# Patient Record
Sex: FEMALE | Race: WHITE | Employment: UNEMPLOYED | URBAN - METROPOLITAN AREA
[De-identification: names, ages, dates, MRNs, and addresses within clinical notes are randomized per-mention and may not be internally consistent; named-entity substitution may affect disease eponyms.]

---

## 2017-03-13 DIAGNOSIS — F42.4 SKIN PICKING HABIT: ICD-10-CM

## 2017-03-13 DIAGNOSIS — G20 DEMENTIA DUE TO PARKINSON'S DISEASE WITH BEHAVIORAL DISTURBANCE (HCC): ICD-10-CM

## 2017-03-13 DIAGNOSIS — Z86.61 HISTORY OF ENCEPHALITIS: ICD-10-CM

## 2017-03-13 DIAGNOSIS — G40.909 SEIZURE DISORDER (HCC): ICD-10-CM

## 2017-03-13 DIAGNOSIS — F02.818 DEMENTIA DUE TO PARKINSON'S DISEASE WITH BEHAVIORAL DISTURBANCE (HCC): ICD-10-CM

## 2017-03-14 RX ORDER — FLUOXETINE 20 MG/1
TABLET, FILM COATED ORAL
Qty: 180 TABLET | Refills: 2 | Status: SHIPPED | OUTPATIENT
Start: 2017-03-14 | End: 2018-09-11 | Stop reason: SDUPTHER

## 2017-07-24 ENCOUNTER — HOSPITAL ENCOUNTER (OUTPATIENT)
Age: 72
Setting detail: SPECIMEN
Discharge: HOME | End: 2017-07-24
Payer: COMMERCIAL

## 2017-07-24 PROCEDURE — 82306 VITAMIN D 25 HYDROXY: CPT

## 2017-07-24 PROCEDURE — 36415 COLL VENOUS BLD VENIPUNCTURE: CPT

## 2017-07-25 LAB — VITAMIN D 25-HYDROXY: 24 NG/ML (ref 30–100)

## 2017-08-04 DIAGNOSIS — G40.909 SEIZURE DISORDER (HCC): ICD-10-CM

## 2017-08-04 DIAGNOSIS — G20 DEMENTIA DUE TO PARKINSON'S DISEASE WITH BEHAVIORAL DISTURBANCE (HCC): ICD-10-CM

## 2017-08-04 DIAGNOSIS — F02.818 DEMENTIA DUE TO PARKINSON'S DISEASE WITH BEHAVIORAL DISTURBANCE (HCC): ICD-10-CM

## 2017-08-04 DIAGNOSIS — Z86.61 HISTORY OF ENCEPHALITIS: ICD-10-CM

## 2017-08-04 DIAGNOSIS — F42.4 SKIN PICKING HABIT: ICD-10-CM

## 2017-08-04 RX ORDER — CARBAMAZEPINE 200 MG/1
TABLET ORAL
Qty: 180 TABLET | Refills: 1 | Status: SHIPPED | OUTPATIENT
Start: 2017-08-04 | End: 2018-03-01 | Stop reason: SDUPTHER

## 2017-10-11 ENCOUNTER — OFFICE VISIT (OUTPATIENT)
Dept: FAMILY MEDICINE CLINIC | Age: 72
End: 2017-10-11
Payer: COMMERCIAL

## 2017-10-11 VITALS
WEIGHT: 140 LBS | HEART RATE: 94 BPM | BODY MASS INDEX: 23.3 KG/M2 | DIASTOLIC BLOOD PRESSURE: 57 MMHG | SYSTOLIC BLOOD PRESSURE: 106 MMHG | RESPIRATION RATE: 16 BRPM

## 2017-10-11 DIAGNOSIS — R32 ENURESIS: ICD-10-CM

## 2017-10-11 DIAGNOSIS — G20 DEMENTIA DUE TO PARKINSON'S DISEASE WITHOUT BEHAVIORAL DISTURBANCE (HCC): ICD-10-CM

## 2017-10-11 DIAGNOSIS — F02.818 DEMENTIA OF THE ALZHEIMER'S TYPE, WITH LATE ONSET, WITH DELUSIONS (HCC): ICD-10-CM

## 2017-10-11 DIAGNOSIS — G30.1 DEMENTIA OF THE ALZHEIMER'S TYPE, WITH LATE ONSET, WITH DELUSIONS (HCC): ICD-10-CM

## 2017-10-11 DIAGNOSIS — Z00.00 WELL ADULT EXAM: Primary | ICD-10-CM

## 2017-10-11 DIAGNOSIS — R56.9 PARTIAL SEIZURE (HCC): ICD-10-CM

## 2017-10-11 DIAGNOSIS — E55.9 VITAMIN D DEFICIENCY: ICD-10-CM

## 2017-10-11 DIAGNOSIS — F02.80 DEMENTIA DUE TO PARKINSON'S DISEASE WITHOUT BEHAVIORAL DISTURBANCE (HCC): ICD-10-CM

## 2017-10-11 PROCEDURE — 99397 PER PM REEVAL EST PAT 65+ YR: CPT | Performed by: FAMILY MEDICINE

## 2017-10-11 RX ORDER — DESMOPRESSIN ACETATE 0.2 MG/1
0.6 TABLET ORAL NIGHTLY
Qty: 90 TABLET | Refills: 11 | Status: SHIPPED | OUTPATIENT
Start: 2017-10-11 | End: 2018-09-11 | Stop reason: SDUPTHER

## 2017-10-11 ASSESSMENT — PATIENT HEALTH QUESTIONNAIRE - PHQ9
1. LITTLE INTEREST OR PLEASURE IN DOING THINGS: 0
2. FEELING DOWN, DEPRESSED OR HOPELESS: 0
SUM OF ALL RESPONSES TO PHQ QUESTIONS 1-9: 0
SUM OF ALL RESPONSES TO PHQ9 QUESTIONS 1 & 2: 0

## 2017-10-11 NOTE — PROGRESS NOTES
[facility] PHYSICIANS  COMPREHENSIVE CARE  [address]  [place] 1977 [address] [place] [id number]  Dept: [phone]      Ted Mahan is a 74 y.o. female who presents today for follow up on her  medical conditions as noted below. Chief Complaint   Patient presents with    Incontinence     peeing bed at night       Patient Active Problem List:     Seizure [facility])     Vitamin D deficiency     Anxiety     Hypercholesteremia     Cellulitis of right leg     MRSA (methicillin resistant staph aureus) culture positive     Breakthrough seizure ([address]     Memory loss     Difficulty speaking     Seizure disorder ([address]     Dementia with behavioral disturbance     History of encephalitis     Dementia due to Parkinson's disease with behavioral disturbance ([address]     Past Medical History:   Diagnosis Date    Anxiety     Convulsion ([address]     Encephalopathy     Herpes     Hyperlipidemia     Left bundle branch block     MRSA (methicillin resistant Staphylococcus aureus)     Parkinson's disease ([address]     Seizures ([address]     secondary to encephalitis    Vitamin D deficiency       Past Surgical History:   Procedure Laterality Date    ABSCESS DRAINAGE Right 12/14/2013    WOUND EXPLORATION AND I+D  RIGHT HIP     No family history on file. Current Outpatient Prescriptions   Medication Sig Dispense Refill    desmopressin (DDAVP) 0.2 MG tablet Take 3 tablets by mouth nightly 90 tablet 11    carBAMazepine (TEGRETOL) 200 MG tablet TAKE 1 AND 1/2 TABLETS BY MOUTH TWO TIMES A DAY  180 tablet 1    FLUoxetine (PROZAC) 20 MG tablet TAKE 2 TABLETS BY MOUTH ONE TIME A DAY  180 tablet 2     No current facility-administered medications for this visit.       ALLERGIES:    Allergies   Allergen Reactions    Latex     Haldol [Haloperidol Lactate]        Social History   Substance Use Topics    Smoking status: Former Smoker     Types: Cigarettes    Smokeless tobacco: Never Used      Comment: unknown how many packs a day, or how many years    Alcohol use No        LDL Calculated (mg/dL)   Date Value   01/05/2016 127     LDL Cholesterol (mg/dL)   Date Value   06/29/2015 115     HDL (mg/dL)   Date Value   01/05/2016 56     BUN (mg/dL)   Date Value   01/05/2016 20     CREATININE (no units)   Date Value   01/05/2016 0.98     Glucose (mg/dL)   Date Value   01/05/2016 101   03/12/2012 93              Subjective:      HPI  She is here today for a well visit  Her daughter states she's been having some problems with bedwetting lately a depends is not necessarily helping the situation. She is continent during the day and there have not been any other changes. Quality & Risk Score Accuracy - MEDICARE ADVANTAGE    Visit Dx:  Partial seizure ([address]  Stable based upon review of recent symptoms. Continue current treatment plan and follow up at least yearly. Visit Dx:  Dementia of the Alzheimer's type, with late onset, with delusions ([address]  Stable based upon review of recent symptoms and exam. Continue current treatment plan and follow-up at least yearly. Last edited 10/11/17 16:47 EDT by Juan Rudolph DO         iew of Systems:     Constitutional: Negative for fever, appetite change and fatigue. Family social and medical history reviewed and unchanged     HENT: Negative. Negative for nosebleeds, trouble swallowing and neck pain. Eyes: Negative for photophobia and visual disturbance. Respiratory: Negative. Negative for chest tightness and shortness of breath. Cardiovascular: Negative. Negative for chest pain and leg swelling. Gastrointestinal: Negative. Negative for abdominal pain and blood in stool. Endocrine: Negative for cold intolerance and polyuria. Genitourinary: Negative for dysuria and hematuria. Musculoskeletal: Negative. Skin: Negative for rash. Allergic/Immunologic: Negative. Neurological: Negative. Negative for dizziness, weakness and numbness. Hematological: Negative. Negative for adenopathy.  Does not bruise/bleed easily. Psychiatric/Behavioral: Negative for sleep disturbance, dysphoric mood and  decreased concentration. The patient is not nervous/anxious. Objective:     Physical Exam:     Nursing note and vitals reviewed. BP (!) 106/57   Pulse 94   Resp 16   Wt 140 lb (63.5 kg)   BMI 23.30 kg/m²   Constitutional: She is oriented to person, place, and time. She   appears well-developed and well-nourished. HENT:   Head: Normocephalic and atraumatic. Right Ear: External ear normal. Tympanic membrane is not erythematous. No middle ear effusion. Left Ear: External ear normal. Tympanic membrane is not erythematous. No middle ear effusion. Nose: No mucosal edema. Mouth/Throat: Oropharynx is clear and moist. No posterior oropharyngeal erythema. Eyes: Conjunctivae and EOM are normal. Pupils are equal, round, and reactive to light. Neck: Normal range of motion. Neck supple. No thyromegaly present. Cardiovascular: Normal rate, regular rhythm and normal heart sounds. No murmur heard. Pulmonary/Chest: Effort normal and breath sounds normal. She has no wheezes. Shehas no rales. Abdominal: Soft. Bowel sounds are normal. She exhibits no distension and no mass. There is no tenderness. There is no rebound and no guarding. Genitourinary/Anorectal:deferred  Musculoskeletal: Normal range of motion. She exhibits no edema or tenderness. Lymphadenopathy: She has no cervical adenopathy. Neurological: She is alert and oriented to person, place, and time. She has normal reflexes. Skin: Skin is warm and dry. No rash noted. Psychiatric: She has a normal mood and affect. Her   behavior is normal.       Assessment:      1. Well adult exam    2. Vitamin D deficiency    3. Enuresis    4. Partial seizure ([address]    5. Dementia of the Alzheimer's type, with late onset, with delusions ([address]    6.  Dementia due to Parkinson's disease without behavioral disturbance ([address]          Plan:      Call or return to clinic prn if these symptoms worsen or fail to improve as anticipated. I have reviewed the instructions with the patient, answering all questions to her satisfaction. No Follow-up on file.   Orders Placed This Encounter   Procedures    CBC Auto Differential     Standing Status:   Future     Standing Expiration Date:   10/11/2018    Comprehensive Metabolic Panel     Standing Status:   Future     Standing Expiration Date:   10/11/2018    Lipid Panel     Standing Status:   Future     Standing Expiration Date:   10/11/2018     Order Specific Question:   Is Patient Fasting?/# of Hours     Answer:   yes    T4, Free     Standing Status:   Future     Standing Expiration Date:   10/11/2018    TSH without Reflex     Standing Status:   Future     Standing Expiration Date:   10/11/2018    Vitamin D 25 Hydroxy     Standing Status:   Future     Standing Expiration Date:   10/11/2018    PTH, Intact     Standing Status:   Future     Standing Expiration Date:   10/11/2018     Orders Placed This Encounter   Medications    desmopressin (DDAVP) 0.2 MG tablet     Sig: Take 3 tablets by mouth nightly     Dispense:  90 tablet     Refill:  11     Try the above medication if this does not help possibly using a bladder control agent for OAB might help  Daughters notify me as to how things are going    Electronically signed by Ngoc So, DO on 10/11/2017 at 4:49 PM

## 2018-03-01 DIAGNOSIS — Z86.61 HISTORY OF ENCEPHALITIS: ICD-10-CM

## 2018-03-01 DIAGNOSIS — F02.818 DEMENTIA DUE TO PARKINSON'S DISEASE WITH BEHAVIORAL DISTURBANCE (HCC): ICD-10-CM

## 2018-03-01 DIAGNOSIS — G20 DEMENTIA DUE TO PARKINSON'S DISEASE WITH BEHAVIORAL DISTURBANCE (HCC): ICD-10-CM

## 2018-03-01 DIAGNOSIS — G40.909 SEIZURE DISORDER (HCC): ICD-10-CM

## 2018-03-01 DIAGNOSIS — F42.4 SKIN PICKING HABIT: ICD-10-CM

## 2018-03-01 RX ORDER — CARBAMAZEPINE 200 MG/1
TABLET ORAL
Qty: 180 TABLET | Refills: 0 | Status: SHIPPED | OUTPATIENT
Start: 2018-03-01 | End: 2018-05-24 | Stop reason: SDUPTHER

## 2018-05-24 DIAGNOSIS — G20 DEMENTIA DUE TO PARKINSON'S DISEASE WITH BEHAVIORAL DISTURBANCE (HCC): ICD-10-CM

## 2018-05-24 DIAGNOSIS — F42.4 SKIN PICKING HABIT: ICD-10-CM

## 2018-05-24 DIAGNOSIS — G40.909 SEIZURE DISORDER (HCC): ICD-10-CM

## 2018-05-24 DIAGNOSIS — F02.818 DEMENTIA DUE TO PARKINSON'S DISEASE WITH BEHAVIORAL DISTURBANCE (HCC): ICD-10-CM

## 2018-05-24 DIAGNOSIS — Z86.61 HISTORY OF ENCEPHALITIS: ICD-10-CM

## 2018-05-24 RX ORDER — CARBAMAZEPINE 200 MG/1
TABLET ORAL
Qty: 180 TABLET | Refills: 0 | Status: SHIPPED | OUTPATIENT
Start: 2018-05-24 | End: 2018-09-11 | Stop reason: SDUPTHER

## 2018-07-24 ENCOUNTER — TELEPHONE (OUTPATIENT)
Dept: FAMILY MEDICINE CLINIC | Age: 73
End: 2018-07-24

## 2018-07-24 NOTE — TELEPHONE ENCOUNTER
Asked daughter Stevie Nuno to schedule appointment. She is out of town until middle of August.  She will call back to schedule an appointment for [facility].

## 2018-08-21 ENCOUNTER — TELEPHONE (OUTPATIENT)
Dept: FAMILY MEDICINE CLINIC | Age: 73
End: 2018-08-21

## 2018-09-07 ENCOUNTER — HOSPITAL ENCOUNTER (EMERGENCY)
Age: 73
Discharge: HOME | End: 2018-09-07
Attending: EMERGENCY MEDICINE
Payer: COMMERCIAL

## 2018-09-07 VITALS
HEART RATE: 81 BPM | RESPIRATION RATE: 16 BRPM | BODY MASS INDEX: 22.29 KG/M2 | TEMPERATURE: 98 F | DIASTOLIC BLOOD PRESSURE: 70 MMHG | OXYGEN SATURATION: 97 % | SYSTOLIC BLOOD PRESSURE: 123 MMHG | HEIGHT: 67 IN | WEIGHT: 142 LBS

## 2018-09-07 DIAGNOSIS — N30.01 ACUTE CYSTITIS WITH HEMATURIA: Primary | ICD-10-CM

## 2018-09-07 DIAGNOSIS — N93.9 VAGINAL BLEEDING: ICD-10-CM

## 2018-09-07 LAB
-: ABNORMAL
ABSOLUTE EOS #: 0.8 K/UL (ref 0–0.4)
ABSOLUTE IMMATURE GRANULOCYTE: ABNORMAL K/UL (ref 0–0.3)
ABSOLUTE LYMPH #: 2.1 K/UL (ref 1–4.8)
ABSOLUTE MONO #: 1.1 K/UL (ref 0.1–1.3)
AMORPHOUS: ABNORMAL
ANION GAP SERPL CALCULATED.3IONS-SCNC: 11 MMOL/L (ref 9–17)
BACTERIA: ABNORMAL
BASOPHILS # BLD: 1 % (ref 0–2)
BASOPHILS ABSOLUTE: 0.1 K/UL (ref 0–0.2)
BILIRUBIN URINE: NEGATIVE
BUN BLDV-MCNC: 20 MG/DL (ref 8–23)
BUN/CREAT BLD: ABNORMAL (ref 9–20)
CALCIUM SERPL-MCNC: 9.2 MG/DL (ref 8.6–10.4)
CASTS UA: ABNORMAL /LPF
CHLORIDE BLD-SCNC: 101 MMOL/L (ref 98–107)
CO2: 26 MMOL/L (ref 20–31)
COLOR: YELLOW
COMMENT UA: ABNORMAL
CREAT SERPL-MCNC: 0.93 MG/DL (ref 0.5–0.9)
CRYSTALS, UA: ABNORMAL /HPF
DIFFERENTIAL TYPE: ABNORMAL
DIRECT EXAM: NORMAL
EOSINOPHILS RELATIVE PERCENT: 10 % (ref 0–4)
EPITHELIAL CELLS UA: ABNORMAL /HPF
GFR AFRICAN AMERICAN: >60 ML/MIN
GFR NON-AFRICAN AMERICAN: 59 ML/MIN
GFR SERPL CREATININE-BSD FRML MDRD: ABNORMAL ML/MIN/{1.73_M2}
GFR SERPL CREATININE-BSD FRML MDRD: ABNORMAL ML/MIN/{1.73_M2}
GLUCOSE BLD-MCNC: 115 MG/DL (ref 70–99)
GLUCOSE URINE: NEGATIVE
HCT VFR BLD CALC: 40.3 % (ref 36–46)
HEMOGLOBIN: 13.5 G/DL (ref 12–16)
IMMATURE GRANULOCYTES: ABNORMAL %
INR BLD: 1
KETONES, URINE: NEGATIVE
LEUKOCYTE ESTERASE, URINE: ABNORMAL
LYMPHOCYTES # BLD: 25 % (ref 24–44)
Lab: NORMAL
MCH RBC QN AUTO: 32 PG (ref 26–34)
MCHC RBC AUTO-ENTMCNC: 33.4 G/DL (ref 31–37)
MCV RBC AUTO: 95.7 FL (ref 80–100)
MONOCYTES # BLD: 13 % (ref 1–7)
MUCUS: ABNORMAL
NITRITE, URINE: NEGATIVE
NRBC AUTOMATED: ABNORMAL PER 100 WBC
OTHER OBSERVATIONS UA: ABNORMAL
PARTIAL THROMBOPLASTIN TIME: 26.1 SEC (ref 23–31)
PDW BLD-RTO: 13.4 % (ref 11.5–14.9)
PH UA: 6 (ref 5–8)
PLATELET # BLD: 331 K/UL (ref 150–450)
PLATELET ESTIMATE: ABNORMAL
PMV BLD AUTO: 7.5 FL (ref 6–12)
POTASSIUM SERPL-SCNC: 4.2 MMOL/L (ref 3.7–5.3)
PROTEIN UA: ABNORMAL
PROTHROMBIN TIME: 10 SEC (ref 9.7–12)
RBC # BLD: 4.21 M/UL (ref 4–5.2)
RBC # BLD: ABNORMAL 10*6/UL
RBC UA: ABNORMAL /HPF
RENAL EPITHELIAL, UA: ABNORMAL /HPF
SEG NEUTROPHILS: 51 % (ref 36–66)
SEGMENTED NEUTROPHILS ABSOLUTE COUNT: 4.3 K/UL (ref 1.3–9.1)
SODIUM BLD-SCNC: 138 MMOL/L (ref 135–144)
SPECIFIC GRAVITY UA: 1.02 (ref 1–1.03)
SPECIMEN DESCRIPTION: NORMAL
STATUS: NORMAL
TRICHOMONAS: ABNORMAL
TURBIDITY: ABNORMAL
URINE HGB: NEGATIVE
UROBILINOGEN, URINE: NORMAL
WBC # BLD: 8.5 K/UL (ref 3.5–11)
WBC # BLD: ABNORMAL 10*3/UL
WBC UA: ABNORMAL /HPF
YEAST: ABNORMAL

## 2018-09-07 PROCEDURE — 87480 CANDIDA DNA DIR PROBE: CPT

## 2018-09-07 PROCEDURE — 81001 URINALYSIS AUTO W/SCOPE: CPT

## 2018-09-07 PROCEDURE — 6370000000 HC RX 637 (ALT 250 FOR IP): Performed by: STUDENT IN AN ORGANIZED HEALTH CARE EDUCATION/TRAINING PROGRAM

## 2018-09-07 PROCEDURE — 99284 EMERGENCY DEPT VISIT MOD MDM: CPT

## 2018-09-07 PROCEDURE — 87591 N.GONORRHOEAE DNA AMP PROB: CPT

## 2018-09-07 PROCEDURE — 36415 COLL VENOUS BLD VENIPUNCTURE: CPT

## 2018-09-07 PROCEDURE — 87660 TRICHOMONAS VAGIN DIR PROBE: CPT

## 2018-09-07 PROCEDURE — 87086 URINE CULTURE/COLONY COUNT: CPT

## 2018-09-07 PROCEDURE — 87510 GARDNER VAG DNA DIR PROBE: CPT

## 2018-09-07 PROCEDURE — 85730 THROMBOPLASTIN TIME PARTIAL: CPT

## 2018-09-07 PROCEDURE — 85610 PROTHROMBIN TIME: CPT

## 2018-09-07 PROCEDURE — 80048 BASIC METABOLIC PNL TOTAL CA: CPT

## 2018-09-07 PROCEDURE — 85025 COMPLETE CBC W/AUTO DIFF WBC: CPT

## 2018-09-07 PROCEDURE — 87491 CHLMYD TRACH DNA AMP PROBE: CPT

## 2018-09-07 RX ORDER — CEPHALEXIN 500 MG/1
500 CAPSULE ORAL 2 TIMES DAILY
Qty: 14 CAPSULE | Refills: 0 | Status: SHIPPED | OUTPATIENT
Start: 2018-09-07 | End: 2018-09-14

## 2018-09-07 RX ORDER — CEPHALEXIN 250 MG/1
500 CAPSULE ORAL ONCE
Status: COMPLETED | OUTPATIENT
Start: 2018-09-07 | End: 2018-09-07

## 2018-09-07 RX ADMIN — CEPHALEXIN 500 MG: 250 CAPSULE ORAL at 21:12

## 2018-09-07 NOTE — ED NOTES
Pt ambulated to the restroom with daughter. Gait even and steady.       Mela Taveras, MITCH  09/07/18 1929

## 2018-09-07 NOTE — ED PROVIDER NOTES
[facility] [address] [facility] ED  Emergency Department Encounter  Emergency Medicine Resident     Pt Name: Kyleigh Nobles  MRN: [id number]  [place] 1945  Date of evaluation: 9/7/18  PCP:  Abdoulaye Braga, [address] [place] [address]       Chief Complaint   Patient presents with    Vaginal Bleeding     possible wound in perineal area       HISTORY OF PRESENT ILLNESS  (Location/Symptom, Timing/Onset, Context/Setting, Quality, Duration, Modifying Factors, Severity.)      Kyleigh Nobles is a 74 y.o. female who presents with concern for vaginal bleeding noted by daughter when changing the patient. Patient has a past history of hyperlipidemia, encephalopathy, and Parkinson's disease. She states that her only medication is Tegretol. Daughter states that she noted a small amount of blood when changing the patient yesterday. At this time the patient has no complaints, however the patient has a history of dementia. History is significantly limited by patient's history of dementia. PAST MEDICAL / SURGICAL / SOCIAL / FAMILY HISTORY      has a past medical history of Anxiety; Convulsion ([address] Encephalopathy; Herpes; Hyperlipidemia; Left bundle branch block; MRSA (methicillin resistant Staphylococcus aureus); Parkinson's disease ([address] Seizures ([address] and Vitamin D deficiency. has a past surgical history that includes Abscess Drainage (Right, 12/14/2013). Social History     Social History    Marital status: Single     Spouse name: N/A    Number of children: N/A    Years of education: N/A     Occupational History    Not on file. Social History Main Topics    Smoking status: Former Smoker     Types: Cigarettes    Smokeless tobacco: Never Used      Comment: unknown how many packs a day, or how many years    Alcohol use No    Drug use: No    Sexual activity: Not on file     Other Topics Concern    Not on file     Social History Narrative    No narrative on file       History reviewed.  No pertinent family history. Allergies:  Latex and Haldol [haloperidol lactate]    Home Medications:  Prior to Admission medications    Medication Sig Start Date End Date Taking? Authorizing Provider   cephALEXin (KEFLEX) 500 MG capsule Take 1 capsule by mouth 2 times daily for 7 days 9/7/18 9/14/18 Yes Consuelo Cunningham MD   carBAMazepine (TEGRETOL) 200 MG tablet take 1 and 1/2 tablets by mouth twice a day 5/24/18   Sravani Wu, DO   desmopressin (DDAVP) 0.2 MG tablet Take 3 tablets by mouth nightly 10/11/17   Sravani Wu DO   FLUoxetine (PROZAC) 20 MG tablet TAKE 2 TABLETS BY MOUTH ONE TIME A DAY  3/14/17   Sravani Wu, DO       REVIEW OF SYSTEMS    (2-9 systems for level 4, 10 or more for level 5)      Review of Systems   Unable to perform ROS: Dementia       PHYSICAL EXAM   (up to 7 for level 4, 8 or more for level 5)      INITIAL VITALS:   /70   Pulse 81   Temp 98 °F (36.7 °C) (Oral)   Resp 16   Ht 5' 7\" (1.702 m)   Wt 142 lb (64.4 kg)   SpO2 97%   BMI 22.24 kg/m²     Physical Exam   Constitutional: She appears well-developed and well-nourished. No distress. HENT:   Mouth/Throat: Oropharynx is clear and moist.   Cardiovascular: Normal rate, regular rhythm and normal heart sounds. No murmur heard. Pulmonary/Chest: Effort normal and breath sounds normal. No respiratory distress. She has no wheezes. She has no rales. Abdominal: Soft. Bowel sounds are normal. She exhibits no distension. There is no tenderness. There is no rebound and no guarding. Genitourinary: Rectal exam shows guaiac negative stool. No vaginal discharge found. Genitourinary Comments: Cervix could not be visualized, no vaginal lesions, no discharge. Guaiac negative on dark brown stool. No internal or external hemorrhoids. Skin: Skin is warm and dry.        DIFFERENTIAL  DIAGNOSIS     PLAN (LABS / IMAGING / EKG):  Orders Placed This Encounter   Procedures    Vaginitis DNA Probe    C.trachomatis N.gonorrhoeae DNA    Urine culture clean catch    Urinalysis Reflex to Culture    CBC Auto Differential    Basic Metabolic Panel    Protime-INR    APTT    Microscopic Urinalysis    Vaginal exam       MEDICATIONS ORDERED:  Orders Placed This Encounter   Medications    cephALEXin (KEFLEX) capsule 500 mg    cephALEXin (KEFLEX) 500 MG capsule     Sig: Take 1 capsule by mouth 2 times daily for 7 days     Dispense:  14 capsule     Refill:  0       DDX: Atrophic vaginitis, vaginal mass, urinary tract infection, STI, bacterial vaginosis, candidal vulvovaginitis, rectal bleeding    DIAGNOSTIC RESULTS / EMERGENCY DEPARTMENT COURSE / MDM     LABS:  Results for orders placed or performed during the hospital encounter of 09/07/18   Vaginitis DNA Probe   Result Value Ref Range    Specimen Description . VAGINA     Special Requests NOT REPORTED     Direct Exam NEGATIVE for Gardnerella vaginalis     Direct Exam NEGATIVE for Candida sp. Direct Exam NEGATIVE for Trichomonas vaginalis     Direct Exam       Method of testing is a DNA probe intended for detection and identification of    Direct Exam        Candida species, Gardnerella vaginalis, and Trichomonas vaginalis nucleic acid    Direct Exam        in vaginal fluid specimens from patients with symptoms of vaginitis/vaginosis.     Status FINAL 09/07/2018    Urinalysis Reflex to Culture   Result Value Ref Range    Color, UA YELLOW YEL    Turbidity UA CLOUDY (A) CLEAR    Glucose, Ur NEGATIVE NEG    Bilirubin Urine NEGATIVE NEG    Ketones, Urine NEGATIVE NEG    Specific Gravity, UA 1.025 1.000 - 1.030    Urine Hgb NEGATIVE NEG    pH, UA 6.0 5.0 - 8.0    Protein, UA TRACE (A) NEG    Urobilinogen, Urine Normal NORM    Nitrite, Urine NEGATIVE NEG    Leukocyte Esterase, Urine MOD (A) NEG    Urinalysis Comments NOT REPORTED    CBC Auto Differential   Result Value Ref Range    WBC 8.5 3.5 - 11.0 k/uL    RBC 4.21 4.0 - 5.2 m/uL    Hemoglobin 13.5 12.0 - 16.0 g/dL    Hematocrit 40.3 36 - 46 %    MCV 95.7 80 Resident    (Please note that portions of this note were completed with a voice recognition program.  Efforts were made to edit the dictations but occasionally words are mis-transcribed.)     Aracelis Galdamez MD  09/08/18 0816

## 2018-09-08 LAB
CULTURE: NORMAL
Lab: NORMAL
SPECIMEN DESCRIPTION: NORMAL
STATUS: NORMAL

## 2018-09-10 LAB
C TRACH DNA GENITAL QL NAA+PROBE: NEGATIVE
N. GONORRHOEAE DNA: NEGATIVE

## 2018-09-11 ENCOUNTER — OFFICE VISIT (OUTPATIENT)
Dept: FAMILY MEDICINE CLINIC | Age: 73
End: 2018-09-11
Payer: COMMERCIAL

## 2018-09-11 VITALS
HEIGHT: 67 IN | BODY MASS INDEX: 21.5 KG/M2 | WEIGHT: 137 LBS | HEART RATE: 77 BPM | DIASTOLIC BLOOD PRESSURE: 69 MMHG | SYSTOLIC BLOOD PRESSURE: 108 MMHG | RESPIRATION RATE: 16 BRPM

## 2018-09-11 DIAGNOSIS — R32 ENURESIS: ICD-10-CM

## 2018-09-11 DIAGNOSIS — G20 DEMENTIA DUE TO PARKINSON'S DISEASE WITH BEHAVIORAL DISTURBANCE (HCC): ICD-10-CM

## 2018-09-11 DIAGNOSIS — Z12.39 SCREENING FOR BREAST CANCER: ICD-10-CM

## 2018-09-11 DIAGNOSIS — F03.91 DEMENTIA WITH BEHAVIORAL DISTURBANCE, UNSPECIFIED DEMENTIA TYPE: ICD-10-CM

## 2018-09-11 DIAGNOSIS — G40.919 BREAKTHROUGH SEIZURE (HCC): ICD-10-CM

## 2018-09-11 DIAGNOSIS — Z00.00 WELL ADULT EXAM: Primary | ICD-10-CM

## 2018-09-11 DIAGNOSIS — F02.818 DEMENTIA DUE TO PARKINSON'S DISEASE WITH BEHAVIORAL DISTURBANCE (HCC): ICD-10-CM

## 2018-09-11 DIAGNOSIS — Z86.61 HISTORY OF ENCEPHALITIS: ICD-10-CM

## 2018-09-11 DIAGNOSIS — Z12.11 SCREENING FOR COLON CANCER: ICD-10-CM

## 2018-09-11 DIAGNOSIS — F42.4 SKIN PICKING HABIT: ICD-10-CM

## 2018-09-11 DIAGNOSIS — R56.9 SEIZURE (HCC): ICD-10-CM

## 2018-09-11 DIAGNOSIS — Z23 NEED FOR VACCINATION WITH 13-POLYVALENT PNEUMOCOCCAL CONJUGATE VACCINE: ICD-10-CM

## 2018-09-11 DIAGNOSIS — Z23 NEED FOR INFLUENZA VACCINATION: ICD-10-CM

## 2018-09-11 DIAGNOSIS — G40.909 SEIZURE DISORDER (HCC): ICD-10-CM

## 2018-09-11 PROCEDURE — 99397 PER PM REEVAL EST PAT 65+ YR: CPT | Performed by: FAMILY MEDICINE

## 2018-09-11 PROCEDURE — 90662 IIV NO PRSV INCREASED AG IM: CPT | Performed by: FAMILY MEDICINE

## 2018-09-11 PROCEDURE — 90670 PCV13 VACCINE IM: CPT | Performed by: FAMILY MEDICINE

## 2018-09-11 PROCEDURE — G0008 ADMIN INFLUENZA VIRUS VAC: HCPCS | Performed by: FAMILY MEDICINE

## 2018-09-11 PROCEDURE — G0009 ADMIN PNEUMOCOCCAL VACCINE: HCPCS | Performed by: FAMILY MEDICINE

## 2018-09-11 RX ORDER — DESMOPRESSIN ACETATE 0.2 MG/1
0.6 TABLET ORAL NIGHTLY
Qty: 90 TABLET | Refills: 11 | Status: SHIPPED | OUTPATIENT
Start: 2018-09-11 | End: 2019-10-24 | Stop reason: SDUPTHER

## 2018-09-11 RX ORDER — CARBAMAZEPINE 200 MG/1
TABLET ORAL
Qty: 180 TABLET | Refills: 3 | Status: SHIPPED | OUTPATIENT
Start: 2018-09-11 | End: 2019-05-28 | Stop reason: SDUPTHER

## 2018-09-11 RX ORDER — FLUOXETINE 20 MG/1
TABLET, FILM COATED ORAL
Qty: 180 TABLET | Refills: 3 | Status: SHIPPED | OUTPATIENT
Start: 2018-09-11 | End: 2019-09-19 | Stop reason: SDUPTHER

## 2018-09-11 ASSESSMENT — PATIENT HEALTH QUESTIONNAIRE - PHQ9
SUM OF ALL RESPONSES TO PHQ QUESTIONS 1-9: 0
SUM OF ALL RESPONSES TO PHQ QUESTIONS 1-9: 0
2. FEELING DOWN, DEPRESSED OR HOPELESS: 0
SUM OF ALL RESPONSES TO PHQ9 QUESTIONS 1 & 2: 0
1. LITTLE INTEREST OR PLEASURE IN DOING THINGS: 0

## 2018-09-11 NOTE — PROGRESS NOTES
Pulse 77   Resp 16   Ht 5' 7\" (1.702 m)   Wt 137 lb (62.1 kg)   Breastfeeding? No   BMI 21.46 kg/m²   Constitutional: She is oriented to person, place, and time. She   appears well-developed and well-nourished. HENT:   Head: Normocephalic and atraumatic. Right Ear: External ear normal. Tympanic membrane is not erythematous. No middle ear effusion. Left Ear: External ear normal. Tympanic membrane is not erythematous. No middle ear effusion. Nose: No mucosal edema. Mouth/Throat: Oropharynx is clear and moist. No posterior oropharyngeal erythema. Eyes: Conjunctivae and EOM are normal. Pupils are equal, round, and reactive to light. Neck: Normal range of motion. Neck supple. No thyromegaly present. Cardiovascular: Normal rate, regular rhythm and normal heart sounds. No murmur heard. Pulmonary/Chest: Effort normal and breath sounds normal. She has no wheezes. Shehas no rales. Abdominal: Soft. Bowel sounds are normal. She exhibits no distension and no mass. There is no tenderness. There is no rebound and no guarding. Genitourinary/Anorectal:deferred  Musculoskeletal: Normal range of motion. She exhibits no edema or tenderness. Lymphadenopathy: She has no cervical adenopathy. Neurological: She is alert and oriented to person. She has normal reflexes. Skin: Skin is warm and dry. No rash noted. Psychiatric: She has a normal mood and affect. Her   behavior is normal.       Assessment:      1. Well adult exam    2. Screening for breast cancer    3. Screening for colon cancer    4. History of encephalitis    5. Seizure disorder ([address]    6. Dementia due to Parkinson's disease with behavioral disturbance ([address]    7. Skin picking habit    8. Enuresis    9. Breakthrough seizure ([address]    10. Seizure ([address]    11. Dementia with behavioral disturbance, unspecified dementia type    12. Need for influenza vaccination    13.  Need for vaccination with 13-polyvalent pneumococcal conjugate vaccine Plan:      Call or return to clinic prn if these symptoms worsen or fail to improve as anticipated. I have reviewed the instructions with the patient, answering all questions to her satisfaction. No Follow-up on file.   Orders Placed This Encounter   Procedures    MC Screen Routine     Standing Status:   Future     Standing Expiration Date:   11/11/2019     Order Specific Question:   Reason for exam:     Answer:   screening    INFLUENZA, HIGH DOSE, 65 YRS +, IM, PF, PREFILL SYR, 0.5ML (FLUZONE HD)    PREVNAR 13 IM (Pneumococcal conjugate vaccine 13-valent)    CBC Auto Differential     Standing Status:   Future     Standing Expiration Date:   9/11/2019    Comprehensive Metabolic Panel     Standing Status:   Future     Standing Expiration Date:   9/11/2019    Lipid Panel     Standing Status:   Future     Standing Expiration Date:   9/11/2019     Order Specific Question:   Is Patient Fasting?/# of Hours     Answer:   yes    T4, Free     Standing Status:   Future     Standing Expiration Date:   9/11/2019    Thyroid Peroxidase Antibody     Standing Status:   Future     Standing Expiration Date:   9/11/2019    TSH without Reflex     Standing Status:   Future     Standing Expiration Date:   9/11/2019    Vitamin D 25 Hydroxy     Standing Status:   Future     Standing Expiration Date:   9/11/2019    PTH, Intact     Standing Status:   Future     Standing Expiration Date:   9/11/2019    [facility] [address], DO*     Referral Priority:   Routine     Referral Type:   Consult for Advice and Opinion     Referral Reason:   Specialty Services Required     Referred to Provider:   Deven Lemus MD     Requested Specialty:   Gastroenterology     Number of Visits Requested:   1     Orders Placed This Encounter   Medications    carBAMazepine (TEGRETOL) 200 MG tablet     Sig: take 1 and 1/2 tablets by mouth twice a day     Dispense:  180 tablet     Refill:  3    desmopressin (DDAVP) 0.2 MG tablet     Sig: Take 3 tablets by mouth nightly     Dispense:  90 tablet     Refill:  11    FLUoxetine (PROZAC) 20 MG tablet     Sig: TAKE 2 TABLETS BY MOUTH ONE TIME A DAY     Dispense:  180 tablet     Refill:  3   GIVEN A PREVNAR 13 AND A HIGH DOSE FLU  We'll order testing but daughter and I agree that she has not been any mental capacity to get any of the testing done   Electronically signed by Otto Foss DO on 9/11/2018 at 12:28 PM

## 2019-05-28 DIAGNOSIS — Z86.61 HISTORY OF ENCEPHALITIS: ICD-10-CM

## 2019-05-28 DIAGNOSIS — G40.909 SEIZURE DISORDER (HCC): ICD-10-CM

## 2019-05-28 DIAGNOSIS — G20 DEMENTIA DUE TO PARKINSON'S DISEASE WITH BEHAVIORAL DISTURBANCE (HCC): ICD-10-CM

## 2019-05-28 DIAGNOSIS — F02.818 DEMENTIA DUE TO PARKINSON'S DISEASE WITH BEHAVIORAL DISTURBANCE (HCC): ICD-10-CM

## 2019-05-28 DIAGNOSIS — F42.4 SKIN PICKING HABIT: ICD-10-CM

## 2019-05-29 RX ORDER — CARBAMAZEPINE 200 MG/1
TABLET ORAL
Qty: 180 TABLET | Refills: 2 | Status: SHIPPED | OUTPATIENT
Start: 2019-05-29 | End: 2020-06-11

## 2019-09-19 DIAGNOSIS — G40.909 SEIZURE DISORDER (HCC): ICD-10-CM

## 2019-09-19 DIAGNOSIS — F02.818 DEMENTIA DUE TO PARKINSON'S DISEASE WITH BEHAVIORAL DISTURBANCE (HCC): ICD-10-CM

## 2019-09-19 DIAGNOSIS — Z86.61 HISTORY OF ENCEPHALITIS: ICD-10-CM

## 2019-09-19 DIAGNOSIS — F42.4 SKIN PICKING HABIT: ICD-10-CM

## 2019-09-19 DIAGNOSIS — G20 DEMENTIA DUE TO PARKINSON'S DISEASE WITH BEHAVIORAL DISTURBANCE (HCC): ICD-10-CM

## 2019-09-19 RX ORDER — FLUOXETINE HYDROCHLORIDE 20 MG/1
CAPSULE ORAL
Qty: 180 CAPSULE | Refills: 2 | Status: SHIPPED | OUTPATIENT
Start: 2019-09-19 | End: 2020-07-14

## 2019-10-07 ENCOUNTER — OFFICE VISIT (OUTPATIENT)
Dept: ORTHOPEDIC SURGERY | Age: 74
End: 2019-10-07
Payer: COMMERCIAL

## 2019-10-07 VITALS
SYSTOLIC BLOOD PRESSURE: 123 MMHG | WEIGHT: 132.4 LBS | DIASTOLIC BLOOD PRESSURE: 76 MMHG | HEIGHT: 67 IN | BODY MASS INDEX: 20.78 KG/M2 | HEART RATE: 83 BPM

## 2019-10-07 DIAGNOSIS — S42.202A CLOSED FRACTURE OF PROXIMAL END OF LEFT HUMERUS, UNSPECIFIED FRACTURE MORPHOLOGY, INITIAL ENCOUNTER: Primary | ICD-10-CM

## 2019-10-07 PROCEDURE — 99203 OFFICE O/P NEW LOW 30 MIN: CPT | Performed by: ORTHOPAEDIC SURGERY

## 2019-10-07 RX ORDER — HYDROCODONE BITARTRATE AND ACETAMINOPHEN 5; 325 MG/1; MG/1
1 TABLET ORAL EVERY 4 HOURS PRN
Qty: 42 TABLET | Refills: 0 | Status: SHIPPED | OUTPATIENT
Start: 2019-10-07 | End: 2019-10-14

## 2019-10-07 RX ORDER — ACETAMINOPHEN 500 MG
500 TABLET ORAL EVERY 6 HOURS PRN
COMMUNITY
End: 2019-10-17

## 2019-10-07 ASSESSMENT — ENCOUNTER SYMPTOMS
ABDOMINAL PAIN: 0
CHEST TIGHTNESS: 0
VOMITING: 0
SHORTNESS OF BREATH: 0
COUGH: 0
COLOR CHANGE: 0
DIARRHEA: 0
NAUSEA: 0
CONSTIPATION: 0
ABDOMINAL DISTENTION: 0
APNEA: 0

## 2019-10-16 ENCOUNTER — CARE COORDINATION (OUTPATIENT)
Dept: CARE COORDINATION | Age: 74
End: 2019-10-16

## 2019-10-16 ENCOUNTER — OFFICE VISIT (OUTPATIENT)
Dept: FAMILY MEDICINE CLINIC | Age: 74
End: 2019-10-16
Payer: COMMERCIAL

## 2019-10-16 VITALS
WEIGHT: 139.4 LBS | HEART RATE: 90 BPM | OXYGEN SATURATION: 97 % | RESPIRATION RATE: 16 BRPM | SYSTOLIC BLOOD PRESSURE: 126 MMHG | DIASTOLIC BLOOD PRESSURE: 68 MMHG | HEIGHT: 68 IN | BODY MASS INDEX: 21.13 KG/M2

## 2019-10-16 DIAGNOSIS — G20 DEMENTIA DUE TO PARKINSON'S DISEASE WITH BEHAVIORAL DISTURBANCE (HCC): ICD-10-CM

## 2019-10-16 DIAGNOSIS — G40.909 SEIZURE DISORDER (HCC): ICD-10-CM

## 2019-10-16 DIAGNOSIS — F02.818 DEMENTIA ASSOCIATED WITH OTHER UNDERLYING DISEASE WITH BEHAVIORAL DISTURBANCE: ICD-10-CM

## 2019-10-16 DIAGNOSIS — R23.0 BLUE TOES: ICD-10-CM

## 2019-10-16 DIAGNOSIS — R60.0 LEG EDEMA, LEFT: Primary | ICD-10-CM

## 2019-10-16 DIAGNOSIS — G40.919 BREAKTHROUGH SEIZURE (HCC): ICD-10-CM

## 2019-10-16 DIAGNOSIS — N39.41 URGE INCONTINENCE OF URINE: ICD-10-CM

## 2019-10-16 DIAGNOSIS — F02.818 DEMENTIA DUE TO PARKINSON'S DISEASE WITH BEHAVIORAL DISTURBANCE (HCC): ICD-10-CM

## 2019-10-16 PROCEDURE — 99214 OFFICE O/P EST MOD 30 MIN: CPT | Performed by: FAMILY MEDICINE

## 2019-10-16 ASSESSMENT — PATIENT HEALTH QUESTIONNAIRE - PHQ9
1. LITTLE INTEREST OR PLEASURE IN DOING THINGS: 0
SUM OF ALL RESPONSES TO PHQ9 QUESTIONS 1 & 2: 0
SUM OF ALL RESPONSES TO PHQ QUESTIONS 1-9: 0
2. FEELING DOWN, DEPRESSED OR HOPELESS: 0
SUM OF ALL RESPONSES TO PHQ QUESTIONS 1-9: 0

## 2019-10-17 ENCOUNTER — HOSPITAL ENCOUNTER (OUTPATIENT)
Dept: VASCULAR LAB | Age: 74
Discharge: HOME | End: 2019-10-17
Payer: COMMERCIAL

## 2019-10-17 ENCOUNTER — HOSPITAL ENCOUNTER (EMERGENCY)
Age: 74
Discharge: HOME | End: 2019-10-17
Attending: EMERGENCY MEDICINE
Payer: COMMERCIAL

## 2019-10-17 ENCOUNTER — OFFICE VISIT (OUTPATIENT)
Dept: ORTHOPEDIC SURGERY | Age: 74
End: 2019-10-17
Payer: COMMERCIAL

## 2019-10-17 VITALS
BODY MASS INDEX: 20.73 KG/M2 | DIASTOLIC BLOOD PRESSURE: 58 MMHG | SYSTOLIC BLOOD PRESSURE: 133 MMHG | WEIGHT: 129 LBS | RESPIRATION RATE: 16 BRPM | HEART RATE: 93 BPM | HEIGHT: 66 IN | TEMPERATURE: 97.4 F | OXYGEN SATURATION: 98 %

## 2019-10-17 VITALS
BODY MASS INDEX: 20.73 KG/M2 | SYSTOLIC BLOOD PRESSURE: 130 MMHG | HEART RATE: 101 BPM | HEIGHT: 66 IN | DIASTOLIC BLOOD PRESSURE: 77 MMHG | WEIGHT: 129 LBS

## 2019-10-17 DIAGNOSIS — S42.202D CLOSED FRACTURE OF PROXIMAL END OF LEFT HUMERUS WITH ROUTINE HEALING, UNSPECIFIED FRACTURE MORPHOLOGY, SUBSEQUENT ENCOUNTER: Primary | ICD-10-CM

## 2019-10-17 DIAGNOSIS — R60.0 LEG EDEMA, LEFT: ICD-10-CM

## 2019-10-17 DIAGNOSIS — R23.0 BLUE TOES: ICD-10-CM

## 2019-10-17 DIAGNOSIS — I82.4Y2 ACUTE DEEP VEIN THROMBOSIS (DVT) OF PROXIMAL VEIN OF LEFT LOWER EXTREMITY (HCC): Primary | ICD-10-CM

## 2019-10-17 PROCEDURE — 99283 EMERGENCY DEPT VISIT LOW MDM: CPT

## 2019-10-17 PROCEDURE — 93926 LOWER EXTREMITY STUDY: CPT

## 2019-10-17 PROCEDURE — 99213 OFFICE O/P EST LOW 20 MIN: CPT | Performed by: ORTHOPAEDIC SURGERY

## 2019-10-17 PROCEDURE — 93971 EXTREMITY STUDY: CPT

## 2019-10-17 RX ORDER — HYDROCODONE BITARTRATE AND ACETAMINOPHEN 5; 325 MG/1; MG/1
1 TABLET ORAL EVERY 4 HOURS PRN
Qty: 42 TABLET | Refills: 0 | Status: SHIPPED | OUTPATIENT
Start: 2019-10-17 | End: 2019-10-24

## 2019-10-17 ASSESSMENT — ENCOUNTER SYMPTOMS
ABDOMINAL DISTENTION: 0
APNEA: 0
ABDOMINAL PAIN: 0
SHORTNESS OF BREATH: 0
DIARRHEA: 0
SINUS PRESSURE: 0
CONSTIPATION: 0
VOMITING: 0
CONSTIPATION: 0
RHINORRHEA: 0
SORE THROAT: 0
COLOR CHANGE: 0
BACK PAIN: 0
WHEEZING: 0
TROUBLE SWALLOWING: 0
COUGH: 0
FACIAL SWELLING: 0
CHEST TIGHTNESS: 0
EYE PAIN: 0
COUGH: 0
CHEST TIGHTNESS: 0
BLOOD IN STOOL: 0
ABDOMINAL PAIN: 0
DIARRHEA: 0
NAUSEA: 0
COLOR CHANGE: 0
NAUSEA: 0
SHORTNESS OF BREATH: 0
VOMITING: 0
EYE REDNESS: 0
EYE DISCHARGE: 0

## 2019-10-17 ASSESSMENT — PAIN SCALES - GENERAL: PAINLEVEL_OUTOF10: 10

## 2019-10-21 DIAGNOSIS — S42.202A CLOSED FRACTURE OF PROXIMAL END OF LEFT HUMERUS, UNSPECIFIED FRACTURE MORPHOLOGY, INITIAL ENCOUNTER: Primary | ICD-10-CM

## 2019-10-22 ENCOUNTER — CARE COORDINATION (OUTPATIENT)
Dept: CARE COORDINATION | Age: 74
End: 2019-10-22

## 2019-10-24 DIAGNOSIS — R32 ENURESIS: ICD-10-CM

## 2019-10-25 ENCOUNTER — OFFICE VISIT (OUTPATIENT)
Dept: ORTHOPEDIC SURGERY | Age: 74
End: 2019-10-25
Payer: COMMERCIAL

## 2019-10-25 VITALS
HEART RATE: 91 BPM | WEIGHT: 145 LBS | DIASTOLIC BLOOD PRESSURE: 66 MMHG | BODY MASS INDEX: 23.3 KG/M2 | SYSTOLIC BLOOD PRESSURE: 120 MMHG | HEIGHT: 66 IN

## 2019-10-25 DIAGNOSIS — S42.202D CLOSED FRACTURE OF PROXIMAL END OF LEFT HUMERUS WITH ROUTINE HEALING, UNSPECIFIED FRACTURE MORPHOLOGY, SUBSEQUENT ENCOUNTER: Primary | ICD-10-CM

## 2019-10-25 PROCEDURE — 99212 OFFICE O/P EST SF 10 MIN: CPT | Performed by: PHYSICIAN ASSISTANT

## 2019-10-25 RX ORDER — HYDROCODONE BITARTRATE AND ACETAMINOPHEN 5; 325 MG/1; MG/1
1 TABLET ORAL
Qty: 40 TABLET | Refills: 0 | Status: SHIPPED | OUTPATIENT
Start: 2019-10-25 | End: 2019-11-01

## 2019-10-25 RX ORDER — DESMOPRESSIN ACETATE 0.2 MG/1
0.6 TABLET ORAL NIGHTLY
Qty: 90 TABLET | Refills: 10 | Status: SHIPPED | OUTPATIENT
Start: 2019-10-25 | End: 2020-10-13

## 2019-10-25 ASSESSMENT — ENCOUNTER SYMPTOMS
APNEA: 0
SHORTNESS OF BREATH: 0
ABDOMINAL PAIN: 0
DIARRHEA: 0
NAUSEA: 0
CHEST TIGHTNESS: 0
ABDOMINAL DISTENTION: 0
VOMITING: 0
CONSTIPATION: 0
COUGH: 0
COLOR CHANGE: 0

## 2019-10-29 ENCOUNTER — CARE COORDINATION (OUTPATIENT)
Dept: CARE COORDINATION | Age: 74
End: 2019-10-29

## 2019-11-05 ENCOUNTER — CARE COORDINATION (OUTPATIENT)
Dept: CARE COORDINATION | Age: 74
End: 2019-11-05

## 2019-11-06 DIAGNOSIS — R30.0 DYSURIA: Primary | ICD-10-CM

## 2019-11-12 ENCOUNTER — CARE COORDINATION (OUTPATIENT)
Dept: CARE COORDINATION | Age: 74
End: 2019-11-12

## 2019-11-13 ENCOUNTER — HOSPITAL ENCOUNTER (OUTPATIENT)
Age: 74
Setting detail: SPECIMEN
Discharge: HOME | End: 2019-11-13
Payer: COMMERCIAL

## 2019-11-13 DIAGNOSIS — R30.0 DYSURIA: ICD-10-CM

## 2019-11-13 LAB
-: ABNORMAL
AMORPHOUS: ABNORMAL
BACTERIA: ABNORMAL
BILIRUBIN URINE: NEGATIVE
CASTS UA: ABNORMAL /LPF (ref 0–8)
COLOR: YELLOW
COMMENT UA: ABNORMAL
CRYSTALS, UA: ABNORMAL /HPF
EPITHELIAL CELLS UA: ABNORMAL /HPF (ref 0–5)
GLUCOSE URINE: NEGATIVE
KETONES, URINE: NEGATIVE
LEUKOCYTE ESTERASE, URINE: ABNORMAL
MUCUS: ABNORMAL
NITRITE, URINE: NEGATIVE
OTHER OBSERVATIONS UA: ABNORMAL
PH UA: 7 (ref 5–8)
PROTEIN UA: ABNORMAL
RBC UA: ABNORMAL /HPF (ref 0–4)
RENAL EPITHELIAL, UA: ABNORMAL /HPF
SPECIFIC GRAVITY UA: 1.02 (ref 1–1.03)
TRICHOMONAS: ABNORMAL
TURBIDITY: ABNORMAL
URINE HGB: NEGATIVE
UROBILINOGEN, URINE: NORMAL
WBC UA: ABNORMAL /HPF (ref 0–5)
YEAST: ABNORMAL

## 2019-11-14 ENCOUNTER — HOSPITAL ENCOUNTER (OUTPATIENT)
Dept: ULTRASOUND IMAGING | Facility: CLINIC | Age: 74
Discharge: HOME | End: 2019-11-16
Payer: COMMERCIAL

## 2019-11-14 ENCOUNTER — OFFICE VISIT (OUTPATIENT)
Dept: ORTHOPEDIC SURGERY | Age: 74
End: 2019-11-14
Payer: COMMERCIAL

## 2019-11-14 VITALS
DIASTOLIC BLOOD PRESSURE: 77 MMHG | HEIGHT: 66 IN | SYSTOLIC BLOOD PRESSURE: 127 MMHG | HEART RATE: 89 BPM | BODY MASS INDEX: 23.3 KG/M2 | WEIGHT: 145 LBS

## 2019-11-14 DIAGNOSIS — S42.202G CLOSED FRACTURE OF PROXIMAL END OF LEFT HUMERUS WITH DELAYED HEALING, UNSPECIFIED FRACTURE MORPHOLOGY, SUBSEQUENT ENCOUNTER: Primary | ICD-10-CM

## 2019-11-14 DIAGNOSIS — N39.42 URINARY INCONTINENCE WITHOUT SENSORY AWARENESS: ICD-10-CM

## 2019-11-14 DIAGNOSIS — R82.90 ABNORMAL FINDING ON URINALYSIS: ICD-10-CM

## 2019-11-14 DIAGNOSIS — R82.90 ABNORMAL FINDING ON URINALYSIS: Primary | ICD-10-CM

## 2019-11-14 LAB
CULTURE: NORMAL
Lab: NORMAL
SPECIMEN DESCRIPTION: NORMAL

## 2019-11-14 PROCEDURE — 99212 OFFICE O/P EST SF 10 MIN: CPT | Performed by: ORTHOPAEDIC SURGERY

## 2019-11-14 PROCEDURE — 76770 US EXAM ABDO BACK WALL COMP: CPT

## 2019-11-14 ASSESSMENT — ENCOUNTER SYMPTOMS
ABDOMINAL DISTENTION: 0
APNEA: 0
SHORTNESS OF BREATH: 0
VOMITING: 0
CHEST TIGHTNESS: 0
NAUSEA: 0
ABDOMINAL PAIN: 0
COLOR CHANGE: 0
DIARRHEA: 0
COUGH: 0
CONSTIPATION: 0

## 2019-11-19 ENCOUNTER — CARE COORDINATION (OUTPATIENT)
Dept: CARE COORDINATION | Age: 74
End: 2019-11-19

## 2019-11-20 DIAGNOSIS — N20.0 KIDNEY STONE: Primary | ICD-10-CM

## 2019-11-21 ENCOUNTER — TELEPHONE (OUTPATIENT)
Dept: FAMILY MEDICINE CLINIC | Age: 74
End: 2019-11-21

## 2019-11-21 DIAGNOSIS — N20.0 KIDNEY STONE: Primary | ICD-10-CM

## 2019-11-26 ENCOUNTER — CARE COORDINATION (OUTPATIENT)
Dept: CARE COORDINATION | Age: 74
End: 2019-11-26

## 2019-12-10 ENCOUNTER — CARE COORDINATION (OUTPATIENT)
Dept: CARE COORDINATION | Age: 74
End: 2019-12-10

## 2019-12-10 SDOH — HEALTH STABILITY: PHYSICAL HEALTH: ON AVERAGE, HOW MANY DAYS PER WEEK DO YOU ENGAGE IN MODERATE TO STRENUOUS EXERCISE (LIKE A BRISK WALK)?: 0 DAYS

## 2019-12-10 SDOH — ECONOMIC STABILITY: TRANSPORTATION INSECURITY
IN THE PAST 12 MONTHS, HAS LACK OF TRANSPORTATION KEPT YOU FROM MEETINGS, WORK, OR FROM GETTING THINGS NEEDED FOR DAILY LIVING?: NO

## 2019-12-10 SDOH — ECONOMIC STABILITY: INCOME INSECURITY: HOW HARD IS IT FOR YOU TO PAY FOR THE VERY BASICS LIKE FOOD, HOUSING, MEDICAL CARE, AND HEATING?: NOT HARD AT ALL

## 2019-12-10 SDOH — ECONOMIC STABILITY: TRANSPORTATION INSECURITY
IN THE PAST 12 MONTHS, HAS THE LACK OF TRANSPORTATION KEPT YOU FROM MEDICAL APPOINTMENTS OR FROM GETTING MEDICATIONS?: NO

## 2019-12-10 SDOH — ECONOMIC STABILITY: FOOD INSECURITY: WITHIN THE PAST 12 MONTHS, YOU WORRIED THAT YOUR FOOD WOULD RUN OUT BEFORE YOU GOT MONEY TO BUY MORE.: NEVER TRUE

## 2019-12-10 SDOH — SOCIAL STABILITY: SOCIAL NETWORK: IN A TYPICAL WEEK, HOW MANY TIMES DO YOU TALK ON THE PHONE WITH FAMILY, FRIENDS, OR NEIGHBORS?: THREE TIMES A WEEK

## 2019-12-10 SDOH — SOCIAL STABILITY: SOCIAL NETWORK: HOW OFTEN DO YOU ATTEND CHURCH OR RELIGIOUS SERVICES?: NEVER

## 2019-12-10 SDOH — SOCIAL STABILITY: SOCIAL NETWORK: HOW OFTEN DO YOU ATTENT MEETINGS OF THE CLUB OR ORGANIZATION YOU BELONG TO?: NEVER

## 2019-12-10 SDOH — ECONOMIC STABILITY: FOOD INSECURITY: WITHIN THE PAST 12 MONTHS, THE FOOD YOU BOUGHT JUST DIDN'T LAST AND YOU DIDN'T HAVE MONEY TO GET MORE.: NEVER TRUE

## 2019-12-10 SDOH — SOCIAL STABILITY: SOCIAL NETWORK
DO YOU BELONG TO ANY CLUBS OR ORGANIZATIONS SUCH AS CHURCH GROUPS UNIONS, FRATERNAL OR ATHLETIC GROUPS, OR SCHOOL GROUPS?: NO

## 2019-12-10 SDOH — SOCIAL STABILITY: SOCIAL NETWORK: HOW OFTEN DO YOU GET TOGETHER WITH FRIENDS OR RELATIVES?: TWICE A WEEK

## 2019-12-10 SDOH — HEALTH STABILITY: PHYSICAL HEALTH: ON AVERAGE, HOW MANY MINUTES DO YOU ENGAGE IN EXERCISE AT THIS LEVEL?: 0 MIN

## 2019-12-10 SDOH — HEALTH STABILITY: MENTAL HEALTH
STRESS IS WHEN SOMEONE FEELS TENSE, NERVOUS, ANXIOUS, OR CAN'T SLEEP AT NIGHT BECAUSE THEIR MIND IS TROUBLED. HOW STRESSED ARE YOU?: ONLY A LITTLE

## 2019-12-11 ENCOUNTER — OFFICE VISIT (OUTPATIENT)
Dept: ORTHOPEDIC SURGERY | Age: 74
End: 2019-12-11
Payer: COMMERCIAL

## 2019-12-11 VITALS
HEART RATE: 98 BPM | BODY MASS INDEX: 20.4 KG/M2 | WEIGHT: 130 LBS | DIASTOLIC BLOOD PRESSURE: 63 MMHG | SYSTOLIC BLOOD PRESSURE: 106 MMHG | HEIGHT: 67 IN

## 2019-12-11 DIAGNOSIS — S42.202G CLOSED FRACTURE OF PROXIMAL END OF LEFT HUMERUS WITH DELAYED HEALING, UNSPECIFIED FRACTURE MORPHOLOGY, SUBSEQUENT ENCOUNTER: Primary | ICD-10-CM

## 2019-12-11 PROCEDURE — 99212 OFFICE O/P EST SF 10 MIN: CPT | Performed by: PHYSICIAN ASSISTANT

## 2019-12-11 ASSESSMENT — ENCOUNTER SYMPTOMS
APNEA: 0
COUGH: 0
CONSTIPATION: 0
NAUSEA: 0
ABDOMINAL PAIN: 0
SHORTNESS OF BREATH: 0
DIARRHEA: 0
VOMITING: 0
COLOR CHANGE: 0
ABDOMINAL DISTENTION: 0
CHEST TIGHTNESS: 0

## 2019-12-17 ENCOUNTER — CARE COORDINATION (OUTPATIENT)
Dept: CARE COORDINATION | Age: 74
End: 2019-12-17

## 2019-12-24 ENCOUNTER — CARE COORDINATION (OUTPATIENT)
Dept: CARE COORDINATION | Age: 74
End: 2019-12-24

## 2020-01-07 ENCOUNTER — CARE COORDINATION (OUTPATIENT)
Dept: CARE COORDINATION | Age: 75
End: 2020-01-07

## 2020-01-07 NOTE — CARE COORDINATION
Ambulatory Care Coordination Note  1/7/2020  CM Risk Score: 1  Charlson 10 Year Mortality Risk Score: 47%     ACC: Michael Whitney, RN    Summary Note: spoke with pt daughter who said she did have kidney stones but they are not obstructing so they are not going to do anything at this time. Urology did give her meds to help with bladder control and she is to follow up with him early Feb. She said vascular does not need to see her anymore and discharged her. She will need to follow up with pcp in March   1) needs dexa  2) finish sdoh done   3) fu urology done   4) ct scan done   5) fu on vascular apt from November done         Care Coordination Interventions    Program Enrollment:  Rising Risk  Referral from Primary Care Provider:  Yes  Suggested Interventions and Community Resources         Goals Addressed                 This New Audra Goal   On track     I will complete referral to community agency rehab for assistance. Barriers: overwhelmed by complexity of regimen  Plan for overcoming my barriers: care coordination   Confidence: 8/10  Anticipated Goal Completion Date: 11/16/19              Prior to Admission medications    Medication Sig Start Date End Date Taking?  Authorizing Provider   Handicap Placard MISC by Does not apply route Permanent one year  Dx fractured humerus 11/14/19   Deepak Lee, DO   desmopressin (DDAVP) 0.2 MG tablet TAKE 3 TABLETS BY MOUTH NIGHTLY 10/25/19   Sravani Wu, DO   rivaroxaban (XARELTO STARTER PACK) 15 & 20 MG Starter Pack Follow package instructions 10/17/19   Katherine Culver MD   FLUoxetine (PROZAC) 20 MG capsule TAKE 2 CAPSULES BY MOUTH ONE TIME A DAY 9/19/19   Sravani Wu DO   carBAMazepine (TEGRETOL) 200 MG tablet TAKE 1 AND 1/2 TABLETS BY MOUTH TWO TIMES A DAY 5/29/19   Yaneth Pinzon DO       Future Appointments   Date Time Provider Ketan Arellano   1/23/2020  8:00 AM [facility]

## 2020-01-21 ENCOUNTER — CARE COORDINATION (OUTPATIENT)
Dept: CARE COORDINATION | Age: 75
End: 2020-01-21

## 2020-01-23 ENCOUNTER — OFFICE VISIT (OUTPATIENT)
Dept: ORTHOPEDIC SURGERY | Age: 75
End: 2020-01-23
Payer: COMMERCIAL

## 2020-01-23 VITALS
SYSTOLIC BLOOD PRESSURE: 106 MMHG | DIASTOLIC BLOOD PRESSURE: 68 MMHG | HEART RATE: 72 BPM | BODY MASS INDEX: 20.42 KG/M2 | WEIGHT: 130.07 LBS | HEIGHT: 67 IN

## 2020-01-23 PROCEDURE — 99212 OFFICE O/P EST SF 10 MIN: CPT | Performed by: ORTHOPAEDIC SURGERY

## 2020-01-23 ASSESSMENT — ENCOUNTER SYMPTOMS
COLOR CHANGE: 0
NAUSEA: 0
ABDOMINAL PAIN: 0
ABDOMINAL DISTENTION: 0
SHORTNESS OF BREATH: 0
CONSTIPATION: 0
COUGH: 0
VOMITING: 0
DIARRHEA: 0
APNEA: 0
CHEST TIGHTNESS: 0

## 2020-01-23 NOTE — PROGRESS NOTES
[facility]  [address] [place] [address]  Dept: [phone]  Dept Fax: [phone]        Fracture Follow Up      Subjective:     Chief Complaint   Patient presents with    Shoulder Pain     Left Proximal humerus 10/06/19 3 months post injury     HPI:     Follow up visit:     Lakshmi Tubbs is a 66y.o. year old female who presents to our office today for a followup regarding her closed fracture of proximal end of left humerus. The date of injury was on 10/06/2019. Therefore, we are 15 weeks and 4 days post injury. Patient has tried Tylenol and Ibuprofen for the pain. The opposite shoulder is okay. Patient presents today with no assisting devices. Patient states that her shoulder is in minimal to no pain. Review of Systems   Constitutional: Positive for activity change. Negative for appetite change, fatigue and fever. Respiratory: Negative for apnea, cough, chest tightness and shortness of breath. Cardiovascular: Negative for chest pain, palpitations and leg swelling. Gastrointestinal: Negative for abdominal distention, abdominal pain, constipation, diarrhea, nausea and vomiting. Genitourinary: Negative for difficulty urinating, dysuria and hematuria. Musculoskeletal: Positive for arthralgias. Negative for gait problem, joint swelling and myalgias. Skin: Negative for color change and rash. Neurological: Negative for dizziness, weakness, numbness and headaches. Psychiatric/Behavioral: Negative for sleep disturbance. I have reviewed the CC, HPI, ROS, PMH, FHX, Social History, and if not present in this note, I have reviewed in the patient's chart. I agree with the documentation provided by other staff and have reviewed their documentation prior to providing my signature indicating agreement.   Vitals:   /68   Pulse 72   Ht 5' 7\" (1.702 m)   Wt 130 lb 1.1 oz (59 kg)   BMI 20.37 kg/m²  Body mass index is 20.37 kg/m². Physical Examination:     Orthopedics:    GENERAL: Alert and oriented X3 in no acute distress. SKIN: Intact without lesions or ulcerations. NEURO: Musculoskeletal and axillary nerves intact to sensory and motor testing. VASC: Capillary refill is less than 3 seconds. Fracture:    LOCATION: Left Shoulder  SITE: Distal neurocirculatory status is intact. EXAM: Sensation is intact to light touch, there is full motor function of the extremity. PALP: fracture site is palpated with minimal pain. ROM: passively 120 degrees of forward elevation, 20 degrees of external rotation in neutral, 60 degrees of external rotation in abduction, internal rotation to L3. Minimally active shoulder ROM. STRENGTH: Supraspinatus 3/5, external rotators 3/5. MUSC: No atrophy, negative subscap lift off or belly press test.  IMP: no painful arc, no pain with cross body abduction. PALP: no pain over anterolateral acromion, no pain over AC joint, no pain over traps/rhomboids. no Bicipital groove pain. Assessment:     1. Closed fracture of proximal end of left humerus with delayed healing, unspecified fracture morphology, subsequent encounter      Procedures:    Procedure: no  Radiology:   SHOULDER X-RAY    Two views of the left shoulder and 2 views of the scapula, including AP, scapular Y, outlet and axillary views reveal:  Surgical neck proximal humerus fracture with no interval healing and resorption of the fracture site indicating an impending nonunion. the glenohumeral joint is well reduced without arthritic changes. Proximal migration of the humeral head has not occurred. Acromion is a type II. The acromioclavicular joint shows degenerative changes. Impression: Impending nonunion left proximal humerus fracture.   Plan:   Fracture Treatment : I reviewed the X-ray with the patient and her daughter and I informed them that the fracture seems to not be healing and it appears that the fracture has became an impending nonunion. We discussed the etiologies and natural histories of closed fracture of proximal end of left humerus. We discussed the various treatment alternatives including anti-inflammatory medications, physical therapy, injections, further imaging studies and as a last result surgery. During today's visit, I explained to the patient and her daughter that I feel the fracture may becoming an impending nonunion but I feel we should x-ray the fracture in three months to see how it is doing. I then informed them that I do not feel a surgery is needed unless she really wants to have it done to fix the fracture. I also explained to them that the surgery is not needed because the patient is not in much pain and she is still able to do the things she would like to do with her arm without experiencing much pain. I then asked the patient if she would like to have a surgery and she stated that she does not want to have surgery. From there, I instructed to the patient to continue using her arm in her ADL's so she may retain her ROM. The patient has opted to continue using her shoulder in her ADL's. Patient should return to the clinic in 3 months to follow up with Bere Barrera D. O. and at that visit we will get PC XR of the fracture site. The patient will call the office immediately with any problems. No orders of the defined types were placed in this encounter. Orders Placed This Encounter   Procedures    XR SHOULDER LEFT (MIN 2 VIEWS)     Standing Status:   Future     Standing Expiration Date:   1/23/2021     Tomasa ALVARADO V am scribing for and in the presence of Bere Liter D.O. 1/27/2020  12:37 PM        Bere ALVARADO DO, have personally seen this patient and I have reviewed the CC, PMH, FHX and Social History as provided by other clinical staff. I reassessed the HPI and ROS as scribed by Tonja Gagnon in my presence and it is both accurate and complete.  Thereafter, I personally performed the PE,

## 2020-01-28 ENCOUNTER — CARE COORDINATION (OUTPATIENT)
Dept: CARE COORDINATION | Age: 75
End: 2020-01-28

## 2020-01-31 ENCOUNTER — TELEPHONE (OUTPATIENT)
Dept: FAMILY MEDICINE CLINIC | Age: 75
End: 2020-01-31

## 2020-01-31 NOTE — TELEPHONE ENCOUNTER
Pt daughter states pt sees Dr. Essie Cruz for Urology and pt needs a refill of Mybetriq 50mg qd but they have not been able to reach Dr. Essie Cruz office for 2 weeks and request Dr. Darius Yoon send refill to [facility] until they can reach Dr. Essie Cruz.

## 2020-02-11 ENCOUNTER — CARE COORDINATION (OUTPATIENT)
Dept: CARE COORDINATION | Age: 75
End: 2020-02-11

## 2020-02-11 NOTE — CARE COORDINATION
Ambulatory Care Coordination Note  2/11/2020  CM Risk Score: 1  Charlson 10 Year Mortality Risk Score: 47%     ACC: Hima Pond, RN    Summary Note: spoke with pt daughter who said she will call and schedule an awv with her pcp. And will get a dexa scan ordered for her she denies any needs at this time will graduate from care coordination at this time as pt daughter states she does not have any needs at this time           Care Coordination Interventions    Program Enrollment:  Rising Risk  Referral from Primary Care Provider:  Yes  Suggested Interventions and Community Resources         Goals Addressed                 This Visit's Progress     Wellness Goal   On track     Patient Self-Management Goal for Health Maintenance  Goal: I will schedule a yearly preventative care visit. I will schedule bone density testing as directed by my provider. Barriers: overwhelmed by complexity of regimen  Plan for overcoming my barriers: care coordination   Confidence: 8/10  Anticipated Goal Completion Date: 3/28/20              Prior to Admission medications    Medication Sig Start Date End Date Taking?  Authorizing Provider   mirabegron (MYRBETRIQ) 50 MG TB24 Take 50 mg by mouth daily 2/3/20   Sravani Wu DO   Handicap Placard MISC by Does not apply route Permanent one year  Dx fractured humerus 11/14/19   Neal Bearden DO   desmopressin (DDAVP) 0.2 MG tablet TAKE 3 TABLETS BY MOUTH NIGHTLY 10/25/19   Sravani Wu DO   rivaroxaban (XARELTO STARTER PACK) 15 & 20 MG Starter Pack Follow package instructions 10/17/19   Jared Carvajal MD   FLUoxetine (PROZAC) 20 MG capsule TAKE 2 CAPSULES BY MOUTH ONE TIME A DAY 9/19/19   Sravani Wu DO   carBAMazepine (TEGRETOL) 200 MG tablet TAKE 1 AND 1/2 TABLETS BY MOUTH TWO TIMES A DAY 5/29/19   Vicente Akers DO       Future Appointments   Date Time Provider Ketan Arellano   4/27/2020  8:10 AM Chavo Vasquez

## 2020-03-30 ENCOUNTER — TELEPHONE (OUTPATIENT)
Dept: FAMILY MEDICINE CLINIC | Age: 75
End: 2020-03-30

## 2020-03-30 NOTE — TELEPHONE ENCOUNTER
States she fell yesterday in the bedroom doesn't think she hit anything when she fell. Daughter gave her tylenol and now when she asks pt if her stomach hurts she says no its her back. Pts daughter stated that pt does have dementia and changes what she says all of the time. Stated that her urine is normal, no vomiting.  Wondered if she just needed to keep an eye on her or what would be best

## 2020-03-30 NOTE — TELEPHONE ENCOUNTER
Pt has abdominal pain from a fall and daughter wants to know if she should be seen or what she can do from home.

## 2020-05-20 ENCOUNTER — OFFICE VISIT (OUTPATIENT)
Dept: FAMILY MEDICINE CLINIC | Age: 75
End: 2020-05-20
Payer: COMMERCIAL

## 2020-05-20 VITALS
OXYGEN SATURATION: 96 % | HEART RATE: 84 BPM | TEMPERATURE: 97.2 F | WEIGHT: 129.6 LBS | HEIGHT: 67 IN | SYSTOLIC BLOOD PRESSURE: 108 MMHG | DIASTOLIC BLOOD PRESSURE: 67 MMHG | BODY MASS INDEX: 20.34 KG/M2

## 2020-05-20 PROCEDURE — 99214 OFFICE O/P EST MOD 30 MIN: CPT | Performed by: FAMILY MEDICINE

## 2020-05-20 ASSESSMENT — PATIENT HEALTH QUESTIONNAIRE - PHQ9
SUM OF ALL RESPONSES TO PHQ QUESTIONS 1-9: 0
1. LITTLE INTEREST OR PLEASURE IN DOING THINGS: 0
2. FEELING DOWN, DEPRESSED OR HOPELESS: 0
SUM OF ALL RESPONSES TO PHQ QUESTIONS 1-9: 0
SUM OF ALL RESPONSES TO PHQ9 QUESTIONS 1 & 2: 0

## 2020-05-20 NOTE — PROGRESS NOTES
[facility]  [address] [place] [address] [address] [facility] [address]  Dept: [phone]      Samantha Hart is a 66 y.o. female who presents today for follow up on her  medical conditions as noted below. Chief Complaint   Patient presents with    Medication Check       Patient Active Problem List:     Seizure [facility])     Vitamin D deficiency     Anxiety     Hypercholesteremia     Cellulitis of right leg     MRSA (methicillin resistant staph aureus) culture positive     Breakthrough seizure ([address]     Memory loss     Difficulty speaking     Seizure disorder ([address]     Dementia with behavioral disturbance ([address]     History of encephalitis     Dementia due to Parkinson's disease with behavioral disturbance ([address]     Past Medical History:   Diagnosis Date    Anxiety     Convulsion ([address]     Encephalopathy     Herpes     Hyperlipidemia     Left bundle branch block     MRSA (methicillin resistant Staphylococcus aureus)     Parkinson's disease ([address]     Seizures ([address]     secondary to encephalitis    Vitamin D deficiency       Past Surgical History:   Procedure Laterality Date    ABSCESS DRAINAGE Right 12/14/2013    WOUND EXPLORATION AND I+D  RIGHT HIP     History reviewed. No pertinent family history.     Current Outpatient Medications   Medication Sig Dispense Refill    mirabegron (MYRBETRIQ) 50 MG TB24 Take 50 mg by mouth daily 30 tablet 11    desmopressin (DDAVP) 0.2 MG tablet TAKE 3 TABLETS BY MOUTH NIGHTLY 90 tablet 10    FLUoxetine (PROZAC) 20 MG capsule TAKE 2 CAPSULES BY MOUTH ONE TIME A  capsule 2    carBAMazepine (TEGRETOL) 200 MG tablet TAKE 1 AND 1/2 TABLETS BY MOUTH TWO TIMES A  tablet 2    Handicap Placard MISC by Does not apply route Permanent one year  Dx fractured humerus (Patient not taking: Reported on 5/20/2020) 1 each 0    rivaroxaban (XARELTO STARTER PACK) 15 & 20 MG Starter Pack Follow package instructions (Patient not Musculoskeletal: Negative. Skin: Negative for rash. Allergic/Immunologic: Negative. Neurological: Negative. Negative for dizziness, weakness and numbness. Hematological: Negative. Negative for adenopathy. Does not bruise/bleed easily. Psychiatric/Behavioral: Negative for sleep disturbance, dysphoric mood and  decreased concentration. The patient is not nervous/anxious. Objective:     Physical Exam:     Nursing note and vitals reviewed. /67   Pulse 84   Temp 97.2 °F (36.2 °C)   Ht 5' 7\" (1.702 m)   Wt 129 lb 9.6 oz (58.8 kg)   SpO2 96%   BMI 20.30 kg/m²   Constitutional: She is oriented to person, place, and time. She   appears well-developed and well-nourished. HENT:   Head: Normocephalic and atraumatic. Right Ear: External ear normal. Tympanic membrane is not erythematous. No middle ear effusion. Left Ear: External ear normal. Tympanic membrane is not erythematous. No middle ear effusion. Nose: No mucosal edema. Mouth/Throat: Oropharynx is clear and moist. No posterior oropharyngeal erythema. Eyes: Conjunctivae and EOM are normal. Pupils are equal, round, and reactive to light. Neck: Normal range of motion. Neck supple. No thyromegaly present. Cardiovascular: Normal rate, regular rhythm and normal heart sounds. No murmur heard. Pulmonary/Chest: Effort normal and breath sounds normal. She has no wheezes. Shehas no rales. Abdominal: Soft. Bowel sounds are normal. She exhibits no distension and no mass. There is no tenderness. There is no rebound and no guarding. Genitourinary/Anorectal:deferred  Musculoskeletal: Normal range of motion. She exhibits no edema or tenderness. Lymphadenopathy: She has no cervical adenopathy. Neurological: She is not alert and oriented to person, place, and time. She has normal reflexes. not verbal   Skin: Skin is warm and dry. No rash noted. Psychiatric: She has a normal mood and affect.  Her   behavior is normal. Assessment:      1. Dementia due to Parkinson's disease with behavioral disturbance ([facility]    2. Seizure disorder ([facility]    3. Dementia associated with other underlying disease with behavioral disturbance ([facility]    4. History of encephalitis    5. Breakthrough seizure ([facility]    6. Dysuria    7. Leg edema, left    8. Vitamin D deficiency    9. Hypercholesteremia    10. Post-menopausal          Plan:      Call or return to clinic prn if these symptoms worsen or fail to improve as anticipated. I have reviewed the instructions with the patient, answering all questions to her satisfaction. Return if symptoms worsen or fail to improve. Orders Placed This Encounter   Procedures    DEXA Bone Density Axial Skeleton     Standing Status:   Future     Standing Expiration Date:   5/20/2021     Order Specific Question:   Reason for exam:     Answer:   post jessy    CBC Auto Differential     Standing Status:   Future     Standing Expiration Date:   11/20/2020    Comprehensive Metabolic Panel     Standing Status:   Future     Standing Expiration Date:   11/20/2020    Lipid Panel     Standing Status:   Future     Standing Expiration Date:   11/20/2020     Order Specific Question:   Is Patient Fasting?/# of Hours     Answer:   yes    T4, Free     Standing Status:   Future     Standing Expiration Date:   11/20/2020    TSH without Reflex     Standing Status:   Future     Standing Expiration Date:   11/20/2020    Vitamin D 25 Hydroxy     Standing Status:   Future     Standing Expiration Date:   5/20/2021     No orders of the defined types were placed in this encounter.       Electronically signed by Moon Thacker DO on 5/20/2020 at 4:31 PM

## 2020-06-11 RX ORDER — CARBAMAZEPINE 200 MG/1
TABLET ORAL
Qty: 180 TABLET | Refills: 0 | Status: SHIPPED | OUTPATIENT
Start: 2020-06-11 | End: 2020-07-14

## 2020-07-14 ENCOUNTER — TELEPHONE (OUTPATIENT)
Dept: FAMILY MEDICINE CLINIC | Age: 75
End: 2020-07-14

## 2020-07-14 RX ORDER — CARBAMAZEPINE 200 MG/1
TABLET ORAL
Qty: 180 TABLET | Refills: 0 | Status: SHIPPED | OUTPATIENT
Start: 2020-07-14 | End: 2020-10-13

## 2020-07-14 RX ORDER — FLUOXETINE HYDROCHLORIDE 20 MG/1
CAPSULE ORAL
Qty: 180 CAPSULE | Refills: 0 | Status: SHIPPED | OUTPATIENT
Start: 2020-07-14 | End: 2020-10-14

## 2020-07-14 NOTE — TELEPHONE ENCOUNTER
Pt daughter requests an order for respite care at [facility] and a med list to be faxed to:    [phone] (w)

## 2020-10-13 ENCOUNTER — TELEPHONE (OUTPATIENT)
Dept: FAMILY MEDICINE CLINIC | Age: 75
End: 2020-10-13

## 2020-10-13 RX ORDER — CARBAMAZEPINE 200 MG/1
TABLET ORAL
Qty: 180 TABLET | Refills: 0 | Status: SHIPPED | OUTPATIENT
Start: 2020-10-13 | End: 2020-11-12

## 2020-10-13 RX ORDER — DESMOPRESSIN ACETATE 0.2 MG/1
TABLET ORAL
Qty: 90 TABLET | Refills: 0 | Status: SHIPPED | OUTPATIENT
Start: 2020-10-13 | End: 2021-01-04

## 2020-10-13 NOTE — TELEPHONE ENCOUNTER
Roseann Russ is calling to request a refill on the following medication(s):    Last Visit Date (If Applicable):  5/62/4386    Next Visit Date:    Visit date not found    Medication Request:  Requested Prescriptions     Pending Prescriptions Disp Refills    desmopressin (DDAVP) 0.2 MG tablet [Pharmacy Med Name: Desmopressin Acetate Oral Tablet 0.2 MG] 90 tablet 0     Sig: TAKE 3 TABLETS BY MOUTH IN THE EVENING    carBAMazepine (TEGRETOL) 200 MG tablet [Pharmacy Med Name: carBAMazepine Oral Tablet 200 MG] 180 tablet 0     Sig: TAKE 1 AND 1/2 TABLETS BY MOUTH TWO TIMES A DAY

## 2020-10-13 NOTE — TELEPHONE ENCOUNTER
Needs med list demographics, and h & P , faxed to Anju Martinez at [phone], Blu Cummins, needs today going to respite care this weekend  I faxed this myself

## 2020-12-26 DIAGNOSIS — R32 ENURESIS: ICD-10-CM

## 2020-12-28 NOTE — TELEPHONE ENCOUNTER
Gomez Mercado is calling to request a refill on the following medication(s):    Last Visit Date (If Applicable):  5/78/4456    Next Visit Date:    Visit date not found    Medication Request:  Requested Prescriptions     Pending Prescriptions Disp Refills    desmopressin (DDAVP) 0.2 MG tablet [Pharmacy Med Name: Desmopressin Acetate Oral Tablet 0.2 MG] 90 tablet 0     Sig: TAKE 3 TABLETS BY MOUTH IN THE EVENING

## 2021-01-04 RX ORDER — DESMOPRESSIN ACETATE 0.2 MG/1
TABLET ORAL
Qty: 90 TABLET | Refills: 0 | Status: SHIPPED | OUTPATIENT
Start: 2021-01-04 | End: 2021-01-07

## 2021-01-07 DIAGNOSIS — R32 ENURESIS: ICD-10-CM

## 2021-01-07 RX ORDER — DESMOPRESSIN ACETATE 0.2 MG/1
TABLET ORAL
Qty: 90 TABLET | Refills: 0 | Status: SHIPPED | OUTPATIENT
Start: 2021-01-07 | End: 2021-01-29

## 2021-01-07 NOTE — TELEPHONE ENCOUNTER
Herbie Cade is calling to request a refill on the following medication(s):    Last Visit Date (If Applicable):  0/24/4368    Next Visit Date:    Visit date not found    Medication Request:  Requested Prescriptions     Pending Prescriptions Disp Refills    desmopressin (DDAVP) 0.2 MG tablet [Pharmacy Med Name: Desmopressin Acetate Oral Tablet 0.2 MG] 90 tablet 0     Sig: TAKE 3 TABLETS BY MOUTH IN THE EVENING

## 2021-01-08 ENCOUNTER — TELEPHONE (OUTPATIENT)
Dept: FAMILY MEDICINE CLINIC | Age: 76
End: 2021-01-08

## 2021-02-22 ENCOUNTER — TELEPHONE (OUTPATIENT)
Dept: FAMILY MEDICINE CLINIC | Age: 76
End: 2021-02-22

## 2021-03-02 NOTE — TELEPHONE ENCOUNTER
[facility]    Last Visit Date (If Applicable):  2/51/3096    Next Visit Date:    3/5/2021    Medication Request:  Requested Prescriptions     Pending Prescriptions Disp Refills    mirabegron (MYRBETRIQ) 50 MG TB24 30 tablet 11     Sig: Take 50 mg by mouth daily

## 2021-03-05 ENCOUNTER — HOSPITAL ENCOUNTER (OUTPATIENT)
Age: 76
Setting detail: SPECIMEN
Discharge: HOME | End: 2021-03-05
Payer: COMMERCIAL

## 2021-03-05 ENCOUNTER — OFFICE VISIT (OUTPATIENT)
Dept: FAMILY MEDICINE CLINIC | Age: 76
End: 2021-03-05
Payer: COMMERCIAL

## 2021-03-05 VITALS
HEIGHT: 67 IN | DIASTOLIC BLOOD PRESSURE: 67 MMHG | OXYGEN SATURATION: 94 % | BODY MASS INDEX: 21.66 KG/M2 | WEIGHT: 138 LBS | SYSTOLIC BLOOD PRESSURE: 113 MMHG | HEART RATE: 76 BPM | TEMPERATURE: 96.7 F

## 2021-03-05 DIAGNOSIS — Z86.61 HISTORY OF ENCEPHALITIS: ICD-10-CM

## 2021-03-05 DIAGNOSIS — Z00.00 MEDICARE ANNUAL WELLNESS VISIT, SUBSEQUENT: ICD-10-CM

## 2021-03-05 DIAGNOSIS — E78.00 HYPERCHOLESTEREMIA: ICD-10-CM

## 2021-03-05 DIAGNOSIS — G20 DEMENTIA DUE TO PARKINSON'S DISEASE WITH BEHAVIORAL DISTURBANCE (HCC): ICD-10-CM

## 2021-03-05 DIAGNOSIS — G40.909 SEIZURE DISORDER (HCC): ICD-10-CM

## 2021-03-05 DIAGNOSIS — E55.9 VITAMIN D DEFICIENCY: ICD-10-CM

## 2021-03-05 DIAGNOSIS — Z78.0 POST-MENOPAUSAL: ICD-10-CM

## 2021-03-05 DIAGNOSIS — R32 ENURESIS: ICD-10-CM

## 2021-03-05 DIAGNOSIS — Z00.00 ROUTINE GENERAL MEDICAL EXAMINATION AT A HEALTH CARE FACILITY: ICD-10-CM

## 2021-03-05 DIAGNOSIS — F02.818 DEMENTIA DUE TO PARKINSON'S DISEASE WITH BEHAVIORAL DISTURBANCE (HCC): ICD-10-CM

## 2021-03-05 DIAGNOSIS — Z00.00 MEDICARE ANNUAL WELLNESS VISIT, SUBSEQUENT: Primary | ICD-10-CM

## 2021-03-05 LAB
ABSOLUTE EOS #: 0.45 K/UL (ref 0–0.44)
ABSOLUTE IMMATURE GRANULOCYTE: <0.03 K/UL (ref 0–0.3)
ABSOLUTE LYMPH #: 1.43 K/UL (ref 1.1–3.7)
ABSOLUTE MONO #: 0.85 K/UL (ref 0.1–1.2)
ALBUMIN SERPL-MCNC: 4 G/DL (ref 3.5–5.2)
ALBUMIN/GLOBULIN RATIO: 1 (ref 1–2.5)
ALP BLD-CCNC: 199 U/L (ref 35–104)
ALT SERPL-CCNC: 9 U/L (ref 5–33)
ANION GAP SERPL CALCULATED.3IONS-SCNC: 13 MMOL/L (ref 9–17)
AST SERPL-CCNC: 17 U/L
BASOPHILS # BLD: 1 % (ref 0–2)
BASOPHILS ABSOLUTE: 0.07 K/UL (ref 0–0.2)
BILIRUB SERPL-MCNC: 0.25 MG/DL (ref 0.3–1.2)
BUN BLDV-MCNC: 14 MG/DL (ref 8–23)
BUN/CREAT BLD: ABNORMAL (ref 9–20)
CALCIUM SERPL-MCNC: 9.3 MG/DL (ref 8.6–10.4)
CHLORIDE BLD-SCNC: 101 MMOL/L (ref 98–107)
CHOLESTEROL/HDL RATIO: 2.8
CHOLESTEROL: 197 MG/DL
CO2: 26 MMOL/L (ref 20–31)
CREAT SERPL-MCNC: 0.73 MG/DL (ref 0.5–0.9)
DIFFERENTIAL TYPE: ABNORMAL
EOSINOPHILS RELATIVE PERCENT: 6 % (ref 1–4)
GFR AFRICAN AMERICAN: >60 ML/MIN
GFR NON-AFRICAN AMERICAN: >60 ML/MIN
GFR SERPL CREATININE-BSD FRML MDRD: ABNORMAL ML/MIN/{1.73_M2}
GFR SERPL CREATININE-BSD FRML MDRD: ABNORMAL ML/MIN/{1.73_M2}
GLUCOSE BLD-MCNC: 95 MG/DL (ref 70–99)
HCT VFR BLD CALC: 42.7 % (ref 36.3–47.1)
HDLC SERPL-MCNC: 70 MG/DL
HEMOGLOBIN: 13.5 G/DL (ref 11.9–15.1)
IMMATURE GRANULOCYTES: 0 %
LDL CHOLESTEROL: 105 MG/DL (ref 0–130)
LYMPHOCYTES # BLD: 20 % (ref 24–43)
MCH RBC QN AUTO: 31.3 PG (ref 25.2–33.5)
MCHC RBC AUTO-ENTMCNC: 31.6 G/DL (ref 28.4–34.8)
MCV RBC AUTO: 99.1 FL (ref 82.6–102.9)
MONOCYTES # BLD: 12 % (ref 3–12)
NRBC AUTOMATED: 0 PER 100 WBC
PDW BLD-RTO: 13.2 % (ref 11.8–14.4)
PLATELET # BLD: 415 K/UL (ref 138–453)
PLATELET ESTIMATE: ABNORMAL
PMV BLD AUTO: 9.3 FL (ref 8.1–13.5)
POTASSIUM SERPL-SCNC: 4.2 MMOL/L (ref 3.7–5.3)
RBC # BLD: 4.31 M/UL (ref 3.95–5.11)
RBC # BLD: ABNORMAL 10*6/UL
SEG NEUTROPHILS: 61 % (ref 36–65)
SEGMENTED NEUTROPHILS ABSOLUTE COUNT: 4.48 K/UL (ref 1.5–8.1)
SODIUM BLD-SCNC: 140 MMOL/L (ref 135–144)
THYROXINE, FREE: 0.94 NG/DL (ref 0.93–1.7)
TOTAL PROTEIN: 8.1 G/DL (ref 6.4–8.3)
TRIGL SERPL-MCNC: 109 MG/DL
TSH SERPL DL<=0.05 MIU/L-ACNC: 2.31 MIU/L (ref 0.3–5)
VITAMIN D 25-HYDROXY: 24.9 NG/ML (ref 30–100)
VLDLC SERPL CALC-MCNC: NORMAL MG/DL (ref 1–30)
WBC # BLD: 7.3 K/UL (ref 3.5–11.3)
WBC # BLD: ABNORMAL 10*3/UL

## 2021-03-05 PROCEDURE — G0438 PPPS, INITIAL VISIT: HCPCS | Performed by: FAMILY MEDICINE

## 2021-03-05 ASSESSMENT — PATIENT HEALTH QUESTIONNAIRE - PHQ9
DEPRESSION UNABLE TO ASSESS: FUNCTIONAL CAPACITY MOTIVATION LIMITS ACCURACY
DEPRESSION UNABLE TO ASSESS: FUNCTIONAL CAPACITY MOTIVATION LIMITS ACCURACY

## 2021-03-08 NOTE — PROGRESS NOTES
Medicare Annual Wellness Visit  Name: Ayo Louise Date: 3/8/2021   MRN: [id number] Sex: Female   Age: 66 y.o. Ethnicity: Non-/Non    : 1945 Race: Kate Short is here for Medicare AWV    Screenings for behavioral, psychosocial and functional/safety risks, and cognitive dysfunction are all negative except as indicated below. These results, as well as other patient data from the [address] form, are documented in Flowsheets linked to this Encounter. Allergies   Allergen Reactions    Haldol [Haloperidol Lactate]        Prior to Visit Medications    Medication Sig Taking? Authorizing Provider   mirabegron (MYRBETRIQ) 50 MG TB24 Take 50 mg by mouth daily Yes Sravani Wu DO   desmopressin (DDAVP) 0.2 MG tablet TAKE 3 TABLETS BY MOUTH IN THE EVENING  Yes Sravani Wu DO   carBAMazepine (TEGRETOL) 200 MG tablet TAKE 1 AND 1/2 TABLETS BY MOUTH TWO TIMES A DAY  Yes Sravani Wu DO   FLUoxetine (PROZAC) 20 MG capsule TAKE 2 CAPSULES BY MOUTH ONE TIME A DAY  Yes Sravani Wu DO   Handicap Placard MISC by Does not apply route Permanent one year  Dx fractured humerus  Patient not taking: Reported on 2020  Everardo Carrasco DO   rivaroxaban (XARELTO STARTER PACK) 15 & 20 MG Starter Pack Follow package instructions  Patient not taking: Reported on 2020  Arnaud Hines MD       Past Medical History:   Diagnosis Date    Anxiety     [facility]     Encephalopathy     Herpes     Hyperlipidemia     Left bundle branch block     MRSA (methicillin resistant Staphylococcus aureus)     Parkinson's disease ([facility]     Seizures ([facility]     secondary to encephalitis    Vitamin D deficiency        Past Surgical History:   Procedure Laterality Date    ABSCESS DRAINAGE Right 2013    WOUND EXPLORATION AND I+D  RIGHT HIP       History reviewed. No pertinent family history.     CareTeam (Including outside providers/suppliers regularly involved in ordered. Positive Risk Factor Screenings with Interventions:     Fall Risk:  2 or more falls in past year?: (!) yes  Fall with injury in past year?: no  Fall Risk Interventions:    · Home safety tips provided     Depression:  Depression Unable to Assess: Functional capacity motivation limits accuracy    Severity:1-4 = minimal depression, 5-9 = mild depression, 10-14 = moderate depression, 15-19 = moderately severe depression, 20-27 = severe depression      General Health and ACP:  General  In general, how would you say your health is?: (unable to answer)  In the past 7 days, have you experienced any of the following?  New or Increased Pain, New or Increased Fatigue, Loneliness, Social Isolation, Stress or Anger?: (unable to answer)  Do you get the social and emotional support that you need?: (unable to answer)  Do you have a Living Will?: (unable to answer)  Advance Directives     Power of  Living Will ACP-Advance Directive ACP-Power of     Not on File Not on File Not on File Not on File      General Health Risk Interventions:  ·  no issues         Personalized Preventive Plan   Current Health Maintenance Status  Immunization History   Administered Date(s) Administered    Influenza 10/09/2013    Influenza Virus Vaccine 02/12/2015    Influenza Whole 10/13/2004    Influenza, High Dose (Fluzone 65 yrs and older) 10/04/2017, 09/11/2018    Pneumococcal Conjugate 13-valent ([id number]) 09/11/2018    Pneumococcal Polysaccharide ([id number]) 02/12/2015        Health Maintenance   Topic Date Due    Hepatitis C screen  Never done    DTaP/Tdap/Td vaccine (1 - Tdap) Never done    Shingles Vaccine (1 of 2) Never done    DEXA (modify frequency per FRAX score)  Never done   [employer] Visit (AWV)  Never done    Flu vaccine (1) 09/01/2020    Pneumococcal 65+ years Vaccine  Completed    COVID-19 Vaccine  Completed    Hepatitis A vaccine  Aged Out    Hepatitis B vaccine  Aged Out    Hib vaccine  Aged Out    Meningococcal (ACWY) vaccine  Aged Out     Recommendations for [employer] Due: see orders and patient instructions/AVS.  . Recommended screening schedule for the next 5-10 years is provided to the patient in written form: see Patient Paula Sanchez was seen today for medicare awv. Diagnoses and all orders for this visit:    Medicare annual wellness visit, subsequent  -     CBC Auto Differential; Future  -     Comprehensive Metabolic Panel; Future  -     Lipid Panel; Future  -     T4, Free; Future  -     TSH without Reflex; Future  -     Vitamin D 25 Hydroxy; Future    Seizure disorder (HCC)  -     CBC Auto Differential; Future  -     Comprehensive Metabolic Panel; Future  -     Lipid Panel; Future  -     T4, Free; Future  -     TSH without Reflex; Future  -     Vitamin D 25 Hydroxy; Future    Dementia due to Parkinson's disease with behavioral disturbance (HCC)  -     CBC Auto Differential; Future  -     Comprehensive Metabolic Panel; Future  -     Lipid Panel; Future  -     T4, Free; Future  -     TSH without Reflex; Future  -     Vitamin D 25 Hydroxy; Future    Vitamin D deficiency  -     CBC Auto Differential; Future  -     Comprehensive Metabolic Panel; Future  -     Lipid Panel; Future  -     T4, Free; Future  -     TSH without Reflex; Future  -     Vitamin D 25 Hydroxy; Future    Hypercholesteremia  -     CBC Auto Differential; Future  -     Comprehensive Metabolic Panel; Future  -     Lipid Panel; Future  -     T4, Free; Future  -     TSH without Reflex; Future  -     Vitamin D 25 Hydroxy; Future    Post-menopausal  -     CBC Auto Differential; Future  -     Comprehensive Metabolic Panel; Future  -     Lipid Panel; Future  -     T4, Free; Future  -     TSH without Reflex; Future  -     Vitamin D 25 Hydroxy; Future    History of encephalitis  -     CBC Auto Differential; Future  -     Comprehensive Metabolic Panel; Future  -     Lipid Panel;  Future  -     T4, Free; Future  -     TSH without Reflex; Future  -     Vitamin D 25 Hydroxy; Future    Enuresis  -     CBC Auto Differential; Future  -     Comprehensive Metabolic Panel; Future  -     Lipid Panel; Future  -     T4, Free; Future  -     TSH without Reflex; Future  -     Vitamin D 25 Hydroxy; Future           1. Medicare annual wellness visit, subsequent    2. Seizure disorder ([facility]    3. Dementia due to Parkinson's disease with behavioral disturbance ([facility]    4. Vitamin D deficiency    5. Hypercholesteremia    6. Post-menopausal    7. History of encephalitis    8.  Enuresis      Orders Placed This Encounter   Procedures    CBC Auto Differential    Comprehensive Metabolic Panel    Lipid Panel    T4, Free    TSH without Reflex    Vitamin D 25 Hydroxy     Requested Prescriptions      No prescriptions requested or ordered in this encounter

## 2021-09-08 ENCOUNTER — HOSPITAL ENCOUNTER (OUTPATIENT)
Age: 76
Setting detail: SPECIMEN
Discharge: HOME | End: 2021-09-08
Payer: COMMERCIAL

## 2021-09-08 ENCOUNTER — OFFICE VISIT (OUTPATIENT)
Dept: FAMILY MEDICINE CLINIC | Age: 76
End: 2021-09-08
Payer: COMMERCIAL

## 2021-09-08 VITALS
HEART RATE: 83 BPM | DIASTOLIC BLOOD PRESSURE: 80 MMHG | SYSTOLIC BLOOD PRESSURE: 139 MMHG | OXYGEN SATURATION: 98 % | HEIGHT: 67 IN | WEIGHT: 126.8 LBS | BODY MASS INDEX: 19.9 KG/M2

## 2021-09-08 DIAGNOSIS — Z09 HOSPITAL DISCHARGE FOLLOW-UP: Primary | ICD-10-CM

## 2021-09-08 DIAGNOSIS — I82.4Y2 ACUTE DEEP VEIN THROMBOSIS (DVT) OF PROXIMAL VEIN OF LEFT LOWER EXTREMITY (HCC): ICD-10-CM

## 2021-09-08 DIAGNOSIS — I95.0 IDIOPATHIC HYPOTENSION: ICD-10-CM

## 2021-09-08 LAB
ANION GAP SERPL CALCULATED.3IONS-SCNC: 21 MMOL/L (ref 9–17)
BUN BLDV-MCNC: 19 MG/DL (ref 8–23)
BUN/CREAT BLD: ABNORMAL (ref 9–20)
CALCIUM SERPL-MCNC: 9.5 MG/DL (ref 8.6–10.4)
CARBAMAZEPINE DATE LAST DOSE: ABNORMAL
CARBAMAZEPINE DOSE AMOUNT: ABNORMAL
CARBAMAZEPINE DOSE TIME: ABNORMAL
CARBAMAZEPINE LEVEL: 12.5 UG/ML (ref 4–12)
CHLORIDE BLD-SCNC: 102 MMOL/L (ref 98–107)
CO2: 17 MMOL/L (ref 20–31)
CREAT SERPL-MCNC: 0.98 MG/DL (ref 0.5–0.9)
GFR AFRICAN AMERICAN: >60 ML/MIN
GFR NON-AFRICAN AMERICAN: 55 ML/MIN
GFR SERPL CREATININE-BSD FRML MDRD: ABNORMAL ML/MIN/{1.73_M2}
GFR SERPL CREATININE-BSD FRML MDRD: ABNORMAL ML/MIN/{1.73_M2}
GLUCOSE BLD-MCNC: 92 MG/DL (ref 70–99)
HCT VFR BLD CALC: 41 % (ref 36.3–47.1)
HEMOGLOBIN: 13.1 G/DL (ref 11.9–15.1)
MCH RBC QN AUTO: 31.6 PG (ref 25.2–33.5)
MCHC RBC AUTO-ENTMCNC: 32 G/DL (ref 28.4–34.8)
MCV RBC AUTO: 99 FL (ref 82.6–102.9)
NRBC AUTOMATED: 0 PER 100 WBC
PDW BLD-RTO: 14.4 % (ref 11.8–14.4)
PLATELET # BLD: 372 K/UL (ref 138–453)
PMV BLD AUTO: 11 FL (ref 8.1–13.5)
POTASSIUM SERPL-SCNC: 4.7 MMOL/L (ref 3.7–5.3)
RBC # BLD: 4.14 M/UL (ref 3.95–5.11)
SODIUM BLD-SCNC: 140 MMOL/L (ref 135–144)
WBC # BLD: 8.8 K/UL (ref 3.5–11.3)

## 2021-09-08 PROCEDURE — 1111F DSCHRG MED/CURRENT MED MERGE: CPT | Performed by: FAMILY MEDICINE

## 2021-09-08 PROCEDURE — 99495 TRANSJ CARE MGMT MOD F2F 14D: CPT | Performed by: FAMILY MEDICINE

## 2021-09-08 RX ORDER — ACETAMINOPHEN 500 MG
500 TABLET ORAL EVERY 6 HOURS PRN
Status: ON HOLD | COMMUNITY
End: 2022-10-04

## 2021-09-08 RX ORDER — FLUDROCORTISONE ACETATE 0.1 MG/1
0.1 TABLET ORAL DAILY
Qty: 90 TABLET | Refills: 3 | Status: SHIPPED | OUTPATIENT
Start: 2021-09-08 | End: 2022-11-03

## 2021-09-08 SDOH — ECONOMIC STABILITY: FOOD INSECURITY: WITHIN THE PAST 12 MONTHS, YOU WORRIED THAT YOUR FOOD WOULD RUN OUT BEFORE YOU GOT MONEY TO BUY MORE.: NEVER TRUE

## 2021-09-08 SDOH — ECONOMIC STABILITY: FOOD INSECURITY: WITHIN THE PAST 12 MONTHS, THE FOOD YOU BOUGHT JUST DIDN'T LAST AND YOU DIDN'T HAVE MONEY TO GET MORE.: NEVER TRUE

## 2021-09-08 ASSESSMENT — PATIENT HEALTH QUESTIONNAIRE - PHQ9: DEPRESSION UNABLE TO ASSESS: FUNCTIONAL CAPACITY MOTIVATION LIMITS ACCURACY

## 2021-09-08 ASSESSMENT — SOCIAL DETERMINANTS OF HEALTH (SDOH): HOW HARD IS IT FOR YOU TO PAY FOR THE VERY BASICS LIKE FOOD, HOUSING, MEDICAL CARE, AND HEATING?: NOT HARD AT ALL

## 2021-09-08 NOTE — LETTER
Pr-14  4.2  [facility] [address] [place] [address]  Phone: [phone]  Fax: [phone]    Brigitte Evans DO         September 8, 2021     Patient: Rl Benites   YOB: 1945   Date of Visit: 9/8/2021       To Whom It May Concern: It is my medical opinion that Marifer Mendez requires a disability parking placard for the following reasons:  She has limited walking ability due to a neurologic condition. Duration of need: 5 years    If you have any questions or concerns, please don't hesitate to call.     Sincerely,        Sravani Wu, DO

## 2021-09-08 NOTE — PROGRESS NOTES
Post-Discharge Transitional Care Management Services or Hospital Follow Up      Dennis Reyes   YOB: 1945    Date of Office Visit:  9/8/2021  Date of Hospital Admission: 9/1/21  Date of Hospital Discharge: 9/7/21    Care management risk score Rising risk (score 2-5) and Complex Care (Scores >=6): 1     Non face to face  following discharge, date last encounter closed (first attempt may have been earlier): *No documented post hospital discharge outreach found in the last 14 days *No documented post hospital discharge outreach found in the last 14 days    Call initiated 2 business days of discharge: *No response recorded in the last 14 days    Patient Active Problem List   Diagnosis    Seizure [facility]    Vitamin D deficiency    Anxiety    Hypercholesteremia    Cellulitis of right leg    MRSA (methicillin resistant staph aureus) culture positive    Breakthrough seizure [facility]    Memory loss    Difficulty speaking    Seizure disorder [facility]    D[facility]    History of encephalitis    Dementia due to Parkinson's disease with behavioral disturbance ([facility]    Acute deep vein thrombosis (DVT) of proximal vein of left lower extremity (HCC)       Allergies   Allergen Reactions    Haldol [Haloperidol Lactate]        Medications listed as ordered at the time of discharge from hospital  List of medications patient is currently taking is complete. Source of medications in list are  Med list from [facility].           Medications marked \"taking\" at this time  Outpatient Medications Marked as Taking for the 9/8/21 encounter (Office Visit) with Ju Pill, DO   Medication Sig Dispense Refill    fludrocortisone (FLORINEF) 0.1 MG tablet Take 1 tablet by mouth daily 90 tablet 3    acetaminophen (TYLENOL) 500 MG tablet Take 500 mg by mouth every 6 hours as needed for Pain      mirabegron (MYRBETRIQ) 50 MG TB24 Take 50 mg by mouth daily 30 tablet 11    carBAMazepine (TEGRETOL) 200 MG tablet TAKE 1 AND 1/2 TABLETS BY MOUTH TWO TIMES A DAY  (Patient taking differently: TAKE 1 AND 1/2 TABLETS Am, 2 tabs bedtime) 180 tablet 3    FLUoxetine (PROZAC) 20 MG capsule TAKE 2 CAPSULES BY MOUTH ONE TIME A DAY  180 capsule 3        Medications patient taking as of now reconciled against medications ordered at time of hospital discharge: Yes    Chief Complaint   Patient presents with   [address] from Hospital       History of Present illness - Follow up of Hospital diagnosis(es):   1. Hospital discharge follow-up    2. Idiopathic hypotension    3. Acute deep vein thrombosis (DVT) of proximal vein of left lower extremity [facility])          Inpatient course: Discharge summary reviewed- see chart. Interval history/Current status: stable     Vitals:    09/08/21 1032   BP: 139/80   Pulse: 83   SpO2: 98%   Weight: 126 lb 12.8 oz (57.5 kg)   Height: 5' 7\" (1.702 m)     Body mass index is 19.86 kg/m². Wt Readings from Last 3 Encounters:   09/08/21 126 lb 12.8 oz (57.5 kg)   03/05/21 138 lb (62.6 kg)   05/20/20 129 lb 9.6 oz (58.8 kg)     BP Readings from Last 3 Encounters:   09/08/21 139/80   03/05/21 113/67   05/20/20 108/67       A comprehensive review of systems was negative except for what was noted in the HPI.     Physical Exam:  General Appearance: alert and oriented to person, place and time, well developed and well- nourished, in no acute distress  Skin: warm and dry, no rash or erythema  Head: normocephalic and atraumatic  Eyes: pupils equal, round, and reactive to light, extraocular eye movements intact, conjunctivae normal  ENT: tympanic membrane, external ear and ear canal normal bilaterally, nose without deformity, nasal mucosa and turbinates normal without polyps  Neck: supple and non-tender without mass, no thyromegaly or thyroid nodules, no cervical lymphadenopathy  Pulmonary/Chest: clear to auscultation bilaterally- no wheezes, rales or rhonchi, normal air movement, no respiratory distress  Cardiovascular: normal rate, regular rhythm, normal S1 and S2, no murmurs, rubs, clicks, or gallops, distal pulses intact, no carotid bruits  Abdomen: soft, non-tender, non-distended, normal bowel sounds, no masses or organomegaly  Extremities: no cyanosis, clubbing or edema  Musculoskeletal: normal range of motion, no joint swelling, deformity or tenderness  Neurologic: reflexes normal and symmetric, no cranial nerve deficit, gait, coordination and speech normal    Assessment/Plan:  1. Hospital discharge follow-up    2. Idiopathic hypotension    3. Acute deep vein thrombosis (DVT) of proximal vein of left lower extremity (HCC)      No orders of the defined types were placed in this encounter.     Requested Prescriptions     Signed Prescriptions Disp Refills    fludrocortisone (FLORINEF) 0.1 MG tablet 90 tablet 3     Sig: Take 1 tablet by mouth daily     Get labs       Medical Decision Making: moderate complexity

## 2021-09-13 ENCOUNTER — TELEPHONE (OUTPATIENT)
Dept: FAMILY MEDICINE CLINIC | Age: 76
End: 2021-09-13

## 2021-09-13 NOTE — TELEPHONE ENCOUNTER
----- Message from Sindy Medrano sent at 9/13/2021  4:04 PM EDT -----  Subject: Message to Provider    QUESTIONS  Information for Provider? [facility] physical therapist   called to inform on pts frequency of physical therapy, 2 times a week for   two weeks and 1 time a week for one week  ---------------------------------------------------------------------------  --------------  CALL BACK INFO  What is the best way for the office to contact you? OK to leave message on   voicemail  Preferred Call Back Phone Number? [phone]  ---------------------------------------------------------------------------  --------------  SCRIPT ANSWERS  Relationship to Patient? Third Party  Representative Name?  Eloise Salas

## 2021-10-28 ENCOUNTER — PATIENT MESSAGE (OUTPATIENT)
Dept: FAMILY MEDICINE CLINIC | Age: 76
End: 2021-10-28

## 2021-10-28 NOTE — PATIENT INSTRUCTIONS
SUBJECTIVE  Patient ID: Michell is a 38 year old female.    Chief Complaint   Patient presents with   • Cough   • PPD Placement   • PPD Placement     Cough  This is a recurrent problem. Episode onset: 2 months ago. The problem has been waxing and waning. The problem occurs every few minutes. The cough is productive of sputum. Associated symptoms include nasal congestion, postnasal drip and rhinorrhea. Pertinent negatives include no chills, ear congestion, ear pain, fever, myalgias, sore throat (resolving), shortness of breath or wheezing. Associated symptoms comments: Sinus pressure. The symptoms are aggravated by lying down (worse night and early morning). She has tried OTC cough suppressant (vicks, theraflu, nyquil) for the symptoms. The treatment provided mild relief. Her past medical history is significant for bronchitis and environmental allergies. There is no history of asthma or pneumonia.     Patient presents to clinic with complaints of cough, sinus congestion/pressure for over 2 months.     Past Medical History:   Diagnosis Date   • Abnormal fetal ultrasound    • AMA (advanced maternal age) multigravida 35+    • Anemia    • Diabetes mellitus (CMS/HCC)     H/O GDM   • Essential hypertension 2021   • Female infertility    • Fibroid    • Fibroids    • History of chicken pox    • History of diet controlled gestational diabetes mellitus (GDM)    • History of gestational diabetes mellitus (GDM)     placed on metformin   • History of  delivery    • PONV (postoperative nausea and vomiting)        Michell has a past medical history of Abnormal fetal ultrasound, AMA (advanced maternal age) multigravida 35+, Anemia, Diabetes mellitus (CMS/HCC), Essential hypertension (2021), Female infertility, Fibroid, Fibroids, History of chicken pox, History of diet controlled gestational diabetes mellitus (GDM), History of gestational diabetes mellitus (GDM) (), History of  delivery, and PONV  Personalized Preventive Plan for Shruti Zayas - 3/5/2021  Medicare offers a range of preventive health benefits. Some of the tests and screenings are paid in full while other may be subject to a deductible, co-insurance, and/or copay. Some of these benefits include a comprehensive review of your medical history including lifestyle, illnesses that may run in your family, and various assessments and screenings as appropriate. After reviewing your medical record and screening and assessments performed today your provider may have ordered immunizations, labs, imaging, and/or referrals for you. A list of these orders (if applicable) as well as your Preventive Care list are included within your After Visit Summary for your review. Other Preventive Recommendations:    · A preventive eye exam performed by an eye specialist is recommended every 1-2 years to screen for glaucoma; cataracts, macular degeneration, and other eye disorders. · A preventive dental visit is recommended every 6 months. · Try to get at least 150 minutes of exercise per week or 10,000 steps per day on a pedometer . · Order or download the FREE \"Exercise & Physical Activity: Your Everyday Guide\" from [employer]. Call 7-414.897.6177 or search [employer] online. · You need 3850-8868 mg of calcium and 5912-9730 IU of vitamin D per day. It is possible to meet your calcium requirement with diet alone, but a vitamin D supplement is usually necessary to meet this goal.  · When exposed to the sun, use a sunscreen that protects against both UVA and UVB radiation with an SPF of 30 or greater. Reapply every 2 to 3 hours or after sweating, drying off with a towel, or swimming. · Always wear a seat belt when traveling in a car. Always wear a helmet when riding a bicycle or motorcycle. (postoperative nausea and vomiting). She also has no past medical history of Adverse effect of anesthesia, Blood disorder, Breast disorder, Chronic kidney disease, Difficult intubation, Failed moderate sedation during procedure, Herpes simplex virus infection, HIV disease (CMS/HCC), Injury, Liver disease, Lupus (CMS/HCC), Malignant hyperthermia, Mental disorder, Motion sickness, No known problems, Postpartum depression, RAD (reactive airway disease), Rh incompatibility, Seizures (CMS/HCC), Sickle cell anemia (CMS/HCC), Spinal headache, or Thyroid disease.  Michell has a past surgical history that includes  section, low transverse; Myomectomy; in vitro fertilization report; and Hysterectomy.  Michell reports that she has never smoked. She has never used smokeless tobacco. She reports previous alcohol use. She reports that she does not use drugs.  Michell has a current medication list which includes the following prescription(s): gabapentin, ibuprofen, acetaminophen, diphenhydramine hcl, benzonatate, and amoxicillin-clavulanate.  Michell is allergic to seasonal.    Review of Systems   Constitutional: Negative for chills, diaphoresis, fatigue and fever.   HENT: Positive for congestion, postnasal drip, rhinorrhea, sinus pressure and sneezing. Negative for ear discharge, ear pain, hearing loss, sore throat (resolving) and trouble swallowing.    Eyes: Negative.    Respiratory: Positive for cough. Negative for chest tightness, shortness of breath and wheezing.    Cardiovascular: Negative.    Musculoskeletal: Negative for myalgias.   Allergic/Immunologic: Positive for environmental allergies. Negative for food allergies.   Neurological: Negative.    Hematological: Negative.    Psychiatric/Behavioral: Negative.        OBJECTIVE  Visit Vitals  /70 (BP Location: LUE - Left upper extremity, Patient Position: Sitting, Cuff Size: Large Adult)   Pulse 96   Temp 99 °F (37.2 °C) (Temporal)   Resp 18   Ht 5' 1\" (1.549 m)    Wt 95.3 kg (210 lb)   LMP 10/12/2020 (Exact Date)   SpO2 98%   BMI 39.68 kg/m²     Physical Exam  Constitutional:       General: She is awake. She is not in acute distress.     Appearance: Normal appearance. She is well-developed, well-groomed and normal weight. She is not ill-appearing or toxic-appearing.   HENT:      Head: Normocephalic.      Right Ear: Hearing, tympanic membrane, ear canal and external ear normal.      Left Ear: Hearing, tympanic membrane, ear canal and external ear normal.      Nose: Congestion present. No rhinorrhea.      Right Sinus: Maxillary sinus tenderness present. No frontal sinus tenderness.      Left Sinus: Maxillary sinus tenderness present. No frontal sinus tenderness.      Mouth/Throat:      Lips: Pink.      Mouth: Mucous membranes are moist.      Pharynx: Oropharynx is clear. Uvula midline. No posterior oropharyngeal erythema.      Tonsils: No tonsillar exudate. 1+ on the right. 1+ on the left.      Comments: +post nasal drip  Eyes:      General: Lids are normal. Vision grossly intact. Gaze aligned appropriately.      Extraocular Movements: Extraocular movements intact.      Conjunctiva/sclera: Conjunctivae normal.      Pupils: Pupils are equal, round, and reactive to light.   Cardiovascular:      Rate and Rhythm: Normal rate and regular rhythm.      Heart sounds: Normal heart sounds. No murmur heard.     Pulmonary:      Effort: Pulmonary effort is normal.      Breath sounds: Normal breath sounds and air entry. No decreased breath sounds, wheezing, rhonchi or rales.   Musculoskeletal:      Cervical back: Normal range of motion and neck supple.   Lymphadenopathy:      Head:      Right side of head: No submental, submandibular, tonsillar, preauricular, posterior auricular or occipital adenopathy.      Left side of head: No submental, submandibular, tonsillar, preauricular, posterior auricular or occipital adenopathy.      Cervical: No cervical adenopathy.      Upper Body:      Right  upper body: No supraclavicular adenopathy.      Left upper body: No supraclavicular adenopathy.   Neurological:      Mental Status: She is alert and oriented to person, place, and time.   Psychiatric:         Attention and Perception: Attention and perception normal.         Mood and Affect: Mood and affect normal.         Speech: Speech normal.         Behavior: Behavior normal. Behavior is cooperative.         Thought Content: Thought content normal.         Cognition and Memory: Cognition and memory normal.         Judgment: Judgment normal.             TB Screening Questionnaire  Any symptoms you are presenting with today: cough at night  Why do you need a TB test today? work  Have you ever had a positive TB skin test or TB blood test? No  Have you had a severe reaction to a TB skin Test? No  Have you ever taken medication for tuberculosis? No  What country were you born in [place]  If you were not born in the [place], what year did you come here[place]  Have you traveled or lived outside the [place] in the last 2 years? No  Have you been in contact with someone who has TB disease? No  Have you ever used injection drugs? No  Do you have HIV/AIDS? No  Do you have any diseases that could affect your immune system such as cancer, leukemia, or other? No  Do you have diabetes? No  Do you have severe kidney disease? No  Are you underweight or do you have a disease which affects how you absorb food and nutrients? No  Have you had the BCG vaccine? No  Do you take any prescription medications? No      Patient presents for a screening Mantoux Tuberculin Skin Test for employment.  Does not have a history of BCG Vaccine.  PPD 5TU 0.1ml placed on Left forearm, 10/28/2021 at 3:15 PM.  Patient given instructions to return in 48 to 72 hours to have Skin Test read.    Return for tb reading after 3:15pm on Saturday 10/30/21 or before 3:15pm on Chavo 10/31/21.          ASSESSMENT      Problem List Items Addressed This Visit     None      Visit  Diagnoses     Acute non-recurrent ethmoidal sinusitis    -  Primary    Relevant Medications    amoxicillin-clavulanate (AUGMENTIN) 875-125 MG per tablet    Screening-pulmonary TB        Relevant Orders    TB SKIN TEST (Completed)    Cough        Relevant Medications    benzonatate (TESSALON PERLES) 200 MG capsule                Current Outpatient Medications   Medication Sig Dispense Refill   • gabapentin (NEURONTIN) 300 MG capsule Take 1 capsule by mouth 3 times daily. 90 capsule 0   • ibuprofen (MOTRIN) 600 MG tablet Take 1 tablet by mouth every 6 hours as needed for Pain. 30 tablet 1   • acetaminophen (TYLENOL) 500 MG tablet Take 2 tablets by mouth every 8 hours as needed for Pain. 60 tablet 0   • diphenhydrAMINE HCl (BENADRYL ALLERGY PO) Take 25 mg by mouth daily as needed.      • benzonatate (TESSALON PERLES) 200 MG capsule Take 1 capsule by mouth 3 times daily as needed for Cough. 20 capsule 0   • amoxicillin-clavulanate (AUGMENTIN) 875-125 MG per tablet Take 1 tablet by mouth 2 times daily for 10 days. 20 tablet 0     No current facility-administered medications for this visit.             PLAN    Here with c/o sinus congestion and productive cough for 2 months. Went to urgent care when symptoms began, told it was URI. Symptoms not improving.   Denies wheezing, shortness of breath  Denies fevers, body aches  Denies sore throat, ear pain    PE reveals swollen nasal mucosa, post nasal drip, maxillary, ethmoid pain with palpation. Lungs clear    Treating for acute sinusitis  Augmentin BID for 10 days  Tessalon 3 times/day as needed for cough  Continue using Flonase nasal spray    Follow up with PCP or in clinic if symptoms don't improve in the next 7-10 days or sooner if they worsen. Patient verbalized understanding.     PPD placed to left forearm; return in 48-72 hours for results      Patient Instructions     MANTOUX TUBERCULIN (a.k.a. TB) SKIN TEST      What is Tuberculosis/TB?  Tuberculosis/TB is an  infectious disease, which is spread through the air by tiny germs when people cough, sneeze, speak or sing. TB germs are very small and can remain in the air for a long period of time. TB germs most oft  en settle in your lungs, but may also settle in other organs of your body. TB germs can be present in your body without making you ill. This is called TB INFECTION. TB infection cannot be spread to other people. When your  body’s defenses become weak and can no longer control the TB germs they multiply, this is called TB DISEASE. It can take month(s) to year(s) for TB infection to become TB disease. The TB SKIN TEST can show if a person has  been “infected” by TB germs.    How is the Mantoux Tuberculin/TB skin test given?  A very small amount of a product called Purified Protein Derivative (PPD) is injected just under the top layer of the skin on the forearm. Persons who have been infected by the TB germs usually react to the test by developing swelling at the injection site. The skin test results must be read 48 to 72 hours after given. Failure to return within this time frame means the test will need to be repeated.    Side effects…  Side effects from a skin test are rare and may include itching and discomfort at the injection site and very rarely the possibility of a blister, ulceration or necrosis (dead tissue) at the site if the injection.      Return for tb reading after 3:15pm on Saturday 10/30/21 or before 3:15pm on Chavo 10/31/21.   Patient Education     Sinusitis (Antibiotic Treatment)    The sinuses are air-filled spaces within the bones of the face. They connect to the inside of the nose. Sinusitis is an inflammation of the tissue that lines the sinuses. Sinusitis can occur during a cold. It can also happen due to allergies to pollens and other particles in the air. Sinusitis can cause symptoms of sinus congestion and a feeling of fullness. A sinus infection causes fever, headache, and facial pain.  There is often green or yellow fluid draining from the nose or into the back of the throat (post-nasal drip). You have been given antibiotics to treat this condition.   Home care  · Take the full course of antibiotics as instructed. Don't stop taking them, even when you feel better.  · Drink plenty of water, hot tea, and other liquids as directed by the healthcare provider. This may help thin nasal mucus. It also may help your sinuses drain fluids.  · Heat may help soothe painful areas of your face. Use a towel soaked in hot water. Or,  the shower and direct the warm spray onto your face. Using a vaporizer along with a menthol rub at night may also help soothe symptoms.   · An expectorant with guaifenesin may help thin nasal mucus and help your sinuses drain fluids. Talk with your provider or pharmacists before taking an over-the-counter (OTC) medicine if you have any questions about it or its side effects..  · You can use an OTC decongestant, unless a similar medicine was prescribed to you. Nasal sprays work the fastest. Use one that contains phenylephrine or oxymetazoline. First blow your nose gently. Then use the spray. Don't use these medicines more often than directed on the label. If you do, your symptoms may get worse. You may also take pills that contain pseudoephedrine. Don’t use products that combine multiple medicines. This is because side effects may be increased. Read labels. You can also ask the pharmacist for help. (People with high blood pressure should not use decongestants. They can raise blood pressure.) Talk with your provider or pharmacist if you have any questions about the medicine..  · OTC antihistamines may help if allergies contributed to your sinusitis. Talk with your provider or pharmacist if you have any questions about the medicine..  · Don't use nasal rinses or irrigation during an acute sinus infection, unless your healthcare provider tells you to. Rinsing may spread the  infection to other areas in your sinuses.  · Use acetaminophen or ibuprofen to control pain, unless another pain medicine was prescribed to you. If you have chronic liver or kidney disease or ever had a stomach ulcer, talk with your healthcare provider before using these medicines. Never give aspirin to anyone under age 18 who is ill with a fever. It may cause severe liver damage.  · Don't smoke. This can make symptoms worse.    Follow-up care  Follow up with your healthcare provider, or as advised.   When to seek medical advice  Call your healthcare provider if any of these occur:   · Facial pain or headache that gets worse  · Stiff neck  · Unusual drowsiness or confusion  · Swelling of your forehead or eyelids  · Symptoms don't go away in 10 days  · Vision problems, such as blurred or double vision  · Fever of 100.4ºF (38ºC) or higher, or as directed by your healthcare provider  Call 911  Call 911 if any of these occur:   · Seizure  · Trouble breathing  · Feeling dizzy or faint  · Fingernails, skin or lips look blue, purple , or gray  Prevention  Here are steps you can take to help prevent an infection:   · Keep good hand washing habits.  · Don’t have close contact with people who have sore throats, colds, or other upper respiratory infections.  · Don’t smoke, and stay away from secondhand smoke.  · Stay up to date with of your vaccines.  [employer] last reviewed this educational content on 12/1/2019  © 6420-1177 [employer]. All rights reserved. This information is not intended as a substitute for professional medical care. Always follow your healthcare professional's instructions.         Take antibiotics as prescribed with food; don't stop if feeling better. Need to complete medication  Tessalon 3 times/day as needed for cough  Continue using Flonase nasal spray    Follow up with PCP or in clinic if symptoms don't improve in the next 7-10 days or sooner if they worsen

## 2021-10-29 ENCOUNTER — HOSPITAL ENCOUNTER (OUTPATIENT)
Dept: VASCULAR LAB | Age: 76
Discharge: HOME | End: 2021-10-29
Payer: COMMERCIAL

## 2021-10-29 DIAGNOSIS — Z86.718 PERSONAL HISTORY OF VENOUS THROMBOSIS AND EMBOLUS: ICD-10-CM

## 2021-10-29 PROCEDURE — 93970 EXTREMITY STUDY: CPT

## 2021-10-29 NOTE — PROGRESS NOTES
10/29/2021. Called physicians office and had Physician on call paged. Micaela Mark returned phone call. Informed him of patients positive results. Past DVT was discussed. Was asked to inform patient to call office and get set up with the DVT clinic for same day visit.

## 2021-10-29 NOTE — TELEPHONE ENCOUNTER
From: Susana Orellana  To: Esther Thompson DO  Sent: 10/28/2021 6:09 PM EDT  Subject: Prescription Question    This message is being sent by Josefa Quan on behalf of Susana Orellana. Hi! I wanted to see if my mom can get her my myrbetriq refilled. I just called and Chaka Simmons said they faxed and called again yesterday.  Thanks

## 2021-11-17 ENCOUNTER — OFFICE VISIT (OUTPATIENT)
Dept: FAMILY MEDICINE CLINIC | Age: 76
End: 2021-11-17
Payer: COMMERCIAL

## 2021-11-17 VITALS
HEART RATE: 85 BPM | SYSTOLIC BLOOD PRESSURE: 105 MMHG | HEIGHT: 67 IN | BODY MASS INDEX: 20.88 KG/M2 | DIASTOLIC BLOOD PRESSURE: 58 MMHG | OXYGEN SATURATION: 94 % | WEIGHT: 133 LBS

## 2021-11-17 DIAGNOSIS — Z91.81 AT HIGH RISK FOR FALLS: ICD-10-CM

## 2021-11-17 DIAGNOSIS — G40.909 SEIZURE DISORDER (HCC): ICD-10-CM

## 2021-11-17 DIAGNOSIS — G20 DEMENTIA DUE TO PARKINSON'S DISEASE WITH BEHAVIORAL DISTURBANCE (HCC): ICD-10-CM

## 2021-11-17 DIAGNOSIS — F42.4 SKIN PICKING HABIT: ICD-10-CM

## 2021-11-17 DIAGNOSIS — F02.818 DEMENTIA DUE TO PARKINSON'S DISEASE WITH BEHAVIORAL DISTURBANCE (HCC): ICD-10-CM

## 2021-11-17 DIAGNOSIS — Z13.31 POSITIVE DEPRESSION SCREENING: ICD-10-CM

## 2021-11-17 DIAGNOSIS — Z86.61 HISTORY OF ENCEPHALITIS: ICD-10-CM

## 2021-11-17 PROCEDURE — 99213 OFFICE O/P EST LOW 20 MIN: CPT | Performed by: FAMILY MEDICINE

## 2021-11-17 PROCEDURE — G8431 POS CLIN DEPRES SCRN F/U DOC: HCPCS | Performed by: FAMILY MEDICINE

## 2021-11-17 RX ORDER — ASPIRIN 81 MG/1
81 TABLET ORAL DAILY
COMMUNITY

## 2021-11-17 RX ORDER — CARBAMAZEPINE 200 MG/1
TABLET ORAL
Qty: 180 TABLET | Refills: 3 | Status: ON HOLD | OUTPATIENT
Start: 2021-11-17 | End: 2022-05-19 | Stop reason: HOSPADM

## 2021-11-17 RX ORDER — FLUOXETINE HYDROCHLORIDE 20 MG/1
CAPSULE ORAL
Qty: 180 CAPSULE | Refills: 3 | Status: SHIPPED | OUTPATIENT
Start: 2021-11-17 | End: 2022-08-22 | Stop reason: SDUPTHER

## 2021-11-17 ASSESSMENT — PATIENT HEALTH QUESTIONNAIRE - PHQ9
5. POOR APPETITE OR OVEREATING: 2
SUM OF ALL RESPONSES TO PHQ9 QUESTIONS 1 & 2: 3
3. TROUBLE FALLING OR STAYING ASLEEP: 0
9. THOUGHTS THAT YOU WOULD BE BETTER OFF DEAD, OR OF HURTING YOURSELF: 0
1. LITTLE INTEREST OR PLEASURE IN DOING THINGS: 0
6. FEELING BAD ABOUT YOURSELF - OR THAT YOU ARE A FAILURE OR HAVE LET YOURSELF OR YOUR FAMILY DOWN: 3
2. FEELING DOWN, DEPRESSED OR HOPELESS: 3
8. MOVING OR SPEAKING SO SLOWLY THAT OTHER PEOPLE COULD HAVE NOTICED. OR THE OPPOSITE, BEING SO FIGETY OR RESTLESS THAT YOU HAVE BEEN MOVING AROUND A LOT MORE THAN USUAL: 0
4. FEELING TIRED OR HAVING LITTLE ENERGY: 0
7. TROUBLE CONCENTRATING ON THINGS, SUCH AS READING THE NEWSPAPER OR WATCHING TELEVISION: 3
SUM OF ALL RESPONSES TO PHQ QUESTIONS 1-9: 11
SUM OF ALL RESPONSES TO PHQ QUESTIONS 1-9: 11
10. IF YOU CHECKED OFF ANY PROBLEMS, HOW DIFFICULT HAVE THESE PROBLEMS MADE IT FOR YOU TO DO YOUR WORK, TAKE CARE OF THINGS AT HOME, OR GET ALONG WITH OTHER PEOPLE: 0
SUM OF ALL RESPONSES TO PHQ QUESTIONS 1-9: 11

## 2021-11-17 ASSESSMENT — COLUMBIA-SUICIDE SEVERITY RATING SCALE - C-SSRS
1. WITHIN THE PAST MONTH, HAVE YOU WISHED YOU WERE DEAD OR WISHED YOU COULD GO TO SLEEP AND NOT WAKE UP?: NO
6. HAVE YOU EVER DONE ANYTHING, STARTED TO DO ANYTHING, OR PREPARED TO DO ANYTHING TO END YOUR LIFE?: NO
2. HAVE YOU ACTUALLY HAD ANY THOUGHTS OF KILLING YOURSELF?: NO

## 2021-11-17 NOTE — PROGRESS NOTES
[facility]  [address] [place] [address] [address] \[facility]\"" [address]  Dept: [phone]      Robbie Blood is a 64 y.o. female who presents today for follow up on her  medical conditions as noted below. Chief Complaint   Patient presents with    Medication Check       Patient Active Problem List:     Seizure [facility])     Vitamin D deficiency     Anxiety     Hypercholesteremia     Cellulitis of right leg     MRSA (methicillin resistant staph aureus) culture positive     Breakthrough seizure ([address]     Memory loss     Difficulty speaking     Seizure disorder ([address]     Dementia with behavioral disturbance ([facility]     History of encephalitis     Dementia due to Parkinson's disease with behavioral disturbance ([facility]     Acute deep vein thrombosis (DVT) of proximal vein of left lower extremity ([facility]     Past Medical History:   Diagnosis Date    Anxiety     Convulsion ([facility]     Encephalopathy     Herpes     Hyperlipidemia     Left bundle branch block     MRSA (methicillin resistant Staphylococcus aureus)     Parkinson's disease ([facility]     Seizures ([facility]     secondary to encephalitis    Vitamin D deficiency       Past Surgical History:   Procedure Laterality Date    ABSCESS DRAINAGE Right 12/14/2013    WOUND EXPLORATION AND I+D  RIGHT HIP     No family history on file.     Current Outpatient Medications   Medication Sig Dispense Refill    aspirin 81 MG EC tablet Take 81 mg by mouth daily      carBAMazepine (TEGRETOL) 200 MG tablet TAKE 1 AND 1/2 TABLETS Am, 2 tabs bedtime 180 tablet 3    FLUoxetine (PROZAC) 20 MG capsule 2 po qd 180 capsule 3    mirabegron (MYRBETRIQ) 50 MG TB24 Take 50 mg by mouth daily 30 tablet 11    acetaminophen (TYLENOL) 500 MG tablet Take 500 mg by mouth every 6 hours as needed for Pain      desmopressin (DDAVP) 0.2 MG tablet TAKE 3 TABLETS BY MOUTH IN THE EVENING  (Patient not taking: Reported on 9/8/2021) 90 tablet 11    Handicap Placard MISC by Does not apply route Permanent one year  Dx fractured humerus (Patient not taking: Reported on 2020) 1 each 0    rivaroxaban (XARELTO STARTER PACK) 15 & 20 MG Starter Pack Follow package instructions (Patient not taking: Reported on 2020) 1 Package 0     No current facility-administered medications for this visit. ALLERGIES:    Allergies   Allergen Reactions    Haldol [Haloperidol Lactate]        Social History     Tobacco Use    Smoking status: Former Smoker     Packs/day: 1.00     Years: 30.00     Pack years: 30.00     Types: Cigarettes     Start date:      Quit date:      Years since quittin.8    Smokeless tobacco: Never Used    Tobacco comment: unknown how many packs a day, or how many years   Substance Use Topics    Alcohol use: No        LDL Calculated (mg/dL)   Date Value   2016 127     LDL Cholesterol (mg/dL)   Date Value   2021 105     HDL (mg/dL)   Date Value   2021 70     BUN (mg/dL)   Date Value   2021 19     CREATININE (mg/dL)   Date Value   2021 0.98 (H)     Glucose (mg/dL)   Date Value   2021 92   2012 93              Subjective:      HPI  She is being seen today for follow-up and med refills overall her daughter states she is doing fine with no new complaints or problems    Review of Systems:     Constitutional: Negative for fever, appetite change and fatigue. Family social and medical history reviewed and unchanged     HENT: Negative. Negative for nosebleeds, trouble swallowing and neck pain. Eyes: Negative for photophobia and visual disturbance. Respiratory: Negative. Negative for chest tightness and shortness of breath. Cardiovascular: Negative. Negative for chest pain and leg swelling. Gastrointestinal: Negative. Negative for abdominal pain and blood in stool. Endocrine: Negative for cold intolerance and polyuria. Genitourinary: Negative for dysuria and hematuria.    Musculoskeletal: Negative. Skin: Negative for rash. Allergic/Immunologic: Negative. Neurological: Negative. Negative for dizziness, weakness and numbness. Hematological: Negative. Negative for adenopathy. Does not bruise/bleed easily. Psychiatric/Behavioral: Negative for sleep disturbance, dysphoric mood and  decreased concentration. The patient is not nervous/anxious. Objective:     Physical Exam:     Nursing note and vitals reviewed. BP (!) 105/58   Pulse 85   Ht 5' 7\" (1.702 m)   Wt 133 lb (60.3 kg)   SpO2 94%   BMI 20.83 kg/m²   Constitutional: She is oriented to person, place, and time. She   appears well-developed and well-nourished. HENT:   Head: Normocephalic and atraumatic. Right Ear: External ear normal. Tympanic membrane is not erythematous. No middle ear effusion. Left Ear: External ear normal. Tympanic membrane is not erythematous. No middle ear effusion. Nose: No mucosal edema. Mouth/Throat: Oropharynx is clear and moist. No posterior oropharyngeal erythema. Eyes: Conjunctivae and EOM are normal. Pupils are equal, round, and reactive to light. Neck: Normal range of motion. Neck supple. No thyromegaly present. Cardiovascular: Normal rate, regular rhythm and normal heart sounds. No murmur heard. Pulmonary/Chest: Effort normal and breath sounds normal. She has no wheezes. Shehas no rales. Abdominal: Soft. Bowel sounds are normal. She exhibits no distension and no mass. There is no tenderness. There is no rebound and no guarding. Genitourinary/Anorectal:deferred  Musculoskeletal: Normal range of motion. She exhibits no edema or tenderness. Lymphadenopathy: She has no cervical adenopathy. Neurological: She is alert and oriented to person, place, and time. She has normal reflexes. Skin: Skin is warm and dry. No rash noted. Psychiatric: She has a normal mood and affect. Her   behavior is normal.       Assessment:      1. History of encephalitis    2.  Seizure disorder ([facility]    3. Dementia due to Parkinson's disease with behavioral disturbance ([facility]    4. Skin picking habit    5. At high risk for falls    6. Positive depression screening          Plan:      Call or return to clinic prn if these symptoms worsen or fail to improve as anticipated. I have reviewed the instructions with the patient, answering all questions to her satisfaction. No follow-ups on file.   Orders Placed This Encounter   Procedures    Positive Screen for Clinical Depression with a Documented Follow-up Plan      Orders Placed This Encounter   Medications    carBAMazepine (TEGRETOL) 200 MG tablet     Sig: TAKE 1 AND 1/2 TABLETS Am, 2 tabs bedtime     Dispense:  180 tablet     Refill:  3    FLUoxetine (PROZAC) 20 MG capsule     Si po qd     Dispense:  180 capsule     Refill:  3       Electronically signed by Lessie Phoenix, DO on 2021 at 9:15 AM

## 2022-01-12 ENCOUNTER — OFFICE VISIT (OUTPATIENT)
Dept: FAMILY MEDICINE CLINIC | Age: 77
End: 2022-01-12
Payer: COMMERCIAL

## 2022-01-12 VITALS
WEIGHT: 135.6 LBS | BODY MASS INDEX: 21.28 KG/M2 | DIASTOLIC BLOOD PRESSURE: 64 MMHG | HEIGHT: 67 IN | SYSTOLIC BLOOD PRESSURE: 122 MMHG | HEART RATE: 90 BPM | OXYGEN SATURATION: 98 %

## 2022-01-12 DIAGNOSIS — I82.4Y2 ACUTE DEEP VEIN THROMBOSIS (DVT) OF PROXIMAL VEIN OF LEFT LOWER EXTREMITY (HCC): ICD-10-CM

## 2022-01-12 DIAGNOSIS — Z86.61 HISTORY OF ENCEPHALITIS: Primary | ICD-10-CM

## 2022-01-12 DIAGNOSIS — G20 DEMENTIA DUE TO PARKINSON'S DISEASE WITH BEHAVIORAL DISTURBANCE (HCC): ICD-10-CM

## 2022-01-12 DIAGNOSIS — G40.909 SEIZURE DISORDER (HCC): ICD-10-CM

## 2022-01-12 DIAGNOSIS — F02.818 DEMENTIA DUE TO PARKINSON'S DISEASE WITH BEHAVIORAL DISTURBANCE (HCC): ICD-10-CM

## 2022-01-12 PROCEDURE — 99214 OFFICE O/P EST MOD 30 MIN: CPT | Performed by: FAMILY MEDICINE

## 2022-01-12 ASSESSMENT — PATIENT HEALTH QUESTIONNAIRE - PHQ9
SUM OF ALL RESPONSES TO PHQ QUESTIONS 1-9: 0
1. LITTLE INTEREST OR PLEASURE IN DOING THINGS: 0
2. FEELING DOWN, DEPRESSED OR HOPELESS: 0
SUM OF ALL RESPONSES TO PHQ9 QUESTIONS 1 & 2: 0

## 2022-01-12 NOTE — PROGRESS NOTES
[facility]  [address] [place] [address] [address] [facility] [address]  Dept: [phone]      Lorraine Yu is a 64 y.o. female who presents today for follow up on her  medical conditions as noted below. Chief Complaint   Patient presents with    Medication Check       Patient Active Problem List:     Seizure [facility])     Vitamin D deficiency     Anxiety     Hypercholesteremia     Cellulitis of right leg     MRSA (methicillin resistant staph aureus) culture positive     Breakthrough seizure ([facility]     Memory loss     Difficulty speaking     Seizure disorder ([address]     Dementia with behavioral disturbance ([facility]     History of encephalitis     Dementia due to Parkinson's disease with behavioral disturbance ([facility]     Acute deep vein thrombosis (DVT) of proximal vein of left lower extremity ([facility]     Past Medical History:   Diagnosis Date    Anxiety     Convulsion ([facility]     Encephalopathy     Herpes     Hyperlipidemia     Left bundle branch block     MRSA (methicillin resistant Staphylococcus aureus)     Parkinson's disease ([facility]     Seizures ([facility]     secondary to encephalitis    Vitamin D deficiency       Past Surgical History:   Procedure Laterality Date    ABSCESS DRAINAGE Right 12/14/2013    WOUND EXPLORATION AND I+D  RIGHT HIP     No family history on file.     Current Outpatient Medications   Medication Sig Dispense Refill    aspirin 81 MG EC tablet Take 81 mg by mouth daily      carBAMazepine (TEGRETOL) 200 MG tablet TAKE 1 AND 1/2 TABLETS Am, 2 tabs bedtime 180 tablet 3    FLUoxetine (PROZAC) 20 MG capsule 2 po qd 180 capsule 3    mirabegron (MYRBETRIQ) 50 MG TB24 Take 50 mg by mouth daily 30 tablet 11    acetaminophen (TYLENOL) 500 MG tablet Take 500 mg by mouth every 6 hours as needed for Pain      desmopressin (DDAVP) 0.2 MG tablet TAKE 3 TABLETS BY MOUTH IN THE EVENING  (Patient not taking: Reported on 9/8/2021) 90 tablet 11    Handicap Placard MISC by Does not apply route Permanent one year  Dx fractured humerus (Patient not taking: Reported on 2020) 1 each 0    rivaroxaban (XARELTO STARTER PACK) 15 & 20 MG Starter Pack Follow package instructions (Patient not taking: Reported on 2020) 1 Package 0     No current facility-administered medications for this visit. ALLERGIES:    Allergies   Allergen Reactions    Haldol [Haloperidol Lactate]        Social History     Tobacco Use    Smoking status: Former Smoker     Packs/day: 1.00     Years: 30.00     Pack years: 30.00     Types: Cigarettes     Start date:      Quit date:      Years since quittin.0    Smokeless tobacco: Never Used    Tobacco comment: unknown how many packs a day, or how many years   Substance Use Topics    Alcohol use: No        LDL Calculated (mg/dL)   Date Value   2016 127     LDL Cholesterol (mg/dL)   Date Value   2021 105     HDL (mg/dL)   Date Value   2021 70     BUN (mg/dL)   Date Value   2021 19     CREATININE (mg/dL)   Date Value   2021 0.98 (H)     Glucose (mg/dL)   Date Value   2021 92   2012 93              Subjective:      HPI  Is here today for follow-up on her ongoing medical issues of encephalopathy Parkinson's dementia seizure disorder. She is controlled currently  She continues to occasionally go to respite for care to give the daughter a respite break since she has been her caregiver since she was young girl  There are no new complaints or problems today    Review of Systems:     Constitutional: Negative for fever, appetite change and fatigue. Family social and medical history reviewed and unchanged     HENT: Negative. Negative for nosebleeds, trouble swallowing and neck pain. Eyes: Negative for photophobia and visual disturbance. Respiratory: Negative. Negative for chest tightness and shortness of breath. Cardiovascular: Negative. Negative for chest pain and leg swelling. Gastrointestinal: Negative. Negative for abdominal pain and blood in stool. Endocrine: Negative for cold intolerance and polyuria. Genitourinary: Negative for dysuria and hematuria. Musculoskeletal: Negative. Skin: Negative for rash. Allergic/Immunologic: Negative. Neurological: Negative. Negative for dizziness, weakness and numbness. Hematological: Negative. Negative for adenopathy. Does not bruise/bleed easily. Psychiatric/Behavioral: Negative for sleep disturbance, dysphoric mood and  decreased concentration. The patient is not nervous/anxious. Objective:     Physical Exam:     Nursing note and vitals reviewed. /64   Pulse 90   Ht 5' 7\" (1.702 m)   Wt 135 lb 9.6 oz (61.5 kg)   SpO2 98%   BMI 21.24 kg/m²   Constitutional: She is oriented to person, place, and time. She   appears well-developed and well-nourished. HENT:   Head: Normocephalic and atraumatic. Right Ear: External ear normal. Tympanic membrane is not erythematous. No middle ear effusion. Left Ear: External ear normal. Tympanic membrane is not erythematous. No middle ear effusion. Nose: No mucosal edema. Mouth/Throat: Oropharynx is clear and moist. No posterior oropharyngeal erythema. Eyes: Conjunctivae and EOM are normal. Pupils are equal, round, and reactive to light. Neck: Normal range of motion. Neck supple. No thyromegaly present. Cardiovascular: Normal rate, regular rhythm and normal heart sounds. No murmur heard. Pulmonary/Chest: Effort normal and breath sounds normal. She has no wheezes. Shehas no rales. Abdominal: Soft. Bowel sounds are normal. She exhibits no distension and no mass. There is no tenderness. There is no rebound and no guarding. Genitourinary/Anorectal:deferred  Musculoskeletal: Normal range of motion. She exhibits no edema or tenderness. Lymphadenopathy: She has no cervical adenopathy. Neurological: She is alert and oriented to person, place, and time. She has normal reflexes. Skin: Skin is warm and dry. No rash noted. Psychiatric: She has a normal mood and affect. Her   behavior is normal.       Assessment:      1. History of encephalitis    2. Acute deep vein thrombosis (DVT) of proximal vein of left lower extremity (HCC)    3. Seizure disorder ([facility]    4. Dementia due to Parkinson's disease with behavioral disturbance [facility])          Plan:      Call or return to clinic prn if these symptoms worsen or fail to improve as anticipated. I have reviewed the instructions with the patient, answering all questions to her satisfaction. Return if symptoms worsen or fail to improve. No orders of the defined types were placed in this encounter. No orders of the defined types were placed in this encounter.   Continue with all current regimen    Electronically signed by Teja Berman DO on 1/12/2022 at 4:14 PM No

## 2022-03-10 ENCOUNTER — TELEPHONE (OUTPATIENT)
Dept: FAMILY MEDICINE CLINIC | Age: 77
End: 2022-03-10

## 2022-03-10 NOTE — LETTER
[address]  [facility] [address] \[facility] [place] [facility]\"" [address]  Phone: [phone]  Fax: [phone]    Teja Berman, DO        March 10, 2022      To Whom It May Concern:     Nabeel Alva (1945) is cleared to go to respite care on weekends. If you have any questions or concerns please contact my office.        Sincerely,        Sravani Wu, DO

## 2022-03-10 NOTE — TELEPHONE ENCOUNTER
Patient's daughter [place] called stating [facility] needs order for patient to go into respite care on weekends. Fax order to [phone]. Patient was recently at [facility] every weekend but [address] is requesting new order since patient is transferring. Daughter is flying out of town and they need orders faxed today if possible.  Please advise

## 2022-05-15 ENCOUNTER — APPOINTMENT (OUTPATIENT)
Dept: CT IMAGING | Age: 77
DRG: 988 | End: 2022-05-15
Payer: COMMERCIAL

## 2022-05-15 ENCOUNTER — HOSPITAL ENCOUNTER (INPATIENT)
Age: 77
LOS: 4 days | Discharge: NURSING, REHAB OR LONG-TERM CARE | DRG: 988 | End: 2022-05-19
Attending: EMERGENCY MEDICINE | Admitting: STUDENT IN AN ORGANIZED HEALTH CARE EDUCATION/TRAINING PROGRAM
Payer: COMMERCIAL

## 2022-05-15 ENCOUNTER — APPOINTMENT (OUTPATIENT)
Dept: GENERAL RADIOLOGY | Age: 77
DRG: 988 | End: 2022-05-15
Payer: COMMERCIAL

## 2022-05-15 DIAGNOSIS — S01.81XA FACIAL LACERATION, INITIAL ENCOUNTER: ICD-10-CM

## 2022-05-15 DIAGNOSIS — I21.4 NSTEMI (NON-ST ELEVATED MYOCARDIAL INFARCTION) (HCC): ICD-10-CM

## 2022-05-15 DIAGNOSIS — S09.90XA CLOSED HEAD INJURY, INITIAL ENCOUNTER: Primary | ICD-10-CM

## 2022-05-15 PROBLEM — S32.010A CLOSED COMPRESSION FRACTURE OF L1 LUMBAR VERTEBRA, INITIAL ENCOUNTER (HCC): Status: ACTIVE | Noted: 2022-05-15

## 2022-05-15 LAB
ABSOLUTE EOS #: 0.69 K/UL (ref 0–0.44)
ABSOLUTE IMMATURE GRANULOCYTE: <0.03 K/UL (ref 0–0.3)
ABSOLUTE LYMPH #: 1.88 K/UL (ref 1.1–3.7)
ABSOLUTE MONO #: 1.01 K/UL (ref 0.1–1.2)
ALBUMIN SERPL-MCNC: 3.8 G/DL (ref 3.5–5.2)
ALBUMIN/GLOBULIN RATIO: 1.2 (ref 1–2.5)
ALP BLD-CCNC: 184 U/L (ref 35–104)
ALT SERPL-CCNC: 9 U/L (ref 5–33)
ANION GAP SERPL CALCULATED.3IONS-SCNC: 11 MMOL/L (ref 9–17)
AST SERPL-CCNC: 15 U/L
BASOPHILS # BLD: 1 % (ref 0–2)
BASOPHILS ABSOLUTE: 0.06 K/UL (ref 0–0.2)
BILIRUB SERPL-MCNC: 0.29 MG/DL (ref 0.3–1.2)
BUN BLDV-MCNC: 15 MG/DL (ref 8–23)
CALCIUM SERPL-MCNC: 8.9 MG/DL (ref 8.6–10.4)
CHLORIDE BLD-SCNC: 103 MMOL/L (ref 98–107)
CO2: 24 MMOL/L (ref 20–31)
CREAT SERPL-MCNC: 0.83 MG/DL (ref 0.5–0.9)
EOSINOPHILS RELATIVE PERCENT: 9 % (ref 1–4)
GFR AFRICAN AMERICAN: >60 ML/MIN
GFR NON-AFRICAN AMERICAN: >60 ML/MIN
GFR SERPL CREATININE-BSD FRML MDRD: ABNORMAL ML/MIN/{1.73_M2}
GLUCOSE BLD-MCNC: 114 MG/DL (ref 70–99)
HCT VFR BLD CALC: 40.5 % (ref 36.3–47.1)
HEMOGLOBIN: 13.6 G/DL (ref 11.9–15.1)
IMMATURE GRANULOCYTES: 0 %
LIPASE: 43 U/L (ref 13–60)
LYMPHOCYTES # BLD: 25 % (ref 24–43)
MAGNESIUM: 2.3 MG/DL (ref 1.6–2.6)
MCH RBC QN AUTO: 32.3 PG (ref 25.2–33.5)
MCHC RBC AUTO-ENTMCNC: 33.6 G/DL (ref 28.4–34.8)
MCV RBC AUTO: 96.2 FL (ref 82.6–102.9)
MONOCYTES # BLD: 14 % (ref 3–12)
NRBC AUTOMATED: 0 PER 100 WBC
PDW BLD-RTO: 13.2 % (ref 11.8–14.4)
PLATELET # BLD: 396 K/UL (ref 138–453)
PMV BLD AUTO: 8.4 FL (ref 8.1–13.5)
POTASSIUM SERPL-SCNC: 3.4 MMOL/L (ref 3.7–5.3)
PRO-BNP: 264 PG/ML
RBC # BLD: 4.21 M/UL (ref 3.95–5.11)
SEG NEUTROPHILS: 51 % (ref 36–65)
SEGMENTED NEUTROPHILS ABSOLUTE COUNT: 3.78 K/UL (ref 1.5–8.1)
SODIUM BLD-SCNC: 138 MMOL/L (ref 135–144)
TOTAL PROTEIN: 7.1 G/DL (ref 6.4–8.3)
TROPONIN, HIGH SENSITIVITY: 47 NG/L (ref 0–14)
TROPONIN, HIGH SENSITIVITY: 49 NG/L (ref 0–14)
WBC # BLD: 7.4 K/UL (ref 3.5–11.3)

## 2022-05-15 PROCEDURE — 6360000002 HC RX W HCPCS: Performed by: STUDENT IN AN ORGANIZED HEALTH CARE EDUCATION/TRAINING PROGRAM

## 2022-05-15 PROCEDURE — 99285 EMERGENCY DEPT VISIT HI MDM: CPT

## 2022-05-15 PROCEDURE — 72128 CT CHEST SPINE W/O DYE: CPT

## 2022-05-15 PROCEDURE — 72125 CT NECK SPINE W/O DYE: CPT

## 2022-05-15 PROCEDURE — 84484 ASSAY OF TROPONIN QUANT: CPT

## 2022-05-15 PROCEDURE — 83735 ASSAY OF MAGNESIUM: CPT

## 2022-05-15 PROCEDURE — 90715 TDAP VACCINE 7 YRS/> IM: CPT | Performed by: STUDENT IN AN ORGANIZED HEALTH CARE EDUCATION/TRAINING PROGRAM

## 2022-05-15 PROCEDURE — 93005 ELECTROCARDIOGRAM TRACING: CPT | Performed by: ANESTHESIOLOGY

## 2022-05-15 PROCEDURE — 80053 COMPREHEN METABOLIC PANEL: CPT

## 2022-05-15 PROCEDURE — 83880 ASSAY OF NATRIURETIC PEPTIDE: CPT

## 2022-05-15 PROCEDURE — 6370000000 HC RX 637 (ALT 250 FOR IP): Performed by: STUDENT IN AN ORGANIZED HEALTH CARE EDUCATION/TRAINING PROGRAM

## 2022-05-15 PROCEDURE — 70450 CT HEAD/BRAIN W/O DYE: CPT

## 2022-05-15 PROCEDURE — 83690 ASSAY OF LIPASE: CPT

## 2022-05-15 PROCEDURE — 0HQ1XZZ REPAIR FACE SKIN, EXTERNAL APPROACH: ICD-10-PCS | Performed by: SURGERY

## 2022-05-15 PROCEDURE — 72131 CT LUMBAR SPINE W/O DYE: CPT

## 2022-05-15 PROCEDURE — 2580000003 HC RX 258: Performed by: STUDENT IN AN ORGANIZED HEALTH CARE EDUCATION/TRAINING PROGRAM

## 2022-05-15 PROCEDURE — 85025 COMPLETE CBC W/AUTO DIFF WBC: CPT

## 2022-05-15 PROCEDURE — 74177 CT ABD & PELVIS W/CONTRAST: CPT

## 2022-05-15 PROCEDURE — 90471 IMMUNIZATION ADMIN: CPT | Performed by: STUDENT IN AN ORGANIZED HEALTH CARE EDUCATION/TRAINING PROGRAM

## 2022-05-15 PROCEDURE — 71045 X-RAY EXAM CHEST 1 VIEW: CPT

## 2022-05-15 PROCEDURE — 1200000000 HC SEMI PRIVATE

## 2022-05-15 PROCEDURE — 71260 CT THORAX DX C+: CPT

## 2022-05-15 PROCEDURE — 6360000004 HC RX CONTRAST MEDICATION: Performed by: STUDENT IN AN ORGANIZED HEALTH CARE EDUCATION/TRAINING PROGRAM

## 2022-05-15 RX ORDER — ACETAMINOPHEN 500 MG
1000 TABLET ORAL EVERY 8 HOURS SCHEDULED
Status: DISCONTINUED | OUTPATIENT
Start: 2022-05-15 | End: 2022-05-19 | Stop reason: HOSPADM

## 2022-05-15 RX ORDER — ONDANSETRON 4 MG/1
4 TABLET, ORALLY DISINTEGRATING ORAL EVERY 8 HOURS PRN
Status: DISCONTINUED | OUTPATIENT
Start: 2022-05-15 | End: 2022-05-16

## 2022-05-15 RX ORDER — TAMSULOSIN HYDROCHLORIDE 0.4 MG/1
0.4 CAPSULE ORAL DAILY
Status: DISCONTINUED | OUTPATIENT
Start: 2022-05-15 | End: 2022-05-19 | Stop reason: HOSPADM

## 2022-05-15 RX ORDER — SODIUM CHLORIDE 9 MG/ML
INJECTION, SOLUTION INTRAVENOUS PRN
Status: DISCONTINUED | OUTPATIENT
Start: 2022-05-15 | End: 2022-05-16

## 2022-05-15 RX ORDER — ONDANSETRON 2 MG/ML
4 INJECTION INTRAMUSCULAR; INTRAVENOUS EVERY 6 HOURS PRN
Status: DISCONTINUED | OUTPATIENT
Start: 2022-05-15 | End: 2022-05-16

## 2022-05-15 RX ORDER — SODIUM CHLORIDE 9 MG/ML
INJECTION, SOLUTION INTRAVENOUS CONTINUOUS
Status: DISCONTINUED | OUTPATIENT
Start: 2022-05-15 | End: 2022-05-16

## 2022-05-15 RX ORDER — SODIUM CHLORIDE 0.9 % (FLUSH) 0.9 %
5-40 SYRINGE (ML) INJECTION EVERY 12 HOURS SCHEDULED
Status: DISCONTINUED | OUTPATIENT
Start: 2022-05-15 | End: 2022-05-16

## 2022-05-15 RX ORDER — SODIUM CHLORIDE 0.9 % (FLUSH) 0.9 %
5-40 SYRINGE (ML) INJECTION PRN
Status: DISCONTINUED | OUTPATIENT
Start: 2022-05-15 | End: 2022-05-19 | Stop reason: HOSPADM

## 2022-05-15 RX ORDER — POLYETHYLENE GLYCOL 3350 17 G/17G
17 POWDER, FOR SOLUTION ORAL DAILY
Status: DISCONTINUED | OUTPATIENT
Start: 2022-05-16 | End: 2022-05-19 | Stop reason: HOSPADM

## 2022-05-15 RX ADMIN — TETANUS TOXOID, REDUCED DIPHTHERIA TOXOID AND ACELLULAR PERTUSSIS VACCINE, ADSORBED 0.5 ML: 5; 2.5; 8; 8; 2.5 SUSPENSION INTRAMUSCULAR at 16:55

## 2022-05-15 RX ADMIN — ACETAMINOPHEN 1000 MG: 500 TABLET ORAL at 22:52

## 2022-05-15 RX ADMIN — SODIUM CHLORIDE, PRESERVATIVE FREE 10 ML: 5 INJECTION INTRAVENOUS at 22:55

## 2022-05-15 RX ADMIN — SODIUM CHLORIDE: 9 INJECTION, SOLUTION INTRAVENOUS at 21:56

## 2022-05-15 RX ADMIN — TAMSULOSIN HYDROCHLORIDE 0.4 MG: 0.4 CAPSULE ORAL at 23:19

## 2022-05-15 RX ADMIN — IOPAMIDOL 75 ML: 755 INJECTION, SOLUTION INTRAVENOUS at 18:38

## 2022-05-15 ASSESSMENT — ENCOUNTER SYMPTOMS
ABDOMINAL PAIN: 0
BACK PAIN: 0

## 2022-05-15 ASSESSMENT — PAIN SCALES - GENERAL: PAINLEVEL_OUTOF10: 2

## 2022-05-15 NOTE — ED NOTES
Pt to ED via [facility] for a fall. Pt resides at a nursing facility, staff found pt on ground with blood around head, ems report approx \"1 cup\" of blood. Denies blood thinners. Laceration noted to left side of forehead. Pt presents in c-collar. Pt poor historian, per daughter, more quite than usual. Unknown how fell, unknown if LOC occurred. Cervical and lumbar tenderness noted. Pt is alert, answers questions, attached to monitor, RR even and non labored. Daughter at bedside, daughter historian.         José Davidson, MITCH  05/15/22 [id number]

## 2022-05-15 NOTE — ED NOTES
The following labs labeled with pt sticker and tubed to lab:     [x] Blue     [x] Lavender   [] on ice  [x] Green/yellow  [x] Green/black [] on ice  [] Yellow  [] Red  [] Pink      [] COVID-19 swab    [] Rapid  [] PCR  [] Flu swab  [] Peds Viral Panel     [] Urine Sample  [] Pelvic Cultures  [] Blood Cultures            Anisa Rogers RN  05/15/22 1205

## 2022-05-15 NOTE — CONSULTS
Dee Medina, Jennifer Aburto, Chandu Khan., [address], [address]   Urology Consultation      Patient:  Susan Anderson  MRN: [id number]  YOB: 1945    CHIEF COMPLAINT:  Obstructing left ureteral stone    HISTORY OF PRESENT ILLNESS:   The patient is a 64 y.o. female who presents after fall found to have age undetermined lumbar fracture and head laceration, urology consulted for incidentally found left obstructing 7 mm proximal ureteral stone on CT, also has nonobstructing bilateral renal stones. Patient does have a history of kidney stones and saw a urologist at [facility] before (no chart data found regarding urology on [facility]). Patient has history of dementia, most of history obtained per daughter. Patient has not had fevers, chills, nausea, or vomiting. No hematuria or dysuria. No abdominal pain or flank pain. No history of cystoscopic procedures. Patient is admitted to trauma for her head laceration, no plans for surgeries from their perspective. Cr 0.83, WBC 7.4, Hgb 13.6. Patient's old records, notes and chart reviewed and summarized above. 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     Current Outpatient Medications:     aspirin 81 MG EC tablet, Take 81 mg by mouth daily, Disp: , Rfl:     carBAMazepine (TEGRETOL) 200 MG tablet, TAKE 1 AND 1/2 TABLETS Am, 2 tabs bedtime, Disp: 180 tablet, Rfl: 3    FLUoxetine (PROZAC) 20 MG capsule, 2 po qd, Disp: 180 capsule, Rfl: 3    mirabegron (MYRBETRIQ) 50 MG TB24, Take 50 mg by mouth daily, Disp: 30 tablet, Rfl: 11    acetaminophen (TYLENOL) 500 MG tablet, Take 500 mg by mouth every 6 hours as needed for Pain, Disp: , Rfl:     desmopressin (DDAVP) 0.2 MG tablet, TAKE 3 TABLETS BY MOUTH IN THE EVENING  (Patient not taking: Reported on 2021), Disp: 90 tablet, Rfl: 11    Handicap Placard MISC, by Does not apply route Permanent one year Dx fractured humerus (Patient not taking: Reported on 2020), Disp: 1 each, Rfl: 0    rivaroxaban (XARELTO STARTER PACK) 15 & 20 MG Starter Pack, Follow package instructions (Patient not taking: Reported on 2020), Disp: 1 Package, Rfl: 0    Allergies: Allergies   Allergen Reactions    Haldol [Haloperidol Lactate]        Social History:   Social History     Socioeconomic History    Marital status: Single     Spouse name: Not on file    Number of children: Not on file    Years of education: Not on file    Highest education level: Not on file   Occupational History    Not on file   Tobacco Use    Smoking status: Former Smoker     Packs/day: 1.00     Years: 30.00     Pack years: 30.00     Types: Cigarettes     Start date:      Quit date:      Years since quittin.3    Smokeless tobacco: Never Used    Tobacco comment: unknown how many packs a day, or how many years   Vaping Use    Vaping Use: Never used   Substance and Sexual Activity    Alcohol use: No    Drug use: No    Sexual activity: Never   Other Topics Concern    Not on file   Social History Narrative    Not on file     Social Determinants of Health     Financial Resource Strain: Low Risk     Difficulty of Paying Living Expenses: Not hard at all   Food Insecurity: No Food Insecurity    Worried About [address] [employer] in the Last Year: Never true    [address] [employer] in the Last Year: Never true   Transportation Needs:     Lack of Transportation (Medical):  Not on file    Lack of Transportation (Non-Medical): Not on file   Physical Activity:     Days of Exercise per Week: Not on file    Minutes of Exercise per Session: Not on file   Stress:     Feeling of Stress : Not on file   Social Connections:     Frequency of Communication with Friends and Family: Not on file    Frequency of Social Gatherings with Friends and Family: Not on file    Attends [employer] Services: Not on file    Active Member of [address] [place] [address] or Organizations: Not on file    Attends Club or Organization Meetings: Not on file    Marital Status: Not on file   Intimate Partner Violence:     Fear of Current or Ex-Partner: Not on file    Emotionally Abused: Not on file    Physically Abused: Not on file    Sexually Abused: Not on file   Housing Stability:     Unable to Pay for Housing in the Last Year: Not on file    Number of [place] in the Last Year: Not on file    Unstable Housing in the Last Year: Not on file       Family History:  History reviewed. No pertinent family history. REVIEW OF SYSTEMS: Patient has dementia    Physical Exam:      This a 64 y.o. female   Vitals:    05/15/22 1718   BP:    Pulse: 78   Resp: 15   Temp:    SpO2: 94%     Constitutional: Patient in no acute distress. Neuro: alert and oriented to person place and time. Head: Atraumatic and normocephalic. Neck: Trachea midline. Ext: 2+ radial pulses bilaterally. Psych: Mood and affect normal.  Skin: No rashes or bruising present. Lungs: Respiratory effort normal.  Cardiovascular:  Regular rate  Abdomen: Soft, non-tender, non-distended. Neither side has CVA tenderness on exam.  Bladder non-tender and not distended. Lymphatics: no palpable lymphadenopathy  Pelvic exam: deferred. Rectal exam not indicated.     Labs:  Recent Labs     05/15/22  1625   WBC 7.4   HGB 13.6   HCT 40.5   MCV 96.2        Recent Labs     05/15/22  1625      K 3.4*      CO2 24   BUN 15   CREATININE 0.83       No results for input(s): COLORU, PHUR, LABCAST, WBCUA, RBCUA, MUCUS, TRICHOMONAS, YEAST, BACTERIA, CLARITYU, SPECGRAV, LEUKOCYTESUR, UROBILINOGEN,  in the last 72 hours. Invalid input(s): NITRATE, GLUCOSEUKETONESUAMORPHOUS        -----------------------------------------------------------------  Imaging Results:  CT HEAD WO CONTRAST    Result Date: 5/15/2022  EXAMINATION: CT OF THE HEAD WITHOUT CONTRAST  5/15/2022 4:38 pm TECHNIQUE: CT of the head was performed without the administration of intravenous contrast. Automated exposure control, iterative reconstruction, and/or weight based adjustment of the mA/kV was utilized to reduce the radiation dose to as low as reasonably achievable. COMPARISON: 10/12/2015 HISTORY: ORDERING SYSTEM PROVIDED HISTORY: Fall TECHNOLOGIST PROVIDED HISTORY: Fall Decision Support Exception - unselect if not a suspected or confirmed emergency medical condition->Emergency Medical Condition (MA) Reason for Exam: fall FINDINGS: BRAIN/VENTRICLES: There is no acute intracranial hemorrhage, mass effect or midline shift. No abnormal extra-axial fluid collection. The gray-white differentiation is maintained without evidence of an acute infarct. There is no evidence of hydrocephalus. Left-sided encephalomalacia likely related to remote MCA distribution stroke. Otherwise chronic microvascular disease and involutional change. Vascular calcifications. ORBITS: The visualized portion of the orbits demonstrate no acute abnormality. SINUSES: Mild paranasal sinus mucosal thickening. Mastoids are clear. SOFT TISSUES/SKULL:  No acute abnormality of the visualized skull or soft tissues. No acute intracranial abnormality. Encephalomalacia likely related to prior left MCA distribution stroke. Otherwise chronic microvascular disease.      CT CERVICAL SPINE WO CONTRAST    Result Date: 5/15/2022  EXAMINATION: CT OF THE LUMBAR SPINE WITHOUT CONTRAST; CT OF THE CERVICAL SPINE WITHOUT CONTRAST; CT OF THE THORACIC SPINE WITHOUT CONTRAST  5/15/2022 TECHNIQUE: CT of the lumbar spine was performed without the administration of intravenous contrast. Multiplanar reformatted images are provided for review. Adjustment of mA and/or kV according to patient size was utilized. Automated exposure control, iterative reconstruction, and/or weight based adjustment of the mA/kV was utilized to reduce the radiation dose to as low as reasonably achievable.; CT of the cervical spine was performed without the administration of intravenous contrast. Multiplanar reformatted images are provided for review. Automated exposure control, iterative reconstruction, and/or weight based adjustment of the mA/kV was utilized to reduce the radiation dose to as low as reasonably achievable.; CT of the thoracic spine was performed without the administration of intravenous contrast. Multiplanar reformatted images are provided for review. Automated exposure control, iterative reconstruction, and/or weight based adjustment of the mA/kV was utilized to reduce the radiation dose to as low as reasonably achievable. COMPARISON: None HISTORY: ORDERING SYSTEM PROVIDED HISTORY: fall, pain TECHNOLOGIST PROVIDED HISTORY: fall, pain Decision Support Exception - unselect if not a suspected or confirmed emergency medical condition->Emergency Medical Condition (MA) Reason for Exam: fall FINDINGS: Cervical: BONES/ALIGNMENT: There is no acute fracture or traumatic malalignment. DEGENERATIVE CHANGES: Multilevel disc and facet degenerative disease most pronounced at C4-C5 and C5-C6. SOFT TISSUES: There is no prevertebral soft tissue swelling. Thoracic and lumbar: BONES/ALIGNMENT: Moderate levoscoliosis of upper to midthoracic spine and mild dextroscoliosis of thoracolumbar junction. Multiple compression fractures throughout the thoracic and lumbar spine.  Some of these fractures have chronic appearance such as superior endplate compression fracture T12, T10 and T7, L2 and L3 with 20%, 20%, 30%, 30% and 50% height loss respectively. However superior endplate compression fracture of L1 with 5 mm retropulsion into the spinal canal is age indeterminate and may be acute in etiology. There is associated moderate canal stenosis at T12-L1. DEGENERATIVE CHANGES: Multilevel disc and facet degenerative disease most pronounced at L4-L5. SOFT TISSUES: Partial visualization of 7 mm obstructing stone of the left proximal ureter which is associated with mild left-sided hydroureteronephrosis. Multiple nonobstructing kidney stones are also noted bilaterally. Cervical CT: No acute osseous abnormality. No acute fracture. CT of thoracic and lumbar spine: Multiple compression fractures throughout the thoracic and lumbar spine. Some of these fractures have chronic appearance such as superior endplate compression fracture T12, T10 and T7, L2 and L3 with 20%, 20%, 30%, 30% and 50% height loss respectively. However superior endplate compression fracture of L1 with 5 mm retropulsion into the spinal canal is age indeterminate and may be acute in etiology. There is associated moderate canal stenosis at T12-L1. For further evaluation of this fracture either comparison with prior examination or lumbar spine MRI can be obtained. Moderate levoscoliosis of upper to midthoracic spine and mild dextroscoliosis of thoracolumbar junction Partial visualization of 7 mm obstructing stone of the left proximal ureter which is associated with mild left-sided hydroureteronephrosis. Multiple nonobstructing kidney stones are also noted bilaterally. RECOMMENDATIONS: Unavailable     CT THORACIC SPINE WO CONTRAST    Result Date: 5/15/2022  EXAMINATION: CT OF THE LUMBAR SPINE WITHOUT CONTRAST; CT OF THE CERVICAL SPINE WITHOUT CONTRAST; CT OF THE THORACIC SPINE WITHOUT CONTRAST  5/15/2022 TECHNIQUE: CT of the lumbar spine was performed without the administration of intravenous contrast. Multiplanar reformatted images are provided for review. Adjustment of mA and/or kV according to patient size was utilized. Automated exposure control, iterative reconstruction, and/or weight based adjustment of the mA/kV was utilized to reduce the radiation dose to as low as reasonably achievable.; CT of the cervical spine was performed without the administration of intravenous contrast. Multiplanar reformatted images are provided for review. Automated exposure control, iterative reconstruction, and/or weight based adjustment of the mA/kV was utilized to reduce the radiation dose to as low as reasonably achievable.; CT of the thoracic spine was performed without the administration of intravenous contrast. Multiplanar reformatted images are provided for review. Automated exposure control, iterative reconstruction, and/or weight based adjustment of the mA/kV was utilized to reduce the radiation dose to as low as reasonably achievable. COMPARISON: None HISTORY: ORDERING SYSTEM PROVIDED HISTORY: fall, pain TECHNOLOGIST PROVIDED HISTORY: fall, pain Decision Support Exception - unselect if not a suspected or confirmed emergency medical condition->Emergency Medical Condition (MA) Reason for Exam: fall FINDINGS: Cervical: BONES/ALIGNMENT: There is no acute fracture or traumatic malalignment. DEGENERATIVE CHANGES: Multilevel disc and facet degenerative disease most pronounced at C4-C5 and C5-C6. SOFT TISSUES: There is no prevertebral soft tissue swelling. Thoracic and lumbar: BONES/ALIGNMENT: Moderate levoscoliosis of upper to midthoracic spine and mild dextroscoliosis of thoracolumbar junction. Multiple compression fractures throughout the thoracic and lumbar spine. Some of these fractures have chronic appearance such as superior endplate compression fracture T12, T10 and T7, L2 and L3 with 20%, 20%, 30%, 30% and 50% height loss respectively.  However superior endplate compression fracture of L1 with 5 mm retropulsion into the spinal canal is age indeterminate and may be acute reduce the radiation dose to as low as reasonably achievable.; CT of the cervical spine was performed without the administration of intravenous contrast. Multiplanar reformatted images are provided for review. Automated exposure control, iterative reconstruction, and/or weight based adjustment of the mA/kV was utilized to reduce the radiation dose to as low as reasonably achievable.; CT of the thoracic spine was performed without the administration of intravenous contrast. Multiplanar reformatted images are provided for review. Automated exposure control, iterative reconstruction, and/or weight based adjustment of the mA/kV was utilized to reduce the radiation dose to as low as reasonably achievable. COMPARISON: None HISTORY: ORDERING SYSTEM PROVIDED HISTORY: fall, pain TECHNOLOGIST PROVIDED HISTORY: fall, pain Decision Support Exception - unselect if not a suspected or confirmed emergency medical condition->Emergency Medical Condition (MA) Reason for Exam: fall FINDINGS: Cervical: BONES/ALIGNMENT: There is no acute fracture or traumatic malalignment. DEGENERATIVE CHANGES: Multilevel disc and facet degenerative disease most pronounced at C4-C5 and C5-C6. SOFT TISSUES: There is no prevertebral soft tissue swelling. Thoracic and lumbar: BONES/ALIGNMENT: Moderate levoscoliosis of upper to midthoracic spine and mild dextroscoliosis of thoracolumbar junction. Multiple compression fractures throughout the thoracic and lumbar spine. Some of these fractures have chronic appearance such as superior endplate compression fracture T12, T10 and T7, L2 and L3 with 20%, 20%, 30%, 30% and 50% height loss respectively. However superior endplate compression fracture of L1 with 5 mm retropulsion into the spinal canal is age indeterminate and may be acute in etiology. There is associated moderate canal stenosis at T12-L1. DEGENERATIVE CHANGES: Multilevel disc and facet degenerative disease most pronounced at L4-L5.  SOFT TISSUES: Partial visualization of 7 mm obstructing stone of the left proximal ureter which is associated with mild left-sided hydroureteronephrosis. Multiple nonobstructing kidney stones are also noted bilaterally. Cervical CT: No acute osseous abnormality. No acute fracture. CT of thoracic and lumbar spine: Multiple compression fractures throughout the thoracic and lumbar spine. Some of these fractures have chronic appearance such as superior endplate compression fracture T12, T10 and T7, L2 and L3 with 20%, 20%, 30%, 30% and 50% height loss respectively. However superior endplate compression fracture of L1 with 5 mm retropulsion into the spinal canal is age indeterminate and may be acute in etiology. There is associated moderate canal stenosis at T12-L1. For further evaluation of this fracture either comparison with prior examination or lumbar spine MRI can be obtained. Moderate levoscoliosis of upper to midthoracic spine and mild dextroscoliosis of thoracolumbar junction Partial visualization of 7 mm obstructing stone of the left proximal ureter which is associated with mild left-sided hydroureteronephrosis. Multiple nonobstructing kidney stones are also noted bilaterally. RECOMMENDATIONS: Unavailable     CT ABDOMEN PELVIS W IV CONTRAST Additional Contrast? None    Result Date: 5/15/2022  EXAMINATION: CT OF THE ABDOMEN AND PELVIS WITH CONTRAST 5/15/2022 6:33 pm TECHNIQUE: CT of the abdomen and pelvis was performed with the administration of intravenous contrast. Multiplanar reformatted images are provided for review. Automated exposure control, iterative reconstruction, and/or weight based adjustment of the mA/kV was utilized to reduce the radiation dose to as low as reasonably achievable. COMPARISON: None.  HISTORY: ORDERING SYSTEM PROVIDED HISTORY: fall, r/o trauma TECHNOLOGIST PROVIDED HISTORY: fall, r/o trauma Decision Support Exception - unselect if not a suspected or confirmed emergency medical condition->Emergency Medical Condition (MA) Reason for Exam: fall FINDINGS: Lower Chest: Multifocal bibasilar ground-glass opacities. Cardiomegaly. Organs: The abdominal wall appears normal. The liver, spleen, pancreas, and adrenals appear normal.  Gallbladder normal. Multiple simple bilateral renal cysts. No further follow-up required as a appear benign/simple. Punctate nonobstructing nephroliths on the left. The bladder appears normal. GI/Bowel: The stomach,small bowel, and colon appear normal. Increased stool and colonic diverticulosis. Appendix is not identified. Pelvis: Normal Peritoneum/Retroperitoneum: The abdominal aorta and iliac arteries are normal in caliber. There is no pathologic adenopathy. Bones/Soft Tissues: Multiple compression fractures and Schmorl's node disease T10 through L3. Multiple compression fractures as above, age indeterminate. XR CHEST PORTABLE    Result Date: 5/15/2022  EXAMINATION: ONE XRAY VIEW OF THE CHEST 5/15/2022 4:18 pm COMPARISON: 10/12/2015. HISTORY: ORDERING SYSTEM PROVIDED HISTORY: fall. TECHNOLOGIST PROVIDED HISTORY: fall. FINDINGS: The cardiomediastinal silhouette is unremarkable. Aortic vascular calcification. Mild blunting of the right lateral costophrenic sulcus, possibly small effusion and or atelectasis. The lungs otherwise are clear. No acute infiltrate or evidence of overt failure. Mild dependent changes at the right lateral lung base, possibly small effusion and/or atelectasis. Otherwise unremarkable chest.     CT CHEST PULMONARY EMBOLISM W CONTRAST    Result Date: 5/15/2022  EXAMINATION: CTA OF THE CHEST 5/15/2022 6:33 pm TECHNIQUE: CTA of the chest was performed after the administration of intravenous contrast.  Multiplanar reformatted images are provided for review. MIP images are provided for review.  Automated exposure control, iterative reconstruction, and/or weight based adjustment of the mA/kV was utilized to reduce the radiation dose to as low as reasonably achievable. COMPARISON: None. HISTORY: ORDERING SYSTEM PROVIDED HISTORY: r/o PE.  fall TECHNOLOGIST PROVIDED HISTORY: r/o PE.  fall Decision Support Exception - unselect if not a suspected or confirmed emergency medical condition->Emergency Medical Condition (MA) Reason for Exam: r/o pe FINDINGS: Pulmonary Arteries: Pulmonary arteries are adequately opacified for evaluation. No evidence of intraluminal filling defect to suggest pulmonary embolism. Main pulmonary artery is normal in caliber. Mediastinum: No evidence of mediastinal lymphadenopathy. The heart and pericardium demonstrate no acute abnormality. There is no acute abnormality of the thoracic aorta. Coronary artery disease. Cardiomegaly. Lungs/pleura: 6 mm right middle lobe nodule adjacent to the pericardium on image number 71 right middle lobe consolidations. Subsegmental atelectasis bilaterally. Upper Abdomen: Limited images of the upper abdomen are unremarkable. Soft Tissues/Bones: Moderate dextroconvex scoliosis thoracolumbar spine. Multiple compression fractures T7 through L1. Right middle lobe airspace disease. 6 mm pleural based pulmonary nodule right middle lobe. Coronary artery disease. Multiple compression fractures age indeterminate. RECOMMENDATIONS: Fleischner Society guidelines for follow-up and management of incidentally detected pulmonary nodules: Single Solid Nodule: Nodule size equals 6-8 mm In a low-risk patient, CT at 6-12 months, then consider CT at 18-24 months. In a high-risk patient, CT at 6-12 months, then CT at 18-24 months. . - Low risk patients include individuals with minimal or absent history of smoking and other known risk factors. - High risk patients include individuals with a history or smoking or known risk factors. Radiology 2017 http://pubs. rsna.org/doi/full/10.1148/radiol. 5297401845       Assessment and Plan   Impression:    Obstructing 7 mm left proximal ureteral stone with mild hydronephrosis  Left nonobstructing nephroliths    Plan:   Follow up UA/UCx  IV fluids  Flomax  Strain all urine  NPO    Dada Cadet MD  Urology Resident, PGY-2  7:59 PM 5/15/2022

## 2022-05-15 NOTE — ED NOTES
Pt back to room from CT, attached to monitor, daughter at bedside, call light in reach      Soheila Cristobal RN  05/15/22 3027

## 2022-05-15 NOTE — H&P
TRAUMA HISTORY AND PHYSICAL EXAMINATION    PATIENT NAME: Roseann Russ  YOB: 1945  MEDICAL RECORD NO. [id number]   DATE: 5/15/2022  PRIMARY CARE PHYSICIAN: Sravani Wu DO  PATIENT EVALUATED AT THE REQUEST OF : Lee Ann    ACTIVATION   []Trauma Alert     [] Trauma Priority     [x]Trauma Consult.      IMPRESSION:     Patient Active Problem List   Diagnosis    Seizure ([address]    Vitamin D deficiency    Anxiety    Hypercholesteremia    Cellulitis of right leg    MRSA (methicillin resistant staph aureus) culture positive    Breakthrough seizure ([address]    Memory loss    Difficulty speaking    Seizure disorder ([address]    Dementia with behavioral disturbance ([address]    History of encephalitis    Dementia due to Parkinson's disease with behavioral disturbance ([address]    Acute deep vein thrombosis (DVT) of proximal vein of left lower extremity [facility])       MEDICAL DECISION MAKING AND PLAN:     63 yo F, FSH, on ASA  - Age indeterminate L1 superior endplate compression fracture with 5 mm retropulsion into spinal canal and associated T12-L1 canal stenosis  - Chronic superior endplate compression fractures T7, T10, T12, L2, L3  - 2 cm laceration to left temporal region, repaired at bedside  - 5 mm obstructing stone left ureteropelvic junction with parapelvic cysts and hydronephrosis  - Right middle lobe airspace disease  - 6 mm pleural based pulmonary nodule right middle lobe  - NSTEMI, Trop 49 - 47  - Hx of encephalomalacia, CAD  - Neurosurgery consult  - Urology consult  - Admit to trauma surgery service    CONSULT SERVICES    [x] Neurosurgery     [] Orthopedic Surgery    [] Cardiothoracic     [] Facial Trauma    [] Plastic Surgery (Burn)    [] Pediatric Surgery     [] Internal Medicine    [] Pulmonary Medicine    [x] Other:  Urology, cardiology      HISTORY:     Chief Complaint:  Fall, denying any pain    INJURY SUMMARY  - Age indeterminate L1 superior endplate compression fracture with 5 mm retropulsion into spinal canal and associated T12-L1 canal stenosis  - Chronic superior endplate compression fractures T7, T10, T12, L2, L3  - 2 cm laceration to left temporal region, repaired at bedside  - 5 mm obstructing stone left ureteropelvic junction with parapelvic cysts and hydronephrosis  - Right middle lobe airspace disease  - 6 mm pleural based pulmonary nodule right middle lobe    If intracranial hemorrhage is present, is it a:  [] BIG 1  [] BIG 2  [] BIG 3    GENERAL DATA  Age 64 y.o.  female   Patient information was obtained from patient and relative(s). History/Exam limitations: dementia. Patient presented to the Emergency Department by ambulance where the patient received see Ambulance Run Sheet prior to arrival.  Injury Date: 5/15/2022   Approximate Injury Time: 6:00 PM        Transport mode:   [x]Ambulance      [] Helicopter     []Car       [] Other    INJURY LOCATION, (e.g., home, farm, industry, street)  Specific Details of Location (e.g., bedroom, kitchen, garage): Respite care  Type of Residence (if occurred in home setting) (e.g., apartment, mobile home, single family home): MECHANISM OF INJURY    [x] Fall    [x]From Standing        HISTORY:     Michelle Simeon is a 64 y.o. female that presented to the Emergency Department following a fall from standing while a respite care. The patient has a history of encephalomalacia and dementia, and does not recall the fall. Daughter is at bedside and states she got a call from respite care that her mother had fallen and struck the left side of her face on the ground and sustained a laceration, unsure if LOC. She is on ASA but no other anticoagulation. Patient is oriented to self but does not have any memory of the fall or preceding events. She has a laceration to the left temporal region, and is denying any pain at the site. Denies CP, SOB, headache, changes in vision, abdominal pain, back pain, N/V, numbness, tingling, weakness.  Per daughter, she has been having new onset of urinary incontinence over the past few months and was started on myrbetriq and desmopressin. She has not had any falls recently and generally ambulates with assistance from her daughter. She does not have a walker or cane for ambulation at home. She lives with her daughter who provides 24/7 care, and goes to respite care occasionally. Loss of Consciousness []No   []Yes Duration(min)       [x] Unknown     Total Fluids Given Prior To Arrival  mL    MEDICATIONS:   []  None     []  Information not available due to exam limitations documented above    Prior to Admission medications    Medication Sig Start Date End Date Taking?  Authorizing Provider   aspirin 81 MG EC tablet Take 81 mg by mouth daily    Historical Provider, MD   carBAMazepine (TEGRETOL) 200 MG tablet TAKE 1 AND 1/2 TABLETS Am, 2 tabs bedtime 11/17/21   Sravani Wu, DO   FLUoxetine (PROZAC) 20 MG capsule 2 po qd 11/17/21   Sravani Wu, DO   mirabegron (MYRBETRIQ) 50 MG TB24 Take 50 mg by mouth daily 10/29/21   Sravani Wu,    acetaminophen (TYLENOL) 500 MG tablet Take 500 mg by mouth every 6 hours as needed for Pain    Historical Provider, MD   desmopressin (DDAVP) 0.2 MG tablet TAKE 3 TABLETS BY MOUTH IN THE EVENING   Patient not taking: Reported on 9/8/2021 6/1/21   Albin Wu,    Handicap Placard MISC by Does not apply route Permanent one year  Dx fractured humerus  Patient not taking: Reported on 5/20/2020 11/14/19   Jonatan Todd,    rivaroxaban (XARELTO STARTER PACK) 15 & 20 MG Starter Pack Follow package instructions  Patient not taking: Reported on 5/20/2020 10/17/19   Giselle Gaona MD       ALLERGIES:   []  None    []   Information not available due to exam limitations documented above     Haldol [haloperidol lactate]    PAST MEDICAL HISTORY: []  None   []   Information not available due to exam limitations documented above      has a past medical history of Anxiety, Convulsion ([facility] Encephalopathy, Herpes, Hyperlipidemia, Left bundle branch block, MRSA (methicillin resistant Staphylococcus aureus), Parkinson's disease ([facility] Seizures ([facility] and Vitamin D deficiency. has a past surgical history that includes Abscess Drainage (Right, 12/14/2013). FAMILY HISTORY   []   Information not available due to exam limitations documented above    family history is not on file. SOCIAL HISTORY  []   Information not available due to exam limitations documented above     reports that she quit smoking about 23 years ago. Her smoking use included cigarettes. She started smoking about 62 years ago. She has a 30.00 pack-year smoking history. She has never used smokeless tobacco.   reports no history of alcohol use. reports no history of drug use. Review of Systems:    []   Information not available due to exam limitations documented above    Review of Systems   Unable to perform ROS: Dementia   Cardiovascular: Negative for chest pain. Gastrointestinal: Negative for abdominal pain. Musculoskeletal: Negative for back pain and neck pain. Skin: Positive for wound (2 cm laceration to left temporal region). Neurological: Negative for dizziness, syncope, weakness, light-headedness and headaches. PHYSICAL EXAMINATION:     GLASCOW COMA SCALE  NEUROMUSCULAR BLOCKADE PRIOR TO ARRIVAL     [x]No        []Yes      Variable  Score   Variable  Score  Eye opening [x]Spontaneous 4 Verbal  [x]Oriented  5     []To voice  3   []Confused  4    []To pain  2   []Inapp words  3    []None  1   []Incomp words 2       []None  1   Motor   [x]Obeys  6    []Localizes pain 5    []Withdraws(pain) 4    []Flexion(pain) 3  []Extension(pain) 2    []None  1     GCS Total = 15    PHYSICAL EXAMINATION    VITAL SIGNS:   Vitals:    05/15/22 1718   BP:    Pulse: 78   Resp: 15   Temp:    SpO2: 94%       Physical Exam  HENT:      Head:      Comments: 2 cm laceration to left temporal region, mild venous oozing. Dried blood in hair.  Denying pain at the 6-12 months, then consider CT at 18-24 months. In a high-risk patient, CT at 6-12 months, then CT at 18-24 months. .      - Low risk patients include individuals with minimal or absent history of   smoking and other known risk factors. - High risk patients include individuals with a history or smoking or known   risk factors. Radiology 2017 http://pubs. rsna.org/doi/full/10.1148/radiol. 9814206725         CT THORACIC SPINE WO CONTRAST   Final Result   Cervical CT: No acute osseous abnormality. No acute fracture. CT of thoracic and lumbar spine:      Multiple compression fractures throughout the thoracic and lumbar spine. Some of these fractures have chronic appearance such as superior endplate   compression fracture T12, T10 and T7, L2 and L3 with 20%, 20%, 30%, 30% and   50% height loss respectively. However superior endplate compression fracture of L1 with 5 mm retropulsion   into the spinal canal is age indeterminate and may be acute in etiology. There is associated moderate canal stenosis at T12-L1. For further   evaluation of this fracture either comparison with prior examination or   lumbar spine MRI can be obtained. Moderate levoscoliosis of upper to midthoracic spine and mild dextroscoliosis   of thoracolumbar junction      Partial visualization of 7 mm obstructing stone of the left proximal ureter   which is associated with mild left-sided hydroureteronephrosis. Multiple   nonobstructing kidney stones are also noted bilaterally. RECOMMENDATIONS:   Unavailable         CT LUMBAR SPINE WO CONTRAST   Final Result   Cervical CT: No acute osseous abnormality. No acute fracture. CT of thoracic and lumbar spine:      Multiple compression fractures throughout the thoracic and lumbar spine.    Some of these fractures have chronic appearance such as superior endplate   compression fracture T12, T10 and T7, L2 and L3 with 20%, 20%, 30%, 30% and   50% height loss respectively. However superior endplate compression fracture of L1 with 5 mm retropulsion   into the spinal canal is age indeterminate and may be acute in etiology. There is associated moderate canal stenosis at T12-L1. For further   evaluation of this fracture either comparison with prior examination or   lumbar spine MRI can be obtained. Moderate levoscoliosis of upper to midthoracic spine and mild dextroscoliosis   of thoracolumbar junction      Partial visualization of 7 mm obstructing stone of the left proximal ureter   which is associated with mild left-sided hydroureteronephrosis. Multiple   nonobstructing kidney stones are also noted bilaterally. RECOMMENDATIONS:   Unavailable         CT HEAD WO CONTRAST   Final Result   No acute intracranial abnormality. Encephalomalacia likely related to prior left MCA distribution stroke. Otherwise chronic microvascular disease. CT CERVICAL SPINE WO CONTRAST   Final Result   Cervical CT: No acute osseous abnormality. No acute fracture. CT of thoracic and lumbar spine:      Multiple compression fractures throughout the thoracic and lumbar spine. Some of these fractures have chronic appearance such as superior endplate   compression fracture T12, T10 and T7, L2 and L3 with 20%, 20%, 30%, 30% and   50% height loss respectively. However superior endplate compression fracture of L1 with 5 mm retropulsion   into the spinal canal is age indeterminate and may be acute in etiology. There is associated moderate canal stenosis at T12-L1. For further   evaluation of this fracture either comparison with prior examination or   lumbar spine MRI can be obtained. Moderate levoscoliosis of upper to midthoracic spine and mild dextroscoliosis   of thoracolumbar junction      Partial visualization of 7 mm obstructing stone of the left proximal ureter   which is associated with mild left-sided hydroureteronephrosis.   Multiple nonobstructing kidney stones are also noted bilaterally. RECOMMENDATIONS:   Unavailable         XR CHEST PORTABLE   Final Result   Mild dependent changes at the right lateral lung base, possibly small   effusion and/or atelectasis. Otherwise unremarkable chest.         CT ABDOMEN PELVIS W IV CONTRAST Additional Contrast? None    (Results Pending)         LABS    Labs Reviewed   CBC WITH AUTO DIFFERENTIAL - Abnormal; Notable for the following components:       Result Value    Monocytes 14 (*)     Eosinophils % 9 (*)     Absolute Eos # 0.69 (*)     All other components within normal limits   COMPREHENSIVE METABOLIC PANEL W/ REFLEX TO MG FOR LOW K - Abnormal; Notable for the following components:    Glucose 114 (*)     Potassium 3.4 (*)     Alkaline Phosphatase 184 (*)     Total Bilirubin 0.29 (*)     All other components within normal limits   TROPONIN - Abnormal; Notable for the following components:    Troponin, High Sensitivity 49 (*)     All other components within normal limits   TROPONIN - Abnormal; Notable for the following components:    Troponin, High Sensitivity 47 (*)     All other components within normal limits   LIPASE   BRAIN NATRIURETIC PEPTIDE   MAGNESIUM   URINALYSIS WITH REFLEX TO CULTURE         Francis Noguera MD  5/15/22, 7:07 PM           Trauma Attending Attestation      I have reviewed the above GCS note(s) and confirmed the key elements of the medical history and physical exam. I have seen and examined the pt. I have discussed the findings, established the care plan and recommendations with Resident.         Gerhardt Dose, DO  5/16/2022  6:43 AM

## 2022-05-15 NOTE — PROGRESS NOTES
[facility] [address]     Emergency/Trauma Note    PATIENT NAME: Hema Sadler    Shift date: 05/15/22  Shift day: Sunday   Shift # 1    Room # 02/02   Name: Hema Sadler            Age: 64 y.o. Gender: female          Samaritan: [address] of Synagogue:     Trauma/Incident type: Adult Trauma Consult  Admit Date & Time: 5/15/2022  4:08 PM  TRAUMA NAME: N/A    ADVANCE DIRECTIVES IN CHART? No    NAME OF DECISION MAKER: N/A    RELATIONSHIP OF DECISION MAKER TO PATIENT: N/A    PATIENT/EVENT DESCRIPTION:  Hema Sadler is a 64 y.o. female who arrived via [place].  received perfect serve for Trauma Consult. Pt to be admitted to 02/02. SPIRITUAL ASSESSMENT/INTERVENTION:  Patient was not in room when  arrived. Patient's daughter Tea Calzada was present. Tea Calzada appeared to be receptive to  presence. She engaged in conversation about patients health and its impact.  gave space for Tea Calzada to openly discuss feelings/emotions which were validated by . Tea Calzada stated patient was Gnosticist and expressed gratitude for visit. PATIENT BELONGINGS:  With patient    ANY BELONGINGS OF SIGNIFICANT VALUE NOTED:  N/A    REGISTRATION STAFF NOTIFIED? Yes        SPIRITUAL CARE FOLLOW-UP PLAN:  Chaplains will remain available to offer spiritual and emotional support as needed.       Electronically signed by Ruby Nelson Resident, on 5/15/2022 at 6:51 PM.  [facility]  [phone]

## 2022-05-15 NOTE — CARE COORDINATION
Advance Care Planning     Advance Care Planning Clinical Specialist  Conversation Note      Date of ACP Conversation: 5/15/2022    Conversation Conducted with:  Healthcare Decision Maker: Next of Kin by law (only applies in absence of above) (name) daughter Helen Blackwell states POA, to provide documents    ACP Clinical Specialist: Charleen Ardon RN        Healthcare Decision Maker:     Current Designated Healthcare Decision Maker:     Primary Decision Maker: Ericka Mcgovern - [phone]  Click here to complete Healthcare Decision Makers including section of the Healthcare Decision Maker Relationship (ie \"Primary\")  Today we documented Decision Maker(s). The patient will provide ACP documents. Care Preferences  Daughter states patient is a DNR but does not have documentation    Hospitalization: \"If your health worsens and it becomes clear that your chance of recovery is unlikely, what would your preference be regarding hospitalization? \"    Choice:  [] The patient wants hospitalization. [] The patient prefers comfort-focused treatment without hospitalization. Ventilation: \"If you were in your present state of health and suddenly became very ill and were unable to breathe on your own, what would your preference be about the use of a ventilator (breathing machine) if it were available to you? \"      If the patient would desire the use of ventilator (breathing machine), answer \"yes\". If not, \"no\":     \"If your health worsens and it becomes clear that your chance of recovery is unlikely, what would your preference be about the use of a ventilator (breathing machine) if it were available to you? \"     Would the patient desire the use of ventilator (breathing machine)?:       Resuscitation  \"CPR works best to restart the heart when there is a sudden event, like a heart attack, in someone who is otherwise healthy.  Unfortunately, CPR does not typically restart the heart for people who have serious health conditions or who are very sick. \"    \"In the event your heart stopped as a result of an underlying serious health condition, would you want attempts to be made to restart your heart (answer \"yes\" for attempt to resuscitate) or would you prefer a natural death (answer \"no\" for do not attempt to resuscitate)? \"        [] Yes   [] No   Educated Patient / Reuel Ego regarding differences between Advance Directives and portable DNR orders.     Length of ACP Conversation in minutes:      Conversation Outcomes:  [] ACP discussion completed  [] Existing advance directive reviewed with patient; no changes to patient's previously recorded wishes  [] New Advance Directive completed  [] Portable Do Not Rescitate prepared for Provider review and signature  [] POLST/POST/MOLST/MOST prepared for Provider review and signature      Follow-up plan:    [] Schedule follow-up conversation to continue planning  [] Referred individual to Provider for additional questions/concerns   [] Advised patient/agent/surrogate to review completed ACP document and update if needed with changes in condition, patient preferences or care setting    [] This note routed to one or more involved healthcare providers

## 2022-05-15 NOTE — CARE COORDINATION
Case Management Initial Discharge Plan  Keyona Enrique,             Met with:family member daughter Pradip Garcia to discuss discharge plans. Information verified: address, contacts, phone number, , insurance Yes  Insurance Provider: [employer] [facility]    Emergency Contact/Next of Kin name & number: Roxana Holguin (daughter) [phone]  Who are involved in patient's support system? daughter    PCP: Meme Ziegler DO  Date of last visit: 2022      Discharge Planning    Living Arrangements:    with daughter    Home has 1 stories   2 stairs to climb to get into front door, Location of bedroom/bathroom in home main    Patient able to perform ADL's:Assisted    Current Services (outpatient & in home) respite care at [address]  DME equipment: shower chair  DME provider:     Is patient receiving oral anticoagulation therapy? No    If indicated:   Physician managing anticoagulation treatment:   Where does patient obtain lab work for ATC treatment? Does patient have any issues/concerns obtaining medications? No  If yes, what are patient's concerns? Is there a preferred Pharmacy after hours or on weekends? Yes    If yes, which pharmacy? [facility] on [address] Assistance Needed:       Patient agreeable to home care: No  Savage of choice provided:  no    Prior SNF/Rehab Placement and Facility: [facility]  Agreeable to SNF/Rehab: Yes  Savage of choice provided: yes     Evaluation: no    Expected Discharge date:       Patient expects to be discharged to:        If home: is the family and/or caregiver wiling & able to provide support at home? daughter  Who will be providing this support? daughter    Follow Up Appointment: Best Day/ Time:      Transportation provider: daughter  Transportation arrangements needed for discharge: Yes or No    Readmission Risk              Risk of Unplanned Readmission:  0             Does patient have a readmission risk score greater than 14?:   If yes, follow-up appointment must be made within 7 days of discharge.      Goals of Care:       Educated daughter on transitional options, provided freedom of choice and are agreeable with plan      Discharge Plan: from home with daughter, goes to [facility] for respite care [place], referral to [facility] if SNF needed          Electronically signed by Nancy Burt RN on 5/15/22 at 7:11 PM EDT

## 2022-05-15 NOTE — ED NOTES
Dr. Candis Rhodes at bedside updating pt and daughter on [id number] [facility]  05/15/22 [phone]

## 2022-05-15 NOTE — ED NOTES
The following labs labeled with pt sticker and tubed to lab:     [] Blue     [] Lavender   [] on ice  [x] Green/yellow  [] Green/black [] on ice  [] Yellow  [] Red  [] Pink      [] COVID-19 swab    [] Rapid  [] PCR  [] Flu swab  [] Peds Viral Panel     [] Urine Sample  [] Pelvic Cultures  [] Blood Cultures            Yara Prado RN  05/15/22 4940

## 2022-05-15 NOTE — ED PROVIDER NOTES
[facility] ED  eMERGENCY dEPARTMENT eNCOUnter   Attending Attestation     Pt Name: Michelle Simeon  MRN: [id number]  [place]bert 1945  Date of evaluation: 5/15/22       Michelle Siemon is a 64 y.o. female who presents with Fall      History: Pt presents after fall at respite care. Unwitnessed, uncertain LOC. Pt has a left for head laceration. Exam: HRRR, lungs CTABL, Abdomen soft and non tender. Pt well appearing. Pt has 3cm laceration over the left forehead which is mildly gaping. Plan for wound repair, CT head and neck. ACS screening. Consider discharge. If something abormal will contact Trauma    I performed a history and physical examination of the patient and discussed management with the resident. I reviewed the residents note and agree with the documented findings and plan of care. Any areas of disagreement are noted on the chart. I was personally present for the key portions of any procedures. I have documented in the chart those procedures where I was not present during the key portions. I have personally reviewed all images and agree with the resident's interpretation. I have reviewed the emergency nurses triage note. I agree with the chief complaint, past medical history, past surgical history, allergies, medications, social and family history as documented unless otherwise noted below. Documentation of the HPI, Physical Exam and Medical Decision Making performed by medical students or scribes is based on my personal performance of the HPI, PE and MDM. For Phys Assistant/ Nurse Practitioner cases/documentation I have had a face to face evaluation of this patient and have completed at least one if not all key elements of the E/M (history, physical exam, and MDM). Additional findings are as noted.     For APC cases I have personally evaluated and examined the patient in conjunction with the APC and agree with the treatment plan and disposition of the patient as recorded by the Franci Fry MD  Attending Emergency  Physician       Matteo Howell MD  05/15/22 1189

## 2022-05-15 NOTE — ED PROVIDER NOTES
[facility] ED  Emergency Department Encounter  EmergencyMedicine Resident     Pt Haily Houston  MRN: [id number]  [place] 1945  Date of evaluation: 5/15/22  PCP:  Fausto Koch, [facility]       Chief Complaint   Patient presents with    Fall       HISTORY OF PRESENT ILLNESS  (Location/Symptom, Timing/Onset, Context/Setting, Quality, Duration, Modifying Factors, Severity.)      Susan Anderson is a 64 y.o. female who presents with fall. Pt lives with daughter 5 days a week and then stays at [facility] for 2 days. Patient has a past medical history of dementia, seizure, DVT, Parkinson's disease. Currently on Tegretol, Prozac, mirabegron, aspirin. Patient is not on any anticoagulation currently, only taking aspirin. Was previously on Xarelto but PCP took her off of it. .  Patient presents with a fall and left-sided forehead laceration. The initial EMS report stated the fall was unwitnessed however the extended-care facility called and stated it was witnessed by the nursing supervisor. Nursing supervisor states that she was walking in the hallway and then fell forward hitting her head on the ground. Etiology of fall is unclear whether was mechanical versus syncopal. unclear loss of consciousness. Suspect patient fell from standing height but really not sure. Patient is otherwise at baseline health. PAST MEDICAL / SURGICAL / SOCIAL / FAMILY HISTORY      has a past medical history of Anxiety, Convulsion ([address] Encephalopathy, Herpes, Hyperlipidemia, Left bundle branch block, MRSA (methicillin resistant Staphylococcus aureus), Parkinson's disease ([address] Seizures ([address] and Vitamin D deficiency. has a past surgical history that includes Abscess Drainage (Right, 12/14/2013).     Social History     Socioeconomic History    Marital status: Single     Spouse name: Not on file    Number of children: Not on file    Years of education: Not on file    Highest education level: Not on file   Occupational History    Not on file   Tobacco Use    Smoking status: Former Smoker     Packs/day: 1.00     Years: 30.00     Pack years: 30.00     Types: Cigarettes     Start date: 54     Quit date:      Years since quittin.3    Smokeless tobacco: Never Used    Tobacco comment: unknown how many packs a day, or how many years   Vaping Use    Vaping Use: Never used   Substance and Sexual Activity    Alcohol use: No    Drug use: No    Sexual activity: Never   Other Topics Concern    Not on file   Social History Narrative    Not on file     Social Determinants of Health     Financial Resource Strain: Low Risk     Difficulty of Paying Living Expenses: Not hard at all   Food Insecurity: No Food Insecurity    Worried About [address] [employer] in the Last Year: Never true    [address] [employer] in the Last Year: Never true   Transportation Needs:     Lack of Transportation (Medical): Not on file    Lack of Transportation (Non-Medical): Not on file   Physical Activity:     Days of Exercise per Week: Not on file    Minutes of Exercise per Session: Not on file   Stress:     Feeling of Stress : Not on file   Social Connections:     Frequency of Communication with Friends and Family: Not on file    Frequency of Social Gatherings with Friends and Family: Not on file    Attends Gnosticist Services: Not on file    Active Member of [address] [place] [address] or Organizations: Not on file    Attends Club or Organization Meetings: Not on file    Marital Status: Not on file   Intimate Partner Violence:     Fear of Current or Ex-Partner: Not on file    Emotionally Abused: Not on file    Physically Abused: Not on file    Sexually Abused: Not on file   Housing Stability:     Unable to Pay for Housing in the Last Year: Not on file    Number of [place] in the Last Year: Not on file    Unstable Housing in the Last Year: Not on file       History reviewed. No pertinent family history.     Allergies:  Haldol [haloperidol lactate]    Home Medications:  Prior to Admission medications    Medication Sig Start Date End Date Taking? Authorizing Provider   aspirin 81 MG EC tablet Take 81 mg by mouth daily    Historical Provider, MD   carBAMazepine (TEGRETOL) 200 MG tablet TAKE 1 AND 1/2 TABLETS Am, 2 tabs bedtime 11/17/21   Sravani uW DO   FLUoxetine (PROZAC) 20 MG capsule 2 po qd 11/17/21   Sravani Wu DO   mirabegron (MYRBETRIQ) 50 MG TB24 Take 50 mg by mouth daily 10/29/21   Sravani Wu DO   acetaminophen (TYLENOL) 500 MG tablet Take 500 mg by mouth every 6 hours as needed for Pain    Historical Provider, MD   desmopressin (DDAVP) 0.2 MG tablet TAKE 3 TABLETS BY MOUTH IN THE EVENING   Patient not taking: Reported on 9/8/2021 6/1/21   Margy Wu DO   Handicap Placard MISC by Does not apply route Permanent one year  Dx fractured humerus  Patient not taking: Reported on 5/20/2020 11/14/19   Sachin Padilla DO   rivaroxaban (XARELTO STARTER PACK) 15 & 20 MG Starter Pack Follow package instructions  Patient not taking: Reported on 5/20/2020 10/17/19   Ember Bhandari MD       REVIEW OF SYSTEMS    (2-9 systems for level 4, 10 or more for level 5)      Review of Systems   Unable to perform ROS: Dementia        PHYSICAL EXAM   (up to 7 for level 4, 8 or more for level 5)      INITIAL VITALS:   /69   Pulse 70   Temp 97.7 °F (36.5 °C) (Axillary)   Resp 16   SpO2 94%      Vitals:    05/15/22 1711 05/15/22 1718 05/15/22 1957 05/15/22 2004   BP:       Pulse: 78 78 72 70   Resp: 12 15 17 16   Temp:       TempSrc:       SpO2: 93% 94% 92% 94%        Physical Exam  Vitals reviewed. Constitutional:       General: She is not in acute distress. Appearance: She is well-developed. She is not ill-appearing. HENT:      Head: Normocephalic. Comments: Approximately 3 cm gaping forehead laceration. Bleeding controlled. Right Ear: There is impacted cerumen. Left Ear: There is impacted cerumen.    Eyes: Extraocular Movements: Extraocular movements intact. Pupils: Pupils are equal, round, and reactive to light. Cardiovascular:      Rate and Rhythm: Normal rate and regular rhythm. Pulmonary:      Effort: Pulmonary effort is normal.      Breath sounds: Normal breath sounds. Abdominal:      General: There is no distension. Palpations: Abdomen is soft. Tenderness: There is no abdominal tenderness. There is no guarding or rebound. Musculoskeletal:         General: Normal range of motion. Cervical back: Normal range of motion and neck supple. Tenderness present. Right lower leg: No edema. Left lower leg: No edema. Comments: Patient has full range of motion of all 4 extremities. Tenderness to the cervical lower thoracic / lumbar spine   Skin:     General: Skin is warm and dry. Comments: No other bruising or wounds noted besides the forehead laceration   Neurological:      Mental Status: She is alert. Comments: Patient answers yes or no questions, pupils equal round reactive to light, extraocular movements are intact, moves all extremities.            DIFFERENTIAL  DIAGNOSIS     PLAN (LABS / IMAGING / EKG):  Orders Placed This Encounter   Procedures    Culture, Urine    CT HEAD WO CONTRAST    CT CERVICAL SPINE WO CONTRAST    CT THORACIC SPINE WO CONTRAST    XR CHEST PORTABLE    CT LUMBAR SPINE WO CONTRAST    CT CHEST PULMONARY EMBOLISM W CONTRAST    CT ABDOMEN PELVIS W IV CONTRAST Additional Contrast? None    CBC with Auto Differential    Comprehensive Metabolic Panel w/ Reflex to MG    Lipase    Troponin    Brain Natriuretic Peptide    Magnesium    Troponin    Urinalysis with Microscopic    Inpatient consult to Cardiology    Inpatient consult to Trauma Surgery    Inpatient consult to Neurosurgery    Inpatient consult to Urology    EKG 12 Lead       MEDICATIONS ORDERED:  Orders Placed This Encounter   Medications    Tetanus-Diphth-Acell Pertussis (BOOSTRIX) injection 0.5 mL    iopamidol (ISOVUE-370) 76 % injection 75 mL       DIAGNOSTIC RESULTS / EMERGENCY DEPARTMENT COURSE / MDM   LAB RESULTS:  Results for orders placed or performed during the hospital encounter of 05/15/22   CBC with Auto Differential   Result Value Ref Range    WBC 7.4 3.5 - 11.3 k/uL    RBC 4.21 3.95 - 5.11 m/uL    Hemoglobin 13.6 11.9 - 15.1 g/dL    Hematocrit 40.5 36.3 - 47.1 %    MCV 96.2 82.6 - 102.9 fL    MCH 32.3 25.2 - 33.5 pg    MCHC 33.6 28.4 - 34.8 g/dL    RDW 13.2 11.8 - 14.4 %    Platelets 701 681 - 846 k/uL    MPV 8.4 8.1 - 13.5 fL    NRBC Automated 0.0 0.0 per 100 WBC    Seg Neutrophils 51 36 - 65 %    Lymphocytes 25 24 - 43 %    Monocytes 14 (H) 3 - 12 %    Eosinophils % 9 (H) 1 - 4 %    Basophils 1 0 - 2 %    Immature Granulocytes 0 0 %    Segs Absolute 3.78 1.50 - 8.10 k/uL    Absolute Lymph # 1.88 1.10 - 3.70 k/uL    Absolute Mono # 1.01 0.10 - 1.20 k/uL    Absolute Eos # 0.69 (H) 0.00 - 0.44 k/uL    Basophils Absolute 0.06 0.00 - 0.20 k/uL    Absolute Immature Granulocyte <0.03 0.00 - 0.30 k/uL   Comprehensive Metabolic Panel w/ Reflex to MG   Result Value Ref Range    Glucose 114 (H) 70 - 99 mg/dL    BUN 15 8 - 23 mg/dL    CREATININE 0.83 0.50 - 0.90 mg/dL    Calcium 8.9 8.6 - 10.4 mg/dL    Sodium 138 135 - 144 mmol/L    Potassium 3.4 (L) 3.7 - 5.3 mmol/L    Chloride 103 98 - 107 mmol/L    CO2 24 20 - 31 mmol/L    Anion Gap 11 9 - 17 mmol/L    Alkaline Phosphatase 184 (H) 35 - 104 U/L    ALT 9 5 - 33 U/L    AST 15 <32 U/L    Total Bilirubin 0.29 (L) 0.3 - 1.2 mg/dL    Total Protein 7.1 6.4 - 8.3 g/dL    Albumin 3.8 3.5 - 5.2 g/dL    Albumin/Globulin Ratio 1.2 1.0 - 2.5    GFR Non-African American >60 >60 mL/min    GFR African American >60 >60 mL/min    GFR Comment         Lipase   Result Value Ref Range    Lipase 43 13 - 60 U/L   Troponin   Result Value Ref Range    Troponin, High Sensitivity 49 (H) 0 - 14 ng/L   Brain Natriuretic Peptide   Result Value Ref Range Pro- <300 pg/mL   Magnesium   Result Value Ref Range    Magnesium 2.3 1.6 - 2.6 mg/dL   Troponin   Result Value Ref Range    Troponin, High Sensitivity 47 (H) 0 - 14 ng/L         RADIOLOGY:  CT CHEST PULMONARY EMBOLISM W CONTRAST   Final Result   Right middle lobe airspace disease. 6 mm pleural based pulmonary nodule   right middle lobe. Coronary artery disease. Multiple compression fractures   age indeterminate. RECOMMENDATIONS:   Fleischner Society guidelines for follow-up and management of incidentally   detected pulmonary nodules:      Single Solid Nodule:      Nodule size equals 6-8 mm   In a low-risk patient, CT at 6-12 months, then consider CT at 18-24 months. In a high-risk patient, CT at 6-12 months, then CT at 18-24 months. .      - Low risk patients include individuals with minimal or absent history of   smoking and other known risk factors. - High risk patients include individuals with a history or smoking or known   risk factors. Radiology 2017 http://pubs. rsna.org/doi/full/10.1148/radiol. 6698092791         CT ABDOMEN PELVIS W IV CONTRAST Additional Contrast? None   Final Result   Addendum 1 of 1   ADDENDUM:   5 mm stone left ureteropelvic junction. Multiple parapelvic cysts and   possible hydronephrosis. .         Final   Multiple compression fractures as above, age indeterminate. CT THORACIC SPINE WO CONTRAST   Final Result   Cervical CT: No acute osseous abnormality. No acute fracture. CT of thoracic and lumbar spine:      Multiple compression fractures throughout the thoracic and lumbar spine. Some of these fractures have chronic appearance such as superior endplate   compression fracture T12, T10 and T7, L2 and L3 with 20%, 20%, 30%, 30% and   50% height loss respectively. However superior endplate compression fracture of L1 with 5 mm retropulsion   into the spinal canal is age indeterminate and may be acute in etiology.    There is associated moderate canal stenosis at T12-L1. For further   evaluation of this fracture either comparison with prior examination or   lumbar spine MRI can be obtained. Moderate levoscoliosis of upper to midthoracic spine and mild dextroscoliosis   of thoracolumbar junction      Partial visualization of 7 mm obstructing stone of the left proximal ureter   which is associated with mild left-sided hydroureteronephrosis. Multiple   nonobstructing kidney stones are also noted bilaterally. RECOMMENDATIONS:   Unavailable         CT LUMBAR SPINE WO CONTRAST   Final Result   Cervical CT: No acute osseous abnormality. No acute fracture. CT of thoracic and lumbar spine:      Multiple compression fractures throughout the thoracic and lumbar spine. Some of these fractures have chronic appearance such as superior endplate   compression fracture T12, T10 and T7, L2 and L3 with 20%, 20%, 30%, 30% and   50% height loss respectively. However superior endplate compression fracture of L1 with 5 mm retropulsion   into the spinal canal is age indeterminate and may be acute in etiology. There is associated moderate canal stenosis at T12-L1. For further   evaluation of this fracture either comparison with prior examination or   lumbar spine MRI can be obtained. Moderate levoscoliosis of upper to midthoracic spine and mild dextroscoliosis   of thoracolumbar junction      Partial visualization of 7 mm obstructing stone of the left proximal ureter   which is associated with mild left-sided hydroureteronephrosis. Multiple   nonobstructing kidney stones are also noted bilaterally. RECOMMENDATIONS:   Unavailable         CT HEAD WO CONTRAST   Final Result   No acute intracranial abnormality. Encephalomalacia likely related to prior left MCA distribution stroke. Otherwise chronic microvascular disease. CT CERVICAL SPINE WO CONTRAST   Final Result   Cervical CT: No acute osseous abnormality.   No acute fracture. CT of thoracic and lumbar spine:      Multiple compression fractures throughout the thoracic and lumbar spine. Some of these fractures have chronic appearance such as superior endplate   compression fracture T12, T10 and T7, L2 and L3 with 20%, 20%, 30%, 30% and   50% height loss respectively. However superior endplate compression fracture of L1 with 5 mm retropulsion   into the spinal canal is age indeterminate and may be acute in etiology. There is associated moderate canal stenosis at T12-L1. For further   evaluation of this fracture either comparison with prior examination or   lumbar spine MRI can be obtained. Moderate levoscoliosis of upper to midthoracic spine and mild dextroscoliosis   of thoracolumbar junction      Partial visualization of 7 mm obstructing stone of the left proximal ureter   which is associated with mild left-sided hydroureteronephrosis. Multiple   nonobstructing kidney stones are also noted bilaterally. RECOMMENDATIONS:   Unavailable         XR CHEST PORTABLE   Final Result   Mild dependent changes at the right lateral lung base, possibly small   effusion and/or atelectasis. Otherwise unremarkable chest.              EKG  EKG Interpretation    Interpreted by me    Rhythm: normal sinus   Rate: normal  Axis: normal  Ectopy: none  Conduction: Left bundle ranch block  ST Segments: no acute change  T Waves: no acute change  Q Waves: none    Clinical Impression: Old left bundle branch block with appropriate concordance and discordance. All EKG's are interpreted by the Emergency Department Physician who either signs or Co-signs this chart in the absence of a cardiologist.      [facility]:    Patient here after a fall. Has a left-sided forehead laceration needing repair. On examination she does have cervical thoracic and lumbar tenderness but no step-offs or deformity.   There is no chest wall tenderness full range of motion of all extremities, I do not see any other bruising or abrasions or lacerations. Unclear etiology of fall syncope versus mechanical fall. Patient walks with assist at baseline so likely mechanical in nature. However patient is unable to provide history secondary to dementia. Cardiac work-up showing old left bundle branch block with mildly elevated but stable troponins. Cardiology was consulted see note below. CT PE negative for PE, does show some atelectasis and a nodule. CT pan scan unremarkable for possible acute lumbar fractures as well as a obstructive uropathy. Neurosurgery and urology consulted. Awaiting urinalysis at this time. Patient is admitted to trauma for further work-up and evaluation. Trauma to repair laceration. ED Course as of 05/15/22 2017   Sun May 15, 2022   1636 FAST negative [CS]   2832 Discussed goals of care with daughter. Daughter states she has a signed DNR, not sure if it is a CC or CCA. At this time is agreeable to admission and further work-up. [CS]   5059 Discussed case with cardiology fellow including old a bundle branch block, troponins, chest x-ray. Do not recommend heparin at this time given flat troponin curve and patient is asymptomatic. Plan for trauma consult and admission [CS]   1934 Urology paged due to obstructive uropathy. Awaiting urine. Trauma paged for admission. Neurosurgery consulted for age-indeterminate back fractures. [CS]   2006 Signout taken from Dr. Jakub Medina. Pending admit orders and bed assignment. [LR]      ED Course User Index  [CS] Penelope Branch DO  [LR] Juliette Shaw DO         PROCEDURES:    CONSULTS:  IP CONSULT TO CARDIOLOGY  IP CONSULT TO TRAUMA SURGERY  IP CONSULT TO NEUROSURGERY  IP CONSULT TO UROLOGY    CRITICAL CARE:  Please see attending note    FINAL IMPRESSION      1. Closed head injury, initial encounter    2. Facial laceration, initial encounter    3.  NSTEMI (non-ST elevated myocardial infarction) ([address]          DISPOSITION / PLAN     DISPOSITION Decision To Admit 05/15/2022 05:57:14 PM      PATIENT REFERRED TO:  No follow-up provider specified.     DISCHARGE MEDICATIONS:  New Prescriptions    No medications on file       Fidencio Robbins DO  Emergency Medicine Resident    (Please note that portions of thisnote were completed with a voice recognition program.  Efforts were made to edit the dictations but occasionally words are mis-transcribed.)        Fidencio Robbins DO  Resident  05/15/22 2017

## 2022-05-15 NOTE — CONSULTS
Department of Neurosurgery                                       Resident Consult Note      Reason for Consult:  Lumbar spine compression fractures  Requesting Physician:  Dr. Dale Magdaleno:   [x]Dr. David Daniels  []Dr. David Cade  []Dr. Black Youssef     History Obtained From:  patient, family member - daughter, ED resident    CHIEF COMPLAINT:         Fall    HISTORY OF PRESENT ILLNESS:       The patient is a 64 y.o. female who presents after a fall. Patient does have a history of dementia and does history is limited. According to the report that was given, patient fell possibly from standing height, this was an unwitnessed fall. Sure there was a loss of consciousness. Of note patient does have a history of dementia, seizure, DVT and Parkinson disease. She is on Tegretol, aspirin, patient was on Xarelto however not on it anymore. CT head was done which showed encephalomalacia, CT thoracic and lumbar showed multiple compression fracture at T7, T10, T12, L2, L3. Patient also does have compression fracture at L1 with 5 mm retropulsion into the spinal canal which they are all age-indeterminate. On my assessment, patient with no focal neurodeficits, she does not have any cervical, thoracic or lumbar spinal tenderness on palpation.   Patient with GCS of 15    PAST MEDICAL HISTORY :       Past Medical History:        Diagnosis Date    Anxiety     Convulsion ([address]     Encephalopathy     Herpes     Hyperlipidemia     Left bundle branch block     MRSA (methicillin resistant Staphylococcus aureus)     Parkinson's disease ([address]     Seizures (HCC)     secondary to encephalitis    Vitamin D deficiency        Past Surgical History:        Procedure Laterality Date    ABSCESS DRAINAGE Right 12/14/2013    WOUND EXPLORATION AND I+D  RIGHT HIP       Social History:   Social History     Socioeconomic History    Marital status: Single     Spouse name: Not on file    Number of children: Not on file    Years of education: Not on file    Highest education level: Not on file   Occupational History    Not on file   Tobacco Use    Smoking status: Former Smoker     Packs/day: 1.00     Years: 30.00     Pack years: 30.00     Types: Cigarettes     Start date: 54     Quit date:      Years since quittin.3    Smokeless tobacco: Never Used    Tobacco comment: unknown how many packs a day, or how many years   Vaping Use    Vaping Use: Never used   Substance and Sexual Activity    Alcohol use: No    Drug use: No    Sexual activity: Never   Other Topics Concern    Not on file   Social History Narrative    Not on file     Social Determinants of Health     Financial Resource Strain: Low Risk     Difficulty of Paying Living Expenses: Not hard at all   Food Insecurity: No Food Insecurity    Worried About [address] in the Last Year: Never true    [address] [facility] in the Last Year: Never true   Transportation Needs:     Lack of Transportation (Medical): Not on file    Lack of Transportation (Non-Medical):  Not on file   Physical Activity:     Days of Exercise per Week: Not on file    Minutes of Exercise per Session: Not on file   Stress:     Feeling of Stress : Not on file   Social Connections:     Frequency of Communication with Friends and Family: Not on file    Frequency of Social Gatherings with Friends and Family: Not on file    Attends Anabaptism Services: Not on file    Active Member of [address] [place] [address] or Organizations: Not on file    Attends Club or Organization Meetings: Not on file    Marital Status: Not on file   Intimate Partner Violence:     Fear of Current or Ex-Partner: Not on file    Emotionally Abused: Not on file    Physically Abused: Not on file    Sexually Abused: Not on file   Housing Stability:     Unable to Pay for Housing in the Last Year: Not on file    Number of [place] in the Last Year: Not on file    Unstable Housing in the Last Year: Not on file       Family History:   History reviewed. No pertinent family history. Allergies:  Haldol [haloperidol lactate]    Home Medications:  Prior to Admission medications    Medication Sig Start Date End Date Taking? Authorizing Provider   aspirin 81 MG EC tablet Take 81 mg by mouth daily    Historical Provider, MD   carBAMazepine (TEGRETOL) 200 MG tablet TAKE 1 AND 1/2 TABLETS Am, 2 tabs bedtime 11/17/21   Sravani Wu DO   FLUoxetine (PROZAC) 20 MG capsule 2 po qd 11/17/21   Sravani Wu DO   mirabegron (MYRBETRIQ) 50 MG TB24 Take 50 mg by mouth daily 10/29/21   Sravani Wu DO   acetaminophen (TYLENOL) 500 MG tablet Take 500 mg by mouth every 6 hours as needed for Pain    Historical Provider, MD   desmopressin (DDAVP) 0.2 MG tablet TAKE 3 TABLETS BY MOUTH IN THE EVENING   Patient not taking: Reported on 9/8/2021 6/1/21   Clarence David Wu DO   Handicap Placard MISC by Does not apply route Permanent one year  Dx fractured humerus  Patient not taking: Reported on 5/20/2020 11/14/19   Slade Medina DO   rivaroxaban (XARELTO STARTER PACK) 15 & 20 MG Starter Pack Follow package instructions  Patient not taking: Reported on 5/20/2020 10/17/19   Meka Vega MD       Current Medications:   No current facility-administered medications for this encounter. REVIEW OF SYSTEMS:       CONSTITUTIONAL: negative for fatigue and malaise   EYES: negative for double vision and photophobia    HEENT: negative for tinnitus and sore throat   RESPIRATORY: negative for cough, shortness of breath   CARDIOVASCULAR: negative for chest pain, palpitations   GASTROINTESTINAL: negative for nausea, vomiting   GENITOURINARY: negative for incontinence   MUSCULOSKELETAL: negative for neck or back pain   NEUROLOGICAL: negative for seizures   PSYCHIATRIC: negative for agitated     Review of systems otherwise negative.     PHYSICAL EXAM:       /69   Pulse 78   Temp 97.7 °F (36.5 °C) (Axillary)   Resp 15   SpO2 94%       CONSTITUTIONAL:  Well developed, well nourished, alert and oriented x 3, in no acute distress. GCS 15, nontoxic. No dysarthria, no aphasia. EOMI.     HEAD:  normocephalic, atraumatic    EYES:  PERRLA, EOMI.   ENT:  moist mucous membranes   NECK:  supple, symmetric, no midline tenderness to palpation    BACK:  without midline tenderness, step-offs or deformities    LUNGS:  Equal air entry bilaterally   CARDIOVASCULAR:  normal s1 / s2   ABDOMEN:  Soft, no rigidity   NEUROLOGIC:  EYE OPENING     Spontaneous - 4 []       To voice - 3 []       To pain - 2 []       None - 1 []    VERBAL RESPONSE     Appropriate, oriented - 5 []       Dazed or confused - 4 []       Syllables, expletives - 3 []       Grunts - 2 []       None - 1 []    MOTOR RESPONSE     Spontaneous, command - 6 []       Localizes pain - 5 []       Withdraws pain - 4 []       Abnormal flexion - 3 []       Abnormal extension - 2 []       None - 1 []            Total GCS: 15    Mental Status:  A & O x3, awake             Cranial Nerves:    cranial nerves II-XII are grossly intact    Motor Exam:    Drift:  absent  Tone:  normal    Motor exam is symmetrical 5 out of 5 all extremities bilaterally    Sensory:    Touch:    Right Upper Extremity:  normal  Left Upper Extremity:  normal  Right Lower Extremity:  normal  Left Lower Extremity:  normal    Deep Tendon Reflexes:    Right Bicep:  1+  Left Bicep:  1+  Right Knee:  1+  Left Knee:  1+    Plantar Response:    Right:  downgoing  Left:  downgoing    Clonus:  absent  De La Paz's:  absent    Coordination/Dysmetria:  Heel to Shin:  Right:  normal  Finger to Nose:   Right:  normal   Dysdiadochokinesia:  N/A    Gait: Patient in spinal immobilization and thus unable to ambulate   SKIN:  no rash      LABS AND IMAGING:     CBC with Differential:    Lab Results   Component Value Date    WBC 7.4 05/15/2022    RBC 4.21 05/15/2022    RBC 4.07 03/12/2012    HGB 13.6 05/15/2022    HCT 40.5 05/15/2022     05/15/2022     03/12/2012    MCV 96.2 05/15/2022    MCH 32.3 05/15/2022    MCHC 33.6 05/15/2022    RDW 13.2 05/15/2022    LYMPHOPCT 25 05/15/2022    LYMPHOPCT 25 01/05/2016    MONOPCT 14 05/15/2022    MONOPCT 11 01/05/2016    EOSPCT 7 01/05/2016    BASOPCT 1 05/15/2022    BASOPCT 0 01/05/2016    MONOSABS 1.01 05/15/2022    MONOSABS 1.17 01/05/2016    LYMPHSABS 1.88 05/15/2022    LYMPHSABS 2.65 01/05/2016    EOSABS 0.69 05/15/2022    EOSABS 0.74 01/05/2016    BASOSABS 0.06 05/15/2022    DIFFTYPE NOT REPORTED 03/05/2021     BMP:    Lab Results   Component Value Date     05/15/2022    K 3.4 05/15/2022     05/15/2022    CO2 24 05/15/2022    BUN 15 05/15/2022    LABALBU 3.8 05/15/2022    CREATININE 0.83 05/15/2022    CALCIUM 8.9 05/15/2022    GFRAA >60 05/15/2022    LABGLOM >60 05/15/2022    GLUCOSE 114 05/15/2022    GLUCOSE 93 03/12/2012       Radiology Review:    CT HEAD WO    Impression   No acute intracranial abnormality.       Encephalomalacia likely related to prior left MCA distribution stroke. Otherwise chronic microvascular disease.           CT CERVICAL/THORACIC/LUMBAR    Impression   Cervical CT: No acute osseous abnormality.  No acute fracture.       CT of thoracic and lumbar spine:       Multiple compression fractures throughout the thoracic and lumbar spine. Some of these fractures have chronic appearance such as superior endplate   compression fracture T12, T10 and T7, L2 and L3 with 20%, 20%, 30%, 30% and   50% height loss respectively.       However superior endplate compression fracture of L1 with 5 mm retropulsion   into the spinal canal is age indeterminate and may be acute in etiology.    There is associated moderate canal stenosis at T12-L1.  For further   evaluation of this fracture either comparison with prior examination or   lumbar spine MRI can be obtained.       Moderate levoscoliosis of upper to midthoracic spine and mild dextroscoliosis   of thoracolumbar junction       Partial visualization of 7 mm obstructing stone of the left proximal ureter   which is associated with mild left-sided hydroureteronephrosis.  Multiple   nonobstructing kidney stones are also noted bilaterally.       RECOMMENDATIONS:   Unavailable           ASSESSMENT AND PLAN:       Patient Active Problem List   Diagnosis    Seizure ([address]    Vitamin D deficiency    Anxiety    Hypercholesteremia    Cellulitis of right leg    MRSA (methicillin resistant staph aureus) culture positive    Breakthrough seizure ([address]    Memory loss    Difficulty speaking    Seizure disorder ([address]    Dementia with behavioral disturbance ([address]    History of encephalitis    Dementia due to Parkinson's disease with behavioral disturbance ([address]    Acute deep vein thrombosis (DVT) of proximal vein of left lower extremity (HCC)         A/P:  This is a 64 y.o. female presents after a fall found to have multiple compression fracture in her thoracic and lumbar region that is age indeterminate. Her L1 compression fracture does show 5 mm retropulsion into the foraminal canal.  Patient has no focal neurodeficits, GCS of 15, she does not have any back pain on palpation. Patient care will be discussed with attending, will reevaluate patient along with attending     - No neurosurgical interventions planned for now  - CTLS recommendations: TLS  - HOB: 30 degrees   - Neuro checks per protocol  - Hold all antiplatelets and anticoagulants  - We recommend SBP < 140  - All further management per primary team     Additional recommendations may follow    Please contact neurosurgery with any changes in patients neurologic status. Thank you for your consult.        MD ROSALIO Kebede pager [phone]  5/15/2022  7:21 PM

## 2022-05-16 ENCOUNTER — ANESTHESIA (OUTPATIENT)
Dept: OPERATING ROOM | Age: 77
DRG: 988 | End: 2022-05-16
Payer: COMMERCIAL

## 2022-05-16 ENCOUNTER — APPOINTMENT (OUTPATIENT)
Dept: GENERAL RADIOLOGY | Age: 77
DRG: 988 | End: 2022-05-16
Payer: COMMERCIAL

## 2022-05-16 ENCOUNTER — ANESTHESIA EVENT (OUTPATIENT)
Dept: OPERATING ROOM | Age: 77
DRG: 988 | End: 2022-05-16
Payer: COMMERCIAL

## 2022-05-16 LAB
-: ABNORMAL
ANION GAP SERPL CALCULATED.3IONS-SCNC: 14 MMOL/L (ref 9–17)
BACTERIA: ABNORMAL
BILIRUBIN URINE: NEGATIVE
BUN BLDV-MCNC: 12 MG/DL (ref 8–23)
CALCIUM SERPL-MCNC: 8.3 MG/DL (ref 8.6–10.4)
CASTS UA: ABNORMAL /LPF (ref 0–8)
CHLORIDE BLD-SCNC: 103 MMOL/L (ref 98–107)
CO2: 18 MMOL/L (ref 20–31)
COLOR: YELLOW
CREAT SERPL-MCNC: 0.69 MG/DL (ref 0.5–0.9)
EKG ATRIAL RATE: 74 BPM
EKG P AXIS: 113 DEGREES
EKG P-R INTERVAL: 238 MS
EKG Q-T INTERVAL: 442 MS
EKG QRS DURATION: 120 MS
EKG QTC CALCULATION (BAZETT): 490 MS
EKG R AXIS: 67 DEGREES
EKG T AXIS: 69 DEGREES
EKG VENTRICULAR RATE: 74 BPM
EPITHELIAL CELLS UA: ABNORMAL /HPF (ref 0–5)
GFR AFRICAN AMERICAN: >60 ML/MIN
GFR NON-AFRICAN AMERICAN: >60 ML/MIN
GFR SERPL CREATININE-BSD FRML MDRD: ABNORMAL ML/MIN/{1.73_M2}
GLUCOSE BLD-MCNC: 108 MG/DL (ref 70–99)
GLUCOSE URINE: NEGATIVE
HCT VFR BLD CALC: 37.9 % (ref 36.3–47.1)
HEMOGLOBIN: 12.4 G/DL (ref 11.9–15.1)
KETONES, URINE: NEGATIVE
LEUKOCYTE ESTERASE, URINE: ABNORMAL
MCH RBC QN AUTO: 31.9 PG (ref 25.2–33.5)
MCHC RBC AUTO-ENTMCNC: 32.7 G/DL (ref 28.4–34.8)
MCV RBC AUTO: 97.4 FL (ref 82.6–102.9)
NITRITE, URINE: POSITIVE
NRBC AUTOMATED: 0 PER 100 WBC
PDW BLD-RTO: 13.3 % (ref 11.8–14.4)
PH UA: 7.5 (ref 5–8)
PLATELET # BLD: 363 K/UL (ref 138–453)
PMV BLD AUTO: 8.6 FL (ref 8.1–13.5)
POTASSIUM SERPL-SCNC: 3.4 MMOL/L (ref 3.7–5.3)
PROTEIN UA: ABNORMAL
RBC # BLD: 3.89 M/UL (ref 3.95–5.11)
RBC UA: ABNORMAL /HPF (ref 0–4)
SARS-COV-2, RAPID: NOT DETECTED
SODIUM BLD-SCNC: 135 MMOL/L (ref 135–144)
SPECIFIC GRAVITY UA: 1.05 (ref 1–1.03)
SPECIMEN DESCRIPTION: NORMAL
TROPONIN, HIGH SENSITIVITY: 40 NG/L (ref 0–14)
TROPONIN, HIGH SENSITIVITY: 43 NG/L (ref 0–14)
TROPONIN, HIGH SENSITIVITY: 44 NG/L (ref 0–14)
TURBIDITY: ABNORMAL
URINE HGB: ABNORMAL
UROBILINOGEN, URINE: NORMAL
WBC # BLD: 7.3 K/UL (ref 3.5–11.3)
WBC UA: ABNORMAL /HPF (ref 0–5)

## 2022-05-16 PROCEDURE — 84484 ASSAY OF TROPONIN QUANT: CPT

## 2022-05-16 PROCEDURE — 99222 1ST HOSP IP/OBS MODERATE 55: CPT | Performed by: NEUROLOGICAL SURGERY

## 2022-05-16 PROCEDURE — 3600000002 HC SURGERY LEVEL 2 BASE: Performed by: UROLOGY

## 2022-05-16 PROCEDURE — 3600000012 HC SURGERY LEVEL 2 ADDTL 15MIN: Performed by: UROLOGY

## 2022-05-16 PROCEDURE — 51798 US URINE CAPACITY MEASURE: CPT

## 2022-05-16 PROCEDURE — C1769 GUIDE WIRE: HCPCS | Performed by: UROLOGY

## 2022-05-16 PROCEDURE — 3209999900 FLUORO FOR SURGICAL PROCEDURES

## 2022-05-16 PROCEDURE — 81001 URINALYSIS AUTO W/SCOPE: CPT

## 2022-05-16 PROCEDURE — 87635 SARS-COV-2 COVID-19 AMP PRB: CPT

## 2022-05-16 PROCEDURE — 2500000003 HC RX 250 WO HCPCS: Performed by: STUDENT IN AN ORGANIZED HEALTH CARE EDUCATION/TRAINING PROGRAM

## 2022-05-16 PROCEDURE — 3700000001 HC ADD 15 MINUTES (ANESTHESIA): Performed by: UROLOGY

## 2022-05-16 PROCEDURE — 7100000000 HC PACU RECOVERY - FIRST 15 MIN: Performed by: UROLOGY

## 2022-05-16 PROCEDURE — 7100000001 HC PACU RECOVERY - ADDTL 15 MIN: Performed by: UROLOGY

## 2022-05-16 PROCEDURE — 2709999900 HC NON-CHARGEABLE SUPPLY: Performed by: UROLOGY

## 2022-05-16 PROCEDURE — 85027 COMPLETE CBC AUTOMATED: CPT

## 2022-05-16 PROCEDURE — 1200000000 HC SEMI PRIVATE

## 2022-05-16 PROCEDURE — 3700000000 HC ANESTHESIA ATTENDED CARE: Performed by: UROLOGY

## 2022-05-16 PROCEDURE — 36415 COLL VENOUS BLD VENIPUNCTURE: CPT

## 2022-05-16 PROCEDURE — 2580000003 HC RX 258: Performed by: UROLOGY

## 2022-05-16 PROCEDURE — 6360000002 HC RX W HCPCS: Performed by: NURSE ANESTHETIST, CERTIFIED REGISTERED

## 2022-05-16 PROCEDURE — 92523 SPEECH SOUND LANG COMPREHEN: CPT

## 2022-05-16 PROCEDURE — 87086 URINE CULTURE/COLONY COUNT: CPT

## 2022-05-16 PROCEDURE — 93005 ELECTROCARDIOGRAM TRACING: CPT | Performed by: STUDENT IN AN ORGANIZED HEALTH CARE EDUCATION/TRAINING PROGRAM

## 2022-05-16 PROCEDURE — 2500000003 HC RX 250 WO HCPCS: Performed by: NURSE ANESTHETIST, CERTIFIED REGISTERED

## 2022-05-16 PROCEDURE — 6370000000 HC RX 637 (ALT 250 FOR IP): Performed by: STUDENT IN AN ORGANIZED HEALTH CARE EDUCATION/TRAINING PROGRAM

## 2022-05-16 PROCEDURE — 87186 SC STD MICRODIL/AGAR DIL: CPT

## 2022-05-16 PROCEDURE — C1758 CATHETER, URETERAL: HCPCS | Performed by: UROLOGY

## 2022-05-16 PROCEDURE — 87077 CULTURE AEROBIC IDENTIFY: CPT

## 2022-05-16 PROCEDURE — 86403 PARTICLE AGGLUT ANTBDY SCRN: CPT

## 2022-05-16 PROCEDURE — 93010 ELECTROCARDIOGRAM REPORT: CPT | Performed by: INTERNAL MEDICINE

## 2022-05-16 PROCEDURE — C2617 STENT, NON-COR, TEM W/O DEL: HCPCS | Performed by: UROLOGY

## 2022-05-16 PROCEDURE — 2580000003 HC RX 258: Performed by: ANESTHESIOLOGY

## 2022-05-16 PROCEDURE — 80048 BASIC METABOLIC PNL TOTAL CA: CPT

## 2022-05-16 PROCEDURE — 6360000002 HC RX W HCPCS: Performed by: STUDENT IN AN ORGANIZED HEALTH CARE EDUCATION/TRAINING PROGRAM

## 2022-05-16 PROCEDURE — 0T778DZ DILATION OF LEFT URETER WITH INTRALUMINAL DEVICE, VIA NATURAL OR ARTIFICIAL OPENING ENDOSCOPIC: ICD-10-PCS | Performed by: UROLOGY

## 2022-05-16 DEVICE — URETERAL STENT
Type: IMPLANTABLE DEVICE | Status: FUNCTIONAL
Brand: POLARIS™ ULTRA

## 2022-05-16 RX ORDER — SODIUM CHLORIDE 0.9 % (FLUSH) 0.9 %
5-40 SYRINGE (ML) INJECTION PRN
Status: DISCONTINUED | OUTPATIENT
Start: 2022-05-16 | End: 2022-05-16 | Stop reason: HOSPADM

## 2022-05-16 RX ORDER — ONDANSETRON 2 MG/ML
4 INJECTION INTRAMUSCULAR; INTRAVENOUS
Status: DISCONTINUED | OUTPATIENT
Start: 2022-05-16 | End: 2022-05-16 | Stop reason: HOSPADM

## 2022-05-16 RX ORDER — SODIUM CHLORIDE, SODIUM LACTATE, POTASSIUM CHLORIDE, CALCIUM CHLORIDE 600; 310; 30; 20 MG/100ML; MG/100ML; MG/100ML; MG/100ML
INJECTION, SOLUTION INTRAVENOUS CONTINUOUS
Status: DISCONTINUED | OUTPATIENT
Start: 2022-05-16 | End: 2022-05-17

## 2022-05-16 RX ORDER — SODIUM CHLORIDE 0.9 % (FLUSH) 0.9 %
5-40 SYRINGE (ML) INJECTION EVERY 12 HOURS SCHEDULED
Status: DISCONTINUED | OUTPATIENT
Start: 2022-05-16 | End: 2022-05-16 | Stop reason: HOSPADM

## 2022-05-16 RX ORDER — FENTANYL CITRATE 50 UG/ML
INJECTION, SOLUTION INTRAMUSCULAR; INTRAVENOUS PRN
Status: DISCONTINUED | OUTPATIENT
Start: 2022-05-16 | End: 2022-05-16 | Stop reason: SDUPTHER

## 2022-05-16 RX ORDER — SODIUM CHLORIDE 9 MG/ML
INJECTION, SOLUTION INTRAVENOUS PRN
Status: DISCONTINUED | OUTPATIENT
Start: 2022-05-16 | End: 2022-05-16 | Stop reason: HOSPADM

## 2022-05-16 RX ORDER — MAGNESIUM HYDROXIDE 1200 MG/15ML
LIQUID ORAL CONTINUOUS PRN
Status: COMPLETED | OUTPATIENT
Start: 2022-05-16 | End: 2022-05-16

## 2022-05-16 RX ORDER — MIDAZOLAM HYDROCHLORIDE 2 MG/2ML
1 INJECTION, SOLUTION INTRAMUSCULAR; INTRAVENOUS EVERY 10 MIN PRN
Status: DISCONTINUED | OUTPATIENT
Start: 2022-05-16 | End: 2022-05-16 | Stop reason: HOSPADM

## 2022-05-16 RX ORDER — LIDOCAINE HYDROCHLORIDE 10 MG/ML
INJECTION, SOLUTION EPIDURAL; INFILTRATION; INTRACAUDAL; PERINEURAL PRN
Status: DISCONTINUED | OUTPATIENT
Start: 2022-05-16 | End: 2022-05-16 | Stop reason: SDUPTHER

## 2022-05-16 RX ORDER — FENTANYL CITRATE 50 UG/ML
25 INJECTION, SOLUTION INTRAMUSCULAR; INTRAVENOUS EVERY 5 MIN PRN
Status: DISCONTINUED | OUTPATIENT
Start: 2022-05-16 | End: 2022-05-16 | Stop reason: HOSPADM

## 2022-05-16 RX ORDER — PROPOFOL 10 MG/ML
INJECTION, EMULSION INTRAVENOUS PRN
Status: DISCONTINUED | OUTPATIENT
Start: 2022-05-16 | End: 2022-05-16 | Stop reason: SDUPTHER

## 2022-05-16 RX ORDER — MEPERIDINE HYDROCHLORIDE 50 MG/ML
12.5 INJECTION INTRAMUSCULAR; INTRAVENOUS; SUBCUTANEOUS EVERY 5 MIN PRN
Status: DISCONTINUED | OUTPATIENT
Start: 2022-05-16 | End: 2022-05-16 | Stop reason: HOSPADM

## 2022-05-16 RX ORDER — LIDOCAINE HYDROCHLORIDE 10 MG/ML
1 INJECTION, SOLUTION EPIDURAL; INFILTRATION; INTRACAUDAL; PERINEURAL
Status: DISCONTINUED | OUTPATIENT
Start: 2022-05-16 | End: 2022-05-16 | Stop reason: HOSPADM

## 2022-05-16 RX ORDER — MAGNESIUM HYDROXIDE 1200 MG/15ML
LIQUID ORAL PRN
Status: DISCONTINUED | OUTPATIENT
Start: 2022-05-16 | End: 2022-05-16 | Stop reason: ALTCHOICE

## 2022-05-16 RX ADMIN — SODIUM CHLORIDE, POTASSIUM CHLORIDE, SODIUM LACTATE AND CALCIUM CHLORIDE: 600; 310; 30; 20 INJECTION, SOLUTION INTRAVENOUS at 12:26

## 2022-05-16 RX ADMIN — PROPOFOL 25 MCG/KG/MIN: 10 INJECTION, EMULSION INTRAVENOUS at 09:16

## 2022-05-16 RX ADMIN — PROPOFOL 20 MG: 10 INJECTION, EMULSION INTRAVENOUS at 09:14

## 2022-05-16 RX ADMIN — FENTANYL CITRATE 25 MCG: 50 INJECTION, SOLUTION INTRAMUSCULAR; INTRAVENOUS at 09:14

## 2022-05-16 RX ADMIN — ACETAMINOPHEN 1000 MG: 500 TABLET ORAL at 05:02

## 2022-05-16 RX ADMIN — CEFTRIAXONE SODIUM 1000 MG: 1 INJECTION, POWDER, FOR SOLUTION INTRAMUSCULAR; INTRAVENOUS at 06:34

## 2022-05-16 RX ADMIN — LIDOCAINE HYDROCHLORIDE 50 MG: 10 INJECTION, SOLUTION EPIDURAL; INFILTRATION; INTRACAUDAL; PERINEURAL at 09:14

## 2022-05-16 RX ADMIN — ACETAMINOPHEN 1000 MG: 500 TABLET ORAL at 21:10

## 2022-05-16 RX ADMIN — SODIUM CHLORIDE: 9 INJECTION, SOLUTION INTRAVENOUS at 09:03

## 2022-05-16 RX ADMIN — SODIUM CHLORIDE, POTASSIUM CHLORIDE, SODIUM LACTATE AND CALCIUM CHLORIDE: 600; 310; 30; 20 INJECTION, SOLUTION INTRAVENOUS at 19:50

## 2022-05-16 ASSESSMENT — PAIN DESCRIPTION - ORIENTATION: ORIENTATION: ANTERIOR

## 2022-05-16 ASSESSMENT — PAIN DESCRIPTION - DESCRIPTORS: DESCRIPTORS: ACHING

## 2022-05-16 ASSESSMENT — PAIN - FUNCTIONAL ASSESSMENT
PAIN_FUNCTIONAL_ASSESSMENT: ACTIVITIES ARE NOT PREVENTED
PAIN_FUNCTIONAL_ASSESSMENT: 0-10

## 2022-05-16 ASSESSMENT — PAIN DESCRIPTION - LOCATION: LOCATION: HEAD

## 2022-05-16 ASSESSMENT — PAIN SCALES - GENERAL
PAINLEVEL_OUTOF10: 8
PAINLEVEL_OUTOF10: 2

## 2022-05-16 NOTE — PROGRESS NOTES
[facility] ([facility])  Occupational Therapy Not Seen Note    DATE: 2022    NAME: Wily Nicole  MRN: [id number]   : 1945      Patient not seen this date for Occupational Therapy due to:    Strict Bedrest:TLS precautions     Next Scheduled Treatment: check back 2022    Electronically signed by BOGDAN Moyer on 2022 at 7:27 AM

## 2022-05-16 NOTE — ED NOTES
Report to [facility] RN, Kristen Wilcox, all questions addressed      Soheila Cristobal RN  05/15/22 2122

## 2022-05-16 NOTE — ED PROVIDER NOTES
[facility]  Emergency Department  Emergency Medicine Resident Sign-out     Care of Gwen Waggoner was assumed from Dr. Anatoly Pennington and is being seen for Fall  . The patient's initial evaluation and plan have been discussed with the prior provider who initially evaluated the patient. EMERGENCY DEPARTMENT COURSE / MEDICAL DECISION MAKING:       MEDICATIONS GIVEN:  Orders Placed This Encounter   Medications    Tetanus-Diphth-Acell Pertussis (BOOSTRIX) injection 0.5 mL    iopamidol (ISOVUE-370) 76 % injection 75 mL       LABS / RADIOLOGY:     Labs Reviewed   CBC WITH AUTO DIFFERENTIAL - Abnormal; Notable for the following components:       Result Value    Monocytes 14 (*)     Eosinophils % 9 (*)     Absolute Eos # 0.69 (*)     All other components within normal limits   COMPREHENSIVE METABOLIC PANEL W/ REFLEX TO MG FOR LOW K - Abnormal; Notable for the following components:    Glucose 114 (*)     Potassium 3.4 (*)     Alkaline Phosphatase 184 (*)     Total Bilirubin 0.29 (*)     All other components within normal limits   TROPONIN - Abnormal; Notable for the following components:    Troponin, High Sensitivity 49 (*)     All other components within normal limits   TROPONIN - Abnormal; Notable for the following components:    Troponin, High Sensitivity 47 (*)     All other components within normal limits   CULTURE, URINE   LIPASE   BRAIN NATRIURETIC PEPTIDE   MAGNESIUM   URINALYSIS WITH REFLEX TO CULTURE       CT HEAD WO CONTRAST    Result Date: 5/15/2022  EXAMINATION: CT OF THE HEAD WITHOUT CONTRAST  5/15/2022 4:38 pm TECHNIQUE: CT of the head was performed without the administration of intravenous contrast. Automated exposure control, iterative reconstruction, and/or weight based adjustment of the mA/kV was utilized to reduce the radiation dose to as low as reasonably achievable.  COMPARISON: 10/12/2015 HISTORY: ORDERING SYSTEM PROVIDED HISTORY: Fall TECHNOLOGIST PROVIDED HISTORY: Fall Decision Support Exception - unselect if not a suspected or confirmed emergency medical condition->Emergency Medical Condition (MA) Reason for Exam: fall FINDINGS: BRAIN/VENTRICLES: There is no acute intracranial hemorrhage, mass effect or midline shift. No abnormal extra-axial fluid collection. The gray-white differentiation is maintained without evidence of an acute infarct. There is no evidence of hydrocephalus. Left-sided encephalomalacia likely related to remote MCA distribution stroke. Otherwise chronic microvascular disease and involutional change. Vascular calcifications. ORBITS: The visualized portion of the orbits demonstrate no acute abnormality. SINUSES: Mild paranasal sinus mucosal thickening. Mastoids are clear. SOFT TISSUES/SKULL:  No acute abnormality of the visualized skull or soft tissues. No acute intracranial abnormality. Encephalomalacia likely related to prior left MCA distribution stroke. Otherwise chronic microvascular disease. CT CERVICAL SPINE WO CONTRAST    Result Date: 5/15/2022  EXAMINATION: CT OF THE LUMBAR SPINE WITHOUT CONTRAST; CT OF THE CERVICAL SPINE WITHOUT CONTRAST; CT OF THE THORACIC SPINE WITHOUT CONTRAST  5/15/2022 TECHNIQUE: CT of the lumbar spine was performed without the administration of intravenous contrast. Multiplanar reformatted images are provided for review. Adjustment of mA and/or kV according to patient size was utilized. Automated exposure control, iterative reconstruction, and/or weight based adjustment of the mA/kV was utilized to reduce the radiation dose to as low as reasonably achievable.; CT of the cervical spine was performed without the administration of intravenous contrast. Multiplanar reformatted images are provided for review.  Automated exposure control, iterative reconstruction, and/or weight based adjustment of the mA/kV was utilized to reduce the radiation dose to as low as reasonably achievable.; CT of the thoracic spine was performed without the administration of intravenous contrast. Multiplanar reformatted images are provided for review. Automated exposure control, iterative reconstruction, and/or weight based adjustment of the mA/kV was utilized to reduce the radiation dose to as low as reasonably achievable. COMPARISON: None HISTORY: ORDERING SYSTEM PROVIDED HISTORY: fall, pain TECHNOLOGIST PROVIDED HISTORY: fall, pain Decision Support Exception - unselect if not a suspected or confirmed emergency medical condition->Emergency Medical Condition (MA) Reason for Exam: fall FINDINGS: Cervical: BONES/ALIGNMENT: There is no acute fracture or traumatic malalignment. DEGENERATIVE CHANGES: Multilevel disc and facet degenerative disease most pronounced at C4-C5 and C5-C6. SOFT TISSUES: There is no prevertebral soft tissue swelling. Thoracic and lumbar: BONES/ALIGNMENT: Moderate levoscoliosis of upper to midthoracic spine and mild dextroscoliosis of thoracolumbar junction. Multiple compression fractures throughout the thoracic and lumbar spine. Some of these fractures have chronic appearance such as superior endplate compression fracture T12, T10 and T7, L2 and L3 with 20%, 20%, 30%, 30% and 50% height loss respectively. However superior endplate compression fracture of L1 with 5 mm retropulsion into the spinal canal is age indeterminate and may be acute in etiology. There is associated moderate canal stenosis at T12-L1. DEGENERATIVE CHANGES: Multilevel disc and facet degenerative disease most pronounced at L4-L5. SOFT TISSUES: Partial visualization of 7 mm obstructing stone of the left proximal ureter which is associated with mild left-sided hydroureteronephrosis. Multiple nonobstructing kidney stones are also noted bilaterally. Cervical CT: No acute osseous abnormality. No acute fracture. CT of thoracic and lumbar spine: Multiple compression fractures throughout the thoracic and lumbar spine.  Some of these fractures have chronic appearance such as superior endplate compression fracture T12, T10 and T7, L2 and L3 with 20%, 20%, 30%, 30% and 50% height loss respectively. However superior endplate compression fracture of L1 with 5 mm retropulsion into the spinal canal is age indeterminate and may be acute in etiology. There is associated moderate canal stenosis at T12-L1. For further evaluation of this fracture either comparison with prior examination or lumbar spine MRI can be obtained. Moderate levoscoliosis of upper to midthoracic spine and mild dextroscoliosis of thoracolumbar junction Partial visualization of 7 mm obstructing stone of the left proximal ureter which is associated with mild left-sided hydroureteronephrosis. Multiple nonobstructing kidney stones are also noted bilaterally. RECOMMENDATIONS: Unavailable     CT THORACIC SPINE WO CONTRAST    Result Date: 5/15/2022  EXAMINATION: CT OF THE LUMBAR SPINE WITHOUT CONTRAST; CT OF THE CERVICAL SPINE WITHOUT CONTRAST; CT OF THE THORACIC SPINE WITHOUT CONTRAST  5/15/2022 TECHNIQUE: CT of the lumbar spine was performed without the administration of intravenous contrast. Multiplanar reformatted images are provided for review. Adjustment of mA and/or kV according to patient size was utilized. Automated exposure control, iterative reconstruction, and/or weight based adjustment of the mA/kV was utilized to reduce the radiation dose to as low as reasonably achievable.; CT of the cervical spine was performed without the administration of intravenous contrast. Multiplanar reformatted images are provided for review. Automated exposure control, iterative reconstruction, and/or weight based adjustment of the mA/kV was utilized to reduce the radiation dose to as low as reasonably achievable.; CT of the thoracic spine was performed without the administration of intravenous contrast. Multiplanar reformatted images are provided for review.  Automated exposure control, iterative reconstruction, and/or weight based adjustment of the mA/kV was utilized to reduce the radiation dose to as low as reasonably achievable. COMPARISON: None HISTORY: ORDERING SYSTEM PROVIDED HISTORY: fall, pain TECHNOLOGIST PROVIDED HISTORY: fall, pain Decision Support Exception - unselect if not a suspected or confirmed emergency medical condition->Emergency Medical Condition (MA) Reason for Exam: fall FINDINGS: Cervical: BONES/ALIGNMENT: There is no acute fracture or traumatic malalignment. DEGENERATIVE CHANGES: Multilevel disc and facet degenerative disease most pronounced at C4-C5 and C5-C6. SOFT TISSUES: There is no prevertebral soft tissue swelling. Thoracic and lumbar: BONES/ALIGNMENT: Moderate levoscoliosis of upper to midthoracic spine and mild dextroscoliosis of thoracolumbar junction. Multiple compression fractures throughout the thoracic and lumbar spine. Some of these fractures have chronic appearance such as superior endplate compression fracture T12, T10 and T7, L2 and L3 with 20%, 20%, 30%, 30% and 50% height loss respectively. However superior endplate compression fracture of L1 with 5 mm retropulsion into the spinal canal is age indeterminate and may be acute in etiology. There is associated moderate canal stenosis at T12-L1. DEGENERATIVE CHANGES: Multilevel disc and facet degenerative disease most pronounced at L4-L5. SOFT TISSUES: Partial visualization of 7 mm obstructing stone of the left proximal ureter which is associated with mild left-sided hydroureteronephrosis. Multiple nonobstructing kidney stones are also noted bilaterally. Cervical CT: No acute osseous abnormality. No acute fracture. CT of thoracic and lumbar spine: Multiple compression fractures throughout the thoracic and lumbar spine. Some of these fractures have chronic appearance such as superior endplate compression fracture T12, T10 and T7, L2 and L3 with 20%, 20%, 30%, 30% and 50% height loss respectively.  However superior endplate compression fracture of L1 with 5 mm retropulsion into the spinal canal is age indeterminate and may be acute in etiology. There is associated moderate canal stenosis at T12-L1. For further evaluation of this fracture either comparison with prior examination or lumbar spine MRI can be obtained. Moderate levoscoliosis of upper to midthoracic spine and mild dextroscoliosis of thoracolumbar junction Partial visualization of 7 mm obstructing stone of the left proximal ureter which is associated with mild left-sided hydroureteronephrosis. Multiple nonobstructing kidney stones are also noted bilaterally. RECOMMENDATIONS: Unavailable     CT LUMBAR SPINE WO CONTRAST    Result Date: 5/15/2022  EXAMINATION: CT OF THE LUMBAR SPINE WITHOUT CONTRAST; CT OF THE CERVICAL SPINE WITHOUT CONTRAST; CT OF THE THORACIC SPINE WITHOUT CONTRAST  5/15/2022 TECHNIQUE: CT of the lumbar spine was performed without the administration of intravenous contrast. Multiplanar reformatted images are provided for review. Adjustment of mA and/or kV according to patient size was utilized. Automated exposure control, iterative reconstruction, and/or weight based adjustment of the mA/kV was utilized to reduce the radiation dose to as low as reasonably achievable.; CT of the cervical spine was performed without the administration of intravenous contrast. Multiplanar reformatted images are provided for review. Automated exposure control, iterative reconstruction, and/or weight based adjustment of the mA/kV was utilized to reduce the radiation dose to as low as reasonably achievable.; CT of the thoracic spine was performed without the administration of intravenous contrast. Multiplanar reformatted images are provided for review. Automated exposure control, iterative reconstruction, and/or weight based adjustment of the mA/kV was utilized to reduce the radiation dose to as low as reasonably achievable.  COMPARISON: None HISTORY: ORDERING SYSTEM PROVIDED HISTORY: fall, pain TECHNOLOGIST PROVIDED HISTORY: fall, pain Decision Support Exception - unselect if not a suspected or confirmed emergency medical condition->Emergency Medical Condition (MA) Reason for Exam: fall FINDINGS: Cervical: BONES/ALIGNMENT: There is no acute fracture or traumatic malalignment. DEGENERATIVE CHANGES: Multilevel disc and facet degenerative disease most pronounced at C4-C5 and C5-C6. SOFT TISSUES: There is no prevertebral soft tissue swelling. Thoracic and lumbar: BONES/ALIGNMENT: Moderate levoscoliosis of upper to midthoracic spine and mild dextroscoliosis of thoracolumbar junction. Multiple compression fractures throughout the thoracic and lumbar spine. Some of these fractures have chronic appearance such as superior endplate compression fracture T12, T10 and T7, L2 and L3 with 20%, 20%, 30%, 30% and 50% height loss respectively. However superior endplate compression fracture of L1 with 5 mm retropulsion into the spinal canal is age indeterminate and may be acute in etiology. There is associated moderate canal stenosis at T12-L1. DEGENERATIVE CHANGES: Multilevel disc and facet degenerative disease most pronounced at L4-L5. SOFT TISSUES: Partial visualization of 7 mm obstructing stone of the left proximal ureter which is associated with mild left-sided hydroureteronephrosis. Multiple nonobstructing kidney stones are also noted bilaterally. Cervical CT: No acute osseous abnormality. No acute fracture. CT of thoracic and lumbar spine: Multiple compression fractures throughout the thoracic and lumbar spine. Some of these fractures have chronic appearance such as superior endplate compression fracture T12, T10 and T7, L2 and L3 with 20%, 20%, 30%, 30% and 50% height loss respectively. However superior endplate compression fracture of L1 with 5 mm retropulsion into the spinal canal is age indeterminate and may be acute in etiology. There is associated moderate canal stenosis at T12-L1.   For further evaluation of this fracture either comparison with prior examination or lumbar spine MRI can be obtained. Moderate levoscoliosis of upper to midthoracic spine and mild dextroscoliosis of thoracolumbar junction Partial visualization of 7 mm obstructing stone of the left proximal ureter which is associated with mild left-sided hydroureteronephrosis. Multiple nonobstructing kidney stones are also noted bilaterally. RECOMMENDATIONS: Unavailable     CT ABDOMEN PELVIS W IV CONTRAST Additional Contrast? None    Result Date: 5/15/2022  EXAMINATION: CT OF THE ABDOMEN AND PELVIS WITH CONTRAST 5/15/2022 6:33 pm TECHNIQUE: CT of the abdomen and pelvis was performed with the administration of intravenous contrast. Multiplanar reformatted images are provided for review. Automated exposure control, iterative reconstruction, and/or weight based adjustment of the mA/kV was utilized to reduce the radiation dose to as low as reasonably achievable. COMPARISON: None. HISTORY: ORDERING SYSTEM PROVIDED HISTORY: fall, r/o trauma TECHNOLOGIST PROVIDED HISTORY: fall, r/o trauma Decision Support Exception - unselect if not a suspected or confirmed emergency medical condition->Emergency Medical Condition (MA) Reason for Exam: fall FINDINGS: Lower Chest: Multifocal bibasilar ground-glass opacities. Cardiomegaly. Organs: The abdominal wall appears normal. The liver, spleen, pancreas, and adrenals appear normal.  Gallbladder normal. Multiple simple bilateral renal cysts. No further follow-up required as a appear benign/simple. Punctate nonobstructing nephroliths on the left. The bladder appears normal. GI/Bowel: The stomach,small bowel, and colon appear normal. Increased stool and colonic diverticulosis. Appendix is not identified. Pelvis: Normal Peritoneum/Retroperitoneum: The abdominal aorta and iliac arteries are normal in caliber. There is no pathologic adenopathy.  Bones/Soft Tissues: Multiple compression fractures and Schmorl's node disease T10 through L3. Multiple compression fractures as above, age indeterminate. XR CHEST PORTABLE    Result Date: 5/15/2022  EXAMINATION: ONE XRAY VIEW OF THE CHEST 5/15/2022 4:18 pm COMPARISON: 10/12/2015. HISTORY: ORDERING SYSTEM PROVIDED HISTORY: fall. TECHNOLOGIST PROVIDED HISTORY: fall. FINDINGS: The cardiomediastinal silhouette is unremarkable. Aortic vascular calcification. Mild blunting of the right lateral costophrenic sulcus, possibly small effusion and or atelectasis. The lungs otherwise are clear. No acute infiltrate or evidence of overt failure. Mild dependent changes at the right lateral lung base, possibly small effusion and/or atelectasis. Otherwise unremarkable chest.     CT CHEST PULMONARY EMBOLISM W CONTRAST    Result Date: 5/15/2022  EXAMINATION: CTA OF THE CHEST 5/15/2022 6:33 pm TECHNIQUE: CTA of the chest was performed after the administration of intravenous contrast.  Multiplanar reformatted images are provided for review. MIP images are provided for review. Automated exposure control, iterative reconstruction, and/or weight based adjustment of the mA/kV was utilized to reduce the radiation dose to as low as reasonably achievable. COMPARISON: None. HISTORY: ORDERING SYSTEM PROVIDED HISTORY: r/o PE.  fall TECHNOLOGIST PROVIDED HISTORY: r/o PE.  fall Decision Support Exception - unselect if not a suspected or confirmed emergency medical condition->Emergency Medical Condition (MA) Reason for Exam: r/o pe FINDINGS: Pulmonary Arteries: Pulmonary arteries are adequately opacified for evaluation. No evidence of intraluminal filling defect to suggest pulmonary embolism. Main pulmonary artery is normal in caliber. Mediastinum: No evidence of mediastinal lymphadenopathy. The heart and pericardium demonstrate no acute abnormality. There is no acute abnormality of the thoracic aorta. Coronary artery disease. Cardiomegaly.  Lungs/pleura: 6 mm right middle lobe nodule adjacent to the No medications on file           Jh Blanton DO  Emergency Medicine Resident  [facility]       Jh Blanton DO  Resident  05/16/22 0139

## 2022-05-16 NOTE — CARE COORDINATION
Met with pt and daughter to complete an SBIRT. Daughter provided assistance answering questions as pt has Dementia. SBIRT is negative. Alcohol Screening and Brief Intervention        No results for input(s): ALC in the last 72 hours. Alcohol Pre-screening     (WOMEN ONLY) How many times in the past year have you had 4 or more drinks in a day?: None    Alcohol Screening Audit       Drug Pre-Screening   How many times in the past year have you used a recreational drug or used a prescription medication for nonmedical reasons?: None    Drug Screening DAST       Mood Pre-Screening (PHQ-2)  During the past two weeks, have you been bothered by little interest or pleasure in doing things?: No  During the past two weeks, have you been bothered by feeling down, depressed, or hopeless?: No    Mood Pre-Screening (PHQ-9)         I have interviewed Otis Mejia, [id number] regarding  Her alcohol consumption/drug use and risk for excessive use. Screenings were negative. Patient  N/A intervention at this time.   Deferred []    Completed on: 5/16/2022   GABINO Bowling

## 2022-05-16 NOTE — PLAN OF CARE
--  NO ACUTE NEUROSURGICAL INTERVENTION NEEDED  Activity as tolerated, no activity restrictions  Follow up as needed. Call with any questions or concerns    NEUROSURGERY TO SIGN OFF     Please contact Neurosurgery with any questions. PATIENT TO FOLLOW UP IN CLINIC: AS NEEDED  ---  Follow-up with Neurosurgery  [address]/[facility] (Medical [facility])  [id number]  Call [phone] for an appointment.   --

## 2022-05-16 NOTE — PROGRESS NOTES
Trauma Tertiary Survey    Admit Date: 5/15/2022  Hospital day 0    [facility]       Past Medical History:   Diagnosis Date    Anxiety     [facility]     Encephalopathy     Herpes     Hyperlipidemia     Left bundle branch block     MRSA (methicillin resistant Staphylococcus aureus)     Parkinson's disease ([facility]     Seizures (HCC)     secondary to encephalitis    Vitamin D deficiency        Scheduled Meds:   cefTRIAXone (ROCEPHIN) IV  1,000 mg IntraVENous Q24H    tamsulosin  0.4 mg Oral Daily    sodium chloride flush  5-40 mL IntraVENous 2 times per day    polyethylene glycol  17 g Oral Daily    acetaminophen  1,000 mg Oral 3 times per day     Continuous Infusions:   sodium chloride 125 mL/hr at 05/15/22 2156    sodium chloride       PRN Meds:sodium chloride flush, sodium chloride, ondansetron **OR** ondansetron    Subjective:     Patient has no complaint of pain. There is not associated numbness, tingling, weakness. Objective:     Patient Vitals for the past 8 hrs:   Height Weight   05/16/22 0045 5' 7\" (1.702 m) 135 lb (61.2 kg)       No intake/output data recorded. I/O this shift:  In: -   Out: 500 [Urine:500]    Radiology:  CT HEAD WO CONTRAST    Result Date: 5/15/2022  EXAMINATION: CT OF THE HEAD WITHOUT CONTRAST  5/15/2022 4:38 pm TECHNIQUE: CT of the head was performed without the administration of intravenous contrast. Automated exposure control, iterative reconstruction, and/or weight based adjustment of the mA/kV was utilized to reduce the radiation dose to as low as reasonably achievable. COMPARISON: 10/12/2015 HISTORY: ORDERING SYSTEM PROVIDED HISTORY: Fall TECHNOLOGIST PROVIDED HISTORY: Fall Decision Support Exception - unselect if not a suspected or confirmed emergency medical condition->Emergency Medical Condition (MA) Reason for Exam: fall FINDINGS: BRAIN/VENTRICLES: There is no acute intracranial hemorrhage, mass effect or midline shift.   No abnormal extra-axial fluid collection. The gray-white differentiation is maintained without evidence of an acute infarct. There is no evidence of hydrocephalus. Left-sided encephalomalacia likely related to remote MCA distribution stroke. Otherwise chronic microvascular disease and involutional change. Vascular calcifications. ORBITS: The visualized portion of the orbits demonstrate no acute abnormality. SINUSES: Mild paranasal sinus mucosal thickening. Mastoids are clear. SOFT TISSUES/SKULL:  No acute abnormality of the visualized skull or soft tissues. No acute intracranial abnormality. Encephalomalacia likely related to prior left MCA distribution stroke. Otherwise chronic microvascular disease. CT CERVICAL SPINE WO CONTRAST    Result Date: 5/15/2022  EXAMINATION: CT OF THE LUMBAR SPINE WITHOUT CONTRAST; CT OF THE CERVICAL SPINE WITHOUT CONTRAST; CT OF THE THORACIC SPINE WITHOUT CONTRAST  5/15/2022 TECHNIQUE: CT of the lumbar spine was performed without the administration of intravenous contrast. Multiplanar reformatted images are provided for review. Adjustment of mA and/or kV according to patient size was utilized. Automated exposure control, iterative reconstruction, and/or weight based adjustment of the mA/kV was utilized to reduce the radiation dose to as low as reasonably achievable.; CT of the cervical spine was performed without the administration of intravenous contrast. Multiplanar reformatted images are provided for review. Automated exposure control, iterative reconstruction, and/or weight based adjustment of the mA/kV was utilized to reduce the radiation dose to as low as reasonably achievable.; CT of the thoracic spine was performed without the administration of intravenous contrast. Multiplanar reformatted images are provided for review. Automated exposure control, iterative reconstruction, and/or weight based adjustment of the mA/kV was utilized to reduce the radiation dose to as low as reasonably achievable. COMPARISON: None HISTORY: ORDERING SYSTEM PROVIDED HISTORY: fall, pain TECHNOLOGIST PROVIDED HISTORY: fall, pain Decision Support Exception - unselect if not a suspected or confirmed emergency medical condition->Emergency Medical Condition (MA) Reason for Exam: fall FINDINGS: Cervical: BONES/ALIGNMENT: There is no acute fracture or traumatic malalignment. DEGENERATIVE CHANGES: Multilevel disc and facet degenerative disease most pronounced at C4-C5 and C5-C6. SOFT TISSUES: There is no prevertebral soft tissue swelling. Thoracic and lumbar: BONES/ALIGNMENT: Moderate levoscoliosis of upper to midthoracic spine and mild dextroscoliosis of thoracolumbar junction. Multiple compression fractures throughout the thoracic and lumbar spine. Some of these fractures have chronic appearance such as superior endplate compression fracture T12, T10 and T7, L2 and L3 with 20%, 20%, 30%, 30% and 50% height loss respectively. However superior endplate compression fracture of L1 with 5 mm retropulsion into the spinal canal is age indeterminate and may be acute in etiology. There is associated moderate canal stenosis at T12-L1. DEGENERATIVE CHANGES: Multilevel disc and facet degenerative disease most pronounced at L4-L5. SOFT TISSUES: Partial visualization of 7 mm obstructing stone of the left proximal ureter which is associated with mild left-sided hydroureteronephrosis. Multiple nonobstructing kidney stones are also noted bilaterally. Cervical CT: No acute osseous abnormality. No acute fracture. CT of thoracic and lumbar spine: Multiple compression fractures throughout the thoracic and lumbar spine. Some of these fractures have chronic appearance such as superior endplate compression fracture T12, T10 and T7, L2 and L3 with 20%, 20%, 30%, 30% and 50% height loss respectively. However superior endplate compression fracture of L1 with 5 mm retropulsion into the spinal canal is age indeterminate and may be acute in etiology.  There is associated moderate canal stenosis at T12-L1. For further evaluation of this fracture either comparison with prior examination or lumbar spine MRI can be obtained. Moderate levoscoliosis of upper to midthoracic spine and mild dextroscoliosis of thoracolumbar junction Partial visualization of 7 mm obstructing stone of the left proximal ureter which is associated with mild left-sided hydroureteronephrosis. Multiple nonobstructing kidney stones are also noted bilaterally. RECOMMENDATIONS: Unavailable     CT THORACIC SPINE WO CONTRAST    Result Date: 5/15/2022  EXAMINATION: CT OF THE LUMBAR SPINE WITHOUT CONTRAST; CT OF THE CERVICAL SPINE WITHOUT CONTRAST; CT OF THE THORACIC SPINE WITHOUT CONTRAST  5/15/2022 TECHNIQUE: CT of the lumbar spine was performed without the administration of intravenous contrast. Multiplanar reformatted images are provided for review. Adjustment of mA and/or kV according to patient size was utilized. Automated exposure control, iterative reconstruction, and/or weight based adjustment of the mA/kV was utilized to reduce the radiation dose to as low as reasonably achievable.; CT of the cervical spine was performed without the administration of intravenous contrast. Multiplanar reformatted images are provided for review. Automated exposure control, iterative reconstruction, and/or weight based adjustment of the mA/kV was utilized to reduce the radiation dose to as low as reasonably achievable.; CT of the thoracic spine was performed without the administration of intravenous contrast. Multiplanar reformatted images are provided for review. Automated exposure control, iterative reconstruction, and/or weight based adjustment of the mA/kV was utilized to reduce the radiation dose to as low as reasonably achievable.  COMPARISON: None HISTORY: ORDERING SYSTEM PROVIDED HISTORY: fall, pain TECHNOLOGIST PROVIDED HISTORY: fall, pain Decision Support Exception - unselect if not a suspected or confirmed emergency medical condition->Emergency Medical Condition (MA) Reason for Exam: fall FINDINGS: Cervical: BONES/ALIGNMENT: There is no acute fracture or traumatic malalignment. DEGENERATIVE CHANGES: Multilevel disc and facet degenerative disease most pronounced at C4-C5 and C5-C6. SOFT TISSUES: There is no prevertebral soft tissue swelling. Thoracic and lumbar: BONES/ALIGNMENT: Moderate levoscoliosis of upper to midthoracic spine and mild dextroscoliosis of thoracolumbar junction. Multiple compression fractures throughout the thoracic and lumbar spine. Some of these fractures have chronic appearance such as superior endplate compression fracture T12, T10 and T7, L2 and L3 with 20%, 20%, 30%, 30% and 50% height loss respectively. However superior endplate compression fracture of L1 with 5 mm retropulsion into the spinal canal is age indeterminate and may be acute in etiology. There is associated moderate canal stenosis at T12-L1. DEGENERATIVE CHANGES: Multilevel disc and facet degenerative disease most pronounced at L4-L5. SOFT TISSUES: Partial visualization of 7 mm obstructing stone of the left proximal ureter which is associated with mild left-sided hydroureteronephrosis. Multiple nonobstructing kidney stones are also noted bilaterally. Cervical CT: No acute osseous abnormality. No acute fracture. CT of thoracic and lumbar spine: Multiple compression fractures throughout the thoracic and lumbar spine. Some of these fractures have chronic appearance such as superior endplate compression fracture T12, T10 and T7, L2 and L3 with 20%, 20%, 30%, 30% and 50% height loss respectively. However superior endplate compression fracture of L1 with 5 mm retropulsion into the spinal canal is age indeterminate and may be acute in etiology. There is associated moderate canal stenosis at T12-L1. For further evaluation of this fracture either comparison with prior examination or lumbar spine MRI can be obtained.  Moderate levoscoliosis of upper to midthoracic spine and mild dextroscoliosis of thoracolumbar junction Partial visualization of 7 mm obstructing stone of the left proximal ureter which is associated with mild left-sided hydroureteronephrosis. Multiple nonobstructing kidney stones are also noted bilaterally. RECOMMENDATIONS: Unavailable     CT LUMBAR SPINE WO CONTRAST    Result Date: 5/15/2022  EXAMINATION: CT OF THE LUMBAR SPINE WITHOUT CONTRAST; CT OF THE CERVICAL SPINE WITHOUT CONTRAST; CT OF THE THORACIC SPINE WITHOUT CONTRAST  5/15/2022 TECHNIQUE: CT of the lumbar spine was performed without the administration of intravenous contrast. Multiplanar reformatted images are provided for review. Adjustment of mA and/or kV according to patient size was utilized. Automated exposure control, iterative reconstruction, and/or weight based adjustment of the mA/kV was utilized to reduce the radiation dose to as low as reasonably achievable.; CT of the cervical spine was performed without the administration of intravenous contrast. Multiplanar reformatted images are provided for review. Automated exposure control, iterative reconstruction, and/or weight based adjustment of the mA/kV was utilized to reduce the radiation dose to as low as reasonably achievable.; CT of the thoracic spine was performed without the administration of intravenous contrast. Multiplanar reformatted images are provided for review. Automated exposure control, iterative reconstruction, and/or weight based adjustment of the mA/kV was utilized to reduce the radiation dose to as low as reasonably achievable. COMPARISON: None HISTORY: ORDERING SYSTEM PROVIDED HISTORY: fall, pain TECHNOLOGIST PROVIDED HISTORY: fall, pain Decision Support Exception - unselect if not a suspected or confirmed emergency medical condition->Emergency Medical Condition (MA) Reason for Exam: fall FINDINGS: Cervical: BONES/ALIGNMENT: There is no acute fracture or traumatic malalignment. DEGENERATIVE CHANGES: Multilevel disc and facet degenerative disease most pronounced at C4-C5 and C5-C6. SOFT TISSUES: There is no prevertebral soft tissue swelling. Thoracic and lumbar: BONES/ALIGNMENT: Moderate levoscoliosis of upper to midthoracic spine and mild dextroscoliosis of thoracolumbar junction. Multiple compression fractures throughout the thoracic and lumbar spine. Some of these fractures have chronic appearance such as superior endplate compression fracture T12, T10 and T7, L2 and L3 with 20%, 20%, 30%, 30% and 50% height loss respectively. However superior endplate compression fracture of L1 with 5 mm retropulsion into the spinal canal is age indeterminate and may be acute in etiology. There is associated moderate canal stenosis at T12-L1. DEGENERATIVE CHANGES: Multilevel disc and facet degenerative disease most pronounced at L4-L5. SOFT TISSUES: Partial visualization of 7 mm obstructing stone of the left proximal ureter which is associated with mild left-sided hydroureteronephrosis. Multiple nonobstructing kidney stones are also noted bilaterally. Cervical CT: No acute osseous abnormality. No acute fracture. CT of thoracic and lumbar spine: Multiple compression fractures throughout the thoracic and lumbar spine. Some of these fractures have chronic appearance such as superior endplate compression fracture T12, T10 and T7, L2 and L3 with 20%, 20%, 30%, 30% and 50% height loss respectively. However superior endplate compression fracture of L1 with 5 mm retropulsion into the spinal canal is age indeterminate and may be acute in etiology. There is associated moderate canal stenosis at T12-L1. For further evaluation of this fracture either comparison with prior examination or lumbar spine MRI can be obtained.  Moderate levoscoliosis of upper to midthoracic spine and mild dextroscoliosis of thoracolumbar junction Partial visualization of 7 mm obstructing stone of the left proximal ureter which is associated with mild left-sided hydroureteronephrosis. Multiple nonobstructing kidney stones are also noted bilaterally. RECOMMENDATIONS: Unavailable     CT ABDOMEN PELVIS W IV CONTRAST Additional Contrast? None    Addendum Date: 5/15/2022    ADDENDUM: 5 mm stone left ureteropelvic junction. Multiple parapelvic cysts and possible hydronephrosis. [place] Result Date: 5/15/2022  EXAMINATION: CT OF THE ABDOMEN AND PELVIS WITH CONTRAST 5/15/2022 6:33 pm TECHNIQUE: CT of the abdomen and pelvis was performed with the administration of intravenous contrast. Multiplanar reformatted images are provided for review. Automated exposure control, iterative reconstruction, and/or weight based adjustment of the mA/kV was utilized to reduce the radiation dose to as low as reasonably achievable. COMPARISON: None. HISTORY: ORDERING SYSTEM PROVIDED HISTORY: fall, r/o trauma TECHNOLOGIST PROVIDED HISTORY: fall, r/o trauma Decision Support Exception - unselect if not a suspected or confirmed emergency medical condition->Emergency Medical Condition (MA) Reason for Exam: fall FINDINGS: Lower Chest: Multifocal bibasilar ground-glass opacities. Cardiomegaly. Organs: The abdominal wall appears normal. The liver, spleen, pancreas, and adrenals appear normal.  Gallbladder normal. Multiple simple bilateral renal cysts. No further follow-up required as a appear benign/simple. Punctate nonobstructing nephroliths on the left. The bladder appears normal. GI/Bowel: The stomach,small bowel, and colon appear normal. Increased stool and colonic diverticulosis. Appendix is not identified. Pelvis: Normal Peritoneum/Retroperitoneum: The abdominal aorta and iliac arteries are normal in caliber. There is no pathologic adenopathy. Bones/Soft Tissues: Multiple compression fractures and Schmorl's node disease T10 through L3. Multiple compression fractures as above, age indeterminate.      XR CHEST PORTABLE    Result Date: 5/15/2022  EXAMINATION: ONE XRAY VIEW OF THE CHEST 5/15/2022 4:18 pm COMPARISON: 10/12/2015. HISTORY: ORDERING SYSTEM PROVIDED HISTORY: fall. TECHNOLOGIST PROVIDED HISTORY: fall. FINDINGS: The cardiomediastinal silhouette is unremarkable. Aortic vascular calcification. Mild blunting of the right lateral costophrenic sulcus, possibly small effusion and or atelectasis. The lungs otherwise are clear. No acute infiltrate or evidence of overt failure. Mild dependent changes at the right lateral lung base, possibly small effusion and/or atelectasis. Otherwise unremarkable chest.     CT CHEST PULMONARY EMBOLISM W CONTRAST    Result Date: 5/15/2022  EXAMINATION: CTA OF THE CHEST 5/15/2022 6:33 pm TECHNIQUE: CTA of the chest was performed after the administration of intravenous contrast.  Multiplanar reformatted images are provided for review. MIP images are provided for review. Automated exposure control, iterative reconstruction, and/or weight based adjustment of the mA/kV was utilized to reduce the radiation dose to as low as reasonably achievable. COMPARISON: None. HISTORY: ORDERING SYSTEM PROVIDED HISTORY: r/o PE.  fall TECHNOLOGIST PROVIDED HISTORY: r/o PE.  fall Decision Support Exception - unselect if not a suspected or confirmed emergency medical condition->Emergency Medical Condition (MA) Reason for Exam: r/o pe FINDINGS: Pulmonary Arteries: Pulmonary arteries are adequately opacified for evaluation. No evidence of intraluminal filling defect to suggest pulmonary embolism. Main pulmonary artery is normal in caliber. Mediastinum: No evidence of mediastinal lymphadenopathy. The heart and pericardium demonstrate no acute abnormality. There is no acute abnormality of the thoracic aorta. Coronary artery disease. Cardiomegaly. Lungs/pleura: 6 mm right middle lobe nodule adjacent to the pericardium on image number 71 right middle lobe consolidations. Subsegmental atelectasis bilaterally.  Upper Abdomen: Limited images of the upper abdomen are unremarkable. Soft Tissues/Bones: Moderate dextroconvex scoliosis thoracolumbar spine. Multiple compression fractures T7 through L1. Right middle lobe airspace disease. 6 mm pleural based pulmonary nodule right middle lobe. Coronary artery disease. Multiple compression fractures age indeterminate. RECOMMENDATIONS: Fleischner Society guidelines for follow-up and management of incidentally detected pulmonary nodules: Single Solid Nodule: Nodule size equals 6-8 mm In a low-risk patient, CT at 6-12 months, then consider CT at 18-24 months. In a high-risk patient, CT at 6-12 months, then CT at 18-24 months. . - Low risk patients include individuals with minimal or absent history of smoking and other known risk factors. - High risk patients include individuals with a history or smoking or known risk factors. Radiology 2017 http://pubs. rsna.org/doi/full/10.1148/radiol. 9080416643       PHYSICAL EXAM:   GCS:  4 - Opens eyes on own   6 - Follows simple motor commands  5 - Alert and oriented    Pupil size:  Left 4 mm Right 4 mm  Pupil reaction: Yes  Wiggles fingers: Left Yes Right Yes  Hand grasp:   Left normal   Right normal  Wiggles toes: Left Yes    Right Yes  Plantar flexion: Left normal  Right normal    /61   Pulse 74   Temp 98.1 °F (36.7 °C) (Oral)   Resp 14   Ht 5' 7\" (1.702 m)   Wt 135 lb (61.2 kg)   SpO2 98%   BMI 21.14 kg/m²   General appearance: alert and cooperative  Head: 3 cm laceration to left temporal region, repaired with 6 sutures in place  Neck: supple, symmetrical, trachea midline  Back: symmetric, no curvature. ROM normal. No CVA tenderness.   Lungs: clear to auscultation bilaterally  Heart: regular rate and rhythm, S1, S2 normal, no murmur, click, rub or gallop  Abdomen: soft, non-tender; bowel sounds normal; no masses,  no organomegaly  Extremities: extremities normal, atraumatic, no cyanosis or edema  Pulses: 2+ and symmetric    Spine:     Spine Tenderness ROM   Cervical 0 /10 Normal   Thoracic 0 /10 Normal   Lumbar 0 /10 Normal     Musculoskeletal    Joint Tenderness Swelling ROM   Right shoulder absent absent normal   Left shoulder absent absent normal   Right elbow absent absent normal   Left elbow absent absent normal   Right wrist absent absent normal   Left wrist absent absent normal   Right hand grasp absent absent normal   Left hand grasp absent absent normal   Right hip absent absent normal   Left hip absent absent normal   Right knee absent absent normal   Left knee absent absent normal   Right ankle absent absent normal   Left ankle absent absent normal   Right foot absent absent normal   Left foot absent absent normal       CONSULTS: Urology, Neurosurgery    INJURIES:        Patient Active Problem List   Diagnosis    Seizure ([address]    Vitamin D deficiency    Anxiety    Hypercholesteremia    Cellulitis of right leg    MRSA (methicillin resistant staph aureus) culture positive    Breakthrough seizure ([address]    Memory loss    Difficulty speaking    Seizure disorder ([address]    Dementia with behavioral disturbance ([address]    History of encephalitis    Dementia due to Parkinson's disease with behavioral disturbance ([address]    Acute deep vein thrombosis (DVT) of proximal vein of left lower extremity (HCC)    Closed compression fracture of L1 lumbar vertebra, initial encounter [facility])         Assessment/Plan:     Please refer to most recent progress note        Caitlyn Avila MD  5/16/22, 6:21 AM

## 2022-05-16 NOTE — OP NOTE
Dee Maza, Reese Babinski, Nicolas Hernandez, Vinny, & Bao  OPERATIVE NOTE   FACILITY: [address] [place] [address], [address] [place] [address]  1945  [id number]    DATE:  05/16/22  SURGEON:  Dr. Vinny M.D.  Mitzi Stokes:  Tiffany Shafer MD PGY3, Shawn Pizarro MD PGY2  PREOPERATIVE DIAGNOSIS: L ureteral and kidney stones   POSTOPERATIVE DIAGNOSIS: Same  PROCEDURES PERFORMED:  1. Cystoscopy. 2. Left sided stent placement. DRAINS: A Left sided 6x26 cm double-J ureteral stent  SPECIMEN: Urine culture  ANESTHESIA: General  ESTIMATED BLOOD LOSS:  None.   COMPLICATIONS:  None.     INDICATIONS FOR PROCEDURE:  Leslie Truong is a 64 y.o. female presents for left ureteral and kidney stone. After the risks, benefits, alternatives, of the procedure were discussed with the patient, informed consent was obtained. The patient elected to proceed.     DETAILS OF THE PROCEDURE:  The patient was brought back to the preoperative holding area to the operating suite, and was transferred to the operating table where the patient lay in supine position. General endotracheal anesthesia was induced, and patient was prepped and draped in standard surgical fashion after being placed in dorsolithotomy position. A proper timeout was performed, preoperative antibiotics were given. A rigid cystoscope with a 22 [place] sheath with 30 degree lens was inserted in the patient's urethral and into the bladder with ease. We then focused our attention on the left ureteral orifice, which we cannulated with our glidewire. Over our glidewire we placed a 6 x 26 cm double-J ureteral stent and we noted appropriate placement in the upper collecting system using fluoroscopy. We then removed our wire. There was good proximal and distal curl. Cloudy almost purulent output from stent upon placement which was taken for urine culture. We did not leave the string at the end of the stent.  We then drained the patient's bladder and removed the scope and the procedure was subsequently terminated. Dr. Yasmin Kenny was present and scrubbed for all critical portions of the procedure. Plan:   The patient will be kept inpatient due to recent fall and concern of urinary tract infection. On Rocephin.   The patient will need to follow up for definitive stone management.

## 2022-05-16 NOTE — PROGRESS NOTES
daughter who provides 24/7 care, and goes to respite care occasionally. Pain:  Pain Assessment  Pain Assessment: None - Denies Pain  Pain Level: 0    Assessment:    Pt presents with mild-severe cognitive-linguistic deficits characterized by difficulties stating biographical information, answering y/n questions, automatic speech tasks, confrontational naming tasks, divergent naming, immediate and delayed recall, and abstract reasoning. Pt. Presents with no dysarthria, no O/M deficits at this time. Spoke with RN, Pt. Is currently functioning at baseline. No need for continued speech therapy at this time. Pt to be d/c from [address] [place] [address] services. Education provided. Recommendations:  Requires SLP Intervention: No     D/C Recommendations: 24 hour supervision/assistance      Subjective:  General  Chart Reviewed: Yes     Vision  Vision: Within Functional Limits  Hearing  Hearing: Within functional limits           Objective: Auditory Comprehension  Comprehension: Exceptions   Simple y/n questions; mod-severe 2/6  Moderate y/n questions; mild 4/6    Expression  Primary Mode of Expression: Verbal    Verbal Expression  Verbal Expression: Exceptions to functional limits  Automatic Speech: Moderate (2/3 Pt able to state days of the week and count from 1-10, but not months of the year)  Confrontation: Mild (2/3)  Divergent: Severe (3+)     Dysarthria: WFL no impairment  Cognition:      Memory  Memory: Exceptions to [facility]  Short-term Memory: Severe (0/3 increased to 2/3 with max verbal cues, 0/3)  Working Memory: Mild (3/3, 3/5, 3/3)  Problem Solving  Problem Solving: Exceptions to [facility]  Verbal Reasoning Skills:  Moderate (Antonyms1/3, Similaries/differences 2/4)  Safety/Judgment  Safety/Judgment: Exceptions to [facility]  Insight: Severe   (0/3)  Flexibility of Thought: Mild (5/6)  Verbal sequencing; WFL    Prognosis:  Speech Therapy Prognosis  Prognosis: Fair  Individuals consulted  Consulted and agree with results and recommendations: Patient;RN    Education:  Patient Education Response: Verbalizes understanding          Therapy Time:   Individual Concurrent Group Co-treatment   Time In  14:35         Time Out  14:53         Minutes                 [employer]   Mello Parra  5/16/2022 3:29 PM

## 2022-05-16 NOTE — DISCHARGE INSTR - COC
Continuity of Care Form    Patient Name: Susan Anderson   :  1945  MRN:  [id number]    6 [facility] date:  5/15/2022  Discharge date:  ***    Code Status Order: Full Code   Advance Directives:      Admitting Physician:  Nicole Celis DO  PCP: Fausto Koch DO    Discharging Nurse: [facility] Unit/Room#: 0911/1249-81  Discharging Unit Phone Number: ***    Emergency Contact:   Extended Emergency Contact Information  Primary Emergency Contact: [address] [address] Phone: [phone]  Relation: Child  Secondary Emergency Contact: [address] Phone: [phone]  Mobile Phone: [phone]  Relation: Grandchild    Past Surgical History:  Past Surgical History:   Procedure Laterality Date    ABSCESS DRAINAGE Right 2013    WOUND EXPLORATION AND I+D  RIGHT HIP    URETER STENT PLACEMENT Left 2022    Cystoscopy       Immunization History:   Immunization History   Administered Date(s) Administered    COVID-19, Pfizer Purple top, DILUTE for use, 12+ yrs, 30mcg/0.3mL dose 2021, 2021, 2021    Influenza 10/09/2013    Influenza Virus Vaccine 2015    Influenza Whole 10/13/2004    Influenza, High Dose (Fluzone 65 yrs and older) 10/04/2017, 2018    Pneumococcal Conjugate 13-valent (Gqymwex69) 2018    Pneumococcal Polysaccharide (Uecziiife55) 2015    Tdap (Boostrix, Adacel) 05/15/2022       Active Problems:  Patient Active Problem List   Diagnosis Code    Seizure ([facility] R56.9    Vitamin D deficiency E55.9    Anxiety F41.9    Hypercholesteremia E78.00    Cellulitis of right leg L03.115    MRSA (methicillin resistant staph aureus) culture positive Z22.322    Breakthrough seizure ([facility] G40.919    Memory loss R41.3    Difficulty speaking R47.9    Seizure disorder ([facility] G40.909    Dementia with behavioral disturbance ([facility] F03.91    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, 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    Acute deep vein thrombosis (DVT) of proximal vein of left lower extremity (HCC) I82.4Y2    Closed compression fracture of L1 lumbar vertebra, initial encounter ([facility] S32.010A       Isolation/Infection:   Isolation            Contact          Patient Infection Status       Infection Onset Added Last Indicated Last Indicated By Review Planned Expiration Resolved Resolved By    [facility]  12/16/13 12/16/13 Jake Ruvalcaba RN                Nurse Assessment:  Last Vital Signs: /64   Pulse 75   Temp 97.6 °F (36.4 °C) (Oral)   Resp 14   Ht 5' 7\" (1.702 m)   Wt 135 lb (61.2 kg)   SpO2 94%   BMI 21.14 kg/m²     Last documented pain score (0-10 scale): Pain Level: 0  Last Weight:   Wt Readings from Last 1 Encounters:   05/16/22 135 lb (61.2 kg)     Mental Status:  disoriented, oriented, and alert    IV Access:  - None    Nursing Mobility/ADLs:  Walking   Dependent  Transfer  Dependent  Bathing  Dependent  Dressing  Dependent  Toileting  Dependent  Feeding  Dependent  Med Admin  Dependent  Med Delivery   whole    Wound Care Documentation and Therapy:  Wound 12/11/13 Other (Comment) Posterior;Right (Active)   Number of days: 3077       Incision 12/14/13 Leg Lateral (Active)   Number of days: 3075        Elimination:  Continence: Bowel: No  Bladder: No  Urinary Catheter: None  Colostomy/Ileostomy/Ileal Conduit: No       Date of Last BM:     Intake/Output Summary (Last 24 hours) at 5/16/2022 1300  Last data filed at 5/16/2022 0930  Gross per 24 hour   Intake 1294.04 ml   Output 500 ml   Net 794.04 ml     I/O last 3 completed shifts:   In: 1094 [I.V.:1094]  Out: 500 [Urine:500]    Safety Concerns:     History of Falls (last 30 days) and At Risk for Falls    Impairments/Disabilities:      Vision    Nutrition Therapy:  Current Nutrition Therapy:   - Oral Diet:  General    Routes of Feeding: Oral  Liquids: No Restrictions  Daily Fluid Restriction: no  Last Modified Barium Swallow with Video (Video Swallowing Test): not done    Treatments at the Time of Hospital Discharge:   Respiratory Treatments: ***  Oxygen Therapy:  is not on home oxygen therapy. Ventilator:    - No ventilator support    Rehab Therapies: Physical Therapy and Occupational Therapy  Weight Bearing Status/Restrictions: No weight bearing restrictions  Other Medical Equipment (for information only, NOT a DME order):  walker  Other Treatments:     Patient's personal belongings (please select all that are sent with patient):  Glasses    RN SIGNATURE:  Electronically signed by Paige Jensen RN on 5/17/22 at 12:43 PM EDT    CASE MANAGEMENT/SOCIAL WORK SECTION    Inpatient Status Date: ***    Readmission Risk Assessment Score:  Readmission Risk              Risk of Unplanned Readmission:  12           Discharging to Facility/ Agency   Name:   [id number] Details  Fax          [address] [facility] [address] [facility] [address]       Phone: [phone]       Fax: [phone]        Address:  Phone:  Fax:    Dialysis Facility (if applicable)   Name:  Address:  Dialysis Schedule:  Phone:  Fax:    / signature: Electronically signed by Chris Seymour RN on 5/19/22 at 10:20 AM EDT    PHYSICIAN SECTION    Prognosis: Good    Condition at Discharge: Stable    Rehab Potential (if transferring to Rehab): {Prognosis:[id number]}    Recommended Labs or Other Treatments After Discharge: ***    Physician Certification: I certify the above information and transfer of Jana Nguyen  is necessary for the continuing treatment of the diagnosis listed and that she requires [facility] for less 30 days.      Update Admission H&P: No change in H&P    PHYSICIAN SIGNATURE:  Electronically signed by LAURIE Cheng CNP on 5/16/22 at 1:00 PM EDT

## 2022-05-16 NOTE — PROGRESS NOTES
PROGRESS NOTE      PATIENT NAME: Ciara Bateman RECORD NO. [id number]  DATE: 2022  SURGEON: Dr Leelee Gold: Charly Adame,     HD: # 1    ASSESSMENT    Patient Active Problem List   Diagnosis    Seizure ([facility] [address]    Vitamin D deficiency    Anxiety    Hypercholesteremia    Cellulitis of right leg    MRSA (methicillin resistant staph aureus) culture positive    Breakthrough seizure ([address]    Memory loss    Difficulty speaking    Seizure disorder ([address]    Dementia with behavioral disturbance ([address]    History of encephalitis    Dementia due to Parkinson's disease with behavioral disturbance ([address]    Acute deep vein thrombosis (DVT) of proximal vein of left lower extremity (HCC)    Closed compression fracture of L1 lumbar vertebra, initial encounter [facility])       MEDICAL DECISION MAKING AND PLAN    68-year-old F, FSH, on ASA w/ age-indeterminate L1 superior endplate compression fx, chronic compression fx of T7, T10, T12, L2, L3    Age-indeterminate L1 compression fx & T7, T10, T12, L2, L3 fx  -TLS precautions  -Neurosurgery consultation  -Multimodal pain control    NSTEMI  -Troponins: 49->47->40  -Cardiology consultation  -No heparin at this time    Left obstructing 7 mm ureteral stone  -Urology consultation  -Cystoscopy and left ureteral stent placement today per urology  -N.p.o. for possible stent placement    UTI  -1 g ceftriaxone IV daily  -Follow-up urine culture    Dispo  -Pending further neurosurgery as well as urology evaluation         SUBJECTIVE    Shan Dunbar was examined at bedside. No acute complaints overnight. States she feels well and does not remember her fall. States that she does not remember the events. And does not remember where she lives.       OBJECTIVE  VITALS: Temp: Temp: 98.4 °F (36.9 °C)Temp  Av.1 °F (36.7 °C)  Min: 97.7 °F (36.5 °C)  Max: 98.4 °F (80.2 °C) BP Systolic (44IFD), TXZ:118 , Min:112 , YUM:037   Diastolic (01DMI), SFL:16, Min:57, Max:91   Pulse Pulse  Av.5  Min: 70  Max: 89 Resp Resp  Avg: 15.4  Min: 11  Max: 20 Pulse ox SpO2  Av.3 %  Min: 91 %  Max: 98 %  GENERAL: Oriented to self but not place, time or events, overall well-appearing in no acute distress, nontoxic  NEURO: CNII-XII grossly intact, no focal neurological deficits    HEENT: Trachea midline, no JVD, laceration to the left temporal region no active bleeding  LUNGS: Equal chest rise and fall, speaking full sentences, no acute distress  HEART: Regular rate and rhythm  ABDOMEN: Soft, nontender, no rebound or guarding  EXTREMITY: Moves all extremities equally    I/O last 3 completed shifts: In: 1094 [I.V.:1094]  Out: 500 [Urine:500]    Drain/tube output: In: 1094 [I.V.:1094]  Out: 500 [Urine:500]    LAB:  CBC:   Recent Labs     05/15/22  1625   WBC 7.4   HGB 13.6   HCT 40.5   MCV 96.2        BMP:   Recent Labs     05/15/22  1625      K 3.4*      CO2 24   BUN 15   CREATININE 0.83   GLUCOSE 114*     COAGS:   Recent Labs     05/15/22  1625   PROT 7.1       RADIOLOGY:  CT HEAD WO CONTRAST    Result Date: 5/15/2022  EXAMINATION: CT OF THE HEAD WITHOUT CONTRAST  5/15/2022 4:38 pm TECHNIQUE: CT of the head was performed without the administration of intravenous contrast. Automated exposure control, iterative reconstruction, and/or weight based adjustment of the mA/kV was utilized to reduce the radiation dose to as low as reasonably achievable. COMPARISON: 10/12/2015 HISTORY: ORDERING SYSTEM PROVIDED HISTORY: Fall TECHNOLOGIST PROVIDED HISTORY: Fall Decision Support Exception - unselect if not a suspected or confirmed emergency medical condition->Emergency Medical Condition (MA) Reason for Exam: fall FINDINGS: BRAIN/VENTRICLES: There is no acute intracranial hemorrhage, mass effect or midline shift. No abnormal extra-axial fluid collection. The gray-white differentiation is maintained without evidence of an acute infarct.   There is no evidence of hydrocephalus. Left-sided encephalomalacia likely related to remote MCA distribution stroke. Otherwise chronic microvascular disease and involutional change. Vascular calcifications. ORBITS: The visualized portion of the orbits demonstrate no acute abnormality. SINUSES: Mild paranasal sinus mucosal thickening. Mastoids are clear. SOFT TISSUES/SKULL:  No acute abnormality of the visualized skull or soft tissues. No acute intracranial abnormality. Encephalomalacia likely related to prior left MCA distribution stroke. Otherwise chronic microvascular disease. CT CERVICAL SPINE WO CONTRAST    Result Date: 5/15/2022  EXAMINATION: CT OF THE LUMBAR SPINE WITHOUT CONTRAST; CT OF THE CERVICAL SPINE WITHOUT CONTRAST; CT OF THE THORACIC SPINE WITHOUT CONTRAST  5/15/2022 TECHNIQUE: CT of the lumbar spine was performed without the administration of intravenous contrast. Multiplanar reformatted images are provided for review. Adjustment of mA and/or kV according to patient size was utilized. Automated exposure control, iterative reconstruction, and/or weight based adjustment of the mA/kV was utilized to reduce the radiation dose to as low as reasonably achievable.; CT of the cervical spine was performed without the administration of intravenous contrast. Multiplanar reformatted images are provided for review. Automated exposure control, iterative reconstruction, and/or weight based adjustment of the mA/kV was utilized to reduce the radiation dose to as low as reasonably achievable.; CT of the thoracic spine was performed without the administration of intravenous contrast. Multiplanar reformatted images are provided for review. Automated exposure control, iterative reconstruction, and/or weight based adjustment of the mA/kV was utilized to reduce the radiation dose to as low as reasonably achievable.  COMPARISON: None HISTORY: ORDERING SYSTEM PROVIDED HISTORY: fall, pain TECHNOLOGIST PROVIDED HISTORY: fall, pain Decision Support Exception - unselect if not a suspected or confirmed emergency medical condition->Emergency Medical Condition (MA) Reason for Exam: fall FINDINGS: Cervical: BONES/ALIGNMENT: There is no acute fracture or traumatic malalignment. DEGENERATIVE CHANGES: Multilevel disc and facet degenerative disease most pronounced at C4-C5 and C5-C6. SOFT TISSUES: There is no prevertebral soft tissue swelling. Thoracic and lumbar: BONES/ALIGNMENT: Moderate levoscoliosis of upper to midthoracic spine and mild dextroscoliosis of thoracolumbar junction. Multiple compression fractures throughout the thoracic and lumbar spine. Some of these fractures have chronic appearance such as superior endplate compression fracture T12, T10 and T7, L2 and L3 with 20%, 20%, 30%, 30% and 50% height loss respectively. However superior endplate compression fracture of L1 with 5 mm retropulsion into the spinal canal is age indeterminate and may be acute in etiology. There is associated moderate canal stenosis at T12-L1. DEGENERATIVE CHANGES: Multilevel disc and facet degenerative disease most pronounced at L4-L5. SOFT TISSUES: Partial visualization of 7 mm obstructing stone of the left proximal ureter which is associated with mild left-sided hydroureteronephrosis. Multiple nonobstructing kidney stones are also noted bilaterally. Cervical CT: No acute osseous abnormality. No acute fracture. CT of thoracic and lumbar spine: Multiple compression fractures throughout the thoracic and lumbar spine. Some of these fractures have chronic appearance such as superior endplate compression fracture T12, T10 and T7, L2 and L3 with 20%, 20%, 30%, 30% and 50% height loss respectively. However superior endplate compression fracture of L1 with 5 mm retropulsion into the spinal canal is age indeterminate and may be acute in etiology. There is associated moderate canal stenosis at T12-L1.   For further evaluation of this fracture either comparison with prior examination or lumbar spine MRI can be obtained. Moderate levoscoliosis of upper to midthoracic spine and mild dextroscoliosis of thoracolumbar junction Partial visualization of 7 mm obstructing stone of the left proximal ureter which is associated with mild left-sided hydroureteronephrosis. Multiple nonobstructing kidney stones are also noted bilaterally. RECOMMENDATIONS: Unavailable     CT THORACIC SPINE WO CONTRAST    Result Date: 5/15/2022  EXAMINATION: CT OF THE LUMBAR SPINE WITHOUT CONTRAST; CT OF THE CERVICAL SPINE WITHOUT CONTRAST; CT OF THE THORACIC SPINE WITHOUT CONTRAST  5/15/2022 TECHNIQUE: CT of the lumbar spine was performed without the administration of intravenous contrast. Multiplanar reformatted images are provided for review. Adjustment of mA and/or kV according to patient size was utilized. Automated exposure control, iterative reconstruction, and/or weight based adjustment of the mA/kV was utilized to reduce the radiation dose to as low as reasonably achievable.; CT of the cervical spine was performed without the administration of intravenous contrast. Multiplanar reformatted images are provided for review. Automated exposure control, iterative reconstruction, and/or weight based adjustment of the mA/kV was utilized to reduce the radiation dose to as low as reasonably achievable.; CT of the thoracic spine was performed without the administration of intravenous contrast. Multiplanar reformatted images are provided for review. Automated exposure control, iterative reconstruction, and/or weight based adjustment of the mA/kV was utilized to reduce the radiation dose to as low as reasonably achievable.  COMPARISON: None HISTORY: ORDERING SYSTEM PROVIDED HISTORY: fall, pain TECHNOLOGIST PROVIDED HISTORY: fall, pain Decision Support Exception - unselect if not a suspected or confirmed emergency medical condition->Emergency Medical Condition (MA) Reason for Exam: fall FINDINGS: Cervical: BONES/ALIGNMENT: There is no acute fracture or traumatic malalignment. DEGENERATIVE CHANGES: Multilevel disc and facet degenerative disease most pronounced at C4-C5 and C5-C6. SOFT TISSUES: There is no prevertebral soft tissue swelling. Thoracic and lumbar: BONES/ALIGNMENT: Moderate levoscoliosis of upper to midthoracic spine and mild dextroscoliosis of thoracolumbar junction. Multiple compression fractures throughout the thoracic and lumbar spine. Some of these fractures have chronic appearance such as superior endplate compression fracture T12, T10 and T7, L2 and L3 with 20%, 20%, 30%, 30% and 50% height loss respectively. However superior endplate compression fracture of L1 with 5 mm retropulsion into the spinal canal is age indeterminate and may be acute in etiology. There is associated moderate canal stenosis at T12-L1. DEGENERATIVE CHANGES: Multilevel disc and facet degenerative disease most pronounced at L4-L5. SOFT TISSUES: Partial visualization of 7 mm obstructing stone of the left proximal ureter which is associated with mild left-sided hydroureteronephrosis. Multiple nonobstructing kidney stones are also noted bilaterally. Cervical CT: No acute osseous abnormality. No acute fracture. CT of thoracic and lumbar spine: Multiple compression fractures throughout the thoracic and lumbar spine. Some of these fractures have chronic appearance such as superior endplate compression fracture T12, T10 and T7, L2 and L3 with 20%, 20%, 30%, 30% and 50% height loss respectively. However superior endplate compression fracture of L1 with 5 mm retropulsion into the spinal canal is age indeterminate and may be acute in etiology. There is associated moderate canal stenosis at T12-L1. For further evaluation of this fracture either comparison with prior examination or lumbar spine MRI can be obtained.  Moderate levoscoliosis of upper to midthoracic spine and mild dextroscoliosis of thoracolumbar junction Partial visualization of 7 mm obstructing stone of the left proximal ureter which is associated with mild left-sided hydroureteronephrosis. Multiple nonobstructing kidney stones are also noted bilaterally. RECOMMENDATIONS: Unavailable     CT LUMBAR SPINE WO CONTRAST    Result Date: 5/15/2022  EXAMINATION: CT OF THE LUMBAR SPINE WITHOUT CONTRAST; CT OF THE CERVICAL SPINE WITHOUT CONTRAST; CT OF THE THORACIC SPINE WITHOUT CONTRAST  5/15/2022 TECHNIQUE: CT of the lumbar spine was performed without the administration of intravenous contrast. Multiplanar reformatted images are provided for review. Adjustment of mA and/or kV according to patient size was utilized. Automated exposure control, iterative reconstruction, and/or weight based adjustment of the mA/kV was utilized to reduce the radiation dose to as low as reasonably achievable.; CT of the cervical spine was performed without the administration of intravenous contrast. Multiplanar reformatted images are provided for review. Automated exposure control, iterative reconstruction, and/or weight based adjustment of the mA/kV was utilized to reduce the radiation dose to as low as reasonably achievable.; CT of the thoracic spine was performed without the administration of intravenous contrast. Multiplanar reformatted images are provided for review. Automated exposure control, iterative reconstruction, and/or weight based adjustment of the mA/kV was utilized to reduce the radiation dose to as low as reasonably achievable. COMPARISON: None HISTORY: ORDERING SYSTEM PROVIDED HISTORY: fall, pain TECHNOLOGIST PROVIDED HISTORY: fall, pain Decision Support Exception - unselect if not a suspected or confirmed emergency medical condition->Emergency Medical Condition (MA) Reason for Exam: fall FINDINGS: Cervical: BONES/ALIGNMENT: There is no acute fracture or traumatic malalignment. DEGENERATIVE CHANGES: Multilevel disc and facet degenerative disease most pronounced at C4-C5 and C5-C6. SOFT TISSUES: There is no prevertebral soft tissue swelling. Thoracic and lumbar: BONES/ALIGNMENT: Moderate levoscoliosis of upper to midthoracic spine and mild dextroscoliosis of thoracolumbar junction. Multiple compression fractures throughout the thoracic and lumbar spine. Some of these fractures have chronic appearance such as superior endplate compression fracture T12, T10 and T7, L2 and L3 with 20%, 20%, 30%, 30% and 50% height loss respectively. However superior endplate compression fracture of L1 with 5 mm retropulsion into the spinal canal is age indeterminate and may be acute in etiology. There is associated moderate canal stenosis at T12-L1. DEGENERATIVE CHANGES: Multilevel disc and facet degenerative disease most pronounced at L4-L5. SOFT TISSUES: Partial visualization of 7 mm obstructing stone of the left proximal ureter which is associated with mild left-sided hydroureteronephrosis. Multiple nonobstructing kidney stones are also noted bilaterally. Cervical CT: No acute osseous abnormality. No acute fracture. CT of thoracic and lumbar spine: Multiple compression fractures throughout the thoracic and lumbar spine. Some of these fractures have chronic appearance such as superior endplate compression fracture T12, T10 and T7, L2 and L3 with 20%, 20%, 30%, 30% and 50% height loss respectively. However superior endplate compression fracture of L1 with 5 mm retropulsion into the spinal canal is age indeterminate and may be acute in etiology. There is associated moderate canal stenosis at T12-L1. For further evaluation of this fracture either comparison with prior examination or lumbar spine MRI can be obtained. Moderate levoscoliosis of upper to midthoracic spine and mild dextroscoliosis of thoracolumbar junction Partial visualization of 7 mm obstructing stone of the left proximal ureter which is associated with mild left-sided hydroureteronephrosis.   Multiple nonobstructing kidney stones are also noted bilaterally. RECOMMENDATIONS: Unavailable     CT ABDOMEN PELVIS W IV CONTRAST Additional Contrast? None    Addendum Date: 5/15/2022    ADDENDUM: 5 mm stone left ureteropelvic junction. Multiple parapelvic cysts and possible hydronephrosis. [facility] Result Date: 5/15/2022  EXAMINATION: CT OF THE ABDOMEN AND PELVIS WITH CONTRAST 5/15/2022 6:33 pm TECHNIQUE: CT of the abdomen and pelvis was performed with the administration of intravenous contrast. Multiplanar reformatted images are provided for review. Automated exposure control, iterative reconstruction, and/or weight based adjustment of the mA/kV was utilized to reduce the radiation dose to as low as reasonably achievable. COMPARISON: None. HISTORY: ORDERING SYSTEM PROVIDED HISTORY: fall, r/o trauma TECHNOLOGIST PROVIDED HISTORY: fall, r/o trauma Decision Support Exception - unselect if not a suspected or confirmed emergency medical condition->Emergency Medical Condition (MA) Reason for Exam: fall FINDINGS: Lower Chest: Multifocal bibasilar ground-glass opacities. Cardiomegaly. Organs: The abdominal wall appears normal. The liver, spleen, pancreas, and adrenals appear normal.  Gallbladder normal. Multiple simple bilateral renal cysts. No further follow-up required as a appear benign/simple. Punctate nonobstructing nephroliths on the left. The bladder appears normal. GI/Bowel: The stomach,small bowel, and colon appear normal. Increased stool and colonic diverticulosis. Appendix is not identified. Pelvis: Normal Peritoneum/Retroperitoneum: The abdominal aorta and iliac arteries are normal in caliber. There is no pathologic adenopathy. Bones/Soft Tissues: Multiple compression fractures and Schmorl's node disease T10 through L3. Multiple compression fractures as above, age indeterminate. XR CHEST PORTABLE    Result Date: 5/15/2022  EXAMINATION: ONE XRAY VIEW OF THE CHEST 5/15/2022 4:18 pm COMPARISON: 10/12/2015.  HISTORY: ORDERING SYSTEM PROVIDED HISTORY: fall. TECHNOLOGIST PROVIDED HISTORY: fall. FINDINGS: The cardiomediastinal silhouette is unremarkable. Aortic vascular calcification. Mild blunting of the right lateral costophrenic sulcus, possibly small effusion and or atelectasis. The lungs otherwise are clear. No acute infiltrate or evidence of overt failure. Mild dependent changes at the right lateral lung base, possibly small effusion and/or atelectasis. Otherwise unremarkable chest.     CT CHEST PULMONARY EMBOLISM W CONTRAST    Result Date: 5/15/2022  EXAMINATION: CTA OF THE CHEST 5/15/2022 6:33 pm TECHNIQUE: CTA of the chest was performed after the administration of intravenous contrast.  Multiplanar reformatted images are provided for review. MIP images are provided for review. Automated exposure control, iterative reconstruction, and/or weight based adjustment of the mA/kV was utilized to reduce the radiation dose to as low as reasonably achievable. COMPARISON: None. HISTORY: ORDERING SYSTEM PROVIDED HISTORY: r/o PE.  Critical access hospital TECHNOLOGIST PROVIDED HISTORY: r/o PE.  Critical access hospital Decision Support Exception - unselect if not a suspected or confirmed emergency medical condition->Emergency Medical Condition (MA) Reason for Exam: r/o pe FINDINGS: Pulmonary Arteries: Pulmonary arteries are adequately opacified for evaluation. No evidence of intraluminal filling defect to suggest pulmonary embolism. Main pulmonary artery is normal in caliber. Mediastinum: No evidence of mediastinal lymphadenopathy. The heart and pericardium demonstrate no acute abnormality. There is no acute abnormality of the thoracic aorta. Coronary artery disease. Cardiomegaly. Lungs/pleura: 6 mm right middle lobe nodule adjacent to the pericardium on image number 71 right middle lobe consolidations. Subsegmental atelectasis bilaterally. Upper Abdomen: Limited images of the upper abdomen are unremarkable. Soft Tissues/Bones: Moderate dextroconvex scoliosis thoracolumbar spine.  Multiple compression fractures T7 through L1. Right middle lobe airspace disease. 6 mm pleural based pulmonary nodule right middle lobe. Coronary artery disease. Multiple compression fractures age indeterminate. RECOMMENDATIONS: Fleischner Society guidelines for follow-up and management of incidentally detected pulmonary nodules: Single Solid Nodule: Nodule size equals 6-8 mm In a low-risk patient, CT at 6-12 months, then consider CT at 18-24 months. In a high-risk patient, CT at 6-12 months, then CT at 18-24 months. . - Low risk patients include individuals with minimal or absent history of smoking and other known risk factors. - High risk patients include individuals with a history or smoking or known risk factors. Radiology 2017 http://pubs. rsna.org/doi/full/10.1148/radiol. 4565203023       Prince Padilla DO  5/16/22, 7:10 AM             Trauma Attending Attestation      I have reviewed the above [facility] note(s) and confirmed the key elements of the medical history and physical exam. I have seen and examined the pt. I have discussed the findings, established the care plan and recommendations with Resident.   Urology took for stent placement today   NS following - possible MRI   Will need PT/OT once cleared by ROSALIO lFores DO  5/16/2022  11:01 AM

## 2022-05-16 NOTE — PROGRESS NOTES
respiratory effort on room air  Cardiac: regular rate  Abdomen: soft, nontender, nondistended  Extremities: moves all extremities  Psych: appropriate mood     Interval Imaging Findings:    Impression:    64year old female   Active  problem list  Obstructing 7 mm left proximal ureteral stone with mild hydronephrosis  Left nonobstructing nephroliths  UTI     Plan:   NPO  IVF  UA showed positive nitrites and leuk esterase, follow up urine culture  Continue Rocephin   Flomax  Sood in place, maintain sood catheter, will plan for void trial once patient is more alert, ambulating, and having regular bowel movements  Will plan for cystoscopy, left stent placement today     Sandra Miller MD  7:15 AM 5/16/2022

## 2022-05-16 NOTE — PROGRESS NOTES
POST OP NOTE    SUBJECTIVE  Pt s/p cystoscopy with left ureteral stent placement. No acute complaints. Patient states that she feels well. Denies any pain, chest pain, shortness of breath, lightheadedness. OBJECTIVE  VITALS:  /64   Pulse 75   Temp 97.6 °F (36.4 °C) (Oral)   Resp 14   Ht 5' 7\" (1.702 m)   Wt 135 lb (61.2 kg)   SpO2 94%   BMI 21.14 kg/m²         GENERAL: Well-appearing, no acute distress, oriented to self but not time place or events  CARDIOVASCULAR: Regular rate and rhythm  LUNGS: Equal chest rise and fall, speaking full sentences, no excess or muscle usage  ABDOMEN:   Soft, nontender, no rebound or guarding  INCISION: No incision    ASSESSMENT  1.  POD# 0 s/p cystoscopy w/ uretal stent placement     PLAN  Please see most recent progress note      Lizzie Owen DO  Trauma/Surgery Service  5/16/2022 at 1:46 PM

## 2022-05-16 NOTE — CONSULTS
Attestation signed by      Attending Physician Statement:    I have discussed the care of  Dennis Reyes , including pertinent history and exam findings, with the Cardiology fellow/resident. I have seen and examined the patient and the key elements of all parts of the encounter have been performed by me. I agree with the assessment, plan and orders as documented by the fellow/resident, after I modified exam findings and plan of treatments, and the final version is my approved version of the assessment. Additional Comments:   68-year-old pleasant female pleasantly confused history of Alzheimer patient daughter is in the room patient came with a history of syncopal no witnesses  It could be multifactorial vasovagal orthostatic history of multiple compression fracture in the thoracic and lumbar spine can also cause pain weightbearing abnormalities. Elevated tropes possible type II no chest pain no pressures EKG incomplete LBBB with first-degree heart block no change since 2015  Clinical history is doing fine  We will do echocardiogram if it is within normal limits we will take conservatively if it is quite abnormal then we will talk with the daughter for further monitoring test thank you this Dr. Juan NG 2. Cardiology Cardiology    Consult / H&P               Today's Date: 5/16/2022  Patient Name: Dennis Reyes  Date of admission: 5/15/2022  4:08 PM  Patient's age: 64 y.o., 1945  Admission Dx: NSTEMI (non-ST elevated myocardial infarction) ([facility] [address] [I21.4]  Facial laceration, initial encounter [S01.81XA]  Closed head injury, initial encounter [S09.90XA]  Closed compression fracture of L1 lumbar vertebra, initial encounter ([facility] [address] [facility]    Reason for Consult:  Cardiac evaluation    Requesting Physician: Bryan Betancourt DO    CHIEF COMPLAINT:  S/p fall    History Obtained From:  patient, electronic medical record    HISTORY OF PRESENT ILLNESS:      The patient is a 64 y.o.  female who is admitted to the hospital for multiple compression fracture in the thoracic and lumbar region. Cardiology is consulted for elevated troponin. Patient had an unwitnessed fall at home. He has baseline dementia, history was largely obtained from EMR. Unsure if there was any loss of consciousness. Patient sustained head laceration from fall. He denies chest pain, shortness of breath, palpitations, lightheadedness, nausea or vomiting. Patient has a PMH significant of dementia, history of seizure, history of DVT(completed treatment with Xarelto), HLD, and Parkinson's disease. Significant labs,  Troponin 49-->47-->40  ,   K 3.4, Cr 0.83  UA- positive for UTI    CT Head 5/15:   Impression   No acute intracranial abnormality.       Encephalomalacia likely related to prior left MCA distribution stroke. Otherwise chronic microvascular disease. CXR 5/15:   Impression   Mild dependent changes at the right lateral lung base, possibly small   effusion and/or atelectasis.  Otherwise unremarkable chest.     CT thoracic/lumbar/cervical spine 5/15/22:   Impression   Cervical CT: No acute osseous abnormality.  No acute fracture.       CT of thoracic and lumbar spine:       Multiple compression fractures throughout the thoracic and lumbar spine. Some of these fractures have chronic appearance such as superior endplate   compression fracture T12, T10 and T7, L2 and L3 with 20%, 20%, 30%, 30% and   50% height loss respectively.       However superior endplate compression fracture of L1 with 5 mm retropulsion   into the spinal canal is age indeterminate and may be acute in etiology.    There is associated moderate canal stenosis at T12-L1.  For further   evaluation of this fracture either comparison with prior examination or   lumbar spine MRI can be obtained.       Moderate levoscoliosis of upper to midthoracic spine and mild dextroscoliosis   of thoracolumbar junction       Partial visualization of 7 mm obstructing stone of the left proximal ureter   which is associated with mild left-sided hydroureteronephrosis.  Multiple   nonobstructing kidney stones are also noted bilaterally.       RECOMMENDATIONS:   Unavailable           CT Chest PE: 5/15/22  Impression   Right middle lobe airspace disease.  6 mm pleural based pulmonary nodule   right middle lobe.  Coronary artery disease.  Multiple compression fractures   age indeterminate.       RECOMMENDATIONS:   Fleischner Society guidelines for follow-up and management of incidentally   detected pulmonary nodules:       Single Solid Nodule:       Nodule size equals 6-8 mm   In a low-risk patient, CT at 6-12 months, then consider CT at 18-24 months. In a high-risk patient, CT at 6-12 months, then CT at 18-24 months.       .       - Low risk patients include individuals with minimal or absent history of   smoking and other known risk factors.       - High risk patients include individuals with a history or smoking or known   risk factors.       Radiology 2017 http://pubs. rsna.org/doi/full/10.1148/radiol. 8242526733       Cardiac history:     Stress test: 3/12/2012:  IMPRESSION:   1) NORMAL SPECT MPI WITH VISUAL AND QUANTITATIVE ANALYSIS. 2) NO REVERSIBLE PERFUSION DEFECT (ISCHEMIA). 3) NO FIXED PERFUSION DEFECT (INFARCT AND/OR HIBERNATION). 4) NORMAL LVEF 74%.     5) NORMAL LV WALL MOTION. 6) NORMAL LV VOLUME. 7) IMAGE QUALITY FAIR. No Echo or cardiac cath on Epic    Past Medical History:   has a past medical history of Anxiety, Convulsion ([address] Encephalopathy, Herpes, Hyperlipidemia, Left bundle branch block, MRSA (methicillin resistant Staphylococcus aureus), Parkinson's disease ([address] Seizures ([address] and Vitamin D deficiency. Past Surgical History:   has a past surgical history that includes Abscess Drainage (Right, 12/14/2013). Home Medications:    Prior to Admission medications    Medication Sig Start Date End Date Taking? Authorizing Provider   aspirin 81 MG EC tablet Take 81 mg by mouth daily    Historical Provider, MD   carBAMazepine (TEGRETOL) 200 MG tablet TAKE 1 AND 1/2 TABLETS Am, 2 tabs bedtime 11/17/21   Sravani Wu DO   FLUoxetine (PROZAC) 20 MG capsule 2 po qd 11/17/21   Sravani Wu DO   mirabegron (MYRBETRIQ) 50 MG TB24 Take 50 mg by mouth daily 10/29/21   Sravani Wu DO   acetaminophen (TYLENOL) 500 MG tablet Take 500 mg by mouth every 6 hours as needed for Pain    Historical Provider, MD   desmopressin (DDAVP) 0.2 MG tablet TAKE 3 TABLETS BY MOUTH IN THE EVENING   Patient not taking: Reported on 9/8/2021 6/1/21   Radha Wu DO   Handicap Placard MISC by Does not apply route Permanent one year  Dx fractured humerus  Patient not taking: Reported on 5/20/2020 11/14/19   Maddison Aragon DO   rivaroxaban (XARELTO STARTER PACK) 15 & 20 MG Starter Pack Follow package instructions  Patient not taking: Reported on 5/20/2020 10/17/19   Don Oro MD      Current Facility-Administered Medications: [MAR Hold] cefTRIAXone (ROCEPHIN) 1,000 mg in sterile water 10 mL IV syringe, 1,000 mg, IntraVENous, Q24H  lidocaine PF 1 % injection 1 mL, 1 mL, IntraDERmal, Once PRN  lactated ringers infusion, , IntraVENous, Continuous  sodium chloride flush 0.9 % injection 5-40 mL, 5-40 mL, IntraVENous, 2 times per day  sodium chloride flush 0.9 % injection 5-40 mL, 5-40 mL, IntraVENous, PRN  0.9 % sodium chloride infusion, , IntraVENous, PRN  midazolam PF (VERSED) injection 1 mg, 1 mg, IntraVENous, Q10 Min PRN  sodium chloride 0.9 % irrigation, , , Continuous PRN  sterile water for irrigation, , , PRN  [MAR Hold] tamsulosin (FLOMAX) capsule 0.4 mg, 0.4 mg, Oral, Daily  [MAR Hold] 0.9 % sodium chloride infusion, , IntraVENous, Continuous  [MAR Hold] sodium chloride flush 0.9 % injection 5-40 mL, 5-40 mL, IntraVENous, 2 times per day  [facility] sodium chloride flush 0.9 % injection 5-40 mL, 5-40 mL, IntraVENous, PRN  [MAR Hold] 0.9 % sodium chloride infusion, , IntraVENous, PRN  [MAR Hold] ondansetron (ZOFRAN-ODT) disintegrating tablet 4 mg, 4 mg, Oral, Q8H PRN **OR** [MAR Hold] ondansetron (ZOFRAN) injection 4 mg, 4 mg, IntraVENous, Q6H PRN  [MAR Hold] polyethylene glycol (GLYCOLAX) packet 17 g, 17 g, Oral, Daily  [MAR Hold] acetaminophen (TYLENOL) tablet 1,000 mg, 1,000 mg, Oral, 3 times per day  Facility-Administered Medications Ordered in Other Encounters: fentaNYL (SUBLIMAZE) injection, , IntraVENous, PRN  lidocaine PF 1 % injection, , IntraVENous, PRN  propofol injection, , IntraVENous, PRN    Allergies:  Haldol [haloperidol lactate]    Social History:   reports that she quit smoking about 23 years ago. Her smoking use included cigarettes. She started smoking about 62 years ago. She has a 30.00 pack-year smoking history. She has never used smokeless tobacco. She reports that she does not drink alcohol and does not use drugs. Family History: family history is not on file. No h/o sudden cardiac death. No for premature CAD    REVIEW OF SYSTEMS:    · Unable to assess due to dementia       PHYSICAL EXAM:      BP (!) 112/57   Pulse 78   Temp 98.4 °F (36.9 °C) (Oral)   Resp 18   Ht 5' 7\" (1.702 m)   Wt 135 lb (61.2 kg)   SpO2 92%   BMI 21.14 kg/m²    Constitutional and General Appearance: alert, cooperative, no distress and appears stated age  HEENT: PERRL, no cervical lymphadenopathy. No masses palpable. Normal oral mucosa  Respiratory:  · Normal excursion and expansion without use of accessory muscles  · Resp Auscultation: Good respiratory effort. No for increased work of breathing.  On auscultation: clear to auscultation bilaterally  Cardiovascular:  · The apical impulse is not displaced  · Heart tones are crisp and normal. regular S1 and S2.  · Jugular venous pulsation Normal  · The carotid upstroke is normal in amplitude and contour without delay or bruit  · Peripheral pulses are symmetrical and full   Abdomen:   · No masses or tenderness  · Bowel sounds present  Extremities:  ·  No Cyanosis or Clubbing  ·  Lower extremity edema: No  ·  Skin: Warm and dry  Neurological:  · Alert and oriented. · Moves all extremities well  · No abnormalities of mood, affect, memory, mentation, or behavior are noted    DATA:    Diagnostics:    EKG: normal sinus rhythm with 1st degree AV block, incomplete LBBB  ECHO: ordered, but not yet obtained. Stress Test: reviewed. Cardiac Angiography: not obtained. Labs:     CBC:   Recent Labs     05/15/22  1625   WBC 7.4   HGB 13.6   HCT 40.5        BMP:   Recent Labs     05/15/22  1625      K 3.4*   CO2 24   BUN 15   CREATININE 0.83   LABGLOM >60   GLUCOSE 114*     BNP: No results for input(s): BNP in the last 72 hours. PT/INR: No results for input(s): PROTIME, INR in the last 72 hours. APTT:No results for input(s): APTT in the last 72 hours. CARDIAC ENZYMES:No results for input(s): CKTOTAL, CKMB, CKMBINDEX, TROPONINI in the last 72 hours. FASTING LIPID PANEL:  Lab Results   Component Value Date    HDL 70 03/05/2021    LDLCALC 127 01/05/2016    TRIG 109 03/05/2021     LIVER PROFILE:  Recent Labs     05/15/22  1625   AST 15   ALT 9   LABALBU 3.8       IMPRESSION:    Patient Active Problem List   Diagnosis    Seizure ([facility] [address]    Vitamin D deficiency    Anxiety    Hypercholesteremia    Cellulitis of right leg    MRSA (methicillin resistant staph aureus) culture positive    Breakthrough seizure ([facility] [address]    Memory loss    Difficulty speaking    Seizure disorder ([facility] [address]    Dementia with behavioral disturbance ([facility] [address]    History of encephalitis    Dementia due to Parkinson's disease with behavioral disturbance ([facility] [address]    Acute deep vein thrombosis (DVT) of proximal vein of left lower extremity (HCC)    Closed compression fracture of L1 lumbar vertebra, initial encounter ([facility]     ASSESSMENT  1. Elevated troponins- down-trending now (Troponin 49-->47-->40)  2.  Multiple compression fracture- thoracic and lumbar spine- undetermined age  1. Obstructing left proximal ureteral stone with hydronephrosis   4. Hx of DVT  5. Hx of seizures  6. HLD  7. UTI  8. Parkinson's disease      RECOMMENDATIONS:  1. Echo ordered,will follow up. 2. Further ischemic workup after Echo results inpatient Vs. Outpatient. 3. Plan for cystoscopy with ureteral stent per urology. 4. Rest of the management per primary team.       Discussed with patient and Nurse.     Electronically signed by Lex Martinez MD on 5/16/2022 at 7:15 AM

## 2022-05-16 NOTE — PROGRESS NOTES
Physical Therapy        Physical Therapy Cancel Note      DATE: 2022    NAME: Carlos Rowan  MRN: [id number]   : 1945      Patient not seen this date for Physical Therapy due to:    Strict Bedrest:      Electronically signed by Rosalia Mark PT on 2022 at 7:40 AM

## 2022-05-16 NOTE — PROCEDURES
PROCEDURE NOTE - LACERATION CLOSURE    PATIENT NAME: Ciara Bateman RECORD NO. [id number]  DATE: 5/15/2022  SURGEON: Dr Tenisha Ray / Eber Díaz MD  PRIMARY CARE PHYSICIAN: Gloria Barrera DO    PREOPERATIVE DIAGNOSIS: Laceration(s) as follows:   LOCATION: Left temporal region   LENGTH: 2 cm   LAYERED CLOSURE: No    POSTOPERATIVE DIAGNOSIS:  Same  PROCEDURE PERFORMED:  Suture closure of laceration  ANESTHESIA:  Local utilizing  Lidocaine 1% without epinephrine  ESTIMATED BLOOD LOSS:  Less than 25 ml. COMPLICATIONS:  None immediately appreciated. OPERATIVE NOTE PREPARED BY: Eber Díaz MD     DISCUSSION:  Eron Donaldson is a 64y.o.-year-old female. The history and physical examination were reviewed and confirmed. The diagnoses, proposed procedure, risks, possible complications, benefits and alternatives were discussed with the patient or family. She was given the opportunity to ask questions, and once answered, informed consent was obtained. The patient was then prepared for the procedure. PROCEDURE:  A timeout was initiated and the procedure and patient were confirmed by those present. The wound area was irrigated with sterile saline, cleansed with povidone iodine and draped in a sterile fashion. The wound area was anesthetized with Lidocaine 1% without epinephrine without added sodium bicarbonate. The wound was repaired with 5-0 Prolene; 6 sutures were used. No immediate complication was evident. All sponge, instrument and needle counts were correct at the completion of the procedure. Eber Díaz MD  5/15/22, 8:57 PM         Trauma Attending [facility]      I have reviewed the above GCS note(s) and confirmed the key elements of the medical history and physical exam. I have seen and examined the pt. I have discussed the findings, established the care plan and recommendations with Resident.         Maryann Watson DO  5/16/2022  6:43 AM

## 2022-05-16 NOTE — ANESTHESIA PRE PROCEDURE
Department of Anesthesiology  Preprocedure Note       Name:  Rl Benites   Age:  64 y.o.  :  1945                                          MRN:  [id number]         Date:  2022      Surgeon: Renee Dubois):  Juliano Oleary MD    Procedure: Procedure(s):  CYSTOSCOPY URETERAL STENT INSERTION    Medications prior to admission:   Prior to Admission medications    Medication Sig Start Date End Date Taking?  Authorizing Provider   aspirin 81 MG EC tablet Take 81 mg by mouth daily    Historical Provider, MD   carBAMazepine (TEGRETOL) 200 MG tablet TAKE 1 AND 1/2 TABLETS Am, 2 tabs bedtime 21   Sravani Wu,    FLUoxetine (PROZAC) 20 MG capsule 2 po qd 21   Sravani Wu, DO   mirabegron (MYRBETRIQ) 50 MG TB24 Take 50 mg by mouth daily 10/29/21   Sravani Wu DO   acetaminophen (TYLENOL) 500 MG tablet Take 500 mg by mouth every 6 hours as needed for Pain    Historical Provider, MD   desmopressin (DDAVP) 0.2 MG tablet TAKE 3 TABLETS BY MOUTH IN THE EVENING   Patient not taking: Reported on 2021   Margy Wu,    Handicap Placard MISC by Does not apply route Permanent one year  Dx fractured humerus  Patient not taking: Reported on 19   Sachin Padilla DO   rivaroxaban (XARELTO STARTER PACK) 15 & 20 MG Starter Pack Follow package instructions  Patient not taking: Reported on 2020 10/17/19   Ember Bhandari MD       Current medications:    Current Facility-Administered Medications   Medication Dose Route Frequency Provider Last Rate Last Admin    cefTRIAXone (ROCEPHIN) 1,000 mg in sterile water 10 mL IV syringe  1,000 mg IntraVENous Q24H Jessica Pires MD   1,000 mg at 22 0634    tamsulosin (FLOMAX) capsule 0.4 mg  0.4 mg Oral Daily Issac Gann MD   0.4 mg at 05/15/22 2319    0.9 % sodium chloride infusion   IntraVENous Continuous Issac Gann  mL/hr at 22 0649 Rate Verify at 22 0649    sodium chloride flush 0.9 % injection 5-40 mL 5-40 mL IntraVENous 2 times per day Demetra Bridges MD   10 mL at 05/15/22 2255    sodium chloride flush 0.9 % injection 5-40 mL  5-40 mL IntraVENous PRN Demetra Bridges MD        0.9 % sodium chloride infusion   IntraVENous PRN Demetra Bridges MD        ondansetron (ZOFRAN-ODT) disintegrating tablet 4 mg  4 mg Oral Q8H PRN Demetra Bridges MD        Or    ondansetron [facility] injection 4 mg  4 mg IntraVENous Q6H PRN Demetra Bridges MD        polyethylene glycol [facility]) packet 17 g  17 g Oral Daily Demetra Bridges MD        acetaminophen (TYLENOL) tablet 1,000 mg  1,000 mg Oral 3 times per day Demetra Bridges MD   1,000 mg at 05/16/22 0502       Allergies:     Allergies   Allergen Reactions    Haldol [Haloperidol Lactate]        Problem List:    Patient Active Problem List   Diagnosis Code    Seizure ([facility] [address] R56.9    Vitamin D deficiency E55.9    Anxiety F41.9    Hypercholesteremia E78.00    Cellulitis of right leg L03.115    MRSA (methicillin resistant staph aureus) culture positive Z22.322    Breakthrough seizure ([address] G40.919    Memory loss R41.3    Difficulty speaking R47.9    Seizure disorder ([address] G40.909    Dementia with behavioral disturbance ([address] F03.91    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    Dementia due to Parkinson's disease with behavioral disturbance ([address] G20, F02.81    Acute deep vein thrombosis (DVT) of proximal vein of left lower extremity (HCC) I82.4Y2    Closed compression fracture of L1 lumbar vertebra, initial encounter ([address] S32.010A       Past Medical History:        Diagnosis Date    Anxiety     Convulsion (HCC)     Encephalopathy     Herpes     Hyperlipidemia     Left bundle branch block     MRSA (methicillin resistant Staphylococcus aureus)     Parkinson's disease ([address]     Seizures ([address]     secondary to encephalitis    Vitamin D deficiency        Past Surgical History:        Procedure Laterality Date    ABSCESS DRAINAGE Right 2013    WOUND EXPLORATION AND I+D  RIGHT HIP       Social History:    Social History     Tobacco Use    Smoking status: Former Smoker     Packs/day: 1.00     Years: 30.00     Pack years: 30.00     Types: Cigarettes     Start date: 54     Quit date:      Years since quittin.3    Smokeless tobacco: Never Used    Tobacco comment: unknown how many packs a day, or how many years   Substance Use Topics    Alcohol use: No                                Counseling given: Not Answered  Comment: unknown how many packs a day, or how many years      Vital Signs (Current):   Vitals:    05/15/22 2129 05/15/22 2145 22 0045 22 0659   BP:  127/61  (!) 11257   Pulse: 84 74  78   Resp:    Temp:  98.1 °F (36.7 °C)  98.4 °F (36.9 °C)   TempSrc:  Oral  Oral   SpO2: 91% 98%  92%   Weight:   135 lb (61.2 kg)    Height:   5' 7\" (1.702 m)                                               BP Readings from Last 3 Encounters:   22 (!) 112/57   22 122/64   21 (!) 105/58       NPO Status: Time of last liquid consumption: 0                        Time of last solid consumption:                                                       BMI:   Wt Readings from Last 3 Encounters:   22 135 lb (61.2 kg)   22 135 lb 9.6 oz (61.5 kg)   21 133 lb (60.3 kg)     Body mass index is 21.14 kg/m².     CBC:   Lab Results   Component Value Date    WBC 7.3 2022    RBC 3.89 2022    RBC 4.07 2012    HGB 12.4 2022    HCT 37.9 2022    MCV 97.4 2022    RDW 13.3 2022     2022     2012       CMP:   Lab Results   Component Value Date     05/15/2022    K 3.4 05/15/2022     05/15/2022    CO2 24 05/15/2022    BUN 15 05/15/2022    CREATININE 0.83 05/15/2022    GFRAA >60 05/15/2022    LABGLOM >60 05/15/2022    GLUCOSE 114 05/15/2022    GLUCOSE 93 2012    PROT 7.1 05/15/2022    CALCIUM 8.9 05/15/2022    BILITOT 0.29 05/15/2022    ALKPHOS 184 05/15/2022    AST 15 05/15/2022    ALT 9 05/15/2022       POC Tests: No results for input(s): POCGLU, POCNA, POCK, POCCL, POCBUN, POCHEMO, POCHCT in the last 72 hours. Coags:   Lab Results   Component Value Date    PROTIME 10.0 09/07/2018    INR 1.0 09/07/2018    APTT 26.1 09/07/2018       HCG (If Applicable): No results found for: PREGTESTUR, PREGSERUM, HCG, HCGQUANT     ABGs: No results found for: PHART, PO2ART, TIU6QJN, EVK0JZU, BEART, I3FOARIJ     Type & Screen (If Applicable):  No results found for: LABABO, LABRH    Drug/Infectious Status (If Applicable):  No results found for: HIV, HEPCAB    COVID-19 Screening (If Applicable):   Lab Results   Component Value Date    COVID19 Not Detected 05/16/2022           Anesthesia Evaluation  Patient summary reviewed no history of anesthetic complications:   Airway: Mallampati: II  TM distance: >3 FB   Neck ROM: full  Mouth opening: > = 3 FB Dental:    (+) edentulous      Pulmonary:Negative Pulmonary ROS and normal exam                               Cardiovascular:Negative CV ROS            Rhythm: regular  Rate: normal                    Neuro/Psych:   (+) psychiatric history:depression/anxiety             GI/Hepatic/Renal: Neg GI/Hepatic/Renal ROS            Endo/Other: Negative Endo/Other ROS                    Abdominal:             Vascular: negative vascular ROS. Other Findings:             Anesthesia Plan      MAC     ASA 2       Induction: intravenous. Anesthetic plan and risks discussed with patient. Plan discussed with CRNA.                   Suasn Green MD   5/16/2022

## 2022-05-17 PROBLEM — N32.81 OVERACTIVE BLADDER: Status: ACTIVE | Noted: 2022-05-17

## 2022-05-17 PROBLEM — F32.A DEPRESSION: Status: ACTIVE | Noted: 2020-08-15

## 2022-05-17 PROBLEM — N39.46 MIXED INCONTINENCE: Status: ACTIVE | Noted: 2022-05-17

## 2022-05-17 PROBLEM — N20.0 KIDNEY STONE: Status: ACTIVE | Noted: 2022-05-17

## 2022-05-17 LAB
ABSOLUTE EOS #: 0.27 K/UL (ref 0–0.44)
ABSOLUTE IMMATURE GRANULOCYTE: <0.03 K/UL (ref 0–0.3)
ABSOLUTE LYMPH #: 2.03 K/UL (ref 1.1–3.7)
ABSOLUTE MONO #: 1.26 K/UL (ref 0.1–1.2)
ANION GAP SERPL CALCULATED.3IONS-SCNC: 14 MMOL/L (ref 9–17)
ANION GAP SERPL CALCULATED.3IONS-SCNC: 9 MMOL/L (ref 9–17)
BASOPHILS # BLD: 1 % (ref 0–2)
BASOPHILS ABSOLUTE: 0.06 K/UL (ref 0–0.2)
BUN BLDV-MCNC: 11 MG/DL (ref 8–23)
CALCIUM SERPL-MCNC: 8.5 MG/DL (ref 8.6–10.4)
CHLORIDE BLD-SCNC: 107 MMOL/L (ref 98–107)
CHLORIDE BLD-SCNC: 108 MMOL/L (ref 98–107)
CO2: 21 MMOL/L (ref 20–31)
CO2: 22 MMOL/L (ref 20–31)
CREAT SERPL-MCNC: 0.8 MG/DL (ref 0.5–0.9)
EKG ATRIAL RATE: 100 BPM
EKG ATRIAL RATE: 138 BPM
EKG P AXIS: 64 DEGREES
EKG P-R INTERVAL: 224 MS
EKG Q-T INTERVAL: 320 MS
EKG Q-T INTERVAL: 380 MS
EKG QRS DURATION: 108 MS
EKG QRS DURATION: 114 MS
EKG QTC CALCULATION (BAZETT): 490 MS
EKG QTC CALCULATION (BAZETT): 493 MS
EKG R AXIS: 50 DEGREES
EKG R AXIS: 58 DEGREES
EKG T AXIS: -145 DEGREES
EKG T AXIS: -177 DEGREES
EKG VENTRICULAR RATE: 100 BPM
EKG VENTRICULAR RATE: 143 BPM
EOSINOPHILS RELATIVE PERCENT: 3 % (ref 1–4)
GFR AFRICAN AMERICAN: >60 ML/MIN
GFR NON-AFRICAN AMERICAN: >60 ML/MIN
GFR SERPL CREATININE-BSD FRML MDRD: ABNORMAL ML/MIN/{1.73_M2}
GLUCOSE BLD-MCNC: 100 MG/DL (ref 70–99)
HCT VFR BLD CALC: 34.8 % (ref 36.3–47.1)
HCT VFR BLD CALC: 36.1 % (ref 36.3–47.1)
HEMOGLOBIN: 12 G/DL (ref 11.9–15.1)
HEMOGLOBIN: 12 G/DL (ref 11.9–15.1)
IMMATURE GRANULOCYTES: 0 %
LV EF: 55 %
LVEF MODALITY: NORMAL
LYMPHOCYTES # BLD: 25 % (ref 24–43)
MAGNESIUM: 2 MG/DL (ref 1.6–2.6)
MCH RBC QN AUTO: 32.1 PG (ref 25.2–33.5)
MCH RBC QN AUTO: 33.1 PG (ref 25.2–33.5)
MCHC RBC AUTO-ENTMCNC: 33.2 G/DL (ref 28.4–34.8)
MCHC RBC AUTO-ENTMCNC: 34.5 G/DL (ref 28.4–34.8)
MCV RBC AUTO: 96.1 FL (ref 82.6–102.9)
MCV RBC AUTO: 96.5 FL (ref 82.6–102.9)
MONOCYTES # BLD: 15 % (ref 3–12)
NRBC AUTOMATED: 0 PER 100 WBC
NRBC AUTOMATED: 0 PER 100 WBC
PDW BLD-RTO: 13.3 % (ref 11.8–14.4)
PDW BLD-RTO: 13.4 % (ref 11.8–14.4)
PLATELET # BLD: 344 K/UL (ref 138–453)
PLATELET # BLD: 374 K/UL (ref 138–453)
PMV BLD AUTO: 8.5 FL (ref 8.1–13.5)
PMV BLD AUTO: 8.9 FL (ref 8.1–13.5)
POTASSIUM SERPL-SCNC: 3.5 MMOL/L (ref 3.7–5.3)
POTASSIUM SERPL-SCNC: 3.5 MMOL/L (ref 3.7–5.3)
RBC # BLD: 3.62 M/UL (ref 3.95–5.11)
RBC # BLD: 3.74 M/UL (ref 3.95–5.11)
SEG NEUTROPHILS: 56 % (ref 36–65)
SEGMENTED NEUTROPHILS ABSOLUTE COUNT: 4.64 K/UL (ref 1.5–8.1)
SODIUM BLD-SCNC: 137 MMOL/L (ref 135–144)
SODIUM BLD-SCNC: 144 MMOL/L (ref 135–144)
TROPONIN, HIGH SENSITIVITY: 41 NG/L (ref 0–14)
TROPONIN, HIGH SENSITIVITY: 44 NG/L (ref 0–14)
WBC # BLD: 8.3 K/UL (ref 3.5–11.3)
WBC # BLD: 9.4 K/UL (ref 3.5–11.3)

## 2022-05-17 PROCEDURE — 2580000003 HC RX 258: Performed by: STUDENT IN AN ORGANIZED HEALTH CARE EDUCATION/TRAINING PROGRAM

## 2022-05-17 PROCEDURE — 6360000002 HC RX W HCPCS: Performed by: STUDENT IN AN ORGANIZED HEALTH CARE EDUCATION/TRAINING PROGRAM

## 2022-05-17 PROCEDURE — 51798 US URINE CAPACITY MEASURE: CPT

## 2022-05-17 PROCEDURE — 6360000002 HC RX W HCPCS

## 2022-05-17 PROCEDURE — 93010 ELECTROCARDIOGRAM REPORT: CPT | Performed by: INTERNAL MEDICINE

## 2022-05-17 PROCEDURE — 76937 US GUIDE VASCULAR ACCESS: CPT

## 2022-05-17 PROCEDURE — 97162 PT EVAL MOD COMPLEX 30 MIN: CPT

## 2022-05-17 PROCEDURE — 2580000003 HC RX 258: Performed by: ANESTHESIOLOGY

## 2022-05-17 PROCEDURE — 85025 COMPLETE CBC W/AUTO DIFF WBC: CPT

## 2022-05-17 PROCEDURE — 2060000000 HC ICU INTERMEDIATE R&B

## 2022-05-17 PROCEDURE — 97166 OT EVAL MOD COMPLEX 45 MIN: CPT

## 2022-05-17 PROCEDURE — 97530 THERAPEUTIC ACTIVITIES: CPT

## 2022-05-17 PROCEDURE — 6370000000 HC RX 637 (ALT 250 FOR IP): Performed by: STUDENT IN AN ORGANIZED HEALTH CARE EDUCATION/TRAINING PROGRAM

## 2022-05-17 PROCEDURE — 93005 ELECTROCARDIOGRAM TRACING: CPT | Performed by: STUDENT IN AN ORGANIZED HEALTH CARE EDUCATION/TRAINING PROGRAM

## 2022-05-17 PROCEDURE — 80051 ELECTROLYTE PANEL: CPT

## 2022-05-17 PROCEDURE — 84484 ASSAY OF TROPONIN QUANT: CPT

## 2022-05-17 PROCEDURE — 99222 1ST HOSP IP/OBS MODERATE 55: CPT | Performed by: STUDENT IN AN ORGANIZED HEALTH CARE EDUCATION/TRAINING PROGRAM

## 2022-05-17 PROCEDURE — 36415 COLL VENOUS BLD VENIPUNCTURE: CPT

## 2022-05-17 PROCEDURE — 2500000003 HC RX 250 WO HCPCS: Performed by: STUDENT IN AN ORGANIZED HEALTH CARE EDUCATION/TRAINING PROGRAM

## 2022-05-17 PROCEDURE — 85027 COMPLETE CBC AUTOMATED: CPT

## 2022-05-17 PROCEDURE — 2500000003 HC RX 250 WO HCPCS

## 2022-05-17 PROCEDURE — 93306 TTE W/DOPPLER COMPLETE: CPT

## 2022-05-17 PROCEDURE — 83735 ASSAY OF MAGNESIUM: CPT

## 2022-05-17 PROCEDURE — 80048 BASIC METABOLIC PNL TOTAL CA: CPT

## 2022-05-17 PROCEDURE — 93005 ELECTROCARDIOGRAM TRACING: CPT | Performed by: SURGERY

## 2022-05-17 RX ORDER — MAGNESIUM SULFATE HEPTAHYDRATE 40 MG/ML
4000 INJECTION, SOLUTION INTRAVENOUS ONCE
Status: COMPLETED | OUTPATIENT
Start: 2022-05-17 | End: 2022-05-17

## 2022-05-17 RX ORDER — CARBAMAZEPINE 200 MG/1
400 TABLET ORAL NIGHTLY
Status: DISCONTINUED | OUTPATIENT
Start: 2022-05-17 | End: 2022-05-19 | Stop reason: HOSPADM

## 2022-05-17 RX ORDER — METOPROLOL TARTRATE 5 MG/5ML
INJECTION INTRAVENOUS
Status: COMPLETED
Start: 2022-05-17 | End: 2022-05-17

## 2022-05-17 RX ORDER — METOPROLOL SUCCINATE 25 MG/1
25 TABLET, EXTENDED RELEASE ORAL DAILY
Status: DISCONTINUED | OUTPATIENT
Start: 2022-05-17 | End: 2022-05-19 | Stop reason: HOSPADM

## 2022-05-17 RX ORDER — ADENOSINE 3 MG/ML
INJECTION, SOLUTION INTRAVENOUS
Status: COMPLETED
Start: 2022-05-17 | End: 2022-05-17

## 2022-05-17 RX ORDER — ADENOSINE 3 MG/ML
6 INJECTION, SOLUTION INTRAVENOUS ONCE
Status: COMPLETED | OUTPATIENT
Start: 2022-05-17 | End: 2022-05-17

## 2022-05-17 RX ORDER — CARBAMAZEPINE 200 MG/1
300 TABLET ORAL EVERY MORNING
Status: DISCONTINUED | OUTPATIENT
Start: 2022-05-18 | End: 2022-05-19 | Stop reason: HOSPADM

## 2022-05-17 RX ORDER — SODIUM CHLORIDE, SODIUM LACTATE, POTASSIUM CHLORIDE, AND CALCIUM CHLORIDE .6; .31; .03; .02 G/100ML; G/100ML; G/100ML; G/100ML
500 INJECTION, SOLUTION INTRAVENOUS ONCE
Status: COMPLETED | OUTPATIENT
Start: 2022-05-17 | End: 2022-05-17

## 2022-05-17 RX ORDER — ADENOSINE 3 MG/ML
12 INJECTION, SOLUTION INTRAVENOUS ONCE
Status: COMPLETED | OUTPATIENT
Start: 2022-05-17 | End: 2022-05-17

## 2022-05-17 RX ADMIN — CARBAMAZEPINE 400 MG: 200 TABLET ORAL at 21:55

## 2022-05-17 RX ADMIN — ADENOSINE: 3 INJECTION, SOLUTION INTRAVENOUS at 15:42

## 2022-05-17 RX ADMIN — ADENOSINE 6 MG: 3 INJECTION, SOLUTION INTRAVENOUS at 16:08

## 2022-05-17 RX ADMIN — METOPROLOL SUCCINATE 25 MG: 25 TABLET, FILM COATED, EXTENDED RELEASE ORAL at 18:57

## 2022-05-17 RX ADMIN — SODIUM CHLORIDE, POTASSIUM CHLORIDE, SODIUM LACTATE AND CALCIUM CHLORIDE: 600; 310; 30; 20 INJECTION, SOLUTION INTRAVENOUS at 03:56

## 2022-05-17 RX ADMIN — ADENOSINE: 3 INJECTION INTRAVENOUS at 15:42

## 2022-05-17 RX ADMIN — AMIODARONE HYDROCHLORIDE 0.5 MG/MIN: 50 INJECTION, SOLUTION INTRAVENOUS at 23:18

## 2022-05-17 RX ADMIN — METOPROLOL TARTRATE 5 MG: 1 INJECTION, SOLUTION INTRAVENOUS at 16:09

## 2022-05-17 RX ADMIN — SODIUM CHLORIDE, POTASSIUM CHLORIDE, SODIUM LACTATE AND CALCIUM CHLORIDE 500 ML: 600; 310; 30; 20 INJECTION, SOLUTION INTRAVENOUS at 18:26

## 2022-05-17 RX ADMIN — MAGNESIUM SULFATE IN WATER 4000 MG: 40 INJECTION, SOLUTION INTRAVENOUS at 18:30

## 2022-05-17 RX ADMIN — AMIODARONE HYDROCHLORIDE 1 MG/MIN: 50 INJECTION, SOLUTION INTRAVENOUS at 16:26

## 2022-05-17 RX ADMIN — POTASSIUM BICARBONATE 40 MEQ: 782 TABLET, EFFERVESCENT ORAL at 18:34

## 2022-05-17 RX ADMIN — SODIUM CHLORIDE, PRESERVATIVE FREE 10 ML: 5 INJECTION INTRAVENOUS at 08:32

## 2022-05-17 RX ADMIN — TAMSULOSIN HYDROCHLORIDE 0.4 MG: 0.4 CAPSULE ORAL at 08:32

## 2022-05-17 RX ADMIN — ACETAMINOPHEN 1000 MG: 500 TABLET ORAL at 21:55

## 2022-05-17 RX ADMIN — ADENOSINE 12 MG: 3 INJECTION, SOLUTION INTRAVENOUS at 15:43

## 2022-05-17 RX ADMIN — Medication 1250 MG: at 21:34

## 2022-05-17 RX ADMIN — SODIUM CHLORIDE, PRESERVATIVE FREE 10 ML: 5 INJECTION INTRAVENOUS at 21:55

## 2022-05-17 RX ADMIN — AMIODARONE HYDROCHLORIDE 150 MG: 50 INJECTION, SOLUTION INTRAVENOUS at 16:11

## 2022-05-17 RX ADMIN — ACETAMINOPHEN 1000 MG: 500 TABLET ORAL at 06:25

## 2022-05-17 RX ADMIN — CEFTRIAXONE SODIUM 1000 MG: 1 INJECTION, POWDER, FOR SOLUTION INTRAMUSCULAR; INTRAVENOUS at 06:17

## 2022-05-17 ASSESSMENT — PAIN SCALES - GENERAL
PAINLEVEL_OUTOF10: 0
PAINLEVEL_OUTOF10: 0

## 2022-05-17 NOTE — DISCHARGE INSTR - DIET

## 2022-05-17 NOTE — PLAN OF CARE
Writer on floor and approched by staff and Dr. Andrew Piper  that patient went to SVT and adenosine dose was given.   Dr.SMargretta Lewis in room ordering EKG

## 2022-05-17 NOTE — H&P
[facility]  Office: [address], DO, Lea Halie, DO, Amelianancy Paiz, DO, Shelly Anila Blood, DO, Luis Miguel Medellin MD, Yamila James MD, Darryle So, MD, Haylie Hernández MD, Alexys Nowak MD, Will Gregorio MD, Kenrick Ko MD, Tamara Merrill, DO, Melvin De La Rosa DO, Patito Skaggs MD,  Jaz Wall DO, Kristyn Thomas MD, Reji Mcnamara MD, Juan Lr MD, Em Nicole DO, Sree Sofia MD, Nirav Romo MD, Ainsley Marquez MD, Margie Lombardi, [facility], [facility], CNP, Kevin Araiza, CNP, Hardy Still, CNP, Nikhil Kumari, CNP, Magdaleno Kowalski, CNP, Evie Grove PA-C, Cyrus Pastor, DNP, Leonard Conti, CNP, Alfie Case, CNP, Daniel Duff, CNP, Luciano Bethea, CNS, Betty Champagne, [facility], Fabby Noel, CNP, Delores Davis, CNP, Gisel Johnson, [facility]    HISTORY AND PHYSICAL EXAMINATION            Date:   5/17/2022  Patient name:  Grace Ordoñez  Date of admission:  5/15/2022  4:08 PM  MRN:   [id number]  Account:  [de-identified]  YOB: 1945  PCP:    Shaista Jorge DO  Room:   [address] [place], [place] [address]-  Code Status:    Full Code    Chief Complaint:     Chief Complaint   Patient presents with    Fall       History Obtained From:     patient, family member - daughter, electronic medical record    History of Present Illness:     Grace Ordoñez is a 64 y.o. Non- / non  female who presents with Fall   and is admitted to the hospital for the management of Closed compression fracture of L1 lumbar vertebra, initial encounter ([facility] [address] 78-year-old female past medical history of breakthrough seizures, depression, history of DVT approximately 2 years ago presents to the hospital status post fall from standing height and was found to have age-indeterminate L1 endplate compression fracture, recent laceration to left temporal region, and NSTEMI.  Patient was evaluated by neurosurgery, no acute interventions planned and to follow-up with neurosurgery clinic as needed. Patient also eval by urology due to left ureteral and kidney stone and underwent left-sided stent placement on 5/16/2022. Cardiology was consulted due to elevated troponins. Echo completed 5/17/2022 with final read pending. Of note patient had SVT was eval by cardiology and given adenosine and started on amiodarone drip. 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  Authorizing Provider   aspirin 81 MG EC tablet Take 81 mg by mouth daily    Historical Provider, MD   carBAMazepine (TEGRETOL) 200 MG tablet TAKE 1 AND 1/2 TABLETS Am, 2 tabs bedtime 11/17/21   Sravani uW DO   FLUoxetine (PROZAC) 20 MG capsule 2 po qd 11/17/21   Sravani Wu DO   mirabegron (MYRBETRIQ) 50 MG TB24 Take 50 mg by mouth daily 10/29/21   Sravani Wu DO   acetaminophen (TYLENOL) 500 MG tablet Take 500 mg by mouth every 6 hours as needed for Pain    Historical Provider, MD   desmopressin (DDAVP) 0.2 MG tablet TAKE 3 TABLETS BY MOUTH IN THE EVENING   Patient not taking: Reported on 9/8/2021 6/1/21   Sravani Wu DO   Handicap Placard MISC by Does not apply route Permanent one year  Dx fractured humerus  Patient not taking: Reported on 19   Luke Mayer DO   rivaroxaban Deandre Shear STARTER PACK) 15 & 20 MG Starter Pack Follow package instructions  Patient not taking: Reported on 2020 10/17/19   Radha Daniels MD        Allergies:     Haldol [haloperidol lactate]    Social History:     Tobacco:    reports that she quit smoking about 23 years ago. Her smoking use included cigarettes. She started smoking about 62 years ago. She has a 30.00 pack-year smoking history. She has never used smokeless tobacco.  Alcohol:      reports no history of alcohol use. Drug Use:  reports no history of drug use. Family History:     History reviewed. No pertinent family history. Review of Systems:     Positive and Negative as described in HPI.     CONSTITUTIONAL:  negative for fevers, chills, sweats, fatigue, weight loss  HEENT:  negative for vision, hearing changes, runny nose, throat pain  RESPIRATORY:  negative for shortness of breath, cough, congestion, wheezing  CARDIOVASCULAR:  negative for chest pain, palpitations  GASTROINTESTINAL:  negative for nausea, vomiting, diarrhea, constipation, change in bowel habits, abdominal pain   GENITOURINARY:  negative for difficulty of urination, burning with urination, frequency   INTEGUMENT:  negative for rash, skin lesions, easy bruising   HEMATOLOGIC/LYMPHATIC:  negative for swelling/edema   ALLERGIC/IMMUNOLOGIC:  negative for urticaria , itching  ENDOCRINE:  negative increase in drinking, increase in urination, hot or cold intolerance  MUSCULOSKELETAL:  negative joint pains, muscle aches, swelling of joints  NEUROLOGICAL:  negative for headaches, dizziness, lightheadedness, numbness, pain, tingling extremities  BEHAVIOR/PSYCH:  negative for depression, anxiety    Physical Exam:   /66   Pulse 79   Temp 98.5 °F (36.9 °C) (Oral)   Resp 14   Ht 5' 7\" (1.702 m)   Wt 135 lb (61.2 kg)   SpO2 97%   BMI 21.14 kg/m²   Temp (24hrs), Av.9 °F (37.2 °C), Min:98.5 °F (36.9 °C), Max:99.2 °F (37.3 °C)    No results for input(s): POCGLU in the last 72 hours.     Intake/Output Summary (Last 24 hours) at 5/17/2022 1722  Last data filed at 5/17/2022 1392  Gross per 24 hour   Intake 1827 ml   Output 1800 ml   Net 27 ml       General Appearance: alert, chronically ill-appearing, laceration on left temple  Mental status: oriented to person, place  Head: Laceration to left temple, healing  Eye: no icterus, redness, pupils equal and reactive  Ear: normal external ear, no discharge, hearing intact  Nose: no drainage noted  Mouth: mucous membranes moist  Neck: supple,   Lungs: Decreased breath sounds bilaterally, no wheezing, rhonchi or  Cardiovascular: normal rate, regular rhythm, systolic murmur  Abdomen: Soft, nontender, nondistended, normal bowel sounds, no hepatomegaly or splenomegaly  Neurologic: There are no new focal motor or sensory deficits, normal muscle tone and bulk, no abnormal sensation, normal speech, cranial nerves II through XII grossly intact  Skin: No gross lesions, rashes, bruising or bleeding on exposed skin area  Extremities: peripheral pulses palpable, no pedal edema or calf pain with palpation  Psych: Flat affect    Investigations:      Laboratory Testing:  Recent Results (from the past 24 hour(s))   CBC    Collection Time: 05/17/22  4:28 AM   Result Value Ref Range    WBC 9.4 3.5 - 11.3 k/uL    RBC 3.62 (L) 3.95 - 5.11 m/uL    Hemoglobin 12.0 11.9 - 15.1 g/dL    Hematocrit 34.8 (L) 36.3 - 47.1 %    MCV 96.1 82.6 - 102.9 fL    MCH 33.1 25.2 - 33.5 pg    MCHC 34.5 28.4 - 34.8 g/dL    RDW 13.4 11.8 - 14.4 %    Platelets 779 760 - 634 k/uL    MPV 8.9 8.1 - 13.5 fL    NRBC Automated 0.0 0.0 per 100 WBC   Basic Metabolic Panel    Collection Time: 05/17/22  4:28 AM   Result Value Ref Range    Glucose 100 (H) 70 - 99 mg/dL    BUN 11 8 - 23 mg/dL    CREATININE 0.80 0.50 - 0.90 mg/dL    Calcium 8.5 (L) 8.6 - 10.4 mg/dL    Sodium 137 135 - 144 mmol/L    Potassium 3.5 (L) 3.7 - 5.3 mmol/L    Chloride 107 98 - 107 mmol/L    CO2 21 20 - 31 mmol/L    Anion Gap 9 9 - 17 mmol/L    GFR Non-African American >60 >60 mL/min    GFR African American >60 >60 mL/min    GFR Comment         EKG 12 Lead    Collection Time: 05/17/22  2:51 PM   Result Value Ref Range    Ventricular Rate 143 BPM    Atrial Rate 138 BPM    QRS Duration 108 ms    Q-T Interval 320 ms    QTc Calculation (Bazett) 493 ms    R Axis 58 degrees    T Axis -145 degrees   EKG 12 Lead    Collection Time: 05/17/22  3:12 PM   Result Value Ref Range    Ventricular Rate 100 BPM    Atrial Rate 100 BPM    P-R Interval 224 ms    QRS Duration 114 ms    Q-T Interval 380 ms    QTc Calculation (Bazett) 490 ms    P Axis 64 degrees    R Axis 50 degrees    T Axis -177 degrees   CBC with Auto Differential    Collection Time: 05/17/22  3:20 PM   Result Value Ref Range    WBC 8.3 3.5 - 11.3 k/uL    RBC 3.74 (L) 3.95 - 5.11 m/uL    Hemoglobin 12.0 11.9 - 15.1 g/dL    Hematocrit 36.1 (L) 36.3 - 47.1 %    MCV 96.5 82.6 - 102.9 fL    MCH 32.1 25.2 - 33.5 pg    MCHC 33.2 28.4 - 34.8 g/dL    RDW 13.3 11.8 - 14.4 %    Platelets 835 367 - 708 k/uL    MPV 8.5 8.1 - 13.5 fL    NRBC Automated 0.0 0.0 per 100 WBC    Seg Neutrophils 56 36 - 65 %    Lymphocytes 25 24 - 43 %    Monocytes 15 (H) 3 - 12 %    Eosinophils % 3 1 - 4 %    Basophils 1 0 - 2 %    Immature Granulocytes 0 0 %    Segs Absolute 4.64 1.50 - 8.10 k/uL    Absolute Lymph # 2.03 1.10 - 3.70 k/uL    Absolute Mono # 1.26 (H) 0.10 - 1.20 k/uL    Absolute Eos # 0.27 0.00 - 0.44 k/uL    Basophils Absolute 0.06 0.00 - 0.20 k/uL    Absolute Immature Granulocyte <0.03 0.00 - 0.30 k/uL   Troponin    Collection Time: 05/17/22  3:20 PM   Result Value Ref Range    Troponin, High Sensitivity 44 (H) 0 - 14 ng/L   Electrolyte Panel    Collection Time: 05/17/22  3:20 PM   Result Value Ref Range    Sodium 144 135 - 144 mmol/L    Potassium 3.5 (L) 3.7 - 5.3 mmol/L    Chloride 108 (H) 98 - 107 mmol/L    CO2 22 20 - 31 mmol/L Anion Gap 14 9 - 17 mmol/L   Magnesium    Collection Time: 05/17/22  3:20 PM   Result Value Ref Range    Magnesium 2.0 1.6 - 2.6 mg/dL       Imaging/Diagnostics:  CT HEAD WO CONTRAST    Result Date: 5/15/2022  No acute intracranial abnormality. Encephalomalacia likely related to prior left MCA distribution stroke. Otherwise chronic microvascular disease. CT CERVICAL SPINE WO CONTRAST    Result Date: 5/15/2022  Cervical CT: No acute osseous abnormality. No acute fracture. CT of thoracic and lumbar spine: Multiple compression fractures throughout the thoracic and lumbar spine. Some of these fractures have chronic appearance such as superior endplate compression fracture T12, T10 and T7, L2 and L3 with 20%, 20%, 30%, 30% and 50% height loss respectively. However superior endplate compression fracture of L1 with 5 mm retropulsion into the spinal canal is age indeterminate and may be acute in etiology. There is associated moderate canal stenosis at T12-L1. For further evaluation of this fracture either comparison with prior examination or lumbar spine MRI can be obtained. Moderate levoscoliosis of upper to midthoracic spine and mild dextroscoliosis of thoracolumbar junction Partial visualization of 7 mm obstructing stone of the left proximal ureter which is associated with mild left-sided hydroureteronephrosis. Multiple nonobstructing kidney stones are also noted bilaterally. RECOMMENDATIONS: Unavailable     CT THORACIC SPINE WO CONTRAST    Result Date: 5/15/2022  Cervical CT: No acute osseous abnormality. No acute fracture. CT of thoracic and lumbar spine: Multiple compression fractures throughout the thoracic and lumbar spine. Some of these fractures have chronic appearance such as superior endplate compression fracture T12, T10 and T7, L2 and L3 with 20%, 20%, 30%, 30% and 50% height loss respectively.  However superior endplate compression fracture of L1 with 5 mm retropulsion into the spinal canal is age indeterminate and may be acute in etiology. There is associated moderate canal stenosis at T12-L1. For further evaluation of this fracture either comparison with prior examination or lumbar spine MRI can be obtained. Moderate levoscoliosis of upper to midthoracic spine and mild dextroscoliosis of thoracolumbar junction Partial visualization of 7 mm obstructing stone of the left proximal ureter which is associated with mild left-sided hydroureteronephrosis. Multiple nonobstructing kidney stones are also noted bilaterally. RECOMMENDATIONS: Unavailable     CT LUMBAR SPINE WO CONTRAST    Result Date: 5/15/2022  Cervical CT: No acute osseous abnormality. No acute fracture. CT of thoracic and lumbar spine: Multiple compression fractures throughout the thoracic and lumbar spine. Some of these fractures have chronic appearance such as superior endplate compression fracture T12, T10 and T7, L2 and L3 with 20%, 20%, 30%, 30% and 50% height loss respectively. However superior endplate compression fracture of L1 with 5 mm retropulsion into the spinal canal is age indeterminate and may be acute in etiology. There is associated moderate canal stenosis at T12-L1. For further evaluation of this fracture either comparison with prior examination or lumbar spine MRI can be obtained. Moderate levoscoliosis of upper to midthoracic spine and mild dextroscoliosis of thoracolumbar junction Partial visualization of 7 mm obstructing stone of the left proximal ureter which is associated with mild left-sided hydroureteronephrosis. Multiple nonobstructing kidney stones are also noted bilaterally. RECOMMENDATIONS: Unavailable     CT ABDOMEN PELVIS W IV CONTRAST Additional Contrast? None    Addendum Date: 5/15/2022    ADDENDUM: 5 mm stone left ureteropelvic junction. Multiple parapelvic cysts and possible hydronephrosis. Sean Haro Result Date: 5/15/2022  Multiple compression fractures as above, age indeterminate.      XR CHEST PORTABLE    Result Date: 5/15/2022  Mild dependent changes at the right lateral lung base, possibly small effusion and/or atelectasis. Otherwise unremarkable chest.     CT CHEST PULMONARY EMBOLISM W CONTRAST    Result Date: 5/15/2022  Right middle lobe airspace disease. 6 mm pleural based pulmonary nodule right middle lobe. Coronary artery disease. Multiple compression fractures age indeterminate. RECOMMENDATIONS: Fleischner Society guidelines for follow-up and management of incidentally detected pulmonary nodules: Single Solid Nodule: Nodule size equals 6-8 mm In a low-risk patient, CT at 6-12 months, then consider CT at 18-24 months. In a high-risk patient, CT at 6-12 months, then CT at 18-24 months. . - Low risk patients include individuals with minimal or absent history of smoking and other known risk factors. - High risk patients include individuals with a history or smoking or known risk factors. Radiology 2017 http://pubs. rsna.org/doi/full/10.1148/radiol. 6068399268       Assessment :      Hospital Problems           Last Modified POA    * (Principal) Closed compression fracture of L1 lumbar vertebra, initial encounter ([facility] [address] 5/15/2022 Yes          Plan:     Patient status inpatient in the Progressive Unit/Step down    1. Status post closed compression of L1 fracture with fall from standing height. Appreciate trauma surgery and neurosurgery recommendations. No acute intervention planned, TLSO brace, continue with PT and OT  2. History of seizures, continue carbamazepine  3. UTI. Follow up sensitivity. Consult to ID. Vancomycin  4. History of depression hold prozac, give magnesium, monitor QTc  5. SVT. Appreciate cardiology recommendations. Echo completed final read pending. Currently on amiodarone drip received adenosine 5/17/2022. Currently being replaced magnesium and potassium  6.  Remote history of DVT follows up with vascular surgery at [address] [place] [address] last seen 10/29/2021 recommended only aspirin at that time.  7. Pt/ot    Consultations:   IP CONSULT TO CARDIOLOGY  IP CONSULT TO TRAUMA SURGERY  IP CONSULT TO NEUROSURGERY  IP CONSULT TO UROLOGY  IP CONSULT TO CARDIOLOGY  IP CONSULT TO INTERNAL MEDICINE    Patient is admitted as inpatient status because of co-morbidities listed above, severity of signs and symptoms as outlined, requirement for current medical therapies and most importantly because of direct risk to patient if care not provided in a hospital setting. Expected length of stay > 48 hours. Naga Chapman MD  5/17/2022  5:22 PM    Copy sent to Dr. Ju Pruett DO    Informed by patient and sitter that she unfortunately had a seizure. Currently alert to person and place, Check tegretol levels, restart carbamazapine, consult to neurology.

## 2022-05-17 NOTE — FLOWSHEET NOTE
Patient found in bed with IV lying next to her in bed and also sood is out and laying on the bed next to patient. Patient was attempting to get out of bed. Dr Matias Jarrett, Urology Resident notified of patient pulling sood out. Order received to insert a new sood. #16 [place] Sood inserted. Returned red urine. Iv restarted at well.

## 2022-05-17 NOTE — CONSULTS
[facility] Neurology   [address] [facility]    Inpatient Neurology Consult Note             Date:   5/18/2022  Patient name:  [place] Flores  Date of admission:  5/15/2022  4:08 PM  MRN:   [id number]  Account:  [de-identified]  YOB: 1945  PCP:    Bere Negro DO  Room:   [address] [place] [address]  Code Status:    Full Code    Chief Complaint:     Chief Complaint   Patient presents with    Fall     History Obtained From:     patient, family member - patients daughter, electronic medical record    History of Present Illness: The patient is a 64 y.o.  female who presents with Fall and she is admitted to the hospital for the management of trauma related to her recent fall found to have head laceration left frontal, NSTEMI and L1 fracture. Past medical history significant for establish seizure disorder since the age of 38 following left temporal Herpes encephalitis maintained on Tegretol 300 mg in the morning and 400 mg at night [facility] Dr. Cesar Álvarez for neurology), baseline severe dementia most recent outpatient [address] [place] [address] 7/30, MRSA, Anxiety, Depression, Vit D def, HLD, Left bundle branch block. Patient has severe dementia thus unable to provide any history stating that she does not remember anything. However patient's daughter is at bedside and states that she had a fall from standing height did not lose consciousness or hit her head. Initial work-up patient was found to have age-indeterminate L1 endplate compression fracture. Due to NSTEMI cardiology was consulted. Patient subsequently had episode of SVT evaluated by cardiology given adenosine and started on amiodarone drip. Cardiac echo was also completed. Neurosurgery has been consulted no acute surgical intervention planned at this time. General neurology was consulted due to concern for breakthrough seizure witnessed by nursing.   Per nursing and daughter around 6 PM she was noted to have a staring spell with right arm and leg focal tonic-clonic activity. Patient was not responding to any commands or following simple commands at that time lasted approximately 1 to 2 minutes and resolved without medication. Patient was postictal.  Patient's daughter states that she has not had her medication since Sunday morning and noticed that she has seizures when she misses Tegretol dosing. Previous records reviewed:   CT head WO 5/15/22  Impression   No acute intracranial abnormality.       Encephalomalacia likely related to prior left MCA distribution stroke. Otherwise chronic microvascular disease.           CT Cervical, thoracic and lumbar spine WO 5/15/22  Impression   Cervical CT: No acute osseous abnormality.  No acute fracture.       CT of thoracic and lumbar spine:       Multiple compression fractures throughout the thoracic and lumbar spine. Some of these fractures have chronic appearance such as superior endplate   compression fracture T12, T10 and T7, L2 and L3 with 20%, 20%, 30%, 30% and   50% height loss respectively.       However superior endplate compression fracture of L1 with 5 mm retropulsion   into the spinal canal is age indeterminate and may be acute in etiology.    There is associated moderate canal stenosis at T12-L1.  For further   evaluation of this fracture either comparison with prior examination or   lumbar spine MRI can be obtained.       Moderate levoscoliosis of upper to midthoracic spine and mild dextroscoliosis   of thoracolumbar junction       Partial visualization of 7 mm obstructing stone of the left proximal ureter   which is associated with mild left-sided hydroureteronephrosis.  Multiple   nonobstructing kidney stones are also noted bilaterally.       RECOMMENDATIONS:   Unavailable     Routine EEG 10/19/2015    Past Medical History:     Past Medical History:   Diagnosis Date    Anxiety     [facility]ulsion [facility]     Encephalopathy     Herpes     Hyperlipidemia     Left bundle branch block     MRSA (methicillin resistant Staphylococcus aureus)     Parkinson's disease ([facility]     Seizures ([facility] [facility]     secondary to encephalitis    Vitamin D deficiency         Past Surgical History:     Past Surgical History:   Procedure Laterality Date    ABSCESS DRAINAGE Right 12/14/2013    WOUND EXPLORATION AND I+D  RIGHT HIP    CYSTOSCOPY Left 5/16/2022    CYSTOSCOPY URETERAL STENT INSERTION performed by Ivonne Ambrose MD at [address] [place] [address] Left 05/16/2022    Cystoscopy        Medications Prior to Admission:     Prior to Admission medications    Medication Sig Start Date End Date Taking? Authorizing Provider   aspirin 81 MG EC tablet Take 81 mg by mouth daily    Historical Provider, MD   carBAMazepine (TEGRETOL) 200 MG tablet TAKE 1 AND 1/2 TABLETS Am, 2 tabs bedtime 11/17/21   Sravani Wu DO   FLUoxetine (PROZAC) 20 MG capsule 2 po qd 11/17/21   Sravani Wu DO   mirabegron (MYRBETRIQ) 50 MG TB24 Take 50 mg by mouth daily 10/29/21   Sravani Wu DO   acetaminophen (TYLENOL) 500 MG tablet Take 500 mg by mouth every 6 hours as needed for Pain    Historical Provider, MD   desmopressin (DDAVP) 0.2 MG tablet TAKE 3 TABLETS BY MOUTH IN THE EVENING   Patient not taking: Reported on 9/8/2021 6/1/21   Lilia Wu DO   Handicap Placard MISC by Does not apply route Permanent one year  Dx fractured humerus  Patient not taking: Reported on 5/20/2020 11/14/19   Nikki Libman, DO   rivaroxaban (XARELTO STARTER PACK) 15 & 20 MG Starter Pack Follow package instructions  Patient not taking: Reported on 5/20/2020 10/17/19   Jenny Celis MD        Allergies:     Haldol [haloperidol lactate]    Social History:     Tobacco:    reports that she quit smoking about 23 years ago. Her smoking use included cigarettes. She started smoking about 62 years ago. She has a 30.00 pack-year smoking history.  She has never used smokeless tobacco.  Alcohol:      reports no history of alcohol use.  Drug Use:  reports no history of drug use. Family History:     History reviewed. No pertinent family history. Review of Systems:     ROS:  Limited review of systems as patient has severe dementia unable to answer questions or give history although per patient's daughter had a mechanical fall prior to admission found to have lumbar fracture, NSTEMI and laceration to left frontal region. Physical Exam:   BP (!) 97/51   Pulse 73   Temp 97.9 °F (36.6 °C) (Temporal)   Resp 17   Ht 5' 7\" (1.702 m)   Wt 135 lb (61.2 kg)   SpO2 99%   BMI 21.14 kg/m²   Temp (24hrs), Av.4 °F (36.9 °C), Min:97.9 °F (36.6 °C), Max:99.1 °F (37.3 °C)    No results for input(s): POCGLU in the last 72 hours.     Intake/Output Summary (Last 24 hours) at 2022 0139  Last data filed at 2022 5544  Gross per 24 hour   Intake 1827 ml   Output 1800 ml   Net 27 ml         NEUROLOGIC EXAMINATION  GENERAL  Appears comfortable and in no distress   HEENT  NC/ AT   NECK  Supple and no bruits heard   MENTAL STATUS:  Alert, oriented to person only not place time or year, poor memory, waxing and waning confusion, normal speech, normal language, no hallucination or delusion   CRANIAL NERVES: II     -      Visual fields intact to confrontation  III,IV,VI -  EOMs full, no afferent defect, no VERNA, no ptosis  V     -     Normal facial sensation  VII    -     Normal facial symmetry  VIII   -     Intact hearing  IX,X -     Symmetrical palate  XI    -     Symmetrical shoulder shrug  XII   -     Midline tongue, no atrophy    MOTOR FUNCTION:  significant for mild weakness and generalized deconditioning of grade 4-/5 in bilateral proximal and distal muscle groups of both upper and lower extremities patient noted to have intention tremor bilateral upper extremities greater than lower   SENSORY FUNCTION:  Normal touch, normal pin, normal vibration, normal proprioception   CEREBELLAR FUNCTION:  Intact fine motor control over upper limbs REFLEX FUNCTION:  Symmetric, no perverted reflex, no Babinski sign   STATION and GAIT  Fall risk        Investigations:      Laboratory Testing:  Recent Results (from the past 24 hour(s))   CBC    Collection Time: 05/17/22  4:28 AM   Result Value Ref Range    WBC 9.4 3.5 - 11.3 k/uL    RBC 3.62 (L) 3.95 - 5.11 m/uL    Hemoglobin 12.0 11.9 - 15.1 g/dL    Hematocrit 34.8 (L) 36.3 - 47.1 %    MCV 96.1 82.6 - 102.9 fL    MCH 33.1 25.2 - 33.5 pg    MCHC 34.5 28.4 - 34.8 g/dL    RDW 13.4 11.8 - 14.4 %    Platelets 666 171 - 990 k/uL    MPV 8.9 8.1 - 13.5 fL    NRBC Automated 0.0 0.0 per 100 WBC   Basic Metabolic Panel    Collection Time: 05/17/22  4:28 AM   Result Value Ref Range    Glucose 100 (H) 70 - 99 mg/dL    BUN 11 8 - 23 mg/dL    CREATININE 0.80 0.50 - 0.90 mg/dL    Calcium 8.5 (L) 8.6 - 10.4 mg/dL    Sodium 137 135 - 144 mmol/L    Potassium 3.5 (L) 3.7 - 5.3 mmol/L    Chloride 107 98 - 107 mmol/L    CO2 21 20 - 31 mmol/L    Anion Gap 9 9 - 17 mmol/L    GFR Non-African American >60 >60 mL/min    GFR African American >60 >60 mL/min    GFR Comment         EKG 12 Lead    Collection Time: 05/17/22  2:51 PM   Result Value Ref Range    Ventricular Rate 143 BPM    Atrial Rate 138 BPM    QRS Duration 108 ms    Q-T Interval 320 ms    QTc Calculation (Bazett) 493 ms    R Axis 58 degrees    T Axis -145 degrees   EKG 12 Lead    Collection Time: 05/17/22  3:12 PM   Result Value Ref Range    Ventricular Rate 100 BPM    Atrial Rate 100 BPM    P-R Interval 224 ms    QRS Duration 114 ms    Q-T Interval 380 ms    QTc Calculation (Bazett) 490 ms    P Axis 64 degrees    R Axis 50 degrees    T Axis -177 degrees   CBC with Auto Differential    Collection Time: 05/17/22  3:20 PM   Result Value Ref Range    WBC 8.3 3.5 - 11.3 k/uL    RBC 3.74 (L) 3.95 - 5.11 m/uL    Hemoglobin 12.0 11.9 - 15.1 g/dL    Hematocrit 36.1 (L) 36.3 - 47.1 %    MCV 96.5 82.6 - 102.9 fL    MCH 32.1 25.2 - 33.5 pg    MCHC 33.2 28.4 - 34.8 g/dL    RDW 13.3 11.8 - 14.4 %    Platelets 225 916 - 848 k/uL    MPV 8.5 8.1 - 13.5 fL    NRBC Automated 0.0 0.0 per 100 WBC    Seg Neutrophils 56 36 - 65 %    Lymphocytes 25 24 - 43 %    Monocytes 15 (H) 3 - 12 %    Eosinophils % 3 1 - 4 %    Basophils 1 0 - 2 %    Immature Granulocytes 0 0 %    Segs Absolute 4.64 1.50 - 8.10 k/uL    Absolute Lymph # 2.03 1.10 - 3.70 k/uL    Absolute Mono # 1.26 (H) 0.10 - 1.20 k/uL    Absolute Eos # 0.27 0.00 - 0.44 k/uL    Basophils Absolute 0.06 0.00 - 0.20 k/uL    Absolute Immature Granulocyte <0.03 0.00 - 0.30 k/uL   Troponin    Collection Time: 05/17/22  3:20 PM   Result Value Ref Range    Troponin, High Sensitivity 44 (H) 0 - 14 ng/L   Electrolyte Panel    Collection Time: 05/17/22  3:20 PM   Result Value Ref Range    Sodium 144 135 - 144 mmol/L    Potassium 3.5 (L) 3.7 - 5.3 mmol/L    Chloride 108 (H) 98 - 107 mmol/L    CO2 22 20 - 31 mmol/L    Anion Gap 14 9 - 17 mmol/L   Magnesium    Collection Time: 05/17/22  3:20 PM   Result Value Ref Range    Magnesium 2.0 1.6 - 2.6 mg/dL   Troponin    Collection Time: 05/17/22  8:59 PM   Result Value Ref Range    Troponin, High Sensitivity 41 (H) 0 - 14 ng/L         Assessment :    Tish Fonseca is a 68-year-old  female who has well-established seizure disorder following encephalitis left temporal lobe secondary to herpes. Patient has been on Tegretol 300 mg in the morning and 400 mg at night long-term. Patient's daughter provided much of history and states that she has not had a seizure in at least 5 years when she is on her medication. Does appear she recently increased her Tegretol dosing from 300 mg twice daily to 300 mg in the morning and 40 mg at night 5/20/2021 although has been stable since then. Patient's medication missed 3 doses thus had a provoked partial focal left temporal with right arm and leg generalized tonic-clonic seizure and staring spell.   Primary Problem  Closed compression fracture of L1 lumbar vertebra, initial encounter [facility])    Active Hospital Problems    Diagnosis Date Noted    Mixed incontinence [N39.46] 05/17/2022     Priority: Medium    Kidney stone [N20.0] 05/17/2022     Priority: Medium    Closed compression fracture of L1 lumbar vertebra, initial encounter ([facility] [address] [S32.010A] 05/15/2022     Priority: Medium    Depression [F32. A] 08/15/2020     Priority: Medium    Dementia due to Parkinson's disease with behavioral disturbance ([facility] [address] [G20, F02.81]     [facility] [G40.909] 10/13/2015    History of encephalitis [Z86.61] 10/13/2015    Memory loss [R41.3]     Anxiety [F41.9] 08/24/2012    Hypercholesteremia [E78.00] 08/24/2012       Plan:     Patient status Admit as inpatient in the  Progressive Unit/Step down    Provoked seizure after missing 3 doses of her Tegretol  -Continue 300 mg a.m. and 400 mg at bedtime Tegretol   -Follow-up Tegretol level   -patient back to baseline no focal neurologic deficits and severe dementia thus no need for further workup at this time  -Previous EEG and MRI brain reviewed     Severe vascular dementia  -[place] 7/30 at baseline outpatient neurologic clinic visit 8/20/21    Compression Fracture and mechanical fall   -[facility] Neurosurgery evaluation   -multiple level degenerative disk disease likely chronic process no focal weakness or numbness   -Tylenol PRN for pain releif patient comfortable denies any complaints when examined     NSTEMI and SVT being managed by cardiology and Internal medicine   -s/p Adenosin 12mg IV X1 and 6mg X3, Amiodarone bolus 150mg followed by infusion     PT/OT/ST  PM&R eval- Severe deconditioning and chronic illness likely would benefit from ARU vs SNF placement     Consultations:   IP CONSULT TO CARDIOLOGY  IP CONSULT TO TRAUMA SURGERY  IP CONSULT TO NEUROSURGERY  IP CONSULT TO UROLOGY  IP CONSULT TO CARDIOLOGY  IP CONSULT TO INTERNAL MEDICINE  IP CONSULT TO INFECTIOUS DISEASES  IP CONSULT TO NEUROLOGY  IP CONSULT TO IV TEAM  PHARMACY TO DOSE VANCOMYCIN  PHARMACY TO DOSE VANCOMYCIN      Follow-up further recommendations after discussing the case with attending  The plan was discussed with the patient, patient's family and the medical staff. Patient is admitted as inpatient status because of co-morbidities listed above, severity of signs and symptoms as outlined, requirement for current medical therapies and most importantly because of direct risk to patient if care not provided in a hospital setting.     Renetta Robles MD   PGY-3 Neurology Resident   5/18/2022  1:39 AM    Copy sent to Dr. Wilma Kim DO

## 2022-05-17 NOTE — PROGRESS NOTES
Physician Progress Note      Tamei Maurer  [facility] #:                  [id number]  :                       1945  ADMIT DATE:       5/15/2022 4:08 PM  DISCH DATE:  RESPONDING  PROVIDER #:        Ashley Barnett          QUERY TEXT:    Patient admitted s/p fall, T/L compression fx's. Noted documentation of   NSTEMI by trauma team and possible Type II MI by cardiology consultant. If   possible, please document in progress notes and discharge summary if you are   evaluating and /or treating any of the following: The medical record reflects the following:  Risk Factors: UTI/hydronephrosis  Clinical Indicators: per trauma NSTEMI, per cardiology \"Elevated tropes   49-->47-->40-trending down,  possible type II no chest pain no pressures EKG   incomplete LBBB with first-degree heart block no change since \"  Treatment: serial troponins, EKG, cardiology consult, pending echo    Thank you, RAMAN Frank  email - [email or web address]  cell- [phone]  office hours [id number]  Options provided:  -- NSTEMI and Type II MI ruled out, demand ischemia due to UTI/hydronephrosis   confirmed  -- NSTEMI and Type II MI ruled out, non-ischemic myocardial injury due to   UTI/hydronephrosis confirmed  -- NSTEMI confirmed and Type II MI ruled out  -- Type II MI confirmed and NSTEMI ruled out  -- Other - I will add my own diagnosis  -- Disagree - Not applicable / Not valid  -- Disagree - Clinically unable to determine / Unknown  -- Refer to Clinical Documentation Reviewer    PROVIDER RESPONSE TEXT:    After study, NSTEMI and Type II MI were ruled out, pt has non-ischemic   myocardial injury due to UTI/hydronephrosis.     Query created by: Scarlett Martinez on 2022 1:50 PM      Electronically signed by:  Ashley Barnett 2022 5:10 PM

## 2022-05-17 NOTE — PROGRESS NOTES
PROGRESS NOTE      PATIENT NAME: Ciara Bateman RECORD NO. [id number]  DATE: 5/17/2022  SURGEON: Edwardo White  PRIMARY CARE PHYSICIAN: Padilla Farrell DO    HD: # 2    ASSESSMENT    Patient Active Problem List   Diagnosis    Seizure ([facility] [address]    Vitamin D deficiency    Anxiety    Hypercholesteremia    Cellulitis of right leg    MRSA (methicillin resistant staph aureus) culture positive    Breakthrough seizure ([address]    Memory loss    Difficulty speaking    Seizure disorder ([address]    Dementia with behavioral disturbance ([address]    History of encephalitis    Dementia due to Parkinson's disease with behavioral disturbance ([address]    Acute deep vein thrombosis (DVT) of proximal vein of left lower extremity (HCC)    Closed compression fracture of L1 lumbar vertebra, initial encounter [facility])       MEDICAL DECISION MAKING AND PLAN    68-year-old F, FSH, on ASA w/ age-indeterminate L1 superior endplate compression fx, chronic compression fx of T7, T10, T12, L2, L3     Age-indeterminate L1 compression fx & T7, T10, T12, L2, L3 fx  -Neurosurgery consultation  -Multimodal pain control  -Neurosurgery signed off   -Weightbearing and movement as tolerated     NSTEMI  -Troponins: 49->47->40  -Cardiology consultation  -No heparin at this time  -Awaiting 2D echo     Left obstructing 7 mm ureteral stone  -Urology consultation  -Cystoscopy and left ureteral stent placement today per urology  -Follow-up on urology recommendations     UTI  -1 g ceftriaxone IV daily  -Follow-up urine culture     Dispo  -Pending further cardiology and urology evaluation      SUBJECTIVE    Gwen Waggoner was examined at bedside. No acute distress. States she feels well and still does not her stand why she is here how she got here. Otherwise denies any pain, fever, chills, nausea, vomiting. Patient did pull out her Dawson overnight and was replaced per urology's request.  Tolerating p.o. diet.       OBJECTIVE  VITALS: Temp: Temp: 99.2 °F (37.3 °C)Temp  Avg: 98 °F (36.7 °C)  Min: 96.8 °F (36 °C)  Max: 99.2 °F (52.7 °C) BP Systolic (95SNF), AWZ:653 , Min:112 , TOR:295   Diastolic (54RYC), RXS:86, Min:55, Max:64   Pulse Pulse  Av.8  Min: 72  Max: 96 Resp Resp  Av.4  Min: 14  Max: 24 Pulse ox SpO2  Av.7 %  Min: 94 %  Max: 98 %  GENERAL: Oriented to self but not place, time or events, overall well-appearing in no acute distress, nontoxic  NEURO: CNII-XII grossly intact, no focal neurological deficits    HEENT: Trachea midline, no JVD, laceration to the left temporal region no active bleeding  LUNGS: Equal chest rise and fall, speaking full sentences, no acute distress  HEART: Regular rate and rhythm  ABDOMEN: Soft, nontender, no rebound or guarding  EXTREMITY: Moves all extremities equally  I/O last 3 completed shifts: In: 3121 [P.O.:480; I.V.:2641]  Out: 2950 [Urine:2950]    Drain/tube output:  In:  [P.O.:480; I.V.:1547]  Out: 8061 [Urine:2450]    LAB:  CBC:   Recent Labs     05/15/22  1625 22  0742 22  0428   WBC 7.4 7.3 9.4   HGB 13.6 12.4 12.0   HCT 40.5 37.9 34.8*   MCV 96.2 97.4 96.1    363 374     BMP:   Recent Labs     05/15/22  1625 22  0742 22  0428    135 137   K 3.4* 3.4* 3.5*    103 107   CO2 24 18* 21   BUN 15 12 11   CREATININE 0.83 0.69 0.80   GLUCOSE 114* 108* 100*     COAGS:   Recent Labs     05/15/22  1625   PROT 7.1       RADIOLOGY:  CT HEAD WO CONTRAST    Result Date: 5/15/2022  EXAMINATION: CT OF THE HEAD WITHOUT CONTRAST  5/15/2022 4:38 pm TECHNIQUE: CT of the head was performed without the administration of intravenous contrast. Automated exposure control, iterative reconstruction, and/or weight based adjustment of the mA/kV was utilized to reduce the radiation dose to as low as reasonably achievable.  COMPARISON: 10/12/2015 HISTORY: ORDERING SYSTEM PROVIDED HISTORY: Fall TECHNOLOGIST PROVIDED HISTORY: Fall Decision Support Exception - unselect if not a suspected or confirmed emergency medical condition->Emergency Medical Condition (MA) Reason for Exam: fall FINDINGS: BRAIN/VENTRICLES: There is no acute intracranial hemorrhage, mass effect or midline shift. No abnormal extra-axial fluid collection. The gray-white differentiation is maintained without evidence of an acute infarct. There is no evidence of hydrocephalus. Left-sided encephalomalacia likely related to remote MCA distribution stroke. Otherwise chronic microvascular disease and involutional change. Vascular calcifications. ORBITS: The visualized portion of the orbits demonstrate no acute abnormality. SINUSES: Mild paranasal sinus mucosal thickening. Mastoids are clear. SOFT TISSUES/SKULL:  No acute abnormality of the visualized skull or soft tissues. No acute intracranial abnormality. Encephalomalacia likely related to prior left MCA distribution stroke. Otherwise chronic microvascular disease. CT CERVICAL SPINE WO CONTRAST    Result Date: 5/15/2022  EXAMINATION: CT OF THE LUMBAR SPINE WITHOUT CONTRAST; CT OF THE CERVICAL SPINE WITHOUT CONTRAST; CT OF THE THORACIC SPINE WITHOUT CONTRAST  5/15/2022 TECHNIQUE: CT of the lumbar spine was performed without the administration of intravenous contrast. Multiplanar reformatted images are provided for review. Adjustment of mA and/or kV according to patient size was utilized. Automated exposure control, iterative reconstruction, and/or weight based adjustment of the mA/kV was utilized to reduce the radiation dose to as low as reasonably achievable.; CT of the cervical spine was performed without the administration of intravenous contrast. Multiplanar reformatted images are provided for review.  Automated exposure control, iterative reconstruction, and/or weight based adjustment of the mA/kV was utilized to reduce the radiation dose to as low as reasonably achievable.; CT of the thoracic spine was performed without the administration of intravenous contrast. Multiplanar reformatted images are provided for review. Automated exposure control, iterative reconstruction, and/or weight based adjustment of the mA/kV was utilized to reduce the radiation dose to as low as reasonably achievable. COMPARISON: None HISTORY: ORDERING SYSTEM PROVIDED HISTORY: fall, pain TECHNOLOGIST PROVIDED HISTORY: fall, pain Decision Support Exception - unselect if not a suspected or confirmed emergency medical condition->Emergency Medical Condition (MA) Reason for Exam: fall FINDINGS: Cervical: BONES/ALIGNMENT: There is no acute fracture or traumatic malalignment. DEGENERATIVE CHANGES: Multilevel disc and facet degenerative disease most pronounced at C4-C5 and C5-C6. SOFT TISSUES: There is no prevertebral soft tissue swelling. Thoracic and lumbar: BONES/ALIGNMENT: Moderate levoscoliosis of upper to midthoracic spine and mild dextroscoliosis of thoracolumbar junction. Multiple compression fractures throughout the thoracic and lumbar spine. Some of these fractures have chronic appearance such as superior endplate compression fracture T12, T10 and T7, L2 and L3 with 20%, 20%, 30%, 30% and 50% height loss respectively. However superior endplate compression fracture of L1 with 5 mm retropulsion into the spinal canal is age indeterminate and may be acute in etiology. There is associated moderate canal stenosis at T12-L1. DEGENERATIVE CHANGES: Multilevel disc and facet degenerative disease most pronounced at L4-L5. SOFT TISSUES: Partial visualization of 7 mm obstructing stone of the left proximal ureter which is associated with mild left-sided hydroureteronephrosis. Multiple nonobstructing kidney stones are also noted bilaterally. Cervical CT: No acute osseous abnormality. No acute fracture. CT of thoracic and lumbar spine: Multiple compression fractures throughout the thoracic and lumbar spine.  Some of these fractures have chronic appearance such as superior endplate compression fracture T12, T10 and T7, L2 and L3 with 20%, 20%, 30%, 30% and 50% height loss respectively. However superior endplate compression fracture of L1 with 5 mm retropulsion into the spinal canal is age indeterminate and may be acute in etiology. There is associated moderate canal stenosis at T12-L1. For further evaluation of this fracture either comparison with prior examination or lumbar spine MRI can be obtained. Moderate levoscoliosis of upper to midthoracic spine and mild dextroscoliosis of thoracolumbar junction Partial visualization of 7 mm obstructing stone of the left proximal ureter which is associated with mild left-sided hydroureteronephrosis. Multiple nonobstructing kidney stones are also noted bilaterally. RECOMMENDATIONS: Unavailable     CT THORACIC SPINE WO CONTRAST    Result Date: 5/15/2022  EXAMINATION: CT OF THE LUMBAR SPINE WITHOUT CONTRAST; CT OF THE CERVICAL SPINE WITHOUT CONTRAST; CT OF THE THORACIC SPINE WITHOUT CONTRAST  5/15/2022 TECHNIQUE: CT of the lumbar spine was performed without the administration of intravenous contrast. Multiplanar reformatted images are provided for review. Adjustment of mA and/or kV according to patient size was utilized. Automated exposure control, iterative reconstruction, and/or weight based adjustment of the mA/kV was utilized to reduce the radiation dose to as low as reasonably achievable.; CT of the cervical spine was performed without the administration of intravenous contrast. Multiplanar reformatted images are provided for review. Automated exposure control, iterative reconstruction, and/or weight based adjustment of the mA/kV was utilized to reduce the radiation dose to as low as reasonably achievable.; CT of the thoracic spine was performed without the administration of intravenous contrast. Multiplanar reformatted images are provided for review.  Automated exposure control, iterative reconstruction, and/or weight based adjustment of the mA/kV was utilized to reduce the radiation dose to as low as reasonably achievable. COMPARISON: None HISTORY: ORDERING SYSTEM PROVIDED HISTORY: fall, pain TECHNOLOGIST PROVIDED HISTORY: fall, pain Decision Support Exception - unselect if not a suspected or confirmed emergency medical condition->Emergency Medical Condition (MA) Reason for Exam: fall FINDINGS: Cervical: BONES/ALIGNMENT: There is no acute fracture or traumatic malalignment. DEGENERATIVE CHANGES: Multilevel disc and facet degenerative disease most pronounced at C4-C5 and C5-C6. SOFT TISSUES: There is no prevertebral soft tissue swelling. Thoracic and lumbar: BONES/ALIGNMENT: Moderate levoscoliosis of upper to midthoracic spine and mild dextroscoliosis of thoracolumbar junction. Multiple compression fractures throughout the thoracic and lumbar spine. Some of these fractures have chronic appearance such as superior endplate compression fracture T12, T10 and T7, L2 and L3 with 20%, 20%, 30%, 30% and 50% height loss respectively. However superior endplate compression fracture of L1 with 5 mm retropulsion into the spinal canal is age indeterminate and may be acute in etiology. There is associated moderate canal stenosis at T12-L1. DEGENERATIVE CHANGES: Multilevel disc and facet degenerative disease most pronounced at L4-L5. SOFT TISSUES: Partial visualization of 7 mm obstructing stone of the left proximal ureter which is associated with mild left-sided hydroureteronephrosis. Multiple nonobstructing kidney stones are also noted bilaterally. Cervical CT: No acute osseous abnormality. No acute fracture. CT of thoracic and lumbar spine: Multiple compression fractures throughout the thoracic and lumbar spine. Some of these fractures have chronic appearance such as superior endplate compression fracture T12, T10 and T7, L2 and L3 with 20%, 20%, 30%, 30% and 50% height loss respectively.  However superior endplate compression fracture of L1 with 5 mm retropulsion into the spinal canal is age indeterminate and may be acute in etiology. There is associated moderate canal stenosis at T12-L1. For further evaluation of this fracture either comparison with prior examination or lumbar spine MRI can be obtained. Moderate levoscoliosis of upper to midthoracic spine and mild dextroscoliosis of thoracolumbar junction Partial visualization of 7 mm obstructing stone of the left proximal ureter which is associated with mild left-sided hydroureteronephrosis. Multiple nonobstructing kidney stones are also noted bilaterally. RECOMMENDATIONS: Unavailable     CT LUMBAR SPINE WO CONTRAST    Result Date: 5/15/2022  EXAMINATION: CT OF THE LUMBAR SPINE WITHOUT CONTRAST; CT OF THE CERVICAL SPINE WITHOUT CONTRAST; CT OF THE THORACIC SPINE WITHOUT CONTRAST  5/15/2022 TECHNIQUE: CT of the lumbar spine was performed without the administration of intravenous contrast. Multiplanar reformatted images are provided for review. Adjustment of mA and/or kV according to patient size was utilized. Automated exposure control, iterative reconstruction, and/or weight based adjustment of the mA/kV was utilized to reduce the radiation dose to as low as reasonably achievable.; CT of the cervical spine was performed without the administration of intravenous contrast. Multiplanar reformatted images are provided for review. Automated exposure control, iterative reconstruction, and/or weight based adjustment of the mA/kV was utilized to reduce the radiation dose to as low as reasonably achievable.; CT of the thoracic spine was performed without the administration of intravenous contrast. Multiplanar reformatted images are provided for review. Automated exposure control, iterative reconstruction, and/or weight based adjustment of the mA/kV was utilized to reduce the radiation dose to as low as reasonably achievable.  COMPARISON: None HISTORY: ORDERING SYSTEM PROVIDED HISTORY: fall, pain TECHNOLOGIST PROVIDED HISTORY: fall, pain Decision Support Exception - unselect if not a suspected or confirmed emergency medical condition->Emergency Medical Condition (MA) Reason for Exam: fall FINDINGS: Cervical: BONES/ALIGNMENT: There is no acute fracture or traumatic malalignment. DEGENERATIVE CHANGES: Multilevel disc and facet degenerative disease most pronounced at C4-C5 and C5-C6. SOFT TISSUES: There is no prevertebral soft tissue swelling. Thoracic and lumbar: BONES/ALIGNMENT: Moderate levoscoliosis of upper to midthoracic spine and mild dextroscoliosis of thoracolumbar junction. Multiple compression fractures throughout the thoracic and lumbar spine. Some of these fractures have chronic appearance such as superior endplate compression fracture T12, T10 and T7, L2 and L3 with 20%, 20%, 30%, 30% and 50% height loss respectively. However superior endplate compression fracture of L1 with 5 mm retropulsion into the spinal canal is age indeterminate and may be acute in etiology. There is associated moderate canal stenosis at T12-L1. DEGENERATIVE CHANGES: Multilevel disc and facet degenerative disease most pronounced at L4-L5. SOFT TISSUES: Partial visualization of 7 mm obstructing stone of the left proximal ureter which is associated with mild left-sided hydroureteronephrosis. Multiple nonobstructing kidney stones are also noted bilaterally. Cervical CT: No acute osseous abnormality. No acute fracture. CT of thoracic and lumbar spine: Multiple compression fractures throughout the thoracic and lumbar spine. Some of these fractures have chronic appearance such as superior endplate compression fracture T12, T10 and T7, L2 and L3 with 20%, 20%, 30%, 30% and 50% height loss respectively. However superior endplate compression fracture of L1 with 5 mm retropulsion into the spinal canal is age indeterminate and may be acute in etiology. There is associated moderate canal stenosis at T12-L1.   For further evaluation of this fracture either comparison with prior examination or lumbar spine MRI can be compression fractures and Schmorl's node disease T10 through L3. Multiple compression fractures as above, age indeterminate. XR CHEST PORTABLE    Result Date: 5/15/2022  EXAMINATION: ONE XRAY VIEW OF THE CHEST 5/15/2022 4:18 pm COMPARISON: 10/12/2015. HISTORY: ORDERING SYSTEM PROVIDED HISTORY: fall. TECHNOLOGIST PROVIDED HISTORY: fall. FINDINGS: The cardiomediastinal silhouette is unremarkable. Aortic vascular calcification. Mild blunting of the right lateral costophrenic sulcus, possibly small effusion and or atelectasis. The lungs otherwise are clear. No acute infiltrate or evidence of overt failure. Mild dependent changes at the right lateral lung base, possibly small effusion and/or atelectasis. Otherwise unremarkable chest.     CT CHEST PULMONARY EMBOLISM W CONTRAST    Result Date: 5/15/2022  EXAMINATION: CTA OF THE CHEST 5/15/2022 6:33 pm TECHNIQUE: CTA of the chest was performed after the administration of intravenous contrast.  Multiplanar reformatted images are provided for review. MIP images are provided for review. Automated exposure control, iterative reconstruction, and/or weight based adjustment of the mA/kV was utilized to reduce the radiation dose to as low as reasonably achievable. COMPARISON: None. HISTORY: ORDERING SYSTEM PROVIDED HISTORY: r/o PE.  fall TECHNOLOGIST PROVIDED HISTORY: r/o PE.  fall Decision Support Exception - unselect if not a suspected or confirmed emergency medical condition->Emergency Medical Condition (MA) Reason for Exam: r/o pe FINDINGS: Pulmonary Arteries: Pulmonary arteries are adequately opacified for evaluation. No evidence of intraluminal filling defect to suggest pulmonary embolism. Main pulmonary artery is normal in caliber. Mediastinum: No evidence of mediastinal lymphadenopathy. The heart and pericardium demonstrate no acute abnormality. There is no acute abnormality of the thoracic aorta. Coronary artery disease. Cardiomegaly.  Lungs/pleura: 6 mm right middle lobe nodule adjacent to the pericardium on image number 71 right middle lobe consolidations. Subsegmental atelectasis bilaterally. Upper Abdomen: Limited images of the upper abdomen are unremarkable. Soft Tissues/Bones: Moderate dextroconvex scoliosis thoracolumbar spine. Multiple compression fractures T7 through L1. Right middle lobe airspace disease. 6 mm pleural based pulmonary nodule right middle lobe. Coronary artery disease. Multiple compression fractures age indeterminate. RECOMMENDATIONS: Fleischner Society guidelines for follow-up and management of incidentally detected pulmonary nodules: Single Solid Nodule: Nodule size equals 6-8 mm In a low-risk patient, CT at 6-12 months, then consider CT at 18-24 months. In a high-risk patient, CT at 6-12 months, then CT at 18-24 months. . - Low risk patients include individuals with minimal or absent history of smoking and other known risk factors. - High risk patients include individuals with a history or smoking or known risk factors. Radiology 2017 http://pubs. rsna.org/doi/full/10.1148/radiol. 4664381894     FLUORO FOR SURGICAL PROCEDURES    Result Date: 5/16/2022  Radiology exam is complete. No Radiologist dictation. Please follow up with ordering provider. Ana Diaz DO  5/17/22, 7:13 AM     Attestation signed by Marcella Malhotra MD    I personally evaluated the patient and directed the medical decision making with Resident/GEOVANNA after the physical/radiologic exam and laboratory values were reviewed and confirmed. Awaiting Echo, cardiology final recs.      Marcella Malhotra MD

## 2022-05-17 NOTE — PROGRESS NOTES
[address] [facility] Pharmacokinetic Monitoring Service - Vancomycin     Carlos Rowan is a 64 y.o. female starting on vancomycin therapy for UTI. Pharmacy consulted by Dr. Kylie Centeno for monitoring and adjustment. Target Concentration: Goal trough of 10-15 mg/L and AUC/KATHRYN <500 mg*hr/L    Additional Antimicrobials:     Pertinent Laboratory Values: Wt Readings from Last 1 Encounters:   05/16/22 135 lb (61.2 kg)     Temp Readings from Last 1 Encounters:   05/17/22 98.5 °F (36.9 °C) (Oral)     Estimated Creatinine Clearance: 56 mL/min (based on SCr of 0.8 mg/dL). Recent Labs     05/16/22  0742 05/16/22  0742 05/17/22  0428 05/17/22  1520   CREATININE 0.69  --  0.80  --    WBC 7.3   < > 9.4 8.3    < > = values in this interval not displayed. Procalcitonin:     Pertinent Cultures:  Culture Date Source Results        MRSA Nasal Swab: N/A. Non-respiratory infection.     Plan:  Dosing recommendations based on Bayesian software  Start vancomycin 1250mg q24  Anticipated AUC of 473 and trough concentration of 13.5 at steady state  Renal labs as indicated   Pharmacy will continue to monitor patient and adjust therapy as indicated    Thank you for the consult,  Naila Eastman, [facility]  5/17/2022 6:41 PM

## 2022-05-17 NOTE — PROGRESS NOTES
April Paul, [address] [facility], [address], [place]  Urology Progress Note     Subjective:    Dawson catheter overnight, it was replaced draining clear yellow  AFVSS  Patient is A/O x 1  UOP 2400 cc/24 hours  Status post cystoscopy and stent placement yesterday    Patient Vitals for the past 24 hrs:   BP Temp Temp src Pulse Resp SpO2   05/17/22 0731 90/67 98.6 °F (37 °C) Oral -- -- --   05/16/22 1941 120/62 99.2 °F (37.3 °C) Oral 96 24 --   05/16/22 1540 112/63 99.2 °F (37.3 °C) Oral 85 17 97 %   05/16/22 1039 125/64 97.6 °F (36.4 °C) Oral 75 14 94 %   05/16/22 1015 (!) 125/58 97.8 °F (36.6 °C) Temporal 72 16 94 %   05/16/22 1000 130/60 -- -- 72 15 98 %   05/16/22 0945 119/64 -- -- 72 15 94 %   05/16/22 0935 (!) 114/55 97.3 °F (36.3 °C) Temporal 73 14 98 %   05/16/22 0846 (!) 123/57 96.8 °F (36 °C) Temporal 77 16 95 %       Intake/Output Summary (Last 24 hours) at 5/17/2022 0743  Last data filed at 5/17/2022 3964  Gross per 24 hour   Intake 2027 ml   Output 2450 ml   Net -423 ml       Recent Labs     05/15/22  1625 05/16/22  0742 05/17/22  0428   WBC 7.4 7.3 9.4   HGB 13.6 12.4 12.0   HCT 40.5 37.9 34.8*   MCV 96.2 97.4 96.1    363 374     Recent Labs     05/15/22  1625 05/16/22  0742 05/17/22  0428    135 137   K 3.4* 3.4* 3.5*    103 107   CO2 24 18* 21   BUN 15 12 11   CREATININE 0.83 0.69 0.80       Recent Labs     05/16/22  0405   COLORU Yellow   PHUR 7.5   WBCUA 5 TO 10   RBCUA 2 TO 5   BACTERIA MANY*   SPECGRAV 1.048*   LEUKOCYTESUR TRACE*   UROBILINOGEN Normal   BILIRUBINUR NEGATIVE       Additional Lab/culture results:    Physical Exam:  General: A/O x 1  Head: atraumatic, normocephalic   HEENT: moist membranes, PERRLA, EOMI  Respiratory: normal respiratory effort on room air  Cardiac: regular rate  Abdomen: soft, nontender, nondistended  Extremities: moves all extremities  Psych: appropriate mood     Interval Imaging Findings:    Impression:    64year old female   Active

## 2022-05-17 NOTE — PROGRESS NOTES
Occupational Therapy  Facility/Department: [address] ORTHO/MED SURG  Occupational Therapy Initial Assessment    Name: Andres Charles  : 1945  MRN: [id number]  Date of Service: 2022    Discharge Recommendations:  Patient would benefit from continued therapy after discharge      Chief Complaint   Patient presents with    Fall       Patient Diagnosis(es): The primary encounter diagnosis was Closed head injury, initial encounter. Diagnoses of Facial laceration, initial encounter and NSTEMI (non-ST elevated myocardial infarction) [facility]) were also pertinent to this visit. Past Medical History:  has a past medical history of Anxiety, Convulsion ([facility] [address] Encephalopathy, Herpes, Hyperlipidemia, Left bundle branch block, MRSA (methicillin resistant Staphylococcus aureus), Parkinson's disease ([facility] [address] Seizures ([facility] [address] and Vitamin D deficiency. Past Surgical History:  has a past surgical history that includes Abscess Drainage (Right, 2013) and Ureter stent placement (Left, 2022). Treatment Diagnosis: Closed compression fracture of L1 lumbar vertebra, initial encounter    Assessment   Performance deficits / Impairments: Decreased functional mobility ; Decreased endurance;Decreased balance;Decreased ROM; Decreased strength;Decreased high-level IADLs;Decreased safe awareness;Decreased cognition  Assessment: Pt would benefit from further therapy to address deficits and increase performance/safety with daily activities.   Treatment Diagnosis: Closed compression fracture of L1 lumbar vertebra, initial encounter  Prognosis: Good  Decision Making: Medium Complexity  REQUIRES OT FOLLOW-UP: Yes  Activity Tolerance  Activity Tolerance: Treatment limited secondary to decreased cognition;Patient Tolerated treatment well  Activity Tolerance Comments: Pt motivated to participate with encouragement but limited during session d/t baseline dementia and not entirely understanding role of therapy despite education, politely reporting \"I just want to get back in bed please\"        Plan   Plan  Times per Week: 3-4x     Restrictions  Restrictions/Precautions  Restrictions/Precautions: Fall Risk  Position Activity Restriction  Other position/activity restrictions: up with assistance, baseline dementia    Subjective   General  Patient assessed for rehabilitation services?: Yes  Family / Caregiver Present: Yes (dtr present during most of session, with pt upon departure)  General Comment  Comments: RN ok'd pt for therapy this date, pt agreeable and cooperative with encouragement and cues throughout session  Pain: Pt with no report of pain throughout session  Social/Functional History  Social/Functional History  Lives With: Daughter  Type of Home: House  Home Layout: One level  Home Access: Stairs to enter without rails  Entrance Stairs - Number of Steps: 1 ELIZABETH back door, 2 ELIZABETH front door, pt uses back door  Bathroom Shower/Tub: Tub/Shower unit  Bathroom Equipment: Shower chair  Home Equipment: Walker, rolling (dtr reports having walker from previous sx)  Has the patient had two or more falls in the past year or any fall with injury in the past year?: Yes (resulting in compression fxs to spine)  Receives Help From: Family  Homemaking Assistance: Needs assistance  Homemaking Responsibilities: No (dtr completes, pt assists dtr intermittently with light responsibilities as able/as desired)  Ambulation Assistance: Independent (no DME at baseline)  Transfer Assistance: Needs assistance (for vehicle transfers, otherwise independent)  Active : No  Mode of Transportation: [place]   Occupation: Retired  Type of Occupation: Shipping department  Leisure & Hobbies: Judaism/Holiness, playing cards  Additional Comments: Dtr answering social-functional questions d/t pt's baseline dementia. Pt with respite care on weekends, will be with respite care for 2 months in a row during the summer when dtr's son is off of school.  Pt at adult day care when dtr is at work (dtr reports working full time). Dtr supervises for all transfers, sets up and supervises dressing/showering ADLs. Dtr mentioning desire for pt to rehab after D/C. Pt has 24/7 care/supervision between dtr, respite care, and adult day care     Objective   O2 Device: None (Room air)  Vision Exceptions: Wears glasses at all times  Hearing: Within functional limits       Observation/Palpation  Posture: Fair  Observation: resting tremor noted in bilateral UE'S and LE's  Safety Devices  Type of Devices: Bed alarm in place;Call light within reach;Gait belt;Left in bed;Sitter present; Patient at risk for falls (dtr in room)  Restraints  Restraints Initially in Place: No  Balance  Sitting:  (Pt seated ~15 min EOB for LB dressing, UE assessment, and preparing to transer. SBA for static balance, CGA for dynamic balance with mild unsteadiness and posterior lean demo'd with LB dressing)  Standing:  (Pt stood ~5 min bedside and func mob short community distances and household distances with RW support. CGA static balance, min A dynamic balance)  Gait  Overall Level of Assistance: Minimum assistance (with RW short community distances and household distances min A for walker navigation and navigating objects in the environment, pt demo L lean which dtr reports is baseline)     AROM: Generally decreased, functional  Strength: Generally decreased, functional (RUE grossly 4+/5, B/L hands 4/5, L shoulder 3-/5, L elbow grossly 4+/5)  Coordination: Generally decreased, functional (Pt with B/L hand tremors at baseline, able to manage utensils for self-feeding)  Tone: Normal  Sensation: Intact (pt with no report of numbness/tingling)  ADL  Feeding: Supervision;Setup; Increased time to complete;Verbal cueing (Pt self-fed long-sitting in bed SUP with setup from daughter, including cutting of food.)  Grooming: Verbal cueing;Setup; Increased time to complete;Stand by assistance  UE Bathing: Contact guard assistance;Setup; Increased time to complete;Verbal cueing  LE Bathing: Setup;Verbal cueing; Increased time to complete;Minimal assistance  UE Dressing: Verbal cueing;Setup; Increased time to complete;Contact guard assistance  LE Dressing: Setup;Verbal cueing; Increased time to complete;Minimal assistance (Pt donned B/L socks seated EOB with increased time/effort to complete and figure-4 technique, CGA for dynamic sitting balance. Pt to need min A for donning/doffing underwear/pants)  Toileting: Increased time to complete;Minimal assistance  Additional Comments: VCs and at least SUP or greater for all ADLs d/t baseline dementia. Activity Tolerance  Activity Tolerance: Patient limited by fatigue;Treatment limited secondary to decreased cognition  Bed mobility  Rolling to Right: Supervision  Supine to Sit: Contact guard assistance  Sit to Supine: Contact guard assistance  Bed Mobility Comments: SUP/CGA for safety and min unsteadiness  Transfers  Sit to stand: Contact guard assistance  Stand to sit: Contact guard assistance  Transfer Comments: Increased time/effort to complete, transfer from EOB with RW, VCs for hand placement     Cognition  Overall Cognitive Status: Exceptions  Following Commands: Inconsistently follows commands; Follows one step commands with repetition; Follows multistep commands with increased time  Attention Span: Difficulty dividing attention; Difficulty attending to directions  Memory: Decreased short term memory;Decreased recall of recent events;Decreased recall of biographical Information  Safety Judgement: Decreased awareness of need for safety;Decreased awareness of need for assistance  Problem Solving: Decreased awareness of errors;Assistance required to correct errors made;Assistance required to generate solutions;Assistance required to identify errors made;Assistance required to implement solutions  Insights: Not aware of deficits  Initiation: Requires cues for some  Sequencing: Requires cues for some  Cognition Comment: Pt with baseline dementia. Max verbal and tactile cues required for hand placement during functional transfers, use/navigation of RW. Orientation Status: oriented to self, disoriented to place and time     Education Given To: Patient; Family (dtr)  Education Provided Comments: Pt and dtr educated on OT role, plan of care, benefits of increased activity and further therapy, and safe transfer technique and functional mobility. Good return by dtr, fair return by pt d/t baseline dementia. Education Method: Demonstration;Verbal  Barriers to Learning: Cognition (baseline dementia)  Education Outcome: Verbalized understanding;Demonstrated understanding;Continued education needed  LUE AROM (degrees)  LUE AROM : Exceptions  LUE General AROM: Dtr reports pt having L humerus fx last year sometime, residual AROM deficits  L Shoulder Flexion 0-180: ~0-120  L Elbow Flexion 0-145: WFL  L Elbow Extension 145-0: WFL  Left Hand AROM (degrees)  Left Hand AROM: WFL  RUE AROM (degrees)  RUE AROM : WFL  Right Hand AROM (degrees)  Right Hand AROM: WFL     Hand Dominance  Hand Dominance: Right     AM-PAC Score  AM-[facility] Inpatient Daily Activity Raw Score: 18 (05/17/22 1331)  AM-PAC Inpatient ADL T-Scale Score : 38.66 (05/17/22 1331)  ADL Inpatient CMS 0-100% Score: 46.65 (05/17/22 1331)  ADL Inpatient CMS G-Code Modifier : CK (05/17/22 1331)    Goals  Short Term Goals  Time Frame for Short term goals: Pt will, by discharge  Short Term Goal 1: Demo SUP for all UB ADLs, with AE/adaptive techniques PRN  Short Term Goal 2: Demo SBA for all LB/toileting ADLs, with AE/adaptive techniques PRN  Short Term Goal 3: Demo good safety awareness during all bed/func mob and transfers with 2 or fewer VCs  Short Term Goal 4: Demo SUP for all bed mobility to promote engagement in ADLs  Short Term Goal 5: Demo SBA for all func mob and transfers, including toilet transfers, with LRD.   Short Term Goal 6: Demo ability to complete 4 step simple ADL with setup and no more than 1 VC for initiating/sequencing. Short Term Goal 7: Demo dynamic standing balance CGA for 5+ min during functional activity with 0 seated rest breaks and LRD for increased activity tolerance.      Therapy Time   Individual Concurrent Group Co-treatment   Time In 1011         Time Out 1042         Minutes 31         Timed Code Treatment Minutes: 8 Minutes   Co-eval with PT     Shelly Wood OTS

## 2022-05-17 NOTE — PROGRESS NOTES
[facility]  Progress Note                   Date:   5/17/2022  Patient name: Eduarda Guan  Date of admission:  5/15/2022  4:08 PM  MRN:   [id number]  YOB: 1945  PCP: Robi Manley DO    Reason for Admission: NSTEMI (non-ST elevated myocardial infarction) ([facility] [I21.4]  Facial laceration, initial encounter [S01.81XA]  Closed head injury, initial encounter [S09.90XA]  Closed compression fracture of L1 lumbar vertebra, initial encounter ([facility] [address] [S32.010A]    Subjective:       Clinical Changes /Abnormalities: Patient seen and examined with daughter at bedside . Denies chest pain or shortness of breath. Tele/vitals/labs reviewed . Discussed case with RN. Review of Systems    Medications:   Scheduled Meds:   cefTRIAXone (ROCEPHIN) IV  1,000 mg IntraVENous Q24H    tamsulosin  0.4 mg Oral Daily    polyethylene glycol  17 g Oral Daily    acetaminophen  1,000 mg Oral 3 times per day     Continuous Infusions:  CBC:   Recent Labs     05/15/22  1625 05/16/22  0742 05/17/22  0428   WBC 7.4 7.3 9.4   HGB 13.6 12.4 12.0    363 374     BMP:    Recent Labs     05/15/22  1625 05/16/22  0742 05/17/22  0428    135 137   K 3.4* 3.4* 3.5*    103 107   CO2 24 18* 21   BUN 15 12 11   CREATININE 0.83 0.69 0.80   GLUCOSE 114* 108* 100*     Hepatic:  Recent Labs     05/15/22  1625   AST 15   ALT 9   BILITOT 0.29*   ALKPHOS 184*     Troponin:   Recent Labs     05/16/22  0259 05/16/22  0742 05/16/22  1331   TROPHS 40* 44* 43*     BNP: No results for input(s): BNP in the last 72 hours. Lipids: No results for input(s): CHOL, HDL in the last 72 hours. Invalid input(s): LDLCALCU  INR: No results for input(s): INR in the last 72 hours. DATA:    Diagnostics:    EKG: normal sinus rhythm with 1st degree AV block, incomplete LBBB  ECHO: ordered, but not yet obtained. Stress Test: reviewed. Cardiac Angiography: not obtained.     Objective:   Vitals: /62   Pulse 96   Temp 99.1 °F (37.3 °C) (Oral)   Resp 24   Ht 5' 7\" (1.702 m)   Wt 135 lb (61.2 kg)   SpO2 97%   BMI 21.14 kg/m²   General appearance: alert and cooperative with exam  HEENT: Head: Normocephalic, no lesions, without obvious abnormality. Neck:no JVD, trachea midline, no adenopathy  Lungs: Clear to auscultation  Heart: Regular rate and rhythm, s1/s2 auscultated, no murmurs  Abdomen: soft, non-tender, bowel sounds active  Extremities: no edema  Neurologic: not done        Assessment / Acute Cardiac Problems:   1. Elevated tropes possible type II . EKG incomplete LBBB with first-degree heart block no change since 2015  1. Elevated troponins- down-trending now (Troponin 49-->47-->40)  2. Multiple compression fracture- thoracic and lumbar spine- undetermined age  1. Obstructing left proximal ureteral stone with hydronephrosis   4. Hx of DVT  5. Hx of seizures  6. HLD  7. UTI  8. Parkinson's disease       Patient Active Problem List:     Seizure [facility])     Vitamin D deficiency     Anxiety     Hypercholesteremia     Cellulitis of right leg     MRSA (methicillin resistant staph aureus) culture positive     Breakthrough seizure ([address]     Memory loss     Difficulty speaking     Seizure disorder ([address]     Dementia with behavioral disturbance ([facility] [address]     History of encephalitis     Dementia due to Parkinson's disease with behavioral disturbance ([address]     Acute deep vein thrombosis (DVT) of proximal vein of left lower extremity (HCC)     Closed compression fracture of L1 lumbar vertebra, initial encounter ([facility] [address]      Plan of Treatment:   1. Cardiac stable -denies chest pain, hemodynamically stable  2. Awaiting echo if low risk no further cardiac work-up needed  3.  Keep K>4, Mg.2    Electronically signed by LAURIE Amin NP on 5/17/2022 at 2:52 PM  [address]  [phone]

## 2022-05-17 NOTE — PROGRESS NOTES
Pt heart rate in the 140 and blood pressure 84/62. Dr Sean Rush paged via perfect serve and order for ekg  received. Dr Zaina Grant at bedside. And read ekg and states to give LR bolus , labs ordered. Dr Zaina Grant verbal order to give Iv  6 mg of adenosine and heart rate went down 103. Hr steadily increased to 140 and   B/p 110/72 Dr Zaina Grant verbal order of iv 12 mg  adenosine and given per writer. Pt heart rate 105-110 and B/p 117/66. Pt remains awake and denies any discomfort Report given to [facility] on 4 B. Pt transferred to room 426 with nurse and transporters.

## 2022-05-17 NOTE — CARE COORDINATION
Transitional Plannin: Patient's Daughter thinks patient needs more assistance than she can provide at present time. [facility] where patient has respite to see if she could go skilled side for rehab. They stated they could and are working on obtaining Auth. Updated Daughter, Robinson Bucio.

## 2022-05-17 NOTE — PROGRESS NOTES
Physical Therapy  Facility/Department: [address] STEPDOWN  Physical Therapy Initial Assessment    Name: Gwen Waggoner  : 1945  MRN: [id number]  Date of Service: 2022    Discharge Recommendations: Further therapy recommended at discharge. PT Equipment Recommendations  Equipment Needed: No      Patient Diagnosis(es): The primary encounter diagnosis was Closed head injury, initial encounter. Diagnoses of Facial laceration, initial encounter and NSTEMI (non-ST elevated myocardial infarction) [facility]) were also pertinent to this visit. Past Medical History:  has a past medical history of Anxiety, Convulsion ([facility] [address] Encephalopathy, Herpes, Hyperlipidemia, Left bundle branch block, MRSA (methicillin resistant Staphylococcus aureus), Parkinson's disease ([facility] [address] Seizures ([facility] [address] and Vitamin D deficiency. Past Surgical History:  has a past surgical history that includes Abscess Drainage (Right, 2013); Ureter stent placement (Left, 2022); and Cystoscopy (Left, 2022). Assessment   Body Structures, Functions, Activity Limitations Requiring Skilled Therapeutic Intervention: Decreased ADL status; Decreased safe awareness;Decreased cognition;Decreased endurance;Decreased balance;Decreased posture  Assessment: Pt ambulated 150ft RW and minAx1. Pt is a fall risk and unsafe to return home at this time without 24 supervision due to decreased cognition. Pt would benefit from skilled PT d/t decreased balance and safety awareness, impaired cognition, and poor endurance.   Therapy Prognosis: Good  Decision Making: Low Complexity  Requires PT Follow-Up: Yes  Activity Tolerance  Activity Tolerance: Patient limited by fatigue;Treatment limited secondary to decreased cognition     Plan   Plan  Plan: 3-5 times per week  Current Treatment Recommendations: Balance training,Functional mobility training,ADL/Self-care training,Endurance training,Gait training,Stair training,Cognitive reorientation,Safety education & training,Therapeutic activities  Safety Devices  Type of Devices: Bed alarm in place,Call light within reach,Gait belt,Left in bed,Sitter present,Patient at risk for falls (dtr in room)  Restraints  Restraints Initially in Place: No     Restrictions  Restrictions/Precautions  Restrictions/Precautions: Fall Risk  Position Activity Restriction  Other position/activity restrictions: up with assistance, hx of dementia, L1 endplate fx, chronic T7, T10, T12, L2, and L3 compression fxs     Subjective   Pain: Pt with no report of pain throughout session  General  Patient assessed for rehabilitation services?: Yes  Response To Previous Treatment: Not applicable  Family / Caregiver Present: Yes (daughter)  Follows Commands: Impaired (maximal verbal and tactile cues required due to cognition)  Subjective  Subjective: RN and pt agreeable to PT eval. Pt supine in bed upon writer's enterance.  Pt pleasantly confused and cooperative throughout PT eval.         Social/Functional History  Social/Functional History  Lives With: Daughter  Type of Home: House  Home Layout: One level  Home Access: Stairs to enter without rails  Entrance Stairs - Number of Steps: 1 ELIZABETH back door, 2 ELIZABETH front door, pt uses back door  Bathroom Shower/Tub: Tub/Shower unit  Bathroom Equipment: Shower chair  Home Equipment: Walker, rolling (dtr reports having walker from previous sx)  Has the patient had two or more falls in the past year or any fall with injury in the past year?: Yes (resulting in compression fxs to spine)  Receives Help From: Family  Homemaking Assistance: Needs assistance  Homemaking Responsibilities: No (dtr completes, pt assists dtr intermittently as able/as desired)  Ambulation Assistance: Independent (no DME at baseline)  Transfer Assistance: Needs assistance (for vehicle transfers)  Active : No  Mode of Transportation: [employer]   Occupation: Retired  Type of Occupation: Shipping department  Leisure & Hobbies: Sikh/Judaism, playing cards  Additional Comments: Pt attends respite care on weekends and during the day while dtr is at work. dtr supervises for transfers, sets up and supervises dressing/showering  Vision/Hearing  Vision Exceptions: Wears glasses at all times  Hearing: Within functional limits    Cognition   Orientation  Overall Orientation Status: Impaired  Orientation Level: Disoriented to place  Cognition  Overall Cognitive Status: Exceptions  Following Commands: Inconsistently follows commands  Attention Span: Difficulty dividing attention  Memory: Decreased short term memory  Safety Judgement: Decreased awareness of need for safety;Decreased awareness of need for assistance  Problem Solving: Decreased awareness of errors;Assistance required to correct errors made  Insights: Not aware of deficits  Initiation: Requires cues for some  Sequencing: Requires cues for some  Cognition Comment: max verbal and tactile cues required for hand placement during functional transfers, use/navigation of RW.      Objective      Observation/Palpation  Posture: Fair  Observation: resting tremor noted in bilateral UE'S and LE's  Gross Assessment  Sensation:  (unable to formally assess d/t pt's cognition)     Joint Mobility  Spine: WFL  ROM RLE: WFL  ROM LLE: WFL  ROM RUE: WFL (formally assessed by OT)  ROM LUE: shoulder flexion limited d/t hx of humerus fx; elbow and wrist WFL (formally assessed by OT)  Strength RLE  Strength RLE: WFL  Strength LLE  Strength LLE: WFL  Strength RUE  Strength RUE: WFL  Strength LUE  Strength LUE: WFL        Bed mobility  Rolling to Right: Supervision  Supine to Sit: Contact guard assistance  Sit to Supine: Contact guard assistance  Transfers  Sit to Stand: Contact guard assistance  Stand to sit: Contact guard assistance  Ambulation  Surface: level tile  Device: Rolling Walker  Assistance: Minimal assistance  Gait Deviations:  (fast tiffany)  Distance: 150ft  Comments: pt unaware of obstacles and required maximal verbal and tactile cueing to negotiate RW. Pt required verbal cueing to decrease gait speed. Balance  Posture: Fair  Sitting - Static: Good;-  Sitting - Dynamic: Good;-  Standing - Static: Fair;+  Standing - Dynamic: Fair;+  Comments: standing balance assessed RW and CGA, sitting balance assessed EOB         AM-PAC Score  AM-PAC Inpatient Mobility Raw Score : 18 (05/17/22 1228)  AM-PAC Inpatient T-Scale Score : 43.63 (05/17/22 1228)  Mobility Inpatient CMS 0-100% Score: 46.58 (05/17/22 1228)  Mobility Inpatient CMS G-Code Modifier : CK (05/17/22 1228)          Goals  Short Term Goals  Time Frame for Short term goals: 14  Short term goal 1: Pt to perform ind. bed mobility with minimal verbal cues  Short term goal 2: Pt to perform ind. functional transfers with minimal verbal cues  Short term goal 3: Pt to ambulate 300ft with minimal verbal cues for negotiating obstacles and a decreased tiffany for fall prevention  Short term goal 4: Pt to ascend/descend 1 step ind with minimal verbal cues to replicate home setup       Education  Patient Education  Education Given To: Patient; Family  Education Provided: Role of Therapy;Plan of Care;Orientation;Precautions; Equipment; Fall Prevention Strategies  Education Method: Demonstration;Verbal  Barriers to Learning: Cognition  Education Outcome: Continued education needed      Therapy Time   Individual Concurrent Group Co-treatment   Time In 1005         Time Out 3134         Minutes 37         Timed Code Treatment Minutes: 8 Minutes       Suanne Rank    Evaluation/treatment performed by Student PT under the supervision of co-signing PT who agrees with all evaluation/treatment and documentation.

## 2022-05-17 NOTE — PROGRESS NOTES
Patient became tachycardic this afternoon. EKG with SVT. Adenosine given and patient transferred to stepdown. Cardiology called and arrived at bedside. Given additional adenosine, lopressor and started on amiodarone bolus plus drip due to continued SVT. Labs ordered - replace K, 4mg Mg given. Internal medicine consulted and accepted primary. Will transfer services to Dr. Li Brooks. Traumatic injuries - L1 superior endplate fracture - Neurosurgery recommended activity as tolerated and signed off.      Jorje Lazcano D.O. PGY-4  Department of Surgery  5/17/2022, 5:04 PM

## 2022-05-18 LAB
ANION GAP SERPL CALCULATED.3IONS-SCNC: 10 MMOL/L (ref 9–17)
ANION GAP SERPL CALCULATED.3IONS-SCNC: 8 MMOL/L (ref 9–17)
BUN BLDV-MCNC: 11 MG/DL (ref 8–23)
BUN BLDV-MCNC: 20 MG/DL (ref 8–23)
CALCIUM SERPL-MCNC: 7.9 MG/DL (ref 8.6–10.4)
CALCIUM SERPL-MCNC: 8.1 MG/DL (ref 8.6–10.4)
CARBAMAZEPINE LEVEL: 5.9 UG/ML (ref 4–12)
CHLORIDE BLD-SCNC: 103 MMOL/L (ref 98–107)
CHLORIDE BLD-SCNC: 105 MMOL/L (ref 98–107)
CO2: 19 MMOL/L (ref 20–31)
CO2: 24 MMOL/L (ref 20–31)
CREAT SERPL-MCNC: 0.76 MG/DL (ref 0.5–0.9)
CREAT SERPL-MCNC: 1.05 MG/DL (ref 0.5–0.9)
CULTURE: ABNORMAL
CULTURE: ABNORMAL
EKG ATRIAL RATE: 80 BPM
EKG P AXIS: 90 DEGREES
EKG P-R INTERVAL: 184 MS
EKG Q-T INTERVAL: 418 MS
EKG QRS DURATION: 126 MS
EKG QTC CALCULATION (BAZETT): 482 MS
EKG R AXIS: 59 DEGREES
EKG T AXIS: 98 DEGREES
EKG VENTRICULAR RATE: 80 BPM
GFR AFRICAN AMERICAN: >60 ML/MIN
GFR AFRICAN AMERICAN: >60 ML/MIN
GFR NON-AFRICAN AMERICAN: 51 ML/MIN
GFR NON-AFRICAN AMERICAN: >60 ML/MIN
GFR SERPL CREATININE-BSD FRML MDRD: ABNORMAL ML/MIN/{1.73_M2}
GFR SERPL CREATININE-BSD FRML MDRD: ABNORMAL ML/MIN/{1.73_M2}
GLUCOSE BLD-MCNC: 102 MG/DL (ref 70–99)
GLUCOSE BLD-MCNC: 112 MG/DL (ref 70–99)
HCT VFR BLD CALC: 33.9 % (ref 36.3–47.1)
HCT VFR BLD CALC: 34.1 % (ref 36.3–47.1)
HEMOGLOBIN: 11.1 G/DL (ref 11.9–15.1)
HEMOGLOBIN: 11.4 G/DL (ref 11.9–15.1)
LACTIC ACID, SEPSIS WHOLE BLOOD: 1 MMOL/L (ref 0.5–1.9)
MAGNESIUM: 2.9 MG/DL (ref 1.6–2.6)
MCH RBC QN AUTO: 32.3 PG (ref 25.2–33.5)
MCH RBC QN AUTO: 32.4 PG (ref 25.2–33.5)
MCHC RBC AUTO-ENTMCNC: 32.7 G/DL (ref 28.4–34.8)
MCHC RBC AUTO-ENTMCNC: 33.4 G/DL (ref 28.4–34.8)
MCV RBC AUTO: 96.9 FL (ref 82.6–102.9)
MCV RBC AUTO: 98.5 FL (ref 82.6–102.9)
MYOGLOBIN: 35 NG/ML (ref 25–58)
NRBC AUTOMATED: 0 PER 100 WBC
NRBC AUTOMATED: 0 PER 100 WBC
PDW BLD-RTO: 13.4 % (ref 11.8–14.4)
PDW BLD-RTO: 13.6 % (ref 11.8–14.4)
PHOSPHORUS: 3.3 MG/DL (ref 2.6–4.5)
PLATELET # BLD: 264 K/UL (ref 138–453)
PLATELET # BLD: 322 K/UL (ref 138–453)
PMV BLD AUTO: 8.7 FL (ref 8.1–13.5)
PMV BLD AUTO: 9 FL (ref 8.1–13.5)
POTASSIUM SERPL-SCNC: 3.8 MMOL/L (ref 3.7–5.3)
POTASSIUM SERPL-SCNC: 4.4 MMOL/L (ref 3.7–5.3)
RBC # BLD: 3.44 M/UL (ref 3.95–5.11)
RBC # BLD: 3.52 M/UL (ref 3.95–5.11)
SODIUM BLD-SCNC: 132 MMOL/L (ref 135–144)
SODIUM BLD-SCNC: 137 MMOL/L (ref 135–144)
SPECIMEN DESCRIPTION: ABNORMAL
SPECIMEN DESCRIPTION: ABNORMAL
TROPONIN, HIGH SENSITIVITY: 44 NG/L (ref 0–14)
TROPONIN, HIGH SENSITIVITY: 45 NG/L (ref 0–14)
WBC # BLD: 8.8 K/UL (ref 3.5–11.3)
WBC # BLD: 9.1 K/UL (ref 3.5–11.3)

## 2022-05-18 PROCEDURE — 6370000000 HC RX 637 (ALT 250 FOR IP): Performed by: SURGERY

## 2022-05-18 PROCEDURE — 6370000000 HC RX 637 (ALT 250 FOR IP): Performed by: STUDENT IN AN ORGANIZED HEALTH CARE EDUCATION/TRAINING PROGRAM

## 2022-05-18 PROCEDURE — 84100 ASSAY OF PHOSPHORUS: CPT

## 2022-05-18 PROCEDURE — 80048 BASIC METABOLIC PNL TOTAL CA: CPT

## 2022-05-18 PROCEDURE — 99222 1ST HOSP IP/OBS MODERATE 55: CPT | Performed by: INTERNAL MEDICINE

## 2022-05-18 PROCEDURE — 83735 ASSAY OF MAGNESIUM: CPT

## 2022-05-18 PROCEDURE — 84484 ASSAY OF TROPONIN QUANT: CPT

## 2022-05-18 PROCEDURE — 87040 BLOOD CULTURE FOR BACTERIA: CPT

## 2022-05-18 PROCEDURE — 2060000000 HC ICU INTERMEDIATE R&B

## 2022-05-18 PROCEDURE — 80157 ASSAY CARBAMAZEPINE FREE: CPT

## 2022-05-18 PROCEDURE — 85027 COMPLETE CBC AUTOMATED: CPT

## 2022-05-18 PROCEDURE — 2580000003 HC RX 258: Performed by: NURSE PRACTITIONER

## 2022-05-18 PROCEDURE — 83874 ASSAY OF MYOGLOBIN: CPT

## 2022-05-18 PROCEDURE — 93010 ELECTROCARDIOGRAM REPORT: CPT | Performed by: INTERNAL MEDICINE

## 2022-05-18 PROCEDURE — 83605 ASSAY OF LACTIC ACID: CPT

## 2022-05-18 PROCEDURE — 80156 ASSAY CARBAMAZEPINE TOTAL: CPT

## 2022-05-18 PROCEDURE — 99232 SBSQ HOSP IP/OBS MODERATE 35: CPT | Performed by: STUDENT IN AN ORGANIZED HEALTH CARE EDUCATION/TRAINING PROGRAM

## 2022-05-18 PROCEDURE — 6360000002 HC RX W HCPCS: Performed by: STUDENT IN AN ORGANIZED HEALTH CARE EDUCATION/TRAINING PROGRAM

## 2022-05-18 PROCEDURE — 99222 1ST HOSP IP/OBS MODERATE 55: CPT | Performed by: PSYCHIATRY & NEUROLOGY

## 2022-05-18 PROCEDURE — 36415 COLL VENOUS BLD VENIPUNCTURE: CPT

## 2022-05-18 PROCEDURE — 6370000000 HC RX 637 (ALT 250 FOR IP): Performed by: NURSE PRACTITIONER

## 2022-05-18 PROCEDURE — 93005 ELECTROCARDIOGRAM TRACING: CPT | Performed by: STUDENT IN AN ORGANIZED HEALTH CARE EDUCATION/TRAINING PROGRAM

## 2022-05-18 RX ORDER — MIDODRINE HYDROCHLORIDE 5 MG/1
5 TABLET ORAL ONCE
Status: COMPLETED | OUTPATIENT
Start: 2022-05-19 | End: 2022-05-18

## 2022-05-18 RX ORDER — MIDODRINE HYDROCHLORIDE 5 MG/1
5 TABLET ORAL 3 TIMES DAILY PRN
Status: DISCONTINUED | OUTPATIENT
Start: 2022-05-18 | End: 2022-05-19 | Stop reason: HOSPADM

## 2022-05-18 RX ORDER — ENOXAPARIN SODIUM 100 MG/ML
40 INJECTION SUBCUTANEOUS DAILY
Status: DISCONTINUED | OUTPATIENT
Start: 2022-05-18 | End: 2022-05-19 | Stop reason: HOSPADM

## 2022-05-18 RX ORDER — SODIUM CHLORIDE 9 MG/ML
INJECTION, SOLUTION INTRAVENOUS CONTINUOUS
Status: ACTIVE | OUTPATIENT
Start: 2022-05-18 | End: 2022-05-19

## 2022-05-18 RX ORDER — POTASSIUM CHLORIDE 20 MEQ/1
40 TABLET, EXTENDED RELEASE ORAL ONCE
Status: COMPLETED | OUTPATIENT
Start: 2022-05-18 | End: 2022-05-18

## 2022-05-18 RX ORDER — 0.9 % SODIUM CHLORIDE 0.9 %
500 INTRAVENOUS SOLUTION INTRAVENOUS ONCE
Status: COMPLETED | OUTPATIENT
Start: 2022-05-18 | End: 2022-05-18

## 2022-05-18 RX ORDER — FENTANYL CITRATE 50 UG/ML
25 INJECTION, SOLUTION INTRAMUSCULAR; INTRAVENOUS ONCE
Status: DISCONTINUED | OUTPATIENT
Start: 2022-05-18 | End: 2022-05-19 | Stop reason: HOSPADM

## 2022-05-18 RX ORDER — AMIODARONE HYDROCHLORIDE 200 MG/1
200 TABLET ORAL DAILY
Status: DISCONTINUED | OUTPATIENT
Start: 2022-05-18 | End: 2022-05-18

## 2022-05-18 RX ADMIN — ACETAMINOPHEN 1000 MG: 500 TABLET ORAL at 14:12

## 2022-05-18 RX ADMIN — ENOXAPARIN SODIUM 40 MG: 100 INJECTION SUBCUTANEOUS at 08:55

## 2022-05-18 RX ADMIN — CARBAMAZEPINE 400 MG: 200 TABLET ORAL at 23:25

## 2022-05-18 RX ADMIN — POTASSIUM CHLORIDE 40 MEQ: 1500 TABLET, EXTENDED RELEASE ORAL at 08:54

## 2022-05-18 RX ADMIN — CARBAMAZEPINE 300 MG: 200 TABLET ORAL at 08:54

## 2022-05-18 RX ADMIN — POLYETHYLENE GLYCOL 3350 17 G: 17 POWDER, FOR SOLUTION ORAL at 08:54

## 2022-05-18 RX ADMIN — SODIUM CHLORIDE: 9 INJECTION, SOLUTION INTRAVENOUS at 23:25

## 2022-05-18 RX ADMIN — SODIUM CHLORIDE 500 ML: 9 INJECTION, SOLUTION INTRAVENOUS at 20:35

## 2022-05-18 RX ADMIN — ACETAMINOPHEN 1000 MG: 500 TABLET ORAL at 20:35

## 2022-05-18 RX ADMIN — METOPROLOL SUCCINATE 25 MG: 25 TABLET, FILM COATED, EXTENDED RELEASE ORAL at 08:54

## 2022-05-18 RX ADMIN — AMIODARONE HYDROCHLORIDE 200 MG: 200 TABLET ORAL at 11:31

## 2022-05-18 RX ADMIN — TAMSULOSIN HYDROCHLORIDE 0.4 MG: 0.4 CAPSULE ORAL at 08:54

## 2022-05-18 RX ADMIN — Medication 1250 MG: at 18:37

## 2022-05-18 RX ADMIN — MIDODRINE HYDROCHLORIDE 5 MG: 5 TABLET ORAL at 23:57

## 2022-05-18 ASSESSMENT — PAIN DESCRIPTION - DESCRIPTORS: DESCRIPTORS: SHARP

## 2022-05-18 ASSESSMENT — PAIN DESCRIPTION - LOCATION
LOCATION: CHEST
LOCATION: HEAD

## 2022-05-18 ASSESSMENT — ENCOUNTER SYMPTOMS
EYE DISCHARGE: 0
SHORTNESS OF BREATH: 0
COLOR CHANGE: 0
ABDOMINAL DISTENTION: 0
COUGH: 0

## 2022-05-18 ASSESSMENT — PAIN SCALES - GENERAL
PAINLEVEL_OUTOF10: 2
PAINLEVEL_OUTOF10: 10
PAINLEVEL_OUTOF10: 0

## 2022-05-18 ASSESSMENT — PAIN DESCRIPTION - ORIENTATION: ORIENTATION: MID

## 2022-05-18 NOTE — PROGRESS NOTES
Pharmacy Note     Enoxaparin Dose Adjustment    Susan Anderson is a 64 y.o. female. Pharmacist assessment of enoxaparin dose for VTE prophylaxis. Recent Labs     05/17/22  0428 05/18/22  0353   BUN 11 11       Recent Labs     05/17/22  0428 05/18/22  0353   CREATININE 0.80 0.76       Estimated Creatinine Clearance: 59 mL/min (based on SCr of 0.76 mg/dL). Estimated CrCl : 59 mL/min (based on weight of 61.2 kg)    Height:   Ht Readings from Last 1 Encounters:   05/16/22 5' 7\" (1.702 m)     Weight:  Wt Readings from Last 1 Encounters:   05/16/22 135 lb (61.2 kg)       The following enoxaparin dose has been adjusted based upon renal function and/or patient weight per P&T Guidelines:             Enoxaparin 30 mg subcutaneously once daily changed to Enoxaparin 40 mg subcutaneously once daily. Tory Ríos. D.    5/18/2022  7:19 AM

## 2022-05-18 NOTE — PLAN OF CARE
Problem: Safety - Adult  Goal: Free from fall injury  Outcome: Progressing     Problem: ABCDS Injury Assessment  Goal: Absence of physical injury  Outcome: Progressing        Problem: Chronic Conditions and Co-morbidities  Goal: Patient's chronic conditions and co-morbidity symptoms are monitored and maintained or improved  Outcome: Progressing     Problem: Confusion  Goal: Confusion, delirium, dementia, or psychosis is improved or at baseline  Description: INTERVENTIONS:  1. Assess for possible contributors to thought disturbance, including medications, impaired vision or hearing, underlying metabolic abnormalities, dehydration, psychiatric diagnoses, and notify attending LIP  2. [place] high risk fall precautions, as indicated  3. Provide frequent short contacts to provide reality reorientation, refocusing and direction  4. Decrease environmental stimuli, including noise as appropriate  5. Monitor and intervene to maintain adequate nutrition, hydration, elimination, sleep and activity  6. If unable to ensure safety without constant attention obtain sitter and review sitter guidelines with assigned personnel  7.  Initiate Psychosocial CNS and Spiritual Care consult, as indicated  Outcome: Progressing

## 2022-05-18 NOTE — PROGRESS NOTES
[facility]  Office: [address], DO, Griselda Simple, DO, Skinnyhelena Myers, DO, Maylin Humphreys Blood, DO, Chago Whitman MD, Clau Man MD, Brad England MD, Dorene Chau MD, Rodolfo Treviño MD, Jack Diamond MD, Leslie Rivas MD, Jerrell Harper, DO, Madelyn Duff, DO, Santo Arciniega MD,  Crystal Thomas, DO, Donna Lowe MD, Danielito Dubois MD, Angela Reich MD, Jyothi Villegas, DO, Lorenzo Centeno MD, Steve Zuniga MD, Vern Wilder MD, Karine Romero, [facility], [facility], [facility], Kasi Morillo CNP, Galo Durant, CNP, Pedro Saldana, CNP, Edson Smith, CNP, Marquise Knight PA-C, Laverne Navarro, [facility], Alfred Davis, CNP, Janelle Toth CNP, Lyn Melvin, CNP, Sarah Solano, CNS, Stephanie Billings, [facility], Myrna Ngo, CNP, Adriana Ulrich, CNP, Vik Marin, [facility] [address] [facility]    Progress Note    5/18/2022    9:22 AM    Name:   Andres Charles  MRN:     [id number]     Acct:      [de-identified]   Room:   [address] [place] [address] Day:  3  Admit Date:  5/15/2022  4:08 PM    PCP:   Genia Coon DO  Code Status:  Full Code    Subjective:     C/C:   Chief Complaint   Patient presents with   [facility] Fall     Interval History Status: improved. patietn seen and examined. No acute issues other than seizure yesterday. Restarted on carbamezapine. Otherwise doing well. Appears to be in sinus rhythm. Brief History:     75-year-old female past medical history of breakthrough seizures, depression, history of DVT approximately 2 years ago presents to the hospital status post fall from standing height and was found to have age-indeterminate L1 endplate compression fracture, recent laceration to left temporal region, and NSTEMI. Patient was evaluated by neurosurgery, no acute interventions planned and to follow-up with neurosurgery clinic as needed.   Patient also eval by urology due to left ureteral and kidney stone and underwent left-sided stent placement on 2022. Cardiology was consulted due to elevated troponins. Echo completed 2022 with final read pending. Of note patient had SVT was eval by cardiology and given adenosine and started on amiodarone drip. Review of Systems:     Review of Systems   Unable to perform ROS: Dementia           Medications: Allergies: Allergies   Allergen Reactions    Haldol [Haloperidol Lactate]        Current Meds:   Scheduled Meds:    enoxaparin  40 mg SubCUTAneous Daily    metoprolol succinate  25 mg Oral Daily    carBAMazepine  300 mg Oral QAM    carBAMazepine  400 mg Oral Nightly    vancomycin  1,250 mg IntraVENous Q24H    vancomycin (VANCOCIN) intermittent dosing (placeholder)   Other RX Placeholder    tamsulosin  0.4 mg Oral Daily    polyethylene glycol  17 g Oral Daily    acetaminophen  1,000 mg Oral 3 times per day     Continuous Infusions:    amiodarone 0.5 mg/min (22 6030)     PRN Meds: sodium chloride flush    Data:     Past Medical History:   has a past medical history of Anxiety, Convulsion ([facility] Encephalopathy, Herpes, Hyperlipidemia, Left bundle branch block, MRSA (methicillin resistant Staphylococcus aureus), Parkinson's disease ([facility] [address] [facility] Seizures ([facility] [address] and Vitamin D deficiency. Social History:   reports that she quit smoking about 23 years ago. Her smoking use included cigarettes. She started smoking about 62 years ago. She has a 30.00 pack-year smoking history. She has never used smokeless tobacco. She reports that she does not drink alcohol and does not use drugs. Family History: History reviewed. No pertinent family history. Vitals:  BP (!) 104/57   Pulse 62   Temp 97.9 °F (36.6 °C) (Oral)   Resp 14   Ht 5' 7\" (1.702 m)   Wt 135 lb (61.2 kg)   SpO2 100%   BMI 21.14 kg/m²   Temp (24hrs), Av.2 °F (36.8 °C), Min:97.8 °F (36.6 °C), Max:99.1 °F (37.3 °C)    No results for input(s): POCGLU in the last 72 hours. I/O (24Hr):     Intake/Output Summary (Last 24 hours) at 5/18/2022 0922  Last data filed at 5/18/2022 1960  Gross per 24 hour   Intake 3050 ml   Output --   Net 3050 ml       Labs:  Hematology:  Recent Labs     05/17/22 0428 05/17/22 1520 05/18/22 0353   WBC 9.4 8.3 8.8   RBC 3.62* 3.74* 3.52*   HGB 12.0 12.0 11.4*   HCT 34.8* 36.1* 34.1*   MCV 96.1 96.5 96.9   MCH 33.1 32.1 32.4   MCHC 34.5 33.2 33.4   RDW 13.4 13.3 13.4    344 322   MPV 8.9 8.5 9.0     Chemistry:  Recent Labs     05/15/22  1625 05/15/22  1741 05/16/22  0742 05/16/22  1331 05/17/22 0428 05/17/22 1520 05/17/22 2059 05/18/22 0353      < > 135  --  137 144  --  137   K 3.4*   < > 3.4*  --  3.5* 3.5*  --  3.8      < > 103  --  107 108*  --  103   CO2 24   < > 18*  --  21 22  --  24   GLUCOSE 114*   < > 108*  --  100*  --   --  102*   BUN 15   < > 12  --  11  --   --  11   CREATININE 0.83   < > 0.69  --  0.80  --   --  0.76   MG 2.3  --   --   --   --  2.0  --  2.9*   ANIONGAP 11   < > 14  --  9 14  --  10   LABGLOM >60   < > >60  --  >60  --   --  >60   GFRAA >60   < > >60  --  >60  --   --  >60   CALCIUM 8.9   < > 8.3*  --  8.5*  --   --  8.1*   PHOS  --   --   --   --   --   --   --  3.3   PROBNP 264  --   --   --   --   --   --   --    TROPHS 49*   < > 44*   < >  --  44* 41* 44*    < > = values in this interval not displayed. Recent Labs     05/15/22  1625   PROT 7.1   LABALBU 3.8   AST 15   ALT 9   ALKPHOS 184*   BILITOT 0.29*   LIPASE 43     ABG:No results found for: POCPH, PHART, PH, POCPCO2, IWP8BOE, PCO2, POCPO2, PO2ART, PO2, POCHCO3, JMV0HJT, HCO3, NBEA, PBEA, BEART, BE, THGBART, THB, QLV5ULN, VZYY4SNS, R8WMATXB, O2SAT, FIO2  Lab Results   Component Value Date/Time    SPECIAL LT HAND 3ML 05/18/2022 03:51 AM     Lab Results   Component Value Date/Time    CULTURE NO GROWTH <24 HRS 05/18/2022 03:51 AM       Radiology:  CT HEAD WO CONTRAST    Result Date: 5/15/2022  No acute intracranial abnormality.  Encephalomalacia likely related to prior left MCA distribution stroke. Otherwise chronic microvascular disease. CT CERVICAL SPINE WO CONTRAST    Result Date: 5/15/2022  Cervical CT: No acute osseous abnormality. No acute fracture. CT of thoracic and lumbar spine: Multiple compression fractures throughout the thoracic and lumbar spine. Some of these fractures have chronic appearance such as superior endplate compression fracture T12, T10 and T7, L2 and L3 with 20%, 20%, 30%, 30% and 50% height loss respectively. However superior endplate compression fracture of L1 with 5 mm retropulsion into the spinal canal is age indeterminate and may be acute in etiology. There is associated moderate canal stenosis at T12-L1. For further evaluation of this fracture either comparison with prior examination or lumbar spine MRI can be obtained. Moderate levoscoliosis of upper to midthoracic spine and mild dextroscoliosis of thoracolumbar junction Partial visualization of 7 mm obstructing stone of the left proximal ureter which is associated with mild left-sided hydroureteronephrosis. Multiple nonobstructing kidney stones are also noted bilaterally. RECOMMENDATIONS: Unavailable     CT THORACIC SPINE WO CONTRAST    Result Date: 5/15/2022  Cervical CT: No acute osseous abnormality. No acute fracture. CT of thoracic and lumbar spine: Multiple compression fractures throughout the thoracic and lumbar spine. Some of these fractures have chronic appearance such as superior endplate compression fracture T12, T10 and T7, L2 and L3 with 20%, 20%, 30%, 30% and 50% height loss respectively. However superior endplate compression fracture of L1 with 5 mm retropulsion into the spinal canal is age indeterminate and may be acute in etiology. There is associated moderate canal stenosis at T12-L1. For further evaluation of this fracture either comparison with prior examination or lumbar spine MRI can be obtained.  Moderate levoscoliosis of upper to midthoracic spine and mild dextroscoliosis of thoracolumbar junction Partial visualization of 7 mm obstructing stone of the left proximal ureter which is associated with mild left-sided hydroureteronephrosis. Multiple nonobstructing kidney stones are also noted bilaterally. RECOMMENDATIONS: Unavailable     CT LUMBAR SPINE WO CONTRAST    Result Date: 5/15/2022  Cervical CT: No acute osseous abnormality. No acute fracture. CT of thoracic and lumbar spine: Multiple compression fractures throughout the thoracic and lumbar spine. Some of these fractures have chronic appearance such as superior endplate compression fracture T12, T10 and T7, L2 and L3 with 20%, 20%, 30%, 30% and 50% height loss respectively. However superior endplate compression fracture of L1 with 5 mm retropulsion into the spinal canal is age indeterminate and may be acute in etiology. There is associated moderate canal stenosis at T12-L1. For further evaluation of this fracture either comparison with prior examination or lumbar spine MRI can be obtained. Moderate levoscoliosis of upper to midthoracic spine and mild dextroscoliosis of thoracolumbar junction Partial visualization of 7 mm obstructing stone of the left proximal ureter which is associated with mild left-sided hydroureteronephrosis. Multiple nonobstructing kidney stones are also noted bilaterally. RECOMMENDATIONS: Unavailable     CT ABDOMEN PELVIS W IV CONTRAST Additional Contrast? None    Addendum Date: 5/15/2022    ADDENDUM: 5 mm stone left ureteropelvic junction. Multiple parapelvic cysts and possible hydronephrosis. Valentina Money Result Date: 5/15/2022  Multiple compression fractures as above, age indeterminate. XR CHEST PORTABLE    Result Date: 5/15/2022  Mild dependent changes at the right lateral lung base, possibly small effusion and/or atelectasis. Otherwise unremarkable chest.     CT CHEST PULMONARY EMBOLISM W CONTRAST    Result Date: 5/15/2022  Right middle lobe airspace disease.   6 mm pleural based pulmonary nodule right middle lobe. Coronary artery disease. Multiple compression fractures age indeterminate. RECOMMENDATIONS: Fleischner Society guidelines for follow-up and management of incidentally detected pulmonary nodules: Single Solid Nodule: Nodule size equals 6-8 mm In a low-risk patient, CT at 6-12 months, then consider CT at 18-24 months. In a high-risk patient, CT at 6-12 months, then CT at 18-24 months. . - Low risk patients include individuals with minimal or absent history of smoking and other known risk factors. - High risk patients include individuals with a history or smoking or known risk factors. Radiology 2017 http://pubs. rsna.org/doi/full/10.1148/radiol. 9638448812       Physical Examination:        General appearance:  alert, cooperative, chronically ill apeparing  Mental Status:  oriented to person, place , normal affect  Lungs:  Decreased bilaterally  Heart:  regular rate and rhythm, systolic murmur  Abdomen:  soft, nontender, nondistended, normal bowel sounds  Extremities:  no edema, redness, tenderness in the calves  Skin:  no gross lesions, rashes, induration    Assessment:        Hospital Problems           Last Modified POA    * (Principal) Closed compression fracture of L1 lumbar vertebra, initial encounter ([facility] 5/15/2022 Yes    Depression 5/17/2022 Yes    Kidney stone 5/17/2022 Yes    Mixed incontinence 5/17/2022 Yes    Anxiety 5/17/2022 Yes    Hypercholesteremia 5/17/2022 Yes    Memory loss 5/17/2022 Yes    Seizure disorder ([address] 5/17/2022 Yes    History of encephalitis 5/17/2022 Yes    Dementia due to Parkinson's disease with behavioral disturbance ([address] 5/17/2022 Yes          Plan:        1. Status post closed compression of L1 fracture with fall from standing height. Appreciate trauma surgery and neurosurgery recommendations. No acute intervention planned, TLSO brace, continue with PT and OT  2. History of seizures, continue carbamazepine, total 5.9 appreciate Neurology recommendations  3. UTI. Follow up sensitivity. Consult to ID. Vancomycin  4. History of depression hold prozac, repeat EKG, if QTC improved will restart prozac  5. SVT. Appreciate cardiology recommendations. Echo completed final read pending. Currently on amiodarone drip received adenosine 5/17/2022. Keep potassium >4 and magnesium >2  6. Remote history of DVT follows up with vascular surgery at [address] [place] [address] last seen 10/29/2021 recommended only aspirin at that time.       Ernie Comer MD  5/18/2022  9:22 AM

## 2022-05-18 NOTE — PLAN OF CARE
Problem: Discharge Planning  Goal: Discharge to home or other facility with appropriate resources  5/18/2022 0915 by Kathy Patel RN  Outcome: Progressing  5/18/2022 0213 by Aurea Gar RN  Outcome: Progressing     Problem: Skin/Tissue Integrity  Goal: Absence of new skin breakdown  5/18/2022 0915 by Kathy Patel RN  Outcome: Progressing  5/18/2022 0213 by Aurea Gar RN  Outcome: Progressing     Problem: Safety - Adult  Goal: Free from fall injury  5/18/2022 0915 by Kathy Patel RN  Outcome: Progressing  5/18/2022 0213 by Aurea Gar RN  Outcome: Progressing     Problem: ABCDS Injury Assessment  Goal: Absence of physical injury  5/18/2022 0915 by Kathy Patel RN  Outcome: Progressing  5/18/2022 0213 by Aurea Gar RN  Outcome: Progressing     Problem: Pain  Goal: Verbalizes/displays adequate comfort level or baseline comfort level  5/18/2022 0915 by Kathy Patel RN  Outcome: Progressing  5/18/2022 0213 by Aurea Gar RN  Outcome: Progressing     Problem: Chronic Conditions and Co-morbidities  Goal: Patient's chronic conditions and co-morbidity symptoms are monitored and maintained or improved  5/18/2022 0915 by Kathy Patel RN  Outcome: Progressing  5/18/2022 0213 by Aurea Gar RN  Outcome: Progressing     Problem: Confusion  Goal: Confusion, delirium, dementia, or psychosis is improved or at baseline  5/18/2022 0915 by Kathy Patel RN  Outcome: Progressing  5/18/2022 0213 by Aurea Gar RN  Outcome: Progressing

## 2022-05-18 NOTE — PROGRESS NOTES
[facility]  Progress Note                   Date:   5/18/2022  Patient name: Jana Nguyen  Date of admission:  5/15/2022  4:08 PM  MRN:   [id number]  YOB: 1945  PCP: Darrel Smith DO    Reason for Admission: NSTEMI (non-ST elevated myocardial infarction) ([facility] [address] [I21.4]  Facial laceration, initial encounter [S01.81XA]  Closed head injury, initial encounter [S09.90XA]  Closed compression fracture of L1 lumbar vertebra, initial encounter ([facility] [address] [S32.010A]    Subjective:       Clinical Changes /Abnormalities: Patient seen and examined with daughter at bedside . Denies chest pain or shortness of breath. Tele/vitals/labs reviewed . Discussed case with RN. Went into SVT yesterday and amio drip was started and converted to SR ,asymptomatic .currently SR on monitor     Review of Systems    Medications:   Scheduled Meds:   enoxaparin  40 mg SubCUTAneous Daily    metoprolol succinate  25 mg Oral Daily    carBAMazepine  300 mg Oral QAM    carBAMazepine  400 mg Oral Nightly    vancomycin  1,250 mg IntraVENous Q24H    vancomycin (VANCOCIN) intermittent dosing (placeholder)   Other RX Placeholder    tamsulosin  0.4 mg Oral Daily    polyethylene glycol  17 g Oral Daily    acetaminophen  1,000 mg Oral 3 times per day     Continuous Infusions:  CBC:   Recent Labs     05/17/22  0428 05/17/22  1520 05/18/22  0353   WBC 9.4 8.3 8.8   HGB 12.0 12.0 11.4*    344 322     BMP:    Recent Labs     05/16/22  0742 05/16/22  0742 05/17/22  0428 05/17/22  1520 05/18/22  0353      < > 137 144 137   K 3.4*   < > 3.5* 3.5* 3.8      < > 107 108* 103   CO2 18*   < > 21 22 24   BUN 12  --  11  --  11   CREATININE 0.69  --  0.80  --  0.76   GLUCOSE 108*  --  100*  --  102*    < > = values in this interval not displayed.      Hepatic:  Recent Labs     05/15/22  1625   AST 15   ALT 9   BILITOT 0.29*   ALKPHOS 184*     Troponin:   Recent Labs     05/17/22  1520 05/17/22  2059 05/18/22  0350 TROPHS 44* 41* 44*     BNP: No results for input(s): BNP in the last 72 hours. Lipids: No results for input(s): CHOL, HDL in the last 72 hours. Invalid input(s): LDLCALCU  INR: No results for input(s): INR in the last 72 hours. DATA:    Diagnostics:    EKG: normal sinus rhythm with 1st degree AV block, incomplete LBBB  ECHO: ordered, but not yet obtained. Stress Test: reviewed. Cardiac Angiography: not obtained. Objective:   Vitals: BP (!) 104/57   Pulse 62   Temp 97.9 °F (36.6 °C) (Oral)   Resp 14   Ht 5' 7\" (1.702 m)   Wt 135 lb (61.2 kg)   SpO2 100%   BMI 21.14 kg/m²   General appearance: alert and cooperative with exam  HEENT: Head: Normocephalic, no lesions, without obvious abnormality. Neck:no JVD, trachea midline, no adenopathy  Lungs: Clear to auscultation  Heart: Regular rate and rhythm, s1/s2 auscultated, no murmurs  Abdomen: soft, non-tender, bowel sounds active  Extremities: no edema  Neurologic: not done        Assessment / Acute Cardiac Problems:   1. Elevated tropes possible type II . EKG incomplete LBBB with first-degree heart block no change since 2015  1. Elevated troponins- down-trending now (Troponin 49-->47-->40)  2. Multiple compression fracture- thoracic and lumbar spine- undetermined age  1. Obstructing left proximal ureteral stone with hydronephrosis   4. Hx of DVT  5. Hx of seizures  6. HLD  7. UTI  8.  Parkinson's disease       Patient Active Problem List:     Seizure [facility])     Vitamin D deficiency     Anxiety     Hypercholesteremia     Cellulitis of right leg     MRSA (methicillin resistant staph aureus) culture positive     Breakthrough seizure ([facility]     Memory loss     Difficulty speaking     Seizure disorder ([facility] [address]     Dementia with behavioral disturbance ([facility] [address]     History of encephalitis     Dementia due to Parkinson's disease with behavioral disturbance ([facility] [address]     Acute deep vein thrombosis (DVT) of proximal vein of left lower extremity (HCC)     Closed compression fracture of L1 lumbar vertebra, initial encounter ([facility]      Plan of Treatment:   1. Cardiac stable -denies chest pain, hemodynamically stable- will DC amio drip and monitor arrhythmia for today per Dr. Mohit Fernandez recommendation , continue BB  2. Awaiting echo   3. HX of DVT - off AC , on ASA only   per recommendation from her vascular doctor per her daughter    3. Recommend Holter monitor as outpatient   5.  Keep K>4, Mg.2    Electronically signed by LAURIE Santiago NP on 5/18/2022 at 11:33 AM  [address]  [phone]

## 2022-05-18 NOTE — PROGRESS NOTES
63 yo F with L1 superior endplate compression fracture. Neurosurgery has evaluated and signed off, no acute intervention. Weight bearing and movement as tolerated. No further surgery intervention indicated or planned at this time. We will sign off at this time. Please reconsult/call if additional questions, concerns, or issues develop requiring further surgery input. Thank you for this interesting consult.     Electronically signed by Ebony Alba MD on 5/18/2022 at 1:14 AM

## 2022-05-18 NOTE — CARE COORDINATION
Call placed to [facility] @ [phone] to follow up status of precert. 1135: received call from [facility] admissions director stating that commercial insurance denied but they can bring patient into the facility under he medicaid benefit and still provide therapy.

## 2022-05-18 NOTE — CONSULTS
[facility] -   Infectious diseases evaluation  admission date 5/15/2022    reason for consultation:   UTI and AB management    Impression :   Current:  · Fall at home  · Multiple vertebral fractures, age indeterminate  · Right middle lobe airspace disease - non specific  · RML Nodule with spiculations  · Right obstructive ureteral stone w complicated UTI - SCN  · Dementia and very forgetful    Other:  ·   Discussion / summary of stay / plan of care   ·   Recommendations   · Suggest Consult pulmonary assess the right middle lobe nodule hence spiculations, look for neoplasia  · Watch creat closely while patient is on vancomycin  · Adjust AB per final U sensitivities - will plan on 10 days AB for the complicated UTI    Infection Control Recommendations   · [place] Precautions  · Contact Isolation     Antimicrobial Stewardship Recommendations   · Simplification of therapy  · Targeted therapy  · PK dosing      History of Present Illness:   Initial history:  Taylor Chavez is a 64y.o.-year-old female af patient     G employed for the  [employer] neurology on board due to left. Obstructive stone, stent placed 3/16   a fall from a height loss found to have L1 age undetermined endplate compression fracture with a laceration of the left temporal region and was found to have NSTEMI.   No plans for surgery due to the fracture, and neurosurgery  To follow results  Developed SVTs and is on amiodarone drip  WBC 8.8 creatinine 0.76    Of the chest shows right middle lobe airspace disease with a 6 mm nodule in the right middle lobe with multiple compressive vertebral fractures age-indeterminate    Personally reviewed, infiltrative subpleural right middle lobe definitely there is a nodular with some spiculations around it      Interval changes  5/18/2022   Patient Vitals for the past 8 hrs:   BP Temp Temp src Pulse Resp SpO2   05/18/22 0700 (!) 104/57 97.9 °F (36.6 °C) Oral 62 14 100 % 05/18/22 0329 (!) 101/52 97.8 °F (36.6 °C) Temporal 71 24 99 %       Summary of relevant labs:  Labs:  WBC 8.8  Creatinine 0.76  Micro:  2 blood cultures 5/18  UA 16 shows positive nitrates trace leukocyte esterase and 5-10 WBCs  Urine culture 5/16 twice showing staph culture is negative no sensitivities yet      Imaging:  CT abdomen pelvis shows multiple basilar groundglass opacities with multiple compression fractures age-indeterminate and  CT of the chest shows right middle lobe airspace disease with a 6 mm left pleural-based pulmonary nodule in the right upper lobe            I have personally reviewed the past medical history, past surgical history, medications, social history, and family history, and I haveupdated the database accordingly. Allergies:   Haldol [haloperidol lactate]     Review of Systems:     Review of Systems   Constitutional: Positive for activity change. HENT: Negative for congestion. Eyes: Negative for discharge. Respiratory: Negative for cough and shortness of breath. Cardiovascular: Negative for chest pain. Gastrointestinal: Negative for abdominal distention. Endocrine: Negative for polydipsia. Genitourinary: Positive for dysuria. Musculoskeletal: Negative for arthralgias. Skin: Negative for color change. Allergic/Immunologic: Negative for immunocompromised state. Neurological: Negative for dizziness. Hematological: Negative for adenopathy. Psychiatric/Behavioral: Positive for confusion. Physical Examination :       Physical Exam  Constitutional:       Appearance: Normal appearance. She is not ill-appearing. HENT:      Head: Normocephalic and atraumatic. Nose: Nose normal.      Mouth/Throat:      Mouth: Mucous membranes are moist.   Eyes:      General: No scleral icterus. Conjunctiva/sclera: Conjunctivae normal.      Pupils: Pupils are equal, round, and reactive to light.    Cardiovascular:      Rate and Rhythm: Normal rate and regular rhythm. Heart sounds: Normal heart sounds. No murmur heard. Pulmonary:      Effort: No respiratory distress. Breath sounds: Normal breath sounds. Abdominal:      General: There is no distension. Palpations: Abdomen is soft. Tenderness: There is no abdominal tenderness. Genitourinary:     Comments: No sood  Musculoskeletal:         General: No swelling or deformity. Cervical back: Neck supple. No rigidity. Skin:     Coloration: Skin is not jaundiced or pale. Neurological:      General: No focal deficit present. Mental Status: She is alert. Cranial Nerves: No cranial nerve deficit. Comments: 5 min memory span as per daughter  Very forgetful   Psychiatric:         Mood and Affect: Mood normal.         Thought Content:  Thought content normal.         Past Medical History:     Past Medical History:   Diagnosis Date    Anxiety     [facility] [address]     Encephalopathy     Herpes     Hyperlipidemia     Left bundle branch block     MRSA (methicillin resistant Staphylococcus aureus)     Parkinson's disease ([facility] [facility]     Seizures (HCC)     secondary to encephalitis    Vitamin D deficiency        Past Surgical  History:     Past Surgical History:   Procedure Laterality Date    ABSCESS DRAINAGE Right 12/14/2013    WOUND EXPLORATION AND I+D  RIGHT HIP    CYSTOSCOPY Left 5/16/2022    CYSTOSCOPY URETERAL STENT INSERTION performed by Quique Pearson MD at [address] [place] [address] Left 05/16/2022    Cystoscopy       Medications:      enoxaparin  40 mg SubCUTAneous Daily    amiodarone  200 mg Oral Daily    metoprolol succinate  25 mg Oral Daily    carBAMazepine  300 mg Oral QAM    carBAMazepine  400 mg Oral Nightly    vancomycin  1,250 mg IntraVENous Q24H    vancomycin (VANCOCIN) intermittent dosing (placeholder)   Other RX Placeholder    tamsulosin  0.4 mg Oral Daily    polyethylene glycol  17 g Oral Daily    acetaminophen  1,000 mg Oral 3 times per day Social History:     Social History     Socioeconomic History    Marital status: Single     Spouse name: Not on file    Number of children: Not on file    Years of education: Not on file    Highest education level: Not on file   Occupational History    Not on file   Tobacco Use    Smoking status: Former Smoker     Packs/day: 1.00     Years: 30.00     Pack years: 30.00     Types: Cigarettes     Start date: 54     Quit date:      Years since quittin.3    Smokeless tobacco: Never Used    Tobacco comment: unknown how many packs a day, or how many years   Vaping Use    Vaping Use: Never used   Substance and Sexual Activity    Alcohol use: No    Drug use: No    Sexual activity: Never   Other Topics Concern    Not on file   Social History Narrative    Not on file     Social Determinants of Health     Financial Resource Strain: Low Risk     Difficulty of Paying Living Expenses: Not hard at all   Food Insecurity: No Food Insecurity    Worried About [facility] in the Last Year: Never true    [facility] in the Last Year: Never true   Transportation Needs:     Lack of Transportation (Medical): Not on file    Lack of Transportation (Non-Medical):  Not on file   Physical Activity:     Days of Exercise per Week: Not on file    Minutes of Exercise per Session: Not on file   Stress:     Feeling of Stress : Not on file   Social Connections:     Frequency of Communication with Friends and Family: Not on file    Frequency of Social Gatherings with Friends and Family: Not on file    Attends Caodaism Services: Not on file    Active Member of Clubs or Organizations: Not on file    Attends Club or Organization Meetings: Not on file    Marital Status: Not on file   Intimate Partner Violence:     Fear of Current or Ex-Partner: Not on file    Emotionally Abused: Not on file    Physically Abused: Not on file    Sexually Abused: Not on file   Housing Stability:     Unable to Pay for Housing in the Last Year: Not on file    Number of Places Lived in the Last Year: Not on file    Unstable Housing in the Last Year: Not on file       Family History:   History reviewed. No pertinent family history. Medical Decision Making:   I have independently reviewed/ordered the following labs:    CBC with Differential:   Recent Labs     05/15/22  1625 05/16/22  0742 05/17/22  1520 05/18/22  0353   WBC 7.4   < > 8.3 8.8   HGB 13.6   < > 12.0 11.4*   HCT 40.5   < > 36.1* 34.1*      < > 344 322   LYMPHOPCT 25  --  25  --    MONOPCT 14*  --  15*  --     < > = values in this interval not displayed. BMP:  Recent Labs     05/15/22  1625 05/17/22  0428 05/17/22  1520 05/18/22  0353   NA   < > 137 144 137   K   < > 3.5* 3.5* 3.8   CL   < > 107 108* 103   CO2   < > 21 22 24   BUN  --  11  --  11   CREATININE  --  0.80  --  0.76   MG   < >  --  2.0 2.9*    < > = values in this interval not displayed. Hepatic Function Panel:   Recent Labs     05/15/22  1625   PROT 7.1   LABALBU 3.8   BILITOT 0.29*   ALKPHOS 184*   ALT 9   AST 15     No results for input(s): RPR in the last 72 hours. No results for input(s): HIV in the last 72 hours. No results for input(s): BC in the last 72 hours. Lab Results   Component Value Date    CREATININE 0.76 05/18/2022    GLUCOSE 102 05/18/2022    GLUCOSE 93 03/12/2012       Detailed results: Thank you for allowing us to participate in the care of this patient. Please call with questions. This note is created with the assistance of a speech recognition program.  While intending to generate adocument that actually reflects the content of the visit, the document can still have some errors including those of syntax and sound a like substitutions which may escape proof reading. It such instances, actual meaningcan be extrapolated by contextual diversion.     Andrés Álvarez MD  Office: [phone]  Perfect serve / office [phone]

## 2022-05-18 NOTE — PROGRESS NOTES
Received pt from . Patient in SVT. Dr. Kristin Thorne at bedside. Gave adenosine. Pt started to return to sinus tach then quickly returned. Gave lopressor, started a amio bolus, drip, LR, and mag. Patient returned to normal sinus. 60 min later daughter said pt had seizure. Precautions put in place and dr. Jose Manuel Beltran notified and was at bedside shortly after. Pt assessed and Medications ordered.     Patient resting comfortably, daughter at bedside

## 2022-05-19 VITALS
SYSTOLIC BLOOD PRESSURE: 81 MMHG | RESPIRATION RATE: 16 BRPM | TEMPERATURE: 98.4 F | HEART RATE: 76 BPM | WEIGHT: 135 LBS | DIASTOLIC BLOOD PRESSURE: 47 MMHG | BODY MASS INDEX: 21.19 KG/M2 | HEIGHT: 67 IN | OXYGEN SATURATION: 97 %

## 2022-05-19 LAB
ANION GAP SERPL CALCULATED.3IONS-SCNC: 8 MMOL/L (ref 9–17)
BUN BLDV-MCNC: 20 MG/DL (ref 8–23)
CALCIUM SERPL-MCNC: 7.9 MG/DL (ref 8.6–10.4)
CHLORIDE BLD-SCNC: 108 MMOL/L (ref 98–107)
CO2: 18 MMOL/L (ref 20–31)
CREAT SERPL-MCNC: 0.97 MG/DL (ref 0.5–0.9)
GFR AFRICAN AMERICAN: >60 ML/MIN
GFR NON-AFRICAN AMERICAN: 56 ML/MIN
GFR SERPL CREATININE-BSD FRML MDRD: ABNORMAL ML/MIN/{1.73_M2}
GLUCOSE BLD-MCNC: 103 MG/DL (ref 70–99)
HCT VFR BLD CALC: 34.2 % (ref 36.3–47.1)
HEMOGLOBIN: 11.2 G/DL (ref 11.9–15.1)
MCH RBC QN AUTO: 32.1 PG (ref 25.2–33.5)
MCHC RBC AUTO-ENTMCNC: 32.7 G/DL (ref 28.4–34.8)
MCV RBC AUTO: 98 FL (ref 82.6–102.9)
MYOGLOBIN: 33 NG/ML (ref 25–58)
MYOGLOBIN: 36 NG/ML (ref 25–58)
NRBC AUTOMATED: 0 PER 100 WBC
PDW BLD-RTO: 13.5 % (ref 11.8–14.4)
PLATELET # BLD: 299 K/UL (ref 138–453)
PMV BLD AUTO: 9 FL (ref 8.1–13.5)
POTASSIUM SERPL-SCNC: 4.4 MMOL/L (ref 3.7–5.3)
RBC # BLD: 3.49 M/UL (ref 3.95–5.11)
SODIUM BLD-SCNC: 134 MMOL/L (ref 135–144)
TROPONIN, HIGH SENSITIVITY: 40 NG/L (ref 0–14)
TROPONIN, HIGH SENSITIVITY: 45 NG/L (ref 0–14)
WBC # BLD: 9.3 K/UL (ref 3.5–11.3)

## 2022-05-19 PROCEDURE — 93242 EXT ECG>48HR<7D RECORDING: CPT

## 2022-05-19 PROCEDURE — 6370000000 HC RX 637 (ALT 250 FOR IP): Performed by: INTERNAL MEDICINE

## 2022-05-19 PROCEDURE — 6370000000 HC RX 637 (ALT 250 FOR IP): Performed by: STUDENT IN AN ORGANIZED HEALTH CARE EDUCATION/TRAINING PROGRAM

## 2022-05-19 PROCEDURE — 6360000002 HC RX W HCPCS: Performed by: STUDENT IN AN ORGANIZED HEALTH CARE EDUCATION/TRAINING PROGRAM

## 2022-05-19 PROCEDURE — 6370000000 HC RX 637 (ALT 250 FOR IP): Performed by: NURSE PRACTITIONER

## 2022-05-19 PROCEDURE — 85027 COMPLETE CBC AUTOMATED: CPT

## 2022-05-19 PROCEDURE — 80048 BASIC METABOLIC PNL TOTAL CA: CPT

## 2022-05-19 PROCEDURE — 93243 EXT ECG>48HR<7D SCAN A/R: CPT

## 2022-05-19 PROCEDURE — 36415 COLL VENOUS BLD VENIPUNCTURE: CPT

## 2022-05-19 PROCEDURE — 84484 ASSAY OF TROPONIN QUANT: CPT

## 2022-05-19 PROCEDURE — 99232 SBSQ HOSP IP/OBS MODERATE 35: CPT | Performed by: INTERNAL MEDICINE

## 2022-05-19 PROCEDURE — 99232 SBSQ HOSP IP/OBS MODERATE 35: CPT | Performed by: STUDENT IN AN ORGANIZED HEALTH CARE EDUCATION/TRAINING PROGRAM

## 2022-05-19 PROCEDURE — 2580000003 HC RX 258: Performed by: STUDENT IN AN ORGANIZED HEALTH CARE EDUCATION/TRAINING PROGRAM

## 2022-05-19 PROCEDURE — 83874 ASSAY OF MYOGLOBIN: CPT

## 2022-05-19 RX ORDER — METOPROLOL SUCCINATE 25 MG/1
25 TABLET, EXTENDED RELEASE ORAL DAILY
Qty: 30 TABLET | Refills: 3 | Status: ON HOLD | OUTPATIENT
Start: 2022-05-19 | End: 2022-10-04

## 2022-05-19 RX ORDER — LEVOFLOXACIN 250 MG/1
250 TABLET ORAL DAILY
Qty: 7 TABLET | Refills: 0 | Status: SHIPPED | OUTPATIENT
Start: 2022-05-20 | End: 2022-05-27

## 2022-05-19 RX ORDER — CARBAMAZEPINE 200 MG/1
400 TABLET ORAL NIGHTLY
Qty: 60 TABLET | Refills: 0 | Status: SHIPPED | OUTPATIENT
Start: 2022-05-19 | End: 2022-08-22 | Stop reason: SDUPTHER

## 2022-05-19 RX ORDER — LEVOFLOXACIN 500 MG/1
500 TABLET, FILM COATED ORAL ONCE
Status: COMPLETED | OUTPATIENT
Start: 2022-05-19 | End: 2022-05-19

## 2022-05-19 RX ORDER — TAMSULOSIN HYDROCHLORIDE 0.4 MG/1
0.4 CAPSULE ORAL DAILY
Qty: 30 CAPSULE | Refills: 3 | Status: SHIPPED | OUTPATIENT
Start: 2022-05-19

## 2022-05-19 RX ORDER — CARBAMAZEPINE 200 MG/1
300 TABLET ORAL EVERY MORNING
Qty: 45 TABLET | Refills: 0 | Status: SHIPPED | OUTPATIENT
Start: 2022-05-19 | End: 2022-08-22 | Stop reason: SDUPTHER

## 2022-05-19 RX ORDER — LEVOFLOXACIN 250 MG/1
250 TABLET ORAL DAILY
Status: DISCONTINUED | OUTPATIENT
Start: 2022-05-20 | End: 2022-05-19 | Stop reason: HOSPADM

## 2022-05-19 RX ORDER — MIDODRINE HYDROCHLORIDE 5 MG/1
5 TABLET ORAL 3 TIMES DAILY PRN
Qty: 90 TABLET | Refills: 3 | Status: ON HOLD | OUTPATIENT
Start: 2022-05-19 | End: 2022-10-04

## 2022-05-19 RX ORDER — CHOLECALCIFEROL (VITAMIN D3) 125 MCG
5 CAPSULE ORAL NIGHTLY PRN
Qty: 10 TABLET | Refills: 0 | Status: SHIPPED | OUTPATIENT
Start: 2022-05-19 | End: 2022-05-29

## 2022-05-19 RX ORDER — CHOLECALCIFEROL (VITAMIN D3) 125 MCG
5 CAPSULE ORAL NIGHTLY PRN
Status: DISCONTINUED | OUTPATIENT
Start: 2022-05-19 | End: 2022-05-19 | Stop reason: HOSPADM

## 2022-05-19 RX ORDER — POLYETHYLENE GLYCOL 3350 17 G/17G
17 POWDER, FOR SOLUTION ORAL DAILY
Qty: 527 G | Refills: 1 | Status: SHIPPED | OUTPATIENT
Start: 2022-05-19 | End: 2022-06-18

## 2022-05-19 RX ADMIN — TAMSULOSIN HYDROCHLORIDE 0.4 MG: 0.4 CAPSULE ORAL at 09:07

## 2022-05-19 RX ADMIN — CARBAMAZEPINE 300 MG: 200 TABLET ORAL at 09:07

## 2022-05-19 RX ADMIN — Medication 5 MG: at 02:01

## 2022-05-19 RX ADMIN — SODIUM CHLORIDE, PRESERVATIVE FREE 10 ML: 5 INJECTION INTRAVENOUS at 09:08

## 2022-05-19 RX ADMIN — ACETAMINOPHEN 1000 MG: 500 TABLET ORAL at 15:48

## 2022-05-19 RX ADMIN — POLYETHYLENE GLYCOL 3350 17 G: 17 POWDER, FOR SOLUTION ORAL at 09:07

## 2022-05-19 RX ADMIN — ENOXAPARIN SODIUM 40 MG: 100 INJECTION SUBCUTANEOUS at 09:07

## 2022-05-19 RX ADMIN — MIDODRINE HYDROCHLORIDE 5 MG: 5 TABLET ORAL at 09:08

## 2022-05-19 RX ADMIN — METOPROLOL SUCCINATE 25 MG: 25 TABLET, FILM COATED, EXTENDED RELEASE ORAL at 09:08

## 2022-05-19 RX ADMIN — LEVOFLOXACIN 500 MG: 500 TABLET, FILM COATED ORAL at 10:26

## 2022-05-19 ASSESSMENT — PAIN SCALES - GENERAL: PAINLEVEL_OUTOF10: 0

## 2022-05-19 NOTE — PROGRESS NOTES
Echo completed 5/17/2022 with final read pending. Of note patient had SVT was eval by cardiology and given adenosine and started on amiodarone drip. Echo:  Summary  Technically difficult study, tachycardia noted  Normal LV size and wall thickness. Abnormal septal wall motion  Normal LV systolic function with LVEF >55%. Normal RV size and function. RV systolic pressure 48 mmHg  LA and RA appears normal in size. No obvious significant structural valvular abnormality noted. No significant valvular stenosis or regurgitation noted. Normal aortic root dimension. No significant pericardial effusion noted. No obvious intra-cardiac mass or shunt noted. IVC not well visualized    Review of Systems:     Review of Systems   Unable to perform ROS: Dementia           Medications: Allergies: Allergies   Allergen Reactions    Haldol [Haloperidol Lactate]        Current Meds:   Scheduled Meds:    levoFLOXacin  500 mg Oral Once    Followed by   Lashawn Allen ON 5/20/2022] levoFLOXacin  250 mg Oral Daily    enoxaparin  40 mg SubCUTAneous Daily    fentanNYL  25 mcg IntraVENous Once    metoprolol succinate  25 mg Oral Daily    carBAMazepine  300 mg Oral QAM    carBAMazepine  400 mg Oral Nightly    tamsulosin  0.4 mg Oral Daily    polyethylene glycol  17 g Oral Daily    acetaminophen  1,000 mg Oral 3 times per day     Continuous Infusions:     PRN Meds: melatonin, midodrine, sodium chloride flush    Data:     Past Medical History:   has a past medical history of Anxiety, Convulsion ([address] Encephalopathy, Herpes, Hyperlipidemia, Left bundle branch block, MRSA (methicillin resistant Staphylococcus aureus), Parkinson's disease ([address] Seizures ([facility] [address] and Vitamin D deficiency. Social History:   reports that she quit smoking about 23 years ago. Her smoking use included cigarettes. She started smoking about 62 years ago. She has a 30.00 pack-year smoking history.  She has never used smokeless tobacco. She reports that she does not drink alcohol and does not use drugs. Family History: History reviewed. No pertinent family history. Vitals:  BP (!) 84/58   Pulse 60   Temp 97 °F (36.1 °C) (Oral)   Resp 13   Ht 5' 7\" (1.702 m)   Wt 135 lb (61.2 kg)   SpO2 93%   BMI 21.14 kg/m²   Temp (24hrs), Av.7 °F (36.5 °C), Min:96.9 °F (36.1 °C), Max:98.4 °F (36.9 °C)    No results for input(s): POCGLU in the last 72 hours. I/O (24Hr): Intake/Output Summary (Last 24 hours) at 2022 0835  Last data filed at 2022 0000  Gross per 24 hour   Intake 1064.39 ml   Output 250 ml   Net 814.39 ml       Labs:  Hematology:  Recent Labs     22   WBC 8.8 9.1 9.3   RBC 3.52* 3.44* 3.49*   HGB 11.4* 11.1* 11.2*   HCT 34.1* 33.9* 34.2*   MCV 96.9 98.5 98.0   MCH 32.4 32.3 32.1   MCHC 33.4 32.7 32.7   RDW 13.4 13.6 13.5    264 299   MPV 9.0 8.7 9.0     Chemistry:  Recent Labs     22  1520 22  0316     --  137 132* 134*   K 3.5*  --  3.8 4.4 4.4   *  --  103 105 108*   CO2 22  --  24 19* 18*   GLUCOSE  --   --  102* 112* 103*   BUN  --   --  11 20 20   CREATININE  --   --  0.76 1.05* 0.97*   MG 2.0  --  2.9*  --   --    ANIONGAP 14  --  10 8* 8*   LABGLOM  --   --  >60 51* 56*   GFRAA  --   --  >60 >60 >60   CALCIUM  --   --  8.1* 7.9* 7.9*   PHOS  --   --  3.3  --   --    TROPHS 44*   < > 44* 45* 45*   MYOGLOBIN  --   --   --  35 36    < > = values in this interval not displayed. No results for input(s): PROT, LABALBU, LABA1C, V5DMSVU, Y2ZGANU, FT4, TSH, AST, ALT, LDH, GGT, ALKPHOS, LABGGT, BILITOT, BILIDIR, AMMONIA, AMYLASE, LIPASE, LACTATE, CHOL, HDL, LDLCHOLESTEROL, CHOLHDLRATIO, TRIG, VLDL, TPZ39PD, PHENYTOIN, PHENYF, URICACID, POCGLU in the last 72 hours.   ABG:No results found for: POCPH, PHART, PH, POCPCO2, BTB4VKZ, PCO2, POCPO2, PO2ART, PO2, POCHCO3, FIN2HXW, HCO3, NBEA, PBEA, BEART, BE, THGBART, THB, DJK3QVA, QWEI4JVF, A2GFSSHK, O2SAT, FIO2  Lab Results   Component Value Date/Time    SPECIAL LT HAND 3ML 05/18/2022 03:51 AM     Lab Results   Component Value Date/Time    CULTURE NO GROWTH 1 DAY 05/18/2022 03:51 AM       Radiology:  CT HEAD WO CONTRAST    Result Date: 5/15/2022  No acute intracranial abnormality. Encephalomalacia likely related to prior left MCA distribution stroke. Otherwise chronic microvascular disease. CT CERVICAL SPINE WO CONTRAST    Result Date: 5/15/2022  Cervical CT: No acute osseous abnormality. No acute fracture. CT of thoracic and lumbar spine: Multiple compression fractures throughout the thoracic and lumbar spine. Some of these fractures have chronic appearance such as superior endplate compression fracture T12, T10 and T7, L2 and L3 with 20%, 20%, 30%, 30% and 50% height loss respectively. However superior endplate compression fracture of L1 with 5 mm retropulsion into the spinal canal is age indeterminate and may be acute in etiology. There is associated moderate canal stenosis at T12-L1. For further evaluation of this fracture either comparison with prior examination or lumbar spine MRI can be obtained. Moderate levoscoliosis of upper to midthoracic spine and mild dextroscoliosis of thoracolumbar junction Partial visualization of 7 mm obstructing stone of the left proximal ureter which is associated with mild left-sided hydroureteronephrosis. Multiple nonobstructing kidney stones are also noted bilaterally. RECOMMENDATIONS: Unavailable     CT THORACIC SPINE WO CONTRAST    Result Date: 5/15/2022  Cervical CT: No acute osseous abnormality. No acute fracture. CT of thoracic and lumbar spine: Multiple compression fractures throughout the thoracic and lumbar spine. Some of these fractures have chronic appearance such as superior endplate compression fracture T12, T10 and T7, L2 and L3 with 20%, 20%, 30%, 30% and 50% height loss respectively.  However superior endplate compression fracture of L1 with 5 mm retropulsion into the spinal canal is age indeterminate and may be acute in etiology. There is associated moderate canal stenosis at T12-L1. For further evaluation of this fracture either comparison with prior examination or lumbar spine MRI can be obtained. Moderate levoscoliosis of upper to midthoracic spine and mild dextroscoliosis of thoracolumbar junction Partial visualization of 7 mm obstructing stone of the left proximal ureter which is associated with mild left-sided hydroureteronephrosis. Multiple nonobstructing kidney stones are also noted bilaterally. RECOMMENDATIONS: Unavailable     CT LUMBAR SPINE WO CONTRAST    Result Date: 5/15/2022  Cervical CT: No acute osseous abnormality. No acute fracture. CT of thoracic and lumbar spine: Multiple compression fractures throughout the thoracic and lumbar spine. Some of these fractures have chronic appearance such as superior endplate compression fracture T12, T10 and T7, L2 and L3 with 20%, 20%, 30%, 30% and 50% height loss respectively. However superior endplate compression fracture of L1 with 5 mm retropulsion into the spinal canal is age indeterminate and may be acute in etiology. There is associated moderate canal stenosis at T12-L1. For further evaluation of this fracture either comparison with prior examination or lumbar spine MRI can be obtained. Moderate levoscoliosis of upper to midthoracic spine and mild dextroscoliosis of thoracolumbar junction Partial visualization of 7 mm obstructing stone of the left proximal ureter which is associated with mild left-sided hydroureteronephrosis. Multiple nonobstructing kidney stones are also noted bilaterally. RECOMMENDATIONS: Unavailable     CT ABDOMEN PELVIS W IV CONTRAST Additional Contrast? None    Addendum Date: 5/15/2022    ADDENDUM: 5 mm stone left ureteropelvic junction. Multiple parapelvic cysts and possible hydronephrosis. Valentina Money      Result Date: 5/15/2022  Multiple compression fractures as above, age indeterminate. XR CHEST PORTABLE    Result Date: 5/15/2022  Mild dependent changes at the right lateral lung base, possibly small effusion and/or atelectasis. Otherwise unremarkable chest.     CT CHEST PULMONARY EMBOLISM W CONTRAST    Result Date: 5/15/2022  Right middle lobe airspace disease. 6 mm pleural based pulmonary nodule right middle lobe. Coronary artery disease. Multiple compression fractures age indeterminate. RECOMMENDATIONS: Fleischner Society guidelines for follow-up and management of incidentally detected pulmonary nodules: Single Solid Nodule: Nodule size equals 6-8 mm In a low-risk patient, CT at 6-12 months, then consider CT at 18-24 months. In a high-risk patient, CT at 6-12 months, then CT at 18-24 months. . - Low risk patients include individuals with minimal or absent history of smoking and other known risk factors. - High risk patients include individuals with a history or smoking or known risk factors. Radiology 2017 http://pubs. rsna.org/doi/full/10.1148/radiol. 4693243831       Physical Examination:        General appearance:  alert, cooperative, chronically ill apeparing  Mental Status:  oriented to person, place , normal affect  Lungs:  Decreased bilaterally  Heart:  regular rate and rhythm, systolic murmur  Abdomen:  soft, nontender, nondistended, normal bowel sounds  Extremities:  no edema, redness, tenderness in the calves  Skin:  no gross lesions, rashes, induration    Assessment:        Hospital Problems           Last Modified POA    * (Principal) Closed compression fracture of L1 lumbar vertebra, initial encounter ([facility] [address] 5/15/2022 Yes    Depression 5/17/2022 Yes    Kidney stone 5/17/2022 Yes    Mixed incontinence 5/17/2022 Yes    Anxiety 5/17/2022 Yes    Hypercholesteremia 5/17/2022 Yes    Memory loss 5/17/2022 Yes    Seizure disorder ([address] 5/17/2022 Yes    History of encephalitis 5/17/2022 Yes    Dementia due to Parkinson's disease with behavioral disturbance ([facility] [address] 5/17/2022 Yes          Plan:        1. Status post closed compression of L1 fracture with fall from standing height. Appreciate trauma surgery and neurosurgery recommendations. No acute intervention planned, TLSO brace, continue with PT and OT  2. History of seizures, continue carbamazepine, total 5.9 appreciate Neurology recommendations. conintue home medications  3. UTI. Follow up sensitivity. Consult to ID. Vancomycin  4. History of depression hold prozac,  5. SVT. Appreciate cardiology recommendations. Currently on amiodarone drip received adenosine 5/17/2022. Keep potassium >4 and magnesium >2  6. Remote history of DVT follows up with vascular surgery at [address] [place] [address] last seen 10/29/2021 recommended only aspirin at that time.   7. Outpatient follow up for uterine bleeding, follow up with PCP      Carlita Mckeon MD  5/19/2022  8:35 AM

## 2022-05-19 NOTE — PROGRESS NOTES
Holter monitor ordered per Internal Medicine. Holter to be on for 48 hours. Spoke with monitor tech. States she will be attaching a [employer] envelope with the instructions to the chart. The patient cannot bathe or shower with monitor on. Patient will be discharged to SNF with monitor. Writer notified [facility] at the skilled facility of the testing and the requirement to return it in the provided envelope. All questions answered.

## 2022-05-19 NOTE — DISCHARGE SUMMARY
[facility]  Office: [address], DO, Selina Zee, DO, June Manifold, DO, Kaz Ca Blood, DO, Neville Tinoco MD, Nicolas Lomax MD, Loren Carbajal MD, Sajan Landry MD, Blane Sheth MD, Tish Hayes MD, Casandra Acevedo MD, Ronni Santiago, DO, Fany Cruz, DO, Giorgio Nicole MD,  Remedios Bruce, DO, Norris Holter, MD, Maricarmen Muniz MD, Lorenza Vasquez MD, Erik Leahy DO, Prateek Yusuf MD, Gerard Neri MD, Amy Noe MD, Patt Archibald CNP, [facility], CNP, Javier Cho, CNP, Ismael Posada, CNP, Angelika Mccall, CNP, Светлана Stewart, CNP, Roman Teresa PA-C, Andrew Brandon, DNP, Steven Fernandez, CNP, Sabiha Mcdaniels, CNP, Juan J Slater, CNP, Donato Alert, [facility], Trina Pan, DNP, Arjun Liao, CNP, Joslyn Witt, CNP, Adry Yoon, CNP         [facility]    Discharge Summary     Patient ID: Prince Quinteros  :  1945   MRN: [id number]     ACCOUNT:  [de-identified]   Patient's PCP: Khushbu Garcia DO  Admit Date: 5/15/2022   Discharge Date: 2022     Length of Stay: 4  Code Status:  Full Code  Admitting Physician: Giorgio Nicole MD  Discharge Physician: Giorgio Nicole MD     Active Discharge Diagnoses:     Hospital Problem Lists:  Principal Problem:    Closed compression fracture of L1 lumbar vertebra, initial encounter [facility])  Active Problems:    Depression    Kidney stone    Mixed incontinence    Anxiety    Hypercholesteremia    Memory loss    Seizure disorder ([facility]    History of encephalitis    Dementia due to Parkinson's disease with behavioral disturbance ([facility]  Resolved Problems:    * No resolved hospital problems.  *      Admission Condition:  stable     Discharged Condition: stable    Hospital Stay:     Hospital Course:  Prince Quinteros is a 64 y.o. female who was admitted for the management of   Closed compression fracture of L1 lumbar vertebra, initial encounter ([facility] , presented to ER with Fall    64year-old female past medical history of breakthrough seizures, depression, history of DVT approximately 2 years ago presents to the hospital status post fall from standing height and was found to have age-indeterminate L1 endplate compression fracture, recent laceration to left temporal region, and NSTEMI. Patient was evaluated by neurosurgery, no acute interventions planned and to follow-up with neurosurgery clinic as needed.  Patient also eval by urology due to left ureteral and kidney stone and underwent left-sided stent placement on 5/16/2022.  Cardiology was consulted due to elevated troponins.  Echo completed 5/17/2022 with final read pending.  Of note patient had SVT was eval by cardiology and given adenosine and started on amiodarone drip. Patient transitioned to PO metoprolol and to discharge to SNF. Patient to follow up neurology, PCP and to be evaluated as outpatient  for intermittent uterine bleeding. pateient discharged on levaquin per ID recommendations     Echo:  Summary  Technically difficult study, tachycardia noted  Normal LV size and wall thickness. Abnormal septal wall motion  Normal LV systolic function with LVEF >55%. Normal RV size and function. RV systolic pressure 48 mmHg  LA and RA appears normal in size. No obvious significant structural valvular abnormality noted. No significant valvular stenosis or regurgitation noted. Normal aortic root dimension. No significant pericardial effusion noted. No obvious intra-cardiac mass or shunt noted.   IVC not well visualized      Significant therapeutic interventions: see above    Significant Diagnostic Studies:   Labs / Micro:  CBC:   Lab Results   Component Value Date    WBC 9.3 05/19/2022    RBC 3.49 05/19/2022    RBC 4.07 03/12/2012    HGB 11.2 05/19/2022    HCT 34.2 05/19/2022    MCV 98.0 05/19/2022    MCH 32.1 05/19/2022    MCHC 32.7 05/19/2022    RDW 13.5 05/19/2022     05/19/2022     03/12/2012     BMP:    Lab Results   Component Value Date    GLUCOSE 103 05/19/2022    GLUCOSE 93 03/12/2012     05/19/2022    K 4.4 05/19/2022     05/19/2022    CO2 18 05/19/2022    ANIONGAP 8 05/19/2022    BUN 20 05/19/2022    CREATININE 0.97 05/19/2022    BUNCRER NOT REPORTED 09/08/2021    CALCIUM 7.9 05/19/2022    LABGLOM 56 05/19/2022    GFRAA >60 05/19/2022    GFR      05/19/2022        Radiology:  CT HEAD WO CONTRAST    Result Date: 5/15/2022  No acute intracranial abnormality. Encephalomalacia likely related to prior left MCA distribution stroke. Otherwise chronic microvascular disease. CT CERVICAL SPINE WO CONTRAST    Result Date: 5/15/2022  Cervical CT: No acute osseous abnormality. No acute fracture. CT of thoracic and lumbar spine: Multiple compression fractures throughout the thoracic and lumbar spine. Some of these fractures have chronic appearance such as superior endplate compression fracture T12, T10 and T7, L2 and L3 with 20%, 20%, 30%, 30% and 50% height loss respectively. However superior endplate compression fracture of L1 with 5 mm retropulsion into the spinal canal is age indeterminate and may be acute in etiology. There is associated moderate canal stenosis at T12-L1. For further evaluation of this fracture either comparison with prior examination or lumbar spine MRI can be obtained. Moderate levoscoliosis of upper to midthoracic spine and mild dextroscoliosis of thoracolumbar junction Partial visualization of 7 mm obstructing stone of the left proximal ureter which is associated with mild left-sided hydroureteronephrosis. Multiple nonobstructing kidney stones are also noted bilaterally. RECOMMENDATIONS: Unavailable     CT THORACIC SPINE WO CONTRAST    Result Date: 5/15/2022  Cervical CT: No acute osseous abnormality. No acute fracture. CT of thoracic and lumbar spine: Multiple compression fractures throughout the thoracic and lumbar spine.  Some of these fractures have chronic appearance such as superior endplate compression fracture T12, T10 and T7, L2 and L3 with 20%, 20%, 30%, 30% and 50% height loss respectively. However superior endplate compression fracture of L1 with 5 mm retropulsion into the spinal canal is age indeterminate and may be acute in etiology. There is associated moderate canal stenosis at T12-L1. For further evaluation of this fracture either comparison with prior examination or lumbar spine MRI can be obtained. Moderate levoscoliosis of upper to midthoracic spine and mild dextroscoliosis of thoracolumbar junction Partial visualization of 7 mm obstructing stone of the left proximal ureter which is associated with mild left-sided hydroureteronephrosis. Multiple nonobstructing kidney stones are also noted bilaterally. RECOMMENDATIONS: Unavailable     CT LUMBAR SPINE WO CONTRAST    Result Date: 5/15/2022  Cervical CT: No acute osseous abnormality. No acute fracture. CT of thoracic and lumbar spine: Multiple compression fractures throughout the thoracic and lumbar spine. Some of these fractures have chronic appearance such as superior endplate compression fracture T12, T10 and T7, L2 and L3 with 20%, 20%, 30%, 30% and 50% height loss respectively. However superior endplate compression fracture of L1 with 5 mm retropulsion into the spinal canal is age indeterminate and may be acute in etiology. There is associated moderate canal stenosis at T12-L1. For further evaluation of this fracture either comparison with prior examination or lumbar spine MRI can be obtained. Moderate levoscoliosis of upper to midthoracic spine and mild dextroscoliosis of thoracolumbar junction Partial visualization of 7 mm obstructing stone of the left proximal ureter which is associated with mild left-sided hydroureteronephrosis. Multiple nonobstructing kidney stones are also noted bilaterally.  RECOMMENDATIONS: Unavailable     CT ABDOMEN PELVIS W IV CONTRAST Additional Contrast? None    Addendum Date: 5/15/2022    ADDENDUM: 5 mm stone left ureteropelvic junction. Multiple parapelvic cysts and possible hydronephrosis. Graylon Gatito Result Date: 5/15/2022  Multiple compression fractures as above, age indeterminate. XR CHEST PORTABLE    Result Date: 5/15/2022  Mild dependent changes at the right lateral lung base, possibly small effusion and/or atelectasis. Otherwise unremarkable chest.     CT CHEST PULMONARY EMBOLISM W CONTRAST    Result Date: 5/15/2022  Right middle lobe airspace disease. 6 mm pleural based pulmonary nodule right middle lobe. Coronary artery disease. Multiple compression fractures age indeterminate. RECOMMENDATIONS: Fleischner Society guidelines for follow-up and management of incidentally detected pulmonary nodules: Single Solid Nodule: Nodule size equals 6-8 mm In a low-risk patient, CT at 6-12 months, then consider CT at 18-24 months. In a high-risk patient, CT at 6-12 months, then CT at 18-24 months. . - Low risk patients include individuals with minimal or absent history of smoking and other known risk factors. - High risk patients include individuals with a history or smoking or known risk factors. Radiology 2017 http://pubs. rsna.org/doi/full/10.1148/radiol. 2525790717       Consultations:    Consults:     Final Specialist Recommendations/Findings:   IP CONSULT TO CARDIOLOGY  IP CONSULT TO TRAUMA SURGERY  IP CONSULT TO NEUROSURGERY  IP CONSULT TO UROLOGY  IP CONSULT TO CARDIOLOGY  IP CONSULT TO INTERNAL MEDICINE  IP CONSULT TO INFECTIOUS DISEASES  IP CONSULT TO NEUROLOGY  IP CONSULT TO IV TEAM  PHARMACY TO DOSE VANCOMYCIN  PHARMACY TO DOSE VANCOMYCIN      The patient was seen and examined on day of discharge and this discharge summary is in conjunction with any daily progress note from day of discharge. Discharge plan:     Disposition: Skilled nursing facility    Physician Follow Up:     Mony Rod MD  [address], [address] [facility] [address]  [phone]    Schedule an appointment as soon as possible for a visit  Definitive stone treatment and stent removal    Shaista Jorge DO  [address] [facility]  [phone]    Schedule an appointment as soon as possible for a visit in 1 week  post hospital follow up with your PCP , For suture removal on or around 5/21-5/24    Hortencia Saul, [address] [facility]  MOB [id number]  [facility]  [phone]      As needed with [address] [facility] [address] [facility]  [address] [facility] [phone]    Cardiology follow up    [id number] [place] [address]. [address] [place] [address]  [phone]           Requiring Further Evaluation/Follow Up POST HOSPITALIZATION/Incidental Findings: Follow-up PCP, follow-up urology, follow-up neurology, follow-up cardiology    Diet: cardiac diet    Activity: As tolerated    Instructions to Patient: Continue take your medication as prescribed in terms of antibiotics. Beta-blocker was added to help regulate your heart rate. Follow-up with neurology as outpatient. Continue to take your antiseizure medications.     Discharge Medications:      Medication List      START taking these medications    levoFLOXacin 250 MG tablet  Commonly known as: LEVAQUIN  Take 1 tablet by mouth daily for 7 doses  Start taking on: May 20, 2022     melatonin 5 MG Tabs tablet  Take 1 tablet by mouth nightly as needed (sleep)     metoprolol succinate 25 MG extended release tablet  Commonly known as: TOPROL XL  Take 1 tablet by mouth daily     midodrine 5 MG tablet  Commonly known as: PROAMATINE  Take 1 tablet by mouth 3 times daily as needed (hypotension.)     polyethylene glycol 17 g packet  Commonly known as: GLYCOLAX  Take 17 g by mouth daily     tamsulosin 0.4 MG capsule  Commonly known as: FLOMAX  Take 1 capsule by mouth daily        CHANGE how you take these medications    * carBAMazepine 200 MG tablet  Commonly known as: TEGRETOL  Take 1.5 tablets by mouth every morning  What changed:   · how much to take  · how to take this  · when to take this  · additional instructions     * carBAMazepine 200 MG tablet  Commonly known as: TEGRETOL  Take 2 tablets by mouth at bedtime  What changed: You were already taking a medication with the same name, and this prescription was added. Make sure you understand how and when to take each. * This list has 2 medication(s) that are the same as other medications prescribed for you. Read the directions carefully, and ask your doctor or other care provider to review them with you. CONTINUE taking these medications    acetaminophen 500 MG tablet  Commonly known as: TYLENOL     aspirin 81 MG EC tablet     FLUoxetine 20 MG capsule  Commonly known as: PROZAC  2 po qd     Handicap Placard Misc  by Does not apply route Permanent one year  Dx fractured humerus     mirabegron 50 MG Tb24  Commonly known as: Myrbetriq  Take 50 mg by mouth daily        STOP taking these medications    desmopressin 0.2 MG tablet  Commonly known as: DDAVP     rivaroxaban 15 & 20 MG Starter Pack  Commonly known as: Xarelto Starter Pack           Where to Get Your Medications      These medications were sent to [address], [place] [address]    Phone: [phone]   · carBAMazepine 200 MG tablet  · carBAMazepine 200 MG tablet  · levoFLOXacin 250 MG tablet  · melatonin 5 MG Tabs tablet  · metoprolol succinate 25 MG extended release tablet  · midodrine 5 MG tablet  · polyethylene glycol 17 g packet  · tamsulosin 0.4 MG capsule         No discharge procedures on file. Time Spent on discharge is  32 mins in patient examination, evaluation, counseling as well as medication reconciliation, prescriptions for required medications, discharge plan and follow up.     Electronically signed by Crissy Stringer MD  5/19/2022  11:52 AM      Thank you Dr. Julius Cho DO for the opportunity to be involved in this patient's care.

## 2022-05-19 NOTE — PROGRESS NOTES
Patient incontinent of stool and urine. Attempting to climb out of bed. Patient removed IV. Catheter intact. Pleasantly confused. Takes holter monitor off. Monitoring leads replaced. Patient educated with no evidence of learning noted. Will reinforce learning and offer emotional support.

## 2022-05-19 NOTE — PROGRESS NOTES
[place] [facility]  Progress Note                   Date:   5/19/2022  Patient name: Jana Nguyen  Date of admission:  5/15/2022  4:08 PM  MRN:   [id number]  YOB: 1945  PCP: Darrel Smith DO    Reason for Admission: NSTEMI (non-ST elevated myocardial infarction) ([facility] [I21.4]  Facial laceration, initial encounter [S01.81XA]  Closed head injury, initial encounter [S09.90XA]  Closed compression fracture of L1 lumbar vertebra, initial encounter ([facility] [S32.010A]    Subjective:       Clinical Changes /Abnormalities: Patient seen and examined with daughter at bedside . Denies chest pain or shortness of breath. Tele/vitals/labs reviewed . Discussed case with RN.currently SR on monitor , no episodes of SVT    Review of Systems    Medications:   Scheduled Meds:   [START ON 5/20/2022] levoFLOXacin  250 mg Oral Daily    enoxaparin  40 mg SubCUTAneous Daily    fentanNYL  25 mcg IntraVENous Once    metoprolol succinate  25 mg Oral Daily    carBAMazepine  300 mg Oral QAM    carBAMazepine  400 mg Oral Nightly    tamsulosin  0.4 mg Oral Daily    polyethylene glycol  17 g Oral Daily    acetaminophen  1,000 mg Oral 3 times per day     Continuous Infusions:  CBC:   Recent Labs     05/18/22 0353 05/18/22 2225 05/19/22 0316   WBC 8.8 9.1 9.3   HGB 11.4* 11.1* 11.2*    264 299     BMP:    Recent Labs     05/18/22  0353 05/18/22  2225 05/19/22  0316    132* 134*   K 3.8 4.4 4.4    105 108*   CO2 24 19* 18*   BUN 11 20 20   CREATININE 0.76 1.05* 0.97*   GLUCOSE 102* 112* 103*     Hepatic:  No results for input(s): AST, ALT, ALB, BILITOT, ALKPHOS in the last 72 hours. Troponin:   Recent Labs     05/18/22 2225 05/19/22 0316 05/19/22  0821   TROPHS 45* 45* 40*     BNP: No results for input(s): BNP in the last 72 hours. Lipids: No results for input(s): CHOL, HDL in the last 72 hours. Invalid input(s): LDLCALCU  INR: No results for input(s): INR in the last 72 hours.      DATA: Diagnostics:    EKG: normal sinus rhythm with 1st degree AV block, incomplete LBBB    ECHO:    Summary  Technically difficult study, tachycardia noted  Normal LV size and wall thickness. Abnormal septal wall motion  Normal LV systolic function with LVEF >55%. Normal RV size and function. RV systolic pressure 48 mmHg  LA and RA appears normal in size. No obvious significant structural valvular abnormality noted. No significant valvular stenosis or regurgitation noted. Normal aortic root dimension. No significant pericardial effusion noted. No obvious intra-cardiac mass or shunt noted. IVC not well visualized     Signature  ----------------------------------------------------------------------------   Electronically signed by Lorri Cordova(Sonographer) on 05/17/2022 02:58   PM  ----------------------------------------------------------------------------     ----------------------------------------------------------------------------   Electronically signed by Orlando Easton(Interpreting physician) on   05/18/2022 11:56 AM  Stress Test: reviewed. Cardiac Angiography: not obtained. Objective:   Vitals: BP (!) 97/52   Pulse 79   Temp 97.1 °F (36.2 °C) (Oral)   Resp 14   Ht 5' 7\" (1.702 m)   Wt 135 lb (61.2 kg)   SpO2 97%   BMI 21.14 kg/m²   General appearance: alert and cooperative with exam  HEENT: Head: Normocephalic, no lesions, without obvious abnormality. Neck:no JVD, trachea midline, no adenopathy  Lungs: Clear to auscultation  Heart: Regular rate and rhythm, s1/s2 auscultated, no murmurs  Abdomen: soft, non-tender, bowel sounds active  Extremities: no edema  Neurologic: not done        Assessment / Acute Cardiac Problems:   1. Elevated tropes possible type II . EKG incomplete LBBB with first-degree heart block no change since 2015  1. Elevated troponins- down-trending now (Troponin 49-->47-->40)  2. Multiple compression fracture- thoracic and lumbar spine- undetermined age  1.  Obstructing left proximal ureteral stone with hydronephrosis   4. Hx of DVT  5. Hx of seizures  6. HLD  7. UTI  8. Parkinson's disease       Patient Active Problem List:     Seizure [facility])     Vitamin D deficiency     Anxiety     Hypercholesteremia     Cellulitis of right leg     MRSA (methicillin resistant staph aureus) culture positive     Breakthrough seizure ([facility]     Memory loss     Difficulty speaking     Seizure disorder ([facility]     Dementia with behavioral disturbance ([facility]     History of encephalitis     Dementia due to Parkinson's disease with behavioral disturbance ([facility]     Acute deep vein thrombosis (DVT) of proximal vein of left lower extremity (HCC)     Closed compression fracture of L1 lumbar vertebra, initial encounter ([facility]      Plan of Treatment:   1. Cardiac stable -denies chest pain, hemodynamically stable- continue BB  2.  echo reviewed as above wit Dr. Royal Basilio   3. HX of DVT - off AC , on ASA only   per recommendation from her vascular doctor per her daughter    3. Will order Holter monitor as outpatient   5.  Ok to discharge from cardiology [facility] , follow up as outpatient in 2 weeks       Electronically signed by LAURIE Dietz NP on 5/19/2022 at 12:49 PM  [address]  [phone]

## 2022-05-19 NOTE — PROGRESS NOTES
Report called to [employer]. Monica Mcintyre, Unit Manager, notified of patient status and estimated time of discharge. All questions answered. Daughter Amada Le aware of the discharge.

## 2022-05-19 NOTE — PLAN OF CARE
Problem: Discharge Planning  Goal: Discharge to home or other facility with appropriate resources  Outcome: Completed  Flowsheets (Taken 5/19/2022 1106)  Discharge to home or other facility with appropriate resources:   Identify barriers to discharge with patient and caregiver   Identify discharge learning needs (meds, wound care, etc)   Refer to discharge planning if patient needs post-hospital services based on physician order or complex needs related to functional status, cognitive ability or social support system   Arrange for needed discharge resources and transportation as appropriate   Arrange for interpreters to assist at discharge as needed     Problem: Skin/Tissue Integrity  Goal: Absence of new skin breakdown  Description: 1. Monitor for areas of redness and/or skin breakdown  2. Assess vascular access sites hourly  3. Every 4-6 hours minimum:  Change oxygen saturation probe site  4. Every 4-6 hours:  If on nasal continuous positive airway pressure, respiratory therapy assess nares and determine need for appliance change or resting period. Outcome: Completed     Problem: Safety - Adult  Goal: Free from fall injury  Outcome: Completed     Problem: ABCDS Injury Assessment  Goal: Absence of physical injury  Outcome: Completed     Problem: Pain  Goal: Verbalizes/displays adequate comfort level or baseline comfort level  Outcome: Completed     Problem: Chronic Conditions and Co-morbidities  Goal: Patient's chronic conditions and co-morbidity symptoms are monitored and maintained or improved  Outcome: Completed     Problem: Confusion  Goal: Confusion, delirium, dementia, or psychosis is improved or at baseline  Description: INTERVENTIONS:  1. Assess for possible contributors to thought disturbance, including medications, impaired vision or hearing, underlying metabolic abnormalities, dehydration, psychiatric diagnoses, and notify attending LIP  2. [place] high risk fall precautions, as indicated  3. Provide frequent short contacts to provide reality reorientation, refocusing and direction  4. Decrease environmental stimuli, including noise as appropriate  5. Monitor and intervene to maintain adequate nutrition, hydration, elimination, sleep and activity  6. If unable to ensure safety without constant attention obtain sitter and review sitter guidelines with assigned personnel  7. Initiate Psychosocial CNS and Spiritual Care consult, as indicated  Outcome: Completed    Patient plan of care completed for discharge to SNF.

## 2022-05-19 NOTE — PROGRESS NOTES
[facility]  Office: [address], DO, Eduardo Gear, DO, Rosario Steward, DO, Irma Kyra Blood, DO, Ye Ho MD, Kari Arango MD, Yajaira Oviedo MD, Lamine Crain MD, Charlotte Botello MD, Coretta Condon MD, Marni Fitzgerald MD, Nikki Fragmin, DO, Criselda Mack, DO, Zaida Sparks MD,  Nolvia Mooney, DO, Consuelo Rosa MD, Kristin Beal MD, Amanda Rogers MD, Sukumar Vogt, DO, Zeus Olmos MD, Soraya Grady MD, Nancy Donohue MD, Kristal Draper, CNP, [facility], CNP, Demi Doyle, CNP, Tabitha Can, CNP, Antonia Porter, CNP, Jon Casper, CNP, Lendel Nissen, PA-C, Tawanna Blood, DNP, Vikki Combs, CNP, Erick Punch, CNP, Monica Fly, CNP, Kim Mask, CNS, Jasmyn Rosses, DNP, Roma Postal, CNP, Alyse Artis, CNP, Ulises Vega, [address] [address]   Nighttime In-House Nurse Practitioner      5/18/2022    9:36 PM    Name:   Michelle Simeon  MRN:     [id number]     [place]:      [de-identified]   Room:   [address] [place] [address] Day:  3  Admit Date:  5/15/2022  4:08 PM    PCP:   Leora Antonio DO  Code Status:  Full Code    Called to the floor by staff to evaluate Michelle Simeon in [id number] at 9:36 PM with complaint of chest pain and hypotension. Labs and chart reviewed. Patient seen and evaluated. Currently denies any complaints. No chest pain, sob, denies any pain issues, denies lightheadedness, dizziness. Ros limited with advanced dementia    Assessment/Plan: patient with persistent borderline hypotension s/p ivfs. Defer to cardio if appropriate to continue beta blocker initiated for svt. Continued on abx per ID for obstructing hydro with staph coag negative uti s/p stent by urology.  Continue home tegretol d/w nsg    Additional medical information reviewed:    Physical Examination:        Physical Exam   General sitting up in bed pleasantly confused watching tv  Lungs nonlabored  Cardio regular rate and rhythm    Problem List: Hospital Problems           Last Modified POA    * (Principal) Closed compression fracture of L1 lumbar vertebra, initial encounter ([facility] 5/15/2022 Yes    Depression 2022 Yes    Kidney stone 2022 Yes    Mixed incontinence 2022 Yes    Anxiety 2022 Yes    Hypercholesteremia 2022 Yes    Memory loss 2022 Yes    Seizure disorder ([facility] 2022 Yes    History of encephalitis 2022 Yes    Dementia due to Parkinson's disease with behavioral disturbance ([facility] 2022 Yes          Medications: Allergies: Allergies   Allergen Reactions    Haldol [Haloperidol Lactate]        Current Meds:   Scheduled Meds:    enoxaparin  40 mg SubCUTAneous Daily    sodium chloride  500 mL IntraVENous Once    fentanNYL  25 mcg IntraVENous Once    metoprolol succinate  25 mg Oral Daily    carBAMazepine  300 mg Oral QAM    carBAMazepine  400 mg Oral Nightly    vancomycin  1,250 mg IntraVENous Q24H    vancomycin (VANCOCIN) intermittent dosing (placeholder)   Other RX Placeholder    tamsulosin  0.4 mg Oral Daily    polyethylene glycol  17 g Oral Daily    acetaminophen  1,000 mg Oral 3 times per day     Continuous Infusions:    sodium chloride       PRN Meds: sodium chloride flush    Data:     Past Medical History:   has a past medical history of Anxiety, Convulsion ([facility] Encephalopathy, Herpes, Hyperlipidemia, Left bundle branch block, MRSA (methicillin resistant Staphylococcus aureus), Parkinson's disease ([facility] Seizures ([facility] and Vitamin D deficiency. Vitals:  BP (!) 85/46 Comment: RN notified  Pulse 67   Temp 98.4 °F (36.9 °C) (Oral)   Resp 16   Ht 5' 7\" (1.702 m)   Wt 135 lb (61.2 kg)   SpO2 96%   BMI 21.14 kg/m²   Temp (24hrs), Av.8 °F (36.6 °C), Min:96.9 °F (36.1 °C), Max:98.4 °F (36.9 °C)    No results for input(s): POCGLU in the last 72 hours. I/O (24Hr):     Intake/Output Summary (Last 24 hours) at 2022  Last data filed at 2022 1259  Gross per 24 hour   Intake 3914.39 ml   Output --   Net 3914.39 ml       Labs:    [unfilled]    Lab Results   Component Value Date/Time    SPECIAL LT HAND 3ML 05/18/2022 03:51 AM     Lab Results   Component Value Date/Time    CULTURE NO GROWTH 12 HOURS 05/18/2022 03:51 AM       [unfilled]    Radiology:    CT HEAD WO CONTRAST    Result Date: 5/15/2022  No acute intracranial abnormality. Encephalomalacia likely related to prior left MCA distribution stroke. Otherwise chronic microvascular disease. CT CERVICAL SPINE WO CONTRAST    Result Date: 5/15/2022  Cervical CT: No acute osseous abnormality. No acute fracture. CT of thoracic and lumbar spine: Multiple compression fractures throughout the thoracic and lumbar spine. Some of these fractures have chronic appearance such as superior endplate compression fracture T12, T10 and T7, L2 and L3 with 20%, 20%, 30%, 30% and 50% height loss respectively. However superior endplate compression fracture of L1 with 5 mm retropulsion into the spinal canal is age indeterminate and may be acute in etiology. There is associated moderate canal stenosis at T12-L1. For further evaluation of this fracture either comparison with prior examination or lumbar spine MRI can be obtained. Moderate levoscoliosis of upper to midthoracic spine and mild dextroscoliosis of thoracolumbar junction Partial visualization of 7 mm obstructing stone of the left proximal ureter which is associated with mild left-sided hydroureteronephrosis. Multiple nonobstructing kidney stones are also noted bilaterally. RECOMMENDATIONS: Unavailable     CT THORACIC SPINE WO CONTRAST    Result Date: 5/15/2022  Cervical CT: No acute osseous abnormality. No acute fracture. CT of thoracic and lumbar spine: Multiple compression fractures throughout the thoracic and lumbar spine.  Some of these fractures have chronic appearance such as superior endplate compression fracture T12, T10 and T7, L2 and L3 with 20%, 20%, 30%, 30% and 50% height loss respectively. However superior endplate compression fracture of L1 with 5 mm retropulsion into the spinal canal is age indeterminate and may be acute in etiology. There is associated moderate canal stenosis at T12-L1. For further evaluation of this fracture either comparison with prior examination or lumbar spine MRI can be obtained. Moderate levoscoliosis of upper to midthoracic spine and mild dextroscoliosis of thoracolumbar junction Partial visualization of 7 mm obstructing stone of the left proximal ureter which is associated with mild left-sided hydroureteronephrosis. Multiple nonobstructing kidney stones are also noted bilaterally. RECOMMENDATIONS: Unavailable     CT LUMBAR SPINE WO CONTRAST    Result Date: 5/15/2022  Cervical CT: No acute osseous abnormality. No acute fracture. CT of thoracic and lumbar spine: Multiple compression fractures throughout the thoracic and lumbar spine. Some of these fractures have chronic appearance such as superior endplate compression fracture T12, T10 and T7, L2 and L3 with 20%, 20%, 30%, 30% and 50% height loss respectively. However superior endplate compression fracture of L1 with 5 mm retropulsion into the spinal canal is age indeterminate and may be acute in etiology. There is associated moderate canal stenosis at T12-L1. For further evaluation of this fracture either comparison with prior examination or lumbar spine MRI can be obtained. Moderate levoscoliosis of upper to midthoracic spine and mild dextroscoliosis of thoracolumbar junction Partial visualization of 7 mm obstructing stone of the left proximal ureter which is associated with mild left-sided hydroureteronephrosis. Multiple nonobstructing kidney stones are also noted bilaterally. RECOMMENDATIONS: Unavailable     CT ABDOMEN PELVIS W IV CONTRAST Additional Contrast? None    Addendum Date: 5/15/2022    ADDENDUM: 5 mm stone left ureteropelvic junction.   Multiple parapelvic cysts and possible hydronephrosis. Benedetta Ou Result Date: 5/15/2022  Multiple compression fractures as above, age indeterminate. XR CHEST PORTABLE    Result Date: 5/15/2022  Mild dependent changes at the right lateral lung base, possibly small effusion and/or atelectasis. Otherwise unremarkable chest.     CT CHEST PULMONARY EMBOLISM W CONTRAST    Result Date: 5/15/2022  Right middle lobe airspace disease. 6 mm pleural based pulmonary nodule right middle lobe. Coronary artery disease. Multiple compression fractures age indeterminate. RECOMMENDATIONS: Fleischner Society guidelines for follow-up and management of incidentally detected pulmonary nodules: Single Solid Nodule: Nodule size equals 6-8 mm In a low-risk patient, CT at 6-12 months, then consider CT at 18-24 months. In a high-risk patient, CT at 6-12 months, then CT at 18-24 months. . - Low risk patients include individuals with minimal or absent history of smoking and other known risk factors. - High risk patients include individuals with a history or smoking or known risk factors. Radiology 2017 http://pubs. rsna.org/doi/full/10.1148/radiol. 0545344180       Belia Garcia MD  5/18/2022  9:36 PM

## 2022-05-19 NOTE — PLAN OF CARE
Problem: Discharge Planning  Goal: Discharge to home or other facility with appropriate resources  Outcome: Progressing  Flowsheets (Taken 5/18/2022 2000)  Discharge to home or other facility with appropriate resources: Identify barriers to discharge with patient and caregiver     Problem: Skin/Tissue Integrity  Goal: Absence of new skin breakdown  Description: 1. Monitor for areas of redness and/or skin breakdown  2. Assess vascular access sites hourly  3. Every 4-6 hours minimum:  Change oxygen saturation probe site  4. Every 4-6 hours:  If on nasal continuous positive airway pressure, respiratory therapy assess nares and determine need for appliance change or resting period. Outcome: Progressing     Problem: Safety - Adult  Goal: Free from fall injury  Outcome: Progressing     Problem: Pain  Goal: Verbalizes/displays adequate comfort level or baseline comfort level  Outcome: Progressing  Flowsheets (Taken 5/18/2022 2018)  Verbalizes/displays adequate comfort level or baseline comfort level: Encourage patient to monitor pain and request assistance     Problem: Confusion  Goal: Confusion, delirium, dementia, or psychosis is improved or at baseline  Description: INTERVENTIONS:  1. Assess for possible contributors to thought disturbance, including medications, impaired vision or hearing, underlying metabolic abnormalities, dehydration, psychiatric diagnoses, and notify attending LIP  2. [place] high risk fall precautions, as indicated  3. Provide frequent short contacts to provide reality reorientation, refocusing and direction  4. Decrease environmental stimuli, including noise as appropriate  5. Monitor and intervene to maintain adequate nutrition, hydration, elimination, sleep and activity  6. If unable to ensure safety without constant attention obtain sitter and review sitter guidelines with assigned personnel  7.  Initiate Psychosocial CNS and Spiritual Care consult, as indicated  Outcome: Progressing

## 2022-05-19 NOTE — FLOWSHEET NOTE
A 48 hr holter monitor was placed on pt 5/19/2022 # [id number] oral and written instructions were given. Pt is going to [facility].

## 2022-05-19 NOTE — PROGRESS NOTES
Pts blood pressure 80/53 map 63. HR 67. At 2013. On call NP messaged. 500 ml NS bolus ordered and 75ml/hr after completed. Pt also complained of chest pain, RN obtained ekg and labs were ordered. Blood pressure 79/45 map 56. In house Physician to bedside and states pt is denying chest pain at this time and they are not concerned with her blood pressures as they have been soft all day. Per chart, lowest blood pressure on day shift was 102/59 map 69. Midodrine ordered.    Electronically signed by Hesham Haro RN on 5/19/2022 at 12:08 AM

## 2022-05-19 NOTE — CARE COORDINATION
Transitional Planning    Spoke with Maureen Monsalve [facility]. They are agreeable to take patient today regardless of telesitter d/c time. Transport requested and Dr. Chanda aSunders updated    1025 transport ETA 1300, updated Dorothy at [facility], she is agreeable. Faxed AVS and HENS to facility    1213 delayed transport to 1730 at the request of Cardiology NP. Facility updated    1518 updated daughter, Norberto Fountain    # for Report [phone]      Discharge Report    [facility])  Clinical Case Management Department  Written by:  Rodo Townsend RN    Patient Name: Lawanda Maldonado  Attending Provider: Henrique Bose MD  Admit Date: 5/15/2022  4:08 PM  MRN: [id number]  Account: [de-identified]                     : 1945  Discharge Date: 22      Disposition: SNF    Rodo Townsend RN

## 2022-05-20 LAB
% FREE CARBAMAZEPINE: 26.7 % (ref 8–35)
CARBAMAZEPINE, FREE: 2 UG/ML (ref 1–3)
CARBAMAZEPINE, TOTAL: 7.5 UG/ML (ref 4–12)
EKG ATRIAL RATE: 68 BPM
EKG P AXIS: 85 DEGREES
EKG P-R INTERVAL: 262 MS
EKG Q-T INTERVAL: 430 MS
EKG QRS DURATION: 114 MS
EKG QTC CALCULATION (BAZETT): 457 MS
EKG R AXIS: 71 DEGREES
EKG T AXIS: 66 DEGREES
EKG VENTRICULAR RATE: 68 BPM

## 2022-05-20 PROCEDURE — 93010 ELECTROCARDIOGRAM REPORT: CPT | Performed by: INTERNAL MEDICINE

## 2022-05-20 NOTE — PROGRESS NOTES
[facility] -   Infectious diseases evaluation  admission date 5/15/2022    reason for consultation:   UTI and AB management    Impression :   Current:  · Fall at home  · Multiple vertebral fractures, age indeterminate  · Right middle lobe airspace disease - non specific  · RML Nodule with spiculations  · Right obstructive ureteral stone w complicated UTI - SCN  · Dementia and very forgetful    Other:  ·   Discussion / summary of stay / plan of care   ·   Recommendations   · ight middle lobe nodule hence spiculations ? for neoplasia  · Stop vanco and DC on levaquin short course for UTI  · Ok for DC  · disc w med    Infection Control Recommendations   · [place] Precautions  · Contact Isolation     Antimicrobial Stewardship Recommendations   · Simplification of therapy  · Targeted therapy  · PK dosing      History of Present Illness:   Initial history:  Samuel Baeza is a 64y.o.-year-old female af patient     G employed for the  [employer] neurology on board due to left. Obstructive stone, stent placed 3/16   a fall from a height loss found to have L1 age undetermined endplate compression fracture with a laceration of the left temporal region and was found to have NSTEMI.   No plans for surgery due to the fracture, and neurosurgery  To follow results  Developed SVTs and is on amiodarone drip  WBC 8.8 creatinine 0.76    Of the chest shows right middle lobe airspace disease with a 6 mm nodule in the right middle lobe with multiple compressive vertebral fractures age-indeterminate    Personally reviewed, infiltrative subpleural right middle lobe definitely there is a nodular with some spiculations around it      Interval changes  5/19/2022   Patient Vitals for the past 8 hrs:   BP Temp Temp src Pulse Resp   05/19/22 1527 (!) 81/47 -- -- -- --   05/19/22 1526 (!) 83/52 98.4 °F (36.9 °C) Oral 76 16   agitated and combative  No fever  abd soft  U cx SCN S levaquin  BC neg    Summary of relevant labs:  Labs:  WBC 8.8  Creatinine 0.76  Micro:  2 blood cultures 5/18  UA infective  Urine culture 5/16 twice SCN sens to levaquin      Imaging:  CT abdomen pelvis shows multiple basilar groundglass opacities with multiple compression fractures age-indeterminate and  CT of the chest shows right middle lobe airspace disease with a 6 mm left pleural-based pulmonary nodule in the right upper lobe            I have personally reviewed the past medical history, past surgical history, medications, social history, and family history, and I haveupdated the database accordingly. Allergies:   Haldol [haloperidol lactate]     Review of Systems:     Review of Systems   Unable to perform ROS: Mental status change (combative and agitated)       Physical Examination :       Physical Exam  Constitutional:       Appearance: She is not ill-appearing. HENT:      Head: Normocephalic and atraumatic. Nose: Nose normal.      Mouth/Throat:      Mouth: Mucous membranes are moist.   Eyes:      General: No scleral icterus. Conjunctiva/sclera: Conjunctivae normal.   Cardiovascular:      Rate and Rhythm: Normal rate and regular rhythm. Heart sounds: Normal heart sounds. No murmur heard. Pulmonary:      Effort: No respiratory distress. Breath sounds: Normal breath sounds. Abdominal:      General: There is no distension. Palpations: Abdomen is soft. Tenderness: There is no abdominal tenderness. Genitourinary:     Comments: No sood  Musculoskeletal:         General: No swelling or deformity. Cervical back: Neck supple. No rigidity. Skin:     Coloration: Skin is not jaundiced or pale. Findings: No bruising or lesion. Neurological:      General: No focal deficit present. Mental Status: She is alert. Cranial Nerves: No cranial nerve deficit.       Comments: 5 min memory span as per daughter  Very forgetful   Psychiatric:      Comments: Very agitated combative         Past Medical History:     Past Medical History:   Diagnosis Date    Anxiety     [facility] ([facility]     Encephalopathy     Herpes     Hyperlipidemia     Left bundle branch block     MRSA (methicillin resistant Staphylococcus aureus)     Parkinson's disease ([facility]     Seizures (HCC)     secondary to encephalitis    Vitamin D deficiency        Past Surgical  History:     Past Surgical History:   Procedure Laterality Date    ABSCESS DRAINAGE Right 2013    WOUND EXPLORATION AND I+D  RIGHT HIP    CYSTOSCOPY Left 2022    CYSTOSCOPY URETERAL STENT INSERTION performed by Lisa Kim MD at [address] [place] [address] Left 2022    Cystoscopy       Medications:         Social History:     Social History     Socioeconomic History    Marital status: Single     Spouse name: Not on file    Number of children: Not on file    Years of education: Not on file    Highest education level: Not on file   Occupational History    Not on file   Tobacco Use    Smoking status: Former Smoker     Packs/day: 1.00     Years: 30.00     Pack years: 30.00     Types: Cigarettes     Start date:      Quit date:      Years since quittin.3    Smokeless tobacco: Never Used    Tobacco comment: unknown how many packs a day, or how many years   Vaping Use    Vaping Use: Never used   Substance and Sexual Activity    Alcohol use: No    Drug use: No    Sexual activity: Never   Other Topics Concern    Not on file   Social History Narrative    Not on file     Social Determinants of Health     Financial Resource Strain: Low Risk     Difficulty of Paying Living Expenses: Not hard at all   Food Insecurity: No Food Insecurity    Worried About Running Out of Food in the Last Year: Never true    [place] of Food in the Last Year: Never true   Transportation Needs:     Lack of Transportation (Medical): Not on file    Lack of Transportation (Non-Medical):  Not on file   Physical Activity:  Days of Exercise per Week: Not on file    Minutes of Exercise per Session: Not on file   Stress:     Feeling of Stress : Not on file   Social Connections:     Frequency of Communication with Friends and Family: Not on file    Frequency of Social Gatherings with Friends and Family: Not on file    Attends Worship Services: Not on file    Active Member of [address] [place] [address] [employer] or Organizations: Not on file    Attends Club or Organization Meetings: Not on file    Marital Status: Not on file   Intimate Partner Violence:     Fear of Current or Ex-Partner: Not on file    Emotionally Abused: Not on file    Physically Abused: Not on file    Sexually Abused: Not on file   Housing Stability:     Unable to Pay for Housing in the Last Year: Not on file    Number of [place] in the Last Year: Not on file    Unstable Housing in the Last Year: Not on file       Family History:   History reviewed. No pertinent family history. Medical Decision Making:   I have independently reviewed/ordered the following labs:    CBC with Differential:   Recent Labs     05/17/22  1520 05/18/22  0353 05/18/22  2225 05/19/22  0316   WBC 8.3   < > 9.1 9.3   HGB 12.0   < > 11.1* 11.2*   HCT 36.1*   < > 33.9* 34.2*      < > 264 299   LYMPHOPCT 25  --   --   --    MONOPCT 15*  --   --   --     < > = values in this interval not displayed. BMP:  Recent Labs     05/17/22  1520 05/17/22  1520 05/18/22  0353 05/18/22  0353 05/18/22  2225 05/19/22  0316      < > 137   < > 132* 134*   K 3.5*   < > 3.8   < > 4.4 4.4   *   < > 103   < > 105 108*   CO2 22   < > 24   < > 19* 18*   BUN  --   --  11   < > 20 20   CREATININE  --   --  0.76   < > 1.05* 0.97*   MG 2.0  --  2.9*  --   --   --     < > = values in this interval not displayed. Hepatic Function Panel:   No results for input(s): PROT, LABALBU, BILIDIR, IBILI, BILITOT, ALKPHOS, ALT, AST in the last 72 hours. No results for input(s): RPR in the last 72 hours.   No results for input(s): HIV in the last 72 hours. No results for input(s): BC in the last 72 hours. Lab Results   Component Value Date    CREATININE 0.97 05/19/2022    GLUCOSE 103 05/19/2022    GLUCOSE 93 03/12/2012       Detailed results: Thank you for allowing us to participate in the care of this patient. Please call with questions. This note is created with the assistance of a speech recognition program.  While intending to generate adocument that actually reflects the content of the visit, the document can still have some errors including those of syntax and sound a like substitutions which may escape proof reading. It such instances, actual meaningcan be extrapolated by contextual diversion.     Gabby Wall MD  Office: [phone]  Perfect serve / office [phone]

## 2022-05-23 LAB
CULTURE: NORMAL
CULTURE: NORMAL
Lab: NORMAL
Lab: NORMAL
SPECIMEN DESCRIPTION: NORMAL
SPECIMEN DESCRIPTION: NORMAL

## 2022-05-31 NOTE — PROGRESS NOTES
Physician Progress Note      Shakir Rojo  CSN #:                  [id number]  :                       1945  ADMIT DATE:       5/15/2022 4:08 PM  [address] DATE:        2022 6:15 PM  RESPONDING  PROVIDER #:        Jeannine Choe MD          QUERY TEXT:    Pt admitted with L1 fracture. Pt was s/p fall. If possible, please document in   progress notes and discharge summary if you are evaluating and/or treating   any of the following: The medical record reflects the following:  Risk Factors: s/p fall  Clinical Indicators: Closed compression fracture of L1 lumbar vertebra noted   after unwitnessed fall, per d/c summary \"status post fall from standing height   and was found to have age-indeterminate L1 endplate compression fracture,   evaluated by neurosurgery, no acute interventions planned\"  Treatment: imaging, neuro-surg consult, monitoring    Thank you, RAMAN Ahn  email - [email or web address]  cell- [phone]  office hours [id number]  Options provided:  -- Traumatic L1 fracture  -- Non-traumatic L1 fracture  -- Other - I will add my own diagnosis  -- Disagree - Not applicable / Not valid  -- Disagree - Clinically unable to determine / Unknown  -- Refer to Clinical Documentation Reviewer    PROVIDER RESPONSE TEXT:    This patient has a non-traumatic L1 fracture.     Query created by: Patito Boyer on 2022 8:45 AM      Electronically signed by:  Jeannine Choe MD 2022 6:58 AM

## 2022-06-09 ENCOUNTER — TELEPHONE (OUTPATIENT)
Dept: UROLOGY | Age: 77
End: 2022-06-09

## 2022-06-09 NOTE — TELEPHONE ENCOUNTER
----- Message from Jocelyn Domínguez MD sent at 5/17/2022  7:45 AM EDT -----  Patient will need to follow-up with Dr. Tim Marte for definitive stone treatment ureteroscopy holmium laser lithotripsy stent left side discharge.   Patient is still admitted to the hospital thinks

## 2022-06-13 DIAGNOSIS — R32 ENURESIS: ICD-10-CM

## 2022-06-13 RX ORDER — DESMOPRESSIN ACETATE 0.2 MG/1
TABLET ORAL
Qty: 90 TABLET | Refills: 0 | Status: SHIPPED | OUTPATIENT
Start: 2022-06-13 | End: 2022-08-22

## 2022-06-13 NOTE — TELEPHONE ENCOUNTER
Luis Alberto Dee is calling to request a refill on the following medication(s):    Last Visit Date (If Applicable):  4/92/0065    Next Visit Date:    Visit date not found    Medication Request:  Requested Prescriptions     Pending Prescriptions Disp Refills    desmopressin (DDAVP) 0.2 MG tablet [Pharmacy Med Name: Desmopressin Acetate Oral Tablet 0.2 MG] 90 tablet 0     Sig: TAKE 3 TABLETS BY MOUTH IN THE EVENING

## 2022-08-22 ENCOUNTER — OFFICE VISIT (OUTPATIENT)
Dept: FAMILY MEDICINE CLINIC | Age: 77
End: 2022-08-22
Payer: COMMERCIAL

## 2022-08-22 ENCOUNTER — TELEPHONE (OUTPATIENT)
Dept: FAMILY MEDICINE CLINIC | Age: 77
End: 2022-08-22

## 2022-08-22 VITALS
SYSTOLIC BLOOD PRESSURE: 117 MMHG | HEIGHT: 67 IN | WEIGHT: 125 LBS | BODY MASS INDEX: 19.62 KG/M2 | DIASTOLIC BLOOD PRESSURE: 67 MMHG | OXYGEN SATURATION: 96 % | HEART RATE: 71 BPM

## 2022-08-22 DIAGNOSIS — I95.0 IDIOPATHIC HYPOTENSION: ICD-10-CM

## 2022-08-22 DIAGNOSIS — G20 DEMENTIA DUE TO PARKINSON'S DISEASE WITH BEHAVIORAL DISTURBANCE (HCC): ICD-10-CM

## 2022-08-22 DIAGNOSIS — R26.2 DIFFICULTY IN WALKING: ICD-10-CM

## 2022-08-22 DIAGNOSIS — R53.1 WEAKNESS: ICD-10-CM

## 2022-08-22 DIAGNOSIS — G40.909 SEIZURE DISORDER (HCC): ICD-10-CM

## 2022-08-22 DIAGNOSIS — R32 ENURESIS: ICD-10-CM

## 2022-08-22 DIAGNOSIS — F42.4 SKIN PICKING HABIT: ICD-10-CM

## 2022-08-22 DIAGNOSIS — F02.818 DEMENTIA DUE TO PARKINSON'S DISEASE WITH BEHAVIORAL DISTURBANCE (HCC): ICD-10-CM

## 2022-08-22 DIAGNOSIS — Z86.61 HISTORY OF ENCEPHALITIS: Primary | ICD-10-CM

## 2022-08-22 PROBLEM — N18.30 CHRONIC RENAL DISEASE, STAGE III (HCC): Status: ACTIVE | Noted: 2022-08-22

## 2022-08-22 PROCEDURE — 99214 OFFICE O/P EST MOD 30 MIN: CPT | Performed by: FAMILY MEDICINE

## 2022-08-22 PROCEDURE — 1123F ACP DISCUSS/DSCN MKR DOCD: CPT | Performed by: FAMILY MEDICINE

## 2022-08-22 RX ORDER — DESMOPRESSIN ACETATE 0.2 MG/1
TABLET ORAL
Qty: 90 TABLET | Refills: 0 | Status: ON HOLD | OUTPATIENT
Start: 2022-08-22 | End: 2022-10-06 | Stop reason: HOSPADM

## 2022-08-22 RX ORDER — BLOOD PRESSURE TEST KIT
1 KIT MISCELLANEOUS DAILY
Qty: 1 KIT | Refills: 0 | Status: ON HOLD | OUTPATIENT
Start: 2022-08-22 | End: 2022-10-04

## 2022-08-22 RX ORDER — POTASSIUM CHLORIDE 20 MEQ/1
TABLET, EXTENDED RELEASE ORAL
COMMUNITY
Start: 2022-08-14 | End: 2022-08-22 | Stop reason: SDUPTHER

## 2022-08-22 RX ORDER — CARBAMAZEPINE 200 MG/1
400 TABLET ORAL 2 TIMES DAILY
Qty: 180 TABLET | Refills: 3 | Status: SHIPPED | OUTPATIENT
Start: 2022-08-22 | End: 2022-08-23

## 2022-08-22 RX ORDER — FLUOXETINE HYDROCHLORIDE 20 MG/1
CAPSULE ORAL
Qty: 180 CAPSULE | Refills: 3 | Status: SHIPPED | OUTPATIENT
Start: 2022-08-22

## 2022-08-22 RX ORDER — POTASSIUM CHLORIDE 20 MEQ/1
20 TABLET, EXTENDED RELEASE ORAL DAILY
Qty: 90 TABLET | Refills: 3 | Status: ON HOLD | OUTPATIENT
Start: 2022-08-22 | End: 2022-10-04

## 2022-08-22 RX ORDER — CARBAMAZEPINE 200 MG/1
300 TABLET ORAL EVERY MORNING
Qty: 45 TABLET | Refills: 0 | Status: SHIPPED | OUTPATIENT
Start: 2022-08-22 | End: 2022-08-23

## 2022-08-22 ASSESSMENT — PATIENT HEALTH QUESTIONNAIRE - PHQ9: DEPRESSION UNABLE TO ASSESS: FUNCTIONAL CAPACITY MOTIVATION LIMITS ACCURACY

## 2022-08-22 NOTE — TELEPHONE ENCOUNTER
Jayy Goodman is calling to request a refill on the following medication(s):    Medication Request:  Requested Prescriptions     Pending Prescriptions Disp Refills    desmopressin (DDAVP) 0.2 MG tablet [Pharmacy Med Name: Desmopressin Acetate Oral Tablet 0.2 MG] 90 tablet 0     Sig: TAKE 3 TABLETS BY MOUTH IN THE EVENING       Last Visit Date (If Applicable):  3/67/6328    Next Visit Date:    Visit date not found

## 2022-08-22 NOTE — TELEPHONE ENCOUNTER
Rahul Collins from [facility] [address] called in stating that they have received two prescriptions for the same medications with different instructions.       carBAMazepine (TEGRETOL) 200 MG tablet [[id number]]     Order Details  Dose: 300 mg Route: Oral Frequency: EVERY MORNING   Dispense Quantity: 45 tablet Refills: 0          Sig: Take 1.5 tablets by mouth every morning         Start Date: 08/22/22 End Date: 09/21/22 after 30 doses   Written Date: 08/22/22 Expiration Date: 08/22/23       Diagnosis Association: Seizure disorder ([facility] (G40.909)   Original Order:  carBAMazepine (TEGRETOL) 200 MG tablet [[id number]]   Providers      carBAMazepine (TEGRETOL) 200 MG tablet [[id number]]     Order Details  Dose: 300 mg Route: Oral Frequency: EVERY MORNING   Dispense Quantity: 45 tablet Refills: 0          Sig: Take 1.5 tablets by mouth every morning         Start Date: 08/22/22 End Date: 09/21/22 after 30 doses   Written Date: 08/22/22 Expiration Date: 08/22/23       Diagnosis Association: Seizure disorder ([facility] (G40.909)   Original Order:  carBAMazepine (TEGRETOL) 200 MG tablet [[id number]

## 2022-08-22 NOTE — TELEPHONE ENCOUNTER
I spoke with daughter patient takes 1.5 tab in the morning and 2 tabs at bedtime. Please send a new script into pharmacy.

## 2022-08-22 NOTE — PROGRESS NOTES
[facility]  [address] [place] [address] [address] [facility] [address]  Dept: [phone]      Jody Haq is a 76 y.o. female who presents today for follow up on her  medical conditions as noted below. Chief Complaint   Patient presents with    Medicare AW    Medication Check       Patient Active Problem List:     Seizure [facility])     Vitamin D deficiency     Anxiety     Hypercholesteremia     Cellulitis of right leg     MRSA (methicillin resistant staph aureus) culture positive     Breakthrough seizure ([address]     Memory loss     Difficulty speaking     Seizure disorder ([address]     Dementia with behavioral disturbance ([address]     History of encephalitis     Dementia due to Parkinson's disease with behavioral disturbance ([address]     Acute deep vein thrombosis (DVT) of proximal vein of left lower extremity (HCC)     Closed compression fracture of L1 lumbar vertebra, initial encounter ([address]     Depression     Kidney stone     Mixed incontinence     Overactive bladder     Closed head injury     Chronic renal disease, stage III ([address] [955788]     Past Medical History:   Diagnosis Date    Anxiety     Convulsion ([address]     Encephalopathy     Herpes     Hyperlipidemia     Left bundle branch block     MRSA (methicillin resistant Staphylococcus aureus)     Parkinson's disease ([address]     Seizures ([address]     secondary to encephalitis    Vitamin D deficiency       Past Surgical History:   Procedure Laterality Date    ABSCESS DRAINAGE Right 12/14/2013    WOUND EXPLORATION AND I+D  RIGHT HIP    CYSTOSCOPY Left 5/16/2022    CYSTOSCOPY URETERAL STENT INSERTION performed by Angel Scott MD at [address] [place] [address] Left 05/16/2022    Cystoscopy     History reviewed. No pertinent family history.     Current Outpatient Medications   Medication Sig Dispense Refill    carBAMazepine (TEGRETOL) 200 MG tablet Take 1.5 tablets by mouth every morning 45 tablet 0    potassium chloride (KLOR-CON M) 20 MEQ extended release tablet Take 1 tablet by mouth daily 90 tablet 3    FLUoxetine (PROZAC) 20 MG capsule 2 po qd 180 capsule 3    mirabegron (MYRBETRIQ) 50 MG TB24 Take 50 mg by mouth daily 90 tablet 3    carBAMazepine (TEGRETOL) 200 MG tablet Take 2 tablets by mouth 2 times daily 180 tablet 3    Blood Pressure KIT 1 Device by Does not apply route daily 1 kit 0    desmopressin (DDAVP) 0.2 MG tablet TAKE 3 TABLETS BY MOUTH IN THE EVENING 90 tablet 0    melatonin 5 MG TABS tablet Take 1 tablet by mouth nightly as needed (sleep) 10 tablet 0    metoprolol succinate (TOPROL XL) 25 MG extended release tablet Take 1 tablet by mouth daily 30 tablet 3    midodrine (PROAMATINE) 5 MG tablet Take 1 tablet by mouth 3 times daily as needed (hypotension.) 90 tablet 3    tamsulosin (FLOMAX) 0.4 MG capsule Take 1 capsule by mouth daily 30 capsule 3    aspirin 81 MG EC tablet Take 81 mg by mouth daily      acetaminophen (TYLENOL) 500 MG tablet Take 500 mg by mouth every 6 hours as needed for Pain (Patient not taking: Reported on 2022)      Handicap Placard MISC by Does not apply route Permanent one year  Dx fractured humerus (Patient not taking: Reported on 2020) 1 each 0     No current facility-administered medications for this visit.      ALLERGIES:    Allergies   Allergen Reactions    Haldol [Haloperidol Lactate]        Social History     Tobacco Use    Smoking status: Former     Packs/day: 1.00     Years: 30.00     Pack years: 30.00     Types: Cigarettes     Start date: 54     Quit date:      Years since quittin.6    Smokeless tobacco: Never    Tobacco comments:     unknown how many packs a day, or how many years   Substance Use Topics    Alcohol use: No        LDL Calculated (mg/dL)   Date Value   2016 127     LDL Cholesterol (mg/dL)   Date Value   2021 105     HDL (mg/dL)   Date Value   2021 70     BUN (mg/dL)   Date Value   2022 20     Creatinine (mg/dL)   Date Value 05/19/2022 0.97 (H)     Glucose (mg/dL)   Date Value   05/19/2022 103 (H)   03/12/2012 93              Subjective:      HPI  Is seen today for follow-up from respite care daughter was concerned because there was some medication changes and she wants to clarify what she should be taking  They gave her something for blood pressure when it was high then something to take when it was low she previously had not been on anything for blood pressure at all  She also wonders she can get a handicap placard and she is having some difficulty walking lately and some weakness    Review of Systems:     Constitutional: Negative for fever, appetite change and fatigue. Family social and medical history reviewed and unchanged     HENT: Negative. Negative for nosebleeds, trouble swallowing and neck pain. Eyes: Negative for photophobia and visual disturbance. Respiratory: Negative. Negative for chest tightness and shortness of breath. Cardiovascular: Negative. Negative for chest pain and leg swelling. Gastrointestinal: Negative. Negative for abdominal pain and blood in stool. Endocrine: Negative for cold intolerance and polyuria. Genitourinary: Negative for dysuria and hematuria. Musculoskeletal: Negative. Skin: Negative for rash. Allergic/Immunologic: Negative. Neurological: Negative. Negative for dizziness, weakness and numbness. Hematological: Negative. Negative for adenopathy. Does not bruise/bleed easily. Psychiatric/Behavioral: Negative for sleep disturbance, dysphoric mood and  decreased concentration. The patient is not nervous/anxious. Objective:     Physical Exam:     Nursing note and vitals reviewed. /67   Pulse 71   Ht 5' 7\" (1.702 m)   Wt 125 lb (56.7 kg)   SpO2 96%   BMI 19.58 kg/m²   Constitutional: She is oriented to person, place, and time. She   appears well-developed and well-nourished. HENT:   Head: Normocephalic and atraumatic.     Right Ear: External ear normal. Tympanic membrane is not erythematous. No middle ear effusion. Left Ear: External ear normal. Tympanic membrane is not erythematous. No middle ear effusion. Nose: No mucosal edema. Mouth/Throat: Oropharynx is clear and moist. No posterior oropharyngeal erythema. Eyes: Conjunctivae and EOM are normal. Pupils are equal, round, and reactive to light. Neck: Normal range of motion. Neck supple. No thyromegaly present. Cardiovascular: Normal rate, regular rhythm and normal heart sounds. No murmur heard. Pulmonary/Chest: Effort normal and breath sounds normal. She has no wheezes. Shehas no rales. Abdominal: Soft. Bowel sounds are normal. She exhibits no distension and no mass. There is no tenderness. There is no rebound and no guarding. Genitourinary/Anorectal:deferred  Musculoskeletal: Normal range of motion. She exhibits no edema or tenderness. Lymphadenopathy: She has no cervical adenopathy. Neurological: She is alert and oriented to person, place, and time. She has normal reflexes. Skin: Skin is warm and dry. No rash noted. Psychiatric: She has a normal mood and affect. Her   behavior is normal.       Assessment:      1. History of encephalitis    2. Seizure disorder ([address]    3. Dementia due to Parkinson's disease with behavioral disturbance ([address]    4. Skin picking habit    5. Enuresis    6. Idiopathic hypotension    7. Weakness    8. Difficulty in walking          Plan:      Call or return to clinic prn if these symptoms worsen or fail to improve as anticipated. I have reviewed the instructions with the patient, answering all questions to her satisfaction. Return if symptoms worsen or fail to improve.   Orders Placed This Encounter   Procedures    Handicap placard    Wheelchair     Orders Placed This Encounter   Medications    carBAMazepine (TEGRETOL) 200 MG tablet     Sig: Take 1.5 tablets by mouth every morning     Dispense:  45 tablet     Refill:  0    potassium chloride (KLOR-CON M) 20 MEQ extended release tablet     Sig: Take 1 tablet by mouth daily     Dispense:  90 tablet     Refill:  3    FLUoxetine (PROZAC) 20 MG capsule     Si po qd     Dispense:  180 capsule     Refill:  3    mirabegron (MYRBETRIQ) 50 MG TB24     Sig: Take 50 mg by mouth daily     Dispense:  90 tablet     Refill:  3    carBAMazepine (TEGRETOL) 200 MG tablet     Sig: Take 2 tablets by mouth 2 times daily     Dispense:  180 tablet     Refill:  3    Blood Pressure KIT     Si Device by Does not apply route daily     Dispense:  1 kit     Refill:  0   Will order handicap placard in a wheelchair she also should maybe see if she could try using a walker as well reviewed medications with patient made refills and follow-up as needed    Electronically signed by Ricardo Dick DO on 2022 at 11:28 AM

## 2022-08-23 RX ORDER — CARBAMAZEPINE 200 MG/1
TABLET ORAL
Qty: 120 TABLET | Refills: 11 | Status: SHIPPED | OUTPATIENT
Start: 2022-08-23

## 2022-10-03 ENCOUNTER — APPOINTMENT (OUTPATIENT)
Dept: CT IMAGING | Age: 77
DRG: 871 | End: 2022-10-03
Payer: COMMERCIAL

## 2022-10-03 ENCOUNTER — APPOINTMENT (OUTPATIENT)
Dept: GENERAL RADIOLOGY | Age: 77
DRG: 871 | End: 2022-10-03
Payer: COMMERCIAL

## 2022-10-03 ENCOUNTER — HOSPITAL ENCOUNTER (INPATIENT)
Age: 77
LOS: 5 days | Discharge: NURSING, REHAB OR LONG-TERM CARE | DRG: 871 | End: 2022-10-08
Attending: EMERGENCY MEDICINE | Admitting: INTERNAL MEDICINE
Payer: COMMERCIAL

## 2022-10-03 DIAGNOSIS — N30.01 ACUTE CYSTITIS WITH HEMATURIA: ICD-10-CM

## 2022-10-03 DIAGNOSIS — J44.1 COPD EXACERBATION (HCC): ICD-10-CM

## 2022-10-03 DIAGNOSIS — J18.9 PNEUMONIA DUE TO INFECTIOUS ORGANISM, UNSPECIFIED LATERALITY, UNSPECIFIED PART OF LUNG: Primary | ICD-10-CM

## 2022-10-03 PROBLEM — J16.0 CAP (COMMUNITY ACQUIRED PNEUMONIA) DUE TO CHLAMYDIA SPECIES: Status: ACTIVE | Noted: 2022-10-03

## 2022-10-03 LAB
ABSOLUTE EOS #: 0.8 K/UL (ref 0–0.4)
ABSOLUTE LYMPH #: 1.8 K/UL (ref 1–4.8)
ABSOLUTE MONO #: 1.6 K/UL (ref 0.1–1.3)
ALBUMIN SERPL-MCNC: 3 G/DL (ref 3.5–5.2)
ALP BLD-CCNC: 207 U/L (ref 35–104)
ALT SERPL-CCNC: 17 U/L (ref 5–33)
AMMONIA: 18 UMOL/L (ref 11–51)
ANION GAP SERPL CALCULATED.3IONS-SCNC: 13 MMOL/L (ref 9–17)
AST SERPL-CCNC: 25 U/L
BACTERIA: ABNORMAL
BASOPHILS # BLD: 0 % (ref 0–2)
BASOPHILS ABSOLUTE: 0.1 K/UL (ref 0–0.2)
BILIRUB SERPL-MCNC: 0.2 MG/DL (ref 0.3–1.2)
BILIRUBIN URINE: NEGATIVE
BUN BLDV-MCNC: 19 MG/DL (ref 8–23)
CALCIUM SERPL-MCNC: 9.2 MG/DL (ref 8.6–10.4)
CASTS UA: ABNORMAL /LPF
CHLORIDE BLD-SCNC: 97 MMOL/L (ref 98–107)
CO2: 24 MMOL/L (ref 20–31)
COLOR: YELLOW
CREAT SERPL-MCNC: 0.93 MG/DL (ref 0.5–0.9)
EOSINOPHILS RELATIVE PERCENT: 5 % (ref 0–4)
EPITHELIAL CELLS UA: ABNORMAL /HPF
GFR SERPL CREATININE-BSD FRML MDRD: >60 ML/MIN/1.73M2
GLUCOSE BLD-MCNC: 131 MG/DL (ref 70–99)
GLUCOSE URINE: NEGATIVE
HCT VFR BLD CALC: 35.6 % (ref 36–46)
HEMOGLOBIN: 12.8 G/DL (ref 12–16)
INR BLD: 1.1
KETONES, URINE: NEGATIVE
LACTIC ACID: 2.1 MMOL/L (ref 0.5–2.2)
LEUKOCYTE ESTERASE, URINE: ABNORMAL
LIPASE: 32 U/L (ref 13–60)
LYMPHOCYTES # BLD: 11 % (ref 24–44)
MCH RBC QN AUTO: 33.6 PG (ref 26–34)
MCHC RBC AUTO-ENTMCNC: 35.9 G/DL (ref 31–37)
MCV RBC AUTO: 93.7 FL (ref 80–100)
MONOCYTES # BLD: 10 % (ref 1–7)
NITRITE, URINE: NEGATIVE
PDW BLD-RTO: 14.5 % (ref 11.5–14.9)
PH UA: 7 (ref 5–8)
PLATELET # BLD: 487 K/UL (ref 150–450)
PMV BLD AUTO: 6.9 FL (ref 6–12)
POTASSIUM SERPL-SCNC: 3.3 MMOL/L (ref 3.7–5.3)
PRO-BNP: 1010 PG/ML
PROTEIN UA: NEGATIVE
PROTHROMBIN TIME: 13.9 SEC (ref 11.8–14.6)
RBC # BLD: 3.81 M/UL (ref 4–5.2)
RBC UA: ABNORMAL /HPF
REASON FOR REJECTION: NORMAL
REASON FOR REJECTION: NORMAL
SARS-COV-2, RAPID: NOT DETECTED
SEG NEUTROPHILS: 74 % (ref 36–66)
SEGMENTED NEUTROPHILS ABSOLUTE COUNT: 12 K/UL (ref 1.3–9.1)
SODIUM BLD-SCNC: 134 MMOL/L (ref 135–144)
SPECIFIC GRAVITY UA: 1.02 (ref 1–1.03)
SPECIMEN DESCRIPTION: NORMAL
TOTAL PROTEIN: 7.7 G/DL (ref 6.4–8.3)
TROPONIN, HIGH SENSITIVITY: 32 NG/L (ref 0–14)
TROPONIN, HIGH SENSITIVITY: 36 NG/L (ref 0–14)
TSH SERPL DL<=0.05 MIU/L-ACNC: 1.94 UIU/ML (ref 0.3–5)
TURBIDITY: ABNORMAL
URINE HGB: ABNORMAL
UROBILINOGEN, URINE: NORMAL
WBC # BLD: 16.3 K/UL (ref 3.5–11)
WBC UA: ABNORMAL /HPF
ZZ NTE CLEAN UP: ORDERED TEST: NORMAL
ZZ NTE CLEAN UP: ORDERED TEST: NORMAL
ZZ NTE WITH NAME CLEAN UP: SPECIMEN SOURCE: NORMAL
ZZ NTE WITH NAME CLEAN UP: SPECIMEN SOURCE: NORMAL

## 2022-10-03 PROCEDURE — 87040 BLOOD CULTURE FOR BACTERIA: CPT

## 2022-10-03 PROCEDURE — 83880 ASSAY OF NATRIURETIC PEPTIDE: CPT

## 2022-10-03 PROCEDURE — 6370000000 HC RX 637 (ALT 250 FOR IP): Performed by: STUDENT IN AN ORGANIZED HEALTH CARE EDUCATION/TRAINING PROGRAM

## 2022-10-03 PROCEDURE — 83605 ASSAY OF LACTIC ACID: CPT

## 2022-10-03 PROCEDURE — 87088 URINE BACTERIA CULTURE: CPT

## 2022-10-03 PROCEDURE — 83690 ASSAY OF LIPASE: CPT

## 2022-10-03 PROCEDURE — 96374 THER/PROPH/DIAG INJ IV PUSH: CPT

## 2022-10-03 PROCEDURE — 2580000003 HC RX 258: Performed by: NURSE PRACTITIONER

## 2022-10-03 PROCEDURE — 81001 URINALYSIS AUTO W/SCOPE: CPT

## 2022-10-03 PROCEDURE — 84484 ASSAY OF TROPONIN QUANT: CPT

## 2022-10-03 PROCEDURE — 36415 COLL VENOUS BLD VENIPUNCTURE: CPT

## 2022-10-03 PROCEDURE — 84443 ASSAY THYROID STIM HORMONE: CPT

## 2022-10-03 PROCEDURE — 93005 ELECTROCARDIOGRAM TRACING: CPT | Performed by: STUDENT IN AN ORGANIZED HEALTH CARE EDUCATION/TRAINING PROGRAM

## 2022-10-03 PROCEDURE — 99285 EMERGENCY DEPT VISIT HI MDM: CPT

## 2022-10-03 PROCEDURE — 87186 SC STD MICRODIL/AGAR DIL: CPT

## 2022-10-03 PROCEDURE — 85025 COMPLETE CBC W/AUTO DIFF WBC: CPT

## 2022-10-03 PROCEDURE — 71045 X-RAY EXAM CHEST 1 VIEW: CPT

## 2022-10-03 PROCEDURE — 6360000002 HC RX W HCPCS: Performed by: STUDENT IN AN ORGANIZED HEALTH CARE EDUCATION/TRAINING PROGRAM

## 2022-10-03 PROCEDURE — 80053 COMPREHEN METABOLIC PANEL: CPT

## 2022-10-03 PROCEDURE — 82140 ASSAY OF AMMONIA: CPT

## 2022-10-03 PROCEDURE — 2580000003 HC RX 258: Performed by: STUDENT IN AN ORGANIZED HEALTH CARE EDUCATION/TRAINING PROGRAM

## 2022-10-03 PROCEDURE — 87635 SARS-COV-2 COVID-19 AMP PRB: CPT

## 2022-10-03 PROCEDURE — 85610 PROTHROMBIN TIME: CPT

## 2022-10-03 PROCEDURE — 2060000000 HC ICU INTERMEDIATE R&B

## 2022-10-03 PROCEDURE — 87086 URINE CULTURE/COLONY COUNT: CPT

## 2022-10-03 PROCEDURE — 70450 CT HEAD/BRAIN W/O DYE: CPT

## 2022-10-03 RX ORDER — ACETAMINOPHEN 650 MG/1
650 SUPPOSITORY RECTAL EVERY 6 HOURS PRN
Status: DISCONTINUED | OUTPATIENT
Start: 2022-10-03 | End: 2022-10-08 | Stop reason: HOSPADM

## 2022-10-03 RX ORDER — 0.9 % SODIUM CHLORIDE 0.9 %
1000 INTRAVENOUS SOLUTION INTRAVENOUS ONCE
Status: COMPLETED | OUTPATIENT
Start: 2022-10-03 | End: 2022-10-03

## 2022-10-03 RX ORDER — ALBUTEROL SULFATE 2.5 MG/3ML
2.5 SOLUTION RESPIRATORY (INHALATION)
Status: DISCONTINUED | OUTPATIENT
Start: 2022-10-03 | End: 2022-10-08 | Stop reason: HOSPADM

## 2022-10-03 RX ORDER — METHYLPREDNISOLONE SODIUM SUCCINATE 125 MG/2ML
125 INJECTION, POWDER, LYOPHILIZED, FOR SOLUTION INTRAMUSCULAR; INTRAVENOUS ONCE
Status: COMPLETED | OUTPATIENT
Start: 2022-10-03 | End: 2022-10-03

## 2022-10-03 RX ORDER — ENOXAPARIN SODIUM 100 MG/ML
40 INJECTION SUBCUTANEOUS DAILY
Status: DISCONTINUED | OUTPATIENT
Start: 2022-10-04 | End: 2022-10-08 | Stop reason: HOSPADM

## 2022-10-03 RX ORDER — ACETAMINOPHEN 325 MG/1
650 TABLET ORAL EVERY 6 HOURS PRN
Status: DISCONTINUED | OUTPATIENT
Start: 2022-10-03 | End: 2022-10-08 | Stop reason: HOSPADM

## 2022-10-03 RX ORDER — SODIUM CHLORIDE 9 MG/ML
INJECTION, SOLUTION INTRAVENOUS CONTINUOUS
Status: DISCONTINUED | OUTPATIENT
Start: 2022-10-03 | End: 2022-10-08 | Stop reason: HOSPADM

## 2022-10-03 RX ORDER — SODIUM CHLORIDE 9 MG/ML
INJECTION, SOLUTION INTRAVENOUS PRN
Status: DISCONTINUED | OUTPATIENT
Start: 2022-10-03 | End: 2022-10-08 | Stop reason: HOSPADM

## 2022-10-03 RX ORDER — SODIUM CHLORIDE 0.9 % (FLUSH) 0.9 %
10 SYRINGE (ML) INJECTION PRN
Status: DISCONTINUED | OUTPATIENT
Start: 2022-10-03 | End: 2022-10-08 | Stop reason: HOSPADM

## 2022-10-03 RX ORDER — ONDANSETRON 2 MG/ML
4 INJECTION INTRAMUSCULAR; INTRAVENOUS EVERY 6 HOURS PRN
Status: DISCONTINUED | OUTPATIENT
Start: 2022-10-03 | End: 2022-10-08 | Stop reason: HOSPADM

## 2022-10-03 RX ORDER — ONDANSETRON 4 MG/1
4 TABLET, ORALLY DISINTEGRATING ORAL EVERY 8 HOURS PRN
Status: DISCONTINUED | OUTPATIENT
Start: 2022-10-03 | End: 2022-10-08 | Stop reason: HOSPADM

## 2022-10-03 RX ORDER — IPRATROPIUM BROMIDE AND ALBUTEROL SULFATE 2.5; .5 MG/3ML; MG/3ML
1 SOLUTION RESPIRATORY (INHALATION) EVERY 6 HOURS PRN
Status: DISCONTINUED | OUTPATIENT
Start: 2022-10-03 | End: 2022-10-08 | Stop reason: HOSPADM

## 2022-10-03 RX ORDER — SODIUM CHLORIDE 0.9 % (FLUSH) 0.9 %
5-40 SYRINGE (ML) INJECTION EVERY 12 HOURS SCHEDULED
Status: DISCONTINUED | OUTPATIENT
Start: 2022-10-03 | End: 2022-10-08 | Stop reason: HOSPADM

## 2022-10-03 RX ORDER — IPRATROPIUM BROMIDE AND ALBUTEROL SULFATE 2.5; .5 MG/3ML; MG/3ML
1 SOLUTION RESPIRATORY (INHALATION)
Status: DISCONTINUED | OUTPATIENT
Start: 2022-10-04 | End: 2022-10-08 | Stop reason: HOSPADM

## 2022-10-03 RX ADMIN — SODIUM CHLORIDE 1000 ML: 9 INJECTION, SOLUTION INTRAVENOUS at 19:58

## 2022-10-03 RX ADMIN — METHYLPREDNISOLONE SODIUM SUCCINATE 125 MG: 125 INJECTION, POWDER, FOR SOLUTION INTRAMUSCULAR; INTRAVENOUS at 18:34

## 2022-10-03 RX ADMIN — SODIUM CHLORIDE, PRESERVATIVE FREE 10 ML: 5 INJECTION INTRAVENOUS at 22:55

## 2022-10-03 RX ADMIN — AZITHROMYCIN DIHYDRATE 500 MG: 500 INJECTION, POWDER, LYOPHILIZED, FOR SOLUTION INTRAVENOUS at 20:45

## 2022-10-03 RX ADMIN — CEFTRIAXONE SODIUM 1000 MG: 1 INJECTION, POWDER, FOR SOLUTION INTRAMUSCULAR; INTRAVENOUS at 20:40

## 2022-10-03 RX ADMIN — POTASSIUM BICARBONATE 40 MEQ: 782 TABLET, EFFERVESCENT ORAL at 20:45

## 2022-10-03 RX ADMIN — SODIUM CHLORIDE: 9 INJECTION, SOLUTION INTRAVENOUS at 22:59

## 2022-10-03 ASSESSMENT — PAIN - FUNCTIONAL ASSESSMENT
PAIN_FUNCTIONAL_ASSESSMENT: NONE - DENIES PAIN
PAIN_FUNCTIONAL_ASSESSMENT: NONE - DENIES PAIN

## 2022-10-03 ASSESSMENT — ENCOUNTER SYMPTOMS
EYES NEGATIVE: 1
ALLERGIC/IMMUNOLOGIC NEGATIVE: 1
WHEEZING: 1
CONSTIPATION: 1

## 2022-10-03 NOTE — ED NOTES
Pt report given to [email or web address]. Questions and concerns were answered. Went over assessments, medications, and vital signs.       Mariusz Meadows RN  10/03/22 [id number]

## 2022-10-03 NOTE — PROGRESS NOTES
Bronchodilator assessment       []   Continuous Aerosol    PEFR =     Bronchodilator assessment at level: 1  BRONCHODILATOR ASSESSMENT SCORE  Score 1 2 3 4   Breath Sounds   [x]  Clear []  Mild Wheezing with good aeration []  Moderate I/E wheezing with adequate aeration []  Poor Aeration or diffuse wheezing   Respiratory Rate [x]  Less than 20 []  20-25 []  Greater than 25  []  Greater than 35    Dyspnea [x]  No SOB  []  SOB with minimal activity []  Speaking in partial sentences []  Acute/ At rest   Peakflow (asthma) []  80 % or greater predicted/PB  [x]  Unable []  70% or greater predicted/PB  []  Unable []  51%-70% predicted/PB  []  Unable []  Less than 50% predicted/PB  []  Unable due to distress   FEV1 % Predicted []  Greater than 69%  [x]  Unable  []  Less than 50%-69%  []  Unable  []  Less than 35%-49%  []  Unable  []  Less than 35%  []  Unable due to distress      SPO2: 92% RA

## 2022-10-03 NOTE — ED NOTES
Patient to emergency department with complaints of increased weakness which started today while at Adult day care, unsure of what time the weakness started. Pt stood and transferred to wheel chair in triage.       Doug Brooks, RN  10/03/22 0421

## 2022-10-03 NOTE — ED PROVIDER NOTES
[address]  Emergency Department Encounter  EmergencyMedicine Resident     Pt Dereck Lopez  MRN: [id number]  [place] 1945  Date of evaluation: 10/3/22  PCP:  Mary Grace Aviles DO    CHIEF COMPLAINT       Chief Complaint   Patient presents with    Shortness of Breath    Extremity Weakness       HISTORY OF PRESENT ILLNESS  (Location/Symptom, Timing/Onset, Context/Setting, Quality, Duration, Modifying Factors, Severity.)      Teddy Singh is a 76 y.o. female who presents with altered mental status, urinary incontinence and wheezing. Patient has a history of Parkinson's dementia is a very poor historian not answering questions appropriately. Daughter at bedside states her mentation has declined significantly over the last couple days. States she fell back in  but denies any recent falls or trauma. Denies any changes in medication. Dates she has remote history of asthma but does not have COPD and does not take any medications for these    PAST MEDICAL / SURGICAL / SOCIAL / FAMILY HISTORY      has a past medical history of Anxiety, Convulsion ([address] Encephalopathy, Herpes, Hyperlipidemia, Left bundle branch block, MRSA (methicillin resistant Staphylococcus aureus), Parkinson's disease ([address] Seizures ([address] and Vitamin D deficiency. has a past surgical history that includes Abscess Drainage (Right, 2013); Ureter stent placement (Left, 2022); and Cystoscopy (Left, 2022).       Social History     Socioeconomic History    Marital status: Single     Spouse name: Not on file    Number of children: Not on file    Years of education: Not on file    Highest education level: Not on file   Occupational History    Not on file   Tobacco Use    Smoking status: Former     Packs/day: 1.00     Years: 30.00     Pack years: 30.00     Types: Cigarettes     Start date:      Quit date:      Years since quittin.7    Smokeless tobacco: Never    Tobacco comments:     unknown how many packs a day, or how many years   Vaping Use    Vaping Use: Never used   Substance and Sexual Activity    Alcohol use: No    Drug use: No    Sexual activity: Never   Other Topics Concern    Not on file   Social History Narrative    Not on file     Social Determinants of Health     Financial Resource Strain: Not on file   Food Insecurity: Not on file   Transportation Needs: Not on file   Physical Activity: Not on file   Stress: Not on file   Social Connections: Not on file   Intimate Partner Violence: Not on file   Housing Stability: Not on file       History reviewed. No pertinent family history. Allergies:  Haldol [haloperidol lactate]    Home Medications:  Prior to Admission medications    Medication Sig Start Date End Date Taking?  Authorizing Provider   carBAMazepine (TEGRETOL) 200 MG tablet 1.5 tablets po q am and 2 po q pm 8/23/22   Sravani Wu, DO   potassium chloride (KLOR-CON M) 20 MEQ extended release tablet Take 1 tablet by mouth daily 8/22/22   Sravani Wu, DO   FLUoxetine (PROZAC) 20 MG capsule 2 po qd 8/22/22   Sravani Wu, DO   mirabegron (MYRBETRIQ) 50 MG TB24 Take 50 mg by mouth daily 8/22/22   Sravani Wu, DO   Blood Pressure KIT 1 Device by Does not apply route daily 8/22/22   Sravani Wu, DO   desmopressin (DDAVP) 0.2 MG tablet TAKE 3 TABLETS BY MOUTH IN THE EVENING 8/22/22   Sravani Wu DO   melatonin 5 MG TABS tablet Take 1 tablet by mouth nightly as needed (sleep) 5/19/22 5/29/22  Fiona Felix MD   metoprolol succinate (TOPROL XL) 25 MG extended release tablet Take 1 tablet by mouth daily 5/19/22   Fiona Felix MD   midodrine (PROAMATINE) 5 MG tablet Take 1 tablet by mouth 3 times daily as needed (hypotension.) 5/19/22   Fiona Felix MD   tamsulosin (FLOMAX) 0.4 MG capsule Take 1 capsule by mouth daily 5/19/22   Fiona Felix MD   aspirin 81 MG EC tablet Take 81 mg by mouth daily    Historical Provider, MD   acetaminophen (TYLENOL) 500 MG tablet Take 500 mg by mouth every 6 hours as needed for Pain  Patient not taking: Reported on 8/22/2022    Historical Provider, MD   Handicap Placard [address] [facility] by Does not apply route Permanent one year  Dx fractured humerus  Patient not taking: Reported on 5/20/2020 11/14/19   Li Amador,        REVIEW OF SYSTEMS    (2-9 systems for level 4, 10 or more for level 5)      Review of Systems   Unable to perform ROS: Dementia     PHYSICAL EXAM   (up to 7 for level 4, 8 or more for level 5)      INITIAL VITALS:   /61   Pulse 99   Temp 97.8 °F (36.6 °C)   Resp 18   SpO2 94%     Physical Exam  Constitutional:       Comments: Patient appears slightly disheveled   HENT:      Head: Normocephalic and atraumatic. Nose: Nose normal.   Eyes:      Extraocular Movements: Extraocular movements intact. Pupils: Pupils are equal, round, and reactive to light. Cardiovascular:      Rate and Rhythm: Normal rate. Pulses: Normal pulses. Heart sounds: Normal heart sounds. Pulmonary:      Breath sounds: Wheezing present. Abdominal:      General: Abdomen is flat. There is no distension. Palpations: Abdomen is soft. Tenderness: There is no abdominal tenderness. There is no guarding or rebound. Musculoskeletal:      Cervical back: No tenderness. Right lower leg: No edema. Left lower leg: No edema. Neurological:      General: No focal deficit present.       Comments: Cranial nerves II through XII intact, no pronator drift upper or lower extremities bilaterally, strength and sensation intact upper and lower extremities bilaterally, negative heel-to-shin test, negative finger-nose test, no dysarthria    Alert to her self but not to place or time       DIFFERENTIAL  DIAGNOSIS     PLAN (LABS / IMAGING / EKG):  Orders Placed This Encounter   Procedures    COVID-19, Rapid    Culture, Urine    Culture, Blood 1    Culture, Blood 1    XR CHEST PORTABLE    CT Head W/O Contrast    Urinalysis with Microscopic    Lactic Acid    CMP    Lipase    Troponin    Brain Natriuretic Peptide    TSH w/reflex to FT4    SPECIMEN REJECTION    SPECIMEN REJECTION    Ammonia    Protime-INR    CBC with Auto Differential    Troponin    Straight cath    Inpatient consult to Internal Medicine    Initiate ED RT Aerosol protocol    EKG 12 Lead    ADMIT TO INPATIENT       MEDICATIONS ORDERED:  Orders Placed This Encounter   Medications    methylPREDNISolone sodium (SOLU-MEDROL) injection 125 mg    potassium bicarb-citric acid (EFFER-K) effervescent tablet 40 mEq    cefTRIAXone (ROCEPHIN) 1,000 mg in sodium chloride 0.9 % 50 mL IVPB mini-bag     Order Specific Question:   Antimicrobial Indications     Answer:   Pneumonia (CAP)    azithromycin (ZITHROMAX) 500 mg in D5W 250ml addavial     Order Specific Question:   Antimicrobial Indications     Answer:   Pneumonia (CAP)    0.9 % sodium chloride bolus    ipratropium-albuterol (DUONEB) nebulizer solution 1 ampule     Order Specific Question:   Initiate RT Bronchodilator Protocol     Answer:   Yes - ED protocol       DIAGNOSTIC RESULTS / EMERGENCY DEPARTMENT COURSE / MDM   LAB RESULTS:  Results for orders placed or performed during the hospital encounter of 10/03/22   COVID-19, Rapid    Specimen: Nasopharyngeal Swab   Result Value Ref Range    Specimen Description . NASOPHARYNGEAL SWAB     SARS-CoV-2, Rapid Not Detected Not Detected   Lactic Acid   Result Value Ref Range    Lactic Acid 2.1 0.5 - 2.2 mmol/L   CMP   Result Value Ref Range    Glucose 131 (H) 70 - 99 mg/dL    BUN 19 8 - 23 mg/dL    Creatinine 0.93 (H) 0.50 - 0.90 mg/dL    Calcium 9.2 8.6 - 10.4 mg/dL    Sodium 134 (L) 135 - 144 mmol/L    Potassium 3.3 (L) 3.7 - 5.3 mmol/L    Chloride 97 (L) 98 - 107 mmol/L    CO2 24 20 - 31 mmol/L    Anion Gap 13 9 - 17 mmol/L    Alkaline Phosphatase 207 (H) 35 - 104 U/L    ALT 17 5 - 33 U/L    AST 25 <32 U/L    Total Bilirubin 0.2 (L) 0.3 - 1.2 mg/dL    Total Protein 7.7 6.4 - 8.3 g/dL Albumin 3.0 (L) 3.5 - 5.2 g/dL    Est, Glom Filt Rate >60 >60 mL/min/1.73m2   Lipase   Result Value Ref Range    Lipase 32 13 - 60 U/L   Troponin   Result Value Ref Range    Troponin, High Sensitivity 32 (H) 0 - 14 ng/L   Brain Natriuretic Peptide   Result Value Ref Range    Pro-BNP 1,010 (H) <300 pg/mL   TSH w/reflex to FT4   Result Value Ref Range    TSH 1.94 0.30 - 5.00 uIU/mL   SPECIMEN REJECTION   Result Value Ref Range    Specimen Source . BLOOD     Ordered Test PT     Reason for Rejection Unable to perform testing: No specimen received. SPECIMEN REJECTION   Result Value Ref Range    Specimen Source . BLOOD     Ordered Test WAYLON     Reason for Rejection Unable to perform testing: No specimen received. CBC with Auto Differential   Result Value Ref Range    WBC 16.3 (H) 3.5 - 11.0 k/uL    RBC 3.81 (L) 4.0 - 5.2 m/uL    Hemoglobin 12.8 12.0 - 16.0 g/dL    Hematocrit 35.6 (L) 36 - 46 %    MCV 93.7 80 - 100 fL    MCH 33.6 26 - 34 pg    MCHC 35.9 31 - 37 g/dL    RDW 14.5 11.5 - 14.9 %    Platelets 885 (H) 794 - 450 k/uL    MPV 6.9 6.0 - 12.0 fL    Seg Neutrophils 74 (H) 36 - 66 %    Lymphocytes 11 (L) 24 - 44 %    Monocytes 10 (H) 1 - 7 %    Eosinophils % 5 (H) 0 - 4 %    Basophils 0 0 - 2 %    Segs Absolute 12.00 (H) 1.3 - 9.1 k/uL    Absolute Lymph # 1.80 1.0 - 4.8 k/uL    Absolute Mono # 1.60 (H) 0.1 - 1.3 k/uL    Absolute Eos # 0.80 (H) 0.0 - 0.4 k/uL    Basophils Absolute 0.10 0.0 - 0.2 k/uL       RADIOLOGY:  CT Head W/O Contrast    Result Date: 10/3/2022  Encephalomalacia in the left temporal lobe. Mild to moderate global atrophy. Mild chronic deep white matter ischemic changes No acute intracranial abnormalities are noted. XR CHEST PORTABLE    Result Date: 10/3/2022  COPD. Acute bilateral multifocal pulmonary consolidation consistent with pneumonia.         EKG Interpretation    Interpreted by myself    Rhythm: normal sinus  and 1 degree AV block  Rate: normal  Axis: normal  Ectopy: none  Conduction: left bundle branch block (complete) and 1st degree AV block  ST Segments: no acute change  T Waves: no acute change  Q Waves: none    Clinical Impression: no acute changes    All EKG's are interpreted by the Emergency Department Physician who either signs or Co-signs this chart in the absence of a cardiologist.    EMERGENCY DEPARTMENT COURSE:  ED Course as of 10/03/22 2024   [facility] Oct 03, 2022   1741 Patient value bedside. History of Parkinson's dementia. Daughter at bedside states she is mental status is worsened significantly over the last couple days. Also states she has been incontinent urinate on herself. States she is wheezing. Will get labs, with x-ray, with CT head we will give breathing treatment steroids [ZE]   1903 Patient noted to have pneumonia on chest x-ray. Will start Rocephin and this is a throat. Patient will be admitted [ZE]   1950 CT head is negative will admit patient for pneumonia [ZE]   2015 Medicine service patient to be admitted to their service [ZE]      ED Course User Index  [ZE] Carlos Stoll DO       PROCEDURES:  Procedures     CONSULTS:  IP CONSULT TO INTERNAL MEDICINE    CRITICAL CARE:  none    MDM  Here for altered mental status shortness of breath. Patient wheezing on exam improved after duo nebs and steroids. Patient noted to have pneumonia likely cause of her altered mental status. Given antibiotics sepsis labs drawn. Cardiac labs per patient's baseline no ischemic changes on EKG patient admitted for pneumonia and IV antibiotics    FINAL IMPRESSION      1. Pneumonia due to infectious organism, unspecified laterality, unspecified part of lung          DISPOSITION / PLAN     DISPOSITION Admitted 10/03/2022 08:21:03 PM      PATIENT REFERRED TO:  No follow-up provider specified.     DISCHARGE MEDICATIONS:  New Prescriptions    No medications on file       Dena Pimentel DO  Emergency Medicine Resident    (Please note that portions of thisnote were completed with a voice recognition program.  Efforts were made to edit the dictations but occasionally words are mis-transcribed.)        Frank Monzon DO  Resident  10/03/22 2024

## 2022-10-03 NOTE — ED NOTES
Spoke with laboratory personnel, Ping Steele. Phlebotomist to draw labs.      Nupur Patino RN  10/03/22 [id number]

## 2022-10-04 ENCOUNTER — APPOINTMENT (OUTPATIENT)
Dept: GENERAL RADIOLOGY | Age: 77
DRG: 871 | End: 2022-10-04
Payer: COMMERCIAL

## 2022-10-04 PROBLEM — E87.6 HYPOKALEMIA: Status: ACTIVE | Noted: 2022-10-04

## 2022-10-04 PROBLEM — J44.1 COPD EXACERBATION (HCC): Status: ACTIVE | Noted: 2022-10-04

## 2022-10-04 PROBLEM — J18.9 CAP (COMMUNITY ACQUIRED PNEUMONIA): Status: ACTIVE | Noted: 2022-10-03

## 2022-10-04 PROBLEM — A41.9 SEPSIS (HCC): Status: ACTIVE | Noted: 2022-10-04

## 2022-10-04 LAB
ADENOVIRUS PCR: NOT DETECTED
ANION GAP SERPL CALCULATED.3IONS-SCNC: 11 MMOL/L (ref 9–17)
BORDETELLA PARAPERTUSSIS: NOT DETECTED
BORDETELLA PERTUSSIS PCR: NOT DETECTED
BUN BLDV-MCNC: 15 MG/DL (ref 8–23)
CALCIUM SERPL-MCNC: 8.7 MG/DL (ref 8.6–10.4)
CHLAMYDIA PNEUMONIAE BY PCR: NOT DETECTED
CHLORIDE BLD-SCNC: 105 MMOL/L (ref 98–107)
CO2: 24 MMOL/L (ref 20–31)
CORONAVIRUS 229E PCR: NOT DETECTED
CORONAVIRUS HKU1 PCR: NOT DETECTED
CORONAVIRUS NL63 PCR: NOT DETECTED
CORONAVIRUS OC43 PCR: NOT DETECTED
CREAT SERPL-MCNC: 0.75 MG/DL (ref 0.5–0.9)
EKG ATRIAL RATE: 95 BPM
EKG P AXIS: 90 DEGREES
EKG P-R INTERVAL: 214 MS
EKG Q-T INTERVAL: 390 MS
EKG QRS DURATION: 126 MS
EKG QTC CALCULATION (BAZETT): 490 MS
EKG R AXIS: 64 DEGREES
EKG T AXIS: 81 DEGREES
EKG VENTRICULAR RATE: 95 BPM
GFR SERPL CREATININE-BSD FRML MDRD: >60 ML/MIN/1.73M2
GLUCOSE BLD-MCNC: 126 MG/DL (ref 70–99)
HCT VFR BLD CALC: 36.3 % (ref 36–46)
HEMOGLOBIN: 12.2 G/DL (ref 12–16)
HUMAN METAPNEUMOVIRUS PCR: NOT DETECTED
INFLUENZA A BY PCR: NOT DETECTED
INFLUENZA B BY PCR: NOT DETECTED
MCH RBC QN AUTO: 32.3 PG (ref 26–34)
MCHC RBC AUTO-ENTMCNC: 33.5 G/DL (ref 31–37)
MCV RBC AUTO: 96.2 FL (ref 80–100)
MYCOPLASMA PNEUMONIAE PCR: NOT DETECTED
PARAINFLUENZA 1 PCR: NOT DETECTED
PARAINFLUENZA 2 PCR: NOT DETECTED
PARAINFLUENZA 3 PCR: NOT DETECTED
PARAINFLUENZA 4 PCR: NOT DETECTED
PDW BLD-RTO: 14.6 % (ref 11.5–14.9)
PLATELET # BLD: 478 K/UL (ref 150–450)
PMV BLD AUTO: 6.1 FL (ref 6–12)
POTASSIUM SERPL-SCNC: 3.7 MMOL/L (ref 3.7–5.3)
RBC # BLD: 3.77 M/UL (ref 4–5.2)
REASON FOR REJECTION: NORMAL
RESP SYNCYTIAL VIRUS PCR: NOT DETECTED
RHINO/ENTEROVIRUS PCR: NOT DETECTED
SARS-COV-2, PCR: NOT DETECTED
SODIUM BLD-SCNC: 140 MMOL/L (ref 135–144)
SPECIMEN DESCRIPTION: NORMAL
WBC # BLD: 12.5 K/UL (ref 3.5–11)
ZZ NTE CLEAN UP: ORDERED TEST: NORMAL
ZZ NTE WITH NAME CLEAN UP: SPECIMEN SOURCE: NORMAL

## 2022-10-04 PROCEDURE — 6360000002 HC RX W HCPCS: Performed by: NURSE PRACTITIONER

## 2022-10-04 PROCEDURE — 6370000000 HC RX 637 (ALT 250 FOR IP): Performed by: NURSE PRACTITIONER

## 2022-10-04 PROCEDURE — 94761 N-INVAS EAR/PLS OXIMETRY MLT: CPT

## 2022-10-04 PROCEDURE — 99223 1ST HOSP IP/OBS HIGH 75: CPT | Performed by: INTERNAL MEDICINE

## 2022-10-04 PROCEDURE — 97530 THERAPEUTIC ACTIVITIES: CPT

## 2022-10-04 PROCEDURE — 85027 COMPLETE CBC AUTOMATED: CPT

## 2022-10-04 PROCEDURE — 94664 DEMO&/EVAL PT USE INHALER: CPT

## 2022-10-04 PROCEDURE — 97162 PT EVAL MOD COMPLEX 30 MIN: CPT

## 2022-10-04 PROCEDURE — 80048 BASIC METABOLIC PNL TOTAL CA: CPT

## 2022-10-04 PROCEDURE — 36415 COLL VENOUS BLD VENIPUNCTURE: CPT

## 2022-10-04 PROCEDURE — 2580000003 HC RX 258: Performed by: NURSE PRACTITIONER

## 2022-10-04 PROCEDURE — 94640 AIRWAY INHALATION TREATMENT: CPT

## 2022-10-04 PROCEDURE — 97166 OT EVAL MOD COMPLEX 45 MIN: CPT

## 2022-10-04 PROCEDURE — 71046 X-RAY EXAM CHEST 2 VIEWS: CPT

## 2022-10-04 PROCEDURE — 2060000000 HC ICU INTERMEDIATE R&B

## 2022-10-04 PROCEDURE — 0202U NFCT DS 22 TRGT SARS-COV-2: CPT

## 2022-10-04 PROCEDURE — 97535 SELF CARE MNGMENT TRAINING: CPT

## 2022-10-04 PROCEDURE — 93010 ELECTROCARDIOGRAM REPORT: CPT | Performed by: INTERNAL MEDICINE

## 2022-10-04 RX ORDER — CARBAMAZEPINE 200 MG/1
300 TABLET ORAL DAILY
Status: DISCONTINUED | OUTPATIENT
Start: 2022-10-04 | End: 2022-10-08 | Stop reason: HOSPADM

## 2022-10-04 RX ORDER — METHYLPREDNISOLONE SODIUM SUCCINATE 125 MG/2ML
60 INJECTION, POWDER, LYOPHILIZED, FOR SOLUTION INTRAMUSCULAR; INTRAVENOUS EVERY 8 HOURS
Status: DISCONTINUED | OUTPATIENT
Start: 2022-10-04 | End: 2022-10-06

## 2022-10-04 RX ORDER — LANOLIN ALCOHOL/MO/W.PET/CERES
3 CREAM (GRAM) TOPICAL NIGHTLY PRN
Status: DISCONTINUED | OUTPATIENT
Start: 2022-10-04 | End: 2022-10-08 | Stop reason: HOSPADM

## 2022-10-04 RX ORDER — POTASSIUM CHLORIDE 20 MEQ/1
40 TABLET, EXTENDED RELEASE ORAL PRN
Status: DISCONTINUED | OUTPATIENT
Start: 2022-10-04 | End: 2022-10-08 | Stop reason: HOSPADM

## 2022-10-04 RX ORDER — GUAIFENESIN 600 MG/1
600 TABLET, EXTENDED RELEASE ORAL 2 TIMES DAILY
Status: DISCONTINUED | OUTPATIENT
Start: 2022-10-04 | End: 2022-10-08 | Stop reason: HOSPADM

## 2022-10-04 RX ORDER — TAMSULOSIN HYDROCHLORIDE 0.4 MG/1
0.4 CAPSULE ORAL DAILY
Status: DISCONTINUED | OUTPATIENT
Start: 2022-10-04 | End: 2022-10-08 | Stop reason: HOSPADM

## 2022-10-04 RX ORDER — FUROSEMIDE 10 MG/ML
20 INJECTION INTRAMUSCULAR; INTRAVENOUS DAILY
Status: DISCONTINUED | OUTPATIENT
Start: 2022-10-04 | End: 2022-10-06

## 2022-10-04 RX ORDER — CARBAMAZEPINE 200 MG/1
300 TABLET ORAL DAILY
Status: ON HOLD | COMMUNITY
End: 2022-10-06 | Stop reason: HOSPADM

## 2022-10-04 RX ORDER — ASPIRIN 81 MG/1
81 TABLET ORAL DAILY
Status: DISCONTINUED | OUTPATIENT
Start: 2022-10-04 | End: 2022-10-08 | Stop reason: HOSPADM

## 2022-10-04 RX ORDER — FLUOXETINE HYDROCHLORIDE 20 MG/1
40 CAPSULE ORAL DAILY
Status: DISCONTINUED | OUTPATIENT
Start: 2022-10-04 | End: 2022-10-08 | Stop reason: HOSPADM

## 2022-10-04 RX ORDER — POTASSIUM CHLORIDE 7.45 MG/ML
10 INJECTION INTRAVENOUS PRN
Status: DISCONTINUED | OUTPATIENT
Start: 2022-10-04 | End: 2022-10-08 | Stop reason: HOSPADM

## 2022-10-04 RX ORDER — CARBAMAZEPINE 200 MG/1
400 TABLET ORAL NIGHTLY
Status: DISCONTINUED | OUTPATIENT
Start: 2022-10-04 | End: 2022-10-08 | Stop reason: HOSPADM

## 2022-10-04 RX ORDER — TROSPIUM CHLORIDE 20 MG/1
20 TABLET, FILM COATED ORAL
Refills: 3 | Status: DISCONTINUED | OUTPATIENT
Start: 2022-10-04 | End: 2022-10-08 | Stop reason: HOSPADM

## 2022-10-04 RX ADMIN — IPRATROPIUM BROMIDE AND ALBUTEROL SULFATE 1 AMPULE: 2.5; .5 SOLUTION RESPIRATORY (INHALATION) at 11:58

## 2022-10-04 RX ADMIN — AZITHROMYCIN DIHYDRATE 500 MG: 500 INJECTION, POWDER, LYOPHILIZED, FOR SOLUTION INTRAVENOUS at 22:30

## 2022-10-04 RX ADMIN — Medication 3 MG: at 22:18

## 2022-10-04 RX ADMIN — ENOXAPARIN SODIUM 40 MG: 100 INJECTION SUBCUTANEOUS at 10:09

## 2022-10-04 RX ADMIN — CARBAMAZEPINE 400 MG: 200 TABLET ORAL at 22:22

## 2022-10-04 RX ADMIN — GUAIFENESIN 600 MG: 600 TABLET, EXTENDED RELEASE ORAL at 22:18

## 2022-10-04 RX ADMIN — FLUOXETINE HYDROCHLORIDE 40 MG: 20 CAPSULE ORAL at 10:02

## 2022-10-04 RX ADMIN — GUAIFENESIN 600 MG: 600 TABLET, EXTENDED RELEASE ORAL at 10:02

## 2022-10-04 RX ADMIN — TAMSULOSIN HYDROCHLORIDE 0.4 MG: 0.4 CAPSULE ORAL at 10:01

## 2022-10-04 RX ADMIN — SODIUM CHLORIDE: 9 INJECTION, SOLUTION INTRAVENOUS at 12:23

## 2022-10-04 RX ADMIN — METHYLPREDNISOLONE SODIUM SUCCINATE 60 MG: 125 INJECTION, POWDER, FOR SOLUTION INTRAMUSCULAR; INTRAVENOUS at 05:41

## 2022-10-04 RX ADMIN — ASPIRIN 81 MG: 81 TABLET, COATED ORAL at 10:02

## 2022-10-04 RX ADMIN — METHYLPREDNISOLONE SODIUM SUCCINATE 60 MG: 125 INJECTION, POWDER, FOR SOLUTION INTRAMUSCULAR; INTRAVENOUS at 12:39

## 2022-10-04 RX ADMIN — METHYLPREDNISOLONE SODIUM SUCCINATE 60 MG: 125 INJECTION, POWDER, FOR SOLUTION INTRAMUSCULAR; INTRAVENOUS at 22:18

## 2022-10-04 RX ADMIN — FUROSEMIDE 20 MG: 10 INJECTION, SOLUTION INTRAMUSCULAR; INTRAVENOUS at 10:01

## 2022-10-04 RX ADMIN — CEFTRIAXONE SODIUM 1000 MG: 1 INJECTION, POWDER, FOR SOLUTION INTRAMUSCULAR; INTRAVENOUS at 22:24

## 2022-10-04 RX ADMIN — IPRATROPIUM BROMIDE AND ALBUTEROL SULFATE 1 AMPULE: 2.5; .5 SOLUTION RESPIRATORY (INHALATION) at 15:37

## 2022-10-04 RX ADMIN — TROSPIUM CHLORIDE 20 MG: 20 TABLET, FILM COATED ORAL at 10:00

## 2022-10-04 RX ADMIN — SODIUM CHLORIDE, PRESERVATIVE FREE 10 ML: 5 INJECTION INTRAVENOUS at 10:09

## 2022-10-04 RX ADMIN — SODIUM CHLORIDE, PRESERVATIVE FREE 10 ML: 5 INJECTION INTRAVENOUS at 22:22

## 2022-10-04 RX ADMIN — IPRATROPIUM BROMIDE AND ALBUTEROL SULFATE 1 AMPULE: 2.5; .5 SOLUTION RESPIRATORY (INHALATION) at 20:11

## 2022-10-04 RX ADMIN — TROSPIUM CHLORIDE 20 MG: 20 TABLET, FILM COATED ORAL at 17:22

## 2022-10-04 RX ADMIN — CARBAMAZEPINE 300 MG: 200 TABLET ORAL at 10:19

## 2022-10-04 RX ADMIN — IPRATROPIUM BROMIDE AND ALBUTEROL SULFATE 1 AMPULE: 2.5; .5 SOLUTION RESPIRATORY (INHALATION) at 07:45

## 2022-10-04 NOTE — H&P
rales or rhonchi, normal effort  Cardiovascular: normal rate, regular rhythm, no murmur, gallop, rub. Abdomen: Soft, nontender, nondistended, normal bowel sounds, no hepatomegaly or splenomegaly  Neurologic: Parkinson tremors present, following commands alert but not oriented there are no new focal motor or sensory deficits,   Skin: No gross lesions, rashes, bruising or bleeding on exposed skin area  Extremities:  peripheral pulses palpable, no pedal edema or calf pain with palpation  Psych: normal affect     Investigations:      Laboratory Testing:  Recent Results (from the past 24 hour(s))   COVID-19, Rapid    Collection Time: 10/03/22  6:17 PM    Specimen: Nasopharyngeal Swab   Result Value Ref Range    Specimen Description . NASOPHARYNGEAL SWAB     SARS-CoV-2, Rapid Not Detected Not Detected   Lactic Acid    Collection Time: 10/03/22  6:17 PM   Result Value Ref Range    Lactic Acid 2.1 0.5 - 2.2 mmol/L   CMP    Collection Time: 10/03/22  6:17 PM   Result Value Ref Range    Glucose 131 (H) 70 - 99 mg/dL    BUN 19 8 - 23 mg/dL    Creatinine 0.93 (H) 0.50 - 0.90 mg/dL    Calcium 9.2 8.6 - 10.4 mg/dL    Sodium 134 (L) 135 - 144 mmol/L    Potassium 3.3 (L) 3.7 - 5.3 mmol/L    Chloride 97 (L) 98 - 107 mmol/L    CO2 24 20 - 31 mmol/L    Anion Gap 13 9 - 17 mmol/L    Alkaline Phosphatase 207 (H) 35 - 104 U/L    ALT 17 5 - 33 U/L    AST 25 <32 U/L    Total Bilirubin 0.2 (L) 0.3 - 1.2 mg/dL    Total Protein 7.7 6.4 - 8.3 g/dL    Albumin 3.0 (L) 3.5 - 5.2 g/dL    Est, Glom Filt Rate >60 >60 mL/min/1.73m2   Lipase    Collection Time: 10/03/22  6:17 PM   Result Value Ref Range    Lipase 32 13 - 60 U/L   Troponin    Collection Time: 10/03/22  6:17 PM   Result Value Ref Range    Troponin, High Sensitivity 32 (H) 0 - 14 ng/L   Brain Natriuretic Peptide    Collection Time: 10/03/22  6:17 PM   Result Value Ref Range    Pro-BNP 1,010 (H) <300 pg/mL   TSH w/reflex to FT4    Collection Time: 10/03/22  6:17 PM   Result Value Ref Range    TSH 1.94 0.30 - 5.00 uIU/mL   SPECIMEN REJECTION    Collection Time: 10/03/22  6:17 PM   Result Value Ref Range    Specimen Source . BLOOD     Ordered Test PT     Reason for Rejection Unable to perform testing: No specimen received. SPECIMEN REJECTION    Collection Time: 10/03/22  6:17 PM   Result Value Ref Range    Specimen Source . BLOOD     Ordered Test WAYLON     Reason for Rejection Unable to perform testing: No specimen received.     CBC with Auto Differential    Collection Time: 10/03/22  6:17 PM   Result Value Ref Range    WBC 16.3 (H) 3.5 - 11.0 k/uL    RBC 3.81 (L) 4.0 - 5.2 m/uL    Hemoglobin 12.8 12.0 - 16.0 g/dL    Hematocrit 35.6 (L) 36 - 46 %    MCV 93.7 80 - 100 fL    MCH 33.6 26 - 34 pg    MCHC 35.9 31 - 37 g/dL    RDW 14.5 11.5 - 14.9 %    Platelets 575 (H) 382 - 450 k/uL    MPV 6.9 6.0 - 12.0 fL    Seg Neutrophils 74 (H) 36 - 66 %    Lymphocytes 11 (L) 24 - 44 %    Monocytes 10 (H) 1 - 7 %    Eosinophils % 5 (H) 0 - 4 %    Basophils 0 0 - 2 %    Segs Absolute 12.00 (H) 1.3 - 9.1 k/uL    Absolute Lymph # 1.80 1.0 - 4.8 k/uL    Absolute Mono # 1.60 (H) 0.1 - 1.3 k/uL    Absolute Eos # 0.80 (H) 0.0 - 0.4 k/uL    Basophils Absolute 0.10 0.0 - 0.2 k/uL   EKG 12 Lead    Collection Time: 10/03/22  7:13 PM   Result Value Ref Range    Ventricular Rate 95 BPM    Atrial Rate 95 BPM    P-R Interval 214 ms    QRS Duration 126 ms    Q-T Interval 390 ms    QTc Calculation (Bazett) 490 ms    P Axis 90 degrees    R Axis 64 degrees    T Axis 81 degrees   Urinalysis with Microscopic    Collection Time: 10/03/22  8:20 PM   Result Value Ref Range    Color, UA Yellow Yellow    Turbidity UA Cloudy (A) Clear    Glucose, Ur NEGATIVE NEGATIVE    Bilirubin Urine NEGATIVE NEGATIVE    Ketones, Urine NEGATIVE NEGATIVE    Specific Gravity, UA 1.017 1.000 - 1.030    Urine Hgb LARGE (A) NEGATIVE    pH, UA 7.0 5.0 - 8.0    Protein, UA NEGATIVE NEGATIVE    Urobilinogen, Urine Normal Normal    Nitrite, Urine NEGATIVE NEGATIVE Leukocyte Esterase, Urine LARGE (A) NEGATIVE    WBC, UA TOO NUMEROUS TO COUNT /HPF    RBC, UA TOO NUMEROUS TO COUNT /HPF    Casts UA 6 TO 9 /LPF    Epithelial Cells UA 3 to 5 /HPF    Bacteria, UA FEW (A) None   Ammonia    Collection Time: 10/03/22  8:21 PM   Result Value Ref Range    Ammonia 18 11 - 51 umol/L   Protime-INR    Collection Time: 10/03/22  8:21 PM   Result Value Ref Range    Protime 13.9 11.8 - 14.6 sec    INR 1.1    Troponin    Collection Time: 10/03/22  8:34 PM   Result Value Ref Range    Troponin, High Sensitivity 36 (H) 0 - 14 ng/L   Culture, Blood 1    Collection Time: 10/03/22 10:46 PM    Specimen: Blood   Result Value Ref Range    Specimen Description . BLOOD     Special Requests 1CC EACH RT AC     Culture NO GROWTH 12 HOURS    Culture, Blood 1    Collection Time: 10/03/22 10:50 PM    Specimen: Blood   Result Value Ref Range    Specimen Description . BLOOD     Special Requests 1CC EACH LT WRIST     Culture NO GROWTH 12 HOURS    SPECIMEN REJECTION    Collection Time: 10/04/22  4:58 AM   Result Value Ref Range    Specimen Source . BLOOD     Ordered Test BMPX     Reason for Rejection Unable to perform testing: Specimen hemolyzed.     Basic Metabolic Panel w/ Reflex to MG    Collection Time: 10/04/22  6:10 AM   Result Value Ref Range    Glucose 126 (H) 70 - 99 mg/dL    BUN 15 8 - 23 mg/dL    Creatinine 0.75 0.50 - 0.90 mg/dL    Calcium 8.7 8.6 - 10.4 mg/dL    Sodium 140 135 - 144 mmol/L    Potassium 3.7 3.7 - 5.3 mmol/L    Chloride 105 98 - 107 mmol/L    CO2 24 20 - 31 mmol/L    Anion Gap 11 9 - 17 mmol/L    Est, Glom Filt Rate >60 >60 mL/min/1.73m2   CBC    Collection Time: 10/04/22  6:10 AM   Result Value Ref Range    WBC 12.5 (H) 3.5 - 11.0 k/uL    RBC 3.77 (L) 4.0 - 5.2 m/uL    Hemoglobin 12.2 12.0 - 16.0 g/dL    Hematocrit 36.3 36 - 46 %    MCV 96.2 80 - 100 fL    MCH 32.3 26 - 34 pg    MCHC 33.5 31 - 37 g/dL    RDW 14.6 11.5 - 14.9 %    Platelets 468 (H) 437 - 450 k/uL    MPV 6.1 6.0 - 12.0 fL Recent Labs     10/04/22  0610 10/03/22  2021 10/03/22  1817   HGB 12.2  --  12.8   HCT 36.3  --  35.6*   WBC 12.5*  --  16.3*   MCV 96.2  --  93.7     --  134*   K 3.7  --  3.3*     --  97*   CO2 24  --  24   BUN 15  --  19   CREATININE 0.75  --  0.93*   GLUCOSE 126*  --  131*   INR  --  1.1  --    PROTIME  --  13.9  --    AST  --   --  25   ALT  --   --  17   LABALBU  --   --  3.0*       Hematology:  Recent Labs     10/03/22  1817 10/03/22  2021 10/04/22  0610   WBC 16.3*  --  12.5*   RBC 3.81*  --  3.77*   HGB 12.8  --  12.2   HCT 35.6*  --  36.3   MCV 93.7  --  96.2   MCH 33.6  --  32.3   MCHC 35.9  --  33.5   RDW 14.5  --  14.6   *  --  478*   MPV 6.9  --  6.1   INR  --  1.1  --      Chemistry:  Recent Labs     10/03/22  1817 10/04/22  0610   * 140   K 3.3* 3.7   CL 97* 105   CO2 24 24   GLUCOSE 131* 126*   BUN 19 15   CREATININE 0.93* 0.75   ANIONGAP 13 11   LABGLOM >60 >60   CALCIUM 9.2 8.7   PROBNP 1,010*  --      Recent Labs     10/03/22  1817 10/03/22  2021   PROT 7.7  --    LABALBU 3.0*  --    TSH 1.94  --    AST 25  --    ALT 17  --    ALKPHOS 207*  --    BILITOT 0.2*  --    AMMONIA  --  18   LIPASE 32  --        Imaging/Diagnostics:       XR CHEST (2 VW)    Result Date: 10/4/2022  EXAMINATION: TWO XRAY VIEWS OF THE CHEST 10/4/2022 8:46 am COMPARISON: 10/03/2022 and 05/15/2022 HISTORY: ORDERING SYSTEM PROVIDED HISTORY: Pneumonia TECHNOLOGIST PROVIDED HISTORY: Pneumonia Reason for Exam: hx. of pneumonia, follow up. FINDINGS: Cardiomediastinal contours are grossly stable. There is scattered interstitial thickening in the lungs. Patchy opacities previously seen in the lung bases are improved. There are small bilateral pleural effusions. Chronic fracture deformity of the proximal left humerus. Mildly improved patchy opacities at the lung bases. Small pleural effusions. Chronic interstitial changes.      CT Head W/O Contrast    Result Date: 10/3/2022  EXAMINATION: CT OF THE HEAD WITHOUT CONTRAST  10/3/2022 3:55 pm TECHNIQUE: CT of the head was performed without the administration of intravenous contrast. Automated exposure control, iterative reconstruction, and/or weight based adjustment of the mA/kV was utilized to reduce the radiation dose to as low as reasonably achievable. COMPARISON: 05/15/2022 HISTORY: ORDERING SYSTEM PROVIDED HISTORY: ams TECHNOLOGIST PROVIDED HISTORY: ams Decision Support Exception - unselect if not a suspected or confirmed emergency medical condition->Emergency Medical Condition (MA) Reason for Exam: ams Additional signs and symptoms: patient unable to follow exam instructions CT BRAIN FINDINGS: BRAIN/VENTRICLES: Encephalomalacia in the left temporal lobe. Diffuse cerebellar atrophy. . The falx is midline. The ventricles and peripheral sulci are mild-to-moderately dilated. There is decreased attenuation in the periventricular white matter. There is no sign of a space occupying lesion, acute infarction, or hemorrhage. Orbits: Portion of the orbits demonstrate no acute abnormality. SINUSES: Soft tissue opacification in the ethmoid and sphenoid sinuses. .  The remaining imaged portions of the paranasal sinuses are clear. The mastoids and the middle ear chambers are clear. SOFT TISSUES/SKULL:  No acute abnormality of the visualized skull or soft tissues. Vascular calcifications are seen compatible with atherosclerotic disease. Encephalomalacia in the left temporal lobe. Mild to moderate global atrophy. Mild chronic deep white matter ischemic changes No acute intracranial abnormalities are noted. XR CHEST PORTABLE    Result Date: 10/3/2022  EXAMINATION: ONE XRAY VIEW OF THE CHEST 10/3/2022 5:58 pm COMPARISON: Chest radiograph 05/15/2022. CT PE study 05/15/2022. HISTORY: ORDERING SYSTEM PROVIDED HISTORY: dyspnea TECHNOLOGIST PROVIDED HISTORY: dyspnea Reason for Exam: sob, extremity weakness FINDINGS: Single view provided.   Stable normal mediastinal and cardiac silhouettes. Stable pulmonary hyperinflation consistent with COPD. There is acute bilateral perihilar and lung base patchy consolidation. No lobar consolidation. No effusion or pneumothorax. Decreased bone mineral density. Redemonstration of chronic displaced left proximal humerus surgical neck fracture. COPD. Acute bilateral multifocal pulmonary consolidation consistent with pneumonia.         Current Facility-Administered Medications   Medication Dose Route Frequency Provider Last Rate Last Admin    azithromycin (ZITHROMAX) 500 mg in D5W 250ml addavial  500 mg IntraVENous Q24H Ismael Hatchet, APRN - CNP        cefTRIAXone (ROCEPHIN) 1,000 mg in sodium chloride 0.9 % 50 mL IVPB mini-bag  1,000 mg IntraVENous Q24H Ismael Hatchet, APRN - CNP        methylPREDNISolone sodium (SOLU-MEDROL) injection 60 mg  60 mg IntraVENous Q8H Ismael Hatchet, APRN - CNP   60 mg at 10/04/22 0541    guaiFENesin (MUCINEX) extended release tablet 600 mg  600 mg Oral BID Ismael Hatchet, APRN - CNP   600 mg at 10/04/22 1002    aspirin EC tablet 81 mg  81 mg Oral Daily Ismael Hatchet, APRN - CNP   81 mg at 10/04/22 1002    melatonin tablet 3 mg  3 mg Oral Nightly PRN Ismael Hatchet, APRN - CNP        tamsulosin [facility]) capsule 0.4 mg  0.4 mg Oral Daily Ismael Hatchet, APRN - CNP   0.4 mg at 10/04/22 1001    trospium (SANCTURA) tablet 20 mg  20 mg Oral BID AC Ismael Hatchet, APRN - CNP   20 mg at 10/04/22 1000    carBAMazepine (TEGRETOL) tablet 400 mg  400 mg Oral Nightly Ismael Hatchet, APRN - CNP        carBAMazepine (TEGRETOL) tablet 300 mg  300 mg Oral Daily Ismael Hatchet, APRN - CNP   300 mg at 10/04/22 1019    FLUoxetine (PROZAC) capsule 40 mg  40 mg Oral Daily Ismael Hatchet, APRN - CNP   40 mg at 10/04/22 1002    potassium chloride (KLOR-CON M) extended release tablet 40 mEq  40 mEq Oral PRN Ismael Hatchet, APRN - CNP        Or    potassium bicarb-citric acid (EFFER-K) effervescent tablet 40 mEq  40 mEq Oral PRN Ismael Hatchet, APRN - CNP Or    potassium chloride 10 mEq/100 mL IVPB (Peripheral Line)  10 mEq IntraVENous PRN Bob Skipper, APRN - CNP        furosemide (LASIX) injection 20 mg  20 mg IntraVENous Daily Bob Skipper, APRN - CNP   20 mg at 10/04/22 1001    ipratropium-albuterol (DUONEB) nebulizer solution 1 ampule  1 ampule Inhalation Q6H PRN Caridad Coho, DO        0.9 % sodium chloride infusion   IntraVENous Continuous Bob Skipper, APRN - CNP 75 mL/hr at 10/03/22 2259 New Bag at 10/03/22 2259    sodium chloride flush 0.9 % injection 5-40 mL  5-40 mL IntraVENous 2 times per day Bob Skipper, APRN - CNP   10 mL at 10/04/22 1009    sodium chloride flush 0.9 % injection 10 mL  10 mL IntraVENous PRN Bob Skipper, APRN - CNP        0.9 % sodium chloride infusion   IntraVENous PRN Bob Skipper, APRN - CNP        enoxaparin (LOVENOX) injection 40 mg  40 mg SubCUTAneous Daily Bob Skipper, APRN - CNP   40 mg at 10/04/22 1009    ondansetron (ZOFRAN-ODT) disintegrating tablet 4 mg  4 mg Oral Q8H PRN Bob Skipper, APRN - CNP        Or    ondansetron [facility]) injection 4 mg  4 mg IntraVENous Q6H PRN Bob Skipper, APRN - CNP        magnesium hydroxide (MILK OF MAGNESIA) 400 MG/5ML suspension 30 mL  30 mL Oral Daily PRN Bob Skipper, APRN - CNP        acetaminophen (TYLENOL) tablet 650 mg  650 mg Oral Q6H PRN Bob Skipper, APRN - CNP        Or    acetaminophen (TYLENOL) suppository 650 mg  650 mg Rectal Q6H PRN Bob Skipper, APRN - CNP        albuterol (PROVENTIL) nebulizer solution 2.5 mg  2.5 mg Nebulization Q2H PRN Bob Skipper, APRN - CNP        ipratropium-albuterol ([place]) nebulizer solution 1 ampule  1 ampule Inhalation Q4H WA Bob Skipper, APRN - CNP   1 ampule at 10/04/22 0745       Impressions :     1. Principal Problem:    CAP (community acquired pneumonia)  Active Problems:    Sepsis ([address]    COPD exacerbation ([facility] [address]    Hypokalemia  Resolved Problems:    * No resolved hospital problems.  *        2.  has a past medical history of Anxiety, Convulsion ([facility] [address] Encephalopathy, Herpes, Hyperlipidemia, Left bundle branch block, MRSA (methicillin resistant Staphylococcus aureus), Parkinson's disease ([facility] [address] Seizures ([facility] [address] and Vitamin D deficiency. Plans:     38-year-old female admitted for community-acquired pneumonia with sepsis    Sepsis secondary to community-acquired pneumonia lactate 2.1 at presentation  multifocal pneumonia started on Rocephin and, azithromycin sputum culture pending  Suspected UTI-large leuk esterase, urine culture pending  Acute toxic metabolic encephalopathy in the setting of Parkinson's dementia  Acute exacerbation of chronic diastolic CHF last echo [id number] EF 55% BNP elevated patient given 1 dose Lasix on admission  COPD/asthma exacerbation secondary to pneumonia-steroids, bronchodilators.   According to family patient does not usually use any inhalers  Hypokalemia-replaced  Parkinson's dementia, urinary incontinence-chronic  Seizure disorder-resume Tegretol    10/4  Leukocytosis improving, no fevers  Blood and sputum cultures pending  Patient doing well on room air maintaining blood pressure and saturations    DVT pplx-Lovenox  Antibiotics started/continued Rocephin: Azithromycin  Code status    Niko López MD  10/4/2022  11:44 AM

## 2022-10-04 NOTE — PROGRESS NOTES
[facility] Internal Medicine  Jose Montes MD; Shama Martinez MD; Sue Kolb MD; Jamey Fothergill, MD Myra Brandy, MD; MD DEMARCO Suarez JAna Laura [facility] Internal Medicine   [address] [facility]     HISTORY AND PHYSICAL EXAMINATION            Date:   10/4/2022  Patientname:  Nancy Reaves  Date of admission:  10/3/2022  5:33 PM  MRN:   [id number]  Account:  [de-identified]  YOB: 1945  PCP:    Sarina Apgar, DO  [facility]   [facility]  Code Status:    Full Code      Chief Complaint:     Chief Complaint   Patient presents with    Shortness of Breath    Extremity Weakness       History Obtained From:     patient, family member - Daughter    History of Present Illness:     Nancy Reaves is a 76 y.o. Non- / non  female who presents with Shortness of Breath and Extremity Weakness   and is admitted to the hospital for the management of CAP (community acquired pneumonia) due to Chlamydia species. According to patient's daughter:The patient has not been acting right for two days. Her confusion appears more intense than normal for the past two days. The patient's daughter is the primary caregiver and began to notice her mother wheezing one day ago. Symptoms are associated with cough,   Symptoms are reported as constant. The time of day does not matter. Symptoms are reported as moderate.     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[address]     secondary to encephalitis    Vitamin D deficiency         Past Surgical History:     Past Surgical History:   Procedure Laterality Date    ABSCESS DRAINAGE Right 2013    WOUND EXPLORATION AND I+D  RIGHT HIP    CYSTOSCOPY Left 2022    CYSTOSCOPY URETERAL STENT INSERTION performed by Sam Greenwood MD at [facility] Left 05/16/2022    Cystoscopy        Medications Prior to Admission:     Prior to Admission medications    Medication Sig Start Date End Date Taking?  Authorizing Provider   carBAMazepine (TEGRETOL) 200 MG tablet 1.5 tablets po q am and 2 po q pm 8/23/22   Sravani Wu,    potassium chloride (KLOR-CON M) 20 MEQ extended release tablet Take 1 tablet by mouth daily  Patient not taking: Reported on 10/3/2022 8/22/22   Keshawn Tyler Wu DO   FLUoxetine (PROZAC) 20 MG capsule 2 po qd 8/22/22   Sravani Wu DO   mirabegron (MYRBETRIQ) 50 MG TB24 Take 50 mg by mouth daily 8/22/22   Sravani Wu DO   Blood Pressure KIT 1 Device by Does not apply route daily  Patient not taking: Reported on 10/3/2022 8/22/22   Sravani Wu DO   desmopressin (DDAVP) 0.2 MG tablet TAKE 3 TABLETS BY MOUTH IN THE EVENING 8/22/22   Sravani Wu DO   melatonin 5 MG TABS tablet Take 1 tablet by mouth nightly as needed (sleep) 5/19/22 5/29/22  Tere Sharma MD   metoprolol succinate (TOPROL XL) 25 MG extended release tablet Take 1 tablet by mouth daily  Patient not taking: Reported on 10/3/2022 5/19/22   Tere Sharma MD   midodrine (PROAMATINE) 5 MG tablet Take 1 tablet by mouth 3 times daily as needed (hypotension.)  Patient not taking: Reported on 10/3/2022 5/19/22   Tere Sharma MD   tamsulosin (FLOMAX) 0.4 MG capsule Take 1 capsule by mouth daily 5/19/22   Tere Sharma MD   aspirin 81 MG EC tablet Take 81 mg by mouth daily    Historical Provider, MD   acetaminophen (TYLENOL) 500 MG tablet Take 500 mg by mouth every 6 hours as needed for Pain  Patient not taking: No sig reported    Historical Provider, MD   Handicap Placard MISC by Does not apply route Permanent one year  Dx fractured humerus  Patient not taking: No sig reported 11/14/19   Darin Johnson DO        Allergies:     Haldol [haloperidol lactate]    Social History:     Tobacco:    reports that she quit smoking about 23 years ago. Her smoking use included cigarettes. She started smoking about 62 years ago. She has a 30.00 pack-year smoking history. She has never used smokeless tobacco.  Alcohol:      reports no history of alcohol use. Drug Use:  reports no history of drug use. Family History:     History reviewed. No pertinent family history. REVIEW OF SYSTEMS     Review of Systems   Constitutional:  Positive for activity change. HENT: Negative. Eyes: Negative. Respiratory:  Positive for wheezing. Cardiovascular: Negative. Gastrointestinal:  Positive for constipation. Endocrine: Negative. Genitourinary: Negative. Musculoskeletal: Negative. Skin: Negative. Allergic/Immunologic: Negative. Neurological: Negative. Hematological: Negative. Psychiatric/Behavioral:  Positive for confusion. The patient is nervous/anxious. PHYSICAL EXAM      BP (!) 110/50   Pulse 88   Temp 97.9 °F (36.6 °C) (Axillary)   Resp 20   Ht 5' 7\" (1.702 m)   Wt 131 lb (59.4 kg)   SpO2 96%   BMI 20.52 kg/m²  Body mass index is 20.52 kg/m². Physical Exam  Constitutional:       Appearance: She is ill-appearing. HENT:      Head: Normocephalic. Right Ear: External ear normal.      Left Ear: External ear normal.      Nose: Nose normal.      Mouth/Throat:      Mouth: Mucous membranes are dry. Eyes:      Pupils: Pupils are equal, round, and reactive to light. Cardiovascular:      Rate and Rhythm: Normal rate. Pulses: Normal pulses. Heart sounds: Normal heart sounds. Pulmonary:      Effort: Pulmonary effort is normal.   Abdominal:      General: Bowel sounds are normal.      Palpations: Abdomen is soft. Musculoskeletal:         General: Normal range of motion. Cervical back: Normal range of motion. Skin:     General: Skin is warm and dry. Capillary Refill: Capillary refill takes 2 to 3 seconds.    Neurological:      Mental Status: Mental status is at baseline. She is disoriented. Investigations:      Laboratory Testing:  Recent Results (from the past 24 hour(s))   COVID-19, Rapid    Collection Time: 10/03/22  6:17 PM    Specimen: Nasopharyngeal Swab   Result Value Ref Range    Specimen Description . NASOPHARYNGEAL SWAB     SARS-CoV-2, Rapid Not Detected Not Detected   Lactic Acid    Collection Time: 10/03/22  6:17 PM   Result Value Ref Range    Lactic Acid 2.1 0.5 - 2.2 mmol/L   CMP    Collection Time: 10/03/22  6:17 PM   Result Value Ref Range    Glucose 131 (H) 70 - 99 mg/dL    BUN 19 8 - 23 mg/dL    Creatinine 0.93 (H) 0.50 - 0.90 mg/dL    Calcium 9.2 8.6 - 10.4 mg/dL    Sodium 134 (L) 135 - 144 mmol/L    Potassium 3.3 (L) 3.7 - 5.3 mmol/L    Chloride 97 (L) 98 - 107 mmol/L    CO2 24 20 - 31 mmol/L    Anion Gap 13 9 - 17 mmol/L    Alkaline Phosphatase 207 (H) 35 - 104 U/L    ALT 17 5 - 33 U/L    AST 25 <32 U/L    Total Bilirubin 0.2 (L) 0.3 - 1.2 mg/dL    Total Protein 7.7 6.4 - 8.3 g/dL    Albumin 3.0 (L) 3.5 - 5.2 g/dL    Est, Glom Filt Rate >60 >60 mL/min/1.73m2   Lipase    Collection Time: 10/03/22  6:17 PM   Result Value Ref Range    Lipase 32 13 - 60 U/L   Troponin    Collection Time: 10/03/22  6:17 PM   Result Value Ref Range    Troponin, High Sensitivity 32 (H) 0 - 14 ng/L   Brain Natriuretic Peptide    Collection Time: 10/03/22  6:17 PM   Result Value Ref Range    Pro-BNP 1,010 (H) <300 pg/mL   TSH w/reflex to FT4    Collection Time: 10/03/22  6:17 PM   Result Value Ref Range    TSH 1.94 0.30 - 5.00 uIU/mL   SPECIMEN REJECTION    Collection Time: 10/03/22  6:17 PM   Result Value Ref Range    Specimen Source . BLOOD     Ordered Test PT     Reason for Rejection Unable to perform testing: No specimen received. SPECIMEN REJECTION    Collection Time: 10/03/22  6:17 PM   Result Value Ref Range    Specimen Source . BLOOD     Ordered Test WAYLON     Reason for Rejection Unable to perform testing: No specimen received.     CBC with Auto Differential Collection Time: 10/03/22  6:17 PM   Result Value Ref Range    WBC 16.3 (H) 3.5 - 11.0 k/uL    RBC 3.81 (L) 4.0 - 5.2 m/uL    Hemoglobin 12.8 12.0 - 16.0 g/dL    Hematocrit 35.6 (L) 36 - 46 %    MCV 93.7 80 - 100 fL    MCH 33.6 26 - 34 pg    MCHC 35.9 31 - 37 g/dL    RDW 14.5 11.5 - 14.9 %    Platelets 627 (H) 044 - 450 k/uL    MPV 6.9 6.0 - 12.0 fL    Seg Neutrophils 74 (H) 36 - 66 %    Lymphocytes 11 (L) 24 - 44 %    Monocytes 10 (H) 1 - 7 %    Eosinophils % 5 (H) 0 - 4 %    Basophils 0 0 - 2 %    Segs Absolute 12.00 (H) 1.3 - 9.1 k/uL    Absolute Lymph # 1.80 1.0 - 4.8 k/uL    Absolute Mono # 1.60 (H) 0.1 - 1.3 k/uL    Absolute Eos # 0.80 (H) 0.0 - 0.4 k/uL    Basophils Absolute 0.10 0.0 - 0.2 k/uL   Urinalysis with Microscopic    Collection Time: 10/03/22  8:20 PM   Result Value Ref Range    Color, UA Yellow Yellow    Turbidity UA Cloudy (A) Clear    Glucose, Ur NEGATIVE NEGATIVE    Bilirubin Urine NEGATIVE NEGATIVE    Ketones, Urine NEGATIVE NEGATIVE    Specific Gravity, UA 1.017 1.000 - 1.030    Urine Hgb LARGE (A) NEGATIVE    pH, UA 7.0 5.0 - 8.0    Protein, UA NEGATIVE NEGATIVE    Urobilinogen, Urine Normal Normal    Nitrite, Urine NEGATIVE NEGATIVE    Leukocyte Esterase, Urine LARGE (A) NEGATIVE    WBC, UA TOO NUMEROUS TO COUNT /HPF    RBC, UA TOO NUMEROUS TO COUNT /HPF    Casts UA 6 TO 9 /LPF    Epithelial Cells UA 3 to 5 /HPF    Bacteria, UA FEW (A) None   Ammonia    Collection Time: 10/03/22  8:21 PM   Result Value Ref Range    Ammonia 18 11 - 51 umol/L   Protime-INR    Collection Time: 10/03/22  8:21 PM   Result Value Ref Range    Protime 13.9 11.8 - 14.6 sec    INR 1.1    Troponin    Collection Time: 10/03/22  8:34 PM   Result Value Ref Range    Troponin, High Sensitivity 36 (H) 0 - 14 ng/L   Culture, Blood 1    Collection Time: 10/03/22 10:46 PM    Specimen: Blood   Result Value Ref Range    Specimen Description . BLOOD     Special Requests 1CC EACH RT AC     Culture NO GROWTH <24 HRS    Culture, Blood 1    Collection Time: 10/03/22 10:50 PM    Specimen: Blood   Result Value Ref Range    Specimen Description . BLOOD     Special Requests 1CC EACH LT WRIST     Culture NO GROWTH <24 HRS        Imaging/Diagnostics:  CT Head W/O Contrast    Result Date: 10/3/2022  Encephalomalacia in the left temporal lobe. Mild to moderate global atrophy. Mild chronic deep white matter ischemic changes No acute intracranial abnormalities are noted. XR CHEST PORTABLE    Result Date: 10/3/2022  COPD. Acute bilateral multifocal pulmonary consolidation consistent with pneumonia. Assessment :      Hospital Problems             Last Modified POA    * (Principal) CAP (community acquired pneumonia) due to Chlamydia species 10/3/2022 Yes       Plan:     Patient status inpatient in the Progressive Unit/Step down    Sepsis  -WBC 16.3  -Fluid bolus administered in ED  -IV antibiotics initiated  -Blood culture x2  -other cultures sputum  -Lactate 2.1 in the ED.       Pneumonia   -IV antibiotic initiated in ED  --continue on admission  -Rocephin an azithromycin  -Sputum C&S pending  -Recheck CXR in am  -Pneumovax before discharge if applicable    COPD Exacerbation  -IV Solumedrol initiated in ED  -Continue on admission  -IV Zithromax and Rocephin   -Mucinex BID  -Respiratory care eval  -Duoneb aerosols 4x/ day  -Albuterol aerosols PRN  -Sputum C&S - pending specimen  -Blood Cultures pending     Hypokalemia  -Potassium 3.3  -K+ replacement protocol  -recheck BMP in am      Urinary tract infection  -ceftriaxone 1,000 mg  -Urine culture pending     CHF  -BNP 1,010 it was 264 four months ago  -Lasix 20 mg IV daily  -The patient has been off of her Beta blocker  -Daily weights; monitor I&O  -Monitor CBC, BMP  -Low sodium diet   -Check 2D echo done 5/17- EF > 55%  -Consider Cardiology consult    Disposition 2-3 days    Consultations:   IP CONSULT TO INTERNAL MEDICINE     Patient is admitted as inpatient status because of co-morbidities listed

## 2022-10-04 NOTE — DISCHARGE INSTR - DIET

## 2022-10-04 NOTE — PROGRESS NOTES
Physical Therapy  Facility/Department: [address] [place] [address]  Physical Therapy Initial Assessment    Name: Amelia Recio  : 1945  MRN: [id number]  Date of Service: 10/4/2022    Discharge Recommendations:  [address]          Patient Diagnosis(es): The encounter diagnosis was Pneumonia due to infectious organism, unspecified laterality, unspecified part of lung. Past Medical History:  has a past medical history of Anxiety, Convulsion ([address] Encephalopathy, Herpes, Hyperlipidemia, Left bundle branch block, MRSA (methicillin resistant Staphylococcus aureus), Parkinson's disease ([facility] Seizures ([facility] and Vitamin D deficiency. Past Surgical History:  has a past surgical history that includes Abscess Drainage (Right, 2013); Ureter stent placement (Left, 2022); and Cystoscopy (Left, 2022). Assessment   Assessment: Pt shows decline in strength both lower extremities, balance deficits, needs assistance for all functional activities and would benefit from PT intervention. Treatment Diagnosis: impaired strength and mobility  Specific Instructions for Next Treatment: ther exs and ther activities as tolerated  Therapy Prognosis: Fair  Decision Making: Medium Complexity  History: admitted to hospital 10/3/22 with shortness of breath,weakness all extremities, altered mental status and pneumonia. Exam: ROM,MMT, sensations, and functional mobility btesting  Clinical Presentation: Pt was alert, cooperative and motivated.   Requires PT Follow-Up: Yes  Activity Tolerance  Activity Tolerance: Patient tolerated evaluation without incident     Plan   Physcial Therapy Plan  General Plan: 1 time a day 3-6 times a week  Specific Instructions for Next Treatment: ther exs and ther activities as tolerated  Current Treatment Recommendations: Strengthening, Balance training, Functional mobility training, Transfer training, Gait training  Safety Devices  Type of Devices: Call light within reach, Gait belt, Nurse notified, Left in bed, All fall risk precautions in place     Restrictions  Restrictions/Precautions  Restrictions/Precautions: Fall Risk, Seizure, General Precautions, Up as Tolerated  Required Braces or Orthoses?: No     Subjective   Pain: Pt denies pain  General  Chart Reviewed: Yes  Patient assessed for rehabilitation services?: Yes  Additional Pertinent Hx: Pt admitted to the hospital on 10/3/22 with shortness of breath, weakness in all extremities, altered mental status and pneumonia. Response To Previous Treatment: Not applicable (PT eval done today)  Family / Caregiver Present: Yes (daughter Eri Mccann)  Referring Practitioner: Dr Lavelle Rockwell  Referral Date : 10/03/22  Diagnosis: Pneumonia  Follows Commands: Within Functional Limits  General Comment  Comments: Pt seen supine in bed and was agreeable for PT treatment. Subjective  Subjective: Pt stated, \" Mixed up,\" when asked how she was feeling. Social/Functional History  Social/Functional History  Lives With: Daughter, Family  Type of Home: House  Home Layout: One level  Home Access: Stairs to enter without rails  Entrance Stairs - Number of Steps: 1 ELIZABETH at back  Bathroom Shower/Tub: Tub/Shower unit, Curtain, Shower chair with back  [employer]: Standard  Bathroom Equipment: Hand-held shower  Bathroom Accessibility: Accessible  Home Equipment: [address]oti [address] Help From:  (daughter)  ADL Assistance: Needs assistance  Homemaking Assistance: Needs assistance  Homemaking Responsibilities: No  Ambulation Assistance: Needs assistance  Transfer Assistance: Needs assistance  Active : No  Patient's  Info: Family  IADL Comments: Daughter reports that patient sleeps in daughters room on a regular flat bed  Additional Comments: Daughter reports that she is home all the time and able to assist as needed.   Vision/Hearing  Vision  Vision: Impaired  Vision Exceptions: Wears glasses at all times  Hearing  Hearing: Within functional limits    Cognition   Orientation  Overall Orientation Status: Impaired  Cognition  Overall Cognitive Status: Exceptions     Objective   Heart Rate: 83  Heart Rate Source: Monitor  BP: (!) 102/57  BP Location: Right upper arm  BP Method: Automatic  Patient Position: Semi fowlers  MAP (Calculated): 72  Resp: 18  SpO2: 95 %  O2 Device: None (Room air)              AROM RLE (degrees)  RLE AROM: WFL  AROM LLE (degrees)  LLE AROM : WFL  AROM RUE (degrees)  RUE AROM :  (see OT eval for details)  AROM LUE (degrees)  LUE AROM :  (see OT eval for details)  Strength RLE  Strength RLE: WFL  Comment: grossly 4-/5  Strength LLE  Strength LLE: WFL  Comment: grossly 4-/5  Strength RUE  Strength RUE:  (see OT eval for details)  Strength LUE  Strength LUE:  (see OT eval for details)  Motor Control  Gross Motor?: WFL  Sensation  Overall Sensation Status: WFL (Pt denies any tingling or numbness)     Bed mobility  Bridging:  (not tested)  Rolling to Left: Stand by assistance  Rolling to Right: Stand by assistance  Supine to Sit: Stand by assistance  Sit to Supine: Stand by assistance  Bed Mobility Comments: verbal cues needed  Transfers  Sit to Stand: Contact guard assistance  Stand to Sit: Contact guard assistance  Comment: Tremors noted in L UE upon standing and per pt's daughter she does have tremores at rest and with activites at times  Ambulation  Surface: Level tile  Device: Rolling Walker  Assistance: Minimal assistance;2 Person assistance  Gait Deviations: Slow Anju;Decreased step length;Decreased step height  Distance: 20 ft  Comments: needed frequent cues especially when turning  Stairs/Curb  Stairs?: No     Balance  Posture: Fair  Sitting - Static: Fair  Sitting - Dynamic: Fair;-  Standing - Static: Fair (with rolling walker)  Standing - Dynamic: Fair (with rolling walker)                                                             AM-PAC Score  AM-PAC Inpatient Mobility Raw Score : 14 (10/04/22 1555)  AM-PAC Inpatient T-Scale Score : 38.1 (10/04/22 1555)  Mobility Inpatient CMS 0-100% Score: 61.29 (10/04/22 1555)  Mobility Inpatient CMS G-Code Modifier : CL (10/04/22 1555)                 Goals  Short Term Goals  Time Frame for Short Term Goals: 4 sessions  Short Term Goal 1: Improve strength in both lower extremities to 5-/5  Short Term Goal 2: Independent in be d mobility  Short Term Goal 3: Improve sitting and standing balance to fair +  Short Term Goal 4: Independent sit to stand. Long Term Goals  Time Frame for Long Term Goals : 6 sessions  Long Term Goal 1: Ambulate 120 ft with rolling walker independently  Patient Goals   Patient Goals : return home       Education  Patient Education  Education Given To: Patient; Family  Education Provided: Role of Therapy;Plan of Care  Education Method: Verbal  Barriers to Learning: Cognition  Education Outcome: Verbalized understanding;Continued education needed      Therapy Time   Individual Concurrent Group Co-treatment   Time In 1119         Time Out 1146         Minutes [address], PT

## 2022-10-04 NOTE — ED PROVIDER NOTES
[facility]  eMERGENCY dEPARTMENT eNCOUnter   Attending Attestation     Pt Name: Afshin Ngo  MRN: [id number]  [place] 1945  Date of evaluation: 10/3/22    History, EXAM, MDM:    Afshin Ngo is a 76 y.o. female who presents with Shortness of Breath and Extremity Weakness  Acute on chronic mental status changes. History limited secondary to ams. WBC count 16.3. UA with pyuria. COVID negative. CT head shows no bleed, there is chronic ischemic changes and atrophy. CXR showing bilateral infiltrates. Pt being admitted to hospital for treatment of pna and UTI  EKG shows a sinus rhythm with a 1st degree avblock  HR is 95, , , , reviewing old ekgs pt has a LBBB which is still present today. no ELIZABETH, No STD, No TWI, the axis is normal.      Vitals:   Vitals:    10/03/22 1723 10/03/22 1724 10/03/22 2053   BP: 119/61 119/61 109/69   Pulse: 99  89   Resp: 18  20   Temp: 97.8 °F (36.6 °C) 97.8 °F (36.6 °C)    TempSrc: Oral     SpO2: 94%  94%   Weight:   131 lb (59.4 kg)   Height:   5' 7\" (1.702 m)     I performed a history and physical examination of the patient and discussed management with the resident. I reviewed the residents note and agree with the documented findings and plan of care. Any areas of disagreement are noted on the chart. I was personally present for the key portions of any procedures. I have documented in the chart those procedures where I was not present during the key portions. I have personally reviewed all images and agree with the resident's interpretation. I have reviewed the emergency nurses triage note. I agree with the chief complaint, past medical history, past surgical history, allergies, medications, social and family history as documented unless otherwise noted below. Documentation of the HPI, Physical Exam and Medical Decision Making performed by medical students or scribes is based on my personal performance of the HPI, PE and MDM.  For Phys Assistant/ Nurse Practitioner cases/documentation I have had a face to face evaluation of this patient and have completed at least one if not all key elements of the E/M (history, physical exam, and MDM). Additional findings are as noted.     Oc Michel MD  Attending Emergency  Physician                Oc Michel MD  10/03/22 [id number]

## 2022-10-04 NOTE — PROGRESS NOTES
[address]   Occupational Therapy Evaluation  Date: 10/4/22  Patient Name: Man Faulkner       Room: -  MRN: [id number]  Account: [de-identified]   : 1945  (73 y.o.) Gender: female     Discharge Recommendations:  Further Occupational Therapy is recommended upon facility discharge. OT Equipment Recommendations  Other: TBD    Referring Practitioner: LAURIE Garcia CNP  Diagnosis: Pneumonia      Treatment Diagnosis: impaired self care status    Past Medical History:  has a past medical history of Anxiety, Convulsion ([address] Encephalopathy, Herpes, Hyperlipidemia, Left bundle branch block, MRSA (methicillin resistant Staphylococcus aureus), Parkinson's disease ([address] Seizures ([address] and Vitamin D deficiency. Past Surgical History:   has a past surgical history that includes Abscess Drainage (Right, 2013); Ureter stent placement (Left, 2022); and Cystoscopy (Left, 2022). Restrictions  Restrictions/Precautions  Restrictions/Precautions: Fall Risk;Seizure;General Precautions; Up as Tolerated  Required Braces or Orthoses?: No      Vitals  Vitals  Heart Rate: 83  Heart Rate Source: Monitor  BP: (!) 102/57  BP Location: Right upper arm  BP Method: Automatic  Patient Position: Semi fowlers  MAP (Calculated): 72  Resp: 18  SpO2: 95 %  O2 Device: None (Room air)     Subjective  Subjective: \"I feel all messed up. Well at least they are paying us for it. \" Pt was pleasant and agreeable to OT/PT eval  Comments: Ok per [employer] for OT/PT eval  Subjective  Pain: Pt denies pain      Social/Functional History  Social/Functional History  Lives With: Daughter, Family  Type of Home: House  Home Layout: One level  Home Access: Stairs to enter without rails  Entrance Stairs - Number of Steps: 1 ELIZABETH at back  Bathroom Shower/Tub: Tub/Shower unit, Curtain, Shower chair with back  [employer]: Standard  Bathroom Equipment: Hand-held shower  Bathroom Accessibility: AROM: WFL  Right Hand General AROM: with increased time with tremors noted  Tone RUE  RUE Tone: Normotonic  RUE Strength  R Hand General: 3+/5  RUE Strength Comment: 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         Fine Motor Skills/Coordination  Coordination  Movements Are Fluid And Coordinated: No  Coordination and Movement Description: Fine motor impairments, Gross motor impairments, Tremors, Decreased speed, Decreased accuracy, Right UE, Left UE                Mobility  Bed Mobility  Bed mobility  Bridging:  (not tested)  Rolling to Left: Stand by assistance  Rolling to Right: Stand by assistance  Supine to Sit: Stand by assistance  Sit to Supine: Stand by assistance  Scooting: Stand by assistance  Bed Mobility Comments: verbal cues needed    Balance  Balance  Sitting Balance: Contact guard assistance       Transfers  Transfers  Sit to stand: 2 Person assistance, Contact guard assistance  Stand to sit: 2 Person assistance, Contact guard assistance  Transfer Comments: Verbal cues for hand placment and safety with Fair carryover. Increased tremors noted.     Functional Mobility  Functional - Mobility Device: Rolling Walker  Activity: Other (to/from door)  Assist Level: Minimal assistance (Min A - CGA x 2)  Functional Mobility Comments: Verbal cues for hand placment, safety, and walker management with Fair carryover    Assessment  Assessment  Performance deficits / Impairments: Decreased ADL status, Decreased functional mobility , Decreased ROM, Decreased strength, Decreased safe awareness, Decreased cognition, Decreased endurance, Decreased balance, Decreased high-level IADLs, Decreased fine motor control, Decreased coordination, Decreased posture  Treatment Diagnosis: impaired self care status  Prognosis: Fair  Decision Making: Medium Complexity  Discharge Recommendations: Patient would benefit from continued therapy after discharge    Activity Tolerance  Activity Tolerance: Patient limited by fatigue, Treatment limited secondary to decreased cognition    Safety Devices  Type of Devices: Call light within reach, Gait belt, Nurse notified, Left in bed, All fall risk precautions in place    Patient Education  Patient Education  Education Given To: Patient, Family  Education Provided: Role of Therapy, Plan of Care, ADL Adaptive Strategies  Education Method: Verbal  Barriers to Learning: Cognition  Education Outcome: Continued education needed      Functional Outcome Measures  AM-PAC Daily Activity Inpatient   How much help for putting on and taking off regular lower body clothing?: Total  How much help for Bathing?: Total  How much help for Toileting?: Total  How much help for putting on and taking off regular upper body clothing?: A Lot  How much help for taking care of personal grooming?: A Little  How much help for eating meals?: A Little  AM-[facility] Inpatient Daily Activity Raw Score: 11  AM-PAC Inpatient ADL T-Scale Score : 29.04  ADL Inpatient CMS 0-100% Score: 70.42  ADL Inpatient CMS G-Code Modifier : CL       Goals     Short Term Goals  Time Frame for Short Term Goals: By discharge  Short Term Goal 1: Pt will complete lower body dressing/bathing with min A and Good safety  Short Term Goal 2: Pt will complete functional transfers/mobility during self care and mobility with Supervision  Short Term Goal 3: Pt will tolerate standing 5+ minutes during functional activity of choice with Supervision and Good safety  Short Term Goal 4: Pt/family will verbalize/demonstrate Good understanding of home safety/fall prevention strategies to increase safety and independence with self care and mobility  Short Term Goal 5: Pt will participate in 15+ minutes of therapeutic exercises/functional activities to increase safety and independence with self care and mobility    Plan  Occupational Therapy Plan  Times Per Week: 3-5  Current Treatment Recommendations: Self-Care / ADL, Strengthening, Balance training, Functional mobility training, Endurance training, Cognitive reorientation, Pain management, Safety education & training, Patient/Caregiver education & training, Equipment evaluation, education, & procurement, Home management training, Cognitive/Perceptual training, Coordination training      OT Individual Minutes  OT Individual Minutes  Time In: 1351  Time Out: 0620  Minutes: 27  Time Code Minutes   Timed Code Treatment Minutes: 8 Minutes        Electronically signed by Windy Merida OT on 10/4/22 at 3:56 PM EDT

## 2022-10-04 NOTE — CARE COORDINATION
DISCHARGE PLANNING NOTE:    Received call from Lin Croft at [facility] stating pt is appropriate to admit to facility on discharge. Will pre-cert and PASSAR. Lin Croft reported she will start pre-cert tomorrow morning. Will follow up tomorrow.     Electronically signed by Dwayne Yoon RN on 10/4/2022 at 4:40 PM

## 2022-10-04 NOTE — PROGRESS NOTES
10/04/22 1931   Encounter Summary   Encounter Overview/Reason  Spiritual/Emotional Needs   Service Provided For: Patient   Referral/Consult From: Rounding   Complexity of Encounter Low   Spiritual/Emotional needs   Type Spiritual Support   Assessment/Intervention/Outcome   Assessment Unable to assess  (patient sleeping)   Intervention Prayer (assurance of)/[place]

## 2022-10-04 NOTE — DISCHARGE INSTR - COC
Continuity of Care Form    Patient Name: Samir Tolbert   :  1945  MRN:  [id number]    Admit date:  10/3/2022  Discharge date:  10/8/2022    Code Status Order: Full Code   Advance Directives:     Admitting Physician:  Cony Pantoja MD  PCP: Irene Oscar DO    Discharging Nurse: [facility] Unit/Room#: 2121/2121-01  Discharging Unit Phone Number: [phone]    Emergency Contact:   Extended Emergency Contact Information  Primary Emergency Contact: [address] Phone: [phone]  Relation: Child  Secondary Emergency Contact: Rena Jones [address] Phone: [phone]  Mobile Phone: [phone]  Relation: Grandchild    Past Surgical History:  Past Surgical History:   Procedure Laterality Date    ABSCESS DRAINAGE Right 2013    WOUND EXPLORATION AND I+D  RIGHT HIP    CYSTOSCOPY Left 2022    CYSTOSCOPY URETERAL STENT INSERTION performed by Herve Camacho MD at [address] [place] [address] Road Left 2022    Cystoscopy       Immunization History:   Immunization History   Administered Date(s) Administered    COVID-19, PFIZER PURPLE top, DILUTE for use, (age 15 y+), 30mcg/0.3mL 2021, 2021, 2021    Influenza 10/09/2013    Influenza Virus Vaccine 2015    Influenza Whole 10/13/2004    Influenza, High Dose (Fluzone 65 yrs and older) 10/04/2017, 2018    Pneumococcal Conjugate 13-valent (Lzwisxj99) 2018    Pneumococcal Polysaccharide (Nwhbsjweo32) 2015    Tdap (Boostrix, Adacel) 05/15/2022       Active Problems:  Patient Active Problem List   Diagnosis Code    Seizure ([facility] R56.9    Vitamin D deficiency E55.9    Anxiety F41.9    Hypercholesteremia E78.00    Cellulitis of right leg L03.115    MRSA (methicillin resistant staph aureus) culture positive Z22.322    Breakthrough seizure ([address] G40.919    Memory loss R41.3    Difficulty speaking R47.9    Seizure disorder ([address] G40.909 Dementia with behavioral disturbance F03.918    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 A    Kidney stone N20.0    Mixed incontinence N39.46    Overactive bladder N32.81    Closed head injury S09.90XA    Chronic renal disease, stage III ([facility] [[id number]] N18.30    CAP (community acquired pneumonia) J18.9    Sepsis ([facility] A41.9    COPD exacerbation ([facility] J44.1    Hypokalemia E87.6       Isolation/Infection:   Isolation            Contact          Patient Infection Status       Infection Onset Added Last Indicated Last Indicated By Review Planned Expiration Resolved Resolved By    COVID-19 (Rule Out) 10/04/22 10/04/22 10/04/22 Respiratory Panel, Molecular, with COVID-19 (Restricted: peds pts or suitable admitted adults) (Ordered) 10/14/22 10/18/22      MRSA  12/16/13 12/16/13 Nan Richter RN        Resolved    COVID-19 (Rule Out) 10/03/22 10/03/22 10/03/22 COVID-19, Rapid (Ordered)   10/03/22 Rule-Out Test Resulted            Nurse Assessment:  Last Vital Signs: BP (!) 102/57   Pulse 89   Temp 98 °F (36.7 °C) (Oral)   Resp 20   Ht 5' 7\" (1.702 m)   Wt 131 lb (59.4 kg)   SpO2 93%   BMI 20.52 kg/m²     Last documented pain score (0-10 scale):    Last Weight:   Wt Readings from Last 1 Encounters:   10/03/22 131 lb (59.4 kg)     Mental Status:  disoriented and alert    IV Access:  - Central Venous Catheter  - site  right, insertion date: 10/5/22    Nursing Mobility/ADLs:  Walking   Assisted  Transfer  Assisted  Bathing  Assisted  Dressing  Assisted  Toileting  Assisted  Feeding  Assisted  Med Admin  Assisted  Med Delivery   whole    Wound Care Documentation and Therapy:  Wound 12/11/13 Other (Comment) Posterior;Right (Active)   Number of days: 3218       Incision 12/14/13 Leg Lateral (Active)   Number of days: 3216        Elimination:  Continence: Bowel: Yes  Bladder: Yes  Urinary Catheter: None   Colostomy/Ileostomy/Ileal Conduit: No       Date of Last BM: 10/7/22  No intake or output data in the 24 hours ending 10/04/22 1458  No intake/output data recorded. Safety Concerns: At Risk for Falls    Impairments/Disabilities:      None    Nutrition Therapy:  Current Nutrition Therapy:   - Oral Diet:  General    Routes of Feeding: Oral  Liquids: No Restrictions  Daily Fluid Restriction: no  Last Modified Barium Swallow with Video (Video Swallowing Test): not done    Treatments at the Time of Hospital Discharge:   Respiratory Treatments: N/A  Oxygen Therapy:  is not on home oxygen therapy. Ventilator:    - No ventilator support    Rehab Therapies: Physical Therapy, Occupational Therapy, and Speech/Language Therapy  Weight Bearing Status/Restrictions: No weight bearing restrictions  Other Medical Equipment (for information only, NOT a DME order):  wheelchair and walker  Other Treatments: Skilled Nursing assessment and monitoring, Medication Education    Patient's personal belongings (please select all that are sent with patient):  Glasses    RN SIGNATURE:  Electronically signed by Mihir Cortez RN on 10/8/22 at 11:20 AM EDT    CASE MANAGEMENT/SOCIAL WORK SECTION    Inpatient Status Date: 10/3/2022    Readmission Risk Assessment Score:  Readmission Risk              Risk of Unplanned Readmission:  16           Discharging to Facility/ [address] [id number]  Telephone: [phone]  Fax:     Dialysis Facility (if applicable)     / signature: Electronically signed by Rachel Contreras RN on 10/4/22 at 2:59 PM EDT    PHYSICIAN SECTION    Prognosis: Fair    Condition at Discharge: Stable    Rehab Potential (if transferring to Rehab):  Fair    Recommended Labs or Other Treatments After Discharge:     Physician Certification: I certify the above information and transfer of Jasper Phillips  is necessary for the continuing treatment of the diagnosis listed and that she requires [facility] for less 30 days.      Update Admission H&P: No change in H&P    PHYSICIAN SIGNATURE:  Electronically signed by Hugo Chaidez MD on 10/6/22 at 2:08 PM EDT

## 2022-10-04 NOTE — CARE COORDINATION
CASE MANAGEMENT NOTE:    Admission Date:  10/3/2022 Gardenia Herron is a 76 y.o.  female    Admitted for : Pneumonia due to infectious organism, unspecified laterality, unspecified part of lung [J18.9]  CAP (community acquired pneumonia) due to Chlamydia species [J16.0]    Met with:  Family, spoke with patient     PCP:  [address]:  Gordo Olmos      Is patient alert and oriented at time of discussion:  n/a - Patient sleeping, has hx of dementia    Current Residence/ Living Arrangements:  at home dependent on family care  , lives with Daughter Maxim Spears PTA:  Yes - [facility] day [id number]    Does patient go to outpatient dialysis: No  If yes, location and chair time: n/a   Who is their nephrologist? N/a    Is patient agreeable to VNS: No    Freedom of choice provided:  Yes    List of [address] provided: NA    VNS chosen:  No    DME:  walker and wheelchair, if she needs it, Doesn't use them now    Home Oxygen: No    Nebulizer: No    CPAP/BIPAP: No    Supplier: N/A    Potential Assistance Needed: No    SNF needed: Yes    Freedom of choice and list provided: Yes - Velia Boyere advised [facility] and wants to go there again    Pharmacy:  [facility]       Is patient currently receiving oral anticoagulation therapy? No    Is the Patient an ELAINE LOVETT [facility] with Readmission Risk Score greater than 14%? No  If yes, pt needs a follow up appointment made within 7 days. Family Members/Caregivers that pt would like involved in their care:    Yes    If yes, list name here:  Velia Johnson    Transportation Provider:  Family             Discharge Plan:  10/4/22 - Maxim Yu - Patient is from home with Daughter Velia Johnson who cares for her. Has hx of Dementia. Has a walker and w/c at home if she needs it, otherwise uses nothing. Been to [facility] before and what's she to go back there, Referral faxed.  Goes to Adult day care 3x per week. Remains on IV lasix, IV solumedrol 60 Q8. HAL needs signed and completed. Daughter to bring in [facility] paperwork tomorrow. Will follow . //pf                Electronically signed by: Anette Vasquez RN on 10/4/2022 at 2:37 PM

## 2022-10-05 ENCOUNTER — APPOINTMENT (OUTPATIENT)
Dept: ULTRASOUND IMAGING | Age: 77
DRG: 871 | End: 2022-10-05
Payer: COMMERCIAL

## 2022-10-05 ENCOUNTER — APPOINTMENT (OUTPATIENT)
Dept: INTERVENTIONAL RADIOLOGY/VASCULAR | Age: 77
DRG: 871 | End: 2022-10-05
Payer: COMMERCIAL

## 2022-10-05 LAB
ANION GAP SERPL CALCULATED.3IONS-SCNC: 11 MMOL/L (ref 9–17)
BUN BLDV-MCNC: 18 MG/DL (ref 8–23)
CALCIUM SERPL-MCNC: 8.3 MG/DL (ref 8.6–10.4)
CHLORIDE BLD-SCNC: 104 MMOL/L (ref 98–107)
CO2: 23 MMOL/L (ref 20–31)
CREAT SERPL-MCNC: 0.89 MG/DL (ref 0.5–0.9)
CULTURE: ABNORMAL
GFR SERPL CREATININE-BSD FRML MDRD: >60 ML/MIN/1.73M2
GLUCOSE BLD-MCNC: 139 MG/DL (ref 70–99)
HCT VFR BLD CALC: 31.9 % (ref 36–46)
HEMOGLOBIN: 10.6 G/DL (ref 12–16)
MCH RBC QN AUTO: 31.2 PG (ref 26–34)
MCHC RBC AUTO-ENTMCNC: 33.1 G/DL (ref 31–37)
MCV RBC AUTO: 94.4 FL (ref 80–100)
PDW BLD-RTO: 14.5 % (ref 11.5–14.9)
PLATELET # BLD: 532 K/UL (ref 150–450)
PMV BLD AUTO: 6.1 FL (ref 6–12)
POTASSIUM SERPL-SCNC: 3.8 MMOL/L (ref 3.7–5.3)
RBC # BLD: 3.38 M/UL (ref 4–5.2)
SODIUM BLD-SCNC: 138 MMOL/L (ref 135–144)
SPECIMEN DESCRIPTION: ABNORMAL
WBC # BLD: 12.6 K/UL (ref 3.5–11)

## 2022-10-05 PROCEDURE — 2580000003 HC RX 258: Performed by: NURSE PRACTITIONER

## 2022-10-05 PROCEDURE — 99233 SBSQ HOSP IP/OBS HIGH 50: CPT | Performed by: INTERNAL MEDICINE

## 2022-10-05 PROCEDURE — 2580000003 HC RX 258: Performed by: INTERNAL MEDICINE

## 2022-10-05 PROCEDURE — 94761 N-INVAS EAR/PLS OXIMETRY MLT: CPT

## 2022-10-05 PROCEDURE — 6360000002 HC RX W HCPCS: Performed by: NURSE PRACTITIONER

## 2022-10-05 PROCEDURE — 97530 THERAPEUTIC ACTIVITIES: CPT

## 2022-10-05 PROCEDURE — 05H933Z INSERTION OF INFUSION DEVICE INTO RIGHT BRACHIAL VEIN, PERCUTANEOUS APPROACH: ICD-10-PCS | Performed by: INTERNAL MEDICINE

## 2022-10-05 PROCEDURE — 76770 US EXAM ABDO BACK WALL COMP: CPT

## 2022-10-05 PROCEDURE — 36410 VNPNXR 3YR/> PHY/QHP DX/THER: CPT

## 2022-10-05 PROCEDURE — 80048 BASIC METABOLIC PNL TOTAL CA: CPT

## 2022-10-05 PROCEDURE — 6370000000 HC RX 637 (ALT 250 FOR IP): Performed by: NURSE PRACTITIONER

## 2022-10-05 PROCEDURE — 76937 US GUIDE VASCULAR ACCESS: CPT

## 2022-10-05 PROCEDURE — 6360000002 HC RX W HCPCS: Performed by: INTERNAL MEDICINE

## 2022-10-05 PROCEDURE — 97110 THERAPEUTIC EXERCISES: CPT

## 2022-10-05 PROCEDURE — 85027 COMPLETE CBC AUTOMATED: CPT

## 2022-10-05 PROCEDURE — 97535 SELF CARE MNGMENT TRAINING: CPT

## 2022-10-05 PROCEDURE — 36415 COLL VENOUS BLD VENIPUNCTURE: CPT

## 2022-10-05 PROCEDURE — 2060000000 HC ICU INTERMEDIATE R&B

## 2022-10-05 PROCEDURE — 99222 1ST HOSP IP/OBS MODERATE 55: CPT | Performed by: INTERNAL MEDICINE

## 2022-10-05 PROCEDURE — 6370000000 HC RX 637 (ALT 250 FOR IP): Performed by: INTERNAL MEDICINE

## 2022-10-05 PROCEDURE — 94640 AIRWAY INHALATION TREATMENT: CPT

## 2022-10-05 PROCEDURE — 2709999900 IR FLUORO GUIDED CVA DEVICE PLMT/REPLACE/REMOVAL

## 2022-10-05 RX ORDER — AZITHROMYCIN 250 MG/1
250 TABLET, FILM COATED ORAL EVERY 24 HOURS
Status: COMPLETED | OUTPATIENT
Start: 2022-10-05 | End: 2022-10-07

## 2022-10-05 RX ADMIN — METHYLPREDNISOLONE SODIUM SUCCINATE 60 MG: 125 INJECTION, POWDER, FOR SOLUTION INTRAMUSCULAR; INTRAVENOUS at 20:35

## 2022-10-05 RX ADMIN — IPRATROPIUM BROMIDE AND ALBUTEROL SULFATE 1 AMPULE: 2.5; .5 SOLUTION RESPIRATORY (INHALATION) at 12:07

## 2022-10-05 RX ADMIN — MEROPENEM 1000 MG: 1 INJECTION, POWDER, FOR SOLUTION INTRAVENOUS at 14:42

## 2022-10-05 RX ADMIN — CARBAMAZEPINE 300 MG: 200 TABLET ORAL at 09:05

## 2022-10-05 RX ADMIN — ENOXAPARIN SODIUM 40 MG: 100 INJECTION SUBCUTANEOUS at 09:04

## 2022-10-05 RX ADMIN — TAMSULOSIN HYDROCHLORIDE 0.4 MG: 0.4 CAPSULE ORAL at 09:05

## 2022-10-05 RX ADMIN — CARBAMAZEPINE 400 MG: 200 TABLET ORAL at 20:36

## 2022-10-05 RX ADMIN — AZITHROMYCIN 250 MG: 250 TABLET, FILM COATED ORAL at 23:01

## 2022-10-05 RX ADMIN — SODIUM CHLORIDE: 9 INJECTION, SOLUTION INTRAVENOUS at 12:09

## 2022-10-05 RX ADMIN — IPRATROPIUM BROMIDE AND ALBUTEROL SULFATE 1 AMPULE: 2.5; .5 SOLUTION RESPIRATORY (INHALATION) at 07:55

## 2022-10-05 RX ADMIN — ASPIRIN 81 MG: 81 TABLET, COATED ORAL at 09:05

## 2022-10-05 RX ADMIN — METHYLPREDNISOLONE SODIUM SUCCINATE 60 MG: 125 INJECTION, POWDER, FOR SOLUTION INTRAMUSCULAR; INTRAVENOUS at 12:18

## 2022-10-05 RX ADMIN — FLUOXETINE HYDROCHLORIDE 40 MG: 20 CAPSULE ORAL at 09:05

## 2022-10-05 RX ADMIN — MEROPENEM 1000 MG: 1 INJECTION, POWDER, FOR SOLUTION INTRAVENOUS at 20:42

## 2022-10-05 RX ADMIN — IPRATROPIUM BROMIDE AND ALBUTEROL SULFATE 1 AMPULE: 2.5; .5 SOLUTION RESPIRATORY (INHALATION) at 20:01

## 2022-10-05 RX ADMIN — FUROSEMIDE 20 MG: 10 INJECTION, SOLUTION INTRAMUSCULAR; INTRAVENOUS at 09:05

## 2022-10-05 RX ADMIN — GUAIFENESIN 600 MG: 600 TABLET, EXTENDED RELEASE ORAL at 20:36

## 2022-10-05 RX ADMIN — SODIUM CHLORIDE, PRESERVATIVE FREE 10 ML: 5 INJECTION INTRAVENOUS at 09:05

## 2022-10-05 RX ADMIN — METHYLPREDNISOLONE SODIUM SUCCINATE 60 MG: 125 INJECTION, POWDER, FOR SOLUTION INTRAMUSCULAR; INTRAVENOUS at 05:03

## 2022-10-05 RX ADMIN — TROSPIUM CHLORIDE 20 MG: 20 TABLET, FILM COATED ORAL at 06:12

## 2022-10-05 RX ADMIN — SODIUM CHLORIDE, PRESERVATIVE FREE 10 ML: 5 INJECTION INTRAVENOUS at 20:38

## 2022-10-05 RX ADMIN — TROSPIUM CHLORIDE 20 MG: 20 TABLET, FILM COATED ORAL at 16:53

## 2022-10-05 RX ADMIN — GUAIFENESIN 600 MG: 600 TABLET, EXTENDED RELEASE ORAL at 09:05

## 2022-10-05 RX ADMIN — PIPERACILLIN AND TAZOBACTAM 4500 MG: 4; .5 INJECTION, POWDER, LYOPHILIZED, FOR SOLUTION INTRAVENOUS at 12:12

## 2022-10-05 NOTE — PROGRESS NOTES
Physical Therapy  Facility/Department: [address] [place] [address] PROGRESSIVE CARE  Daily Treatment Note  NAME: José Ramirez  : 1945  MRN: [id number]    Date of Service: 10/5/2022    Discharge Recommendations:  [address]        Patient Diagnosis(es): The encounter diagnosis was Pneumonia due to infectious organism, unspecified laterality, unspecified part of lung. Assessment   Activity Tolerance: Patient tolerated treatment well;Treatment limited secondary to medical complications (tx limited due to transport arrived to take pt to IR.)     Plan    Physcial Therapy Plan  General Plan: 1 time a day 3-6 times a week  Specific Instructions for Next Treatment: ther exs and ther activities as tolerated  Current Treatment Recommendations: Strengthening;Balance training;Functional mobility training;Transfer training;Gait training     Restrictions  Restrictions/Precautions  Restrictions/Precautions: Fall Risk, Seizure, General Precautions, Up as Tolerated  Required Braces or Orthoses?: No     Subjective    Subjective  Subjective: Pt with advanced dementia and unsure of anything. Pt unable to express name, hospital, state, children,  or single  Pain: Pt denies pain  Orientation  Overall Orientation Status: Impaired  Orientation Level: Disoriented to person;Disoriented to place; Disoriented to time;Disoriented to situation     Objective   Vitals     Bed Mobility Training  Bed Mobility Training: Yes  Rolling: Stand-by assistance  Supine to Sit: Stand-by assistance  Sit to Supine: Stand-by assistance  Scooting: Stand-by assistance  Balance  Sitting: Impaired  Sitting - Static: Good (unsupported)  Sitting - Dynamic: Fair (occasional)  Standing: Impaired  Standing - Static: Fair  Standing - Dynamic: Fair  Transfer Training  Transfer Training: Yes  Sit to Stand: Contact-guard assistance  Stand to Sit: Contact-guard assistance  Gait Training  Gait Training: Yes  Gait  Overall Level of Assistance: Contact-guard assistance; Additional time; Adaptive equipment;Assist X2  Interventions: Verbal cues  Base of Support: Narrowed  Speed/Anju: Slow  Step Length: Left shortened;Right shortened  Gait Abnormalities: Decreased step clearance;Shuffling gait;Trunk sway increased  Distance (ft): 3 Feet (3ft forward and 3ft retro)  Assistive Device: Gait belt;Walker, rolling     PT Exercises  Exercise Treatment: Pt found soaked in urine, clean bedding, clean brief, cleaned pericare  Pressure Relief Exercises: STS x3  Static Sitting Balance Exercises: Dangle EOB 10-15 min. Static Standing Balance Exercises: Standing tolerance x2:  60sec, 90sec         [facility] Mobility Inpatient   How much difficulty turning over in bed?: A Little  How much difficulty sitting down on / standing up from a chair with arms?: A Little  How much difficulty moving from lying on back to sitting on side of bed?: A Little  How much help from another person moving to and from a bed to a chair?: A Little  How much help from another person needed to walk in hospital room?: Total  How much help from another person for climbing 3-5 steps with a railing?: Total  AM-PAC Inpatient Mobility Raw Score : 14  AM-PAC Inpatient T-Scale Score : 61.29  Mobility Inpatient CMS 0-100% Score: 38.1  Mobility Inpatient CMS G-Code Modifier : CL      Goals  Short Term Goals  Time Frame for Short Term Goals: 4 sessions  Short Term Goal 1: Improve strength in both lower extremities to 5-/5  Short Term Goal 2: Independent in be d mobility  Short Term Goal 3: Improve sitting and standing balance to fair +  Short Term Goal 4: Independent sit to stand. Long Term Goals  Time Frame for Long Term Goals : 6 sessions  Long Term Goal 1: Ambulate 120 ft with rolling walker independently  Patient Goals   Patient Goals : return home    Education  Patient Education  Education Given To: Patient; Family  Education Provided: Role of Therapy;Plan of Care  Education Method: Verbal  Barriers to Learning: Cognition  Education Outcome: Verbalized understanding;Continued education needed    Therapy Time   Individual Concurrent Group Co-treatment   Time In 1504         Time Out [address] [place] [address]

## 2022-10-05 NOTE — CONSULTS
[facility] -   Infectious diseases evaluation  admission date 10/3/2022    reason for consultation:   ESBL producing E. coli UTI  Community-acquired pneumonia    Impression :   Current:  ESBL producing E. coli UTI  Community-acquired pneumonia  History of nonobstructive kidney stone  Dementia    Recommendations   Discontinue Zosyn  IV meropenem, may change to ertapenem on discharge through 10/12/2022  Midline placement  Discontinue IV Zithromax  P.o. Zithromax 250 daily through 10/7/2022  Renal ultrasound  Follow blood cultures/no growth    Infection Control Recommendations   [place] Precautions  Contact Isolation       Antimicrobial Stewardship Recommendations   Simplification of therapy  Targeted therapy      History of Present Illness:   Initial history:  Nancy Reaves is a 76y.o.-year-old female was brought to the hospital with altered mental status associated with wheezing for 2 days prior to admission. The patient had baseline dementia, unable to provide history that was obtained from chart review and daughter at the bedside. The patient lives at home with daughter. No reported significant cough, no nausea or vomiting, no diarrhea. Chest x-ray on admission showed acute bilateral multifocal pulmonary consolidation consistent with pneumonia. Urinalysis on 10/3/2022 showed large leukocyte esterase, too numerous to count WBC  ESBL producing Proteus mirabilis grew on urine culture.       Interval changes  10/5/2022   Patient Vitals for the past 8 hrs:   BP Temp Temp src Pulse Resp SpO2 Weight   10/05/22 1215 (!) 111/50 98.5 °F (36.9 °C) Oral 95 18 94 % --   10/05/22 1208 -- -- -- 88 16 96 % --   10/05/22 0755 -- -- -- 86 18 95 % --   10/05/22 0700 116/64 97.9 °F (36.6 °C) Oral 85 20 93 % --   10/05/22 0500 -- -- -- -- -- -- 127 lb 6.4 oz (57.8 kg)             I have personally reviewed the past medical history, past surgical history, medications, social history, and family history, and I haveupdated the database accordingly.       Allergies:   Haldol [haloperidol lactate]     Review of Systems:     Review of Systems    Physical Examination :       Physical Exam    Past Medical History:     Past Medical History:   Diagnosis Date    Anxiety     Convulsion ([address]     Encephalopathy     Herpes     Hyperlipidemia     Left bundle branch block     MRSA (methicillin resistant Staphylococcus aureus)     [facility]'s disease ([facility]     Seizures ([facility]     secondary to encephalitis    Vitamin D deficiency        Past Surgical  History:     Past Surgical History:   Procedure Laterality Date    ABSCESS DRAINAGE Right 12/14/2013    WOUND EXPLORATION AND I+D  RIGHT HIP    CYSTOSCOPY Left 5/16/2022    CYSTOSCOPY URETERAL STENT INSERTION performed by Yayo Covington MD at [address] Left 05/16/2022    Cystoscopy       Medications:      meropenem  1,000 mg IntraVENous Once    Followed by    meropenem  1,000 mg IntraVENous Q8H    azithromycin  500 mg IntraVENous Q24H    methylPREDNISolone  60 mg IntraVENous Q8H    guaiFENesin  600 mg Oral BID    aspirin  81 mg Oral Daily    tamsulosin  0.4 mg Oral Daily    trospium  20 mg Oral BID AC    carBAMazepine  400 mg Oral Nightly    carBAMazepine  300 mg Oral Daily    FLUoxetine  40 mg Oral Daily    furosemide  20 mg IntraVENous Daily    sodium chloride flush  5-40 mL IntraVENous 2 times per day    enoxaparin  40 mg SubCUTAneous Daily    ipratropium-albuterol  1 ampule Inhalation Q4H WA       Social History:     Social History     Socioeconomic History    Marital status: Single     Spouse name: Not on file    Number of children: Not on file    Years of education: Not on file    Highest education level: Not on file   Occupational History    Not on file   Tobacco Use    Smoking status: Former     Packs/day: 1.00     Years: 30.00     Pack years: 30.00     Types: Cigarettes     Start date: 54     Quit date: 1999     Years since quittin.7    Smokeless tobacco: Never    Tobacco comments:     unknown how many packs a day, or how many years   Vaping Use    Vaping Use: Never used   Substance and Sexual Activity    Alcohol use: No    Drug use: No    Sexual activity: Never   Other Topics Concern    Not on file   Social History Narrative    Not on file     Social Determinants of Health     Financial Resource Strain: Not on file   Food Insecurity: Not on file   Transportation Needs: Not on file   Physical Activity: Not on file   Stress: Not on file   Social Connections: Not on file   Intimate Partner Violence: Not on file   Housing Stability: Not on file       Family History:   History reviewed. No pertinent family history. Medical Decision Making:   I have independently reviewed/ordered the following labs:    CBC with Differential:   Recent Labs     10/03/22  1817 10/04/22  0610 10/05/22  0518   WBC 16.3* 12.5* 12.6*   HGB 12.8 12.2 10.6*   HCT 35.6* 36.3 31.9*   * 478* 532*   LYMPHOPCT 11*  --   --    MONOPCT 10*  --   --      BMP:  Recent Labs     10/04/22  0610 10/05/22  0518    138   K 3.7 3.8    104   CO2 24 23   BUN 15 18   CREATININE 0.75 0.89     Hepatic Function Panel:   Recent Labs     10/03/22  1817   PROT 7.7   LABALBU 3.0*   BILITOT 0.2*   ALKPHOS 207*   ALT 17   AST 25     No results for input(s): RPR in the last 72 hours. No results for input(s): HIV in the last 72 hours. No results for input(s): BC in the last 72 hours. Lab Results   Component Value Date/Time    CREATININE 0.89 10/05/2022 05:18 AM    GLUCOSE 139 10/05/2022 05:18 AM    GLUCOSE 93 2012 03:18 PM       Detailed results: Thank you for allowing us to participate in the care of this patient. Please call with questions.     This note is created with the assistance of a speech recognition program.  While intending to generate adocument that actually reflects the content of the visit, the document can still have some errors including those of syntax and sound a like substitutions which may escape proof reading. It such instances, actual meaningcan be extrapolated by contextual diversion.     Deepak Rojas MD  Office: [phone]  Perfect serve / office [phone]

## 2022-10-05 NOTE — CARE COORDINATION
DISCHARGE PLANNING NOTE:    Plan remains for patient to be discharged to [facility]. Faxed PT/OT notes to Bobby Villafuerte at [facility] so that pre-cert can be initiated. Active order for IV Zithromax, IV Zosyn, IV Solu-medrol 60mg every 8 hours, and IV Lasix 20mg daily. Will continue to follow for additional discharge needs. Electronically signed by Ele Pace RN on 10/5/2022 at 11:26 AM    Faxed [facility] and physician notes to Bobby Villafuerte at [facility].     Electronically signed by Ele Pace RN on 10/5/2022 at 12:18 PM

## 2022-10-05 NOTE — PROGRESS NOTES
[address]   INPATIENT OCCUPATIONAL THERAPY  PROGRESS NOTE  Date: 10/5/2022  Patient Name: Olayinka Schultz       Room: 9650/5482-75  MRN: [id number]    : 1945  (76 y.o.)  Gender: female   Referring Practitioner: LAURIE Moore CNP  Diagnosis: Pneumonia    Restrictions/Precautions       Subjective  Subjective  Subjective: Pt. resting in bed upon arrival. Difficult to wake at first. Once awake- agreeable to therapy. Pain: Pt denies pain     Objective  Orientation  Overall Orientation Status: Impaired  Orientation Level: Disoriented to place; Disoriented to time;Disoriented to situation  Cognition  Overall Cognitive Status: Exceptions  ADL  Feeding: Stand by assistance  Grooming: Stand by assistance  LE Bathing: Dependent/Total  LE Dressing: Dependent/Total  Toileting: Dependent/Total  Toileting Skilled Clinical Factors: Incontinent with brief change complete. Total A completed  Additional Comments: Nursing was going to start using purewick due to incontinence. Balance  Balance  Sitting Balance: Stand by assistance  Standing Balance: Contact guard assistance  Standing Balance  Time: 2-3min  Activity: Standing during clothing management, LB bathing, and brief change. Comment: Uses rw for balance and support. Transfers/Mobility  Bed mobility  Rolling to Left: Supervision  Rolling to Right: Supervision  Supine to Sit: Stand by assistance  Sit to Supine: Stand by assistance  Scooting: Stand by assistance  Bed Mobility Comments: verbal cues needed  Transfers  Sit to stand: 2 Person assistance;Contact guard assistance  Stand to sit: 2 Person assistance;Contact guard assistance  Transfer Comments: Verbal cues for hand placment and safety with Fair carryover. Increased tremors noted.     Functional Mobility  Functional - Mobility Device: Rolling Walker  Activity: Other (Within room.)  Assist Level: Minimal assistance  Functional Mobility Comments: CGA x2    Functional Activities Patient Education  Patient Education  Education Given To: Patient  Education Provided: Role of Therapy, Plan of Care, ADL Adaptive Strategies  Education Method: Verbal, Demonstration  Barriers to Learning: Cognition  Education Outcome: Continued education needed      Goals  Short Term Goals  Time Frame for Short Term Goals: By discharge  Short Term Goal 1: Pt will complete lower body dressing/bathing with min A and Good safety  Short Term Goal 2: Pt will complete functional transfers/mobility during self care and mobility with Supervision  Short Term Goal 3: Pt will tolerate standing 5+ minutes during functional activity of choice with Supervision and Good safety  Short Term Goal 4: Pt/family will verbalize/demonstrate Good understanding of home safety/fall prevention strategies to increase safety and independence with self care and mobility  Short Term Goal 5: Pt will participate in 15+ minutes of therapeutic exercises/functional activities to increase safety and independence with self care and mobility  Occupational Therapy Plan  Times Per Week: 3-5  Current Treatment Recommendations: Self-Care / ADL, Strengthening, Balance training, Functional mobility training, Endurance training, Cognitive reorientation, Pain management, Safety education & training, Patient/Caregiver education & training, Equipment evaluation, education, & procurement, Home management training, Cognitive/Perceptual training, Coordination training      Assessment  Activity Tolerance  Activity Tolerance: Patient limited by fatigue, Treatment limited secondary to decreased cognition  Assessment  Performance deficits / Impairments: Decreased ADL status, Decreased functional mobility , Decreased ROM, Decreased strength, Decreased safe awareness, Decreased cognition, Decreased endurance, Decreased balance, Decreased high-level IADLs, Decreased fine motor control, Decreased coordination, Decreased posture  Treatment Diagnosis: impaired self care status  Prognosis: Fair  Decision Making: Medium Complexity  Discharge Recommendations: Patient would benefit from continued therapy after discharge  OT Equipment Recommendations  Other: TBD  Safety Devices  Type of Devices: Call light within reach, Gait belt, Nurse notified, Left in bed, All fall risk precautions in place      AM-PAC Daily Activities Inpatient  AM-PAC Daily Activity Inpatient   How much help for putting on and taking off regular lower body clothing?: Total  How much help for Bathing?: Total  How much help for Toileting?: Total  How much help for putting on and taking off regular upper body clothing?: A Lot  How much help for taking care of personal grooming?: A Little  How much help for eating meals?: A Little  AM-PAC Inpatient Daily Activity Raw Score: 11  AM-PAC Inpatient ADL T-Scale Score : 29.04  ADL Inpatient CMS 0-100% Score: 70.42  ADL Inpatient CMS G-Code Modifier : CL    OT Minutes  OT Individual Minutes  Time In: 1500  Time Out: 1528  Minutes: [address] [place] [address], [place]/

## 2022-10-05 NOTE — PROGRESS NOTES
[facility]    Progress Note    10/5/2022    11:22 AM    Name:   Olayinka Schultz  MRN:     [id number]     Acct:      [de-identified]   Room:   212/212101   Day:  2  Admit Date:  10/3/2022  5:33 PM    PCP:   Socorro Raines DO  Code Status:  Full Code    Subjective:     C/C:   Chief Complaint   Patient presents with    Shortness of Breath    Extremity Weakness         Interval History Status: Improving    HPI:     This patient is a 76 y.o. Non- / non  femalewho presents with  Shortness of breath  Altered mental status started 2 days ago symptoms are constant, progressive associated with wheezing and cough  Moderately severe  No aggravating relieving factors     Medical history includes Parkinson's disease, anxiety, hyperlipidemia    Review of Systems:     Positive for shortness of breath , cough  Denies chest pain or palpitations  Denies abdominal pain, diarrhea vomiting  Denies any new numbness tremors or weakness. Medications: Allergies:     Allergies   Allergen Reactions    Haldol [Haloperidol Lactate]        Current Meds:   Scheduled Meds:    piperacillin-tazobactam  4,500 mg IntraVENous Once    Followed by    piperacillin-tazobactam  3,375 mg IntraVENous Q8H    azithromycin  500 mg IntraVENous Q24H    methylPREDNISolone  60 mg IntraVENous Q8H    guaiFENesin  600 mg Oral BID    aspirin  81 mg Oral Daily    tamsulosin  0.4 mg Oral Daily    trospium  20 mg Oral BID AC    carBAMazepine  400 mg Oral Nightly    carBAMazepine  300 mg Oral Daily    FLUoxetine  40 mg Oral Daily    furosemide  20 mg IntraVENous Daily    sodium chloride flush  5-40 mL IntraVENous 2 times per day    enoxaparin  40 mg SubCUTAneous Daily    ipratropium-albuterol  1 ampule Inhalation Q4H WA     Continuous Infusions:    sodium chloride 75 mL/hr at 10/04/22 1223    sodium chloride       PRN Meds: melatonin, potassium chloride **OR** potassium alternative oral replacement **OR** potassium chloride, ipratropium-albuterol, sodium chloride flush, sodium chloride, ondansetron **OR** ondansetron, magnesium hydroxide, acetaminophen **OR** acetaminophen, albuterol    Data:     Past Medical History:   has a past medical history of Anxiety, Convulsion ([facility] Encephalopathy, Herpes, Hyperlipidemia, Left bundle branch block, MRSA (methicillin resistant Staphylococcus aureus), Parkinson's disease ([facility] Seizures ([facility] and Vitamin D deficiency. Social History:   reports that she quit smoking about 23 years ago. Her smoking use included cigarettes. She started smoking about 62 years ago. She has a 30.00 pack-year smoking history. She has never used smokeless tobacco. She reports that she does not drink alcohol and does not use drugs. Family History: History reviewed. No pertinent family history. Vitals:  /64   Pulse 86   Temp 97.9 °F (36.6 °C) (Oral)   Resp 18   Ht 5' 7\" (1.702 m)   Wt 127 lb 6.4 oz (57.8 kg)   SpO2 95%   BMI 19.95 kg/m²   Temp (24hrs), Av °F (36.7 °C), Min:97.9 °F (36.6 °C), Max:98.1 °F (36.7 °C)    No results for input(s): POCGLU in the last 72 hours. I/O (24Hr):     Intake/Output Summary (Last 24 hours) at 10/5/2022 1122  Last data filed at 10/5/2022 1040  Gross per 24 hour   Intake 100 ml   Output --   Net 100 ml       Labs:    Lab Results   Component Value Date    WBC 12.6 (H) 10/05/2022    HGB 10.6 (L) 10/05/2022    HCT 31.9 (L) 10/05/2022    MCV 94.4 10/05/2022     (H) 10/05/2022     Lab Results   Component Value Date/Time     10/05/2022 05:18 AM    K 3.8 10/05/2022 05:18 AM     10/05/2022 05:18 AM    CO2 23 10/05/2022 05:18 AM    BUN 18 10/05/2022 05:18 AM    CREATININE 0.89 10/05/2022 05:18 AM    GLUCOSE 139 10/05/2022 05:18 AM    GLUCOSE 93 2012 03:18 PM    CALCIUM 8.3 10/05/2022 05:18 AM          Lab Results   Component Value Date/Time    SPECIAL 1CC EACH LT WRIST 10/03/2022 10:50 PM     Lab Results   Component Value Date/Time    CULTURE NO GROWTH 1 DAY 10/03/2022 10:50 PM         Radiology:    Recent data reviewed    Physical Examination:        General appearance:  alert, cooperative and no distress  Eyes: Anicteric sclera. Pupils are equally round and reactive to light. Extraocular movements are intact. Lungs:  reduced air entry bilaterally, normal effort  Heart:  regular rate and rhythm, no murmur  Abdomen:  soft, nontender, nondistended, normal bowel sounds, no masses, hepatomegaly, splenomegaly  Extremities:  no edema, redness, tenderness in the calves  Skin:  no gross lesions, rashes, induration  Neuro:  Alert, no new focal weakness  Assessment:        Primary Problem  CAP (community acquired pneumonia)    Active Hospital Problems    Diagnosis Date Noted    Sepsis ([facility] [address] [A41.9] 10/04/2022     Priority: Medium    COPD exacerbation ([facility] [address] [J44.1] 10/04/2022     Priority: Medium    Hypokalemia [E87.6] 10/04/2022     Priority: Medium    CAP (community acquired pneumonia) [J18.9] 10/03/2022     Priority: Medium           Plan:        58-year-old female admitted for community-acquired pneumonia with sepsis     Sepsis secondary to community-acquired pneumonia lactate 2.1 at presentation  multifocal pneumonia started on Rocephin and, azithromycin sputum culture pending  Suspected UTI-large leuk esterase, urine culture pending  Acute toxic metabolic encephalopathy in the setting of Parkinson's dementia  Acute exacerbation of chronic diastolic CHF last echo 0321 EF 55% BNP elevated patient given 1 dose Lasix on admission  COPD/asthma exacerbation secondary to pneumonia-steroids, bronchodilators.   According to family patient does not usually use any inhalers  Hypokalemia-replaced  Parkinson's dementia, urinary incontinence-chronic  Seizure disorder-resume Tegretol    10/5  Urine culture growing Proteus sensitive only to Zosyn-antibiotics changed accordingly, ID consultl for advice on discharge abx  No fevers  Doing well on room air  Leukocytosis initially improved but persisting now        Dispo:    Niko López MD  10/5/2022  11:22 AM

## 2022-10-05 NOTE — PLAN OF CARE
Problem: Discharge Planning  Goal: Discharge to home or other facility with appropriate resources  10/5/2022 0343 by Bethany Moran RN  Outcome: Progressing     Problem: Skin/Tissue Integrity  Goal: Absence of new skin breakdown  Description: 1. Monitor for areas of redness and/or skin breakdown  2. Assess vascular access sites hourly  3. Every 4-6 hours minimum:  Change oxygen saturation probe site  4. Every 4-6 hours:  If on nasal continuous positive airway pressure, respiratory therapy assess nares and determine need for appliance change or resting period.   10/5/2022 0343 by Bethany Moran RN  Outcome: Progressing     Problem: Safety - Adult  Goal: Free from fall injury  10/5/2022 0343 by Bethany Moran RN  Outcome: Progressing     Problem: ABCDS Injury Assessment  Goal: Absence of physical injury  10/5/2022 0343 by Bethany Moran RN  Outcome: Progressing

## 2022-10-06 LAB
ANION GAP SERPL CALCULATED.3IONS-SCNC: 12 MMOL/L (ref 9–17)
BUN BLDV-MCNC: 21 MG/DL (ref 8–23)
CALCIUM SERPL-MCNC: 8.4 MG/DL (ref 8.6–10.4)
CHLORIDE BLD-SCNC: 101 MMOL/L (ref 98–107)
CO2: 23 MMOL/L (ref 20–31)
CREAT SERPL-MCNC: 0.95 MG/DL (ref 0.5–0.9)
GFR SERPL CREATININE-BSD FRML MDRD: >60 ML/MIN/1.73M2
GLUCOSE BLD-MCNC: 121 MG/DL (ref 70–99)
HCT VFR BLD CALC: 32.6 % (ref 36–46)
HEMOGLOBIN: 10.9 G/DL (ref 12–16)
MCH RBC QN AUTO: 32 PG (ref 26–34)
MCHC RBC AUTO-ENTMCNC: 33.4 G/DL (ref 31–37)
MCV RBC AUTO: 95.7 FL (ref 80–100)
PDW BLD-RTO: 14.7 % (ref 11.5–14.9)
PLATELET # BLD: 422 K/UL (ref 150–450)
PMV BLD AUTO: 6.7 FL (ref 6–12)
POTASSIUM SERPL-SCNC: 4 MMOL/L (ref 3.7–5.3)
RBC # BLD: 3.41 M/UL (ref 4–5.2)
SODIUM BLD-SCNC: 136 MMOL/L (ref 135–144)
WBC # BLD: 12.2 K/UL (ref 3.5–11)

## 2022-10-06 PROCEDURE — 6360000002 HC RX W HCPCS: Performed by: NURSE PRACTITIONER

## 2022-10-06 PROCEDURE — 6370000000 HC RX 637 (ALT 250 FOR IP): Performed by: NURSE PRACTITIONER

## 2022-10-06 PROCEDURE — 94761 N-INVAS EAR/PLS OXIMETRY MLT: CPT

## 2022-10-06 PROCEDURE — 99232 SBSQ HOSP IP/OBS MODERATE 35: CPT | Performed by: INTERNAL MEDICINE

## 2022-10-06 PROCEDURE — 85027 COMPLETE CBC AUTOMATED: CPT

## 2022-10-06 PROCEDURE — 6360000002 HC RX W HCPCS: Performed by: INTERNAL MEDICINE

## 2022-10-06 PROCEDURE — 2580000003 HC RX 258: Performed by: INTERNAL MEDICINE

## 2022-10-06 PROCEDURE — 6370000000 HC RX 637 (ALT 250 FOR IP): Performed by: INTERNAL MEDICINE

## 2022-10-06 PROCEDURE — 2580000003 HC RX 258: Performed by: NURSE PRACTITIONER

## 2022-10-06 PROCEDURE — 94640 AIRWAY INHALATION TREATMENT: CPT

## 2022-10-06 PROCEDURE — 80048 BASIC METABOLIC PNL TOTAL CA: CPT

## 2022-10-06 PROCEDURE — 36415 COLL VENOUS BLD VENIPUNCTURE: CPT

## 2022-10-06 PROCEDURE — 2060000000 HC ICU INTERMEDIATE R&B

## 2022-10-06 RX ORDER — PREDNISONE 20 MG/1
40 TABLET ORAL DAILY
Status: DISCONTINUED | OUTPATIENT
Start: 2022-10-07 | End: 2022-10-08 | Stop reason: HOSPADM

## 2022-10-06 RX ORDER — AZITHROMYCIN 250 MG/1
250 TABLET, FILM COATED ORAL EVERY 24 HOURS
Qty: 2 TABLET | Refills: 0 | Status: SHIPPED | OUTPATIENT
Start: 2022-10-06 | End: 2022-10-07 | Stop reason: SDUPTHER

## 2022-10-06 RX ADMIN — TAMSULOSIN HYDROCHLORIDE 0.4 MG: 0.4 CAPSULE ORAL at 09:27

## 2022-10-06 RX ADMIN — FLUOXETINE HYDROCHLORIDE 40 MG: 20 CAPSULE ORAL at 09:27

## 2022-10-06 RX ADMIN — TROSPIUM CHLORIDE 20 MG: 20 TABLET, FILM COATED ORAL at 14:55

## 2022-10-06 RX ADMIN — IPRATROPIUM BROMIDE AND ALBUTEROL SULFATE 1 AMPULE: 2.5; .5 SOLUTION RESPIRATORY (INHALATION) at 07:52

## 2022-10-06 RX ADMIN — IPRATROPIUM BROMIDE AND ALBUTEROL SULFATE 1 AMPULE: 2.5; .5 SOLUTION RESPIRATORY (INHALATION) at 19:19

## 2022-10-06 RX ADMIN — SODIUM CHLORIDE, PRESERVATIVE FREE 10 ML: 5 INJECTION INTRAVENOUS at 20:58

## 2022-10-06 RX ADMIN — IPRATROPIUM BROMIDE AND ALBUTEROL SULFATE 1 AMPULE: 2.5; .5 SOLUTION RESPIRATORY (INHALATION) at 15:14

## 2022-10-06 RX ADMIN — AZITHROMYCIN 250 MG: 250 TABLET, FILM COATED ORAL at 20:54

## 2022-10-06 RX ADMIN — MEROPENEM 1000 MG: 1 INJECTION, POWDER, FOR SOLUTION INTRAVENOUS at 20:58

## 2022-10-06 RX ADMIN — CARBAMAZEPINE 300 MG: 200 TABLET ORAL at 09:32

## 2022-10-06 RX ADMIN — GUAIFENESIN 600 MG: 600 TABLET, EXTENDED RELEASE ORAL at 20:54

## 2022-10-06 RX ADMIN — TROSPIUM CHLORIDE 20 MG: 20 TABLET, FILM COATED ORAL at 06:14

## 2022-10-06 RX ADMIN — CARBAMAZEPINE 400 MG: 200 TABLET ORAL at 20:54

## 2022-10-06 RX ADMIN — FUROSEMIDE 20 MG: 10 INJECTION, SOLUTION INTRAMUSCULAR; INTRAVENOUS at 09:27

## 2022-10-06 RX ADMIN — METHYLPREDNISOLONE SODIUM SUCCINATE 60 MG: 125 INJECTION, POWDER, FOR SOLUTION INTRAMUSCULAR; INTRAVENOUS at 06:15

## 2022-10-06 RX ADMIN — GUAIFENESIN 600 MG: 600 TABLET, EXTENDED RELEASE ORAL at 09:27

## 2022-10-06 RX ADMIN — MEROPENEM 1000 MG: 1 INJECTION, POWDER, FOR SOLUTION INTRAVENOUS at 09:31

## 2022-10-06 RX ADMIN — SODIUM CHLORIDE: 9 INJECTION, SOLUTION INTRAVENOUS at 02:11

## 2022-10-06 RX ADMIN — ENOXAPARIN SODIUM 40 MG: 100 INJECTION SUBCUTANEOUS at 09:27

## 2022-10-06 RX ADMIN — ASPIRIN 81 MG: 81 TABLET, COATED ORAL at 09:27

## 2022-10-06 RX ADMIN — IPRATROPIUM BROMIDE AND ALBUTEROL SULFATE 1 AMPULE: 2.5; .5 SOLUTION RESPIRATORY (INHALATION) at 11:09

## 2022-10-06 ASSESSMENT — ENCOUNTER SYMPTOMS
SHORTNESS OF BREATH: 0
ABDOMINAL PAIN: 0

## 2022-10-06 NOTE — PROGRESS NOTES
[facility] -   Infectious diseases evaluation  admission date 10/3/2022    reason for consultation:   ESBL producing Proteus UTI  Community-acquired pneumonia    Impression :   Current:  ESBL producing Proteus UTI  Community-acquired pneumonia  History of nonobstructive kidney stone  Dementia    Recommendations     IV meropenem, may change to ertapenem on discharge through 10/12/2022  Midline placement  P.o. Zithromax 250 daily through 10/7/2022  Renal ultrasound showing nonobstructing bilateral nephrolithiasis  Follow blood cultures/no growth    Infection Control Recommendations   [place] Precautions  Contact Isolation       Antimicrobial Stewardship Recommendations   Simplification of therapy  Targeted therapy    History of Present Illness:   Initial history:  Kathy Ward is a 76y.o.-year-old female was brought to the hospital with altered mental status associated with wheezing for 2 days prior to admission. The patient had baseline dementia, unable to provide history that was obtained from chart review and daughter at the bedside. The patient lives at home with daughter. No reported significant cough, no nausea or vomiting, no diarrhea. Chest x-ray on admission showed acute bilateral multifocal pulmonary consolidation consistent with pneumonia. Urinalysis on 10/3/2022 showed large leukocyte esterase, too numerous to count WBC  ESBL producing Proteus mirabilis grew on urine culture.     Interval changes  10/6/2022   Patient Vitals for the past 8 hrs:   BP Temp Temp src Pulse Resp SpO2   10/06/22 1200 (!) 105/44 98.3 °F (36.8 °C) Oral 94 18 96 %   10/06/22 1109 -- -- -- 82 16 95 %   10/06/22 0752 -- -- -- 84 16 94 %   10/06/22 0655 (!) 119/55 98.1 °F (36.7 °C) Oral 79 16 95 %         I have personally reviewed the past medical history, past surgical history, medications, social history, and family history, and I haveupdated the database accordingly. Allergies:   Haldol [haloperidol lactate]     Review of Systems:     Review of Systems   Constitutional:  Negative for fever. Respiratory:  Negative for shortness of breath. Cardiovascular:  Negative for chest pain. Gastrointestinal:  Negative for abdominal pain. Neurological:  Negative for weakness. All other systems reviewed and are negative. Physical Examination :       Physical Exam  Vitals reviewed. Constitutional:       General: She is awake. Cardiovascular:      Rate and Rhythm: Normal rate and regular rhythm. Heart sounds: Normal heart sounds. Pulmonary:      Effort: Pulmonary effort is normal.      Breath sounds: Normal breath sounds and air entry. Abdominal:      General: Abdomen is flat. Palpations: Abdomen is soft. Tenderness: There is no abdominal tenderness. Neurological:      Mental Status: She is alert. Psychiatric:         Behavior: Behavior is cooperative.      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[address]     secondary to encephalitis    Vitamin D deficiency        Past Surgical  History:     Past Surgical History:   Procedure Laterality Date    ABSCESS DRAINAGE Right 12/14/2013    WOUND EXPLORATION AND I+D  RIGHT HIP    CYSTOSCOPY Left 5/16/2022    CYSTOSCOPY URETERAL STENT INSERTION performed by Ale Rivera MD at [address] [place] [address] Left 05/16/2022    Cystoscopy       Medications:      meropenem  1,000 mg IntraVENous Q12H    azithromycin  250 mg Oral Q24H    methylPREDNISolone  60 mg IntraVENous Q8H    guaiFENesin  600 mg Oral BID    aspirin  81 mg Oral Daily    tamsulosin  0.4 mg Oral Daily    trospium  20 mg Oral BID AC    carBAMazepine  400 mg Oral Nightly    carBAMazepine  300 mg Oral Daily    FLUoxetine  40 mg Oral Daily    furosemide  20 mg IntraVENous Daily    sodium chloride flush  5-40 mL IntraVENous 2 times per day    enoxaparin  40 mg SubCUTAneous Daily    ipratropium-albuterol  1 ampule Inhalation Q4H WA       Social History:     Social History     Socioeconomic History    Marital status: Single     Spouse name: Not on file    Number of children: Not on file    Years of education: Not on file    Highest education level: Not on file   Occupational History    Not on file   Tobacco Use    Smoking status: Former     Packs/day: 1.00     Years: 30.00     Pack years: 30.00     Types: Cigarettes     Start date:      Quit date:      Years since quittin.7    Smokeless tobacco: Never    Tobacco comments:     unknown how many packs a day, or how many years   Vaping Use    Vaping Use: Never used   Substance and Sexual Activity    Alcohol use: No    Drug use: No    Sexual activity: Never   Other Topics Concern    Not on file   Social History Narrative    Not on file     Social Determinants of Health     Financial Resource Strain: Not on file   Food Insecurity: Not on file   Transportation Needs: Not on file   Physical Activity: Not on file   Stress: Not on file   Social Connections: Not on file   Intimate Partner Violence: Not on file   Housing Stability: Not on file       Family History:   History reviewed. No pertinent family history. Medical Decision Making:   I have independently reviewed/ordered the following labs:    CBC with Differential:   Recent Labs     10/03/22  1817 10/04/22  0610 10/05/22  0518 10/06/22  0450   WBC 16.3*   < > 12.6* 12.2*   HGB 12.8   < > 10.6* 10.9*   HCT 35.6*   < > 31.9* 32.6*   *   < > 532* 422   LYMPHOPCT 11*  --   --   --    MONOPCT 10*  --   --   --     < > = values in this interval not displayed.      BMP:  Recent Labs     10/05/22  0518 10/06/22  0450    136   K 3.8 4.0    101   CO2 23 23   BUN 18 21   CREATININE 0.89 0.95* Hepatic Function Panel:   Recent Labs     10/03/22  1817   PROT 7.7   LABALBU 3.0*   BILITOT 0.2*   ALKPHOS 207*   ALT 17   AST 25     No results for input(s): RPR in the last 72 hours. No results for input(s): HIV in the last 72 hours. No results for input(s): BC in the last 72 hours. Lab Results   Component Value Date/Time    CREATININE 0.95 10/06/2022 04:50 AM    GLUCOSE 121 10/06/2022 04:50 AM    GLUCOSE 93 03/12/2012 03:18 PM       Detailed results: Thank you for allowing us to participate in the care of this patient. Please call with questions. This note is created with the assistance of a speech recognition program.  While intending to generate adocument that actually reflects the content of the visit, the document can still have some errors including those of syntax and sound a like substitutions which may escape proof reading. It such instances, actual meaningcan be extrapolated by contextual diversion. Gini Major MD  Attending Physician Statement  I have discussed the care of the patient, including pertinent history and exam findings,  with the resident. I have reviewed the key elements of all parts of the encounter with the resident. I agree with the assessment, plan and orders as documented by the resident.     Zi Valerio MD   Office: [phone]  Perfect serve / office [phone]

## 2022-10-06 NOTE — CARE COORDINATION
ADRIAN spoke with patients daughter Stephenie Soares about discharge plans. Reji reported that she is agreeable to have patient discharge to [facility]. Spoke with Madie Leal and authorization is still pending for patient. Started yesterday.  Electronically signed by GABINO Montero on 10/6/2022 at 12:29 PM

## 2022-10-06 NOTE — PROGRESS NOTES
[facility] Internal Medicine    Progress Note    10/6/2022    2:06 PM    Name:   Olayinka Schultz  MRN:     [id number]     Acct:      [de-identified]   Room:   [address] [place] [address]  IP Day:  3  Admit Date:  10/3/2022  5:33 PM    PCP:   Socorro Raines DO  Code Status:  Full Code    Subjective:     C/C:   Chief Complaint   Patient presents with    Shortness of Breath    Extremity Weakness         Interval History Status: Improving    HPI:     This patient is a 76 y.o. Non- / non  femalewho presents with  Shortness of breath  Altered mental status started 2 days ago symptoms are constant, progressive associated with wheezing and cough  Moderately severe  No aggravating relieving factors     Medical history includes Parkinson's disease, anxiety, hyperlipidemia    Review of Systems:     Positive for shortness of breath , cough  Denies chest pain or palpitations  Denies abdominal pain, diarrhea vomiting  Denies any new numbness tremors or weakness. Medications: Allergies:     Allergies   Allergen Reactions    Haldol [Haloperidol Lactate]        Current Meds:   Scheduled Meds:    meropenem  1,000 mg IntraVENous Q12H    azithromycin  250 mg Oral Q24H    methylPREDNISolone  60 mg IntraVENous Q8H    guaiFENesin  600 mg Oral BID    aspirin  81 mg Oral Daily    tamsulosin  0.4 mg Oral Daily    trospium  20 mg Oral BID AC    carBAMazepine  400 mg Oral Nightly    carBAMazepine  300 mg Oral Daily    FLUoxetine  40 mg Oral Daily    furosemide  20 mg IntraVENous Daily    sodium chloride flush  5-40 mL IntraVENous 2 times per day    enoxaparin  40 mg SubCUTAneous Daily    ipratropium-albuterol  1 ampule Inhalation Q4H WA     Continuous Infusions:    sodium chloride 75 mL/hr at 10/06/22 0211    sodium chloride 20 mL/hr at 10/05/22 1209     PRN Meds: melatonin, potassium chloride **OR** potassium alternative oral replacement **OR** potassium chloride, ipratropium-albuterol, sodium chloride flush, sodium chloride, ondansetron **OR** ondansetron, magnesium hydroxide, acetaminophen **OR** acetaminophen, albuterol    Data:     Past Medical History:   has a past medical history of Anxiety, Convulsion ([facility] Encephalopathy, Herpes, Hyperlipidemia, Left bundle branch block, MRSA (methicillin resistant Staphylococcus aureus), Parkinson's disease ([facility] Seizures ([facility] and Vitamin D deficiency. Social History:   reports that she quit smoking about 23 years ago. Her smoking use included cigarettes. She started smoking about 62 years ago. She has a 30.00 pack-year smoking history. She has never used smokeless tobacco. She reports that she does not drink alcohol and does not use drugs. Family History: History reviewed. No pertinent family history. Vitals:  BP (!) 105/44   Pulse 94   Temp 98.3 °F (36.8 °C) (Oral)   Resp 18   Ht 5' 7\" (1.702 m)   Wt 127 lb 6.4 oz (57.8 kg)   SpO2 96%   BMI 19.95 kg/m²   Temp (24hrs), Av.1 °F (36.7 °C), Min:97.8 °F (36.6 °C), Max:98.3 °F (36.8 °C)    No results for input(s): POCGLU in the last 72 hours. I/O (24Hr):     Intake/Output Summary (Last 24 hours) at 10/6/2022 1406  Last data filed at 10/6/2022 1233  Gross per 24 hour   Intake 1045 ml   Output 200 ml   Net 845 ml         Labs:    Lab Results   Component Value Date    WBC 12.2 (H) 10/06/2022    HGB 10.9 (L) 10/06/2022    HCT 32.6 (L) 10/06/2022    MCV 95.7 10/06/2022     10/06/2022     Lab Results   Component Value Date/Time     10/06/2022 04:50 AM    K 4.0 10/06/2022 04:50 AM     10/06/2022 04:50 AM    CO2 23 10/06/2022 04:50 AM    BUN 21 10/06/2022 04:50 AM    CREATININE 0.95 10/06/2022 04:50 AM    GLUCOSE 121 10/06/2022 04:50 AM    GLUCOSE 93 2012 03:18 PM    CALCIUM 8.4 10/06/2022 04:50 AM          Lab Results   Component Value Date/Time    SPECIAL 1CC EACH LT WRIST 10/03/2022 10:50 PM     Lab Results   Component Value Date/Time    CULTURE NO GROWTH 2 DAYS 10/03/2022 10:50 PM         Radiology:    Recent data reviewed    Physical Examination:        General appearance:  alert, cooperative and no distress  Eyes: Anicteric sclera. Pupils are equally round and reactive to light. Extraocular movements are intact. Lungs:  reduced air entry bilaterally, normal effort  Heart:  regular rate and rhythm, no murmur  Abdomen:  soft, nontender, nondistended, normal bowel sounds, no masses, hepatomegaly, splenomegaly  Extremities:  no edema, redness, tenderness in the calves  Skin:  no gross lesions, rashes, induration  Neuro:  Alert, no new focal weakness  Assessment:        Primary Problem  Pneumonia due to infectious organism    Active Hospital Problems    Diagnosis Date Noted    Sepsis ([facility] [A41.9] 10/04/2022     Priority: Medium    COPD exacerbation ([facility] [J44.1] 10/04/2022     Priority: Medium    Hypokalemia [E87.6] 10/04/2022     Priority: Medium    Pneumonia due to infectious organism [J18.9] 10/03/2022     Priority: Medium           Plan:        70-year-old female admitted for community-acquired pneumonia with sepsis     Sepsis secondary to community-acquired pneumonia lactate 2.1 at presentation  multifocal pneumonia started on Rocephin and, azithromycin sputum culture pending  Suspected UTI-large leuk esterase, urine culture pending  Acute toxic metabolic encephalopathy in the setting of Parkinson's dementia  Acute exacerbation of chronic diastolic CHF last echo 9560 EF 55% BNP elevated patient given 1 dose Lasix on admission  COPD/asthma exacerbation secondary to pneumonia-steroids, bronchodilators.   According to family patient does not usually use any inhalers  Hypokalemia-replaced  Parkinson's dementia, urinary incontinence-chronic  Seizure disorder-resume Tegretol    10/5  Urine culture growing Proteus sensitive only to Zosyn-antibiotics changed accordingly, ID consultl for advice on discharge abx  No fevers  Doing well on room air  Leukocytosis initially improved but persisting now    10/6  Appreciate ID recommendations-patient will be discharged on ertapenem she has midline placed  Remains on room air, comfortable today  Awaiting pre-CERT for facility    Dispo:    Yassine Grace MD  10/6/2022  2:06 PM

## 2022-10-07 ENCOUNTER — APPOINTMENT (OUTPATIENT)
Dept: GENERAL RADIOLOGY | Age: 77
DRG: 871 | End: 2022-10-07
Payer: COMMERCIAL

## 2022-10-07 PROCEDURE — 99232 SBSQ HOSP IP/OBS MODERATE 35: CPT | Performed by: INTERNAL MEDICINE

## 2022-10-07 PROCEDURE — 6370000000 HC RX 637 (ALT 250 FOR IP): Performed by: NURSE PRACTITIONER

## 2022-10-07 PROCEDURE — 2060000000 HC ICU INTERMEDIATE R&B

## 2022-10-07 PROCEDURE — 6360000002 HC RX W HCPCS: Performed by: NURSE PRACTITIONER

## 2022-10-07 PROCEDURE — 6360000002 HC RX W HCPCS: Performed by: INTERNAL MEDICINE

## 2022-10-07 PROCEDURE — 6370000000 HC RX 637 (ALT 250 FOR IP): Performed by: INTERNAL MEDICINE

## 2022-10-07 PROCEDURE — 2580000003 HC RX 258: Performed by: INTERNAL MEDICINE

## 2022-10-07 PROCEDURE — 71045 X-RAY EXAM CHEST 1 VIEW: CPT

## 2022-10-07 PROCEDURE — 97116 GAIT TRAINING THERAPY: CPT

## 2022-10-07 PROCEDURE — 94761 N-INVAS EAR/PLS OXIMETRY MLT: CPT

## 2022-10-07 PROCEDURE — 94640 AIRWAY INHALATION TREATMENT: CPT

## 2022-10-07 RX ORDER — AZITHROMYCIN 250 MG/1
250 TABLET, FILM COATED ORAL EVERY 24 HOURS
Qty: 2 TABLET | Refills: 0 | Status: SHIPPED | OUTPATIENT
Start: 2022-10-07 | End: 2022-10-08 | Stop reason: HOSPADM

## 2022-10-07 RX ORDER — PREDNISONE 20 MG/1
40 TABLET ORAL DAILY
Qty: 8 TABLET | Refills: 0 | Status: SHIPPED | OUTPATIENT
Start: 2022-10-08 | End: 2022-10-12

## 2022-10-07 RX ADMIN — FLUOXETINE HYDROCHLORIDE 40 MG: 20 CAPSULE ORAL at 08:40

## 2022-10-07 RX ADMIN — AZITHROMYCIN 250 MG: 250 TABLET, FILM COATED ORAL at 23:17

## 2022-10-07 RX ADMIN — CARBAMAZEPINE 400 MG: 200 TABLET ORAL at 20:23

## 2022-10-07 RX ADMIN — IPRATROPIUM BROMIDE AND ALBUTEROL SULFATE 1 AMPULE: 2.5; .5 SOLUTION RESPIRATORY (INHALATION) at 07:48

## 2022-10-07 RX ADMIN — MEROPENEM 1000 MG: 1 INJECTION, POWDER, FOR SOLUTION INTRAVENOUS at 20:27

## 2022-10-07 RX ADMIN — ASPIRIN 81 MG: 81 TABLET, COATED ORAL at 08:41

## 2022-10-07 RX ADMIN — TROSPIUM CHLORIDE 20 MG: 20 TABLET, FILM COATED ORAL at 15:33

## 2022-10-07 RX ADMIN — IPRATROPIUM BROMIDE AND ALBUTEROL SULFATE 1 AMPULE: 2.5; .5 SOLUTION RESPIRATORY (INHALATION) at 11:08

## 2022-10-07 RX ADMIN — GUAIFENESIN 600 MG: 600 TABLET, EXTENDED RELEASE ORAL at 20:23

## 2022-10-07 RX ADMIN — GUAIFENESIN 600 MG: 600 TABLET, EXTENDED RELEASE ORAL at 08:44

## 2022-10-07 RX ADMIN — PREDNISONE 40 MG: 20 TABLET ORAL at 08:41

## 2022-10-07 RX ADMIN — IPRATROPIUM BROMIDE AND ALBUTEROL SULFATE 1 AMPULE: 2.5; .5 SOLUTION RESPIRATORY (INHALATION) at 15:57

## 2022-10-07 RX ADMIN — CARBAMAZEPINE 300 MG: 200 TABLET ORAL at 08:42

## 2022-10-07 RX ADMIN — ENOXAPARIN SODIUM 40 MG: 100 INJECTION SUBCUTANEOUS at 08:40

## 2022-10-07 RX ADMIN — IPRATROPIUM BROMIDE AND ALBUTEROL SULFATE 1 AMPULE: 2.5; .5 SOLUTION RESPIRATORY (INHALATION) at 20:06

## 2022-10-07 RX ADMIN — TAMSULOSIN HYDROCHLORIDE 0.4 MG: 0.4 CAPSULE ORAL at 08:41

## 2022-10-07 RX ADMIN — ALBUTEROL SULFATE 2.5 MG: 2.5 SOLUTION RESPIRATORY (INHALATION) at 12:44

## 2022-10-07 RX ADMIN — MEROPENEM 1000 MG: 1 INJECTION, POWDER, FOR SOLUTION INTRAVENOUS at 08:34

## 2022-10-07 RX ADMIN — TROSPIUM CHLORIDE 20 MG: 20 TABLET, FILM COATED ORAL at 06:12

## 2022-10-07 ASSESSMENT — PAIN SCALES - GENERAL: PAINLEVEL_OUTOF10: 0

## 2022-10-07 NOTE — PROGRESS NOTES
[address]   OCCUPATIONAL THERAPY MISSED TREATMENT NOTE   INPATIENT   Date: 10/7/22  Patient Name: Jasper Phillips       Room: 5450/9560-64  MRN: [id number]   Account #: [de-identified]    : 1945  (76 y.o.)  Gender: female   Referring Practitioner: LAURIE Leigh CNP  Diagnosis: Pneumonia           REASON FOR MISSED TREATMENT:  Patient refusal   -   Writer in room from [id number] re orientating and educating pt for therapy session. Pt continues to decline.  \"I just can't remember, I just want to lay here\"         ERICKA Hidalgo/WILLY

## 2022-10-07 NOTE — PROGRESS NOTES
[facility] -   Infectious diseases evaluation  admission date 10/3/2022    reason for consultation:   ESBL Proteus UTI, CAP    Impression :   Current:  ESBL producing Proteus UTI  Community-acquired pneumonia  Leukocytosis  History of nonobstructive kidney stone  Dementia    Other:    Discussion / summary of stay / plan of care     Recommendations   Meropenem 1 gm IV every 12 hours while inpatient. Ertapenem 1 gm IV daily until 10/12/22 on discharge. Follow CBC and renal function. Follow-up CBC, BUN, Cre in 1-week. Reconciled. Discussed with daughter at the bedside. Infection Control Recommendations   [place] Precautions  Contact Isolation     Antimicrobial Stewardship Recommendations   Simplification of therapy  Targeted therapy    History of Present Illness:   Initial history:  Anamaria Shaw is a 76y.o.-year-old female  who was brought to the hospital with altered mental status associated with wheezing for 2 days prior to admission. The patient had baseline dementia, unable to provide history that was obtained from chart review and daughter at the bedside. The patient lives at home with daughter. No reported significant cough, no nausea or vomiting, no diarrhea. Chest x-ray on admission showed acute bilateral multifocal pulmonary consolidation consistent with pneumonia. Urinalysis on 10/3/2022 showed large leukocyte esterase, too numerous to count WBC  ESBL producing Proteus mirabilis grew on urine culture. Interval changes  10/8/2022   Patient sleeping. Daughter at the bedside. No concerns voiced. Possible discharge today. No new labs. RUE PICC site clean.     Patient Vitals for the past 8 hrs:   BP Temp Temp src Pulse Resp SpO2 Weight   10/08/22 0842 -- -- -- 74 18 95 % --   10/08/22 0645 (!) 106/54 98.3 °F (36.8 °C) Oral 91 18 94 % --   10/08/22 0400 -- -- -- -- -- -- 131 lb 1.6 oz (59.5 kg)       Summary of relevant labs:  Labs:  Cre: 0. 89-0.95  WBC: 12.6-12.2    Micro:  10/3 BC x 2-ESBL Proteus Mirabilis > 100K  10/4 Respiratory PCR Panel-Negative. Imaging:      I have personally reviewed the past medical history, past surgical history, medications, social history, and family history, and I haveupdated the database accordingly. Allergies:   Haldol [haloperidol lactate]     Review of Systems:     Review of Systems   Reason unable to perform ROS: Patient sleeping. Physical Examination :       Physical Exam  Vitals and nursing note reviewed. Constitutional:       Appearance: Normal appearance. She is not ill-appearing. Comments: Sleeping. HENT:      Head: Normocephalic and atraumatic. Right Ear: External ear normal.      Left Ear: External ear normal.      Nose: Nose normal.      Mouth/Throat:      Comments: VIN  Eyes:      General:         Right eye: No discharge. Left eye: No discharge. Cardiovascular:      Rate and Rhythm: Normal rate and regular rhythm. Heart sounds: Normal heart sounds. No murmur heard. Pulmonary:      Effort: Pulmonary effort is normal.      Breath sounds: Normal breath sounds. No wheezing or rhonchi. Comments: RA.  Abdominal:      General: Bowel sounds are normal. There is no distension. Palpations: Abdomen is soft. Tenderness: There is no abdominal tenderness. Genitourinary:     Comments: No sood. Musculoskeletal:      Cervical back: No rigidity. Right lower leg: No edema. Left lower leg: No edema. Skin:     General: Skin is warm and dry. Capillary Refill: Capillary refill takes less than 2 seconds. Findings: No erythema. Neurological:      Comments: VIN, sleeping. Psychiatric:      Comments: VIN, sleeping.        Past Medical History:     Past Medical History:   Diagnosis Date    Anxiety     Convulsion ([address]     Encephalopathy     Herpes     Hyperlipidemia     Left bundle branch block     MRSA (methicillin resistant Staphylococcus aureus) Parkinson's disease ([facility]     Seizures ([facility]     secondary to encephalitis    Vitamin D deficiency        Past Surgical  History:     Past Surgical History:   Procedure Laterality Date    ABSCESS DRAINAGE Right 2013    WOUND EXPLORATION AND I+D  RIGHT HIP    CYSTOSCOPY Left 2022    CYSTOSCOPY URETERAL STENT INSERTION performed by Sheridan Siemens, MD at [address] [place] [address] Road Left 2022    Cystoscopy       Medications:      predniSONE  40 mg Oral Daily    meropenem  1,000 mg IntraVENous Q12H    guaiFENesin  600 mg Oral BID    aspirin  81 mg Oral Daily    tamsulosin  0.4 mg Oral Daily    trospium  20 mg Oral BID AC    carBAMazepine  400 mg Oral Nightly    carBAMazepine  300 mg Oral Daily    FLUoxetine  40 mg Oral Daily    sodium chloride flush  5-40 mL IntraVENous 2 times per day    enoxaparin  40 mg SubCUTAneous Daily    ipratropium-albuterol  1 ampule Inhalation Q4H WA       Social History:     Social History     Socioeconomic History    Marital status: Single     Spouse name: Not on file    Number of children: Not on file    Years of education: Not on file    Highest education level: Not on file   Occupational History    Not on file   Tobacco Use    Smoking status: Former     Packs/day: 1.00     Years: 30.00     Pack years: 30.00     Types: Cigarettes     Start date:      Quit date:      Years since quittin.7    Smokeless tobacco: Never    Tobacco comments:     unknown how many packs a day, or how many years   Vaping Use    Vaping Use: Never used   Substance and Sexual Activity    Alcohol use: No    Drug use: No    Sexual activity: Never   Other Topics Concern    Not on file   Social History Narrative    Not on file     Social Determinants of Health     Financial Resource Strain: Not on file   Food Insecurity: Not on file   Transportation Needs: Not on file   Physical Activity: Not on file   Stress: Not on file   Social Connections: Not on file   Intimate Partner Violence: Not on file   Housing Stability: Not on file       Family History:   History reviewed. No pertinent family history. Medical Decision Making:   I have independently reviewed/ordered the following labs:    CBC with Differential:   Recent Labs     10/06/22  0450   WBC 12.2*   HGB 10.9*   HCT 32.6*        BMP:  Recent Labs     10/06/22  0450      K 4.0      CO2 23   BUN 21   CREATININE 0.95*     Hepatic Function Panel: No results for input(s): PROT, LABALBU, BILIDIR, IBILI, BILITOT, ALKPHOS, ALT, AST in the last 72 hours. No results for input(s): RPR in the last 72 hours. No results for input(s): HIV in the last 72 hours. No results for input(s): BC in the last 72 hours. Lab Results   Component Value Date/Time    CREATININE 0.95 10/06/2022 04:50 AM    GLUCOSE 121 10/06/2022 04:50 AM    GLUCOSE 93 03/12/2012 03:18 PM       Detailed results: Thank you for allowing us to participate in the care of this patient. Please call with questions. This note is created with the assistance of a speech recognition program.  While intending to generate adocument that actually reflects the content of the visit, the document can still have some errors including those of syntax and sound a like substitutions which may escape proof reading. It such instances, actual meaningcan be extrapolated by contextual diversion.     LAURIE Loera - CNP  Office: [phone]  Perfect serve / office [phone]

## 2022-10-07 NOTE — PROGRESS NOTES
10/07/22 1304   Encounter Summary   Encounter Overview/Reason  Volunteer Encounter   Service Provided For: Patient   Referral/Consult From: Toney   Last Encounter  10/07/22  (V)   Complexity of Encounter Low   Spiritual/Emotional needs   Type Spiritual Support   Assessment/Intervention/Outcome   Intervention Prayer (assurance of)/Tow

## 2022-10-07 NOTE — CARE COORDINATION
Authorization started 10/4 for [facility] and still pending. Electronically signed by GABINO Dominguez on 10/7/2022 at 3:23 PM       Authorization has come back for patient. Patient has admit to facility Sunday by EOD.  Electronically signed by GABINO Dominguez on 10/7/2022 at 4:30 PM

## 2022-10-07 NOTE — PROGRESS NOTES
Dr. Luzma Magana notified patients family was concerned that she had some wheezing and requested chest xray. Dr. Luzma Magana to place orders.

## 2022-10-07 NOTE — PLAN OF CARE
Problem: Discharge Planning  Goal: Discharge to home or other facility with appropriate resources  10/7/2022 1525 by Sara Saeed RN  Outcome: Progressing     Problem: Skin/Tissue Integrity  Goal: Absence of new skin breakdown  Description: 1. Monitor for areas of redness and/or skin breakdown  2. Assess vascular access sites hourly  3. Every 4-6 hours minimum:  Change oxygen saturation probe site  4. Every 4-6 hours:  If on nasal continuous positive airway pressure, respiratory therapy assess nares and determine need for appliance change or resting period.   10/7/2022 1525 by Sara Saeed RN  Outcome: Progressing     Problem: Safety - Adult  Goal: Free from fall injury  10/7/2022 1525 by Sara Saeed RN  Outcome: Progressing     Problem: ABCDS Injury Assessment  Goal: Absence of physical injury  Outcome: Progressing

## 2022-10-07 NOTE — PROGRESS NOTES
[facility]    Progress Note    10/7/2022    10:25 AM    Name:   Anamaria Shaw  MRN:     [id number]     Acct:      [de-identified]   Room:   [address] [place] [address] Day:  4  Admit Date:  10/3/2022  5:33 PM    PCP:   Kaylie Pierce DO  Code Status:  Full Code    Subjective:     C/C:   Chief Complaint   Patient presents with    Shortness of Breath    Extremity Weakness         Interval History Status: Improving    HPI:     This patient is a 76 y.o. Non- / non  femalewho presents with  Shortness of breath  Altered mental status started 2 days ago symptoms are constant, progressive associated with wheezing and cough  Moderately severe  No aggravating relieving factors     Medical history includes Parkinson's disease, anxiety, hyperlipidemia    Review of Systems:     Positive for shortness of breath , cough  Denies chest pain or palpitations  Denies abdominal pain, diarrhea vomiting  Denies any new numbness tremors or weakness. Medications: Allergies:     Allergies   Allergen Reactions    Haldol [Haloperidol Lactate]        Current Meds:   Scheduled Meds:    predniSONE  40 mg Oral Daily    meropenem  1,000 mg IntraVENous Q12H    azithromycin  250 mg Oral Q24H    guaiFENesin  600 mg Oral BID    aspirin  81 mg Oral Daily    tamsulosin  0.4 mg Oral Daily    trospium  20 mg Oral BID AC    carBAMazepine  400 mg Oral Nightly    carBAMazepine  300 mg Oral Daily    FLUoxetine  40 mg Oral Daily    sodium chloride flush  5-40 mL IntraVENous 2 times per day    enoxaparin  40 mg SubCUTAneous Daily    ipratropium-albuterol  1 ampule Inhalation Q4H WA     Continuous Infusions:    sodium chloride 75 mL/hr at 10/06/22 0211    sodium chloride 20 mL/hr at 10/05/22 1209     PRN Meds: melatonin, potassium chloride **OR** potassium alternative oral replacement **OR** potassium chloride, ipratropium-albuterol, sodium chloride flush, sodium chloride, ondansetron **OR** ondansetron, magnesium hydroxide, acetaminophen **OR** acetaminophen, albuterol    Data:     Past Medical History:   has a past medical history of Anxiety, Convulsion ([facility] [address] Encephalopathy, Herpes, Hyperlipidemia, Left bundle branch block, MRSA (methicillin resistant Staphylococcus aureus), Parkinson's disease ([facility] [address] [facility] and Vitamin D deficiency. Social History:   reports that she quit smoking about 23 years ago. Her smoking use included cigarettes. She started smoking about 62 years ago. She has a 30.00 pack-year smoking history. She has never used smokeless tobacco. She reports that she does not drink alcohol and does not use drugs. Family History: History reviewed. No pertinent family history. Vitals:  /63   Pulse 78   Temp 98.3 °F (36.8 °C) (Oral)   Resp 16   Ht 5' 7\" (1.702 m)   Wt 127 lb 6.4 oz (57.8 kg)   SpO2 94%   BMI 19.95 kg/m²   Temp (24hrs), Av.3 °F (36.8 °C), Min:98.2 °F (36.8 °C), Max:98.5 °F (36.9 °C)    No results for input(s): POCGLU in the last 72 hours. I/O (24Hr):     Intake/Output Summary (Last 24 hours) at 10/7/2022 1025  Last data filed at 10/6/2022 1233  Gross per 24 hour   Intake 120 ml   Output --   Net 120 ml         Labs:    Lab Results   Component Value Date    WBC 12.2 (H) 10/06/2022    HGB 10.9 (L) 10/06/2022    HCT 32.6 (L) 10/06/2022    MCV 95.7 10/06/2022     10/06/2022     Lab Results   Component Value Date/Time     10/06/2022 04:50 AM    K 4.0 10/06/2022 04:50 AM     10/06/2022 04:50 AM    CO2 23 10/06/2022 04:50 AM    BUN 21 10/06/2022 04:50 AM    CREATININE 0.95 10/06/2022 04:50 AM    GLUCOSE 121 10/06/2022 04:50 AM    GLUCOSE 93 2012 03:18 PM    CALCIUM 8.4 10/06/2022 04:50 AM          Lab Results   Component Value Date/Time    SPECIAL 1CC EACH LT WRIST 10/03/2022 10:50 PM     Lab Results   Component Value Date/Time    CULTURE NO GROWTH 3 DAYS 10/03/2022 10:50 PM         Radiology:    Recent data recommendations-patient will be discharged on ertapenem she has midline placed  Remains on room air, comfortable today  Awaiting pre-CERT for facility    10/7  Overall doing well remains on room air appears comfortable awaiting pre-CERT        Sonia Rowe MD  10/7/2022  10:25 AM

## 2022-10-07 NOTE — PROGRESS NOTES
Physical Therapy  Facility/Department: [facility] PROGRESSIVE CARE  Daily Treatment Note  NAME: Mack Moore  : 1945  MRN: [id number]    Date of Service: 10/7/2022    Discharge Recommendations:  Patient would benefit from continued therapy after discharge   PT Equipment Recommendations  Equipment Needed: No  Other: Pt demonstrates improved balance with use of RW versus no device but has poor carryover of use due to dementia. Patient Diagnosis(es): The primary encounter diagnosis was Pneumonia due to infectious organism, unspecified laterality, unspecified part of lung. A diagnosis of COPD exacerbation ([facility] [address] was also pertinent to this visit. Assessment   Assessment: Pt demonstrates LE weakness and impaired mobility with balance deficits complicated by decreased safety awareness due to dementia. Pt exhibits balance deficits without use of device as gait progresses and LE fatigue increases. Pt has improved balance and gait with use of RW but has poor carryover with its use due to cognitive deficits. Pt will require 24/7 assistance upon discharge. Pt may benefit from use of RW at home but will need constant supervision with its use. Activity Tolerance: Patient tolerated treatment well  Equipment Needed: No  Other: Pt demonstrates improved balance with use of RW versus no device but has poor carryover of use due to dementia. Plan    Physcial Therapy Plan  General Plan: 1 time a day 3-6 times a week  Current Treatment Recommendations: Strengthening;Balance training;Functional mobility training;Transfer training;Gait training     Restrictions  Restrictions/Precautions  Restrictions/Precautions: Fall Risk, Seizure, General Precautions, Up as Tolerated  Required Braces or Orthoses?: No     Subjective    Subjective  Subjective: Pt supine in bed with daughter present. Pt agreeable to therapy. Pleasantly confused.   Pain: Pt denies pain  Orientation  Overall Orientation Status: Impaired  Orientation Level: Disoriented to place; Disoriented to time;Disoriented to situation;Oriented to person  Cognition  Overall Cognitive Status: Exceptions  Arousal/Alertness: Appropriate responses to stimuli  Following Commands: Follows one step commands with increased time; Follows one step commands with repetition  Attention Span: Attends with cues to redirect  Safety Judgement: Decreased awareness of need for assistance;Decreased awareness of need for safety  Insights: Decreased awareness of deficits  Initiation: Requires cues for all  Sequencing: Requires cues for some     Objective      Bed Mobility Training  Bed Mobility Training: Yes  Overall Level of Assistance: Stand-by assistance  Rolling: Stand-by assistance  Supine to Sit: Stand-by assistance  Sit to Supine: Stand-by assistance  Scooting: Stand-by assistance  Balance  Sitting: With support  Sitting - Static: Good (unsupported)  Sitting - Dynamic: Fair (occasional)  Standing: With support  Standing - Static: Fair  Standing - Dynamic: Fair  Transfer Training  Transfer Training: Yes  Overall Level of Assistance: Contact-guard assistance  Interventions: Safety awareness training;Verbal cues  Sit to Stand: Contact-guard assistance (Transfers performed without device initially then with device.)  Stand to Sit: Contact-guard assistance  Gait Training  Gait Training: Yes  Gait  Overall Level of Assistance: Minimum assistance;Contact-guard assistance  Interventions: Verbal cues  Base of Support: Narrowed (occasional near scissoring)  Speed/Anju: Slow  Step Length: Left shortened;Right shortened  Gait Abnormalities: Decreased step clearance;Shuffling gait;Trunk sway increased (Pt with increasing (B) knee flexion and trunk sway as gait progressed, with decreased LE coordination noted due to weakness and fatigue.)  Distance (ft): 60 Feet  Assistive Device: Gait belt; Other (comment) (no device)  Gait 2: Pt ambulated with RW CGA ~ 60' x 1.             Safety Devices  Type of Devices: Call light within reach;Gait belt;Nurse notified; Left in bed;Bed alarm in place  Restraints  Restraints Initially in Place: No       Goals  Short Term Goals  Time Frame for Short Term Goals: 4 sessions  Short Term Goal 1: Improve strength in both lower extremities to 5-/5  Short Term Goal 2: Independent in be d mobility  Short Term Goal 3: Improve sitting and standing balance to fair +  Short Term Goal 4: Independent sit to stand.   Long Term Goals  Time Frame for Long Term Goals : 6 sessions  Long Term Goal 1: Ambulate 120 ft with rolling walker independently  Patient Goals   Patient Goals : return home    Education  Patient Education  Education Given To: Patient  Education Provided: Plan of Care;Transfer Training  Education Provided Comments: walker management  Education Method: Verbal;Demonstration  Barriers to Learning: Cognition  Education Outcome: Continued education needed;Verbalized understanding    Therapy Time   Individual Concurrent Group Co-treatment   Time In 1414         Time Out 1435         Minutes 21         Timed Code Treatment Minutes: [id number] Bonifacio Thorne, PT

## 2022-10-07 NOTE — PLAN OF CARE
Problem: Discharge Planning  Goal: Discharge to home or other facility with appropriate resources  10/7/2022 0331 by Ezequiel Tolbert RN  Outcome: Progressing     Problem: Skin/Tissue Integrity  Goal: Absence of new skin breakdown  Description: 1. Monitor for areas of redness and/or skin breakdown  2. Assess vascular access sites hourly  3. Every 4-6 hours minimum:  Change oxygen saturation probe site  4. Every 4-6 hours:  If on nasal continuous positive airway pressure, respiratory therapy assess nares and determine need for appliance change or resting period. 10/7/2022 0331 by Ezequiel Tolbert RN  Outcome: Progressing     Problem: Safety - Adult  Goal: Free from fall injury  10/7/2022 0331 by Ezequiel Tolbert RN  Outcome: Progressing  Note: The patient remained free from falls this shift, call light within reach, bed in locked and lowest position. Side rails up x2.

## 2022-10-07 NOTE — CARE COORDINATION
DISCHARGE PLANNING NOTE:    Left message for Ewelina Rowley to notify that pre-cert has been obtained. Discharge order needed. Electronically signed by Trav Clifton RN on 10/7/2022 at 4:51 PM    Received call from Dr. Melyssa Yates stating pt can be discharged. She will need IV Invanz for 5 more days. Left message for Tiara at [facility] to make sure they can accommodate this.     Electronically signed by Trav Clifton RN on 10/7/2022 at 5:11 PM

## 2022-10-07 NOTE — PROGRESS NOTES
[facility] -   Infectious diseases evaluation  admission date 10/3/2022    reason for consultation:   ESBL producing E. coli UTI  Community-acquired pneumonia    Impression :   Current:  ESBL producing E. coli UTI  Community-acquired pneumonia  History of nonobstructive kidney stone  Dementia    Recommendations     IV meropenem, may change to ertapenem on discharge through 10/12/2022  P.o. Zithromax 250 daily last dose today  Renal ultrasound showed nonobstructive nephrolithiasis. Follow blood cultures/no growth  Follow CBC and BUN/creatinine in 1 week  No objection for discharge from infectious disease point of view    Infection Control Recommendations   [place] Precautions  Contact Isolation       Antimicrobial Stewardship Recommendations   Simplification of therapy  Targeted therapy      History of Present Illness:   Initial history:  Hugo Rivas is a 76y.o.-year-old female was brought to the hospital with altered mental status associated with wheezing for 2 days prior to admission. The patient had baseline dementia, unable to provide history that was obtained from chart review and daughter at the bedside. The patient lives at home with daughter. No reported significant cough, no nausea or vomiting, no diarrhea. Chest x-ray on admission showed acute bilateral multifocal pulmonary consolidation consistent with pneumonia. Urinalysis on 10/3/2022 showed large leukocyte esterase, too numerous to count WBC  ESBL producing Proteus mirabilis grew on urine culture.       Interval changes  10/7/2022   She is afebrile, denied pain, denied fever or chills, no other complaints  Patient Vitals for the past 8 hrs:   BP Temp Temp src Pulse Resp SpO2   10/07/22 1246 (!) 99/55 98.2 °F (36.8 °C) Oral 98 18 96 %   10/07/22 1109 -- -- -- -- -- 94 %   10/07/22 0748 -- -- -- 78 16 94 %   10/07/22 0653 125/63 98.3 °F (36.8 °C) Oral 87 16 93 %               I have personally reviewed the past medical history, past surgical history, medications, social history, and family history, and I haveupdated the database accordingly.       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    Vitamin D deficiency        Past Surgical  History:     Past Surgical History:   Procedure Laterality Date    ABSCESS DRAINAGE Right 2013    WOUND EXPLORATION AND I+D  RIGHT HIP    CYSTOSCOPY Left 2022    CYSTOSCOPY URETERAL STENT INSERTION performed by Luis A Jacobsen MD at [address] [place] [address] Road Left 2022    Cystoscopy       Medications:      predniSONE  40 mg Oral Daily    meropenem  1,000 mg IntraVENous Q12H    azithromycin  250 mg Oral Q24H    guaiFENesin  600 mg Oral BID    aspirin  81 mg Oral Daily    tamsulosin  0.4 mg Oral Daily    trospium  20 mg Oral BID AC    carBAMazepine  400 mg Oral Nightly    carBAMazepine  300 mg Oral Daily    FLUoxetine  40 mg Oral Daily    sodium chloride flush  5-40 mL IntraVENous 2 times per day    enoxaparin  40 mg SubCUTAneous Daily    ipratropium-albuterol  1 ampule Inhalation Q4H WA       Social History:     Social History     Socioeconomic History    Marital status: Single     Spouse name: Not on file    Number of children: Not on file    Years of education: Not on file    Highest education level: Not on file   Occupational History    Not on file   Tobacco Use    Smoking status: Former     Packs/day: 1.00     Years: 30.00     Pack years: 30.00     Types: Cigarettes     Start date:      Quit date:      Years since quittin.7    Smokeless tobacco: Never    Tobacco comments:     unknown how many packs a day, or how many years   Vaping Use    Vaping Use: Never used   Substance and Sexual Activity    Alcohol use: No    Drug use: No    Sexual activity: Never   Other Topics Concern    Not on file   Social History Narrative    Not on file     Social Determinants of Health     Financial Resource Strain: Not on file   Food Insecurity: Not on file   Transportation Needs: Not on file   Physical Activity: Not on file   Stress: Not on file   Social Connections: Not on file   Intimate Partner Violence: Not on file   Housing Stability: Not on file       Family History:   History reviewed. No pertinent family history. Medical Decision Making:   I have independently reviewed/ordered the following labs:    CBC with Differential:   Recent Labs     10/05/22  0518 10/06/22  0450   WBC 12.6* 12.2*   HGB 10.6* 10.9*   HCT 31.9* 32.6*   * 422       BMP:  Recent Labs     10/05/22  0518 10/06/22  0450    136   K 3.8 4.0    101   CO2 23 23   BUN 18 21   CREATININE 0.89 0.95*       Hepatic Function Panel:   No results for input(s): PROT, LABALBU, BILIDIR, IBILI, BILITOT, ALKPHOS, ALT, AST in the last 72 hours. No results for input(s): RPR in the last 72 hours. No results for input(s): HIV in the last 72 hours. No results for input(s): BC in the last 72 hours. Lab Results   Component Value Date/Time    CREATININE 0.95 10/06/2022 04:50 AM    GLUCOSE 121 10/06/2022 04:50 AM    GLUCOSE 93 03/12/2012 03:18 PM       Detailed results: Thank you for allowing us to participate in the care of this patient. Please call with questions. This note is created with the assistance of a speech recognition program.  While intending to generate adocument that actually reflects the content of the visit, the document can still have some errors including those of syntax and sound a like substitutions which may escape proof reading.   It such instances, actual meaningcan be extrapolated by contextual diversion.     Lc Bey MD  Office: [phone]  Perfect serve / office [phone]

## 2022-10-08 VITALS
HEART RATE: 86 BPM | WEIGHT: 131.1 LBS | HEIGHT: 67 IN | BODY MASS INDEX: 20.58 KG/M2 | TEMPERATURE: 97.4 F | DIASTOLIC BLOOD PRESSURE: 55 MMHG | SYSTOLIC BLOOD PRESSURE: 111 MMHG | RESPIRATION RATE: 16 BRPM | OXYGEN SATURATION: 94 %

## 2022-10-08 PROBLEM — B96.4 URINARY TRACT INFECTION DUE TO PROTEUS: Status: ACTIVE | Noted: 2022-10-08

## 2022-10-08 PROBLEM — A49.9 ESBL (EXTENDED SPECTRUM BETA-LACTAMASE) PRODUCING BACTERIA INFECTION: Status: ACTIVE | Noted: 2022-10-08

## 2022-10-08 PROBLEM — N30.01 ACUTE CYSTITIS WITH HEMATURIA: Status: ACTIVE | Noted: 2022-10-08

## 2022-10-08 PROBLEM — Z16.12 ESBL (EXTENDED SPECTRUM BETA-LACTAMASE) PRODUCING BACTERIA INFECTION: Status: ACTIVE | Noted: 2022-10-08

## 2022-10-08 PROBLEM — N39.0 URINARY TRACT INFECTION DUE TO PROTEUS: Status: ACTIVE | Noted: 2022-10-08

## 2022-10-08 PROBLEM — D72.829 LEUKOCYTOSIS: Status: ACTIVE | Noted: 2022-10-08

## 2022-10-08 PROBLEM — A49.9 BACTERIAL UTI: Status: ACTIVE | Noted: 2022-10-08

## 2022-10-08 PROBLEM — N39.0 BACTERIAL UTI: Status: ACTIVE | Noted: 2022-10-08

## 2022-10-08 PROCEDURE — 2580000003 HC RX 258: Performed by: NURSE PRACTITIONER

## 2022-10-08 PROCEDURE — 6370000000 HC RX 637 (ALT 250 FOR IP): Performed by: INTERNAL MEDICINE

## 2022-10-08 PROCEDURE — 6370000000 HC RX 637 (ALT 250 FOR IP): Performed by: NURSE PRACTITIONER

## 2022-10-08 PROCEDURE — 94761 N-INVAS EAR/PLS OXIMETRY MLT: CPT

## 2022-10-08 PROCEDURE — 99239 HOSP IP/OBS DSCHRG MGMT >30: CPT | Performed by: INTERNAL MEDICINE

## 2022-10-08 PROCEDURE — 6360000002 HC RX W HCPCS: Performed by: NURSE PRACTITIONER

## 2022-10-08 PROCEDURE — 6360000002 HC RX W HCPCS: Performed by: INTERNAL MEDICINE

## 2022-10-08 PROCEDURE — 99232 SBSQ HOSP IP/OBS MODERATE 35: CPT | Performed by: NURSE PRACTITIONER

## 2022-10-08 PROCEDURE — 2580000003 HC RX 258: Performed by: INTERNAL MEDICINE

## 2022-10-08 PROCEDURE — 94640 AIRWAY INHALATION TREATMENT: CPT

## 2022-10-08 RX ADMIN — ASPIRIN 81 MG: 81 TABLET, COATED ORAL at 09:06

## 2022-10-08 RX ADMIN — SODIUM CHLORIDE, PRESERVATIVE FREE 10 ML: 5 INJECTION INTRAVENOUS at 09:07

## 2022-10-08 RX ADMIN — GUAIFENESIN 600 MG: 600 TABLET, EXTENDED RELEASE ORAL at 09:06

## 2022-10-08 RX ADMIN — TROSPIUM CHLORIDE 20 MG: 20 TABLET, FILM COATED ORAL at 06:12

## 2022-10-08 RX ADMIN — ENOXAPARIN SODIUM 40 MG: 100 INJECTION SUBCUTANEOUS at 09:06

## 2022-10-08 RX ADMIN — IPRATROPIUM BROMIDE AND ALBUTEROL SULFATE 1 AMPULE: 2.5; .5 SOLUTION RESPIRATORY (INHALATION) at 11:33

## 2022-10-08 RX ADMIN — TAMSULOSIN HYDROCHLORIDE 0.4 MG: 0.4 CAPSULE ORAL at 09:06

## 2022-10-08 RX ADMIN — IPRATROPIUM BROMIDE AND ALBUTEROL SULFATE 1 AMPULE: 2.5; .5 SOLUTION RESPIRATORY (INHALATION) at 08:41

## 2022-10-08 RX ADMIN — MEROPENEM 1000 MG: 1 INJECTION, POWDER, FOR SOLUTION INTRAVENOUS at 09:17

## 2022-10-08 RX ADMIN — CARBAMAZEPINE 300 MG: 200 TABLET ORAL at 09:07

## 2022-10-08 RX ADMIN — PREDNISONE 40 MG: 20 TABLET ORAL at 09:06

## 2022-10-08 RX ADMIN — FLUOXETINE HYDROCHLORIDE 40 MG: 20 CAPSULE ORAL at 09:06

## 2022-10-08 NOTE — PLAN OF CARE
Problem: Discharge Planning  Goal: Discharge to home or other facility with appropriate resources  10/8/2022 0301 by Mayo Raines RN  Outcome: Progressing  Note: Pt to discharge to [facility]       Problem: Skin/Tissue Integrity  Goal: Absence of new skin breakdown  Description: 1. Monitor for areas of redness and/or skin breakdown  2. Assess vascular access sites hourly  3. Every 4-6 hours minimum:  Change oxygen saturation probe site  4. Every 4-6 hours:  If on nasal continuous positive airway pressure, respiratory therapy assess nares and determine need for appliance change or resting period. 10/8/2022 0301 by Mayo Raines RN  Outcome: Progressing     Problem: Safety - Adult  Goal: Free from fall injury  10/8/2022 0301 by Mayo Raines RN  Outcome: Progressing  Note: The patient remained free from falls this shift, call light within reach, bed in locked and lowest position. Side rails up x2. Problem: Pain  Goal: Verbalizes/displays adequate comfort level or baseline comfort level  10/8/2022 0301 by Mayo Raines RN  Outcome: Progressing  Note: Pt denied pain this shift.

## 2022-10-08 NOTE — CARE COORDINATION
Discharge Plan    Daughter, Levin Kehr at bedside. Plan is to discharge to [facility] rehab today on [address] [facility]. [facility] set up for 2 pm  via stretcher. I called and spoke with Nicky and Gema Renee at [facility]. Report can be called to [phone]. AVS to be faxed to [phone].    Electronically signed by Ottoniel Monge RN on 10/8/2022 at 11:30 AM     Hens/7000 completed Electronically signed by Ottoniel Monge RN on 10/8/2022 at 11:52 AM

## 2022-10-08 NOTE — PROGRESS NOTES
BRONCHOSPASM/BRONCHOCONSTRICTION     [x]         IMPROVE AERATION/BREATH SOUNDS  [x]   ADMINISTER BRONCHODILATOR THERAPY AS APPROPRIATE  [x]   ASSESS BREATH SOUNDS  []   IMPLEMENT AEROSOL/MDI PROTOCOL  [x]   PATIENT EDUCATION AS NEEDED     PROVIDE ADEQUATE OXYGENATION WITH ACCEPTABLE SP02/ABG'S    [x]  IDENTIFY APPROPRIATE OXYGEN THERAPY  [x]   MONITOR SP02/ABG'S AS NEEDED   [x]   PATIENT EDUCATION AS NEEDED     Stable on RA.

## 2022-10-09 NOTE — DISCHARGE SUMMARY
[facility] Internal Medicine    Discharge Summary     Patient ID: Emi Cat  :  1945   MRN: [id number]     ACCOUNT:  [de-identified]   Patient's PCP: Maddy Joshi DO  Admit Date: 10/3/2022   Discharge Date: 10/9/22  Length of Stay: 5  Code Status:  Prior  Admitting Physician: Ariane Daily MD  Discharge Physician: Marie Hwang MD     Active Discharge Diagnoses:     Primary Problem  Pneumonia due to infectious organism      [place] Problems    Diagnosis Date Noted    Bacterial UTI [N39.0, A49.9] 10/08/2022     Priority: Medium    Urinary tract infection due to Proteus [N39.0, B96.4] 10/08/2022     Priority: Medium    ESBL (extended spectrum beta-lactamase) producing bacteria infection [A49.9, Z16.12] 10/08/2022     Priority: Medium    Acute cystitis with hematuria [N30.01] 10/08/2022     Priority: Medium    Leukocytosis [D72.829] 10/08/2022     Priority: Medium    Sepsis ([address] [A41.9] 10/04/2022     Priority: Medium    COPD exacerbation ([address] [J44.1] 10/04/2022     Priority: Medium    Hypokalemia [E87.6] 10/04/2022     Priority: Medium    Pneumonia due to infectious organism [J18.9] 10/03/2022     Priority: Medium       Admission Condition:  fair     Discharged Condition: fair    Hospital Stay:     Hospital Course:  Emi Cat is a 76 y.o. female who was admitted for the management of Pneumonia due to infectious organism , presented to ER with Shortness of Breath and Extremity Weakness    This patient is a 76 y.o.  Non- / non  femalewho presents with  Shortness of breath  Altered mental status started 2 days ago symptoms are constant, progressive    Medical history includes Parkinson's disease, anxiety, hyperlipidemia, COPD    Noted to have and treated for the following:    Sepsis secondary to community-acquired pneumonia lactate 2.1 at presentation  multifocal pneumonia started on Rocephin and, azithromycin , switched to IV Merrem during admission   UTI-large leuk esterase, urine culture showing Proteus Mirabella's  Acute toxic metabolic encephalopathy in the setting of Parkinson's dementia  Acute exacerbation of chronic diastolic CHF last echo 5112 EF 55% BNP elevated patient given 1 dose Lasix on admission  COPD/asthma exacerbation secondary to pneumonia-steroids, bronchodilators. According to family patient does not usually use any inhalers  Hypokalemia-replaced  Parkinson's dementia, urinary incontinence-chronic  Seizure disorder-resume Tegretol    Proteus Mirabella's on urine culture sensitive only to Zosyn-patient was discharged on ertapenem via midline per ID recommendation  Discharge to [facility]      Significant therapeutic interventions: As above    Significant Diagnostic Studies:   Labs / Micro:    Lab Results   Component Value Date    WBC 12.2 (H) 10/06/2022    HGB 10.9 (L) 10/06/2022    HCT 32.6 (L) 10/06/2022    MCV 95.7 10/06/2022     10/06/2022          Lab Results   Component Value Date/Time     10/06/2022 04:50 AM    K 4.0 10/06/2022 04:50 AM     10/06/2022 04:50 AM    CO2 23 10/06/2022 04:50 AM    BUN 21 10/06/2022 04:50 AM    CREATININE 0.95 10/06/2022 04:50 AM    GLUCOSE 121 10/06/2022 04:50 AM    GLUCOSE 93 03/12/2012 03:18 PM    CALCIUM 8.4 10/06/2022 04:50 AM          Radiology:    XR CHEST (SINGLE VIEW FRONTAL)    Result Date: 10/7/2022  EXAMINATION: ONE XRAY VIEW OF THE CHEST 10/7/2022 2:28 pm COMPARISON: 10/04/2022 HISTORY: ORDERING SYSTEM PROVIDED HISTORY: shortness of breath TECHNOLOGIST PROVIDED HISTORY: shortness of breath Reason for Exam: dyspnea FINDINGS: Cardiomediastinal silhouette is unchanged in size. Lungs are mildly hyperinflated. No large pleural effusion or pneumothorax. There is linear bibasilar opacity. There is chronic appearing deformity in the left humerus. .     Linear bibasilar opacities, likely atelectasis. Lungs are hyperinflated, consistent with COPD.      XR CHEST (2 VW)    Result Date: 10/4/2022  EXAMINATION: TWO XRAY VIEWS OF THE CHEST 10/4/2022 8:46 am COMPARISON: 10/03/2022 and 05/15/2022 HISTORY: ORDERING SYSTEM PROVIDED HISTORY: Pneumonia TECHNOLOGIST PROVIDED HISTORY: Pneumonia Reason for Exam: hx. of pneumonia, follow up. FINDINGS: Cardiomediastinal contours are grossly stable. There is scattered interstitial thickening in the lungs. Patchy opacities previously seen in the lung bases are improved. There are small bilateral pleural effusions. Chronic fracture deformity of the proximal left humerus. Mildly improved patchy opacities at the lung bases. Small pleural effusions. Chronic interstitial changes. CT Head W/O Contrast    Result Date: 10/3/2022  EXAMINATION: CT OF THE HEAD WITHOUT CONTRAST  10/3/2022 3:55 pm TECHNIQUE: CT of the head was performed without the administration of intravenous contrast. Automated exposure control, iterative reconstruction, and/or weight based adjustment of the mA/kV was utilized to reduce the radiation dose to as low as reasonably achievable. COMPARISON: 05/15/2022 HISTORY: ORDERING SYSTEM PROVIDED HISTORY: ams TECHNOLOGIST PROVIDED HISTORY: ams Decision Support Exception - unselect if not a suspected or confirmed emergency medical condition->Emergency Medical Condition (MA) Reason for Exam: ams Additional signs and symptoms: patient unable to follow exam instructions CT BRAIN FINDINGS: BRAIN/VENTRICLES: Encephalomalacia in the left temporal lobe. Diffuse cerebellar atrophy. . The falx is midline. The ventricles and peripheral sulci are mild-to-moderately dilated. There is decreased attenuation in the periventricular white matter. There is no sign of a space occupying lesion, acute infarction, or hemorrhage. Orbits: Portion of the orbits demonstrate no acute abnormality. SINUSES: Soft tissue opacification in the ethmoid and sphenoid sinuses. .  The remaining imaged portions of the paranasal sinuses are clear.   The mastoids and the middle ear chambers are clear. SOFT TISSUES/SKULL:  No acute abnormality of the visualized skull or soft tissues. Vascular calcifications are seen compatible with atherosclerotic disease. Encephalomalacia in the left temporal lobe. Mild to moderate global atrophy. Mild chronic deep white matter ischemic changes No acute intracranial abnormalities are noted. US RENAL COMPLETE    Result Date: 10/5/2022  EXAMINATION: RETROPERITONEAL ULTRASOUND OF THE KIDNEYS AND URINARY BLADDER 10/5/2022 COMPARISON: None HISTORY: ORDERING SYSTEM PROVIDED HISTORY: UTI TECHNOLOGIST PROVIDED HISTORY: UTI FINDINGS: Kidneys: The right kidney measures 11.5 cm in length and the left kidney measures 9.6 cm in length. Normal renal cortical echogenicity. No hydronephrosis. Nonobstructing bilateral nephrolithiasis largest on the right 8 mm superior pole. Largest on the left inferior pole proximally 10 mm. Nonobstructing bilateral nephrolithiasis. Exam otherwise unremarkable     IR FLUORO GUIDED CVA DEVICE PLMT/REPLACE/REMOVAL    Result Date: 10/5/2022  EXAMINATION: IR FLUOROSCOPY GUIDED CENTRAL VENOUS ACCESS DEVICE PLACEMENT HISTORY: ORDERING SYSTEM PROVIDED HISTORY: IV antibiotics through 10/12 TECHNOLOGIST PROVIDED HISTORY: IV antibiotics through 10/12 midline Lumen?->Single Lumen SEDATION: Local anesthesia FINDINGS/PROCEDURE DETAILS: This procedure was performed under indirect supervision of Dr. Tapan Atkinson by the interventional radiology nurse. Informed consent was obtained after an explanation of the procedure including risks. Universal protocol was observed. The right arm was prepped and draped in sterile fashion using maximum sterile barrier technique. A temporary tourniquet was placed on the upper aspect of the right upper arm. Ultrasound revealed a patent upper arm brachial vein. The overlying skin was anesthetized with 1% lidocaine.   A needle was inserted under ultrasound guidance into the lumen of this vein. This needle was exchanged for a small peel-away sheath over a microwire. A 4.5 [place] by 15 cm single-lumen midline was advanced through the sheath into the vein, and the sheath was removed. The catheter flushed and aspirated appropriately and was locked with heparinized saline. The catheter was then attached to the skin with an adhesive dressing. The patient tolerated the procedure well. Successful placement a midline into the right brachial vein. The catheter is ready to use. XR CHEST PORTABLE    Result Date: 10/3/2022  EXAMINATION: ONE XRAY VIEW OF THE CHEST 10/3/2022 5:58 pm COMPARISON: Chest radiograph 05/15/2022. CT PE study 05/15/2022. HISTORY: ORDERING SYSTEM PROVIDED HISTORY: dyspnea TECHNOLOGIST PROVIDED HISTORY: dyspnea Reason for Exam: sob, extremity weakness FINDINGS: Single view provided. Stable normal mediastinal and cardiac silhouettes. Stable pulmonary hyperinflation consistent with COPD. There is acute bilateral perihilar and lung base patchy consolidation. No lobar consolidation. No effusion or pneumothorax. Decreased bone mineral density. Redemonstration of chronic displaced left proximal humerus surgical neck fracture. COPD. Acute bilateral multifocal pulmonary consolidation consistent with pneumonia. Consultations:    Consults:     Final Specialist Recommendations/Findings:   IP CONSULT TO INTERNAL MEDICINE  IP CONSULT TO INFECTIOUS DISEASES      The patient was seen and examined on day of discharge and this discharge summary is in conjunction with any daily progress note from day of discharge.     Discharge plan:     Disposition: ecf      Instructions to Patient: Keep follow-up appointments      Requiring Further Evaluation/Follow Up POST HOSPITALIZATION/Incidental Findings:     Physician Follow Up:     Lc Bey MD  [address] [place] [address]  [phone]    Schedule an appointment as soon as possible for a visit      [facility] [address] [address]  [phone]           Activity: Resume as directed    Discharge Medications:      Medication List        START taking these medications      ertapenem  infusion  Commonly known as: INVanz  Infuse 1,000 mg intravenously every 24 hours for 5 days Compound per protocol. predniSONE 20 MG tablet  Commonly known as: DELTASONE  Take 2 tablets by mouth daily for 4 days            CHANGE how you take these medications      carBAMazepine 200 MG tablet  Commonly known as: TEGretol  1.5 tablets po q am and 2 po q pm  What changed:   how much to take  additional instructions  Another medication with the same name was removed. Continue taking this medication, and follow the directions you see here. CONTINUE taking these medications      aspirin 81 MG EC tablet     FLUoxetine 20 MG capsule  Commonly known as: PROZAC  2 po qd     melatonin 5 MG Tabs tablet  Take 1 tablet by mouth nightly as needed (sleep)     mirabegron 50 MG Tb24  Commonly known as: Myrbetriq  Take 50 mg by mouth daily     tamsulosin 0.4 MG capsule  Commonly known as: FLOMAX  Take 1 capsule by mouth daily            STOP taking these medications      desmopressin 0.2 MG tablet  Commonly known as: DDAVP               Where to Get Your Medications        These medications were sent to [address] [place] [address] [phone] - [phone]  [address] [place] [address]      Phone: [phone]   predniSONE 20 MG tablet       You can get these medications from any pharmacy    Bring a paper prescription for each of these medications  ertapenem  infusion         Time Spent on discharge is  35 mins in patient examination, evaluation, counseling as well as medication reconciliation, prescriptions for required medications, discharge plan and follow up.     Electronically signed by   Yassine Grace MD  10/9/2022  7:18 PM      Thank you Dr. Alberto Ross DO for the opportunity to be involved in this patient's care.

## 2022-10-24 ENCOUNTER — APPOINTMENT (OUTPATIENT)
Dept: GENERAL RADIOLOGY | Age: 77
DRG: 689 | End: 2022-10-24
Payer: COMMERCIAL

## 2022-10-24 ENCOUNTER — HOSPITAL ENCOUNTER (INPATIENT)
Age: 77
LOS: 3 days | Discharge: NURSING, REHAB OR LONG-TERM CARE | DRG: 689 | End: 2022-10-28
Attending: STUDENT IN AN ORGANIZED HEALTH CARE EDUCATION/TRAINING PROGRAM | Admitting: INTERNAL MEDICINE
Payer: COMMERCIAL

## 2022-10-24 DIAGNOSIS — N30.01 ACUTE CYSTITIS WITH HEMATURIA: Primary | ICD-10-CM

## 2022-10-24 LAB
ABSOLUTE BANDS #: 0.09 K/UL (ref 0–1)
ABSOLUTE EOS #: 0.45 K/UL (ref 0–0.4)
ABSOLUTE LYMPH #: 2.7 K/UL (ref 1–4.8)
ABSOLUTE MONO #: 0.63 K/UL (ref 0.1–1.3)
AMMONIA: 11 UMOL/L (ref 11–51)
ANION GAP SERPL CALCULATED.3IONS-SCNC: 11 MMOL/L (ref 9–17)
BACTERIA: ABNORMAL
BANDS: 1 % (ref 0–10)
BASOPHILS # BLD: 0 % (ref 0–2)
BASOPHILS ABSOLUTE: 0 K/UL (ref 0–0.2)
BILIRUBIN URINE: NEGATIVE
BUN BLDV-MCNC: 21 MG/DL (ref 8–23)
CALCIUM SERPL-MCNC: 8.7 MG/DL (ref 8.6–10.4)
CASTS UA: ABNORMAL /LPF
CHLORIDE BLD-SCNC: 101 MMOL/L (ref 98–107)
CO2: 27 MMOL/L (ref 20–31)
COLOR: YELLOW
CREAT SERPL-MCNC: 1.05 MG/DL (ref 0.5–0.9)
EOSINOPHILS RELATIVE PERCENT: 5 % (ref 0–4)
EPITHELIAL CELLS UA: ABNORMAL /HPF
GFR SERPL CREATININE-BSD FRML MDRD: 54 ML/MIN/1.73M2
GLUCOSE BLD-MCNC: 132 MG/DL (ref 70–99)
GLUCOSE URINE: NEGATIVE
HCT VFR BLD CALC: 39.3 % (ref 36–46)
HEMOGLOBIN: 13.1 G/DL (ref 12–16)
KETONES, URINE: ABNORMAL
LEUKOCYTE ESTERASE, URINE: ABNORMAL
LYMPHOCYTES # BLD: 30 % (ref 24–44)
MCH RBC QN AUTO: 31.6 PG (ref 26–34)
MCHC RBC AUTO-ENTMCNC: 33.2 G/DL (ref 31–37)
MCV RBC AUTO: 95.1 FL (ref 80–100)
MONOCYTES # BLD: 7 % (ref 1–7)
MORPHOLOGY: ABNORMAL
NITRITE, URINE: NEGATIVE
PDW BLD-RTO: 15.1 % (ref 11.5–14.9)
PH UA: 6 (ref 5–8)
PLATELET # BLD: 407 K/UL (ref 150–450)
PMV BLD AUTO: 6.9 FL (ref 6–12)
POTASSIUM SERPL-SCNC: 3.4 MMOL/L (ref 3.7–5.3)
PROTEIN UA: ABNORMAL
RBC # BLD: 4.14 M/UL (ref 4–5.2)
RBC UA: ABNORMAL /HPF
REASON FOR REJECTION: NORMAL
SEG NEUTROPHILS: 57 % (ref 36–66)
SEGMENTED NEUTROPHILS ABSOLUTE COUNT: 5.13 K/UL (ref 1.3–9.1)
SODIUM BLD-SCNC: 139 MMOL/L (ref 135–144)
SPECIFIC GRAVITY UA: 1.02 (ref 1–1.03)
TROPONIN, HIGH SENSITIVITY: 37 NG/L (ref 0–14)
TROPONIN, HIGH SENSITIVITY: 40 NG/L (ref 0–14)
TURBIDITY: ABNORMAL
URINE HGB: ABNORMAL
UROBILINOGEN, URINE: NORMAL
WBC # BLD: 9 K/UL (ref 3.5–11)
WBC UA: ABNORMAL /HPF
ZZ NTE CLEAN UP: ORDERED TEST: NORMAL
ZZ NTE WITH NAME CLEAN UP: SPECIMEN SOURCE: NORMAL

## 2022-10-24 PROCEDURE — 71045 X-RAY EXAM CHEST 1 VIEW: CPT

## 2022-10-24 PROCEDURE — 82140 ASSAY OF AMMONIA: CPT

## 2022-10-24 PROCEDURE — 84484 ASSAY OF TROPONIN QUANT: CPT

## 2022-10-24 PROCEDURE — 93005 ELECTROCARDIOGRAM TRACING: CPT | Performed by: STUDENT IN AN ORGANIZED HEALTH CARE EDUCATION/TRAINING PROGRAM

## 2022-10-24 PROCEDURE — 87077 CULTURE AEROBIC IDENTIFY: CPT

## 2022-10-24 PROCEDURE — 87086 URINE CULTURE/COLONY COUNT: CPT

## 2022-10-24 PROCEDURE — 99285 EMERGENCY DEPT VISIT HI MDM: CPT

## 2022-10-24 PROCEDURE — 80048 BASIC METABOLIC PNL TOTAL CA: CPT

## 2022-10-24 PROCEDURE — 36415 COLL VENOUS BLD VENIPUNCTURE: CPT

## 2022-10-24 PROCEDURE — 87186 SC STD MICRODIL/AGAR DIL: CPT

## 2022-10-24 PROCEDURE — 85025 COMPLETE CBC W/AUTO DIFF WBC: CPT

## 2022-10-24 PROCEDURE — 81001 URINALYSIS AUTO W/SCOPE: CPT

## 2022-10-24 NOTE — ED TRIAGE NOTES
Mode of arrival (squad #, walk in, police, etc) : walk in         Chief complaint(s): altered mental status         Arrival Note (brief scenario, treatment PTA, etc). : daughter states she thinks she has a UTI with some weakness        C= \"Have you ever felt that you should Cut down on your drinking? \"  No  A= \"Have people Annoyed you by criticizing your drinking? \"  No  G= \"Have you ever felt bad or Guilty about your drinking? \"  No  E= \"Have you ever had a drink as an Eye-opener first thing in the morning to steady your nerves or to help a hangover? \"  No      Deferred []      Reason for deferring: N/A    *If yes to two or more: probable alcohol abuse. *

## 2022-10-25 LAB
EKG ATRIAL RATE: 74 BPM
EKG P AXIS: 83 DEGREES
EKG P-R INTERVAL: 248 MS
EKG Q-T INTERVAL: 446 MS
EKG QRS DURATION: 142 MS
EKG QTC CALCULATION (BAZETT): 495 MS
EKG R AXIS: 77 DEGREES
EKG T AXIS: -14 DEGREES
EKG VENTRICULAR RATE: 74 BPM

## 2022-10-25 PROCEDURE — 6360000002 HC RX W HCPCS: Performed by: STUDENT IN AN ORGANIZED HEALTH CARE EDUCATION/TRAINING PROGRAM

## 2022-10-25 PROCEDURE — 6370000000 HC RX 637 (ALT 250 FOR IP): Performed by: STUDENT IN AN ORGANIZED HEALTH CARE EDUCATION/TRAINING PROGRAM

## 2022-10-25 PROCEDURE — 2580000003 HC RX 258: Performed by: STUDENT IN AN ORGANIZED HEALTH CARE EDUCATION/TRAINING PROGRAM

## 2022-10-25 PROCEDURE — 6370000000 HC RX 637 (ALT 250 FOR IP): Performed by: INTERNAL MEDICINE

## 2022-10-25 PROCEDURE — 99223 1ST HOSP IP/OBS HIGH 75: CPT | Performed by: INTERNAL MEDICINE

## 2022-10-25 PROCEDURE — 2580000003 HC RX 258: Performed by: INTERNAL MEDICINE

## 2022-10-25 PROCEDURE — 6360000002 HC RX W HCPCS: Performed by: INTERNAL MEDICINE

## 2022-10-25 PROCEDURE — 93010 ELECTROCARDIOGRAM REPORT: CPT | Performed by: INTERNAL MEDICINE

## 2022-10-25 PROCEDURE — 1200000000 HC SEMI PRIVATE

## 2022-10-25 RX ORDER — LANOLIN ALCOHOL/MO/W.PET/CERES
3 CREAM (GRAM) TOPICAL NIGHTLY PRN
Status: DISCONTINUED | OUTPATIENT
Start: 2022-10-25 | End: 2022-10-28 | Stop reason: HOSPADM

## 2022-10-25 RX ORDER — TAMSULOSIN HYDROCHLORIDE 0.4 MG/1
0.4 CAPSULE ORAL DAILY
Status: DISCONTINUED | OUTPATIENT
Start: 2022-10-25 | End: 2022-10-28 | Stop reason: HOSPADM

## 2022-10-25 RX ORDER — ENOXAPARIN SODIUM 100 MG/ML
40 INJECTION SUBCUTANEOUS DAILY
Status: DISCONTINUED | OUTPATIENT
Start: 2022-10-25 | End: 2022-10-28 | Stop reason: HOSPADM

## 2022-10-25 RX ORDER — SODIUM CHLORIDE 0.9 % (FLUSH) 0.9 %
5-40 SYRINGE (ML) INJECTION PRN
Status: DISCONTINUED | OUTPATIENT
Start: 2022-10-25 | End: 2022-10-28 | Stop reason: HOSPADM

## 2022-10-25 RX ORDER — ONDANSETRON 2 MG/ML
4 INJECTION INTRAMUSCULAR; INTRAVENOUS EVERY 6 HOURS PRN
Status: DISCONTINUED | OUTPATIENT
Start: 2022-10-25 | End: 2022-10-28 | Stop reason: HOSPADM

## 2022-10-25 RX ORDER — CARBAMAZEPINE 200 MG/1
400 TABLET ORAL NIGHTLY
Status: DISCONTINUED | OUTPATIENT
Start: 2022-10-26 | End: 2022-10-28 | Stop reason: HOSPADM

## 2022-10-25 RX ORDER — CHOLECALCIFEROL (VITAMIN D3) 125 MCG
5 CAPSULE ORAL NIGHTLY PRN
Status: DISCONTINUED | OUTPATIENT
Start: 2022-10-25 | End: 2022-10-25 | Stop reason: RX

## 2022-10-25 RX ORDER — POTASSIUM CHLORIDE 7.45 MG/ML
10 INJECTION INTRAVENOUS PRN
Status: DISCONTINUED | OUTPATIENT
Start: 2022-10-25 | End: 2022-10-28 | Stop reason: HOSPADM

## 2022-10-25 RX ORDER — ONDANSETRON 4 MG/1
4 TABLET, ORALLY DISINTEGRATING ORAL EVERY 8 HOURS PRN
Status: DISCONTINUED | OUTPATIENT
Start: 2022-10-25 | End: 2022-10-28 | Stop reason: HOSPADM

## 2022-10-25 RX ORDER — SODIUM CHLORIDE 9 MG/ML
INJECTION, SOLUTION INTRAVENOUS PRN
Status: DISCONTINUED | OUTPATIENT
Start: 2022-10-25 | End: 2022-10-28 | Stop reason: HOSPADM

## 2022-10-25 RX ORDER — SODIUM CHLORIDE 0.9 % (FLUSH) 0.9 %
5-40 SYRINGE (ML) INJECTION EVERY 12 HOURS SCHEDULED
Status: DISCONTINUED | OUTPATIENT
Start: 2022-10-25 | End: 2022-10-28 | Stop reason: HOSPADM

## 2022-10-25 RX ORDER — CARBAMAZEPINE 200 MG/1
400 TABLET ORAL ONCE
Status: COMPLETED | OUTPATIENT
Start: 2022-10-25 | End: 2022-10-25

## 2022-10-25 RX ORDER — ASPIRIN 81 MG/1
81 TABLET ORAL DAILY
Status: DISCONTINUED | OUTPATIENT
Start: 2022-10-25 | End: 2022-10-28 | Stop reason: HOSPADM

## 2022-10-25 RX ORDER — ACETAMINOPHEN 325 MG/1
650 TABLET ORAL EVERY 4 HOURS PRN
Status: DISCONTINUED | OUTPATIENT
Start: 2022-10-25 | End: 2022-10-28 | Stop reason: HOSPADM

## 2022-10-25 RX ORDER — FLUOXETINE HYDROCHLORIDE 20 MG/1
20 CAPSULE ORAL DAILY
Status: DISCONTINUED | OUTPATIENT
Start: 2022-10-25 | End: 2022-10-28 | Stop reason: HOSPADM

## 2022-10-25 RX ORDER — POTASSIUM CHLORIDE 20 MEQ/1
40 TABLET, EXTENDED RELEASE ORAL PRN
Status: DISCONTINUED | OUTPATIENT
Start: 2022-10-25 | End: 2022-10-28 | Stop reason: HOSPADM

## 2022-10-25 RX ADMIN — SODIUM CHLORIDE, PRESERVATIVE FREE 10 ML: 5 INJECTION INTRAVENOUS at 07:36

## 2022-10-25 RX ADMIN — FLUOXETINE HYDROCHLORIDE 20 MG: 20 CAPSULE ORAL at 07:36

## 2022-10-25 RX ADMIN — CARBAMAZEPINE 400 MG: 200 TABLET ORAL at 01:43

## 2022-10-25 RX ADMIN — POTASSIUM CHLORIDE 40 MEQ: 1500 TABLET, EXTENDED RELEASE ORAL at 11:10

## 2022-10-25 RX ADMIN — MEROPENEM 1000 MG: 1 INJECTION, POWDER, FOR SOLUTION INTRAVENOUS at 15:46

## 2022-10-25 RX ADMIN — MEROPENEM 1000 MG: 1 INJECTION, POWDER, FOR SOLUTION INTRAVENOUS at 22:12

## 2022-10-25 RX ADMIN — TAMSULOSIN HYDROCHLORIDE 0.4 MG: 0.4 CAPSULE ORAL at 07:36

## 2022-10-25 RX ADMIN — ASPIRIN 81 MG: 81 TABLET, COATED ORAL at 07:36

## 2022-10-25 RX ADMIN — CEFTRIAXONE SODIUM 1000 MG: 1 INJECTION, POWDER, FOR SOLUTION INTRAMUSCULAR; INTRAVENOUS at 01:38

## 2022-10-25 ASSESSMENT — PAIN - FUNCTIONAL ASSESSMENT: PAIN_FUNCTIONAL_ASSESSMENT: NONE - DENIES PAIN

## 2022-10-25 ASSESSMENT — PAIN SCALES - GENERAL: PAINLEVEL_OUTOF10: 0

## 2022-10-25 NOTE — PROGRESS NOTES
Patient found to be incontinent of very large amount of urine.  Yuli care given and new brief applied

## 2022-10-25 NOTE — ED PROVIDER NOTES
EMERGENCY DEPARTMENT ENCOUNTER   ATTENDING ATTESTATION     Pt Name: Mack Moore  MRN: [id number]  [place] 1945  Date of evaluation: 10/24/22       Mack Moore is a 76 y.o. female who presents with Altered Mental Status    Increasing confusion. Urinary incontinence with some hematuria. Obtain broad workup and likely admit    MDM:     Patient found to have complicated UTI not tolerating p.o. we will admit on IV antibiotics    Vitals:   Vitals:    10/24/22 1713   BP: (!) 117/97   Pulse: 99   Resp: 16   Temp: 97.5 °F (36.4 °C)   SpO2: 95%   Weight: 130 lb (59 kg)   Height: 5' 7\" (1.702 m)         I personally saw and examined the patient. I have reviewed and agree with the resident's findings, including all diagnostic interpretations and treatment plan as written. I was present for the key portions of any procedures performed and the inclusive time noted for any critical care statement. The care is provided during an unprecedented national emergency due to the novel coronavirus, COVID 19.   Maryam Braun MD  Attending Emergency Physician           Maryam Braun MD  10/25/22 0052

## 2022-10-25 NOTE — PLAN OF CARE
Problem: Discharge Planning  Goal: Discharge to home or other facility with appropriate resources  10/25/2022 1808 by Stuart Bar RN  Outcome: Progressing

## 2022-10-25 NOTE — PROGRESS NOTES
10/25/22 1130   Encounter Summary   Encounter Overview/Reason   Encounter   Service Provided For: Patient   Referral/Consult From: Rounding   Last Encounter  10/25/22   Complexity of Encounter Low   Spiritual/Emotional needs   Type Spiritual Support   Rituals, Rites and Sacraments   Type Anointing  (Fr May 10-25-22)

## 2022-10-25 NOTE — PROGRESS NOTES
Infectious disease consult placed.      Electronically signed by Jerod Zelaya RN on 10/25/22 at 9:49 AM EDT

## 2022-10-25 NOTE — CARE COORDINATION
CASE MANAGEMENT NOTE:    Admission Date:  10/24/2022 Joel Martel is a 76 y.o.  female    Admitted for : Acute cystitis with hematuria [N30.01]    Met with:  Patient's daughter, Julio Vazquez    PCP:  [facility]:  Madhuri Jojo      Is patient alert and oriented at time of discussion:  No, hx dementia    Current Residence/ Living Arrangements:  at home dependent on family care from daughterJulio. Current Services PTA:  No    Does patient go to outpatient dialysis: No  If yes, location and chair time: n/a  Who is their nephrologist? N/a    Is patient agreeable to VNS: No, Julio Vazquez would like patient to return to [facility]. She was just discharged from there on [place] 10/23. [place] of choice provided:  NA    List of [address] provided: NA    VNS chosen:  NA    DME:  walker and wheelchair (does not use either)    Home Oxygen: No    Nebulizer: No    CPAP/BIPAP: No    Supplier: N/A    Potential Assistance Needed: Will likely need IV atb's on discharge. SNF needed: Yes    Freedom of choice and list provided: Yes, daughter would like [facility]. Referral faxed and left message for Teo at [facility]. Pharmacy:  [facility]       Is patient currently receiving oral anticoagulation therapy? No    Is the Patient an ELAINE NEGR[facility] with Readmission Risk Score greater than 14%? Yes  If yes, pt needs a follow up appointment made within 7 days. Family Members/Caregivers that pt would like involved in their care:    Yes    If yes, list name here:  Julio Vazquez    Transportation Provider:  Family             Discharge Plan:  SNF - [facility]. Electronically signed by: Michela Butler RN on 10/25/2022 at 2:25 PM     Teo from [facility] returned call stating pt can be accepted back and she will start pre-cert.  If pre-cert is denied, pt can come under Medicaid per Teo.    Electronically signed by Clint Ward RN on 10/25/2022 at 3:06 PM

## 2022-10-25 NOTE — ED NOTES
Admission Dx: cystitis    Pts Chief Complaints on Arrival: altered mental status    ADL's - Total assistance    Pending Diagnostics:  n/a    Residence PTA: single story home    Special Considerations/Circumstances:  n/a  Vitals: Current vital signs:  BP (!) 104/48   Pulse 74   Temp 97.8 °F (36.6 °C) (Oral)   Resp 16   Ht 5' 7\" (1.702 m)   Wt 130 lb (59 kg)   SpO2 96%   BMI 20.36 kg/m²                MEWS Score: [id number] Dafne Ochoa RN  10/25/22 0157

## 2022-10-25 NOTE — PROGRESS NOTES
Palliative care consult placed.      Electronically signed by Mickey Menchaca RN on 10/25/22 at 9:52 AM EDT

## 2022-10-25 NOTE — ED PROVIDER NOTES
[facility]  Emergency Department Encounter  EmergencyMedicine Resident     Pt Kinga Evans  MRN: [id number]  [place] 1945  Date of evaluation: 10/24/22  PCP:  Jeancarlos Mayer DO      CHIEF COMPLAINT       Chief Complaint   Patient presents with    Altered Mental Status       HISTORY OF PRESENT ILLNESS  (Location/Symptom, Timing/Onset, Context/Setting, Quality, Duration, Modifying Factors, Severity.)      Shu Geller is a 76 y.o. female who presents with increasing confusion over the past few days with urinary incontinence and hematuria. Daughter at bedside states patient has history of Parkinson's and dementia but has been acting more confused than baseline over the past few days. She has also had several episodes of urinary incontinence, the most recent of which the daughter states was significantly blood-tinged. Patient states she feels \"all mixed up\". Patient has had nausea and significantly decreased PO intake per daughter. She has a history of recurrent UTIs, the most recent of which grew Proteus mirabilis and was sensitive only to meropenem. She denies headache, changes in vision, chest pain, shortness of breath, abdominal pain, vomiting, diarrhea, falls or trauma, numbness, tingling, weakness. PAST MEDICAL / SURGICAL / SOCIAL / FAMILY HISTORY      has a past medical history of Anxiety, Convulsion ([address] Encephalopathy, Herpes, Hyperlipidemia, Left bundle branch block, MRSA (methicillin resistant Staphylococcus aureus), Parkinson's disease ([facility] [address] Seizures ([facility] [address] and Vitamin D deficiency. has a past surgical history that includes Abscess Drainage (Right, 12/14/2013); Ureter stent placement (Left, 05/16/2022); and Cystoscopy (Left, 5/16/2022).       Social History     Socioeconomic History    Marital status: Single     Spouse name: Not on file    Number of children: Not on file    Years of education: Not on file    Highest education level: Not on file   Occupational History Not on file   Tobacco Use    Smoking status: Former     Packs/day: 1.00     Years: 30.00     Pack years: 30.00     Types: Cigarettes     Start date: 54     Quit date:      Years since quittin.8    Smokeless tobacco: Never    Tobacco comments:     unknown how many packs a day, or how many years   Vaping Use    Vaping Use: Never used   Substance and Sexual Activity    Alcohol use: No    Drug use: No    Sexual activity: Never   Other Topics Concern    Not on file   Social History Narrative    Not on file     Social Determinants of Health     Financial Resource Strain: Not on file   Food Insecurity: Not on file   Transportation Needs: Not on file   Physical Activity: Not on file   Stress: Not on file   Social Connections: Not on file   Intimate Partner Violence: Not on file   Housing Stability: Not on file       History reviewed. No pertinent family history. Allergies:  Haldol [haloperidol lactate]    Home Medications:  Prior to Admission medications    Medication Sig Start Date End Date Taking?  Authorizing Provider   carBAMazepine (TEGRETOL) 200 MG tablet 1.5 tablets po q am and 2 po q pm  Patient taking differently: No sig reported 22   Sravani Wu DO   FLUoxetine (PROZAC) 20 MG capsule 2 po qd 22   Sravani Wu DO   mirabegron (MYRBETRIQ) 50 MG TB24 Take 50 mg by mouth daily 22   Sravani Wu DO   melatonin 5 MG TABS tablet Take 1 tablet by mouth nightly as needed (sleep) 22  Gwen Covington MD   tamsulosin (FLOMAX) 0.4 MG capsule Take 1 capsule by mouth daily 22   Gwen Covington MD   aspirin 81 MG EC tablet Take 81 mg by mouth daily    Historical Provider, MD       REVIEW OF SYSTEMS    (2-9 systems for level 4, 10 or more for level 5)      Review of Systems   Unable to perform ROS: Dementia     PHYSICAL EXAM   (up to 7 for level 4, 8 or more for level 5)      INITIAL VITALS:   BP (!) 111/54   Pulse 71   Temp 97.7 °F (36.5 °C)   Resp 18   Ht 5' 7\" (1.702 m)   Wt 125 lb (56.7 kg)   SpO2 96%   BMI 19.58 kg/m²     Physical Exam  Vitals reviewed. HENT:      Head: Normocephalic and atraumatic. Mouth/Throat:      Pharynx: Oropharynx is clear. Eyes:      Extraocular Movements: Extraocular movements intact. Pupils: Pupils are equal, round, and reactive to light. Cardiovascular:      Rate and Rhythm: Normal rate and regular rhythm. Pulses: Normal pulses. Heart sounds: Normal heart sounds. Pulmonary:      Effort: Pulmonary effort is normal.      Breath sounds: Normal breath sounds. No decreased breath sounds, wheezing, rhonchi or rales. Abdominal:      Palpations: Abdomen is soft. Tenderness: There is no abdominal tenderness. There is no guarding or rebound. Musculoskeletal:         General: Normal range of motion. Cervical back: Normal range of motion and neck supple. Right lower leg: No edema. Left lower leg: No edema. Skin:     Capillary Refill: Capillary refill takes less than 2 seconds. Coloration: Skin is not pale. Findings: No rash. Neurological:      General: No focal deficit present. Mental Status: She is alert. She is confused. GCS: GCS eye subscore is 4. GCS verbal subscore is 4. GCS motor subscore is 6. Motor: No weakness.        DIFFERENTIAL  DIAGNOSIS     PLAN (LABS / IMAGING / EKG):  Orders Placed This Encounter   Procedures    Culture, Urine    XR CHEST PORTABLE    CBC with Auto Differential    Ammonia    Urinalysis with Microscopic    SPECIMEN REJECTION    Basic Metabolic Panel    Troponin    Troponin    ADULT DIET; Easy to Chew    Straight cath    Vital signs per unit routine    Notify physician    Notify physician    Admission/Observation order previously placed    Up with assistance    Place intermittent pneumatic compression device    Full Code    Inpatient consult to Internal Medicine    Inpatient consult to Infectious Diseases    EKG 12 Lead    ADMIT TO INPATIENT MEDICATIONS ORDERED:  Orders Placed This Encounter   Medications    cefTRIAXone (ROCEPHIN) 1,000 mg in sodium chloride 0.9 % 50 mL IVPB mini-bag     Order Specific Question:   Antimicrobial Indications     Answer:   Urinary Tract Infection    carBAMazepine (TEGRETOL) tablet 400 mg    aspirin EC tablet 81 mg    carBAMazepine (TEGRETOL) tablet 400 mg    FLUoxetine (PROZAC) capsule 20 mg    melatonin tablet 5 mg    tamsulosin (FLOMAX) capsule 0.4 mg    sodium chloride flush 0.9 % injection 5-40 mL    sodium chloride flush 0.9 % injection 5-40 mL    0.9 % sodium chloride infusion    acetaminophen (TYLENOL) tablet 650 mg    OR Linked Order Group     ondansetron (ZOFRAN-ODT) disintegrating tablet 4 mg     ondansetron (ZOFRAN) injection 4 mg    meropenem (MERREM) 1,000 mg in sodium chloride 0.9 % 100 mL IVPB (mini-bag)     Order Specific Question:   Antimicrobial Indications     Answer:   Urinary Tract Infection       DDX: Cystitis v pyelonephritis v nephrolithiasis v infection v polypharmacy    DIAGNOSTIC RESULTS / EMERGENCY DEPARTMENT COURSE / MDM   LAB RESULTS:  Results for orders placed or performed during the hospital encounter of 10/24/22   CBC with Auto Differential   Result Value Ref Range    WBC 9.0 3.5 - 11.0 k/uL    RBC 4.14 4.0 - 5.2 m/uL    Hemoglobin 13.1 12.0 - 16.0 g/dL    Hematocrit 39.3 36 - 46 %    MCV 95.1 80 - 100 fL    MCH 31.6 26 - 34 pg    MCHC 33.2 31 - 37 g/dL    RDW 15.1 (H) 11.5 - 14.9 %    Platelets 840 478 - 770 k/uL    MPV 6.9 6.0 - 12.0 fL    Seg Neutrophils 57 36 - 66 %    Lymphocytes 30 24 - 44 %    Monocytes 7 1 - 7 %    Eosinophils % 5 (H) 0 - 4 %    Basophils 0 0 - 2 %    Bands 1 0 - 10 %    Segs Absolute 5.13 1.3 - 9.1 k/uL    Absolute Lymph # 2.70 1.0 - 4.8 k/uL    Absolute Mono # 0.63 0.1 - 1.3 k/uL    Absolute Eos # 0.45 (H) 0.0 - 0.4 k/uL    Basophils Absolute 0.00 0.0 - 0.2 k/uL    Absolute Bands # 0.09 0.0 - 1.0 k/uL    Morphology ANISOCYTOSIS PRESENT    Ammonia   Result Value Ref Range    Ammonia 11 11 - 51 umol/L   Urinalysis with Microscopic   Result Value Ref Range    Color, UA Yellow Yellow    Turbidity UA Cloudy (A) Clear    Glucose, Ur NEGATIVE NEGATIVE    Bilirubin Urine NEGATIVE NEGATIVE    Ketones, Urine TRACE (A) NEGATIVE    Specific Gravity, UA 1.018 1.000 - 1.030    Urine Hgb LARGE (A) NEGATIVE    pH, UA 6.0 5.0 - 8.0    Protein, UA 2+ (A) NEGATIVE    Urobilinogen, Urine Normal Normal    Nitrite, Urine NEGATIVE NEGATIVE    Leukocyte Esterase, Urine LARGE (A) NEGATIVE    WBC, UA TOO NUMEROUS TO COUNT /HPF    RBC, UA TOO NUMEROUS TO COUNT /HPF    Casts UA 0 TO 2 /LPF    Epithelial Cells UA 3 to 5 /HPF    Bacteria, UA MODERATE (A) None   SPECIMEN REJECTION   Result Value Ref Range    Specimen Source . BLOOD     Ordered Test DANGELO CARRILLO     Reason for Rejection Unable to perform testing: Specimen hemolyzed. Basic Metabolic Panel   Result Value Ref Range    Glucose 132 (H) 70 - 99 mg/dL    BUN 21 8 - 23 mg/dL    Creatinine 1.05 (H) 0.50 - 0.90 mg/dL    Est, Glom Filt Rate 54 (L) >60 mL/min/1.73m2    Calcium 8.7 8.6 - 10.4 mg/dL    Sodium 139 135 - 144 mmol/L    Potassium 3.4 (L) 3.7 - 5.3 mmol/L    Chloride 101 98 - 107 mmol/L    CO2 27 20 - 31 mmol/L    Anion Gap 11 9 - 17 mmol/L   Troponin   Result Value Ref Range    Troponin, High Sensitivity 40 (H) 0 - 14 ng/L   Troponin   Result Value Ref Range    Troponin, High Sensitivity 37 (H) 0 - 14 ng/L   EKG 12 Lead   Result Value Ref Range    Ventricular Rate 74 BPM    Atrial Rate 74 BPM    P-R Interval 248 ms    QRS Duration 142 ms    Q-T Interval 446 ms    QTc Calculation (Bazett) 495 ms    P Axis 83 degrees    R Axis 77 degrees    T Axis -14 degrees       IMPRESSION: 75 yo F with increasing confusion and urinary incontinence with hematuria. Hx of recurrent UTIs. Baseline confusion, oriented to self but not situation and states she is \"all mixed up\". Afebrile. Pupils equal and reactive. GCS 14. Lungs CTAB.  Abdomen soft and nontender in all quadrants. Patient endorsing nausea and states she is not tolerating PO intake due to significantly decreased appetite. Impression is likely cystitis given patient history. UA showing large Hgb, large leukocyte esterase with WBC and RBC too numerous to count. Most recent urine culture showing Proteus, only sensitive to meropenem. As patient has complicated UTI requiring an IV antibiotic and not tolerating PO, will admit. EMERGENCY DEPARTMENT COURSE:  ED Course as of 10/25/22 0412   Mon Oct 24, 2022   2238 Leukocyte Esterase, Urine(!): LARGE [JG]   2238 Bacteria, UA(!): MODERATE [JG]   2238 WBC, UA: TOO NUMEROUS TO COUNT [JG]   2358 Troponin 40 - 37 [JG]      ED Course User Index  [JG] Di Smith MD     CONSULTS:  IP CONSULT TO INTERNAL MEDICINE  IP CONSULT TO INFECTIOUS DISEASES    FINAL IMPRESSION      1. Acute cystitis with hematuria          DISPOSITION / PLAN     DISPOSITION Admitted 10/25/2022 01:05:38 AM      PATIENT REFERRED TO:  No follow-up provider specified.     DISCHARGE MEDICATIONS:  Current Discharge Medication List          Di Smith MD  Emergency Medicine Resident    (Please note that portions of thisnote were completed with a voice recognition program.  Efforts were made to edit the dictations but occasionally words are mis-transcribed.)       Di Smith MD  Resident  10/25/22 [id number]

## 2022-10-25 NOTE — PROGRESS NOTES
Writer received report from night shift RN, night shift RN stated patients potassium was low 3.4 and did not reach out to MD on call for an order for replacement. Writer sent MD perfect serve this am for orders. Writer assessed patient and patient is only alert to self. Patient currently eating breakfast. Meds given in applesauce.      Electronically signed by Alexei De RN on 10/25/22 at 7:41 AM EDT

## 2022-10-25 NOTE — H&P
[facility]    HISTORY AND PHYSICAL EXAMINATION            Date:   10/25/2022  Patient name:  Mack Moore  Date of admission:  10/24/2022  8:54 PM  MRN:   [id number]  Account:  [de-identified]  YOB: 1945  PCP:    Silvano Rg DO  Room:   2045/2045-01  Code Status:    Full Code    Chief Complaint:     Chief Complaint   Patient presents with    Altered Mental Status       History Obtained From:     patient, electronic medical record    History of Present Illness: The patient is a 76 y.o. Non- / non  female who presents with Altered Mental Status   and she is admitted to the hospital for the management of change in mentation  History is extremely limited, patient is advanced dementia, Parkinson disease, COPD, history of recurrent UTIs  Most of history history is retrieved from E HR  Patient lives at home, as per notes, patient daughter noticed change in mentation and blood in the urine  Patient was recently admitted in hospital and got discharged on 10/8, with pneumonia and UTI  At that time her urine was positive for Proteus which was MDR and ESBL          Past Medical History:     Past Medical History:   Diagnosis Date    Anxiety     Convulsion ([address]     Encephalopathy     Herpes     Hyperlipidemia     Left bundle branch block     MRSA (methicillin resistant Staphylococcus aureus)     Parkinson's disease ([facility] [address]     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 Authorizing Provider   carBAMazepine (TEGRETOL) 200 MG tablet 1.5 tablets po q am and 2 po q pm  Patient taking differently: No sig reported 22   Sravani Wu DO   FLUoxetine (PROZAC) 20 MG capsule 2 po qd 22   Sravani Wu DO   mirabegron (MYRBETRIQ) 50 MG TB24 Take 50 mg by mouth daily 22   Sravani Wu DO   melatonin 5 MG TABS tablet Take 1 tablet by mouth nightly as needed (sleep) 22  Shasta Thomas MD   tamsulosin (FLOMAX) 0.4 MG capsule Take 1 capsule by mouth daily 22   Shasta Thomas MD   aspirin 81 MG EC tablet Take 81 mg by mouth daily    Historical Provider, MD        Allergies:     Haldol [haloperidol lactate]    Social History:     Tobacco:    reports that she quit smoking about 23 years ago. Her smoking use included cigarettes. She started smoking about 62 years ago. She has a 30.00 pack-year smoking history. She has never used smokeless tobacco.  Alcohol:      reports no history of alcohol use. Drug Use:  reports no history of drug use. Family History:     History reviewed. No pertinent family history. Review of Systems:   Cannot be done because of patient condition  Physical Exam:   /63   Pulse 75   Temp 98.2 °F (36.8 °C)   Resp 20   Ht 5' 7\" (1.702 m)   Wt 125 lb (56.7 kg)   SpO2 95%   BMI 19.58 kg/m²   Temp (24hrs), Av.8 °F (36.6 °C), Min:97.5 °F (36.4 °C), Max:98.2 °F (36.8 °C)    No results for input(s): POCGLU in the last 72 hours. Intake/Output Summary (Last 24 hours) at 10/25/2022 0834  Last data filed at 10/25/2022 0827  Gross per 24 hour   Intake 360 ml   Output 75 ml   Net 285 ml       General Appearance: Oriented to self, has dementia   mental status: Not oriented to person, place, and time with normal affect  Head:  normocephalic, atraumatic.   Eye: no icterus, redness, pupils equal and reactive, extraocular eye movements intact, conjunctiva clear  Ear: normal external ear, no discharge, hearing intact  Nose:  no drainage noted  Mouth: mucous membranes moist  Neck: supple, no carotid bruits, thyroid not palpable  Lungs: Bilateral equal air entry, clear to ausculation, no wheezing, rales or rhonchi, normal effort  Cardiovascular: normal rate, regular rhythm, no murmur, gallop, rub. Abdomen: Soft, nontender, nondistended, normal bowel sounds, no hepatomegaly or splenomegaly  Neurologic: Moving all 4 extremities, not very cooperative cranial nerve exam  Skin: No gross lesions, rashes, bruising or bleeding on exposed skin area  Extremities:  peripheral pulses palpable, no pedal edema or calf pain with palpation  Psych: normal affect     Investigations:      Laboratory Testing:  Recent Results (from the past 24 hour(s))   CBC with Auto Differential    Collection Time: 10/24/22  9:05 PM   Result Value Ref Range    WBC 9.0 3.5 - 11.0 k/uL    RBC 4.14 4.0 - 5.2 m/uL    Hemoglobin 13.1 12.0 - 16.0 g/dL    Hematocrit 39.3 36 - 46 %    MCV 95.1 80 - 100 fL    MCH 31.6 26 - 34 pg    MCHC 33.2 31 - 37 g/dL    RDW 15.1 (H) 11.5 - 14.9 %    Platelets 477 807 - 603 k/uL    MPV 6.9 6.0 - 12.0 fL    Seg Neutrophils 57 36 - 66 %    Lymphocytes 30 24 - 44 %    Monocytes 7 1 - 7 %    Eosinophils % 5 (H) 0 - 4 %    Basophils 0 0 - 2 %    Bands 1 0 - 10 %    Segs Absolute 5.13 1.3 - 9.1 k/uL    Absolute Lymph # 2.70 1.0 - 4.8 k/uL    Absolute Mono # 0.63 0.1 - 1.3 k/uL    Absolute Eos # 0.45 (H) 0.0 - 0.4 k/uL    Basophils Absolute 0.00 0.0 - 0.2 k/uL    Absolute Bands # 0.09 0.0 - 1.0 k/uL    Morphology ANISOCYTOSIS PRESENT    SPECIMEN REJECTION    Collection Time: 10/24/22  9:05 PM   Result Value Ref Range    Specimen Source . BLOOD     Ordered Test BMP,TROPI     Reason for Rejection Unable to perform testing: Specimen hemolyzed.     Ammonia    Collection Time: 10/24/22  9:40 PM   Result Value Ref Range    Ammonia 11 11 - 51 umol/L   Urinalysis with Microscopic    Collection Time: 10/24/22  9:45 PM   Result Value Ref Range    Color, UA Yellow Yellow    Turbidity UA Cloudy (A) Clear    Glucose, Ur NEGATIVE NEGATIVE    Bilirubin Urine NEGATIVE NEGATIVE    Ketones, Urine TRACE (A) NEGATIVE    Specific Gravity, UA 1.018 1.000 - 1.030    Urine Hgb LARGE (A) NEGATIVE    pH, UA 6.0 5.0 - 8.0    Protein, UA 2+ (A) NEGATIVE    Urobilinogen, Urine Normal Normal    Nitrite, Urine NEGATIVE NEGATIVE    Leukocyte Esterase, Urine LARGE (A) NEGATIVE    WBC, UA TOO NUMEROUS TO COUNT /HPF    RBC, UA TOO NUMEROUS TO COUNT /HPF    Casts UA 0 TO 2 /LPF    Epithelial Cells UA 3 to 5 /HPF    Bacteria, UA MODERATE (A) None   Basic Metabolic Panel    Collection Time: 10/24/22  9:45 PM   Result Value Ref Range    Glucose 132 (H) 70 - 99 mg/dL    BUN 21 8 - 23 mg/dL    Creatinine 1.05 (H) 0.50 - 0.90 mg/dL    Est, Glom Filt Rate 54 (L) >60 mL/min/1.73m2    Calcium 8.7 8.6 - 10.4 mg/dL    Sodium 139 135 - 144 mmol/L    Potassium 3.4 (L) 3.7 - 5.3 mmol/L    Chloride 101 98 - 107 mmol/L    CO2 27 20 - 31 mmol/L    Anion Gap 11 9 - 17 mmol/L   Troponin    Collection Time: 10/24/22  9:45 PM   Result Value Ref Range    Troponin, High Sensitivity 40 (H) 0 - 14 ng/L   EKG 12 Lead    Collection Time: 10/24/22  9:54 PM   Result Value Ref Range    Ventricular Rate 74 BPM    Atrial Rate 74 BPM    P-R Interval 248 ms    QRS Duration 142 ms    Q-T Interval 446 ms    QTc Calculation (Bazett) 495 ms    P Axis 83 degrees    R Axis 77 degrees    T Axis -14 degrees   Troponin    Collection Time: 10/24/22 11:21 PM   Result Value Ref Range    Troponin, High Sensitivity 37 (H) 0 - 14 ng/L       Imaging/Diagnostics:        Assessment :      Primary Problem  Acute cystitis with hematuria    Active Hospital Problems    Diagnosis Date Noted    Acute cystitis with hematuria [N30.01] 10/08/2022     Priority: Medium       Plan:     Patient status Admit as inpatient in the  Progressive Unit/Step down    Admitted with change in mentation, patient has history of UTI with ESBL, urine culture positive, final sensitivity awaited, will continue with IV meropenem until final culture sensitivity available  History of dementia  Chest x-ray seen, patient, has signs of resolving pneumonia, of note she was recently discharged from hospital with pneumonia  History of seizure disorder restarted home dose of Tegretol  Will request palliative care consult to discuss CODE STATUS  Patient hemoglobin is even better what it was when she was released last time, will continue with Lovenox for DVT prophylaxis  Patient will need outpatient urology evaluation and cystoscopy with recurrent UTIs  Will try to reach patient family    Consultations:   IP CONSULT TO INTERNAL MEDICINE  IP CONSULT TO INFECTIOUS DISEASES     Patient is admitted as inpatient status because of co-morbidities listed above, severity of signs and symptoms as outlined, requirement for current medical therapies and most importantly because of direct risk to patient if care not provided in a hospital setting. Ismael Decker MD  10/25/2022  8:45 AM    Copy sent to Dr. Dominique Palomino, DO    Please note that this chart was generated using voice recognition Dragon dictation software. Although every effort was made to ensure the accuracy of this automated transcription, some errors in transcription may have occurred.

## 2022-10-25 NOTE — DISCHARGE INSTR - COC
Continuity of Care Form    Patient Name: Mack Moore   :  1945  MRN:  [id number]    Admit date:  10/24/2022  Discharge date: 10/27/22    Code Status Order: Full Code   Advance Directives:     Admitting Physician:  Tamiko Nino MD  PCP: Silvano Rg DO    Discharging Nurse: Saadia Walker [address]/Room#: 1088/0118-51  Discharging Unit Phone Number: [phone]      Emergency Contact:   Extended Emergency Contact Information  Primary Emergency Contact: [address] of [address] Phone: [phone]  Relation: Child  Secondary Emergency Contact: Fede [facility] of [address] Phone: [phone]  Mobile Phone: [phone]  Relation: Grandchild    Past Surgical History:  Past Surgical History:   Procedure Laterality Date    ABSCESS DRAINAGE Right 2013    WOUND EXPLORATION AND I+D  RIGHT HIP    CYSTOSCOPY Left 2022    CYSTOSCOPY URETERAL STENT INSERTION performed by Donna Leiva MD at [address] Left 2022    Cystoscopy       Immunization History:   Immunization History   Administered Date(s) Administered    COVID-19, PFIZER PURPLE top, DILUTE for use, (age 15 y+), 30mcg/0.3mL 2021, 2021, 2021    Influenza 10/09/2013    Influenza Virus Vaccine 2015    Influenza Whole 10/13/2004    Influenza, High Dose (Fluzone 65 yrs and older) 10/04/2017, 2018    Pneumococcal Conjugate 13-valent (Ohjkfix12) 2018    Pneumococcal Polysaccharide (Xcthknace56) 2015    Tdap (Boostrix, Adacel) 05/15/2022       Active Problems:  Patient Active Problem List   Diagnosis Code    Seizure ([facility] R56.9    Vitamin D deficiency E55.9    Anxiety F41.9    Hypercholesteremia E78.00    Cellulitis of right leg L03.115    MRSA (methicillin resistant staph aureus) culture positive Z22.322    Breakthrough seizure ([facility] G40.919    Memory loss R41.3    Difficulty speaking R47.9    Seizure disorder ([facility] G40.909 Dementia with behavioral disturbance F03.918    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 A    Kidney stone N20.0    Mixed incontinence N39.46    Overactive bladder N32.81    Closed head injury S09.90XA    Chronic renal disease, stage III ([facility] [[id number]] N18.30    Community acquired pneumonia J18.9    Sepsis ([facility] A41.9    COPD exacerbation ([facility] J44.1    Hypokalemia E87.6    Bacterial UTI N39.0, A49.9    Urinary tract infection due to Proteus N39.0, B96.4    ESBL (extended spectrum beta-lactamase) producing bacteria infection A49.9, Z16.12    Acute cystitis with hematuria N30.01    Leukocytosis D72.829       Isolation/Infection:   Isolation            Contact          Patient Infection Status       Infection Onset Added Last Indicated Last Indicated By Review Planned Expiration Resolved Resolved By    ESBL (Extended Spectrum Beta Lactamase) 10/03/22 10/05/22 10/03/22 Culture, Urine        MRSA  12/16/13 12/16/13 Leopoldo Stuart RN        Resolved    COVID-19 (Rule Out) 10/04/22 10/04/22 10/04/22 Respiratory Panel, Molecular, with COVID-19 (Restricted: peds pts or suitable admitted adults) (Ordered)   10/04/22 Rule-Out Test Resulted    COVID-19 (Rule Out) 10/03/22 10/03/22 10/03/22 COVID-19, Rapid (Ordered)   10/03/22 Rule-Out Test Resulted            Nurse Assessment:  Last Vital Signs: /63   Pulse 75   Temp 98.2 °F (36.8 °C)   Resp 20   Ht 5' 7\" (1.702 m)   Wt 125 lb (56.7 kg)   SpO2 95%   BMI 19.58 kg/m²     Last documented pain score (0-10 scale): Pain Level: 0  Last Weight:   Wt Readings from Last 1 Encounters:   10/25/22 125 lb (56.7 kg)     Mental Status:  disoriented and alert    IV Access:  - PICC - site  R Basilic, insertion date: 10/27/22    Nursing Mobility/ADLs:  Walking   Assisted  Transfer  Assisted  Bathing  Assisted  Dressing  Assisted  Toileting  Assisted  Feeding  Assisted  Med Admin  Assisted  Med Delivery   whole    Wound Care Documentation and Therapy:  Wound 12/11/13 Other (Comment) Posterior;Right (Active)   Number of days: 3239       Incision 12/14/13 Leg Lateral (Active)   Number of days: 3237        Elimination:  Continence: Bowel: No  Bladder: No  Urinary Catheter: None   Colostomy/Ileostomy/Ileal Conduit: No       Date of Last BM: 10/24/22      Intake/Output Summary (Last 24 hours) at 10/25/2022 1429  Last data filed at 10/25/2022 0827  Gross per 24 hour   Intake 360 ml   Output 75 ml   Net 285 ml     I/O last 3 completed shifts:  In: -   Out: 75 [Urine:75]    Safety Concerns:     Sund[employer] [place], History of Falls (last 30 days), and At Risk for Falls    Impairments/Disabilities:      Vision    Nutrition Therapy:  Current Nutrition Therapy:   - Oral Diet:  General, Dysphagia 3 advanced, and easy to chew diet    Routes of Feeding: Oral  Liquids: No Restrictions  Daily Fluid Restriction: no  Last Modified Barium Swallow with Video (Video Swallowing Test): not done    Treatments at the Time of Hospital Discharge:   Respiratory Treatments:   Oxygen Therapy:  is not on home oxygen therapy. Ventilator:    - No ventilator support    Rehab Therapies: Physical Therapy and Occupational Therapy  Weight Bearing Status/Restrictions: No weight bearing restrictions  Other Medical Equipment (for information only, NOT a DME order):     Other Treatments: skilled nursing assessment, medication education and monitoring  Merrem 1000mg IV Q12 for 4 days   PICC line care and flushes per protocol      Patient's personal belongings (please select all that are sent with patient):  Glasses    RN SIGNATURE:  Electronically signed by Beverly Schneider RN on 10/27/22 at 12:41 PM EDT    CASE MANAGEMENT/SOCIAL WORK SECTION    Inpatient Status Date: 10/25/22    Readmission Risk Assessment Score:  Readmission Risk              Risk of Unplanned Readmission:  16           Discharging to Facility/ [address]  P: [phone]  F: [phone]    Dialysis Facility (if applicable)   Name:  Address:  Dialysis Schedule:  Phone:  Fax:    / signature: Electronically signed by Thomas Horan RN on 10/26/22 at 8:04 AM EDT    PHYSICIAN SECTION    Prognosis: Fair    Condition at Discharge: Stable    Rehab Potential (if transferring to Rehab): Good    Recommended Labs or Other Treatments After Discharge:     Physician Certification: I certify the above information and transfer of Samir Tolbert  is necessary for the continuing treatment of the diagnosis listed and that she requires [facility] for greater 30 days.      Update Admission H&P: No change in H&P    PHYSICIAN SIGNATURE:  Electronically signed by Tobias Camilo MD on 10/27/22 at 9:23 AM EDT

## 2022-10-25 NOTE — CARE COORDINATION
DISCHARGE PLANNING NOTE:    Called daughter, Rosalinda Gonzalez, to discuss discharge planning, however she plans to be here around 1pm, and she would prefer to talk to writer then. Will speak with her in person.     Electronically signed by Cruz Guzman RN on 10/25/2022 at 11:36 AM

## 2022-10-26 PROBLEM — G20.A1 DEMENTIA DUE TO PARKINSON'S DISEASE WITH BEHAVIORAL DISTURBANCE (HCC): Status: ACTIVE | Noted: 2020-08-15

## 2022-10-26 PROBLEM — I05.9 MITRAL VALVE DISORDER: Status: ACTIVE | Noted: 2022-10-26

## 2022-10-26 PROBLEM — F02.818 DEMENTIA DUE TO PARKINSON'S DISEASE WITH BEHAVIORAL DISTURBANCE (HCC): Status: ACTIVE | Noted: 2020-08-15

## 2022-10-26 PROBLEM — I82.412 DVT OF DEEP FEMORAL VEIN, LEFT (HCC): Status: ACTIVE | Noted: 2021-07-10

## 2022-10-26 PROBLEM — G20 DEMENTIA DUE TO PARKINSON'S DISEASE WITH BEHAVIORAL DISTURBANCE (HCC): Status: ACTIVE | Noted: 2020-08-15

## 2022-10-26 LAB
ABSOLUTE EOS #: 0.47 K/UL (ref 0–0.4)
ABSOLUTE LYMPH #: 2.54 K/UL (ref 1–4.8)
ABSOLUTE MONO #: 1.13 K/UL (ref 0.1–1.3)
ANION GAP SERPL CALCULATED.3IONS-SCNC: 10 MMOL/L (ref 9–17)
BASOPHILS # BLD: 0 % (ref 0–2)
BASOPHILS ABSOLUTE: 0 K/UL (ref 0–0.2)
BUN BLDV-MCNC: 21 MG/DL (ref 8–23)
CALCIUM SERPL-MCNC: 8.2 MG/DL (ref 8.6–10.4)
CHLORIDE BLD-SCNC: 104 MMOL/L (ref 98–107)
CO2: 23 MMOL/L (ref 20–31)
CREAT SERPL-MCNC: 1.02 MG/DL (ref 0.5–0.9)
CULTURE: ABNORMAL
EOSINOPHILS RELATIVE PERCENT: 5 % (ref 0–4)
GFR SERPL CREATININE-BSD FRML MDRD: 56 ML/MIN/1.73M2
GLUCOSE BLD-MCNC: 90 MG/DL (ref 70–99)
HCT VFR BLD CALC: 36 % (ref 36–46)
HEMOGLOBIN: 12.1 G/DL (ref 12–16)
LYMPHOCYTES # BLD: 27 % (ref 24–44)
MCH RBC QN AUTO: 31.9 PG (ref 26–34)
MCHC RBC AUTO-ENTMCNC: 33.6 G/DL (ref 31–37)
MCV RBC AUTO: 95 FL (ref 80–100)
MONOCYTES # BLD: 12 % (ref 1–7)
MORPHOLOGY: NORMAL
PDW BLD-RTO: 14.7 % (ref 11.5–14.9)
PLATELET # BLD: 396 K/UL (ref 150–450)
PMV BLD AUTO: 6.9 FL (ref 6–12)
POTASSIUM SERPL-SCNC: 3.9 MMOL/L (ref 3.7–5.3)
RBC # BLD: 3.79 M/UL (ref 4–5.2)
SEG NEUTROPHILS: 56 % (ref 36–66)
SEGMENTED NEUTROPHILS ABSOLUTE COUNT: 5.26 K/UL (ref 1.3–9.1)
SODIUM BLD-SCNC: 137 MMOL/L (ref 135–144)
SPECIMEN DESCRIPTION: ABNORMAL
WBC # BLD: 9.4 K/UL (ref 3.5–11)

## 2022-10-26 PROCEDURE — 36415 COLL VENOUS BLD VENIPUNCTURE: CPT

## 2022-10-26 PROCEDURE — 2580000003 HC RX 258: Performed by: STUDENT IN AN ORGANIZED HEALTH CARE EDUCATION/TRAINING PROGRAM

## 2022-10-26 PROCEDURE — 97530 THERAPEUTIC ACTIVITIES: CPT

## 2022-10-26 PROCEDURE — 1200000000 HC SEMI PRIVATE

## 2022-10-26 PROCEDURE — 99232 SBSQ HOSP IP/OBS MODERATE 35: CPT | Performed by: INTERNAL MEDICINE

## 2022-10-26 PROCEDURE — 6370000000 HC RX 637 (ALT 250 FOR IP): Performed by: STUDENT IN AN ORGANIZED HEALTH CARE EDUCATION/TRAINING PROGRAM

## 2022-10-26 PROCEDURE — 97535 SELF CARE MNGMENT TRAINING: CPT

## 2022-10-26 PROCEDURE — 99222 1ST HOSP IP/OBS MODERATE 55: CPT | Performed by: NURSE PRACTITIONER

## 2022-10-26 PROCEDURE — 97162 PT EVAL MOD COMPLEX 30 MIN: CPT

## 2022-10-26 PROCEDURE — 6360000002 HC RX W HCPCS: Performed by: INTERNAL MEDICINE

## 2022-10-26 PROCEDURE — 85025 COMPLETE CBC W/AUTO DIFF WBC: CPT

## 2022-10-26 PROCEDURE — 80048 BASIC METABOLIC PNL TOTAL CA: CPT

## 2022-10-26 PROCEDURE — 97166 OT EVAL MOD COMPLEX 45 MIN: CPT

## 2022-10-26 PROCEDURE — 2580000003 HC RX 258: Performed by: INTERNAL MEDICINE

## 2022-10-26 RX ADMIN — ENOXAPARIN SODIUM 40 MG: 100 INJECTION SUBCUTANEOUS at 08:33

## 2022-10-26 RX ADMIN — MEROPENEM 1000 MG: 1 INJECTION, POWDER, FOR SOLUTION INTRAVENOUS at 20:55

## 2022-10-26 RX ADMIN — MEROPENEM 1000 MG: 1 INJECTION, POWDER, FOR SOLUTION INTRAVENOUS at 08:51

## 2022-10-26 RX ADMIN — SODIUM CHLORIDE, PRESERVATIVE FREE 10 ML: 5 INJECTION INTRAVENOUS at 08:34

## 2022-10-26 RX ADMIN — FLUOXETINE HYDROCHLORIDE 20 MG: 20 CAPSULE ORAL at 08:33

## 2022-10-26 RX ADMIN — TAMSULOSIN HYDROCHLORIDE 0.4 MG: 0.4 CAPSULE ORAL at 08:33

## 2022-10-26 RX ADMIN — SODIUM CHLORIDE, PRESERVATIVE FREE 10 ML: 5 INJECTION INTRAVENOUS at 21:05

## 2022-10-26 RX ADMIN — ASPIRIN 81 MG: 81 TABLET, COATED ORAL at 08:33

## 2022-10-26 RX ADMIN — CARBAMAZEPINE 400 MG: 200 TABLET ORAL at 20:51

## 2022-10-26 NOTE — PROGRESS NOTES
10/26/22 1847   Encounter Summary   Encounter Overview/Reason  Spiritual/Emotional Needs   Service Provided For: Patient   Referral/Consult From: Palliative Care   Last Encounter  10/26/22   Complexity of Encounter Low   Spiritual/Emotional needs   Type Spiritual Support   Palliative Care   Type Palliative Care, Follow-up   Assessment/Intervention/Outcome   Assessment Unable to assess  (patient sleeping)   Intervention Prayer (assurance of)/[place]

## 2022-10-26 NOTE — PROGRESS NOTES
[address] [facility] Internal Medicine  Margot Driver MD; Carmen Grover MD; Breanna Alfonso MD; MD Roland Klein MD; MD [facility]   [facility]     Progress Note    10/26/2022    2:58 PM    Name:   Darling Santo  MRN:     [id number]     Acct:      [de-identified]   Room:   2045/2045-01  IP Day:  1  Admit Date:  10/24/2022  8:54 PM    PCP:   Zayra Fraser DO  Code Status:  DNR-CCA    Subjective:     C/C:   Chief Complaint   Patient presents with    Altered Mental Status     Interval History Status: improved. Patient is feeling better today. She is without any complaint  She is afebrile and is hemodynamically stable. Urine culture positive for E. coli. She is on IV meropenem. Brief History:     The patient is a 76 y.o. Non- / non  female who presents with Altered Mental Status   and she is admitted to the hospital for the management of change in mentation  History is extremely limited, patient is advanced dementia, Parkinson disease, COPD, history of recurrent UTIs  Most of history history is retrieved from E HR  Patient lives at home, as per notes, patient daughter noticed change in mentation and blood in the urine  Patient was recently admitted in hospital and got discharged on 10/8, with pneumonia and UTI  At that time her urine was positive for Proteus which was MDR and ESBL          Review of Systems:         As recorded in interval hx                                     Medications: Allergies:     Allergies   Allergen Reactions    Haldol [Haloperidol Lactate]        Current Meds:   Scheduled Meds:    aspirin  81 mg Oral Daily    carBAMazepine  400 mg Oral Nightly    FLUoxetine  20 mg Oral Daily    tamsulosin  0.4 mg Oral Daily    sodium chloride flush  5-40 mL IntraVENous 2 times per day    meropenem  1,000 mg IntraVENous Q12H    enoxaparin  40 mg SubCUTAneous Daily     Continuous Infusions:    sodium chloride       PRN Meds: sodium chloride flush, sodium chloride, acetaminophen, ondansetron **OR** ondansetron, melatonin, potassium chloride **OR** potassium alternative oral replacement **OR** potassium chloride    Data:     I/O (24Hr): Intake/Output Summary (Last 24 hours) at 10/26/2022 1458  Last data filed at 10/26/2022 1145  Gross per 24 hour   Intake 120 ml   Output --   Net 120 ml       Labs:  Significant last 24 hr data reviewed ;   Vitals:    10/25/22 0315 10/25/22 0633 10/25/22 1758 10/26/22 0725   BP:  115/63 (!) 92/47 109/61   Pulse:  75 87 98   Resp:  20 18 16   Temp:  98.2 °F (36.8 °C) 98.6 °F (37 °C) 98.1 °F (36.7 °C)   TempSrc:    Oral   SpO2:  95% 95% 96%   Weight: 125 lb (56.7 kg)      Height:         No results for input(s): POCGLU in the last 72 hours.     Recent Results (from the past 24 hour(s))   Basic Metabolic Panel    Collection Time: 10/26/22  6:05 AM   Result Value Ref Range    Glucose 90 70 - 99 mg/dL    BUN 21 8 - 23 mg/dL    Creatinine 1.02 (H) 0.50 - 0.90 mg/dL    Est, Glom Filt Rate 56 (L) >60 mL/min/1.73m2    Calcium 8.2 (L) 8.6 - 10.4 mg/dL    Sodium 137 135 - 144 mmol/L    Potassium 3.9 3.7 - 5.3 mmol/L    Chloride 104 98 - 107 mmol/L    CO2 23 20 - 31 mmol/L    Anion Gap 10 9 - 17 mmol/L   CBC with Auto Differential    Collection Time: 10/26/22  6:05 AM   Result Value Ref Range    WBC 9.4 3.5 - 11.0 k/uL    RBC 3.79 (L) 4.0 - 5.2 m/uL    Hemoglobin 12.1 12.0 - 16.0 g/dL    Hematocrit 36.0 36 - 46 %    MCV 95.0 80 - 100 fL    MCH 31.9 26 - 34 pg    MCHC 33.6 31 - 37 g/dL    RDW 14.7 11.5 - 14.9 %    Platelets 269 980 - 014 k/uL    MPV 6.9 6.0 - 12.0 fL    Seg Neutrophils 56 36 - 66 %    Lymphocytes 27 24 - 44 %    Monocytes 12 (H) 1 - 7 %    Eosinophils % 5 (H) 0 - 4 %    Basophils 0 0 - 2 %    Segs Absolute 5.26 1.3 - 9.1 k/uL    Absolute Lymph # 2.54 1.0 - 4.8 k/uL    Absolute Mono # 1.13 0.1 - 1.3 k/uL    Absolute Eos # 0.47 (H) 0.0 - 0.4 k/uL Basophils Absolute 0.00 0.0 - 0.2 k/uL    Morphology Normal      No results for input(s): POCGLU in the last 72 hours. URINE ANALYSIS: No results found for: LABURIN     CBC:  Lab Results   Component Value Date/Time    WBC 9.4 10/26/2022 06:05 AM    WBC 9.0 10/24/2022 09:05 PM    WBC 12.2 10/06/2022 04:50 AM    HGB 12.1 10/26/2022 06:05 AM    HGB 13.1 10/24/2022 09:05 PM    HGB 10.9 10/06/2022 04:50 AM     10/26/2022 06:05 AM     10/24/2022 09:05 PM     10/06/2022 04:50 AM     03/12/2012 03:18 PM        BMP:    Lab Results   Component Value Date/Time     10/26/2022 06:05 AM     10/24/2022 09:45 PM     10/06/2022 04:50 AM    K 3.9 10/26/2022 06:05 AM    K 3.4 10/24/2022 09:45 PM    K 4.0 10/06/2022 04:50 AM     10/26/2022 06:05 AM     10/24/2022 09:45 PM     10/06/2022 04:50 AM    CO2 23 10/26/2022 06:05 AM    CO2 27 10/24/2022 09:45 PM    CO2 23 10/06/2022 04:50 AM    BUN 21 10/26/2022 06:05 AM    BUN 21 10/24/2022 09:45 PM    BUN 21 10/06/2022 04:50 AM    CREATININE 1.02 10/26/2022 06:05 AM    CREATININE 1.05 10/24/2022 09:45 PM    CREATININE 0.95 10/06/2022 04:50 AM    GLUCOSE 90 10/26/2022 06:05 AM    GLUCOSE 132 10/24/2022 09:45 PM    GLUCOSE 121 10/06/2022 04:50 AM    GLUCOSE 93 03/12/2012 03:18 PM      LIVER PROFILE:  Lab Results   Component Value Date/Time    ALT 17 10/03/2022 06:17 PM    AST 25 10/03/2022 06:17 PM    PROT 7.7 10/03/2022 06:17 PM    BILITOT 0.2 10/03/2022 06:17 PM    BILIDIR <0.08 10/12/2015 07:00 AM    LABALBU 3.0 10/03/2022 06:17 PM               Lab Results   Component Value Date/Time    SPECIAL 1CC EACH LT WRIST 10/03/2022 10:50 PM     Lab Results   Component Value Date/Time    CULTURE (A) 10/24/2022 09:45 PM     ESCHERICHIA COLI 50 to 100,000 CFU/ML This organism is an Extended Spectrum Beta Lactamase (ESBL)  and resistance to therapy with Penicillins, Cephalosporins and Aztreonam is expected.  These organisms generally remain susceptible to Carbapenems. Consider ID Consultation. CONTACT PRECAUTIONS INDICATED. Radiology:  XR CHEST PORTABLE    Result Date: 10/24/2022  Right lower lobe infiltrate improved. COPD. Physical Examination:      Physical Exam   Vitals:    Vitals:    10/25/22 0315 10/25/22 0633 10/25/22 1758 10/26/22 0725   BP:  115/63 (!) 92/47 109/61   Pulse:  75 87 98   Resp:  20 18 16   Temp:  98.2 °F (36.8 °C) 98.6 °F (37 °C) 98.1 °F (36.7 °C)   TempSrc:    Oral   SpO2:  95% 95% 96%   Weight: 125 lb (56.7 kg)      Height:                        Body mass index is 19.58 kg/m². General Appearance:   Awake alert and oriented. Pulmonary/Chest:        Clear to auscultation bilaterally . No wheezes, rales or rhonchi . Cardiovascular:            Normal rate, regular rhythm,                                          No murmur or  Gallop . Abdomen:                       Soft, non-tender                                                                                    Extremities:                    No  Edema .                                                    Assessment:        Hospital Problems             Last Modified POA    * (Principal) Acute cystitis with hematuria 10/25/2022 Yes    Seizure ([facility] 10/26/2022 Yes    Dementia due to Parkinson's disease with behavioral disturbance ([facility] 10/26/2022 Yes       Plan:        Patient status Admit as inpatient in the  Progressive Unit/Step down     Admitted with change in mentation, patient has history of UTI with ESBL, urine culture positive, final sensitivity awaited, will continue with IV meropenem until final culture sensitivity available  History of dementia  Chest x-ray seen, patient, has signs of resolving pneumonia, of note she was recently discharged from hospital with pneumonia  History of seizure disorder restarted home dose of Tegretol  Will request palliative care consult to discuss CODE STATUS  Patient hemoglobin is even better what it was when she was released last time, will continue with Lovenox for DVT prophylaxis  Patient will need outpatient urology evaluation and cystoscopy with recurrent UTIs  Will try to reach patient family. 10/26:  Patient is feeling better with no complaints. Afebrile and hemodynamically stable. Urine culture positive for E. coli. Palliative care on board. Gregg Palacios MD  10/26/2022  2:58 PM     I have discussed the care of Delores Hsieh , including pertinent history and exam findings,    today with the resident. I have seen and examined the patient and the key elements of all parts of the encounter have been performed by me . I agree with the assessment, plan and orders as documented by the resident. Principal Problem:    Acute cystitis with hematuria  Active Problems:    Seizure ([address]    Dementia due to Parkinson's disease with behavioral disturbance ([address]  Resolved Problems:    * No resolved hospital problems. *        Overall  course ;                                   are improving over time. Patient clinically doing better  Hb is stable , no hematuria  Urine culture growing ESBL  Requesting ID consult likely will need PICC line and discharge on ertapenem          Electronically signed by Jason Florian MD     Please note that this chart was generated using voice recognition Dragon dictation software. Although every effort was made to ensure the accuracy of this automated transcription, some errors in transcription may have occurred.

## 2022-10-26 NOTE — ACP (ADVANCE CARE PLANNING)
Advance Care Planning     Advance Care Planning (ACP) Physician/NP/PA Conversation    Date of Conversation: 10/26/2022  Conducted with: Patient has advanced dementia and unable to participate in conversation. Spoke with [facility] patient daughter and POA    Healthcare Decision Maker:    Primary Decision Maker: Rick Guillermo  Child - [phone]  Click here to complete Healthcare Decision Makers including selection of the Healthcare Decision Maker Relationship (ie \"Primary\"). Patient is  and has 3 biological children Shirin Olea, and Lulú Richardson. Patient completed HC POA and [facility] is patient POA#1. Patient daughter to bring paperwork in tomorrow. Patient lives at home with daughter [facility]. Care Preferences:    Hospitalization: \"If your health worsens and it becomes clear that your chance of recovery is unlikely, what would be your preference regarding hospitalization? \"  Hospitalized     Ventilation: \"If you were unable to breath on your own and your chance of recovery was unlikely, what would be your preference about the use of a ventilator (breathing machine) if it was available to you? \"  DNRCC-A no intubation     Resuscitation: \"In the event your heart stopped as a result of an underlying serious health condition, would you want attempts made to restart your heart, or would you prefer a natural death? \"  DNRCC-A no intubation         Conversation Outcomes / Follow-Up Plan:  Palliative Interaction:  I went to bedside and spoke with Patient and her daughter [facility] and introduced myself and my palliative care role. Patient has advanced dementia and is unable to make decisions for herself. Patient is  and has 3 biological children Shirin Olea, and Lulú Richardson. Patient completed HC POA and [facility] is patient POA#1. Patient daughter to bring paperwork in tomorrow. Patient lives at home with [facility]. We discussed code status levels and I explained each level completely to patient daughter [facility].  She states she believes patient would want to be a DNRCC-A no intubation. Paperwork completed and order placed in the computer. Patient is going to go to [facility] place for IV antibiotics and therapy for strengthening at discharge. Patient daughter states she wants to bring patient home once antibiotics are completed.       Emotional support provided and palliative care will continue to follow     Length of Voluntary ACP Conversation in minutes:  16 minutes    LAURIE Lorenzana - NP

## 2022-10-26 NOTE — CONSULTS
Palliative Care Inpatient Consult    NAME:  Ciara 76 RECORD NUMBER:  [id number]  AGE: 76 y.o. GENDER: female  : 1945  TODAY'S DATE:  10/26/2022    Reasons for Consultation:    Code status discussion     Members of PC team contributing to this consultation are :  Judith Scott CNP  Palliative care     Summary   Patient code status DNRCC-A no intubation   Patient going to [facility] place for IV antibiotics and therapy at discharge. Patient daughter John Botello states she has HCPOA to bring copy of paperwork in tomorrow  Patient lives at home with daughter Jazlyn Reyes Interaction:  I went to bedside and spoke with Patient and her daughter John Botello and introduced myself and my palliative care role. Patient has advanced dementia and is unable to make decisions for herself. Patient is  and has 3 biological children Marlana Cooks, and Lucila Westfall. Patient completed HC POA and John Botello is patient POA#1. Patient daughter to bring paperwork in tomorrow. Patient lives at home with daughter John Botello. We discussed code status levels and I explained each level completely to patient daughter John Botello. She states she believes patient would want to be a DNRCC-A no intubation. Paperwork completed and order placed in the computer. Patient is going to go to [facility] place for IV antibiotics and therapy for strengthening at discharge. Patient daughter states she wants to bring patient home once antibiotics are completed.      Emotional support provided and palliative care will continue to follow     Education/support to family  Education/support to patient  Discharge planning/helping to coordinate care  Communications with primary service  Pharmacologic pain management  Providing support for coping/adaptation/distress of family  Providing support for coping/adaptation/distress of patient  Discussing meaning/purpose   Caregiver support/education  Continue with current plan of care  Code status clarified: Full Code  Code status clarified: [facility]  Code status clarified: DNRCCA  Principle Problem/Diagnosis:  Acute cystitis with hematuria    Additional Assessments:   Principal Problem:    Acute cystitis with hematuria  Resolved Problems:    * No resolved hospital problems. *   1- Symptom management/ pain control     Pain Assessment:  Tylenol                Anxiety:  none                          Dyspnea:  Respirations relaxed                           Fatigue:   generalized weakness     Other:      2- Goals of care evaluation   The patient goals of care are improve or maintain function/quality of life   Goals of care discussed with:    [] Patient independently    [x] Patient and Family    [] Family or Healthcare DPOA independently    [] Unable to discuss with patient, family/DPOA not present    3- Code Status  Full Code    4- Other recommendations   - We will continue to provide comfort and support to the patient and the family    Palliative Care will continue to follow Ms. Morrison's care as needed. History of Present Illness     The patient is a 76 y.o. Non- / non  female who presents with Altered Mental Status      Referred to Palliative Care by   [x] Physician   [] Nursing  [] Family Request   [] Other:       She was admitted to the Medical surgical service for Acute cystitis with hematuria [N30.01]. Her hospital course has been associated with Acute cystitis with hematuria. The patient has a complicated medical history and has been hospitalized since 10/24/2022  8:54 PM.    Caryn Recio is a 76year old female with the following medical history anxiety,recurrent UTIs,  dementia, seizures, Herpes, Hyperlipidemia, Left Bundle branch block, Parkinson's, and Vitamin D deficiency. Caryn Recio came to ER due to altered mental status. Patient lives at home with her daughter. The daughter noticed change in mentation and also blood in her urine.  Patient was recently admitted to hospital for Pneumonia and UTI. Her urine was positive for Proteus which was MDR and ESBL. In ER patient urine was positive for UTI and urine sent for culture. Meropenem was started until culture is back. Her Chest xray shows resolving pneumonia. Patient will need to follow up with urology at discharge due to recurrent infections. Patient has the following consults Infectious disease, Internal medicine, and Palliative care. Palliative care will continue to follow     Active Hospital Problems    Diagnosis Date Noted    Acute cystitis with hematuria [N30.01] 10/08/2022     Priority: Medium       PAST MEDICAL HISTORY      Diagnosis Date    Anxiety     Convulsion ([address]     Encephalopathy     Herpes     Hyperlipidemia     Left bundle branch block     MRSA (methicillin resistant Staphylococcus aureus)     Parkinson's disease ([facility]     Seizures ([facility]     secondary to encephalitis    Vitamin D deficiency        PAST SURGICAL HISTORY  Past Surgical History:   Procedure Laterality Date    ABSCESS DRAINAGE Right 2013    WOUND EXPLORATION AND I+D  RIGHT HIP    CYSTOSCOPY Left 2022    CYSTOSCOPY URETERAL STENT INSERTION performed by Maddy Robbins MD at [address] [place] [address] Left 2022    Cystoscopy       SOCIAL HISTORY  Social History     Tobacco Use    Smoking status: Former     Packs/day: 1.00     Years: 30.00     Pack years: 30.00     Types: Cigarettes     Start date:      Quit date:      Years since quittin.8    Smokeless tobacco: Never    Tobacco comments:     unknown how many packs a day, or how many years   Vaping Use    Vaping Use: Never used   Substance Use Topics    Alcohol use: No    Drug use: No       FAMILY HISTORY  . .History reviewed. No pertinent family history.      ALLERGIES  Allergies   Allergen Reactions    Haldol [Haloperidol Lactate]          MEDICATIONS  Current Medications    aspirin  81 mg Oral Daily    carBAMazepine  400 mg Oral Nightly    FLUoxetine  20 mg Oral Daily tamsulosin  0.4 mg Oral Daily    sodium chloride flush  5-40 mL IntraVENous 2 times per day    meropenem  1,000 mg IntraVENous Q12H    enoxaparin  40 mg SubCUTAneous Daily     sodium chloride flush, sodium chloride, acetaminophen, ondansetron **OR** ondansetron, melatonin, potassium chloride **OR** potassium alternative oral replacement **OR** potassium chloride  IV Drips/Infusions   sodium chloride       Home Medications  No current facility-administered medications on file prior to encounter.      Current Outpatient Medications on File Prior to Encounter   Medication Sig Dispense Refill    carBAMazepine (TEGRETOL) 200 MG tablet 1.5 tablets po q am and 2 po q pm (Patient taking differently: No sig reported) 120 tablet 11    FLUoxetine (PROZAC) 20 MG capsule 2 po qd 180 capsule 3    mirabegron (MYRBETRIQ) 50 MG TB24 Take 50 mg by mouth daily 90 tablet 3    melatonin 5 MG TABS tablet Take 1 tablet by mouth nightly as needed (sleep) 10 tablet 0    tamsulosin (FLOMAX) 0.4 MG capsule Take 1 capsule by mouth daily 30 capsule 3    aspirin 81 MG EC tablet Take 81 mg by mouth daily         Data         /61   Pulse 98   Temp 98.1 °F (36.7 °C) (Oral)   Resp 16   Ht 5' 7\" (1.702 m)   Wt 125 lb (56.7 kg)   SpO2 96%   BMI 19.58 kg/m²     Wt Readings from Last 3 Encounters:   10/25/22 125 lb (56.7 kg)   10/08/22 131 lb 1.6 oz (59.5 kg)   08/22/22 125 lb (56.7 kg)        Lab Results   Component Value Date    WBC 9.4 10/26/2022    HGB 12.1 10/26/2022    HCT 36.0 10/26/2022    MCV 95.0 10/26/2022     10/26/2022    ,   Lab Results   Component Value Date/Time     10/26/2022 06:05 AM    K 3.9 10/26/2022 06:05 AM     10/26/2022 06:05 AM    CO2 23 10/26/2022 06:05 AM    BUN 21 10/26/2022 06:05 AM    CREATININE 1.02 10/26/2022 06:05 AM    GLUCOSE 90 10/26/2022 06:05 AM    GLUCOSE 93 03/12/2012 03:18 PM    CALCIUM 8.2 10/26/2022 06:05 AM    ,   Lab Results   Component Value Date/Time    MG 2.9 05/18/2022 03:53 AM    ,  Lab Results   Component Value Date    CALCIUM 8.2 (L) 10/26/2022    PHOS 3.3 05/18/2022        Xray Result (most recent):  XR CHEST PORTABLE 10/24/2022    Narrative  EXAMINATION:  ONE XRAY VIEW OF THE CHEST    10/24/2022 10:07 pm    COMPARISON:  October 7, 2022    HISTORY:  ORDERING SYSTEM PROVIDED HISTORY: SOB, recent admission for pneumonia  TECHNOLOGIST PROVIDED HISTORY:  SOB, recent admission for pneumonia  Reason for Exam: SOB, recent admission for pneumonia    FINDINGS:  Lungs are hyperaerated. Improving right lower lobe airspace disease. Heart  and mediastinum normal.  Scoliosis. Impression  Right lower lobe infiltrate improved. COPD. MRI Result (most recent):  MRI BRAIN WO CONTRAST 10/12/2015    Narrative  MRI brain without intravenous contrast, 10/12/2015    History: Parkinson's disease. Stroke priority. History of encephalitis and seizures. Weakness in the left lower extremity and speech deficits    Technique: Multiplanar multisequence MR imaging of the brain was performed without intravenous contrast.    Findings: No evidence for shift of midline structures, mass effect or compression of the ventricles noted. No acute intra-or extra-axial hemorrhage is seen. No abnormal intracranial fluid collections are identified. The basal cisterns are patent. Posterior fossa structures demonstrate no discrete mass. And subtle wedging gliosis in the left temporal lobe on noted consistent with MCA division M2 segment chronic infarct. No acute or subacute ischemic insult. Scattered foci of T2/FLAIR hyperintensity in the periventricular and subcortical white matter which are nonspecific in imaging appearance however likely represent chronic small vessel disease. Chronic left thalamic lacunar infarct. Bilateral basal ganglia mineralization. Opacification of the bilateral maxillary sinuses with air-fluid levels in the left maxillary sinus.     Mucosal thickening of the ethmoid air cells. Mucosal thickening of the sphenoid sinus. Left-sided Wallerian degeneration    Impression: No evidence for acute or subacute ischemic insult. No abnormal intracranial mass or acute hemorrhage. 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 Chronic small vessel disease. Chronic small left thalamic lacunar infarct. Final report electronically signed by Reese Grant M.D. on 10/12/2015 9:56 AM       CT Result (most recent):  CT HEAD WO CONTRAST 10/03/2022    Narrative  EXAMINATION:  CT OF THE HEAD WITHOUT CONTRAST  10/3/2022 3:55 pm    TECHNIQUE:  CT of the head was performed without the administration of intravenous  contrast. Automated exposure control, iterative reconstruction, and/or weight  based adjustment of the mA/kV was utilized to reduce the radiation dose to as  low as reasonably achievable. COMPARISON:  05/15/2022    HISTORY:  ORDERING SYSTEM PROVIDED HISTORY: ams  TECHNOLOGIST PROVIDED HISTORY:  [facility]    Decision Support Exception - unselect if not a suspected or confirmed  emergency medical condition->Emergency Medical Condition (MA)  Reason for Exam: ams  Additional signs and symptoms: patient unable to follow exam instructions    CT BRAIN FINDINGS:  BRAIN/VENTRICLES:    Encephalomalacia in the left temporal lobe. Diffuse cerebellar atrophy. .  The falx is midline. The ventricles and peripheral sulci are  mild-to-moderately dilated. There is decreased attenuation in the  periventricular white matter. There is no sign of a space occupying lesion,  acute infarction, or hemorrhage. Orbits: Portion of the orbits demonstrate no acute abnormality. SINUSES: Soft tissue opacification in the ethmoid and sphenoid sinuses. .  The  remaining imaged portions of the paranasal sinuses are clear. The mastoids  and the middle ear chambers are clear. SOFT TISSUES/SKULL:  No acute abnormality of the visualized skull or soft  tissues. Vascular calcifications are seen compatible with atherosclerotic  disease. Impression  Encephalomalacia in the left temporal lobe. Mild to moderate global atrophy. Mild chronic deep white matter ischemic changes    No acute intracranial abnormalities are noted. Code Status: Full Code     ADVANCED CARE PLANNING:  Patient has capacity for medical decisions: no  Health Care Power of : yes  Living Will: yes     Personal, Social, and Family History  Marital Status:   Living situation:with family:  daughter  Importance of queta/Confucianism/spiritual beliefs: [] Very [] Somewhat [] Not   Psychological Distress: moderate  Does patient understand diagnosis/treatment? no  Does caregiver understand diagnosis/treatment? yes      Assessment        REVIEW OF SYSTEMS  Constitutional: Patient has dementia and answers few questions and follows few commands   Eyes: no eye pain or blurred vision  ENT: no hearing loss, congestion, or difficulty swallowing   Respiratory: no wheezing, chest tightness, or shortness of breath   Cardiovascular: no chest pain or pressure, no palpitations, no diaphoresis   Gastrointestinal: no nausea, vomiting,abdominal pain, diarrhea or constipation, no melena   Genitourinary: incontinent of urine   Musculoskeletal: generalized weakness   Skin: no rashes or sores   Neurological: Patient has dementia and answers few questions and follows few commands   PHYSICAL ASSESSMENT:  Constitutional: Patient has dementia and answers few questions and follows few commands   Head: Normocephalic and atraumatic. Eyes: EOM are normal. Pupils are equal, round   Neck: Normal range of motion. Neck supple. No tracheal deviation present. Cardiovascular: Normal rate and regular rhythm, S1, S2, no murmur   Pulmonary/Chest: Effort normal and breath sounds normal. No rales or wheezes. Abdomen: Soft.  No tenderness, not distended, no ascites, no organomegaly   Musculoskeletal: generalized weakness Neurological: Patient has dementia and answers few questions and follows few commands   Skin: Normal turgor, no bleeding, no bruising     Palliative Performance Scale:  ___60%  Ambulation reduced; Significant disease; Can't do hobbies/housework; intake normal or reduced; occasional assist; LOC full/confusion  ___50%  Mainly sit/lie; Extensive disease; Can't do any work; Considerable assist; intake normal or reduced; LOC full/confusion  __x_40%  Mainly in bed; Extensive disease; Mainly assist; intake normal or reduced; LOC full/confusion   ___30%  Bed Bound; Extensive disease; Total care; intake reduced; LOCfull/confusion  ___20%  Bed Bound; Extensive disease; Total care; intake minimal; Drowsy/coma  ___10%  Bed Bound; Extensive disease; Total care; Mouth care only; Drowsy/coma  ___0       Death        Thank you for allowing Palliative Care to participate in the care of Ms. Lexi Dao . This note has been dictated by dragon, typing errors may be a possibility. The total time I spent in seeing the patient, discussing goals of care, advanced directives, code status and other major issues was more than 60 minutes      Electronically signed by   Louisa Mcburney, APRN - NP  Palliative Care Team  on 10/26/2022 at 10:29 AM    [address] [facility] Number [phone]    [address] Number [phone] [address] Number [phone]    Please call with any palliative questions or concerns. Palliative Care Team is available via perfect serve or via phone.

## 2022-10-26 NOTE — PROGRESS NOTES
[address]    Occupational Therapy Evaluation  Date: 10/26/22  Patient Name: Eric Gómez       Room: 9091/5934-18  MRN: [id number]  Account: [de-identified]   : 1945  (73 y.o.) Gender: female     Discharge Recommendations:  Further Occupational Therapy is recommended upon facility discharge. OT Equipment Recommendations  Other: TBD    Referring Practitioner: Babak Coats MD  Diagnosis: Acute cystitis with hematuria Additional Pertinent Hx: Eric Gómez is a 76 y.o. female who presents with increasing confusion over the past few days with urinary incontinence and hematuria. Daughter at bedside states patient has history of Parkinson's and dementia but has been acting more confused than baseline over the past few days. She has also had several episodes of urinary incontinence, the most recent of which the daughter states was significantly blood-tinged. Patient states she feels \"all mixed up\". Patient has had nausea and significantly decreased PO intake per daughter. She has a history of recurrent UTIs, the most recent of which grew Proteus mirabilis and was sensitive only to meropenem. She denies headache, changes in vision, chest pain, shortness of breath, abdominal pain, vomiting, diarrhea, falls or trauma, numbness, tingling, weakness    Treatment Diagnosis: impaired self care status    Past Medical History:  has a past medical history of Anxiety, Convulsion ([address] Encephalopathy, Herpes, Hyperlipidemia, Left bundle branch block, MRSA (methicillin resistant Staphylococcus aureus), Parkinson's disease ([address] Seizures ([address] and Vitamin D deficiency. Past Surgical History:   has a past surgical history that includes Abscess Drainage (Right, 2013); Ureter stent placement (Left, 2022); and Cystoscopy (Left, 2022).     Restrictions  Restrictions/Precautions  Restrictions/Precautions: Fall Risk;General Precautions  Required Braces or Orthoses?: No  Position Activity Restriction  Other position/activity restrictions: up with assistance      Vitals  Vitals  Heart Rate: 98  Heart Rate Source: Monitor  BP: 109/61  MAP (Calculated): 77  Resp: 16  SpO2: 96 %  O2 Device: None (Room air)     Subjective  Subjective: \"No I don't need to right now\" pt reports in response to using the bathroom despite soiled brief. Pt is pleasantly confused throughout and requires maximum encouragement to participate in toileting. Comments: RN Austin Son ok'd pt for OT/PT eval and treat. Pt agreeable to participate and pleasantly confused throughout. Subjective  Pain: pt denies pain at present      Social/Functional History  Social/Functional History  Lives With: Daughter, Family (Dtr Gricel Ivy, her spouse and 12 y.o. son)  Type of Home: House  Home Layout: One level  Home Access: Stairs to enter without rails  Entrance Stairs - Number of Steps: 1 ELIZABETH at back  Bathroom Shower/Tub: Tub/Shower unit, Curtain, Shower chair with back  [employer]: Standard  Bathroom Equipment: Hand-held shower  Bathroom Accessibility: Accessible  Home Equipment: Walker, rolling, [address] Help From: Family (daughter)  ADL Assistance: Needs assistance (Set-up assist and verbal cues from Dtr for all ADLs)  Homemaking Assistance: Needs assistance  Homemaking Responsibilities: No  Ambulation Assistance: Needs assistance (without device)  Transfer Assistance: Needs assistance (without device)  Active : No  Patient's  Info: daughter  IADL Comments: Daughter reports that patient sleeps in daughters room on a regular flat bed  Additional Comments: daughter reports she works full time 3 days/week and then is off the remainder of the week, therefore is unable to provide 24hr assistance.       Objective  Cognition  Orientation  Overall Orientation Status: Impaired  Orientation Level: Oriented to person, Disoriented to place, Disoriented to time, Disoriented to situation  Cognition  Overall Cognitive Status: Exceptions  Arousal/Alertness: Appropriate responses to stimuli  Following Commands: Follows one step commands with increased time, Follows one step commands with repetition  Attention Span: Attends with cues to redirect  Memory: Decreased short term memory, Decreased recall of recent events, Decreased recall of biographical Information  Safety Judgement: Decreased awareness of need for assistance, Decreased awareness of need for safety  Problem Solving: Assistance required to generate solutions, Assistance required to implement solutions, Assistance required to identify errors made, Assistance required to correct errors made, Decreased awareness of errors  Insights: Not aware of deficits  Initiation: Requires cues for all  Sequencing: Requires cues for some  Cognition Comment: Unable to identify or express wants/needs accurately   Sensation  Overall Sensation Status: WFL    Activities of Daily Living  ADL  Feeding: Stand by assistance  Feeding Skilled Clinical Factors: verbal cues and set up assistance  Grooming: Stand by assistance  Grooming Skilled Clinical Factors: standing sinkside to complete hand hygiene  UE Bathing: Stand by assistance  LE Bathing: Moderate assistance  UE Dressing: Stand by assistance  LE Dressing: Moderate assistance  Toileting: Moderate assistance  Toileting Skilled Clinical Factors: A to thread BLEs into brief and pull up over hips during toileting; pt able to complete larry hygiene while seated on toilet  Additional Comments: ADL scores based on skilled observation and clinical reasoning unless otherwise noted. Pt is currently limited by cognition, balance, activity tolerance, and strength impacting performance in self care tasks.          UE Function  LUE PROM (degrees)  LUE PROM: WFL  LUE AROM (degrees)  LUE AROM : Exceptions  LUE General AROM: shoulder 0-45; all other joints WFL  Left Hand AROM (degrees)  Left Hand AROM: WFL (pt is missing digit 5 at baseline)  Tone LUE  LUE Tone: Normotonic  LUE Strength  Gross LUE Strength: Exceptions to [facility]  L Shoulder Flex: 2/5  L Shoulder Ext: 2/5  L Hand General: 3+/5  LUE Strength Comment: elbow 3+/5; wrist 3+/5    RUE AROM (degrees)  RUE AROM : WFL  Right Hand AROM (degrees)  Right Hand AROM: WFL  Tone RUE  RUE Tone: Normotonic  RUE Strength  Gross RUE Strength: WFL  R Hand General: 4-/5  RUE Strength Comment: grossly 4-/5         Fine Motor Skills/Coordination  Coordination  Movements Are Fluid And Coordinated: No  Coordination and Movement Description: Gross motor impairments, Fine motor impairments, Right UE, Left UE, Decreased speed, Decreased accuracy, Tremors         Mobility  Bed Mobility  Bed mobility  Rolling to Right: Stand by assistance  Supine to Sit: Stand by assistance  Sit to Supine: Stand by assistance  Scooting: Stand by assistance  Bed Mobility Comments: HOB flat without use of rails.  SBA for safety and IV line management    Balance  Balance  Sitting Balance: Stand by assistance (dynamic and static sitting EOB for 1-2 minutes, no LOB)  Standing Balance: Contact guard assistance  Standing Balance  Time: 2-3 minutes  Activity: functional transfers, functional mobility, hand hygiene  Comment: CGA for safety due to decreased balance and tremors    Transfers  Transfers  Sit to stand: Contact guard assistance  Stand to sit: Contact guard assistance  Transfer Comments: no device    Functional Mobility  Functional - Mobility Device: No device  Activity: To/from bathroom  Assist Level: Contact guard assistance  Functional Mobility Comments: pt slightly unsteady at times however no significant loss of balance    Assessment  Assessment  Performance deficits / Impairments: Decreased functional mobility , Decreased ADL status, Decreased ROM, Decreased strength, Decreased safe awareness, Decreased endurance, Decreased cognition, Decreased balance, Decreased vision/visual deficit, Decreased high-level IADLs, Decreased coordination, Decreased fine motor control  Treatment Diagnosis: impaired self care status  Prognosis: Fair  Decision Making: Medium Complexity  Discharge Recommendations: Patient would benefit from continued therapy after discharge    Activity Tolerance  Activity Tolerance: Treatment limited secondary to decreased cognition, Patient Tolerated treatment well    Safety Devices  Type of Devices: Nurse notified, Left in bed, Bed alarm in place, Call light within reach    Patient Education  Patient Education  Education Given To: Patient, Family  Education Provided: Role of Therapy, Plan of Care, Transfer Training  Education Method: Verbal  Barriers to Learning: Cognition  Education Outcome: Continued education needed      Functional Outcome Measures  AM-PAC Daily Activity Inpatient   How much help for putting on and taking off regular lower body clothing?: A Lot  How much help for Bathing?: A Lot  How much help for Toileting?: A Lot  How much help for putting on and taking off regular upper body clothing?: A Little  How much help for taking care of personal grooming?: A Little  How much help for eating meals?: A Little  AM-[facility] Inpatient Daily Activity Raw Score: 15  AM-PAC Inpatient ADL T-Scale Score : 34.69  ADL Inpatient CMS 0-100% Score: 56.46  ADL Inpatient CMS G-Code Modifier : CK       Goals     Short Term Goals  Time Frame for Short Term Goals: By discharge, pt will  Short Term Goal 1: complete lower body dressing/bathing with CGA and Fair safety  Short Term Goal 2: complete functional transfers/mobility during self care and mobility with Supervision  Short Term Goal 3: tolerate standing 5+ minutes during functional activity of choice with Supervision  Short Term Goal 4: verbalize/demonstrate Good understanding of home safety/fall prevention strategies to increase safety and independence with self care and mobility  Short Term Goal 5: participate in 15+ minutes of therapeutic exercises/functional activities to increase safety and independence with self care and mobility  Short Term Goal 6: initiate self feeding/grooming task with <2 verbal cues to increase independence in self care    Plan  Occupational Therapy Plan  Times Per Week: 4-6  Current Treatment Recommendations: Strengthening, Balance training, Functional mobility training, ROM, Endurance training, Pain management, Safety education & training, Patient/Caregiver education & training, Equipment evaluation, education, & procurement, Positioning, Self-Care / ADL, Cognitive/Perceptual training, Coordination training      OT Individual Minutes  OT Individual Minutes  Time In: 0945  Time Out: 1013  Minutes: 28  Time Code Minutes   Timed Code Treatment Minutes: 15 Minutes        Electronically signed by Rudy Matta OT on 10/26/22 at 2:56 PM EDT

## 2022-10-26 NOTE — PROGRESS NOTES
Physical Therapy  Facility/Department: [facility] MED SURG  Physical Therapy Initial Assessment    Name: Mna Faulkner  : 1945  MRN: [id number]  Date of Service: 10/26/2022    Discharge Recommendations:  Patient would benefit from continued therapy after discharge          Patient Diagnosis(es): The encounter diagnosis was Acute cystitis with hematuria. Past Medical History:  has a past medical history of Anxiety, Convulsion ([address] Encephalopathy, Herpes, Hyperlipidemia, Left bundle branch block, MRSA (methicillin resistant Staphylococcus aureus), Parkinson's disease ([address] Seizures [address] [address] [facility] and Vitamin D deficiency. Past Surgical History:  has a past surgical history that includes Abscess Drainage (Right, 2013); Ureter stent placement (Left, 2022); and Cystoscopy (Left, 2022). Assessment   Assessment: The patient presents with impaired strength and balance impeding overall safety and independence with mobility. The patient demonstrates need for assistance during all functional mobility and is unsafe to return home without 24/hr support and superivision.  PT services warranted to address deficits and progress towards prior level of independence  Treatment Diagnosis: Impaired mobility and balance 2* acute cystitis with hematuria  Specific Instructions for Next Treatment: Progress ambulation and balance; therapeutic activity  Therapy Prognosis: Fair  Decision Making: Medium Complexity  Exam: ROM, MMT, Balance, and functional mobility assessments  Clinical Presentation: Pt is pleasant, confused, and cooperative; easily redirected to task  Barriers to Learning: Cognition; Dementia  Requires PT Follow-Up: Yes  Activity Tolerance  Activity Tolerance: Treatment limited secondary to decreased cognition     Plan   Physcial Therapy Plan  General Plan: 3-5 times per week  Specific Instructions for Next Treatment: Progress ambulation and balance; therapeutic activity  Current Treatment Recommendations: Strengthening, Balance training, Functional mobility training, Transfer training, Stair training, Gait training, Neuromuscular re-education, Safety education & training, Patient/Caregiver education & training, Therapeutic activities  Safety Devices  Type of Devices: Nurse notified, Left in bed, Bed alarm in place, Call light within reach  Restraints  Restraints Initially in Place: No     Restrictions  Restrictions/Precautions  Restrictions/Precautions: Fall Risk, General Precautions  Required Braces or Orthoses?: No  Position Activity Restriction  Other position/activity restrictions: up with assistance     Subjective   Pain: pt denies pain at present  General  Chart Reviewed: Yes  Patient assessed for rehabilitation services?: Yes  Additional Pertinent Hx: Acute cystitis with hematuria Additional Pertinent Hx: Lyly Wood is a 76 y.o. female who presents with increasing confusion over the past few days with urinary incontinence and hematuria. Daughter at bedside states patient has history of Parkinson's and dementia but has been acting more confused than baseline over the past few days. She has also had several episodes of urinary incontinence, the most recent of which the daughter states was significantly blood-tinged. Patient states she feels \"all mixed up\". Patient has had nausea and significantly decreased PO intake per daughter. She has a history of recurrent UTIs, the most recent of which grew Proteus mirabilis and was sensitive only to meropenem. She denies headache, changes in vision, chest pain, shortness of breath, abdominal pain, vomiting, diarrhea, falls or trauma, numbness, tingling, weakness.   Response To Previous Treatment: Not applicable  Family / Caregiver Present: Yes Yahir Hartman)  Referring Practitioner: Dr. Shoshana Avalos  Referral Date : 10/25/22  Diagnosis: Acute cystitis with hematuria  Follows Commands: Impaired (with increased time and repeated cues)  General Comment  Comments: [id number] [address] per Nurse Vivek Douglass to proceed with Therapy assessments  Subjective  Subjective: Pt agreeable to participating but pleasantly confused throughout; unable to provide much relevant information 2* baseline dementia and cognitive barriers. Pt denies needs to void but when prompted to use toilet, voids in toilet. Per discussion with Dtr and time spent with pt; pt is unable to identify and express needs and requires frequent cues/prompts for feeding and toileting. Social/Functional History  Social/Functional History  Lives With: Daughter, Family (Dtr Cassie Honeycutt, her spouse and 12 y.o. son)  Type of Home: House  Home Layout: One level  Home Access: Stairs to enter without rails  Entrance Stairs - Number of Steps: 1 ELIZABETH at back  Bathroom Shower/Tub: Tub/Shower unit, Curtain, Shower chair with back  [employer]: Standard  Bathroom Equipment: Hand-held shower  Bathroom Accessibility: Accessible  Home Equipment: Walker, rolling, [address] Help From: Family (daughter)  ADL Assistance: Needs assistance (Set-up assist and verbal cues from Dtr for all ADLs)  Homemaking Assistance: Needs assistance  Homemaking Responsibilities: No  Ambulation Assistance: Needs assistance (without device)  Transfer Assistance: Needs assistance (without device)  Active : No  Patient's  Info: daughter  IADL Comments: Daughter reports that patient sleeps in daughters room on a regular flat bed  Additional Comments: daughter reports she works full time 3 days/week and then is off the remainder of the week, therefore is unable to provide 24hr assistance. Vision/Hearing  Vision  Vision: Impaired  Vision Exceptions: Wears glasses at all times  Hearing  Hearing: Exceptions to [facility]   Orientation  Overall Orientation Status: Impaired  Orientation Level: Oriented to person;Disoriented to place; Disoriented to time;Disoriented to situation  Cognition  Overall Cognitive Status: Exceptions  Arousal/Alertness: Appropriate responses to stimuli  Following Commands: Follows one step commands with increased time; Follows one step commands with repetition  Attention Span: Attends with cues to redirect  Memory: Decreased short term memory;Decreased recall of recent events;Decreased recall of biographical Information  Safety Judgement: Decreased awareness of need for assistance;Decreased awareness of need for safety  Problem Solving: Assistance required to generate solutions;Assistance required to implement solutions;Assistance required to identify errors made;Assistance required to correct errors made;Decreased awareness of errors  Insights: Not aware of deficits  Initiation: Requires cues for all  Sequencing: Requires cues for some  Cognition Comment: Unable to identify or express wants/needs accurately     Objective   Heart Rate: 98  Heart Rate Source: Monitor  BP: 109/61  MAP (Calculated): 77  Resp: 16  SpO2: 96 %  O2 Device: None (Room air)     Observation/Palpation  Observation: Peripheral IV R wrist        AROM RLE (degrees)  RLE AROM: WFL  AROM LLE (degrees)  LLE AROM : WFL  AROM RUE (degrees)  RUE General AROM: See OT  AROM LUE (degrees)  LUE General AROM: See OT  Strength RLE  Strength RLE: WFL  Comment: Grossly 4-/5  Strength LLE  Strength LLE: WFL  Comment: Grossly 4-/5  Strength RUE  Comment: See OT  Strength LUE  Comment: See OT           Bed mobility  Rolling to Right: Stand by assistance  Supine to Sit: Stand by assistance  Sit to Supine: Stand by assistance  Scooting: Stand by assistance  Bed Mobility Comments: HOB flat without use of rails.  SBA for safety and IV line management  Transfers  Sit to Stand: Contact guard assistance  Stand to Sit: Contact guard assistance  Comment: No Device used; CGA for steadying assist as pt demo's unsteadiness (Per Pt's Dtr's Report; pt does not know how to use RW thus making mobility more difficult and complicated for pt and this is reflected in a previous PT Note from few weeks ago.)  Ambulation  Surface: Level tile  Device: No Device  Assistance: Contact guard assistance  Quality of Gait: Unsteady, short and choppy steps; tendency to reach for external supports  Gait Deviations: Slow Anju; Increased RIKI; Decreased step length;Decreased step height;Decreased head and trunk rotation; Shuffles  Distance: 15'x2, 10'x2  Stairs/Curb  Stairs?: Yes  Stairs  # Steps : 1  Stairs Height: 4\"  Device: No Device  Assistance: Minimal assistance  Comment: Larry with HHA provided for single box step negotiation     Balance  Posture: Fair  Sitting - Static: Fair;+  Sitting - Dynamic: Fair;+  Standing - Static: Fair (no Device)  Standing - Dynamic: Fair;- (no device)           AM-PAC Score  AM-PAC Inpatient Mobility Raw Score : 18 (10/26/22 0900)  AM-PAC Inpatient T-Scale Score : 43.63 (10/26/22 0900)  Mobility Inpatient CMS 0-100% Score: 46.58 (10/26/22 0900)  Mobility Inpatient CMS G-Code Modifier : CK (10/26/22 0900)     Goals  Short Term Goals  Time Frame for Short Term Goals: 6 visits  Short Term Goal 1: Independent bed mobility  Short Term Goal 2: pt to demo transfers with device as needed and SBA  Short Term Goal 3: pt to ambulate 54-65' with device as needed and SBA  Short Term Goal 4: pt to negotiate single step with CGA and no rail to allow for safe home access  Short Term Goal 5: pt to improve dynamic standing balance to FAIR to dec fall risk       Education  Patient Education  Education Given To: Patient; Family  Education Provided: Role of Therapy;Plan of Care;Transfer Training; Fall Prevention Strategies; Equipment  Education Method: Verbal  Barriers to Learning: Cognition;Vision  Education Outcome: Continued education needed      Therapy Time   Individual Concurrent Group Co-treatment   Time In 0945         Time Out 1013         Minutes 28         Timed Code Treatment Minutes: 81 KIM Hansen   Electronically signed by Gara Nissen, PT on 10/26/2022 at 5:13 PM

## 2022-10-26 NOTE — PLAN OF CARE
Problem: Discharge Planning  Goal: Discharge to home or other facility with appropriate resources  Outcome: Progressing   SW working on getting patient approved for SNF

## 2022-10-27 ENCOUNTER — APPOINTMENT (OUTPATIENT)
Dept: ULTRASOUND IMAGING | Age: 77
DRG: 689 | End: 2022-10-27
Payer: COMMERCIAL

## 2022-10-27 ENCOUNTER — APPOINTMENT (OUTPATIENT)
Dept: INTERVENTIONAL RADIOLOGY/VASCULAR | Age: 77
DRG: 689 | End: 2022-10-27
Payer: COMMERCIAL

## 2022-10-27 PROCEDURE — 1200000000 HC SEMI PRIVATE

## 2022-10-27 PROCEDURE — 76770 US EXAM ABDO BACK WALL COMP: CPT

## 2022-10-27 PROCEDURE — 6360000002 HC RX W HCPCS: Performed by: INTERNAL MEDICINE

## 2022-10-27 PROCEDURE — 2580000003 HC RX 258: Performed by: STUDENT IN AN ORGANIZED HEALTH CARE EDUCATION/TRAINING PROGRAM

## 2022-10-27 PROCEDURE — 6370000000 HC RX 637 (ALT 250 FOR IP): Performed by: STUDENT IN AN ORGANIZED HEALTH CARE EDUCATION/TRAINING PROGRAM

## 2022-10-27 PROCEDURE — 02HV33Z INSERTION OF INFUSION DEVICE INTO SUPERIOR VENA CAVA, PERCUTANEOUS APPROACH: ICD-10-PCS | Performed by: INTERNAL MEDICINE

## 2022-10-27 PROCEDURE — 2580000003 HC RX 258: Performed by: INTERNAL MEDICINE

## 2022-10-27 PROCEDURE — 2709999900 IR FLUORO GUIDED CVA DEVICE PLMT/REPLACE/REMOVAL

## 2022-10-27 PROCEDURE — 99232 SBSQ HOSP IP/OBS MODERATE 35: CPT | Performed by: INTERNAL MEDICINE

## 2022-10-27 PROCEDURE — 36573 INSJ PICC RS&I 5 YR+: CPT

## 2022-10-27 PROCEDURE — 99222 1ST HOSP IP/OBS MODERATE 55: CPT | Performed by: INTERNAL MEDICINE

## 2022-10-27 RX ORDER — CIPROFLOXACIN 500 MG/1
500 TABLET, FILM COATED ORAL 2 TIMES DAILY
Qty: 20 TABLET | Refills: 0 | Status: SHIPPED | OUTPATIENT
Start: 2022-10-27 | End: 2022-11-06

## 2022-10-27 RX ADMIN — SODIUM CHLORIDE, PRESERVATIVE FREE 10 ML: 5 INJECTION INTRAVENOUS at 21:34

## 2022-10-27 RX ADMIN — ASPIRIN 81 MG: 81 TABLET, COATED ORAL at 08:58

## 2022-10-27 RX ADMIN — TAMSULOSIN HYDROCHLORIDE 0.4 MG: 0.4 CAPSULE ORAL at 08:58

## 2022-10-27 RX ADMIN — MEROPENEM 1000 MG: 1 INJECTION, POWDER, FOR SOLUTION INTRAVENOUS at 09:47

## 2022-10-27 RX ADMIN — ENOXAPARIN SODIUM 40 MG: 100 INJECTION SUBCUTANEOUS at 08:58

## 2022-10-27 RX ADMIN — FLUOXETINE HYDROCHLORIDE 20 MG: 20 CAPSULE ORAL at 08:58

## 2022-10-27 RX ADMIN — MEROPENEM 1000 MG: 1 INJECTION, POWDER, FOR SOLUTION INTRAVENOUS at 21:38

## 2022-10-27 RX ADMIN — CARBAMAZEPINE 400 MG: 200 TABLET ORAL at 21:54

## 2022-10-27 RX ADMIN — SODIUM CHLORIDE: 9 INJECTION, SOLUTION INTRAVENOUS at 21:37

## 2022-10-27 RX ADMIN — SODIUM CHLORIDE, PRESERVATIVE FREE 10 ML: 5 INJECTION INTRAVENOUS at 09:48

## 2022-10-27 ASSESSMENT — ENCOUNTER SYMPTOMS
ABDOMINAL PAIN: 0
SHORTNESS OF BREATH: 0

## 2022-10-27 NOTE — PROGRESS NOTES
10/27/22 1127   Encounter Summary   Encounter Overview/Reason  Attempted Encounter  (Patient gone for procedure)   Service Provided For: Patient not available

## 2022-10-27 NOTE — CONSULTS
[facility] -   Infectious diseases evaluation  admission date 10/24/2022    reason for consultation:   Altered mental status, UTI    Impression :     Acute metabolic encephalopathy secondary to UTI in the setting of recurrent UTIs  History of Proteus mirabilis ESBL UTI in the beginning of this month    Recommendations   Continue IV meropenem  PICC line is placed  History of nonobstructing stones earlier this month, will repeat renal ultrasound to rule out obstruction  If no obstruction can DC on IV Meropenem for 7 days total    Infection Control Recommendations   [place] Precautions  Contact Isolation     Antimicrobial Stewardship Recommendations   Simplification of therapy  Targeted therapy    History of Present Illness:   Initial history:  Gardenia Herron is a 76y.o.-year-old female with a past medical history of seizure disorder and recurrent UTIs who presented originally with altered mental status, she also has a history of advanced dementia and Parkinson's disease. She lives at home and was brought into the ER by her daughter given her change in mental status. Had a recent admission earlier this month with pneumonia and Proteus ESBL UTI. Interval changes  10/27/2022   Patient is confused at this time  Alert and oriented only to self  She is not complaining of any urinary symptoms  She is incontinent of urine  PICC line placed yesterday    Patient Vitals for the past 8 hrs:   BP Temp Temp src Pulse Resp SpO2   10/27/22 0645 (!) 95/57 98.1 °F (36.7 °C) Oral 99 18 95 %         I have personally reviewed the past medical history, past surgical history, medications, social history, and family history, and I haveupdated the database accordingly. Allergies:   Haldol [haloperidol lactate]     Review of Systems:     Review of Systems   Constitutional:  Negative for fever. Respiratory:  Negative for shortness of breath.     Cardiovascular:  Negative for chest pain.   Gastrointestinal:  Negative for abdominal pain. Genitourinary:  Negative for dysuria and flank pain. Psychiatric/Behavioral:  Positive for confusion. All other systems reviewed and are negative. Physical Examination :     Physical Exam  Vitals reviewed. Constitutional:       General: She is awake. Cardiovascular:      Rate and Rhythm: Normal rate and regular rhythm. Heart sounds: Normal heart sounds. Pulmonary:      Effort: Pulmonary effort is normal.      Breath sounds: Normal breath sounds and air entry. Abdominal:      General: Abdomen is flat. Bowel sounds are normal.      Palpations: Abdomen is soft. Tenderness: There is no abdominal tenderness. Neurological:      Mental Status: She is alert. Psychiatric:         Behavior: Behavior is cooperative.      Past Medical History:     Past Medical History:   Diagnosis Date    Anxiety     Convulsion ([facility] [address]     Encephalopathy     Herpes     Hyperlipidemia     Left bundle branch block     MRSA (methicillin resistant Staphylococcus aureus)     Parkinson's disease ([facility] [address]     Seizures ([facility] [address]     secondary to encephalitis    Vitamin D deficiency        Past Surgical  History:     Past Surgical History:   Procedure Laterality Date    ABSCESS DRAINAGE Right 12/14/2013    WOUND EXPLORATION AND I+D  RIGHT HIP    CYSTOSCOPY Left 5/16/2022    CYSTOSCOPY URETERAL STENT INSERTION performed by Steven Guzmán MD at [address] [place] [address] Left 05/16/2022    Cystoscopy       Medications:      meropenem  1,000 mg IntraVENous Q12H    aspirin  81 mg Oral Daily    carBAMazepine  400 mg Oral Nightly    FLUoxetine  20 mg Oral Daily    tamsulosin  0.4 mg Oral Daily    sodium chloride flush  5-40 mL IntraVENous 2 times per day    enoxaparin  40 mg SubCUTAneous Daily       Social History:     Social History     Socioeconomic History    Marital status: Single     Spouse name: Not on file    Number of children: Not on file    Years of education: Not on file    Highest education level: Not on file   Occupational History    Not on file   Tobacco Use    Smoking status: Former     Packs/day: 1.00     Years: 30.00     Pack years: 30.00     Types: Cigarettes     Start date: 54     Quit date:      Years since quittin.8    Smokeless tobacco: Never    Tobacco comments:     unknown how many packs a day, or how many years   Vaping Use    Vaping Use: Never used   Substance and Sexual Activity    Alcohol use: No    Drug use: No    Sexual activity: Never   Other Topics Concern    Not on file   Social History Narrative    Not on file     Social Determinants of Health     Financial Resource Strain: Not on file   Food Insecurity: Not on file   Transportation Needs: Not on file   Physical Activity: Not on file   Stress: Not on file   Social Connections: Not on file   Intimate Partner Violence: Not on file   Housing Stability: Not on file       Family History:   History reviewed. No pertinent family history. Medical Decision Making:   I have independently reviewed/ordered the following labs:    CBC with Differential:   Recent Labs     10/24/22  2105 10/26/22  0605   WBC 9.0 9.4   HGB 13.1 12.1   HCT 39.3 36.0    396   LYMPHOPCT 30 27   MONOPCT 7 12*     BMP:  Recent Labs     10/24/22  2145 10/26/22  0605    137   K 3.4* 3.9    104   CO2 27 23   BUN 21 21   CREATININE 1.05* 1.02*     Hepatic Function Panel: No results for input(s): PROT, LABALBU, BILIDIR, IBILI, BILITOT, ALKPHOS, ALT, AST in the last 72 hours. No results for input(s): RPR in the last 72 hours. No results for input(s): HIV in the last 72 hours. No results for input(s): BC in the last 72 hours. Lab Results   Component Value Date/Time    CREATININE 1.02 10/26/2022 06:05 AM    GLUCOSE 90 10/26/2022 06:05 AM    GLUCOSE 93 2012 03:18 PM       Detailed results: Thank you for allowing us to participate in the care of this patient. Please call with questions.     This note is created with the assistance of a speech recognition program.  While intending to generate adocument that actually reflects the content of the visit, the document can still have some errors including those of syntax and sound a like substitutions which may escape proof reading. It such instances, actual meaningcan be extrapolated by contextual diversion. Minh Pelletier MD  Attending Physician Statement  I have discussed the care of the patient, including pertinent history and exam findings,  with the resident. I have reviewed the key elements of all parts of the encounter with the resident. I agree with the assessment, plan and orders as documented by the resident.     Iris Ardon MD   Office: [phone]  [employer] / office [phone]

## 2022-10-27 NOTE — PLAN OF CARE
Problem: Discharge Planning  Goal: Discharge to home or other facility with appropriate resources  10/27/2022 1638 by Judy Ledesma RN  Outcome: Progressing  Flowsheets (Taken 10/27/2022 0901)  Discharge to home or other facility with appropriate resources:   Identify barriers to discharge with patient and caregiver   Arrange for needed discharge resources and transportation as appropriate   Identify discharge learning needs (meds, wound care, etc)  10/27/2022 0450 by Raad Le RN  Outcome: Progressing  Flowsheets (Taken 10/25/2022 0338 by Alen Castillo RN)  Discharge to home or other facility with appropriate resources:   Identify barriers to discharge with patient and caregiver   Identify discharge learning needs (meds, wound care, etc)  Note: Discharge planning in progress. SNF appropriate. Problem: Skin/Tissue Integrity  Goal: Absence of new skin breakdown  Description: 1. Monitor for areas of redness and/or skin breakdown  2. Assess vascular access sites hourly  3. Every 4-6 hours minimum:  Change oxygen saturation probe site  4. Every 4-6 hours:  If on nasal continuous positive airway pressure, respiratory therapy assess nares and determine need for appliance change or resting period. 10/27/2022 1638 by Judy Ledesma RN  Outcome: Progressing  10/27/2022 0450 by Raad Le RN  Outcome: Progressing  Note: Skin assessment as charted.       Problem: Safety - Adult  Goal: Free from fall injury  Outcome: Progressing     Problem: ABCDS Injury Assessment  Goal: Absence of physical injury  Outcome: Progressing  Flowsheets (Taken 10/27/2022 0900)  Absence of Physical Injury: Implement safety measures based on patient assessment

## 2022-10-27 NOTE — CARE COORDINATION
ONGOING DISCHARGE PLAN:    Patient is alert and oriented x4. Spoke with patient regarding discharge plan and patient confirms that plan is still to go to [facility]. Updated Clinicals faxed to Teo at [facility]. IV merrem for +urine for ecoli  ID consult , PT/OT eval.    Will continue to follow for additional discharge needs.     Electronically signed by Maximo Rogers RN on 10/27/2022 at 9:03 AM

## 2022-10-27 NOTE — PLAN OF CARE
Problem: Discharge Planning  Goal: Discharge to home or other facility with appropriate resources  10/27/2022 0450 by Dash Sexton RN  Outcome: Progressing  Flowsheets (Taken 10/25/2022 0338 by Jeane Castaneda RN)  Discharge to home or other facility with appropriate resources:   Identify barriers to discharge with patient and caregiver   Identify discharge learning needs (meds, wound care, etc)  Note: Discharge planning in progress. SNF appropriate. 10/26/2022 1610 by Bennie Vargas RN  Outcome: Progressing     Problem: Skin/Tissue Integrity  Goal: Absence of new skin breakdown  Description: 1. Monitor for areas of redness and/or skin breakdown  2. Assess vascular access sites hourly  3. Every 4-6 hours minimum:  Change oxygen saturation probe site  4. Every 4-6 hours:  If on nasal continuous positive airway pressure, respiratory therapy assess nares and determine need for appliance change or resting period. Outcome: Progressing  Note: Skin assessment as charted.

## 2022-10-27 NOTE — PROGRESS NOTES
Writer messaged good Arias on pt US results that showed   Caliectasis right kidney. Mild hydronephrosis left kidney. Bilateral   nephrolithiasis. No cortical thinning. To notify her so appropriate antibiotics can be prescribed for dc.

## 2022-10-27 NOTE — CARE COORDINATION
DISCHARGE PLANNING NOTE:    Xi Gross from [facility] called back stating patient got denied for authorization . Auth was submitted prior to knowing about IV ATB. Xi Gross stated they we could submit for Peer to Peer and it might be processed faster than resubmitting for authorization. Dr Lani Pack notified of need for Peer to Peer. Needs to be completed prior tp 10/28/22 at 0900 am .   Call [phone]   Service provider ID [id number]   Authorization ID [id number].   Electronically signed by Landen Galan RN on 10/27/2022 at 3:57 PM

## 2022-10-27 NOTE — CARE COORDINATION
Kristel Munoz MD   Physician   Internal Medicine   Progress Notes      Signed   Date of Service:  10/26/2022  2:53 PM                 Signed        Expand All Collapse All                                                                                                                                                                                                                                                                                                                                                                                                                                                                                                                                                                                                                                                                                         [facility] Internal Medicine  Carine Villa MD; Demetrius Pressley MD; Kenyon Nazario MD; MD Jonathan Ornelas MD; Mariela Oates MD     [facility] Internal Medicine   [address] [facility]      Progress Note     10/26/2022      2:58 PM     Name:             Angelo Brasher  MRN:               [id number]                                      Acct:                [de-identified]   Room:             2045/2045-01  IP Day:            1  Admit Date:     10/24/2022  8:54 PM                PCP:                Sarah Hayes DO  Code Status:   DNR-CCA     Subjective:      C/C:       Chief Complaint   Patient presents with    Altered Mental Status      Interval History Status: improved. Patient is feeling better today. She is without any complaint  She is afebrile and is hemodynamically stable. Urine culture positive for E. coli. She is on IV meropenem. Brief History:      The patient is a 76 y.o.   Non- / non  female who presents with Altered Mental Status   and she is admitted to the hospital for the management of change in mentation  History is extremely limited, patient is advanced dementia, Parkinson disease, COPD, history of recurrent UTIs  Most of history history is retrieved from E [facility]  Patient lives at home, as per notes, patient daughter noticed change in mentation and blood in the urine  Patient was recently admitted in hospital and got discharged on 10/8, with pneumonia and UTI  At that time her urine was positive for Proteus which was MDR and ESBL           Review of Systems:         As recorded in interval hx                                      Medications: Allergies: Allergies   Allergen Reactions    Haldol [Haloperidol Lactate]           Current Meds:   Scheduled Meds:   Scheduled Medications    aspirin  81 mg Oral Daily    carBAMazepine  400 mg Oral Nightly    FLUoxetine  20 mg Oral Daily    tamsulosin  0.4 mg Oral Daily    sodium chloride flush  5-40 mL IntraVENous 2 times per day    meropenem  1,000 mg IntraVENous Q12H    enoxaparin  40 mg SubCUTAneous Daily         Continuous Infusions:   Infusions Meds    sodium chloride           PRN Meds:   PRN Medications   sodium chloride flush, sodium chloride, acetaminophen, ondansetron **OR** ondansetron, melatonin, potassium chloride **OR** potassium alternative oral replacement **OR** potassium chloride        Data:      I/O (24Hr): Intake/Output Summary (Last 24 hours) at 10/26/2022 1458  Last data filed at 10/26/2022 1145      Gross per 24 hour   Intake 120 ml   Output --   Net 120 ml         Labs:  Significant last 24 hr data reviewed ;   Vitals          Vitals:     10/25/22 0315 10/25/22 0633 10/25/22 1758 10/26/22 0725   BP:   115/63 (!) 92/47 109/61   Pulse:   75 87 98   Resp:   20 18 16   Temp:   98.2 °F (36.8 °C) 98.6 °F (37 °C) 98.1 °F (36.7 °C)   TempSrc:       Oral   SpO2:   95% 95% 96%   Weight: 125 lb (56.7 kg)         Height:                 No results for input(s): POCGLU in the last 72 hours.     Recent Results         Recent Results (from the past 24 hour(s))   Basic Metabolic Panel     Collection Time: 10/26/22  6:05 AM   Result Value Ref Range     Glucose 90 70 - 99 mg/dL     BUN 21 8 - 23 mg/dL     Creatinine 1.02 (H) 0.50 - 0.90 mg/dL     Est, Glom Filt Rate 56 (L) >60 mL/min/1.73m2     Calcium 8.2 (L) 8.6 - 10.4 mg/dL     Sodium 137 135 - 144 mmol/L     Potassium 3.9 3.7 - 5.3 mmol/L     Chloride 104 98 - 107 mmol/L     CO2 23 20 - 31 mmol/L     Anion Gap 10 9 - 17 mmol/L   CBC with Auto Differential     Collection Time: 10/26/22  6:05 AM   Result Value Ref Range     WBC 9.4 3.5 - 11.0 k/uL     RBC 3.79 (L) 4.0 - 5.2 m/uL     Hemoglobin 12.1 12.0 - 16.0 g/dL     Hematocrit 36.0 36 - 46 %     MCV 95.0 80 - 100 fL     MCH 31.9 26 - 34 pg     MCHC 33.6 31 - 37 g/dL     RDW 14.7 11.5 - 14.9 %     Platelets 841 707 - 217 k/uL     MPV 6.9 6.0 - 12.0 fL     Seg Neutrophils 56 36 - 66 %     Lymphocytes 27 24 - 44 %     Monocytes 12 (H) 1 - 7 %     Eosinophils % 5 (H) 0 - 4 %     Basophils 0 0 - 2 %     Segs Absolute 5.26 1.3 - 9.1 k/uL     Absolute Lymph # 2.54 1.0 - 4.8 k/uL     Absolute Mono # 1.13 0.1 - 1.3 k/uL     Absolute Eos # 0.47 (H) 0.0 - 0.4 k/uL     Basophils Absolute 0.00 0.0 - 0.2 k/uL     Morphology Normal           No results for input(s): POCGLU in the last 72 hours.                     URINE ANALYSIS: No results found for: LABURIN      CBC:        Lab Results   Component Value Date/Time     WBC 9.4 10/26/2022 06:05 AM     WBC 9.0 10/24/2022 09:05 PM     WBC 12.2 10/06/2022 04:50 AM     HGB 12.1 10/26/2022 06:05 AM     HGB 13.1 10/24/2022 09:05 PM     HGB 10.9 10/06/2022 04:50 AM      10/26/2022 06:05 AM      10/24/2022 09:05 PM      10/06/2022 04:50 AM      03/12/2012 03:18 PM          BMP:          Lab Results   Component Value Date/Time      10/26/2022 06:05 AM      10/24/2022 09:45 PM      10/06/2022 04:50 AM     K 3.9 10/26/2022 06:05 AM     K 3.4 10/24/2022 09:45 PM     K 4.0 10/06/2022 04:50 AM      10/26/2022 06:05 AM      10/24/2022 09:45 PM      10/06/2022 04:50 AM     CO2 23 10/26/2022 06:05 AM     CO2 27 10/24/2022 09:45 PM     CO2 23 10/06/2022 04:50 AM     BUN 21 10/26/2022 06:05 AM     BUN 21 10/24/2022 09:45 PM     BUN 21 10/06/2022 04:50 AM     CREATININE 1.02 10/26/2022 06:05 AM     CREATININE 1.05 10/24/2022 09:45 PM     CREATININE 0.95 10/06/2022 04:50 AM     GLUCOSE 90 10/26/2022 06:05 AM     GLUCOSE 132 10/24/2022 09:45 PM     GLUCOSE 121 10/06/2022 04:50 AM     GLUCOSE 93 03/12/2012 03:18 PM       LIVER PROFILE:        Lab Results   Component Value Date/Time     ALT 17 10/03/2022 06:17 PM     AST 25 10/03/2022 06:17 PM     PROT 7.7 10/03/2022 06:17 PM     BILITOT 0.2 10/03/2022 06:17 PM     BILIDIR <0.08 10/12/2015 07:00 AM     LABALBU 3.0 10/03/2022 06:17 PM                        Lab Results   Component Value Date/Time     SPECIAL 1CC EACH LT WRIST 10/03/2022 10:50 PM             Lab Results   Component Value Date/Time     CULTURE (A) 10/24/2022 09:45 PM       ESCHERICHIA COLI 50 to 100,000 CFU/ML This organism is an Extended Spectrum Beta Lactamase (ESBL)  and resistance to therapy with Penicillins, Cephalosporins and Aztreonam is expected. These organisms generally remain susceptible to Carbapenems. Consider ID Consultation. CONTACT PRECAUTIONS INDICATED. Radiology:  XR CHEST PORTABLE     Result Date: 10/24/2022  Right lower lobe infiltrate improved. COPD. Physical Examination:      Physical Exam   Vitals:    Vitals          Vitals:     10/25/22 0315 10/25/22 0633 10/25/22 1758 10/26/22 0725   BP:   115/63 (!) 92/47 109/61   Pulse:   75 87 98   Resp:   20 18 16   Temp:   98.2 °F (36.8 °C) 98.6 °F (37 °C) 98.1 °F (36.7 °C)   TempSrc:       Oral   SpO2:   95% 95% 96%   Weight: 125 lb (56.7 kg)         Height:                               Body mass index is 19.58 kg/m². General Appearance:   Awake alert and oriented. Pulmonary/Chest:        Clear to auscultation bilaterally . No wheezes, rales or rhonchi . Cardiovascular:            Normal rate, regular rhythm,                                          No murmur or  Gallop . Abdomen:                       Soft, non-tender                                                                                    Extremities:                    No  Edema . Assessment:         Hospital Problems               Last Modified POA     * (Principal) Acute cystitis with hematuria 10/25/2022 Yes     Seizure ([facility] [address] 10/26/2022 Yes     Dementia due to Parkinson's disease with behavioral disturbance ([facility] [address] 10/26/2022 Yes         Plan:         Patient status Admit as inpatient in the  Progressive Unit/Step down     Admitted with change in mentation, patient has history of UTI with ESBL, urine culture positive, final sensitivity awaited, will continue with IV meropenem until final culture sensitivity available  History of dementia  Chest x-ray seen, patient, has signs of resolving pneumonia, of note she was recently discharged from hospital with pneumonia  History of seizure disorder restarted home dose of Tegretol  Will request palliative care consult to discuss CODE STATUS  Patient hemoglobin is even better what it was when she was released last time, will continue with Lovenox for DVT prophylaxis  Patient will need outpatient urology evaluation and cystoscopy with recurrent UTIs  Will try to reach patient family. 10/26:  Patient is feeling better with no complaints. Afebrile and hemodynamically stable. Urine culture positive for E. coli. Palliative care on board.         Kenisha Mills MD  10/26/2022  2:58 PM      I have discussed the care of Jasper Phillips , including pertinent history and exam findings,    today with the resident. I have seen and examined the patient and the key elements of all parts of the encounter have been performed by me . I agree with the assessment, plan and orders as documented by the resident. Principal Problem:    Acute cystitis with hematuria  Active Problems:    Seizure ([facility] [address]    Dementia due to Parkinson's disease with behavioral disturbance ([facility] [address]  Resolved Problems:    * No resolved hospital problems. *          Overall  course ;                                   are improving over time. Patient clinically doing better  Hb is stable , no hematuria  Urine culture growing ESBL  Requesting ID consult likely will need PICC line and discharge on ertapenem             Electronically signed by Rasheeda Barlow MD      Please note that this chart was generated using voice recognition Dragon dictation software. Although every effort was made to ensure the accuracy of this automated transcription, some errors in transcription may have occurred. Revision History                            Tulio Emmanuel OT   Occupational Therapist   Occupational   Progress Notes      Signed   Date of Service:  10/26/2022  2:55 PM          Initial Evaluation           Signed                                                                                                                                                                                                                                                                                            Occupational Therapy  [facility]: YOSEF LITTLEJOHN   Occupational Therapy Evaluation  Date: 10/26/22  Patient Name: Jasper Phillips       Room: 7520/2914-33  MRN:   [id number]  Account: [de-identified]   : 1945  (73 y.o.) Gender: female      Discharge Recommendations:  Further Occupational Therapy is recommended upon facility discharge.     OT Equipment Recommendations  Other: TBD     Referring Practitioner: Carolyn Phan MD  Diagnosis: Acute cystitis with hematuria Additional Pertinent Hx: Marvin Huff is a 76 y.o. female who presents with increasing confusion over the past few days with urinary incontinence and hematuria. Daughter at bedside states patient has history of Parkinson's and dementia but has been acting more confused than baseline over the past few days. She has also had several episodes of urinary incontinence, the most recent of which the daughter states was significantly blood-tinged. Patient states she feels \"all mixed up\". Patient has had nausea and significantly decreased PO intake per daughter. She has a history of recurrent UTIs, the most recent of which grew Proteus mirabilis and was sensitive only to meropenem. She denies headache, changes in vision, chest pain, shortness of breath, abdominal pain, vomiting, diarrhea, falls or trauma, numbness, tingling, weakness     Treatment Diagnosis: impaired self care status     Past Medical History:  has a past medical history of Anxiety, Convulsion ([address] Encephalopathy, Herpes, Hyperlipidemia, Left bundle branch block, MRSA (methicillin resistant Staphylococcus aureus), Parkinson's disease ([facility] [address] Seizures ([facility] [address] and Vitamin D deficiency. Past Surgical History:   has a past surgical history that includes Abscess Drainage (Right, 12/14/2013); Ureter stent placement (Left, 05/16/2022); and Cystoscopy (Left, 5/16/2022). Restrictions  Restrictions/Precautions  Restrictions/Precautions: Fall Risk;General Precautions  Required Braces or Orthoses?: No  Position Activity Restriction  Other position/activity restrictions: up with assistance       Vitals  Vitals  Heart Rate: 98  Heart Rate Source: Monitor  BP: 109/61  MAP (Calculated): 77  Resp: 16  SpO2: 96 %  O2 Device: None (Room air)      Subjective  Subjective:  \"No I don't need to right now\" pt reports in response to using the bathroom despite Information  Safety Judgement: Decreased awareness of need for assistance, Decreased awareness of need for safety  Problem Solving: Assistance required to generate solutions, Assistance required to implement solutions, Assistance required to identify errors made, Assistance required to correct errors made, Decreased awareness of errors  Insights: Not aware of deficits  Initiation: Requires cues for all  Sequencing: Requires cues for some  Cognition Comment: Unable to identify or express wants/needs accurately   Sensation  Overall Sensation Status: WFL     Activities of Daily Living  ADL  Feeding: Stand by assistance  Feeding Skilled Clinical Factors: verbal cues and set up assistance  Grooming: Stand by assistance  Grooming Skilled Clinical Factors: standing sinkside to complete hand hygiene  UE Bathing: Stand by assistance  LE Bathing: Moderate assistance  UE Dressing: Stand by assistance  LE Dressing: Moderate assistance  Toileting: Moderate assistance  Toileting Skilled Clinical Factors: A to thread BLEs into brief and pull up over hips during toileting; pt able to complete larry hygiene while seated on toilet  Additional Comments: ADL scores based on skilled observation and clinical reasoning unless otherwise noted. Pt is currently limited by cognition, balance, activity tolerance, and strength impacting performance in self care tasks.         UE Function  LUE PROM (degrees)  LUE PROM: WFL  LUE AROM (degrees)  LUE AROM : Exceptions  LUE General AROM: shoulder 0-45; all other joints WFL  Left Hand AROM (degrees)  Left Hand AROM: WFL (pt is missing digit 5 at baseline)  Tone LUE  LUE Tone: Normotonic  LUE Strength  Gross LUE Strength: Exceptions to [facility]  L Shoulder Flex: 2/5  L Shoulder Ext: 2/5  L Hand General: 3+/5  LUE Strength Comment: elbow 3+/5; wrist 3+/5     RUE AROM (degrees)  RUE AROM : WFL  Right Hand AROM (degrees)  Right Hand AROM: WFL  Tone RUE  RUE Tone: Normotonic  RUE Strength  Gross RUE Strength: WFL  R Hand General: 4-/5  RUE Strength Comment: grossly 4-/5        Fine Motor Skills/Coordination  Coordination  Movements Are Fluid And Coordinated: No  Coordination and Movement Description: Gross motor impairments, Fine motor impairments, Right UE, Left UE, Decreased speed, Decreased accuracy, Tremors   Mobility  Bed Mobility  Bed mobility  Rolling to Right: Stand by assistance  Supine to Sit: Stand by assistance  Sit to Supine: Stand by assistance  Scooting: Stand by assistance  Bed Mobility Comments: HOB flat without use of rails.  SBA for safety and IV line management     Balance  Balance  Sitting Balance: Stand by assistance (dynamic and static sitting EOB for 1-2 minutes, no LOB)  Standing Balance: Contact guard assistance  Standing Balance  Time: 2-3 minutes  Activity: functional transfers, functional mobility, hand hygiene  Comment: CGA for safety due to decreased balance and tremors     Transfers  Transfers  Sit to stand: Contact guard assistance  Stand to sit: Contact guard assistance  Transfer Comments: no device     Functional Mobility  Functional - Mobility Device: No device  Activity: To/from bathroom  Assist Level: Contact guard assistance  Functional Mobility Comments: pt slightly unsteady at times however no significant loss of balance     Assessment  Assessment  Performance deficits / Impairments: Decreased functional mobility , Decreased ADL status, Decreased ROM, Decreased strength, Decreased safe awareness, Decreased endurance, Decreased cognition, Decreased balance, Decreased vision/visual deficit, Decreased high-level IADLs, Decreased coordination, Decreased fine motor control  Treatment Diagnosis: impaired self care status  Prognosis: Fair  Decision Making: Medium Complexity  Discharge Recommendations: Patient would benefit from continued therapy after discharge     Activity Tolerance  Activity Tolerance: Treatment limited secondary to decreased cognition, Patient Tolerated treatment well     Safety Devices  Type of Devices: Nurse notified, Left in bed, Bed alarm in place, Call light within reach     Patient Education  Patient Education  Education Given To: Patient, Family  Education Provided: Role of Therapy, Plan of Care, Transfer Training  Education Method: Verbal  Barriers to Learning: Cognition  Education Outcome: Continued education needed        Functional Outcome Measures  AM-PAC Daily Activity Inpatient   How much help for putting on and taking off regular lower body clothing?: A Lot  How much help for Bathing?: A Lot  How much help for Toileting?: A Lot  How much help for putting on and taking off regular upper body clothing?: A Little  How much help for taking care of personal grooming?: A Little  How much help for eating meals?: A Little  AM-[facility] Inpatient Daily Activity Raw Score: 15  AM-PAC Inpatient ADL T-Scale Score : 34.69  ADL Inpatient CMS 0-100% Score: 56.46  ADL Inpatient CMS G-Code Modifier : CK     Goals  Short Term Goals  Time Frame for Short Term Goals: By discharge, pt will  Short Term Goal 1: complete lower body dressing/bathing with CGA and Fair safety  Short Term Goal 2: complete functional transfers/mobility during self care and mobility with Supervision  Short Term Goal 3: tolerate standing 5+ minutes during functional activity of choice with Supervision  Short Term Goal 4: verbalize/demonstrate Good understanding of home safety/fall prevention strategies to increase safety and independence with self care and mobility  Short Term Goal 5: participate in 15+ minutes of therapeutic exercises/functional activities to increase safety and independence with self care and mobility  Short Term Goal 6: initiate self feeding/grooming task with <2 verbal cues to increase independence in self care     Plan  Occupational Therapy Plan  Times Per Week: 4-6  Current Treatment Recommendations: Strengthening, Balance training, Functional mobility training, ROM, Endurance training, Pain management, Safety education & training, Patient/Caregiver education & training, Equipment evaluation, education, & procurement, Positioning, Self-Care / ADL, Cognitive/Perceptual training, Coordination training        OT Individual Minutes  OT Individual Minutes  Time In: 6976  Time Out: 1013  Minutes: 28  Time Code Minutes   Timed Code Treatment Minutes: 15 Minutes           Electronically signed by Kaitlin Albright OT on 10/26/22 at 2:56 PM EDT                    Cb Driver PT   Physical Therapist   Physical Therapy   Progress Notes      Signed   Date of Service:  10/26/2022  5:13 PM          Initial Evaluation           Signed                                                                                                                                                                                                                    Physical Therapy  Facility/Department: [place] [id number]  Physical Therapy Initial Assessment     Name: Hannah Ledbetter  : 1945  MRN: [id number]  Date of Service: 10/26/2022     Discharge Recommendations:  Patient would benefit from continued therapy after discharge       Patient Diagnosis(es): The encounter diagnosis was Acute cystitis with hematuria. Past Medical History:  has a past medical history of Anxiety, Convulsion ([address] Encephalopathy, Herpes, Hyperlipidemia, Left bundle branch block, MRSA (methicillin resistant Staphylococcus aureus), Parkinson's disease ([address] Seizures ([address] and Vitamin D deficiency. Past Surgical History:  has a past surgical history that includes Abscess Drainage (Right, 2013); Ureter stent placement (Left, 2022); and Cystoscopy (Left, 2022). Assessment   Assessment: The patient presents with impaired strength and balance impeding overall safety and independence with mobility. The patient demonstrates need for assistance during all functional mobility and is unsafe to return home without 24/hr support and superivision. PT services warranted to address deficits and progress towards prior level of independence  Treatment Diagnosis: Impaired mobility and balance 2* acute cystitis with hematuria  Specific Instructions for Next Treatment: Progress ambulation and balance; therapeutic activity  Therapy Prognosis: Fair  Decision Making: Medium Complexity  Exam: ROM, MMT, Balance, and functional mobility assessments  Clinical Presentation: Pt is pleasant, confused, and cooperative; easily redirected to task  Barriers to Learning: Cognition; Dementia  Requires PT Follow-Up: Yes  Activity Tolerance  Activity Tolerance: Treatment limited secondary to decreased cognition      Plan   Physcial Therapy Plan  General Plan: 3-5 times per week  Specific Instructions for Next Treatment: Progress ambulation and balance; therapeutic activity  Current Treatment Recommendations: Strengthening, Balance training, Functional mobility training, Transfer training, Stair training, Gait training, Neuromuscular re-education, Safety education & training, Patient/Caregiver education & training, Therapeutic activities  Safety Devices  Type of Devices: Nurse notified, Left in bed, Bed alarm in place, Call light within reach  Restraints  Restraints Initially in Place: No      Restrictions  Restrictions/Precautions  Restrictions/Precautions: Fall Risk, General Precautions  Required Braces or Orthoses?: No  Position Activity Restriction  Other position/activity restrictions: up with assistance      Subjective   Pain: pt denies pain at present  General  Chart Reviewed: Yes  Patient assessed for rehabilitation services?: Yes  Additional Pertinent Hx: Acute cystitis with hematuria Additional Pertinent Hx: Kathy Ward is a 76 y.o. female who presents with increasing confusion over the past few days with urinary incontinence and hematuria.  Daughter at bedside states patient has history of Parkinson's and dementia but has been acting more confused than baseline over the past few days. She has also had several episodes of urinary incontinence, the most recent of which the daughter states was significantly blood-tinged. Patient states she feels \"all mixed up\". Patient has had nausea and significantly decreased PO intake per daughter. She has a history of recurrent UTIs, the most recent of which grew Proteus mirabilis and was sensitive only to meropenem. She denies headache, changes in vision, chest pain, shortness of breath, abdominal pain, vomiting, diarrhea, falls or trauma, numbness, tingling, weakness. Response To Previous Treatment: Not applicable  Family / Caregiver Present: Yes María Serrato)  Referring Practitioner: Dr. Arnaldo Lynch  Referral Date : 10/25/22  Diagnosis: Acute cystitis with hematuria  Follows Commands: Impaired (with increased time and repeated cues)  General Comment  Comments: [facility] per Nurse Austin Son to proceed with Therapy assessments  Subjective  Subjective: Pt agreeable to participating but pleasantly confused throughout; unable to provide much relevant information 2* baseline dementia and cognitive barriers. Pt denies needs to void but when prompted to use toilet, voids in toilet. Per discussion with Dtr and time spent with pt; pt is unable to identify and express needs and requires frequent cues/prompts for feeding and toileting.       Social/Functional History  Social/Functional History  Lives With: Daughter, Family (Dtr Gricel Ivy, her spouse and 12 y.o. son)  Type of Home: House  Home Layout: One level  Home Access: Stairs to enter without rails  Entrance Stairs - Number of Steps: 1 ELIZABETH at back  Bathroom Shower/Tub: Tub/Shower unit, Curtain, Shower chair with back  [employer]: Standard  Bathroom Equipment: Hand-held shower  Bathroom Accessibility: Accessible  Home Equipment: Walker, rolling, [address] Help From: Family (daughter)  ADL Assistance: Needs assistance (Set-up assist and verbal cues from Dtr for all ADLs)  Homemaking Assistance: Needs assistance  Homemaking Responsibilities: No  Ambulation Assistance: Needs assistance (without device)  Transfer Assistance: Needs assistance (without device)  Active : No  Patient's  Info: daughter  IADL Comments: Daughter reports that patient sleeps in daughters room on a regular flat bed  Additional Comments: daughter reports she works full time 3 days/week and then is off the remainder of the week, therefore is unable to provide 24hr assistance. Vision/Hearing  Vision  Vision: Impaired  Vision Exceptions: Wears glasses at all times  Hearing  Hearing: Exceptions to [address] [facility]   Orientation  Overall Orientation Status: Impaired  Orientation Level: Oriented to person;Disoriented to place; Disoriented to time;Disoriented to situation  Cognition  Overall Cognitive Status: Exceptions  Arousal/Alertness: Appropriate responses to stimuli  Following Commands: Follows one step commands with increased time; Follows one step commands with repetition  Attention Span: Attends with cues to redirect  Memory: Decreased short term memory;Decreased recall of recent events;Decreased recall of biographical Information  Safety Judgement: Decreased awareness of need for assistance;Decreased awareness of need for safety  Problem Solving: Assistance required to generate solutions;Assistance required to implement solutions;Assistance required to identify errors made;Assistance required to correct errors made;Decreased awareness of errors  Insights: Not aware of deficits  Initiation: Requires cues for all  Sequencing: Requires cues for some  Cognition Comment: Unable to identify or express wants/needs accurately      Objective   Heart Rate: 98  Heart Rate Source: Monitor  BP: 109/61  MAP (Calculated): 77  Resp: 16  SpO2: 96 %  O2 Device: None (Room air)  Observation/Palpation  Observation: Peripheral IV R wrist     AROM RLE (degrees)  RLE AROM: WFL  AROM LLE (degrees)  LLE AROM : WFL  AROM RUE (degrees)  RUE General AROM: See OT  AROM LUE (degrees)  LUE General AROM: See OT  Strength RLE  Strength RLE: WFL  Comment: Grossly 4-/5  Strength LLE  Strength LLE: WFL  Comment: Grossly 4-/5  Strength RUE  Comment: See OT  Strength LUE  Comment: See OT  Bed mobility  Rolling to Right: Stand by assistance  Supine to Sit: Stand by assistance  Sit to Supine: Stand by assistance  Scooting: Stand by assistance  Bed Mobility Comments: HOB flat without use of rails. SBA for safety and IV line management  Transfers  Sit to Stand: Contact guard assistance  Stand to Sit: Contact guard assistance  Comment: No Device used; CGA for steadying assist as pt demo's unsteadiness (Per Pt's Dtr's Report; pt does not know how to use RW thus making mobility more difficult and complicated for pt and this is reflected in a previous PT Note from few weeks ago.)  Ambulation  Surface: Level tile  Device: No Device  Assistance: Contact guard assistance  Quality of Gait: Unsteady, short and choppy steps; tendency to reach for external supports  Gait Deviations: Slow Anju; Increased RIKI; Decreased step length;Decreased step height;Decreased head and trunk rotation; Shuffles  Distance: 15'x2, 10'x2  Stairs/Curb  Stairs?: Yes  Stairs  # Steps : 1  Stairs Height: 4\"  Device: No Device  Assistance: Minimal assistance  Comment: Larry with HHA provided for single box step negotiation  Balance  Posture: Fair  Sitting - Static: Fair;+  Sitting - Dynamic: Fair;+  Standing - Static: Fair (no Device)  Standing - Dynamic: Fair;- (no device)     AM-PAC Score  AM-PAC Inpatient Mobility Raw Score : 18 (10/26/22 0900)  AM-PAC Inpatient T-Scale Score : 43.63 (10/26/22 0900)  Mobility Inpatient CMS 0-100% Score: 46.58 (10/26/22 0900)  Mobility Inpatient CMS G-Code Modifier : CK (10/26/22 0900)  Goals  Short Term Goals  Time Frame for Short Term Goals: 6 visits  Short Term Goal 1: Independent bed mobility  Short Term Goal 2: pt to demo transfers with device as needed and SBA  Short Term Goal 3: pt to ambulate 54-65' with device as needed and SBA  Short Term Goal 4: pt to negotiate single step with CGA and no rail to allow for safe home access  Short Term Goal 5: pt to improve dynamic standing balance to FAIR to dec fall risk     Education  Patient Education  Education Given To: Patient; Family  Education Provided: Role of Therapy;Plan of Care;Transfer Training; Fall Prevention Strategies; Equipment  Education Method: Verbal  Barriers to Learning: Cognition;Vision  Education Outcome: Continued education needed        Therapy Time    Individual Concurrent Group Co-treatment   Time In 0945      Time Out 1013      Minutes 28      Timed Code Treatment Minutes: Adam Matson PT   Electronically signed by Caterina Pedro PT on 10/26/2022 at 5:13 PM

## 2022-10-27 NOTE — PROGRESS NOTES
[facility] Internal Medicine  Yoanna Akers MD; Gama Baker MD; Sara Snyder MD; MD Jason Felder MD; MD [facility]   [facility]     Progress Note    10/27/2022    9:20 AM    Name:   Kasi Chino  MRN:     [id number]     Acct:      [de-identified]   Room:   2045/2045-01  IP Day:  2  Admit Date:  10/24/2022  8:54 PM    PCP:   Dominique Palomino DO  Code Status:  DNR-CCA    Subjective:     C/C:   Chief Complaint   Patient presents with    Altered Mental Status     Interval History Status: improved. Patient is feeling better today. She is without any complaint  She is afebrile and is hemodynamically stable. Urine culture positive for E. coli. She is on IV meropenem. Brief History:     The patient is a 76 y.o. Non- / non  female who presents with Altered Mental Status   and she is admitted to the hospital for the management of change in mentation  History is extremely limited, patient is advanced dementia, Parkinson disease, COPD, history of recurrent UTIs  Most of history history is retrieved from E HR  Patient lives at home, as per notes, patient daughter noticed change in mentation and blood in the urine  Patient was recently admitted in hospital and got discharged on 10/8, with pneumonia and UTI  At that time her urine was positive for Proteus which was MDR and ESBL          Review of Systems:         As recorded in interval hx                                     Medications: Allergies:     Allergies   Allergen Reactions    Haldol [Haloperidol Lactate]        Current Meds:   Scheduled Meds:    meropenem  1,000 mg IntraVENous Q12H    aspirin  81 mg Oral Daily    carBAMazepine  400 mg Oral Nightly    FLUoxetine  20 mg Oral Daily    tamsulosin  0.4 mg Oral Daily    sodium chloride flush  5-40 mL IntraVENous 2 times per day    enoxaparin  40 mg SubCUTAneous Daily     Continuous Infusions:    sodium chloride       PRN Meds: sodium chloride flush, sodium chloride, acetaminophen, ondansetron **OR** ondansetron, melatonin, potassium chloride **OR** potassium alternative oral replacement **OR** potassium chloride    Data:     I/O (24Hr): Intake/Output Summary (Last 24 hours) at 10/27/2022 0920  Last data filed at 10/26/2022 1739  Gross per 24 hour   Intake 240 ml   Output --   Net 240 ml       Labs:  Significant last 24 hr data reviewed ;   Vitals:    10/25/22 1758 10/26/22 0725 10/26/22 1857 10/27/22 0645   BP: (!) 92/47 109/61 (!) 108/54 (!) 95/57   Pulse: 87 98 86 99   Resp: 18 16 18 18   Temp: 98.6 °F (37 °C) 98.1 °F (36.7 °C) 98.2 °F (36.8 °C) 98.1 °F (36.7 °C)   TempSrc:  Oral Oral Oral   SpO2: 95% 96% 95% 95%   Weight:       Height:         No results for input(s): POCGLU in the last 72 hours. No results found for this or any previous visit (from the past 24 hour(s)). No results for input(s): POCGLU in the last 72 hours.                 URINE ANALYSIS: No results found for: LABURIN     CBC:  Lab Results   Component Value Date/Time    WBC 9.4 10/26/2022 06:05 AM    WBC 9.0 10/24/2022 09:05 PM    WBC 12.2 10/06/2022 04:50 AM    HGB 12.1 10/26/2022 06:05 AM    HGB 13.1 10/24/2022 09:05 PM    HGB 10.9 10/06/2022 04:50 AM     10/26/2022 06:05 AM     10/24/2022 09:05 PM     10/06/2022 04:50 AM     03/12/2012 03:18 PM        BMP:    Lab Results   Component Value Date/Time     10/26/2022 06:05 AM     10/24/2022 09:45 PM     10/06/2022 04:50 AM    K 3.9 10/26/2022 06:05 AM    K 3.4 10/24/2022 09:45 PM    K 4.0 10/06/2022 04:50 AM     10/26/2022 06:05 AM     10/24/2022 09:45 PM     10/06/2022 04:50 AM    CO2 23 10/26/2022 06:05 AM    CO2 27 10/24/2022 09:45 PM    CO2 23 10/06/2022 04:50 AM    BUN 21 10/26/2022 06:05 AM    BUN 21 10/24/2022 09:45 PM    BUN 21 10/06/2022 04:50 AM    CREATININE 1.02 10/26/2022 06:05 AM CREATININE 1.05 10/24/2022 09:45 PM    CREATININE 0.95 10/06/2022 04:50 AM    GLUCOSE 90 10/26/2022 06:05 AM    GLUCOSE 132 10/24/2022 09:45 PM    GLUCOSE 121 10/06/2022 04:50 AM    GLUCOSE 93 03/12/2012 03:18 PM      LIVER PROFILE:  Lab Results   Component Value Date/Time    ALT 17 10/03/2022 06:17 PM    AST 25 10/03/2022 06:17 PM    PROT 7.7 10/03/2022 06:17 PM    BILITOT 0.2 10/03/2022 06:17 PM    BILIDIR <0.08 10/12/2015 07:00 AM    LABALBU 3.0 10/03/2022 06:17 PM               Lab Results   Component Value Date/Time    SPECIAL 1CC EACH LT WRIST 10/03/2022 10:50 PM     Lab Results   Component Value Date/Time    CULTURE (A) 10/24/2022 09:45 PM     ESCHERICHIA COLI 50 to 100,000 CFU/ML This organism is an Extended Spectrum Beta Lactamase (ESBL)  and resistance to therapy with Penicillins, Cephalosporins and Aztreonam is expected. These organisms generally remain susceptible to Carbapenems. Consider ID Consultation. CONTACT PRECAUTIONS INDICATED. Radiology:  XR CHEST PORTABLE    Result Date: 10/24/2022  Right lower lobe infiltrate improved. COPD. Physical Examination:      Physical Exam   Vitals:    Vitals:    10/25/22 1758 10/26/22 0725 10/26/22 1857 10/27/22 0645   BP: (!) 92/47 109/61 (!) 108/54 (!) 95/57   Pulse: 87 98 86 99   Resp: 18 16 18 18   Temp: 98.6 °F (37 °C) 98.1 °F (36.7 °C) 98.2 °F (36.8 °C) 98.1 °F (36.7 °C)   TempSrc:  Oral Oral Oral   SpO2: 95% 96% 95% 95%   Weight:       Height:                        Body mass index is 19.58 kg/m². General Appearance:   Awake alert and oriented. Pulmonary/Chest:        Clear to auscultation bilaterally . No wheezes, rales or rhonchi .                                                                                                  Cardiovascular:            Normal rate, regular rhythm,                                          No murmur or Gallop . Abdomen:                       Soft, non-tender                                                                                    Extremities:                    No  Edema . Assessment:        Hospital Problems             Last Modified POA    * (Principal) Acute cystitis with hematuria 10/25/2022 Yes    Seizure ([facility] [address] 10/26/2022 Yes    Dementia due to Parkinson's disease with behavioral disturbance ([facility] [address] 10/26/2022 Yes       Plan:        Patient status Admit as inpatient in the  Progressive Unit/Step down     Admitted with change in mentation, patient has history of UTI with ESBL, urine culture positive, final sensitivity awaited, will continue with IV meropenem until final culture sensitivity available  History of dementia  Chest x-ray seen, patient, has signs of resolving pneumonia, of note she was recently discharged from hospital with pneumonia  History of seizure disorder restarted home dose of Tegretol  Will request palliative care consult to discuss CODE STATUS  Patient hemoglobin is even better what it was when she was released last time, will continue with Lovenox for DVT prophylaxis  Patient will need outpatient urology evaluation and cystoscopy with recurrent UTIs  Will try to reach patient family. 10/26:  Patient is feeling better with no complaints. Afebrile and hemodynamically stable. Urine culture positive for E. coli. Palliative care on board.   10/27  Patient clinically doing better  Hemoglobin is stable  Awaiting placement  PICC line ordered with ESBL, likely will get discharged on meropenem, patient has history of seizures, may not able to use ertapenem  ID inputs awaited     Mildred Kinney MD  10/27/2022  9:20 AM

## 2022-10-27 NOTE — PROGRESS NOTES
10/27/22 1618   Encounter Summary   Encounter Overview/Reason  Volunteer Encounter   Service Provided For: Patient   Referral/Consult From: Volunteer   Last Encounter  10/27/22  (V)   Complexity of Encounter Low   Begin Time 1300   End Time  1315   Total Time Calculated 15 min   Rituals, Rites and Sacraments   Type [employer] Communion

## 2022-10-27 NOTE — PROGRESS NOTES
RN spoke with doc Andi Diego regarding recommendations of pts DC IV antibiotics she stated she ordered an Retro perineal US and if no obstruction noted pt can DC IV merrem Q12hrs for 7 days antibiotics and if obstruction the pt will go with IV merrem Q12hrs for 14 days.

## 2022-10-28 ENCOUNTER — APPOINTMENT (OUTPATIENT)
Dept: CT IMAGING | Age: 77
DRG: 689 | End: 2022-10-28
Payer: COMMERCIAL

## 2022-10-28 VITALS
DIASTOLIC BLOOD PRESSURE: 61 MMHG | SYSTOLIC BLOOD PRESSURE: 95 MMHG | RESPIRATION RATE: 18 BRPM | OXYGEN SATURATION: 95 % | WEIGHT: 125 LBS | TEMPERATURE: 98.1 F | HEART RATE: 96 BPM | BODY MASS INDEX: 19.62 KG/M2 | HEIGHT: 67 IN

## 2022-10-28 LAB
ABSOLUTE EOS #: 0.87 K/UL (ref 0–0.4)
ABSOLUTE LYMPH #: 2.29 K/UL (ref 1–4.8)
ABSOLUTE MONO #: 0.95 K/UL (ref 0.1–1.3)
ANION GAP SERPL CALCULATED.3IONS-SCNC: 11 MMOL/L (ref 9–17)
ATYPICAL LYMPHOCYTE ABSOLUTE COUNT: 0.32 K/UL
ATYPICAL LYMPHOCYTES: 4 %
BASOPHILS # BLD: 0 % (ref 0–2)
BASOPHILS ABSOLUTE: 0 K/UL (ref 0–0.2)
BUN BLDV-MCNC: 16 MG/DL (ref 8–23)
CALCIUM SERPL-MCNC: 8.6 MG/DL (ref 8.6–10.4)
CHLORIDE BLD-SCNC: 106 MMOL/L (ref 98–107)
CO2: 23 MMOL/L (ref 20–31)
CREAT SERPL-MCNC: 0.93 MG/DL (ref 0.5–0.9)
EOSINOPHILS RELATIVE PERCENT: 11 % (ref 0–4)
GFR SERPL CREATININE-BSD FRML MDRD: >60 ML/MIN/1.73M2
GLUCOSE BLD-MCNC: 146 MG/DL (ref 70–99)
HCT VFR BLD CALC: 39.9 % (ref 36–46)
HEMOGLOBIN: 13.3 G/DL (ref 12–16)
LYMPHOCYTES # BLD: 29 % (ref 24–44)
MCH RBC QN AUTO: 31.9 PG (ref 26–34)
MCHC RBC AUTO-ENTMCNC: 33.3 G/DL (ref 31–37)
MCV RBC AUTO: 95.7 FL (ref 80–100)
MONOCYTES # BLD: 12 % (ref 1–7)
MORPHOLOGY: ABNORMAL
PDW BLD-RTO: 14.7 % (ref 11.5–14.9)
PLATELET # BLD: 428 K/UL (ref 150–450)
PMV BLD AUTO: 6.3 FL (ref 6–12)
POTASSIUM SERPL-SCNC: 3.7 MMOL/L (ref 3.7–5.3)
RBC # BLD: 4.17 M/UL (ref 4–5.2)
SEG NEUTROPHILS: 44 % (ref 36–66)
SEGMENTED NEUTROPHILS ABSOLUTE COUNT: 3.47 K/UL (ref 1.3–9.1)
SODIUM BLD-SCNC: 140 MMOL/L (ref 135–144)
WBC # BLD: 7.9 K/UL (ref 3.5–11)

## 2022-10-28 PROCEDURE — 2580000003 HC RX 258: Performed by: STUDENT IN AN ORGANIZED HEALTH CARE EDUCATION/TRAINING PROGRAM

## 2022-10-28 PROCEDURE — 6360000002 HC RX W HCPCS: Performed by: INTERNAL MEDICINE

## 2022-10-28 PROCEDURE — 74176 CT ABD & PELVIS W/O CONTRAST: CPT

## 2022-10-28 PROCEDURE — 6370000000 HC RX 637 (ALT 250 FOR IP): Performed by: STUDENT IN AN ORGANIZED HEALTH CARE EDUCATION/TRAINING PROGRAM

## 2022-10-28 PROCEDURE — 99239 HOSP IP/OBS DSCHRG MGMT >30: CPT | Performed by: INTERNAL MEDICINE

## 2022-10-28 PROCEDURE — 97530 THERAPEUTIC ACTIVITIES: CPT

## 2022-10-28 PROCEDURE — 99232 SBSQ HOSP IP/OBS MODERATE 35: CPT | Performed by: INTERNAL MEDICINE

## 2022-10-28 PROCEDURE — 97535 SELF CARE MNGMENT TRAINING: CPT

## 2022-10-28 PROCEDURE — 80048 BASIC METABOLIC PNL TOTAL CA: CPT

## 2022-10-28 PROCEDURE — 2580000003 HC RX 258: Performed by: INTERNAL MEDICINE

## 2022-10-28 PROCEDURE — 85025 COMPLETE CBC W/AUTO DIFF WBC: CPT

## 2022-10-28 PROCEDURE — 36415 COLL VENOUS BLD VENIPUNCTURE: CPT

## 2022-10-28 RX ADMIN — FLUOXETINE HYDROCHLORIDE 20 MG: 20 CAPSULE ORAL at 09:44

## 2022-10-28 RX ADMIN — ASPIRIN 81 MG: 81 TABLET, COATED ORAL at 09:44

## 2022-10-28 RX ADMIN — ENOXAPARIN SODIUM 40 MG: 100 INJECTION SUBCUTANEOUS at 09:44

## 2022-10-28 RX ADMIN — TAMSULOSIN HYDROCHLORIDE 0.4 MG: 0.4 CAPSULE ORAL at 09:44

## 2022-10-28 RX ADMIN — MEROPENEM 1000 MG: 1 INJECTION, POWDER, FOR SOLUTION INTRAVENOUS at 09:47

## 2022-10-28 RX ADMIN — SODIUM CHLORIDE, PRESERVATIVE FREE 10 ML: 5 INJECTION INTRAVENOUS at 09:46

## 2022-10-28 ASSESSMENT — ENCOUNTER SYMPTOMS
SHORTNESS OF BREATH: 0
ABDOMINAL PAIN: 0

## 2022-10-28 NOTE — PROGRESS NOTES
[facility] -   Infectious diseases evaluation  admission date 10/24/2022    reason for consultation:   Altered mental status, UTI    Impression :     Acute metabolic encephalopathy   UTI/ESBL producing E. coli growth on urine culture from 10/24/2022  Mild hydronephrosis  Nephrolithiasis  History of Proteus mirabilis ESBL UTI in the beginning of this month  History of seizure on Tegretol  Parkinson's disease  Recommendations   Continue IV meropenem through November 1, 2022  PICC line was placed. Post void residual  Monitor closely for seizure activities  No objection for discharge from infectious disease point of view after urology evaluation. Infection Control Recommendations   [place] Precautions  Contact Isolation     Antimicrobial Stewardship Recommendations   Simplification of therapy  Targeted therapy    History of Present Illness:   Initial history:  Samir Tolbert is a 76y.o.-year-old female with a past medical history of seizure disorder and recurrent UTIs who presented originally with altered mental status, she also has a history of advanced dementia and Parkinson's disease. She lives at home and was brought into the ER by her daughter given her change in mental status. Had a recent admission earlier this month with pneumonia and Proteus ESBL UTI. Interval changes  10/28/2022   She remains confused, afebrile, denied significant pain, incontinent to urine. Renal ultrasound from yesterday reviewed  PICC line placed shoulder mild left kidney hydronephrosis, bilateral nephrolithiasis. Patient Vitals for the past 8 hrs:   BP Temp Pulse Resp SpO2   10/28/22 0707 95/61 98.1 °F (36.7 °C) 96 18 95 %           I have personally reviewed the past medical history, past surgical history, medications, social history, and family history, and I haveupdated the database accordingly.       Allergies:   Haldol [haloperidol lactate]     Review of Systems: Review of Systems   Constitutional:  Negative for fever. Respiratory:  Negative for shortness of breath. Cardiovascular:  Negative for chest pain. Gastrointestinal:  Negative for abdominal pain. Genitourinary:  Negative for dysuria and flank pain. Psychiatric/Behavioral:  Positive for confusion. All other systems reviewed and are negative. Physical Examination :     Physical Exam  Vitals reviewed. Constitutional:       General: She is awake. Cardiovascular:      Rate and Rhythm: Normal rate and regular rhythm. Heart sounds: Normal heart sounds. Pulmonary:      Effort: Pulmonary effort is normal.      Breath sounds: Normal breath sounds and air entry. Abdominal:      General: Abdomen is flat. Bowel sounds are normal.      Palpations: Abdomen is soft. Tenderness: There is no abdominal tenderness. Neurological:      Mental Status: She is alert. Psychiatric:         Behavior: Behavior is cooperative.      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[address]     secondary to encephalitis    Vitamin D deficiency        Past Surgical  History:     Past Surgical History:   Procedure Laterality Date    ABSCESS DRAINAGE Right 12/14/2013    WOUND EXPLORATION AND I+D  RIGHT HIP    CYSTOSCOPY Left 5/16/2022    CYSTOSCOPY URETERAL STENT INSERTION performed by Misty Ordaz MD at [address] [place] [address] Left 05/16/2022    Cystoscopy       Medications:      meropenem  1,000 mg IntraVENous Q12H    aspirin  81 mg Oral Daily    carBAMazepine  400 mg Oral Nightly    FLUoxetine  20 mg Oral Daily    tamsulosin  0.4 mg Oral Daily    sodium chloride flush  5-40 mL IntraVENous 2 times per day    enoxaparin  40 mg SubCUTAneous Daily       Social History:     Social History     Socioeconomic History    Marital status: Single     Spouse name: Not on file    Number of children: Not on file    Years of education: Not on file    Highest education level: Not on file   Occupational History    Not on file   Tobacco Use    Smoking status: Former     Packs/day: 1.00     Years: 30.00     Pack years: 30.00     Types: Cigarettes     Start date: 54     Quit date:      Years since quittin.8    Smokeless tobacco: Never    Tobacco comments:     unknown how many packs a day, or how many years   Vaping Use    Vaping Use: Never used   Substance and Sexual Activity    Alcohol use: No    Drug use: No    Sexual activity: Never   Other Topics Concern    Not on file   Social History Narrative    Not on file     Social Determinants of Health     Financial Resource Strain: Not on file   Food Insecurity: Not on file   Transportation Needs: Not on file   Physical Activity: Not on file   Stress: Not on file   Social Connections: Not on file   Intimate Partner Violence: Not on file   Housing Stability: Not on file       Family History:   History reviewed. No pertinent family history. Medical Decision Making:   I have independently reviewed/ordered the following labs:    CBC with Differential:   Recent Labs     10/26/22  0605   WBC 9.4   HGB 12.1   HCT 36.0      LYMPHOPCT 27   MONOPCT 12*       BMP:  Recent Labs     10/26/22  0605      K 3.9      CO2 23   BUN 21   CREATININE 1.02*       Hepatic Function Panel: No results for input(s): PROT, LABALBU, BILIDIR, IBILI, BILITOT, ALKPHOS, ALT, AST in the last 72 hours. No results for input(s): RPR in the last 72 hours. No results for input(s): HIV in the last 72 hours. No results for input(s): BC in the last 72 hours. Lab Results   Component Value Date/Time    CREATININE 1.02 10/26/2022 06:05 AM    GLUCOSE 90 10/26/2022 06:05 AM    GLUCOSE 93 2012 03:18 PM       Detailed results:       Thank you for allowing us to participate in the care of this patient. Please call with questions. This note is created with the assistance of a speech recognition program.  While intending to generate adocument that actually reflects the content of the visit, the document can still have some errors including those of syntax and sound a like substitutions which may escape proof reading. It such instances, actual meaningcan be extrapolated by contextual diversion.       Jasbir Louise MD   Office: [phone]  Perfect serve / office [phone]

## 2022-10-28 NOTE — CARE COORDINATION
ONGOING DISCHARGE PLAN:    Patient is alert and oriented to person. Spoke with patient regarding discharge plan and patient confirms that plan is still to go to [facility]. Peer to Peer done with Dr Sophia De Jesus on 10/28/22. Tisha notified of approval.     ID recs: IV Merrem 1 gram Q12 hours until 11/1/22. RX on chart. Pt has PICC line placed 10/27/22. New consult for urology , CT abdomen to check for obstruction stones, awaiting results. Will continue to follow for additional discharge needs.     Electronically signed by Brannon Can RN on 10/28/2022 at 10:14 AM

## 2022-10-28 NOTE — CARE COORDINATION
Report given to [facility] at [facility] via telephone. All questions and concerns answered.

## 2022-10-28 NOTE — PROGRESS NOTES
10/28/22 1520   Encounter Summary   Encounter Overview/Reason  Spiritual/Emotional Needs   Service Provided For: Patient   Referral/Consult From: Palliative Care   Last Encounter  10/28/22   Complexity of Encounter Low   Spiritual/Emotional needs   Type Spiritual Support   Palliative Care   Type Palliative Care, Follow-up   Assessment/Intervention/Outcome   Assessment Unable to assess  (patient sleeping)   Intervention Prayer (assurance of)/[place]

## 2022-10-28 NOTE — PLAN OF CARE
Problem: Discharge Planning  Goal: Discharge to home or other facility with appropriate resources  10/28/2022 0446 by Miguelito Jauregui RN  Outcome: Progressing  10/28/2022 0441 by Miguelito Jauregui RN  Outcome: Progressing  10/27/2022 1638 by Priscila Thomas RN  Outcome: Progressing  Flowsheets (Taken 10/27/2022 0901)  Discharge to home or other facility with appropriate resources:   Identify barriers to discharge with patient and caregiver   Arrange for needed discharge resources and transportation as appropriate   Identify discharge learning needs (meds, wound care, etc)     Problem: Skin/Tissue Integrity  Goal: Absence of new skin breakdown  10/28/2022 0446 by Miguelito Jauregui RN  Outcome: Progressing  10/28/2022 0441 by Miguelito Jauregui RN  Outcome: Progressing  10/27/2022 1638 by Priscila Thomas RN  Outcome: Progressing     Problem: Safety - Adult  Goal: Free from fall injury  10/28/2022 0446 by Miguelito Jauregui RN  Outcome: Progressing  10/28/2022 0441 by Miguelito Jauregui RN  Outcome: Progressing  10/27/2022 1638 by Priscila Thomas RN  Outcome: Progressing     Problem: ABCDS Injury Assessment  Goal: Absence of physical injury  10/28/2022 0446 by Miguelito Jauregui RN  Outcome: Progressing  10/28/2022 0441 by Miguelito Jauregui RN  Outcome: Progressing  10/27/2022 1638 by Priscila Thomas RN  Outcome: Progressing  Flowsheets (Taken 10/27/2022 0900)  Absence of Physical Injury: Implement safety measures based on patient assessment     Problem: Infection - Adult  Goal: Absence of infection during hospitalization  10/28/2022 0446 by Miguelito Jaureugi RN  Outcome: Progressing  10/28/2022 0441 by Miguelito Jauregui RN  Outcome: Progressing  Goal: Absence of fever/infection during anticipated neutropenic period  10/28/2022 0446 by Miguelito Jauregui RN  Outcome: Progressing  10/28/2022 0441 by Miguelito Jauregui RN  Outcome: Progressing     Problem: Neurosensory - Adult  Goal: Achieves maximal functionality and self care  10/28/2022 0446 by Polo Oneill RN  Outcome: Progressing  10/28/2022 0441 by Polo Oneill RN  Outcome: Progressing  Goal: Achieves stable or improved neurological status  10/28/2022 0446 by Polo Oneill RN  Outcome: Progressing  10/28/2022 0441 by Polo Oneill RN  Outcome: Progressing

## 2022-10-28 NOTE — CARE COORDINATION
DISCHARGE PLANNING NOTE:    [facility] ambulette to  patient at 4:00pm. If patient is not able to go at 4:00pm please call Dane Vega [phone] to cancel. HENS COMPLETED. Call report to [phone].    Electronically signed by Doron Grey RN on 10/28/2022 at 11:02 AM

## 2022-10-28 NOTE — CARE COORDINATION
Writer received call from Dr. Matt Leung stating patient ok to d/c from urology standpoint. Julior simba served Dr. Kane Zhang for ok to d/c, waiting for a response.

## 2022-10-28 NOTE — PLAN OF CARE
Problem: Discharge Planning  Goal: Discharge to home or other facility with appropriate resources  10/28/2022 0441 by Arun Enriquez RN  Outcome: Progressing  10/27/2022 1638 by Barbara Youssef RN  Outcome: Progressing  Flowsheets (Taken 10/27/2022 0901)  Discharge to home or other facility with appropriate resources:   Identify barriers to discharge with patient and caregiver   Arrange for needed discharge resources and transportation as appropriate   Identify discharge learning needs (meds, wound care, etc)     Problem: Skin/Tissue Integrity  Goal: Absence of new skin breakdown  10/28/2022 0441 by Arun Enriquez RN  Outcome: Progressing  10/27/2022 1638 by Barbara Youssef RN  Outcome: Progressing     Problem: Safety - Adult  Goal: Free from fall injury  10/28/2022 0441 by Arun Enriquez RN  Outcome: Progressing  10/27/2022 1638 by Barbara Youssef RN  Outcome: Progressing     Problem: ABCDS Injury Assessment  Goal: Absence of physical injury  10/28/2022 0441 by Arun Enriquez RN  Outcome: Progressing  10/27/2022 1638 by Barbara Youssef RN  Outcome: Progressing  Flowsheets (Taken 10/27/2022 0900)  Absence of Physical Injury: Implement safety measures based on patient assessment     Problem: Infection - Adult  Goal: Absence of infection during hospitalization  Outcome: Progressing  Goal: Absence of fever/infection during anticipated neutropenic period  Outcome: Progressing     Problem: Neurosensory - Adult  Goal: Achieves maximal functionality and self care  Outcome: Progressing  Goal: Achieves stable or improved neurological status  Outcome: Progressing

## 2022-10-28 NOTE — CARE COORDINATION
Report and pt packet given to [address] [place] [address]. Pt discharged back to [facility].

## 2022-10-28 NOTE — PLAN OF CARE
Problem: Discharge Planning  Goal: Discharge to home or other facility with appropriate resources  10/28/2022 1624 by Ignacio Patel RN  Outcome: Completed  10/28/2022 0446 by Carine Quiroga RN  Outcome: Progressing  10/28/2022 0441 by Carine Quiroga RN  Outcome: Progressing     Problem: Skin/Tissue Integrity  Goal: Absence of new skin breakdown  Description: 1. Monitor for areas of redness and/or skin breakdown  2. Assess vascular access sites hourly  3. Every 4-6 hours minimum:  Change oxygen saturation probe site  4. Every 4-6 hours:  If on nasal continuous positive airway pressure, respiratory therapy assess nares and determine need for appliance change or resting period.   10/28/2022 1624 by Ignacio Patel RN  Outcome: Completed  10/28/2022 0446 by Carine Quiroga RN  Outcome: Progressing  10/28/2022 0441 by Carine Quiroga RN  Outcome: Progressing     Problem: Safety - Adult  Goal: Free from fall injury  10/28/2022 1624 by Ignacio Patel RN  Outcome: Completed  10/28/2022 0446 by Carine Quiroga RN  Outcome: Progressing  10/28/2022 0441 by Carine Quiroga RN  Outcome: Progressing     Problem: ABCDS Injury Assessment  Goal: Absence of physical injury  10/28/2022 1624 by Ignacio Patel RN  Outcome: Completed  10/28/2022 0446 by Carine Quiroga RN  Outcome: Progressing  10/28/2022 0441 by Carine Quiroga RN  Outcome: Progressing     Problem: Infection - Adult  Goal: Absence of infection during hospitalization  10/28/2022 1624 by Ignacio Patel RN  Outcome: Completed  10/28/2022 0446 by Carine Quiroga RN  Outcome: Progressing  10/28/2022 0441 by Carine Quiroga RN  Outcome: Progressing  Goal: Absence of fever/infection during anticipated neutropenic period  10/28/2022 1624 by Ignacio Patel RN  Outcome: Completed  10/28/2022 0446 by Carine Quiroga RN  Outcome: Progressing  10/28/2022 0441 by Carine Quiroga RN  Outcome: Progressing     Problem: Neurosensory - Adult  Goal: Achieves maximal functionality and self care  10/28/2022 1624 by Lalo Negro RN  Outcome: Completed  10/28/2022 0446 by Kal North RN  Outcome: Progressing  10/28/2022 0441 by Kal North RN  Outcome: Progressing  Goal: Achieves stable or improved neurological status  10/28/2022 1624 by Lalo Negro RN  Outcome: Completed  10/28/2022 0446 by Kal North RN  Outcome: Progressing  10/28/2022 0441 by aKl North RN  Outcome: Progressing

## 2022-10-28 NOTE — PROGRESS NOTES
[address] [facility] Internal Medicine  Catrachito Covington MD; Alexis Torres MD; Lissette Oliveira MD; MD Chana Ny MD; Flaco Cool MD    [facility] Internal Medicine   [facility]     Progress Note    10/28/2022    9:02 AM    Name:   Eleni Paez  MRN:     [id number]     Acct:      [de-identified]   Room:   2045/2045-01  IP Day:  3  Admit Date:  10/24/2022  8:54 PM    PCP:   Elisabeth Falk DO  Code Status:  DNR-CCA    Subjective:     C/C:   Chief Complaint   Patient presents with    Altered Mental Status     Interval History Status: improved. Patient is feeling better today. She is without any complaint  She is afebrile and is hemodynamically stable. Urine culture positive for E. coli. She is on IV meropenem. Brief History:     The patient is a 76 y.o. Non- / non  female who presents with Altered Mental Status   and she is admitted to the hospital for the management of change in mentation  History is extremely limited, patient is advanced dementia, Parkinson disease, COPD, history of recurrent UTIs  Most of history history is retrieved from E [facility]  Patient lives at home, as per notes, patient daughter noticed change in mentation and blood in the urine  Patient was recently admitted in hospital and got discharged on 10/8, with pneumonia and UTI  At that time her urine was positive for Proteus which was MDR and ESBL   10/28   Patient ultrasound concerning for bilateral nephrolithiasis, mild left hydronephrosis  On antibiotics per ID  PICC line was placed  Mild hematuria noticed on the brief    Review of Systems:         As recorded in interval hx                                     Medications: Allergies:     Allergies   Allergen Reactions    Haldol [Haloperidol Lactate]        Current Meds:   Scheduled Meds:    meropenem  1,000 mg IntraVENous Q12H    aspirin  81 mg Oral Daily    carBAMazepine  400 mg Oral Nightly    FLUoxetine 20 mg Oral Daily    tamsulosin  0.4 mg Oral Daily    sodium chloride flush  5-40 mL IntraVENous 2 times per day    enoxaparin  40 mg SubCUTAneous Daily     Continuous Infusions:    sodium chloride 10 mL/hr at 10/28/22 0615     PRN Meds: sodium chloride flush, sodium chloride, acetaminophen, ondansetron **OR** ondansetron, melatonin, potassium chloride **OR** potassium alternative oral replacement **OR** potassium chloride    Data:     I/O (24Hr): Intake/Output Summary (Last 24 hours) at 10/28/2022 0902  Last data filed at 10/28/2022 0615  Gross per 24 hour   Intake 503.34 ml   Output --   Net 503.34 ml       Labs:  Significant last 24 hr data reviewed ;   Vitals:    10/26/22 1857 10/27/22 0645 10/27/22 1823 10/28/22 0707   BP: (!) 108/54 (!) 95/57 103/69 95/61   Pulse: 86 99 (!) 103 96   Resp: 18 18 20 18   Temp: 98.2 °F (36.8 °C) 98.1 °F (36.7 °C) 98.1 °F (36.7 °C) 98.1 °F (36.7 °C)   TempSrc: Oral Oral     SpO2: 95% 95% 95% 95%   Weight:       Height:         No results for input(s): POCGLU in the last 72 hours. No results found for this or any previous visit (from the past 24 hour(s)). No results for input(s): POCGLU in the last 72 hours.                 URINE ANALYSIS: No results found for: LABURIN     CBC:  Lab Results   Component Value Date/Time    WBC 9.4 10/26/2022 06:05 AM    WBC 9.0 10/24/2022 09:05 PM    WBC 12.2 10/06/2022 04:50 AM    HGB 12.1 10/26/2022 06:05 AM    HGB 13.1 10/24/2022 09:05 PM    HGB 10.9 10/06/2022 04:50 AM     10/26/2022 06:05 AM     10/24/2022 09:05 PM     10/06/2022 04:50 AM     03/12/2012 03:18 PM        BMP:    Lab Results   Component Value Date/Time     10/26/2022 06:05 AM     10/24/2022 09:45 PM     10/06/2022 04:50 AM    K 3.9 10/26/2022 06:05 AM    K 3.4 10/24/2022 09:45 PM    K 4.0 10/06/2022 04:50 AM     10/26/2022 06:05 AM     10/24/2022 09:45 PM     10/06/2022 04:50 AM    CO2 23 10/26/2022 06:05 AM CO2 27 10/24/2022 09:45 PM    CO2 23 10/06/2022 04:50 AM    BUN 21 10/26/2022 06:05 AM    BUN 21 10/24/2022 09:45 PM    BUN 21 10/06/2022 04:50 AM    CREATININE 1.02 10/26/2022 06:05 AM    CREATININE 1.05 10/24/2022 09:45 PM    CREATININE 0.95 10/06/2022 04:50 AM    GLUCOSE 90 10/26/2022 06:05 AM    GLUCOSE 132 10/24/2022 09:45 PM    GLUCOSE 121 10/06/2022 04:50 AM    GLUCOSE 93 03/12/2012 03:18 PM      LIVER PROFILE:  Lab Results   Component Value Date/Time    ALT 17 10/03/2022 06:17 PM    AST 25 10/03/2022 06:17 PM    PROT 7.7 10/03/2022 06:17 PM    BILITOT 0.2 10/03/2022 06:17 PM    BILIDIR <0.08 10/12/2015 07:00 AM    LABALBU 3.0 10/03/2022 06:17 PM               Lab Results   Component Value Date/Time    SPECIAL 1CC EACH LT WRIST 10/03/2022 10:50 PM     Lab Results   Component Value Date/Time    CULTURE (A) 10/24/2022 09:45 PM     ESCHERICHIA COLI 50 to 100,000 CFU/ML This organism is an Extended Spectrum Beta Lactamase (ESBL)  and resistance to therapy with Penicillins, Cephalosporins and Aztreonam is expected. These organisms generally remain susceptible to Carbapenems. Consider ID Consultation. CONTACT PRECAUTIONS INDICATED. Radiology:  XR CHEST PORTABLE    Result Date: 10/24/2022  Right lower lobe infiltrate improved. COPD. Physical Examination:      Physical Exam   Vitals:    Vitals:    10/26/22 1857 10/27/22 0645 10/27/22 1823 10/28/22 0707   BP: (!) 108/54 (!) 95/57 103/69 95/61   Pulse: 86 99 (!) 103 96   Resp: 18 18 20 18   Temp: 98.2 °F (36.8 °C) 98.1 °F (36.7 °C) 98.1 °F (36.7 °C) 98.1 °F (36.7 °C)   TempSrc: Oral Oral     SpO2: 95% 95% 95% 95%   Weight:       Height:                        Body mass index is 19.58 kg/m². General Appearance:   Awake alert and oriented. Pulmonary/Chest:        Clear to auscultation bilaterally . No wheezes, rales or rhonchi . Cardiovascular:            Normal rate, regular rhythm,                                          No murmur or  Gallop . Abdomen:                       Soft, non-tender                                                                                    Extremities:                    No  Edema . Assessment:        Hospital Problems             Last Modified POA    * (Principal) Acute cystitis with hematuria 10/25/2022 Yes    Seizure ([facility] [address] 10/26/2022 Yes    Dementia due to Parkinson's disease with behavioral disturbance ([facility] [address] 10/26/2022 Yes       Plan:        Patient status Admit as inpatient in the  Progressive Unit/Step down     Admitted with change in mentation, patient has history of UTI with ESBL, urine culture positive, final sensitivity awaited, will continue with IV meropenem until final culture sensitivity available  History of dementia  Chest x-ray seen, patient, has signs of resolving pneumonia, of note she was recently discharged from hospital with pneumonia  History of seizure disorder restarted home dose of Tegretol  Will request palliative care consult to discuss CODE STATUS  Patient hemoglobin is even better what it was when she was released last time, will continue with Lovenox for DVT prophylaxis  Patient will need outpatient urology evaluation and cystoscopy with recurrent UTIs  Will try to reach patient family. 10/26:  Patient is feeling better with no complaints. Afebrile and hemodynamically stable. Urine culture positive for E. coli. Palliative care on board.   10/27  Patient clinically doing better  Hemoglobin is stable  Awaiting placement  PICC line ordered with ESBL, likely will get discharged on meropenem, patient has history of seizures, may not able to use ertapenem  ID inputs awaited   10/28   Patient had PICC line placed  On IV meropenem for ESBL with history of seizure disorder  Urology consulted with bilateral nephrolithiasis  Awaiting placement  If repeat hemoglobin, suggestive of major drop, will stop Vianey Perera MD  10/28/2022  9:02 AM

## 2022-10-28 NOTE — CONSULTS
Urology Consultation    Patient:  Gardenia Herron  MRN: [id number]  YOB: 1945    CHIEF COMPLAINT:  UTI    HISTORY OF PRESENT ILLNESS:   The patient is a 76 y.o. female who presents with UTI. She cannot offer any history. She had a stent palced at [facility] in May. She has not followed up for stone treatment that I could find. Now has infection. Patient's old records, notes and chart reviewed and summarized above. Past Medical History:    Past Medical History:   Diagnosis Date    Anxiety     Convulsion ([address]     Encephalopathy     Herpes     Hyperlipidemia     Left bundle branch block     MRSA (methicillin resistant Staphylococcus aureus)     Parkinson's disease ([facility] [address]     Seizures ([facility] [address]     secondary to encephalitis    Vitamin D deficiency        Past Surgical History:    Past Surgical History:   Procedure Laterality Date    ABSCESS DRAINAGE Right 12/14/2013    WOUND EXPLORATION AND I+D  RIGHT HIP    CYSTOSCOPY Left 5/16/2022    CYSTOSCOPY URETERAL STENT INSERTION performed by Yayo Covington MD at [address] [place] [address] Left 05/16/2022    Cystoscopy     Previous  surgery:  cysto and stent placement.      Medications:    Scheduled Meds:   meropenem  1,000 mg IntraVENous Q12H    aspirin  81 mg Oral Daily    carBAMazepine  400 mg Oral Nightly    FLUoxetine  20 mg Oral Daily    tamsulosin  0.4 mg Oral Daily    sodium chloride flush  5-40 mL IntraVENous 2 times per day    enoxaparin  40 mg SubCUTAneous Daily     Continuous Infusions:   sodium chloride 10 mL/hr at 10/28/22 0615     PRN Meds:.sodium chloride flush, sodium chloride, acetaminophen, ondansetron **OR** ondansetron, melatonin, potassium chloride **OR** potassium alternative oral replacement **OR** potassium chloride    Allergies:  Haldol [haloperidol lactate]    Social History:    Social History     Socioeconomic History    Marital status: Single     Spouse name: Not on file    Number of children: Not on file    Years of education: Not on file    Highest education level: Not on file   Occupational History    Not on file   Tobacco Use    Smoking status: Former     Packs/day: 1.00     Years: 30.00     Pack years: 30.00     Types: Cigarettes     Start date: 54     Quit date:      Years since quittin.8    Smokeless tobacco: Never    Tobacco comments:     unknown how many packs a day, or how many years   Vaping Use    Vaping Use: Never used   Substance and Sexual Activity    Alcohol use: No    Drug use: No    Sexual activity: Never   Other Topics Concern    Not on file   Social History Narrative    Not on file     Social Determinants of Health     Financial Resource Strain: Not on file   Food Insecurity: Not on file   Transportation Needs: Not on file   Physical Activity: Not on file   Stress: Not on file   Social Connections: Not on file   Intimate Partner Violence: Not on file   Housing Stability: Not on file       Family History:  History reviewed. No pertinent family history. Previous Urologic Family history: none  REVIEW OF SYSTEMS:  Unobtainable. Physical Exam:      This a 76 y.o. female   Patient Vitals for the past 24 hrs:   BP Temp Pulse Resp SpO2   10/28/22 0707 95/61 98.1 °F (36.7 °C) 96 18 95 %   10/27/22 1823 103/69 98.1 °F (36.7 °C) (!) 103 20 95 %     Constitutional: Patient in no acute distress. Neuro: Alert and oriented to person, place and time. Psych: mood and affect normal  HEENT negative  Lungs: Respiratory effort is normal  Cardiovascular: Normal peripheral pulses  Abdomen: Soft, non-tender, non-distended with no CVA, flank pain or hepatosplenomegaly. No hernias. Kidneys normal.  Lymphatics: No palpable lymphadenopathy. Bladder non-tender and not distended.   Pelvic exam: deferred  Rectal exam not indicated    LABS:   Recent Labs     10/26/22  0605 10/28/22  0928   WBC 9.4 7.9   HGB 12.1 13.3   HCT 36.0 39.9   MCV 95.0 95.7    428     Recent Labs     10/26/22  0605 10/28/22  0928

## 2022-10-28 NOTE — CARE COORDINATION
Writer simba served Dr. Vicente Noguera urology of new inpatient consult. Message has been read by Dr. Vicente Noguera.

## 2022-11-03 DIAGNOSIS — I95.0 IDIOPATHIC HYPOTENSION: ICD-10-CM

## 2022-11-03 RX ORDER — FLUDROCORTISONE ACETATE 0.1 MG/1
TABLET ORAL
Qty: 90 TABLET | Refills: 0 | Status: ON HOLD | OUTPATIENT
Start: 2022-11-03

## 2022-11-03 NOTE — DISCHARGE SUMMARY
[place] [address] Internal Medicine    Discharge Summary     Patient ID: Marcos Dos Santos  :  1945   MRN: [id number]     ACCOUNT:  [de-identified]   Patient's PCP: Phoenix Mahan DO  Admit Date: 10/24/2022   Discharge Date: 11/3/2022    Length of Stay: 3  Code Status:  Prior  Admitting Physician: Kathleen Borja MD  Discharge Physician: Kandi Corey MD     Active Discharge Diagnoses:     Primary Problem  Acute cystitis with hematuria      [place] Problems    Diagnosis Date Noted    Acute cystitis with hematuria [N30.01] 10/08/2022     Priority: Medium    Dementia due to Parkinson's disease with behavioral disturbance ([facility] [G20, F02.818] 08/15/2020    Seizure ([facility] [address] [R56.9] 2012       Admission Condition:  poor    Discharged Condition: fair    Hospital Stay:     Hospital Course:  Marcos Dos Santos is a 76 y.o. female who was admitted for the management of Acute cystitis with hematuria , presented to ER with Altered Mental Status  History is extremely limited, patient is advanced dementia, Parkinson disease, COPD, history of recurrent UTIs  Most of history history is retrieved from E [facility]  Patient lives at home, as per notes, patient daughter noticed change in mentation and blood in the urine  Patient was found to have ESBL UTI, treated with antibiotic  Seen by ID, urology  Getting discharged on IV meropenem via PICC line      Significant therapeutic interventions:     Significant Diagnostic Studies:   Labs / Micro:        ,     Radiology:    CT ABDOMEN PELVIS WO CONTRAST Additional Contrast? None    Result Date: 2022  EXAMINATION: CT OF THE ABDOMEN AND PELVIS WITHOUT CONTRAST 10/28/2022 11:34 am TECHNIQUE: CT of the abdomen and pelvis was performed without the administration of intravenous contrast. Multiplanar reformatted images are provided for review.  Automated exposure control, iterative reconstruction, and/or weight based adjustment of the mA/kV was utilized to reduce the radiation dose to as low as reasonably achievable. COMPARISON: 05/15/2022 HISTORY: ORDERING SYSTEM PROVIDED HISTORY: kidney stones, hydronephrosis TECHNOLOGIST PROVIDED HISTORY: kidney stones, hydronephrosis Reason for Exam: kidney stones, hydronephrosis Additional signs and symptoms: pt not talking unable to get more hx FINDINGS: Lower Chest: The heart size is upper normal.  There is a small hiatal hernia. Motion limits the exam.  Bibasilar atelectasis or scarring is noted. Organs: A nodule along the anterior aspect of the left lobe of the liver is re-identified. Gallstones are present. Small splenules are noted. A focal pancreatic abnormality is not identified. There is thickening of the adrenal glands, as before. Bilateral renal calcifications are present. The largest is in the right upper pole and measures approximately 1.2 cm. There is mild left hydronephrosis. A left-sided ureteral stent is in place. There is a calcification within the left ureter adjacent to the stent on axial image 84 series 2. This measures 3 mm. No additional ureteral calculi are identified. Somewhat amorphous calcification is noted surrounding the pigtail portion of the stent within the urinary bladder. The right kidney is not obstructed. No right-sided ureteral calculi are identified. GI/Bowel: The bowel is not obstructed. There is a moderate to large amount of stool in the rectum. Stool is noted throughout the colon. Diverticulosis is noted. Numerous omental soft tissue nodules are identified, most pronounced on the left. These appears similar to the previous exam.  On the previous exam these enhance similarly to the left upper quadrant splenules. Pelvis: There is no free fluid in the pelvis. The uterus has been removed. Peritoneum/Retroperitoneum: There is calcification of the abdominal aorta. There is no free intraperitoneal air. Bones/Soft Tissues:  Old right-sided rib fractures are noted.  The bones are demineralized. There are numerous compression deformities in the spine. These appear unchanged. 1. A left ureteral stent is in place. There is mild left hydronephrosis. There is a small left mid ureteral calcification measuring 3 mm. Amorphous calcification is formed around the pigtail portion of the catheter within the urinary bladder. 2. Nonobstructing bilateral renal calculi. 3. Moderate to large amount of stool in the rectum. Consider mild fecal impaction. 4. Splenosis. XR CHEST (SINGLE VIEW FRONTAL)    Result Date: 10/7/2022  EXAMINATION: ONE XRAY VIEW OF THE CHEST 10/7/2022 2:28 pm COMPARISON: 10/04/2022 HISTORY: ORDERING SYSTEM PROVIDED HISTORY: shortness of breath TECHNOLOGIST PROVIDED HISTORY: shortness of breath Reason for Exam: dyspnea FINDINGS: Cardiomediastinal silhouette is unchanged in size. Lungs are mildly hyperinflated. No large pleural effusion or pneumothorax. There is linear bibasilar opacity. There is chronic appearing deformity in the left humerus. .     Linear bibasilar opacities, likely atelectasis. Lungs are hyperinflated, consistent with COPD. US RENAL COMPLETE    Result Date: 10/5/2022  EXAMINATION: RETROPERITONEAL ULTRASOUND OF THE KIDNEYS AND URINARY BLADDER 10/5/2022 COMPARISON: None HISTORY: ORDERING SYSTEM PROVIDED HISTORY: UTI TECHNOLOGIST PROVIDED HISTORY: UTI FINDINGS: Kidneys: The right kidney measures 11.5 cm in length and the left kidney measures 9.6 cm in length. Normal renal cortical echogenicity. No hydronephrosis. Nonobstructing bilateral nephrolithiasis largest on the right 8 mm superior pole. Largest on the left inferior pole proximally 10 mm. Nonobstructing bilateral nephrolithiasis. Exam otherwise unremarkable     IR FLUORO GUIDED CVA DEVICE PLMT/REPLACE/REMOVAL    Result Date: 10/27/2022  PROCEDURE: ULTRASOUND GUIDED VASCULAR ACCESS. FLUOROSCOPY GUIDED PICC PLACEMENT 10/27/2022.  HISTORY: ORDERING SYSTEM PROVIDED HISTORY: ESBL UTI < will needs PICC Line TECHNOLOGIST PROVIDED HISTORY: ESBL UTI < will needs PICC Line Lumen?->Single Lumen SEDATION: None FLUOROSCOPY DOSE AND TYPE OR TIME AND EXPOSURES: 1 minute 58 seconds; D AP 84 cGy cm2 TECHNIQUE: Informed consent was obtained after a detailed explanation of the procedure including risks, benefits, and alternatives. Universal protocol was observed. The right arm was prepped and draped in sterile fashion using maximum sterile barrier technique. Local anesthesia was achieved with lidocaine. A micropuncture needle was used to access the right brachial vein using ultrasound guidance. An ultrasound image demonstrating patency of the vein with needle tip located within it. An image was obtained and stored in PACs. A 0.018 guidewire was used to place a peel-a-way sheath and a 4 Italian single-lumen PICC was advanced with fluoroscopic guidance with the tip at the cavo-atrial junction. The catheter flushed easily and there was a good blood return. The catheter was secured to the skin. The patient tolerated the procedure well and there were no immediate complications. EBL: Less than 3 mL FINDINGS: Fluoroscopic image demonstrates the tip of the catheter at the cavo-atrial junction. Successful ultrasound and fluoroscopy guided PICC placement     IR FLUORO GUIDED CVA DEVICE PLMT/REPLACE/REMOVAL    Result Date: 10/5/2022  EXAMINATION: IR FLUOROSCOPY GUIDED CENTRAL VENOUS ACCESS DEVICE PLACEMENT HISTORY: ORDERING SYSTEM PROVIDED HISTORY: IV antibiotics through 10/12 TECHNOLOGIST PROVIDED HISTORY: IV antibiotics through 10/12 midline Lumen?->Single Lumen SEDATION: Local anesthesia FINDINGS/PROCEDURE DETAILS: This procedure was performed under indirect supervision of Dr. Norberto Silva by the interventional radiology nurse. Informed consent was obtained after an explanation of the procedure including risks. Universal protocol was observed.  The right arm was prepped and draped in sterile Caliectasis right kidney is noted with mild left hydronephrosis. Bladder: Bladder not evaluated. Caliectasis right kidney. Mild hydronephrosis left kidney. Bilateral nephrolithiasis. No cortical thinning. Consultations:    Consults:     Final Specialist Recommendations/Findings:   IP CONSULT TO INTERNAL MEDICINE  IP CONSULT TO INFECTIOUS DISEASES  IP CONSULT TO PALLIATIVE CARE  IP CONSULT TO INFECTIOUS DISEASES  IP CONSULT TO UROLOGY      The patient was seen and examined on day of discharge and this discharge summary is in conjunction with any daily progress note from day of discharge.     Discharge plan:     Disposition: Home    Physician Follow Up:     Jennifer Groves, DO  [address] [facility] [address]    Follow up      Jennifer Groves, [address] [facility]  [phone]             Requiring Further Evaluation/Follow Up POST HOSPITALIZATION/Incidental Findings:    Diet: cardiac diet    Activity: As tolerated    Instructions to Patient:     Discharge Medications:      Medication List        CHANGE how you take these medications      carBAMazepine 200 MG tablet  Commonly known as: TEGretol  1.5 tablets po q am and 2 po q pm  What changed:   how much to take  additional instructions            CONTINUE taking these medications      aspirin 81 MG EC tablet     FLUoxetine 20 MG capsule  Commonly known as: PROZAC  2 po qd     melatonin 5 MG Tabs tablet  Take 1 tablet by mouth nightly as needed (sleep)     mirabegron 50 MG Tb24  Commonly known as: Myrbetriq  Take 50 mg by mouth daily     tamsulosin 0.4 MG capsule  Commonly known as: FLOMAX  Take 1 capsule by mouth daily            ASK your doctor about these medications      ciprofloxacin 500 MG tablet  Commonly known as: CIPRO  Take 1 tablet by mouth 2 times daily for 10 days     meropenem  infusion  Commonly known as: MERREM  Infuse 1,000 mg intravenously in the morning and 1,000 mg at noon and 1,000 mg in the evening. Do all this for 4 days. Compound per protocol. .  Ask about: Should I take this medication? Where to Get Your Medications        These medications were sent to [address] [phone] - F [phone]  [address] [place] [address],  Net[address]we [address]      Phone: [phone]   ciprofloxacin 500 MG tablet       You can get these medications from any pharmacy    Bring a paper prescription for each of these medications  meropenem  infusion         Time Spent on discharge is  35 mins in patient examination, evaluation, counseling as well as medication reconciliation, prescriptions for required medications, discharge plan and follow up. Electronically signed by   Rosina Bethea MD  11/3/2022  4:34 PM      Thank you Dr. Jeancarlos Mayer DO for the opportunity to be involved in this patient's care.

## 2022-11-09 ENCOUNTER — HOSPITAL ENCOUNTER (OUTPATIENT)
Age: 77
Setting detail: OBSERVATION
Discharge: HOME | End: 2022-11-11
Attending: EMERGENCY MEDICINE | Admitting: EMERGENCY MEDICINE
Payer: COMMERCIAL

## 2022-11-09 ENCOUNTER — APPOINTMENT (OUTPATIENT)
Dept: GENERAL RADIOLOGY | Age: 77
End: 2022-11-09
Payer: COMMERCIAL

## 2022-11-09 DIAGNOSIS — R07.89 OTHER CHEST PAIN: Primary | ICD-10-CM

## 2022-11-09 PROBLEM — R07.9 CHEST PAIN: Status: ACTIVE | Noted: 2022-11-09

## 2022-11-09 LAB
ALBUMIN SERPL-MCNC: 3.2 G/DL (ref 3.5–5.2)
ALBUMIN/GLOBULIN RATIO: 0.8 (ref 1–2.5)
ALP BLD-CCNC: 246 U/L (ref 35–104)
ALT SERPL-CCNC: 14 U/L (ref 5–33)
ANION GAP SERPL CALCULATED.3IONS-SCNC: 13 MMOL/L (ref 9–17)
AST SERPL-CCNC: 27 U/L
BILIRUB SERPL-MCNC: 0.2 MG/DL (ref 0.3–1.2)
BUN BLDV-MCNC: 13 MG/DL (ref 8–23)
CALCIUM SERPL-MCNC: 8.3 MG/DL (ref 8.6–10.4)
CHLORIDE BLD-SCNC: 99 MMOL/L (ref 98–107)
CO2: 24 MMOL/L (ref 20–31)
CREAT SERPL-MCNC: 0.91 MG/DL (ref 0.5–0.9)
GFR SERPL CREATININE-BSD FRML MDRD: >60 ML/MIN/1.73M2
GLUCOSE BLD-MCNC: 133 MG/DL (ref 70–99)
HCT VFR BLD CALC: 39.9 % (ref 36.3–47.1)
HEMOGLOBIN: 13 G/DL (ref 11.9–15.1)
MCH RBC QN AUTO: 31 PG (ref 25.2–33.5)
MCHC RBC AUTO-ENTMCNC: 32.6 G/DL (ref 28.4–34.8)
MCV RBC AUTO: 95.2 FL (ref 82.6–102.9)
NRBC AUTOMATED: 0 PER 100 WBC
PDW BLD-RTO: 14 % (ref 11.8–14.4)
PLATELET # BLD: 410 K/UL (ref 138–453)
PMV BLD AUTO: 8.7 FL (ref 8.1–13.5)
POTASSIUM SERPL-SCNC: 3.3 MMOL/L (ref 3.7–5.3)
RBC # BLD: 4.19 M/UL (ref 3.95–5.11)
SARS-COV-2, RAPID: NOT DETECTED
SODIUM BLD-SCNC: 136 MMOL/L (ref 135–144)
SPECIMEN DESCRIPTION: NORMAL
TOTAL PROTEIN: 7.1 G/DL (ref 6.4–8.3)
TROPONIN, HIGH SENSITIVITY: 37 NG/L (ref 0–14)
TROPONIN, HIGH SENSITIVITY: 40 NG/L (ref 0–14)
WBC # BLD: 7.1 K/UL (ref 3.5–11.3)

## 2022-11-09 PROCEDURE — 99285 EMERGENCY DEPT VISIT HI MDM: CPT

## 2022-11-09 PROCEDURE — 87635 SARS-COV-2 COVID-19 AMP PRB: CPT

## 2022-11-09 PROCEDURE — G0378 HOSPITAL OBSERVATION PER HR: HCPCS

## 2022-11-09 PROCEDURE — 6370000000 HC RX 637 (ALT 250 FOR IP): Performed by: STUDENT IN AN ORGANIZED HEALTH CARE EDUCATION/TRAINING PROGRAM

## 2022-11-09 PROCEDURE — 71045 X-RAY EXAM CHEST 1 VIEW: CPT

## 2022-11-09 PROCEDURE — 2580000003 HC RX 258: Performed by: STUDENT IN AN ORGANIZED HEALTH CARE EDUCATION/TRAINING PROGRAM

## 2022-11-09 PROCEDURE — 80053 COMPREHEN METABOLIC PANEL: CPT

## 2022-11-09 PROCEDURE — 85027 COMPLETE CBC AUTOMATED: CPT

## 2022-11-09 PROCEDURE — 84484 ASSAY OF TROPONIN QUANT: CPT

## 2022-11-09 PROCEDURE — 93005 ELECTROCARDIOGRAM TRACING: CPT | Performed by: STUDENT IN AN ORGANIZED HEALTH CARE EDUCATION/TRAINING PROGRAM

## 2022-11-09 RX ORDER — ACETAMINOPHEN 650 MG/1
650 SUPPOSITORY RECTAL EVERY 6 HOURS PRN
Status: DISCONTINUED | OUTPATIENT
Start: 2022-11-09 | End: 2022-11-11 | Stop reason: HOSPADM

## 2022-11-09 RX ORDER — POTASSIUM CHLORIDE 20 MEQ/1
40 TABLET, EXTENDED RELEASE ORAL PRN
Status: DISCONTINUED | OUTPATIENT
Start: 2022-11-09 | End: 2022-11-11 | Stop reason: HOSPADM

## 2022-11-09 RX ORDER — ENOXAPARIN SODIUM 100 MG/ML
40 INJECTION SUBCUTANEOUS DAILY
Status: DISCONTINUED | OUTPATIENT
Start: 2022-11-10 | End: 2022-11-11 | Stop reason: HOSPADM

## 2022-11-09 RX ORDER — FLUOXETINE HYDROCHLORIDE 20 MG/1
20 CAPSULE ORAL DAILY
Status: DISCONTINUED | OUTPATIENT
Start: 2022-11-10 | End: 2022-11-11 | Stop reason: HOSPADM

## 2022-11-09 RX ORDER — FLUDROCORTISONE ACETATE 0.1 MG/1
0.1 TABLET ORAL DAILY
Status: DISCONTINUED | OUTPATIENT
Start: 2022-11-10 | End: 2022-11-11 | Stop reason: HOSPADM

## 2022-11-09 RX ORDER — POTASSIUM CHLORIDE 7.45 MG/ML
10 INJECTION INTRAVENOUS PRN
Status: DISCONTINUED | OUTPATIENT
Start: 2022-11-09 | End: 2022-11-11 | Stop reason: HOSPADM

## 2022-11-09 RX ORDER — CARBAMAZEPINE 200 MG/1
200 TABLET ORAL DAILY
Status: DISCONTINUED | OUTPATIENT
Start: 2022-11-10 | End: 2022-11-11 | Stop reason: HOSPADM

## 2022-11-09 RX ORDER — TROSPIUM CHLORIDE 20 MG/1
20 TABLET, FILM COATED ORAL NIGHTLY
Status: DISCONTINUED | OUTPATIENT
Start: 2022-11-09 | End: 2022-11-11 | Stop reason: HOSPADM

## 2022-11-09 RX ORDER — SODIUM CHLORIDE 0.9 % (FLUSH) 0.9 %
5-40 SYRINGE (ML) INJECTION EVERY 12 HOURS SCHEDULED
Status: DISCONTINUED | OUTPATIENT
Start: 2022-11-09 | End: 2022-11-11 | Stop reason: HOSPADM

## 2022-11-09 RX ORDER — ACETAMINOPHEN 325 MG/1
650 TABLET ORAL EVERY 6 HOURS PRN
Status: DISCONTINUED | OUTPATIENT
Start: 2022-11-09 | End: 2022-11-11 | Stop reason: HOSPADM

## 2022-11-09 RX ORDER — SODIUM CHLORIDE 9 MG/ML
INJECTION, SOLUTION INTRAVENOUS CONTINUOUS
Status: DISCONTINUED | OUTPATIENT
Start: 2022-11-09 | End: 2022-11-11 | Stop reason: HOSPADM

## 2022-11-09 RX ORDER — CARBAMAZEPINE 200 MG/1
400 TABLET ORAL NIGHTLY
Status: DISCONTINUED | OUTPATIENT
Start: 2022-11-09 | End: 2022-11-11 | Stop reason: HOSPADM

## 2022-11-09 RX ORDER — SODIUM CHLORIDE 0.9 % (FLUSH) 0.9 %
5-40 SYRINGE (ML) INJECTION PRN
Status: DISCONTINUED | OUTPATIENT
Start: 2022-11-09 | End: 2022-11-11 | Stop reason: HOSPADM

## 2022-11-09 RX ORDER — POLYETHYLENE GLYCOL 3350 17 G/17G
17 POWDER, FOR SOLUTION ORAL DAILY PRN
Status: DISCONTINUED | OUTPATIENT
Start: 2022-11-09 | End: 2022-11-11 | Stop reason: HOSPADM

## 2022-11-09 RX ORDER — ASPIRIN 81 MG/1
81 TABLET ORAL DAILY
Status: DISCONTINUED | OUTPATIENT
Start: 2022-11-10 | End: 2022-11-11 | Stop reason: HOSPADM

## 2022-11-09 RX ORDER — ONDANSETRON 2 MG/ML
4 INJECTION INTRAMUSCULAR; INTRAVENOUS EVERY 4 HOURS PRN
Status: DISCONTINUED | OUTPATIENT
Start: 2022-11-09 | End: 2022-11-11 | Stop reason: HOSPADM

## 2022-11-09 RX ORDER — SODIUM CHLORIDE 9 MG/ML
25 INJECTION, SOLUTION INTRAVENOUS PRN
Status: DISCONTINUED | OUTPATIENT
Start: 2022-11-09 | End: 2022-11-11 | Stop reason: HOSPADM

## 2022-11-09 RX ADMIN — CARBAMAZEPINE 400 MG: 200 TABLET ORAL at 21:43

## 2022-11-09 RX ADMIN — TROSPIUM CHLORIDE 20 MG: 20 TABLET, FILM COATED ORAL at 21:43

## 2022-11-09 RX ADMIN — SODIUM CHLORIDE: 9 INJECTION, SOLUTION INTRAVENOUS at 21:40

## 2022-11-09 RX ADMIN — POTASSIUM CHLORIDE 40 MEQ: 1500 TABLET, EXTENDED RELEASE ORAL at 21:42

## 2022-11-09 RX ADMIN — SODIUM CHLORIDE, PRESERVATIVE FREE 10 ML: 5 INJECTION INTRAVENOUS at 21:40

## 2022-11-09 ASSESSMENT — ENCOUNTER SYMPTOMS
DIARRHEA: 0
NAUSEA: 0
SORE THROAT: 0
ABDOMINAL PAIN: 0
SINUS PAIN: 0
SHORTNESS OF BREATH: 0
COUGH: 0
VOMITING: 0
EYE PAIN: 0
BACK PAIN: 0

## 2022-11-09 ASSESSMENT — PAIN SCALES - GENERAL: PAINLEVEL_OUTOF10: 0

## 2022-11-09 NOTE — ED PROVIDER NOTES
[facility]  ED  Emergency Department Encounter  EmergencyMedicineResident       Pt Name: Hugo Rivas  MRN: [id number]  [place] 1945  Date of evaluation: 11/9/22  PCP: Sarah Ochoa DO    CHIEF COMPLAINT       Chief Complaint   Patient presents with    Chest Pain       HISTORY OF PRESENT ILLNESS  (Location/Symptom, Timing/Onset, Context/Setting, Quality, Duration, Modifying Factors, Severity.)      Hugo Rivas is a 76 y.o. female who presents 49-year-old female coming in today for evaluation of chest pain. She has history of dementia. Told her daughter today that she was having an out of 10 chest pain. She does report that she has a history of \"STEMI\" in the past.  Past medical history does show that she has a history of rate controlled atrial fibrillation. Not on any anticoagulation. Former smoker. She is AO x1 here in the emergency department. At this present time she states she does not have any chest pain. She was loaded with aspirin via EMS. Her EKG showed atrial fibrillation with a left bundle branch block that was unchanged compared to previous. She has no other complaints at this time. PAST MEDICAL / SURGICAL /SOCIAL / FAMILY HISTORY      has a past medical history of Anxiety, Convulsion ([address] Encephalopathy, Herpes, Hyperlipidemia, Left bundle branch block, MRSA (methicillin resistant Staphylococcus aureus), Parkinson's disease ([address] Seizures ([address] and Vitamin D deficiency. has a past surgical history that includes Abscess Drainage (Right, 12/14/2013); Ureter stent placement (Left, 05/16/2022); and Cystoscopy (Left, 5/16/2022).       Social History     Socioeconomic History    Marital status: Single     Spouse name: Not on file    Number of children: Not on file    Years of education: Not on file    Highest education level: Not on file   Occupational History    Not on file   Tobacco Use    Smoking status: Former     Packs/day: 1.00     Years: 30.00 Pack years: 30.00     Types: Cigarettes     Start date: 54     Quit date:      Years since quittin.8    Smokeless tobacco: Never    Tobacco comments:     unknown how many packs a day, or how many years   Vaping Use    Vaping Use: Never used   Substance and Sexual Activity    Alcohol use: No    Drug use: No    Sexual activity: Never   Other Topics Concern    Not on file   Social History Narrative    Not on file     Social Determinants of Health     Financial Resource Strain: Not on file   Food Insecurity: Not on file   Transportation Needs: Not on file   Physical Activity: Not on file   Stress: Not on file   Social Connections: Not on file   Intimate Partner Violence: Not on file   Housing Stability: Not on file       No family history on file. Allergies:  Haldol [haloperidol lactate]    Home Medications:  Prior to Admission medications    Medication Sig Start Date End Date Taking? Authorizing Provider   fludrocortisone (FLORINEF) 0.1 MG tablet TAKE 1 TABLET BY MOUTH EVERY DAY 11/3/22   Sravani Wu, DO   carBAMazepine (TEGRETOL) 200 MG tablet 1.5 tablets po q am and 2 po q pm  Patient taking differently: No sig reported 22   Sravani Wu, DO   FLUoxetine (PROZAC) 20 MG capsule 2 po qd 22   Sravani Wu, DO   mirabegron (MYRBETRIQ) 50 MG TB24 Take 50 mg by mouth daily 22   Sravani Wu DO   melatonin 5 MG TABS tablet Take 1 tablet by mouth nightly as needed (sleep) 22  Nirav Jimenez MD   tamsulosin (FLOMAX) 0.4 MG capsule Take 1 capsule by mouth daily 22   Nirav Jimenez MD   aspirin 81 MG EC tablet Take 81 mg by mouth daily    Historical Provider, MD       REVIEW OF SYSTEMS    (2-9 systems for level 4, 10 or more forlevel 5)      Review of Systems   Constitutional:  Negative for activity change, chills and fever. HENT:  Negative for congestion, sinus pain and sore throat. Eyes:  Negative for pain and visual disturbance.    Respiratory:  Negative for cough and shortness of breath. Cardiovascular:  Positive for chest pain. Gastrointestinal:  Negative for abdominal pain, diarrhea, nausea and vomiting. Genitourinary:  Negative for difficulty urinating, dysuria and hematuria. Musculoskeletal:  Negative for back pain and myalgias. Skin:  Negative for rash and wound. Neurological:  Negative for dizziness, light-headedness and headaches. Psychiatric/Behavioral:  Negative for agitation and confusion. PHYSICAL EXAM   (up to 7 for level 4, 8 or more forlevel 5)      ED TRIAGE VITALS BP: (!) 102/57, Temp: 98.4 °F (36.9 °C), Heart Rate: 99, Resp: 17, SpO2: 95 %    Vitals:    11/09/22 1326 11/09/22 1330 11/09/22 1345 11/09/22 1445   BP:  (!) 101/54 83/68 (!) 95/59   Pulse:  84 81 81   Resp:  11 17 15   Temp: 98.4 °F (36.9 °C)      TempSrc: Oral      SpO2:  96% 91% 96%         Physical Exam  Vitals and nursing note reviewed. Constitutional:       General: She is not in acute distress. Appearance: Normal appearance. She is not ill-appearing. HENT:      Head: Normocephalic and atraumatic. Nose: Nose normal.      Mouth/Throat:      Mouth: Mucous membranes are moist.   Eyes:      Extraocular Movements: Extraocular movements intact. Pupils: Pupils are equal, round, and reactive to light. Cardiovascular:      Rate and Rhythm: Normal rate. Rhythm irregular. Pulses: Normal pulses. Heart sounds: Normal heart sounds. No murmur heard. Pulmonary:      Effort: Pulmonary effort is normal. No respiratory distress. Breath sounds: Normal breath sounds. No stridor. No wheezing or rhonchi. Abdominal:      General: Abdomen is flat. There is no distension. Palpations: Abdomen is soft. Tenderness: There is no abdominal tenderness. There is no guarding. Musculoskeletal:         General: Normal range of motion. Cervical back: Normal range of motion. Right lower leg: No edema. Left lower leg: No edema.    Skin: General: Skin is warm and dry. Capillary Refill: Capillary refill takes less than 2 seconds. Neurological:      General: No focal deficit present. Mental Status: She is alert. She is disoriented. Psychiatric:         Mood and Affect: Mood normal.         Behavior: Behavior normal.         DIFFERENTIAL  DIAGNOSIS     PLAN (LABS / IMAGING / EKG):  Orders Placed This Encounter   Procedures    XR CHEST PORTABLE    CBC    Comprehensive Metabolic Panel    Troponin    Cardiac Monitor    Pulse Oximetry    EKG 12 Lead    Saline lock IV       MEDICATIONS ORDERED:  ED Medication Orders (From admission, onward)      None            DIAGNOSTIC RESULTS / EMERGENCY DEPARTMENT COURSE / MDM     IMPRESSION & INITIAL PLAN:  55-year-old female coming in today for evaluation of chest pain. She reports a history of \"STEMI\" in the past.  History of dementia. Had told daughter just prior to arrival she was having 8 out of 10 chest pain. Loaded with aspirin. Here in the emergency department she is AO x1. She states that she has no chest pain at this time. Given her history and her complaint will order ACS work-up. Pretroponin and pre-EKG heart score is greater than 4. We will at a minimum admit to observation unit. Patient signed out to Dr. Paul Been:  Results for orders placed or performed during the hospital encounter of 11/09/22   CBC   Result Value Ref Range    WBC 7.1 3.5 - 11.3 k/uL    RBC 4.19 3.95 - 5.11 m/uL    Hemoglobin 13.0 11.9 - 15.1 g/dL    Hematocrit 39.9 36.3 - 47.1 %    MCV 95.2 82.6 - 102.9 fL    MCH 31.0 25.2 - 33.5 pg    MCHC 32.6 28.4 - 34.8 g/dL    RDW 14.0 11.8 - 14.4 %    Platelets 133 804 - 098 k/uL    MPV 8.7 8.1 - 13.5 fL    NRBC Automated 0.0 0.0 per 100 WBC       RADIOLOGY:  XR CHEST PORTABLE   Final Result   Senescent changes compatible with the age of the patient. Findings appear stable when compared to the previous study.              ECG:  Rate controlled atrial fibrillation with left bundle branch block. Unchanged compared to previous. QTC prolonged at 496. No acute ST changes. No T wave changes. No other repolarization abnormalities. All EKG's are interpreted by the Emergency Department Physician who either signsor Co-signs this chart in the absence of a cardiologist.    CONSULTS:  None    CRITICAL CARE:  See attending physician note    FINAL IMPRESSION      1. Other chest pain          DISPOSITION / PLAN     DISPOSITION    Please see Dr. Jonatan Ramirez note     Bobjose Bonds:  No follow-up provider specified.     DISCHARGE MEDICATIONS:  New Prescriptions    No medications on file     Modified Medications    No medications on file        Guillermo Steele MD  Emergency Medicine Resident    (Please note that portions of this note were completed with a voice recognition program.  Efforts were made to edit the dictations but occasionally words are mis-transcribed.)       Guillermo Steele MD  Resident  11/09/22 8734

## 2022-11-09 NOTE — ED NOTES
Pt to ED by EMS for chest pain and SOB x1 day. Pt has hx of STEMI. Hypotensive for EMS 85/50. Given 324mg ASA prior to arrival. Pt has hx of short term memory loss and dementia and is poor historian. Pt is confused about she is but alert and able to follow commands. Patient alert and oriented x4, talking in complete sentences. Respirations even and unlabored. Patient placed on continuous cardiac monitoring, BP cuff, and pulse ox. EKG obtained, blood work obtained.  Daughter at bedside        Chago GarciaExcela [facility]  11/09/22 4525

## 2022-11-09 NOTE — ED PROVIDER NOTES
[facility]     Emergency Department     Faculty Attestation    I performed a history and physical examination of the patient and discussed management with the resident. I have reviewed and agree with the residents findings including all diagnostic interpretations, and treatment plans as written at the time of my review. Any areas of disagreement are noted on the chart. I was personally present for the key portions of any procedures. I have documented in the chart those procedures where I was not present during the key portions. For Physician Assistant/ Nurse Practitioner cases/documentation I have personally evaluated this patient and have completed at least one if not all key elements of the E/M (history, physical exam, and MDM). Additional findings are as noted. Primary Care Physician: Rob Miller DO    History: This is a 76 y.o. female who presents to the Emergency Department with complaint of chest pain. The patient presents to the emergency department with complaint of sharp chest pain. Most of the history obtained from the daughter as the patient has significant dementia. Daughter states pain lasted approximately 5 minutes and resolve spontaneously. There is no associated vomiting. She denies any shortness of breath. Physical:   oral temperature is 98.4 °F (36.9 °C). Her blood pressure is 83/68 and her pulse is 81. Her respiration is 17 and oxygen saturation is 91%.   Lungs are clear to auscultation bilaterally, heart regular rate and rhythm, abdomen is soft nontender    Impression: Chest pain    Plan: Chest x-ray, EKG, CBC, BMP, troponin      EKG Interpretation    Interpreted by me  Atrial fibrillation with a ventricular rate of 90, left bundle branch block  Compared EKG of October 24, 2022, ventricular rate has increased by 16, fusion complexes have resolved    (Please note that portions of this note were completed with a voice recognition program.  Efforts were made to edit the dictations but occasionally words are mis-transcribed.)    Juan Carlos Leon.  Jd Lainez MD, [facility]  Attending Emergency Medicine Physician        Michelle Kingston MD  11/09/22 8337

## 2022-11-09 NOTE — ED PROVIDER NOTES
[facility] [facility]  Emergency Department  Emergency Medicine Resident Sign-out     Care of Kasi Chino was assumed from Dr. Indio Hart and is being seen for Chest Pain  . The patient's initial evaluation and plan have been discussed with the prior provider who initially evaluated the patient. EMERGENCY DEPARTMENT COURSE / MEDICAL DECISION MAKING:       MEDICATIONS GIVEN:  No orders of the defined types were placed in this encounter. LABS / RADIOLOGY:     Labs Reviewed   COMPREHENSIVE METABOLIC PANEL - Abnormal; Notable for the following components:       Result Value    Glucose 133 (*)     Creatinine 0.91 (*)     Calcium 8.3 (*)     Potassium 3.3 (*)     Alkaline Phosphatase 246 (*)     Total Bilirubin 0.2 (*)     Albumin 3.2 (*)     Albumin/Globulin Ratio 0.8 (*)     All other components within normal limits   TROPONIN - Abnormal; Notable for the following components:    Troponin, High Sensitivity 40 (*)     All other components within normal limits   CBC   TROPONIN       CT ABDOMEN PELVIS WO CONTRAST Additional Contrast? None    Result Date: 11/1/2022  EXAMINATION: CT OF THE ABDOMEN AND PELVIS WITHOUT CONTRAST 10/28/2022 11:34 am TECHNIQUE: CT of the abdomen and pelvis was performed without the administration of intravenous contrast. Multiplanar reformatted images are provided for review. Automated exposure control, iterative reconstruction, and/or weight based adjustment of the mA/kV was utilized to reduce the radiation dose to as low as reasonably achievable. COMPARISON: 05/15/2022 HISTORY: ORDERING SYSTEM PROVIDED HISTORY: kidney stones, hydronephrosis TECHNOLOGIST PROVIDED HISTORY: kidney stones, hydronephrosis Reason for Exam: kidney stones, hydronephrosis Additional signs and symptoms: pt not talking unable to get more hx FINDINGS: Lower Chest: The heart size is upper normal.  There is a small hiatal hernia. Motion limits the exam.  Bibasilar atelectasis or scarring is noted. Organs: A nodule along the anterior aspect of the left lobe of the liver is re-identified. Gallstones are present. Small splenules are noted. A focal pancreatic abnormality is not identified. There is thickening of the adrenal glands, as before. Bilateral renal calcifications are present. The largest is in the right upper pole and measures approximately 1.2 cm. There is mild left hydronephrosis. A left-sided ureteral stent is in place. There is a calcification within the left ureter adjacent to the stent on axial image 84 series 2. This measures 3 mm. No additional ureteral calculi are identified. Somewhat amorphous calcification is noted surrounding the pigtail portion of the stent within the urinary bladder. The right kidney is not obstructed. No right-sided ureteral calculi are identified. GI/Bowel: The bowel is not obstructed. There is a moderate to large amount of stool in the rectum. Stool is noted throughout the colon. Diverticulosis is noted. Numerous omental soft tissue nodules are identified, most pronounced on the left. These appears similar to the previous exam.  On the previous exam these enhance similarly to the left upper quadrant splenules. Pelvis: There is no free fluid in the pelvis. The uterus has been removed. Peritoneum/Retroperitoneum: There is calcification of the abdominal aorta. There is no free intraperitoneal air. Bones/Soft Tissues: Old right-sided rib fractures are noted. The bones are demineralized. There are numerous compression deformities in the spine. These appear unchanged. 1. A left ureteral stent is in place. There is mild left hydronephrosis. There is a small left mid ureteral calcification measuring 3 mm. Amorphous calcification is formed around the pigtail portion of the catheter within the urinary bladder. 2. Nonobstructing bilateral renal calculi. 3. Moderate to large amount of stool in the rectum. Consider mild fecal impaction. 4. Splenosis.      IR FLUORO GUIDED CVA DEVICE PLMT/REPLACE/REMOVAL    Result Date: 10/27/2022  PROCEDURE: ULTRASOUND GUIDED VASCULAR ACCESS. FLUOROSCOPY GUIDED PICC PLACEMENT 10/27/2022. HISTORY: ORDERING SYSTEM PROVIDED HISTORY: ESBL UTI < will needs PICC Line TECHNOLOGIST PROVIDED HISTORY: ESBL UTI < will needs PICC Line Lumen?->Single Lumen SEDATION: None FLUOROSCOPY DOSE AND TYPE OR TIME AND EXPOSURES: 1 minute 58 seconds; D AP 84 cGy cm2 TECHNIQUE: Informed consent was obtained after a detailed explanation of the procedure including risks, benefits, and alternatives. Universal protocol was observed. The right arm was prepped and draped in sterile fashion using maximum sterile barrier technique. Local anesthesia was achieved with lidocaine. A micropuncture needle was used to access the right brachial vein using ultrasound guidance. An ultrasound image demonstrating patency of the vein with needle tip located within it. An image was obtained and stored in PACs. A 0.018 guidewire was used to place a peel-a-way sheath and a 4 Spanish single-lumen PICC was advanced with fluoroscopic guidance with the tip at the cavo-atrial junction. The catheter flushed easily and there was a good blood return. The catheter was secured to the skin. The patient tolerated the procedure well and there were no immediate complications. EBL: Less than 3 mL FINDINGS: Fluoroscopic image demonstrates the tip of the catheter at the cavo-atrial junction. Successful ultrasound and fluoroscopy guided PICC placement     XR CHEST PORTABLE    Result Date: 11/9/2022  EXAMINATION: ONE XRAY VIEW OF THE CHEST 11/9/2022 1:51 pm COMPARISON: Portable frontal view of the chest October 24, 2022. HISTORY: ORDERING SYSTEM PROVIDED HISTORY: Chest Pain TECHNOLOGIST PROVIDED HISTORY: Chest Pain FINDINGS: The heart is unchanged in size and is within limits of normal for size. Some calcification is present within the aorta. Tortuous brachiocephalic vasculature.   Mild rotatory scoliosis of the cervicothoracic spine and some asymmetry of the thoracic cage. No evidence of pneumothorax, pleural effusion, infiltrate, or abnormal lung mass. Central pulmonary vascularity appears normal.     Senescent changes compatible with the age of the patient. Findings appear stable when compared to the previous study. XR CHEST PORTABLE    Result Date: 10/24/2022  EXAMINATION: ONE XRAY VIEW OF THE CHEST 10/24/2022 10:07 pm COMPARISON: October 7, 2022 HISTORY: ORDERING SYSTEM PROVIDED HISTORY: SOB, recent admission for pneumonia TECHNOLOGIST PROVIDED HISTORY: SOB, recent admission for pneumonia Reason for Exam: SOB, recent admission for pneumonia FINDINGS: Lungs are hyperaerated. Improving right lower lobe airspace disease. Heart and mediastinum normal.  Scoliosis. Right lower lobe infiltrate improved. COPD. US RETROPERITONEAL COMPLETE    Result Date: 10/27/2022  EXAMINATION: RETROPERITONEAL ULTRASOUND OF THE KIDNEYS AND URINARY BLADDER 10/27/2022 COMPARISON: None HISTORY: ORDERING SYSTEM PROVIDED HISTORY: recurrent UTI rule out obstruction TECHNOLOGIST PROVIDED HISTORY: recurrent UTI rule out obstruction FINDINGS: Kidneys: The right kidney measures 9.7 x 4.7 x 5.1 cm. The left kidney measures 11.0 x 5 x 5.2 cm. Kidneys demonstrate normal cortical echogenicity. Echogenic foci are present in both kidneys including a focus of 6.5 mm lower pole of the right kidney and a focus of 6.6 mm lower pole of the left kidney. Caliectasis right kidney is noted with mild left hydronephrosis. Bladder: Bladder not evaluated. Caliectasis right kidney. Mild hydronephrosis left kidney. Bilateral nephrolithiasis. No cortical thinning. RECENT VITALS:     Temp: 98.4 °F (36.9 °C),  Heart Rate: 81, Resp: 15, BP: (!) 95/59, SpO2: 96 %      This patient is a 76 y.o.  Female with past medical history of hypercholesterolemia, tobacco abuse, CKD, A. fib not on anticoagulation who initially presented by EMS due to complaints of chest pain. She did receive aspirin prior to hospital arrival via EMS. Initial troponin at patient's baseline and second troponin ordered and pending. EKG was unremarkable. Heart score is elevated we will discuss placement in observation unit for cardiology to see    History: 1  EC  Patient Age: 1  *Risk factors for Atherosclerotic disease: Hypercholesterolemia  Risk Factors: 1  Troponin: 1  Heart Score Total: 6      Heart score of 6. Did discuss placement in the observation unit versus follow-up given the patient does have history of severe dementia and is DNR CCA. Had a long fair decision-making conversation with daughter given medical morbidities as well as if further cardiac testing were positive if daughter would wish to pursue heart cath and potential stent. Daughter is requesting placement in the observation unit to see cardiologist to talk about further testing., given elevated heart score this is reasonable. Patient received aspirin by EMS no need to repeat aspirin. Will place in obs. OUTSTANDING TASKS / RECOMMENDATIONS:    Awaiting for troponin  Disposition     FINAL IMPRESSION:     1. Other chest pain        DISPOSITION:         DISPOSITION:  []  Discharge   []  Transfer -    [x]  Admission -     []  Against Medical Advice   []  Eloped   FOLLOW-UP: No follow-up provider specified.    DISCHARGE MEDICATIONS: New Prescriptions    No medications on file          Candis Sy DO  Emergency Medicine Resident  [facility]       Candis Sy [place]  Resident  11/10/22 [id number]

## 2022-11-09 NOTE — ED NOTES
Pt transferred to room 22 in ED. Given box lunch.  Call light within reach, all needs met at this time     Smooth Villegas RN  11/09/22 [address]

## 2022-11-10 LAB
ABSOLUTE EOS #: 1.28 K/UL (ref 0–0.44)
ABSOLUTE IMMATURE GRANULOCYTE: <0.03 K/UL (ref 0–0.3)
ABSOLUTE LYMPH #: 1.65 K/UL (ref 1.1–3.7)
ABSOLUTE MONO #: 1.13 K/UL (ref 0.1–1.2)
ANION GAP SERPL CALCULATED.3IONS-SCNC: 11 MMOL/L (ref 9–17)
BASOPHILS # BLD: 1 % (ref 0–2)
BASOPHILS ABSOLUTE: 0.09 K/UL (ref 0–0.2)
BUN BLDV-MCNC: 16 MG/DL (ref 8–23)
CALCIUM SERPL-MCNC: 8 MG/DL (ref 8.6–10.4)
CHLORIDE BLD-SCNC: 104 MMOL/L (ref 98–107)
CO2: 21 MMOL/L (ref 20–31)
CREAT SERPL-MCNC: 0.87 MG/DL (ref 0.5–0.9)
EOSINOPHILS RELATIVE PERCENT: 16 % (ref 1–4)
GFR SERPL CREATININE-BSD FRML MDRD: >60 ML/MIN/1.73M2
GLUCOSE BLD-MCNC: 93 MG/DL (ref 70–99)
HCT VFR BLD CALC: 38.3 % (ref 36.3–47.1)
HEMOGLOBIN: 12.6 G/DL (ref 11.9–15.1)
IMMATURE GRANULOCYTES: 0 %
LYMPHOCYTES # BLD: 21 % (ref 24–43)
MCH RBC QN AUTO: 31.4 PG (ref 25.2–33.5)
MCHC RBC AUTO-ENTMCNC: 32.9 G/DL (ref 28.4–34.8)
MCV RBC AUTO: 95.5 FL (ref 82.6–102.9)
MONOCYTES # BLD: 14 % (ref 3–12)
NRBC AUTOMATED: 0 PER 100 WBC
PDW BLD-RTO: 14 % (ref 11.8–14.4)
PLATELET # BLD: 405 K/UL (ref 138–453)
PMV BLD AUTO: 9 FL (ref 8.1–13.5)
POTASSIUM SERPL-SCNC: 3.8 MMOL/L (ref 3.7–5.3)
RBC # BLD: 4.01 M/UL (ref 3.95–5.11)
SEG NEUTROPHILS: 48 % (ref 36–65)
SEGMENTED NEUTROPHILS ABSOLUTE COUNT: 3.77 K/UL (ref 1.5–8.1)
SODIUM BLD-SCNC: 136 MMOL/L (ref 135–144)
TROPONIN, HIGH SENSITIVITY: 35 NG/L (ref 0–14)
WBC # BLD: 7.9 K/UL (ref 3.5–11.3)

## 2022-11-10 PROCEDURE — 96361 HYDRATE IV INFUSION ADD-ON: CPT

## 2022-11-10 PROCEDURE — G0378 HOSPITAL OBSERVATION PER HR: HCPCS

## 2022-11-10 PROCEDURE — 97535 SELF CARE MNGMENT TRAINING: CPT

## 2022-11-10 PROCEDURE — 6360000002 HC RX W HCPCS: Performed by: STUDENT IN AN ORGANIZED HEALTH CARE EDUCATION/TRAINING PROGRAM

## 2022-11-10 PROCEDURE — 97162 PT EVAL MOD COMPLEX 30 MIN: CPT

## 2022-11-10 PROCEDURE — 85025 COMPLETE CBC W/AUTO DIFF WBC: CPT

## 2022-11-10 PROCEDURE — 2580000003 HC RX 258: Performed by: STUDENT IN AN ORGANIZED HEALTH CARE EDUCATION/TRAINING PROGRAM

## 2022-11-10 PROCEDURE — 36415 COLL VENOUS BLD VENIPUNCTURE: CPT

## 2022-11-10 PROCEDURE — 96372 THER/PROPH/DIAG INJ SC/IM: CPT

## 2022-11-10 PROCEDURE — 97166 OT EVAL MOD COMPLEX 45 MIN: CPT

## 2022-11-10 PROCEDURE — 96360 HYDRATION IV INFUSION INIT: CPT

## 2022-11-10 PROCEDURE — 84484 ASSAY OF TROPONIN QUANT: CPT

## 2022-11-10 PROCEDURE — 6370000000 HC RX 637 (ALT 250 FOR IP): Performed by: STUDENT IN AN ORGANIZED HEALTH CARE EDUCATION/TRAINING PROGRAM

## 2022-11-10 PROCEDURE — 80048 BASIC METABOLIC PNL TOTAL CA: CPT

## 2022-11-10 PROCEDURE — 94761 N-INVAS EAR/PLS OXIMETRY MLT: CPT

## 2022-11-10 PROCEDURE — 97530 THERAPEUTIC ACTIVITIES: CPT

## 2022-11-10 RX ADMIN — FLUDROCORTISONE ACETATE 0.1 MG: 0.1 TABLET ORAL at 07:58

## 2022-11-10 RX ADMIN — CARBAMAZEPINE 200 MG: 200 TABLET ORAL at 07:58

## 2022-11-10 RX ADMIN — TROSPIUM CHLORIDE 20 MG: 20 TABLET, FILM COATED ORAL at 21:08

## 2022-11-10 RX ADMIN — SODIUM CHLORIDE: 9 INJECTION, SOLUTION INTRAVENOUS at 11:12

## 2022-11-10 RX ADMIN — Medication 81 MG: at 07:58

## 2022-11-10 RX ADMIN — FLUOXETINE HYDROCHLORIDE 20 MG: 20 CAPSULE ORAL at 07:58

## 2022-11-10 RX ADMIN — ENOXAPARIN SODIUM 40 MG: 40 INJECTION SUBCUTANEOUS at 07:59

## 2022-11-10 RX ADMIN — CARBAMAZEPINE 400 MG: 200 TABLET ORAL at 21:08

## 2022-11-10 ASSESSMENT — PAIN SCALES - GENERAL
PAINLEVEL_OUTOF10: 0
PAINLEVEL_OUTOF10: 0

## 2022-11-10 ASSESSMENT — HEART SCORE: ECG: 1

## 2022-11-10 NOTE — PROGRESS NOTES
Assessment:  patient is a 76year old female in room 46. Intervention:  provided Bible for patient as requested by nurse. Outcome;  Patient expressed gratitude. Plan: Chaplains are available 24/7 via [address] [place] [address].

## 2022-11-10 NOTE — CARE COORDINATION
Call from Robby Martinez at [facility], who relates patient will needs new precert to return    [phone] PT/OT notes faxed to [facility]

## 2022-11-10 NOTE — PROGRESS NOTES
Physical Therapy  Facility/Department: [facility] 3C OBSERVATION  Physical Therapy Initial Assessment    Name: Kathy Ward  : 1945  MRN: [id number]  Date of Service: 11/10/2022  History copied and pasted from H+P: This is a 76 y.o. female who presents to the Emergency Department with complaint of chest pain. The patient presents to the emergency department with complaint of sharp chest pain. Most of the history obtained from the daughter as the patient has significant dementia. Daughter states pain lasted approximately 5 minutes and resolve spontaneously. There is no associated vomiting. She denies any shortness of breath. Discharge Recommendations:  Further therapy recommended at discharge. PT Equipment Recommendations  Equipment Needed: No      Patient Diagnosis(es): The encounter diagnosis was Other chest pain. Past Medical History:  has a past medical history of Anxiety, Convulsion ([address] Encephalopathy, Herpes, Hyperlipidemia, Left bundle branch block, MRSA (methicillin resistant Staphylococcus aureus), Parkinson's disease ([address] Seizures ([address] and Vitamin D deficiency. Past Surgical History:  has a past surgical history that includes Abscess Drainage (Right, 2013); Ureter stent placement (Left, 2022); and Cystoscopy (Left, 2022). Assessment   Pt sleeping upon PT arrival, easily awakened and agreeable to get OOB/ambulate. Pt oriented to self only, when asked questions she repeatedly answered \"I don't know\". She requires assistance for all mobility at this time. Body Structures, Functions, Activity Limitations Requiring Skilled Therapeutic Intervention: Decreased functional mobility ; Decreased ROM; Decreased tolerance to work activity; Decreased strength;Decreased safe awareness;Decreased cognition;Decreased endurance;Decreased balance;Decreased posture  Therapy Prognosis: Fair  Decision Making: Medium Complexity  Barriers to Learning: decreased cognition; dementia  Requires PT Follow-Up: Yes  Activity Tolerance  Activity Tolerance: Patient limited by fatigue;Treatment limited secondary to decreased cognition  Activity Tolerance Comments:  (pt very tired and cold, but agreeable to get OOB/ambulate with PT)     Plan   Physcial Therapy Plan  General Plan:  (5 visits weekly)  Current Treatment Recommendations: Strengthening, ROM, Balance training, Functional mobility training, Transfer training, Endurance training, Gait training, Patient/Caregiver education & training, Equipment evaluation, education, & procurement, Positioning, Therapeutic activities  Safety Devices  Type of Devices: Bed alarm in place, Call light within reach, Gait belt, Patient at risk for falls, Left in bed, Nurse notified  Restraints  Restraints Initially in Place: No     Restrictions  Restrictions/Precautions  Restrictions/Precautions: Fall Risk, General Precautions, Contact Precautions, Up as Tolerated  Required Braces or Orthoses?: No     Subjective   General  Patient assessed for rehabilitation services?: Yes  Response To Previous Treatment: Not applicable  Family / Caregiver Present: No  Follows Commands: Impaired (pt inconsistently able to follow commands for MMT, ROM, mobility; needed frequent visual and tactile cues)  Subjective  Subjective: denies pain         Social/Functional History  Social/Functional History  Lives With: Daughter  Receives Help From: Family  ADL Assistance: Needs assistance  Toileting: Needs assistance  Homemaking Assistance: Needs assistance  Homemaking Responsibilities: No  Ambulation Assistance: Needs assistance  Transfer Assistance: Needs assistance  Active : No  Patient's  Info: daughter  Mode of Transportation: Family  Additional Comments: info taken from [facility] planner note; pt unable to answer questions \"I can't think\"  Vision/Hearing  Vision  Vision:  (VIN d/t pt's decreased cognition)  Hearing  Hearing:  (VIN d/t pt's decreased cognition)    Cognition Orientation  Overall Orientation Status: Impaired  Orientation Level: Oriented to person;Disoriented to place; Disoriented to time;Disoriented to situation (pt oriented only to self, stated \"I can't think\" and answered \"I don't know\" to all questions except to give her name)     Objective   Heart Rate: 85  BP: (!) 97/56  MAP (Calculated): 70  Resp: 20  SpO2: 95 %  O2 Device: None (Room air)     Observation/Palpation  Posture: Fair        PROM RLE (degrees)  RLE PROM: WFL  PROM LLE (degrees)  LLE PROM: WFL  PROM RUE (degrees)  RUE PROM: WFL  PROM LUE (degrees)  LUE PROM: Exceptions shoulder elevation ~70 degrees and painful; elbow distal WFL  Strength RLE  Strength RLE: WFL  Strength LLE  Strength LLE: WFL  Strength RUE  Strength RUE: WFL  Strength LUE  Strength LUE: Exception--shoulder grossly 1/5; elbow distal WFL  Strength Other  Other:  (pt inconsistently followed commands for ROM/MMT; better with visual and tactile cues)           Bed mobility  Rolling to Right: Minimal assistance  Supine to Sit: Minimal assistance  Sit to Supine: Stand by assistance  Transfers  Sit to Stand: Minimal Assistance  Stand to Sit: Minimal Assistance  Stand Pivot Transfers: Minimal Assistance  Ambulation  Surface: Level tile  Device: No Device  Assistance: Minimal assistance  Quality of Gait:  (pt put her arm around PTs waist to ambulate, slightly leans on PT)  Gait Deviations: Slow Anju;Decreased step length;Decreased arm swing;Decreased head and trunk rotation  Distance: 80'x1  Stairs/Curb  Stairs?: No     Balance  Posture: Fair  Sitting - Static: Good  Sitting - Dynamic: Good  Standing - Static: Fair  Standing - Dynamic: Fair;-  A/AROM Exercises: AROM x 10, verbal cues to complete, occasional tactile cues: LE heel slides, SAQs, hip ab/adduction; UE hand grasp/release, elbow flexion/extension, R shoulder flexion; AAROM L shoulder flexion through decreased ROM    AM-PAC Score  AM-PAC Inpatient Mobility Raw Score : 17 (11/10/22 1403)  AM-PAC Inpatient T-Scale Score : 42.13 (11/10/22 1403)  Mobility Inpatient CMS 0-100% Score: 50.57 (11/10/22 1403)  Mobility Inpatient CMS G-Code Modifier : CK (11/10/22 1403)          Goals  Short Term Goals  Time Frame for Short Term Goals: 12 visits  Short Term Goal 1: bed mob with supervision  Short Term Goal 2: transfers with CG  Short Term Goal 3: gait with CG x 100'  Patient Goals   Patient Goals : pt didn't verbalize goal, pt very confused       Education  Patient Education  Education Given To: Patient  Education Provided: Role of Therapy;Plan of Care;Transfer Training; Fall Prevention Strategies  Education Method: Verbal;Demonstration  Barriers to Learning: Cognition  Education Outcome: Continued education needed; Unable to demonstrate understanding      Therapy Time   Individual Concurrent Group Co-treatment   Time In 1313         Time Out 1359         Minutes 46         Timed Code Treatment Minutes: [address]       Lilibeth Petersen, PT

## 2022-11-10 NOTE — H&P
[address] [facility] [employer]  CDU / OBSERVATION ENCOUNTER  RESIDENT NOTE     Pt Name: Samir Tolbert  MRN: [id number]  Demetriotrongfalbert 1945  Date of evaluation: 11/10/22  Patient's PCP is :  Irene Oscar DO    CHIEF COMPLAINT       Chief Complaint   Patient presents with    Chest Pain         HISTORY OF PRESENT ILLNESS    Samir Tolbert is a 76 y.o. female tree of dementia, A. fib not on anticoagulation, seizure disorder, and Parkinson's disease who presents chest pain. Patient was having 10/10 chest pain while at the nursing home. EMS was called where she was also found to be hypotensive. The patient is a poor historian with a somewhat flat affect. The patient is not complaining of any dyspnea. The patient's daughter Lisy Currie helps the patient communicate and make decisions. Location/Symptom: Chest pain  Timing/Onset: Sudden onset  Provocation: Uncertain  Quality: Sharp  Radiation: Uncertain  Severity: 04/59  Timing/Duration: Yesterday, subsided  Modifying Factors: Uncertain    REVIEW OF SYSTEMS       General ROS - No fevers, No malaise   Respiratory ROS - no shortness of breath, no cough, no  wheezing  Cardiovascular ROS - positive for chest pain associated with dyspnea  Gastrointestinal ROS - No abdominal pain, no nausea or vomiting, no change in bowel habits, no black or bloody stools  Genito-Urinary ROS - No dysuria, trouble voiding, or hematuria  Musculoskeletal ROS - No myalgias, No arthalgias  Neurological ROS - No headache, no dizziness/lightheadedness, No focal weakness, no loss of sensation  Dermatological ROS - No lesions, No rash     (PQRS) Advance directives on face sheet per hospital policy.  No change unless specifically mentioned in chart    PAST MEDICAL HISTORY    has a past medical history of Anxiety, Convulsion ([address] Encephalopathy, Herpes, Hyperlipidemia, Left bundle branch block, MRSA (methicillin resistant Staphylococcus aureus), Parkinson's disease ([address] Seizures ([address] and never used smokeless tobacco. She reports that she does not drink alcohol and does not use drugs. I have reviewed and agree with all Social.  There are no concerns for substance abuse/use. PHYSICAL EXAM     INITIAL VITALS:  temperature is 98.2 °F (36.8 °C). Her blood pressure is 97/56 (abnormal) and her pulse is 85. Her respiration is 20 and oxygen saturation is 95%. CONSTITUTIONAL: AOx4, no apparent distress, appears stated age    HEAD: normocephalic, atraumatic   EYES: PERRLA, EOMI    ENT: moist mucous membranes, uvula midline   NECK: supple, symmetric   BACK: symmetric   LUNGS: clear to auscultation bilaterally   CARDIOVASCULAR: regular rate and rhythm, no murmurs, rubs or gallops   ABDOMEN: soft, non-tender, non-distended with normal active bowel sounds   NEUROLOGIC:  MAEx4, no focal sensory or motor deficits   MUSCULOSKELETAL: no clubbing, cyanosis or edema   SKIN: no rash or wounds       DIFFERENTIAL DIAGNOSIS/MDM:     ACS, PE, pneumonia    DIAGNOSTIC RESULTS     EKG: All EKG's are interpreted by the Observation Physician who either signs or Co-signs this chart in the absence of a cardiologist.    EKG Interpretation    Interpreted by observation physician    Rhythm: normal sinus   Rate: normal  Axis: normal  Ectopy: none  Conduction: left bundle branch block (complete), 1st degree AV block  ST Segments: no acute change  T Waves: non specific changes  Q Waves: none    Clinical Impression: left bundle branch block with 1st degree AV block    Lacy Sue MD      RADIOLOGY:   I directly visualized the following  images and reviewed the radiologist interpretations:    XR CHEST PORTABLE    Result Date: 11/9/2022  EXAMINATION: ONE XRAY VIEW OF THE CHEST 11/9/2022 1:51 pm COMPARISON: Portable frontal view of the chest October 24, 2022.  HISTORY: ORDERING SYSTEM PROVIDED HISTORY: Chest Pain TECHNOLOGIST PROVIDED HISTORY: Chest Pain FINDINGS: The heart is unchanged in size and is within limits of normal for size.  Some calcification is present within the aorta. Tortuous brachiocephalic vasculature. Mild rotatory scoliosis of the cervicothoracic spine and some asymmetry of the thoracic cage. No evidence of pneumothorax, pleural effusion, infiltrate, or abnormal lung mass. Central pulmonary vascularity appears normal.     Senescent changes compatible with the age of the patient. Findings appear stable when compared to the previous study. LABS:  I have reviewed and interpreted all available lab results. Labs Reviewed   COMPREHENSIVE METABOLIC PANEL - Abnormal; Notable for the following components:       Result Value    Glucose 133 (*)     Creatinine 0.91 (*)     Calcium 8.3 (*)     Potassium 3.3 (*)     Alkaline Phosphatase 246 (*)     Total Bilirubin 0.2 (*)     Albumin 3.2 (*)     Albumin/Globulin Ratio 0.8 (*)     All other components within normal limits   TROPONIN - Abnormal; Notable for the following components:    Troponin, High Sensitivity 40 (*)     All other components within normal limits   TROPONIN - Abnormal; Notable for the following components:    Troponin, High Sensitivity 37 (*)     All other components within normal limits   BASIC METABOLIC PANEL W/ REFLEX TO MG FOR LOW K - Abnormal; Notable for the following components:    Calcium 8.0 (*)     All other components within normal limits   CBC WITH AUTO DIFFERENTIAL - Abnormal; Notable for the following components:    Lymphocytes 21 (*)     Monocytes 14 (*)     Eosinophils % 16 (*)     Absolute Eos # 1.28 (*)     All other components within normal limits   TROPONIN - Abnormal; Notable for the following components:    Troponin, High Sensitivity 35 (*)     All other components within normal limits   COVID-19, RAPID   CBC         CDU IMPRESSION / PLAN     Darling Santo is a 76 y.o. female tree of dementia, A. fib not on anticoagulation, seizure disorder, and Parkinson's disease who presents chest pain.       Chest Pain  Cardiology consulted-appreciate their input  Mild chronic troponin elevation with ECG showing first-degree AV block with left bundle branch. Daughter who is a shared decision maker does not wish to proceed with any further testing  Cardiology signed off    Palliative Care  Palliative care consulted - appreciate their input  Confirm with case management - daughter, Daylin Johnson, is a shared decision maker. Patient made DNR CCA by Jaye Dumont DO --> no documentation in electronic chart, please confirm discussion was had with the patient and daughter    Continue home medications and pain control  Monitor vitals, labs, and imaging    DISPO: pending palliative care, please confirm decision-making ability with daughter and confirm DNR CCA status    CONSULTS:    IP CONSULT TO CARDIOLOGY  IP CONSULT TO PALLIATIVE CARE    PROCEDURES:  Not indicated       PATIENT REFERRED TO:    No follow-up provider specified. --  Cherly Kanner, MD   Emergency Medicine Resident    This dictation was generated by voice recognition computer software. Although all attempts are made to edit the dictation for accuracy, there may be errors in the transcription that are not intended.

## 2022-11-10 NOTE — PROGRESS NOTES
According to Dr. Bonnye Gowers note, he states the pt is a [facility] however the pt has an order for a full code. Obs resident notified.

## 2022-11-10 NOTE — PROGRESS NOTES
Pt is A&Ox1. Pt is orientated to self. Pt states she \"knows nothing\". Pt is unable to tell me who she lives with etc. Kimberly Fleischer is Unable to do pts admission at this time.

## 2022-11-10 NOTE — PROGRESS NOTES
[address]  CDU / OBSERVATION ENCOUNTER  ATTENDING NOTE       I performed a history and physical examination of the patient and discussed management with the resident or midlevel provider. I reviewed the resident or midlevel provider's note and agree with the documented findings and plan of care. Any areas of disagreement are noted on the chart. I was personally present for the key portions of any procedures. I have documented in the chart those procedures where I was not present during the key portions. I have reviewed the nurses notes. I agree with the chief complaint, past medical history, past surgical history, allergies, medications, social and family history as documented unless otherwise noted below. The Family history, social history, and ROS are effectively unchanged since admission unless noted elsewhere in the chart. Patient admitted due to chest pain. Cardiology consulted, note does describe discussion with daughter who was not present at my initial evaluation. And discussion of no intervention. Palliative consult was placed as patient does have dementia. Will await evaluation, patient will need new pre-CERT.     Clyde Chung  Attending Emergency  Physician

## 2022-11-10 NOTE — PROGRESS NOTES
SPIRITUAL CARE DEPARTMENT - [address]  PROGRESS NOTE    Shift date: 11/9/22  Shift day: Wednesday   Shift # 2    Room # 22/22   Name: Selam Barker                Lutheran:    Place of Episcopalian:     Referral: Routine Visit    Admit Date & Time: 11/9/2022  1:19 PM    Assessment:  Selam Barker is a 76 y.o. female      Intervention:  Writer introduced self and title as . Patient's oldest daughter was present in room for emotional support. Writer offered space for patient  to express feelings, needs, and concerns and provided a ministry presence. Patient engaged in conversation about her health and its impact.  allowed space for feelings/emotions. Outcome:  Patient and visitor expressed gratitude for  visit. Plan:  Chaplains will remain available to offer spiritual and emotional support as needed.       Electronically signed by Zari Vides on 11/9/2022 at [address]  [phone]

## 2022-11-10 NOTE — CONSULTS
Attestation signed by      Attending Physician Statement:    I have discussed the care of  Afshin Ngo , including pertinent history and exam findings, with the Cardiology fellow/resident. I have seen and examined the patient and the key elements of all parts of the encounter have been performed by me. I agree with the assessment, plan and orders as documented by the fellow/resident, after I modified exam findings and plan of treatments, and the final version is my approved version of the assessment. Additional Comments: chest pain. Mild HS chronic trop elevation. SR, first degree AV block. Daughter does not want to proceed with any testing. Patient has advanced dementia and parkinson. Consult palliative. Zohra Garcia MD         [facility] Cardiology    Consult / H&P               Today's Date: 11/10/2022  Patient Name: Afshin Ngo  Date of admission: 11/9/2022  1:19 PM  Patient's age: 76 y.o., 1945  Admission Dx: Other chest pain [R07.89]  Chest pain [R07.9]    Reason for Consult:  Cardiac evaluation    Requesting Physician: César Ricketts MD    CHIEF COMPLAINT: Chest pain    History Obtained From:  patient, electronic medical record    HISTORY OF PRESENT ILLNESS:      77-year-old female with known history of dementia , multiple falls, atrial fibrillation, not on anticoagulation, smoker , seizure disorder, Parkinson  who came in for evaluation of chest pain yesterday. Apparently her daughter was told by nurse at nursing home that she is having 10 out of 10 sharp chest pain, was also found hypotensive by EMS 85/50 was given aspirin via EMS  Patient is poor historian, she also complained of some shortness of breath to her daughter  Patient denied nausea vomiting, lightheadedness/dizziness/palpitations. She was recently treated for pneumonia and UTI few weeks ago.     Blood pressure has been on the lower side since she came in  Troponins 40, 37, 35(had elevated troponins previous admissions as well in same range)  EKG consistent with atrial fibrillation with LBBB, unchanged from before, QT prolongation, no ST-T wave changes    Echo 5/17/202  Technically difficult study, tachycardia noted  Normal LV size and wall thickness. Abnormal septal wall motion  Normal LV systolic function with LVEF >55%. Normal RV size and function. RV systolic pressure 48 mmHg  LA and RA appears normal in size. No obvious significant structural valvular abnormality noted. No significant valvular stenosis or regurgitation noted. Normal aortic root dimension. No significant pericardial effusion noted. No obvious intra-cardiac mass or shunt noted. IVC not well visualized    Stress test 10 years ago-normal  Past Medical History:   has a past medical history of Anxiety, Convulsion ([address] Encephalopathy, Herpes, Hyperlipidemia, Left bundle branch block, MRSA (methicillin resistant Staphylococcus aureus), Parkinson's disease ([facility] [address] Seizures ([facility] [address] and Vitamin D deficiency. Past Surgical History:   has a past surgical history that includes Abscess Drainage (Right, 12/14/2013); Ureter stent placement (Left, 05/16/2022); and Cystoscopy (Left, 5/16/2022). Home Medications:    Prior to Admission medications    Medication Sig Start Date End Date Taking?  Authorizing Provider   fludrocortisone (FLORINEF) 0.1 MG tablet TAKE 1 TABLET BY MOUTH EVERY DAY 11/3/22   Sravani Wu DO   carBAMazepine (TEGRETOL) 200 MG tablet 1.5 tablets po q am and 2 po q pm  Patient taking differently: No sig reported 8/23/22   Sravani Wu DO   FLUoxetine (PROZAC) 20 MG capsule 2 po qd 8/22/22   Sravani Wu DO   mirabegron (MYRBETRIQ) 50 MG TB24 Take 50 mg by mouth daily 8/22/22   Sravani Wu DO   melatonin 5 MG TABS tablet Take 1 tablet by mouth nightly as needed (sleep) 5/19/22 5/29/22  Shasta Thomas MD   tamsulosin (FLOMAX) 0.4 MG capsule Take 1 capsule by mouth daily 5/19/22   Shasta Thomas MD   aspirin 81 MG EC tablet Take 81 mg by mouth daily    Historical Provider, MD      Current Facility-Administered Medications: 0.9 % sodium chloride infusion, , IntraVENous, Continuous  sodium chloride flush 0.9 % injection 5-40 mL, 5-40 mL, IntraVENous, 2 times per day  sodium chloride flush 0.9 % injection 5-40 mL, 5-40 mL, IntraVENous, PRN  0.9 % sodium chloride infusion, 25 mL, IntraVENous, PRN  potassium chloride (KLOR-CON M) extended release tablet 40 mEq, 40 mEq, Oral, PRN **OR** potassium bicarb-citric acid (EFFER-K) effervescent tablet 40 mEq, 40 mEq, Oral, PRN **OR** potassium chloride 10 mEq/100 mL IVPB (Peripheral Line), 10 mEq, IntraVENous, PRN  enoxaparin (LOVENOX) injection 40 mg, 40 mg, SubCUTAneous, Daily  ondansetron (ZOFRAN) injection 4 mg, 4 mg, IntraVENous, Q4H PRN  polyethylene glycol (GLYCOLAX) packet 17 g, 17 g, Oral, Daily PRN  acetaminophen (TYLENOL) tablet 650 mg, 650 mg, Oral, Q6H PRN **OR** acetaminophen (TYLENOL) suppository 650 mg, 650 mg, Rectal, Q6H PRN  aspirin EC tablet 81 mg, 81 mg, Oral, Daily  carBAMazepine (TEGRETOL) tablet 400 mg, 400 mg, Oral, Nightly  carBAMazepine (TEGRETOL) tablet 200 mg, 200 mg, Oral, Daily  fludrocortisone (FLORINEF) tablet 0.1 mg, 0.1 mg, Oral, Daily  FLUoxetine (PROZAC) capsule 20 mg, 20 mg, Oral, Daily  trospium (SANCTURA) tablet 20 mg, 20 mg, Oral, Nightly    Allergies:  Haldol [haloperidol lactate]    Social History:   reports that she quit smoking about 23 years ago. Her smoking use included cigarettes. She started smoking about 62 years ago. She has a 30.00 pack-year smoking history. She has never used smokeless tobacco. She reports that she does not drink alcohol and does not use drugs. Family History: family history is not on file. No h/o sudden cardiac death. No for premature CAD    REVIEW OF SYSTEMS:    Constitutional: there has been no unanticipated weight loss. There's been No change in energy level, No change in activity level.      Eyes: No visual changes or diplopia. No scleral icterus. ENT: No Headaches  Cardiovascular: No cardiac history  Respiratory: No previous pulmonary problems, No cough  Gastrointestinal: No abdominal pain. No change in bowel or bladder habits. Genitourinary: No dysuria, trouble voiding, or hematuria. Musculoskeletal:  No gait disturbance, No weakness or joint complaints. Integumentary: No rash or pruritis. Neurological: No headache, diplopia, change in muscle strength, numbness or tingling. No change in gait, balance, coordination, mood, affect, memory, mentation, behavior. Psychiatric: No anxiety, or depression. Endocrine: No temperature intolerance. No excessive thirst, fluid intake, or urination. No tremor. Hematologic/Lymphatic: No abnormal bruising or bleeding, blood clots or swollen lymph nodes. Allergic/Immunologic: No nasal congestion or hives. PHYSICAL EXAM:      BP (!) 98/55   Pulse 79   Temp 97.9 °F (36.6 °C) (Oral)   Resp 16   SpO2 96%    Constitutional and General Appearance: alert, cooperative, no distress and appears stated age  HEENT: PERRL, no cervical lymphadenopathy. No masses palpable. Normal oral mucosa  Respiratory:  Normal excursion and expansion without use of accessory muscles  Resp Auscultation: Good respiratory effort. No for increased work of breathing. On auscultation: clear to auscultation bilaterally  Cardiovascular: The apical impulse is not displaced  Heart tones are crisp and normal. regular S1 and S2.  Jugular venous pulsation Normal  The carotid upstroke is normal in amplitude and contour without delay or bruit  Peripheral pulses are symmetrical and full   Abdomen:   No masses or tenderness  Bowel sounds present  Extremities:   No Cyanosis or Clubbing   Lower extremity edema: No   Skin: Warm and dry  Neurological:  Alert and oriented.   Moves all extremities well  No abnormalities of mood, affect, memory, mentation, or behavior are noted      Labs:     CBC:   Recent Labs 11/09/22  1328 11/10/22  0528   WBC 7.1 7.9   HGB 13.0 12.6   HCT 39.9 38.3    405     BMP:   Recent Labs     11/09/22  1328 11/10/22  0528    136   K 3.3* 3.8   CO2 24 21   BUN 13 16   CREATININE 0.91* 0.87   LABGLOM >60 >60   GLUCOSE 133* 93     BNP: No results for input(s): BNP in the last 72 hours. PT/INR: No results for input(s): PROTIME, INR in the last 72 hours. APTT:No results for input(s): APTT in the last 72 hours. CARDIAC ENZYMES:No results for input(s): CKTOTAL, CKMB, CKMBINDEX, TROPONINI in the last 72 hours.   FASTING LIPID PANEL:  Lab Results   Component Value Date/Time    HDL 70 03/05/2021 09:20 AM    LDLCALC 127 01/05/2016 12:00 AM    TRIG 109 03/05/2021 09:20 AM     LIVER PROFILE:  Recent Labs     11/09/22  1328   AST 27   ALT 14   LABALBU 3.2*       IMPRESSION:    Patient Active Problem List   Diagnosis    Seizure ([address]    Vitamin D deficiency    Anxiety    Hypercholesteremia    Cellulitis of right leg    MRSA (methicillin resistant staph aureus) culture positive    Breakthrough seizure ([address]    Memory loss    Difficulty speaking    Seizure disorder ([address]    Dementia with behavioral disturbance    History of encephalitis    Dementia due to Parkinson's disease with behavioral disturbance ([address]    DVT of deep femoral vein, left (HCC)    Closed compression fracture of L1 lumbar vertebra, initial encounter ([address]    Depression    Kidney stone    Mixed incontinence    Overactive bladder    Closed head injury    Chronic renal disease, stage III ([address] [812644]    Community acquired pneumonia    Sepsis ([address]    COPD exacerbation ([address]    Hypokalemia    Bacterial UTI    Urinary tract infection due to Proteus    ESBL (extended spectrum beta-lactamase) producing bacteria infection    Acute cystitis with hematuria    Leukocytosis    Mitral valve disorder    Chest pain     Assessment  -Chest pain with elevated troponins(same range as before)  -History of hypertension  -Dementia  -Recurrent falls  -History of DVT, not on anticoagulation due to falls  -History of seizures  -Parkinson disease    RECOMMENDATIONS:  Talked to her daughter in detail, apparently her CODE STATUS was recently changed to DNR, she is unsure whether it was CC or CCA, but wants more clarification on the codes, she states that she does not want any heroic measures for her mother, would recommend palliative care for better discussion. Discussed with patient and Nurse.     Electronically signed by Torsten Ortiz MD on 11/10/2022 at 8:27 AM

## 2022-11-10 NOTE — PROGRESS NOTES
Occupational Therapy  Facility/Department: [facility] 3C OBSERVATION  Occupational Therapy Initial Assessment    Name: Eric Gómez  : 1945  MRN: [id number]  Date of Service: 11/10/2022  Chief Complaint   Patient presents with    Chest Pain     Discharge Recommendations:  Patient would benefit from continued therapy after discharge     Patient Diagnosis(es): The encounter diagnosis was Other chest pain. Past Medical History:  has a past medical history of Anxiety, Convulsion ([facility] [facility] Encephalopathy, Herpes, Hyperlipidemia, Left bundle branch block, MRSA (methicillin resistant Staphylococcus aureus), Parkinson's disease ([facility] [address] [facility] Seizures ([facility] [address] and Vitamin D deficiency. Past Surgical History:  has a past surgical history that includes Abscess Drainage (Right, 2013); Ureter stent placement (Left, 2022); and Cystoscopy (Left, 2022). Assessment   Performance deficits / Impairments: Decreased functional mobility ; Decreased ADL status; Decreased endurance;Decreased high-level IADLs;Decreased safe awareness;Decreased balance;Decreased cognition;Decreased strength;Decreased ROM; Decreased coordination  Assessment: Patient demonstrates decreased cognition throughout impacting performance throughout evaluation. Pt required VCs throughout for sequencing and initiation throughout evaluation. Pt completed donning/doffing socks sitting EOB at [address] [place] [address] for balance d/t cognition and balance deficits. Pt completed toileting tasks at [facility] with VC for awareness to change soiled brief. Pt completed grooming tasks standing sinkside at [facility] for balance and independently doing tasks, cues for sequencing to next task. Pt would benefit from continued therapy to promote safety and functional outcomes for safe return to prior living environment.   Prognosis: Good  Decision Making: Medium Complexity  REQUIRES OT FOLLOW-UP: Yes  Activity Tolerance  Activity Tolerance: Treatment limited secondary to decreased cognition;Patient Tolerated treatment well        Plan   Occupational Therapy Plan  Times Per Week: 1-3x/wk  Current Treatment Recommendations: Strengthening, Balance training, Functional mobility training, Endurance training, Safety education & training, Positioning, Equipment evaluation, education, & procurement, Patient/Caregiver education & training, Self-Care / ADL, Cognitive/Perceptual training, Cognitive reorientation     Restrictions  Restrictions/Precautions  Restrictions/Precautions: Fall Risk, General Precautions, Contact Precautions, Up as Tolerated  Required Braces or Orthoses?: No    Subjective   General  Patient assessed for rehabilitation services?: Yes  Family / Caregiver Present: No  General Comment  Comments: RN ok'd patient for OT evaluation. Pt pleasant, cooperative and agreeable throughout. Pt declines any pain, reports being tired. Social/Functional History  Social/Functional History  Lives With: Daughter  Type of Home: Facility  Home Layout: One level  Home Access: Level entry  Bathroom Shower/Tub: Walk-in shower  Bathroom Toilet: Handicap height  Receives Help From: Family  ADL Assistance: Needs assistance  Toileting: Needs assistance  Homemaking Assistance: Needs assistance  Homemaking Responsibilities: No  Ambulation Assistance: Needs assistance  Transfer Assistance: Needs assistance  Active : No  Patient's  Info: daughter  Mode of Transportation: Family  Additional Comments: Patient difficulty answering social function questions this date, AxOx1     Objective   Safety Devices  Type of Devices: Bed alarm in place;Call light within reach;Gait belt;Patient at risk for falls; Left in bed;Nurse notified  Restraints  Restraints Initially in Place: No  Balance  Sitting: With support (CGA EOB, required min A for trunk support with forward flexion d/t decreased safety awareness and balance deficits)  Standing: With support (toileting tasks, sinkside ADLs ~3-4 min cumulative; CGA for balance)  Transfer Training  Transfer Training: Yes  Overall Level of Assistance: Minimum assistance  Interventions: Safety awareness training;Verbal cues  Sit to Stand: Minimum assistance (handheld assistance)  Stand to Sit: Contact-guard assistance  Toilet Transfer: Contact-guard assistance  Gait  Overall Level of Assistance: Minimum assistance (handheld assistance for functional mobility tasks)  Interventions: Verbal cues;Manual cues  Assistive Device: Gait belt (EOB > bathroom > door > EOB)     PROM: Generally decreased, functional (decreased shoulder flexion of the L UE ~15 degrees; R UE ~75 degrees; WFL distally)  Strength: Generally decreased, functional (R shoulder 2-/5; R bicep/tricep 3+/5; R  3+/5; L shoulder 2/5; L bicep/tricep 3+/5; L  3+/5)  Coordination: Generally decreased, functional (decreased speed; difficulty with understanding opposition)  Tone: Normal  Sensation: Intact  ADL  Feeding: Independent  Grooming: Stand by assistance;Setup;Verbal cueing  UE Bathing: Contact guard assistance;Verbal cueing;Setup  LE Bathing: Minimal assistance;Setup;Verbal cueing  UE Dressing: Contact guard assistance;Setup;Verbal cueing  LE Dressing: Minimal assistance;Setup;Verbal cueing  Toileting: Contact guard assistance; Increased time to complete;Setup  Additional Comments: Pt completed doffing/donning socks at [address] [facility] A for for balance with forward flexed posture, able to complete task with increased time and VC. Pt completed functional mobility to bathroom to engage in toileting tasks, required CGA for balance and VC for changing brief d/t being soiled. Pt able to doff brief at [facility] and thread clean at [facility] with set up. Pt sat on toilet and completed seated personal hygiene at [place] [address]. Pt stood sinkside to complete hand hygiene and washing face, required VCs for sequencing tasks. Pt reported \"I don't remember anything\" throughout evaluation when progressing through functional tasks.  Pt retired with lunch in front of patient, able to cut up food, stab and eat independently. Activity Tolerance  Activity Tolerance: Patient limited by fatigue;Treatment limited secondary to decreased cognition  Activity Tolerance Comments:  (pt very tired and cold, but agreeable to get OOB/ambulate with PT)  Bed mobility  Supine to Sit: Contact guard assistance  Sit to Supine: Stand by assistance  Scooting: Stand by assistance  Bed Mobility Comments: Increased time to complete, HOB flat     Vision  Vision: Impaired  Vision Exceptions: Wears glasses at all times  Hearing  Hearing:  (VIN d/t cognition, no deficits noted)  Cognition  Overall Cognitive Status: Exceptions  Arousal/Alertness: Appropriate responses to stimuli  Following Commands: Follows one step commands consistently  Attention Span: Attends with cues to redirect  Memory: Decreased short term memory;Decreased long term memory  Safety Judgement: Decreased awareness of need for assistance;Decreased awareness of need for safety  Problem Solving: Decreased awareness of errors  Insights: Decreased awareness of deficits  Initiation: Requires cues for some  Sequencing: Requires cues for some  Orientation  Overall Orientation Status: Impaired  Orientation Level: Oriented to person;Disoriented to place; Disoriented to time;Disoriented to situation     Education Given To: Patient  Education Provided: Role of Therapy;Plan of Care; Fall Prevention Strategies;Transfer Training;Orientation  Education Method: Verbal  Barriers to Learning: Cognition  Education Outcome: Continued education needed  AM-PAC Score        AM-PAC Inpatient Daily Activity Raw Score: 19 (11/10/22 1639)  AM-PAC Inpatient ADL T-Scale Score : 40.22 (11/10/22 1639)  ADL Inpatient CMS 0-100% Score: 42.8 (11/10/22 1639)  ADL Inpatient CMS G-Code Modifier : CK (11/10/22 1639)  Goals  Short Term Goals  Time Frame for Short Term Goals: Patient will, by discharge  Short Term Goal 1: demo ADLs at SBA with <2 VCs  Short Term Goal 2: demo functional transfers/mobility using LRD at Supervision to engage in ADL tasks  Short Term Goal 3: demo 8+ min of dynamic standing tolerance at Supervision to engage in ADLs     Therapy Time   Individual Concurrent Group Co-treatment   Time In 1408         Time Out 1433         Minutes 25         Timed Code Treatment Minutes: [address], OTR/L

## 2022-11-10 NOTE — ACP (ADVANCE CARE PLANNING)
..Advance Care Planning     Advance Care Planning Activator (Inpatient)  Conversation Note      Date of ACP Conversation: 11/9/2022     Conversation Conducted with: Patient with Decision Making Capacity    ACP Activator: Ramses Ayan, [address] :     Current Designated Health Care Decision Maker:     Primary Decision Maker: Destiny Watson - Shaniqua - [phone]  Click here to complete Healthcare Decision Makers including section of the Healthcare Decision Maker Relationship (ie \"Primary\")      Care Preferences    Ventilation: \"If you were in your present state of health and suddenly became very ill and were unable to breathe on your own, what would your preference be about the use of a ventilator (breathing machine) if it were available to you? \"      Would the patient desire the use of ventilator (breathing machine)?: no    \"If your health worsens and it becomes clear that your chance of recovery is unlikely, what would your preference be about the use of a ventilator (breathing machine) if it were available to you? \"     Would the patient desire the use of ventilator (breathing machine)?: No      Resuscitation  \"CPR works best to restart the heart when there is a sudden event, like a heart attack, in someone who is otherwise healthy. Unfortunately, CPR does not typically restart the heart for people who have serious health conditions or who are very sick. \"    \"In the event your heart stopped as a result of an underlying serious health condition, would you want attempts to be made to restart your heart (answer \"yes\" for attempt to resuscitate) or would you prefer a natural death (answer \"no\" for do not attempt to resuscitate)? \" no       [] Yes   [] No   Educated Patient / Ruby Jennings regarding differences between Advance Directives and portable DNR orders.     Length of ACP Conversation in minutes:      Conversation Outcomes:  [x] ACP discussion completed  [] Existing advance directive reviewed with patient; no changes to patient's previously recorded wishes  [] New Advance Directive completed  [] Portable Do Not Rescitate prepared for Provider review and signature  [] POLST/POST/MOLST/MOST prepared for Provider review and signature      Follow-up plan:    [] Schedule follow-up conversation to continue planning  [] Referred individual to Provider for additional questions/concerns   [] Advised patient/agent/surrogate to review completed ACP document and update if needed with changes in condition, patient preferences or care setting    [] This note routed to one or more involved healthcare providers      Patient has a [facility] on file 10.31.22

## 2022-11-10 NOTE — CARE COORDINATION
11/10/22 1052   Service Assessment   Patient Orientation   (asleep, information provided by daughter Stephenie Soares)   Cognition Dementia / Early Alzheimer's   History Provided By Child/Family  (frances Soares)   Accompanied By/Relationship daughter Li Mauricio Children;Family Members   PCP Verified by CM Yes   Last Visit to PCP Within last 3 months   Can patient return to prior living arrangement Other (see comment)  (return to SNF, then back home with daughter after rehab)   Ability to make needs known: Poor   Family able to assist with home care needs: Yes   Social/Functional History   Lives With Daughter   Receives Help From Family   ADL Assistance Needs assistance   Toileting Needs assistance   [address]eds assistance   Homemaking Responsibilities No   Ambulation Assistance Needs assistance   Transfer Assistance Needs assistance   Active  No   Mode of Transportation Family   Discharge Planning   Type of Residence [facility] [address]   Current Services Prior To Admission Bhaskar Hogue  ([facility])   [address]   DME Ordered?  No   Potential Assistance Purchasing Medications No   Type of Home Care Services None   Patient expects to be discharged to: [facility]   Services At/After Discharge [facility] (SNF)   Condition of Participation: Discharge Planning   The Plan for Transition of Care is related to the following treatment goals: increased comfort level   Patient from [facility], frances Soares would like patient to return, referral faxed

## 2022-11-11 VITALS
TEMPERATURE: 98.1 F | OXYGEN SATURATION: 97 % | RESPIRATION RATE: 18 BRPM | HEART RATE: 86 BPM | DIASTOLIC BLOOD PRESSURE: 54 MMHG | SYSTOLIC BLOOD PRESSURE: 111 MMHG

## 2022-11-11 PROBLEM — Z51.5 ENCOUNTER FOR PALLIATIVE CARE: Status: ACTIVE | Noted: 2022-11-11

## 2022-11-11 LAB
EKG ATRIAL RATE: 82 BPM
EKG Q-T INTERVAL: 406 MS
EKG QRS DURATION: 126 MS
EKG QTC CALCULATION (BAZETT): 496 MS
EKG R AXIS: 39 DEGREES
EKG T AXIS: 117 DEGREES
EKG VENTRICULAR RATE: 90 BPM

## 2022-11-11 PROCEDURE — 96372 THER/PROPH/DIAG INJ SC/IM: CPT

## 2022-11-11 PROCEDURE — 93010 ELECTROCARDIOGRAM REPORT: CPT | Performed by: INTERNAL MEDICINE

## 2022-11-11 PROCEDURE — 96361 HYDRATE IV INFUSION ADD-ON: CPT

## 2022-11-11 PROCEDURE — 6360000002 HC RX W HCPCS: Performed by: STUDENT IN AN ORGANIZED HEALTH CARE EDUCATION/TRAINING PROGRAM

## 2022-11-11 PROCEDURE — G0378 HOSPITAL OBSERVATION PER HR: HCPCS

## 2022-11-11 PROCEDURE — 6370000000 HC RX 637 (ALT 250 FOR IP): Performed by: STUDENT IN AN ORGANIZED HEALTH CARE EDUCATION/TRAINING PROGRAM

## 2022-11-11 PROCEDURE — 99214 OFFICE O/P EST MOD 30 MIN: CPT | Performed by: INTERNAL MEDICINE

## 2022-11-11 RX ADMIN — FLUDROCORTISONE ACETATE 0.1 MG: 0.1 TABLET ORAL at 08:17

## 2022-11-11 RX ADMIN — FLUOXETINE HYDROCHLORIDE 20 MG: 20 CAPSULE ORAL at 08:17

## 2022-11-11 RX ADMIN — Medication 81 MG: at 08:17

## 2022-11-11 RX ADMIN — ENOXAPARIN SODIUM 40 MG: 40 INJECTION SUBCUTANEOUS at 08:17

## 2022-11-11 RX ADMIN — CARBAMAZEPINE 200 MG: 200 TABLET ORAL at 08:17

## 2022-11-11 NOTE — DISCHARGE SUMMARY
CDU Discharge Summary        Patient:  Peter Guerrero  YOB: 1945    MRN: [id number]   Acct: [de-identified]    Primary Care Physician: Niko Sorto DO    Admit date:  11/9/2022  1:19 PM  Discharge date: No discharge date for patient encounter. 11/11/2022    Discharge Diagnoses:     Acute chest pain due to noncardiac related  Improved with pain medications and rest    Follow-up:  Call today/tomorrow for a follow up appointment with Niko Sorto DO , or return to the Emergency Room with worsening symptoms    Stressed to patient the importance of following up with primary care doctor for further workup/management of symptoms. Pt verbalizes understanding and agrees with plan. Discharge Medications: No changes to medications. Medication List        CHANGE how you take these medications      carBAMazepine 200 MG tablet  Commonly known as: TEGretol  1.5 tablets po q am and 2 po q pm  What changed:   how much to take  additional instructions            CONTINUE taking these medications      aspirin 81 MG EC tablet     fludrocortisone 0.1 MG tablet  Commonly known as: FLORINEF  TAKE 1 TABLET BY MOUTH EVERY DAY     FLUoxetine 20 MG capsule  Commonly known as: PROZAC  2 po qd     melatonin 5 MG Tabs tablet  Take 1 tablet by mouth nightly as needed (sleep)     mirabegron 50 MG Tb24  Commonly known as: Myrbetriq  Take 50 mg by mouth daily     tamsulosin 0.4 MG capsule  Commonly known as: FLOMAX  Take 1 capsule by mouth daily              Diet:  ADULT DIET; Easy to Chew , Advance as tolerated     Activity:  As tolerated    Consultants: IP CONSULT TO CARDIOLOGY  IP CONSULT TO PALLIATIVE CARE    Procedures:  Not indicated     Diagnostic Test:   Results for orders placed or performed during the hospital encounter of 11/09/22   COVID-19, Rapid    Specimen: Nasopharyngeal Swab   Result Value Ref Range    Specimen Description . NASOPHARYNGEAL SWAB     SARS-CoV-2, Rapid Not Detected Not Detected   CBC Result Value Ref Range    WBC 7.1 3.5 - 11.3 k/uL    RBC 4.19 3.95 - 5.11 m/uL    Hemoglobin 13.0 11.9 - 15.1 g/dL    Hematocrit 39.9 36.3 - 47.1 %    MCV 95.2 82.6 - 102.9 fL    MCH 31.0 25.2 - 33.5 pg    MCHC 32.6 28.4 - 34.8 g/dL    RDW 14.0 11.8 - 14.4 %    Platelets 549 222 - 296 k/uL    MPV 8.7 8.1 - 13.5 fL    NRBC Automated 0.0 0.0 per 100 WBC   Comprehensive Metabolic Panel   Result Value Ref Range    Glucose 133 (H) 70 - 99 mg/dL    BUN 13 8 - 23 mg/dL    Creatinine 0.91 (H) 0.50 - 0.90 mg/dL    Est, Glom Filt Rate >60 >60 mL/min/1.73m2    Calcium 8.3 (L) 8.6 - 10.4 mg/dL    Sodium 136 135 - 144 mmol/L    Potassium 3.3 (L) 3.7 - 5.3 mmol/L    Chloride 99 98 - 107 mmol/L    CO2 24 20 - 31 mmol/L    Anion Gap 13 9 - 17 mmol/L    Alkaline Phosphatase 246 (H) 35 - 104 U/L    ALT 14 5 - 33 U/L    AST 27 <32 U/L    Total Bilirubin 0.2 (L) 0.3 - 1.2 mg/dL    Total Protein 7.1 6.4 - 8.3 g/dL    Albumin 3.2 (L) 3.5 - 5.2 g/dL    Albumin/Globulin Ratio 0.8 (L) 1.0 - 2.5   Troponin   Result Value Ref Range    Troponin, High Sensitivity 40 (H) 0 - 14 ng/L   Troponin   Result Value Ref Range    Troponin, High Sensitivity 37 (H) 0 - 14 ng/L   Basic Metabolic Panel w/ Reflex to MG   Result Value Ref Range    Glucose 93 70 - 99 mg/dL    BUN 16 8 - 23 mg/dL    Creatinine 0.87 0.50 - 0.90 mg/dL    Est, Glom Filt Rate >60 >60 mL/min/1.73m2    Calcium 8.0 (L) 8.6 - 10.4 mg/dL    Sodium 136 135 - 144 mmol/L    Potassium 3.8 3.7 - 5.3 mmol/L    Chloride 104 98 - 107 mmol/L    CO2 21 20 - 31 mmol/L    Anion Gap 11 9 - 17 mmol/L   CBC with Auto Differential   Result Value Ref Range    WBC 7.9 3.5 - 11.3 k/uL    RBC 4.01 3.95 - 5.11 m/uL    Hemoglobin 12.6 11.9 - 15.1 g/dL    Hematocrit 38.3 36.3 - 47.1 %    MCV 95.5 82.6 - 102.9 fL    MCH 31.4 25.2 - 33.5 pg    MCHC 32.9 28.4 - 34.8 g/dL    RDW 14.0 11.8 - 14.4 %    Platelets 314 447 - 028 k/uL    MPV 9.0 8.1 - 13.5 fL    NRBC Automated 0.0 0.0 per 100 WBC    Seg Neutrophils 48 36 - 65 %    Lymphocytes 21 (L) 24 - 43 %    Monocytes 14 (H) 3 - 12 %    Eosinophils % 16 (H) 1 - 4 %    Basophils 1 0 - 2 %    Immature Granulocytes 0 0 %    Segs Absolute 3.77 1.50 - 8.10 k/uL    Absolute Lymph # 1.65 1.10 - 3.70 k/uL    Absolute Mono # 1.13 0.10 - 1.20 k/uL    Absolute Eos # 1.28 (H) 0.00 - 0.44 k/uL    Basophils Absolute 0.09 0.00 - 0.20 k/uL    Absolute Immature Granulocyte <0.03 0.00 - 0.30 k/uL   Troponin   Result Value Ref Range    Troponin, High Sensitivity 35 (H) 0 - 14 ng/L   EKG 12 Lead   Result Value Ref Range    Ventricular Rate 90 BPM    Atrial Rate 82 BPM    QRS Duration 126 ms    Q-T Interval 406 ms    QTc Calculation (Bazett) 496 ms    R Axis 39 degrees    T Axis 117 degrees   EKG 12 lead   Result Value Ref Range    Ventricular Rate 97 BPM    Atrial Rate 91 BPM    QRS Duration 122 ms    Q-T Interval 390 ms    QTc Calculation (Bazett) 495 ms    R Axis 58 degrees    T Axis 77 degrees     CT ABDOMEN PELVIS WO CONTRAST Additional Contrast? None    Result Date: 11/1/2022  EXAMINATION: CT OF THE ABDOMEN AND PELVIS WITHOUT CONTRAST 10/28/2022 11:34 am TECHNIQUE: CT of the abdomen and pelvis was performed without the administration of intravenous contrast. Multiplanar reformatted images are provided for review. Automated exposure control, iterative reconstruction, and/or weight based adjustment of the mA/kV was utilized to reduce the radiation dose to as low as reasonably achievable. COMPARISON: 05/15/2022 HISTORY: ORDERING SYSTEM PROVIDED HISTORY: kidney stones, hydronephrosis TECHNOLOGIST PROVIDED HISTORY: kidney stones, hydronephrosis Reason for Exam: kidney stones, hydronephrosis Additional signs and symptoms: pt not talking unable to get more hx FINDINGS: Lower Chest: The heart size is upper normal.  There is a small hiatal hernia. Motion limits the exam.  Bibasilar atelectasis or scarring is noted.  Organs: A nodule along the anterior aspect of the left lobe of the liver is re-identified. Gallstones are present. Small splenules are noted. A focal pancreatic abnormality is not identified. There is thickening of the adrenal glands, as before. Bilateral renal calcifications are present. The largest is in the right upper pole and measures approximately 1.2 cm. There is mild left hydronephrosis. A left-sided ureteral stent is in place. There is a calcification within the left ureter adjacent to the stent on axial image 84 series 2. This measures 3 mm. No additional ureteral calculi are identified. Somewhat amorphous calcification is noted surrounding the pigtail portion of the stent within the urinary bladder. The right kidney is not obstructed. No right-sided ureteral calculi are identified. GI/Bowel: The bowel is not obstructed. There is a moderate to large amount of stool in the rectum. Stool is noted throughout the colon. Diverticulosis is noted. Numerous omental soft tissue nodules are identified, most pronounced on the left. These appears similar to the previous exam.  On the previous exam these enhance similarly to the left upper quadrant splenules. Pelvis: There is no free fluid in the pelvis. The uterus has been removed. Peritoneum/Retroperitoneum: There is calcification of the abdominal aorta. There is no free intraperitoneal air. Bones/Soft Tissues: Old right-sided rib fractures are noted. The bones are demineralized. There are numerous compression deformities in the spine. These appear unchanged. 1. A left ureteral stent is in place. There is mild left hydronephrosis. There is a small left mid ureteral calcification measuring 3 mm. Amorphous calcification is formed around the pigtail portion of the catheter within the urinary bladder. 2. Nonobstructing bilateral renal calculi. 3. Moderate to large amount of stool in the rectum. Consider mild fecal impaction. 4. Splenosis.      IR FLUORO GUIDED CVA DEVICE PLMT/REPLACE/REMOVAL    Result Date: 10/27/2022  PROCEDURE: ULTRASOUND GUIDED VASCULAR ACCESS. FLUOROSCOPY GUIDED PICC PLACEMENT 10/27/2022. HISTORY: ORDERING SYSTEM PROVIDED HISTORY: ESBL UTI < will needs PICC Line TECHNOLOGIST PROVIDED HISTORY: ESBL UTI < will needs PICC Line Lumen?->Single Lumen SEDATION: None FLUOROSCOPY DOSE AND TYPE OR TIME AND EXPOSURES: 1 minute 58 seconds; D AP 84 cGy cm2 TECHNIQUE: Informed consent was obtained after a detailed explanation of the procedure including risks, benefits, and alternatives. Universal protocol was observed. The right arm was prepped and draped in sterile fashion using maximum sterile barrier technique. Local anesthesia was achieved with lidocaine. A micropuncture needle was used to access the right brachial vein using ultrasound guidance. An ultrasound image demonstrating patency of the vein with needle tip located within it. An image was obtained and stored in PACs. A 0.018 guidewire was used to place a peel-a-way sheath and a 4 [place] single-lumen PICC was advanced with fluoroscopic guidance with the tip at the cavo-atrial junction. The catheter flushed easily and there was a good blood return. The catheter was secured to the skin. The patient tolerated the procedure well and there were no immediate complications. EBL: Less than 3 mL FINDINGS: Fluoroscopic image demonstrates the tip of the catheter at the cavo-atrial junction. Successful ultrasound and fluoroscopy guided PICC placement     XR CHEST PORTABLE    Result Date: 11/9/2022  EXAMINATION: ONE XRAY VIEW OF THE CHEST 11/9/2022 1:51 pm COMPARISON: Portable frontal view of the chest October 24, 2022. HISTORY: ORDERING SYSTEM PROVIDED HISTORY: Chest Pain TECHNOLOGIST PROVIDED HISTORY: Chest Pain FINDINGS: The heart is unchanged in size and is within limits of normal for size. Some calcification is present within the aorta. Tortuous brachiocephalic vasculature.   Mild rotatory scoliosis of the cervicothoracic spine and some asymmetry of the thoracic cage. No evidence of pneumothorax, pleural effusion, infiltrate, or abnormal lung mass. Central pulmonary vascularity appears normal.     Senescent changes compatible with the age of the patient. Findings appear stable when compared to the previous study. XR CHEST PORTABLE    Result Date: 10/24/2022  EXAMINATION: ONE XRAY VIEW OF THE CHEST 10/24/2022 10:07 pm COMPARISON: October 7, 2022 HISTORY: ORDERING SYSTEM PROVIDED HISTORY: SOB, recent admission for pneumonia TECHNOLOGIST PROVIDED HISTORY: SOB, recent admission for pneumonia Reason for Exam: SOB, recent admission for pneumonia FINDINGS: Lungs are hyperaerated. Improving right lower lobe airspace disease. Heart and mediastinum normal.  Scoliosis. Right lower lobe infiltrate improved. COPD. US RETROPERITONEAL COMPLETE    Result Date: 10/27/2022  EXAMINATION: RETROPERITONEAL ULTRASOUND OF THE KIDNEYS AND URINARY BLADDER 10/27/2022 COMPARISON: None HISTORY: ORDERING SYSTEM PROVIDED HISTORY: recurrent UTI rule out obstruction TECHNOLOGIST PROVIDED HISTORY: recurrent UTI rule out obstruction FINDINGS: Kidneys: The right kidney measures 9.7 x 4.7 x 5.1 cm. The left kidney measures 11.0 x 5 x 5.2 cm. Kidneys demonstrate normal cortical echogenicity. Echogenic foci are present in both kidneys including a focus of 6.5 mm lower pole of the right kidney and a focus of 6.6 mm lower pole of the left kidney. Caliectasis right kidney is noted with mild left hydronephrosis. Bladder: Bladder not evaluated. Caliectasis right kidney. Mild hydronephrosis left kidney. Bilateral nephrolithiasis. No cortical thinning.            Physical Exam:    General appearance - NAD, AOx 3   Lungs -CTAB, no R/R/R  Heart - RRR, no M/R/G  Abdomen - Soft, NT/ND  Neurological:  MAEx4, No focal motor deficit, sensory loss  Extremities - Cap refil <2 sec in all ext., no edema  Skin -warm, dry      Hospital Course:  Clinical course has improved, labs and imaging reviewed. Anamaria Shaw originally presented to the hospital on 11/9/2022  1:19 PM. with chest pain. At that time it was determined that She required further observation and valuation by cardiology. Patient became DNR CCA. Patient is asymptomatic at this time. No intervention at this time. Yanet Amandeeping She was admitted and labs and imaging were followed daily. Imaging results as above. She is medically stable to be discharged. Disposition: Home    Patient stated that they will not drive themselves home from the hospital if they have gotten pain killers/ narcotics earlier that day and that they will arrange for transportation on their own or work with the  for a ride. Patient counseled NOT to drive while under the influence of narcotics/ pain killers. Condition: Good    Patient stable and ready for discharge home. I have discussed plan of care with patient and they are in understanding. They were instructed to read discharge paperwork. All of their questions and concerns were addressed. Time Spent: 0 day      --  Shilpa Gonzalez,   Emergency Medicine Resident Physician    This dictation was generated by voice recognition computer software. Although all attempts are made to edit the dictation for accuracy, there may be errors in the transcription that are not intended.

## 2022-11-11 NOTE — CARE COORDINATION
Chari Delacruz with [facility] to f/u on precert to return still await precert.      15:48 call from Kinjal Srivastava can take patient today  Call report to [phone]  Faxed orders to [phone] transport request to [facility] [place] 1st available 24:00  Called [facility]gie 1st available 20:00   Called frances Harrington Bryon with pp updated

## 2022-11-11 NOTE — CARE COORDINATION
11/11/22 1627   Readmission Assessment   Number of Days since last admission? 1-7 days   Previous Disposition SNF   Who is being Interviewed Caregiver   Did you visit your Primary Care Physician after you left the hospital, before you returned this time? No   Did you see a specialist, such as Cardiac, Pulmonary, Orthopedic Physician, etc. after you left the hospital? No   Does the patient report anything that got in the way of taking their medications?  No

## 2022-11-11 NOTE — PROGRESS NOTES
Report called to Rosa Maria Hernandez LPN at [facility], all questions answered at this time.

## 2022-11-11 NOTE — CONSULTS
Palliative Care Inpatient Consult    NAME:  Ciara 76 RECORD NUMBER:  [id number]  AGE: 76 y.o. GENDER: female  : 1945  TODAY'S DATE:  2022    Reasons for Consultation:    Symptom and/or pain management  Provision of information regarding PC and/or hospice philosophies  Complex, time-intensive communication and interdisciplinary psychosocial support  Clarification of goals of care and/or assistance with difficult decision-making  Guidance in regards to resources and transition(s)    Members of PC team contributing to this consultation are:  Jesus Esquivel DO - fellow  Marifer Lee MD - attending    Plan      Palliative Interaction:  The palliative care team was able to meet with the patient this morning. The patient's daughter [facility] is at bedside. The patient is awake and alert. She denies having chest pain at this time. She reports that she is hungry. However, she is confused at baseline. I introduced my role as on the members of the palliative care team of the patient's daughter, [facility]. [facility] has been taking care of her mother for over 25 years. She reports that she has lived with her and she is taking care of her to the best of her abilities. She explains that due to her mother's progressive memory issues, she does not want any further testing to be done. She is agreeable to having antibiotics and fluids in the hospital if needed, but does not want any invasive testing or surgical procedures. She explains that they are likely moving to [place] at the beginning of January in their camper. The family currently lives in a 700 square foot house at [facility]. She explains that it recently was discovered to have mold in their walls. They are currently living in a camper and are planning to move to [place] to live permanently.     Given this, I explained that we would recommend that the patient establishes care with a PCP in [place] and that there are options for palliative care to be provided at home, if the family wishes to do so. John Botello does explain that her mother does go for respite care on the weekends, which helps out tremendously. Patient is a DNR CCA. Paperwork is in the chart. No objection for discharge from palliative care perspective. Education/support to family  Communications with primary service  Providing support for coping/adaptation/distress of family  Code status clarified: [facility]    Additional Assessments:     1- Symptom management/ pain control     Pain Assessment:  The patient is not having any pain. Anxiety:  none                          Dyspnea:  none                          Fatigue:  none    We feel the patient symptoms are being controlled. Her current regimen is reviewed by myself and discussed with the staff. We recommend adjusting her medications as follows: no changes from a palliative care perspective    2- Goals of care evaluation   The patient goals of care are improve or maintain function/quality of life, provide comfort care/support/palliation/relieve suffering, and support for family/caregiver   Goals of care discussed with:    [] Patient independently    [] Patient and Family    [x] Family or Healthcare DPOA independently    [] Unable to discuss with patient, family/DPOA not present    3- Code Status  DNR-CCA    4- Other recommendations   - We will continue to provide comfort and support to the patient and the family    Palliative Care will continue to follow Ms. Morrison's care as needed. Thank you for allowing Palliative Care to participate in the care of Ms. Nicole Guerra . History of Present Illness     The patient is a 76 y.o. female with a history of Parkinson's disease and dementia who initially presented to [facility] for evaluation of chest pain.  Patient lives at home with her daughter in a 0 sq ft home but is currently in an ECF receiving IV antibiotics for a recently diagnosed ESBL E. Coli UTI. Patient's daughter was contacted after cardiology evaluated the patient. She is deferring any and all testing moving forward. Palliative care has been consulted to assist with code status discussions, goals of care, and family support. Referred to Palliative Care by   [x] Physician   [] Nursing  [] Family Request   [] Other:       Active Hospital Problems    Diagnosis Date Noted    Chest pain [R07.9] 2022     Priority: Medium       PAST MEDICAL HISTORY      Diagnosis Date    Anxiety     Convulsion ([facility]     Encephalopathy     Herpes     Hyperlipidemia     Left bundle branch block     MRSA (methicillin resistant Staphylococcus aureus)     Parkinson's disease ([facility]     Seizures ([facility] [facility]     secondary to encephalitis    Vitamin D deficiency        PAST SURGICAL HISTORY  Past Surgical History:   Procedure Laterality Date    ABSCESS DRAINAGE Right 2013    WOUND EXPLORATION AND I+D  RIGHT HIP    CYSTOSCOPY Left 2022    CYSTOSCOPY URETERAL STENT INSERTION performed by Kaylie Madison MD at [address] [place] [address] Left 2022    Cystoscopy       SOCIAL HISTORY  Social History     Tobacco Use    Smoking status: Former     Packs/day: 1.00     Years: 30.00     Pack years: 30.00     Types: Cigarettes     Start date:      Quit date:      Years since quittin.8    Smokeless tobacco: Never    Tobacco comments:     unknown how many packs a day, or how many years   Vaping Use    Vaping Use: Never used   Substance Use Topics    Alcohol use: No    Drug use: No       FAMILY HISTORY  No family history on file.     ALLERGIES  Allergies   Allergen Reactions    Haldol [Haloperidol Lactate]        MEDICATIONS  Current Medications    sodium chloride flush  5-40 mL IntraVENous 2 times per day    enoxaparin  40 mg SubCUTAneous Daily    aspirin  81 mg Oral Daily    carBAMazepine  400 mg Oral Nightly    carBAMazepine  200 mg Oral Daily    fludrocortisone  0.1 mg Oral Daily FLUoxetine  20 mg Oral Daily    trospium  20 mg Oral Nightly     sodium chloride flush, sodium chloride, potassium chloride **OR** potassium alternative oral replacement **OR** potassium chloride, ondansetron, polyethylene glycol, acetaminophen **OR** acetaminophen  Home Medications  No current facility-administered medications on file prior to encounter. Current Outpatient Medications on File Prior to Encounter   Medication Sig Dispense Refill    fludrocortisone (FLORINEF) 0.1 MG tablet TAKE 1 TABLET BY MOUTH EVERY DAY 90 tablet 0    carBAMazepine (TEGRETOL) 200 MG tablet 1.5 tablets po q am and 2 po q pm (Patient taking differently: No sig reported) 120 tablet 11    FLUoxetine (PROZAC) 20 MG capsule 2 po qd 180 capsule 3    mirabegron (MYRBETRIQ) 50 MG TB24 Take 50 mg by mouth daily 90 tablet 3    melatonin 5 MG TABS tablet Take 1 tablet by mouth nightly as needed (sleep) 10 tablet 0    tamsulosin (FLOMAX) 0.4 MG capsule Take 1 capsule by mouth daily 30 capsule 3    aspirin 81 MG EC tablet Take 81 mg by mouth daily         Data         BP (!) 111/54   Pulse 86   Temp 98.1 °F (36.7 °C) (Oral)   Resp 18   SpO2 97%     Wt Readings from Last 3 Encounters:   10/25/22 125 lb (56.7 kg)   10/08/22 131 lb 1.6 oz (59.5 kg)   08/22/22 125 lb (56.7 kg)        Code Status: DNR-CCA     ADVANCED CARE PLANNING:  Patient has capacity for medical decisions: no  Health Care Power of : yes - daughter  Living Will: not asked     Personal, Social, and Family History  Marital Status: single  Living situation: with family:  daughter  Importance of queta/Mu-ism/spiritual beliefs: not asked  Psychological Distress: not asked  Does patient understand diagnosis/treatment? no  Does caregiver understand diagnosis/treatment? yes    Assessment        REVIEW OF SYSTEMS  Unable to obtain as patient is demented. Patient denies any chest pain at this time.     PHYSICAL ASSESSMENT:  General Appearance: alert, awake, elderly  female sitting up in bed in no acute distress  Mental status: oriented to person only  Head: normocephalic, atraumatic  Eye: no icterus, redness, pupils equal and reactive, extraocular eye movements intact, conjunctiva clear  Ear: normal external ear, no discharge, hearing intact  Nose: no drainage noted  Mouth: mucous membranes moist  Neck: supple  Lungs: Bilateral equal air entry, clear to ausculation, no wheezing, rales or rhonchi, normal effort  Cardiovascular: normal rate, regular rhythm, no murmur, gallop, rub  Abdomen: Soft, nontender, nondistended, normal bowel sounds  Neurologic: Awake and alert, spontaneously moving all 4 extremities  Skin: No gross lesions, rashes, bruising or bleeding on exposed skin area  Extremities: peripheral pulses palpable, no pedal edema or calf pain with palpation  Psych: normal affect    Palliative Performance Scale:  ___100% Full ambulation; normal activity/No disease; full self-care; normal intake; LOC full  ___90% full ambulation; normal activity/some disease; full self-care; normal intake; LOC full  ___80% ambulation full; normal activity with effort/some disease; full self-care; normal/reduced intake; LOC full  ___70% ambulation reduced; cannot do normal work/some disease; full self-care; normal/reduce intake; LOC full  _x_60%  Ambulation reduced; Significant disease; Can't do hobbies/housework; intake normal or reduced; occasional assist; LOC full/confusion  ___50%  Mainly sit/lie; Extensive disease; Can't do any work; Considerable assist; intake normal or reduced; LOC full/confusion  ___40%  Mainly in bed; Extensive disease; Mainly assist; intake normal or reduced; LOC full/confusion   ___30%  Bed Bound; Extensive disease; Total care; intake reduced; LOC full/confusion  ___20%  Bed Bound; Extensive disease; Total care; intake minimal; Drowsy/coma  ___10%  Bed Bound; Extensive disease;  Total care; Mouth care only; Drowsy/coma  ___0       Death    Principle Problem/Diagnosis:  Principal Problem:    Chest pain  Active Problems:    ESBL (extended spectrum beta-lactamase) producing bacteria infection    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 *    Please call with any palliative questions or concerns. Palliative Care Team is available via perfect serve or via phone. This note has been dictated by dragon, typing errors may be a possibility.   The total time I spent in seeing the patient, discussing goals of care, advanced directives, code status, greater than 50% time in counseling and other major issues was more than 60 minutes    Electronically signed by      [facility] Fellow  [address] [place], [place]  11/11/2022 9:49 AM  Palliative care office: [phone]

## 2022-11-11 NOTE — PROGRESS NOTES
OBS/CDU   RESIDENT NOTE      Patients PCP is:  Sravani Wu DO        SUBJECTIVE      Patient is resting comfortably in bed. No complaints at this time. No acute events overnight. Has been able to tolerate a full diet without nausea or vomiting. The patient is urinating on his own and is passing flatus. Denies fever, chills, nausea, vomiting, chest pain, shortness of breath, abdominal pain, focal weakness, numbness, tingling, urinary/bowel symptoms, vision changes, visual hallucinations, or headache. PHYSICAL EXAM      General: NAD, AO X 3  Heent: EMOI, PERRL  Neck: SUPPLE, NO JVD  Cardiovascular: RRR, S1S2  Pulmonary: CTAB, NO SOB  Abdomen: SOFT, NTTP, ND, +BS  Extremities: +2/4 PULSES DISTAL, NO SWELLING  Neuro / Psych: NO NUMBNESS OR TINGLING, MENTATION AT BASELINE    PERTINENT TEST /EXAMS      I have reviewed all available laboratory results. MEDICATIONS CURRENT   0.9 % sodium chloride infusion, Continuous  sodium chloride flush 0.9 % injection 5-40 mL, 2 times per day  sodium chloride flush 0.9 % injection 5-40 mL, PRN  0.9 % sodium chloride infusion, PRN  potassium chloride (KLOR-CON M) extended release tablet 40 mEq, PRN   Or  potassium bicarb-citric acid (EFFER-K) effervescent tablet 40 mEq, PRN   Or  potassium chloride 10 mEq/100 mL IVPB (Peripheral Line), PRN  enoxaparin (LOVENOX) injection 40 mg, Daily  ondansetron (ZOFRAN) injection 4 mg, Q4H PRN  polyethylene glycol (GLYCOLAX) packet 17 g, Daily PRN  acetaminophen (TYLENOL) tablet 650 mg, Q6H PRN   Or  acetaminophen (TYLENOL) suppository 650 mg, Q6H PRN  aspirin EC tablet 81 mg, Daily  carBAMazepine (TEGRETOL) tablet 400 mg, Nightly  carBAMazepine (TEGRETOL) tablet 200 mg, Daily  fludrocortisone (FLORINEF) tablet 0.1 mg, Daily  FLUoxetine (PROZAC) capsule 20 mg, Daily  trospium (SANCTURA) tablet 20 mg, Nightly        All medication charted and reviewed.     CONSULTS      IP CONSULT TO CARDIOLOGY  IP CONSULT TO PALLIATIVE CARE    ASSESSMENT/PLAN       Pan Carney is a 76 y.o. female who presents with chest pain. Chest pain  Cardiology evaluation  DNR CCA  Palliative care  Okay for discharge home or ECF. Recommend patient establish care in [place] sounds family will be moving to [place] in January. Continue home medications and pain control  Monitor vitals, labs, and imaging  DISPO: ECF    --  Barbie Jones MD   Emergency Medicine Resident Physician     This dictation was generated by voice recognition computer software. Although all attempts are made to edit the dictation for accuracy, there may be errors in the transcription that are not intended.

## 2022-11-11 NOTE — PROGRESS NOTES
[address]  CDU / OBSERVATION ENCOUNTER  ATTENDING NOTE       I performed a history and physical examination of the patient and discussed management with the resident or midlevel provider. I reviewed the resident or midlevel provider's note and agree with the documented findings and plan of care. Any areas of disagreement are noted on the chart. I was personally present for the key portions of any procedures. I have documented in the chart those procedures where I was not present during the key portions. I have reviewed the nurses notes. I agree with the chief complaint, past medical history, past surgical history, allergies, medications, social and family history as documented unless otherwise noted below. The Family history, social history, and ROS are effectively unchanged since admission unless noted elsewhere in the chart. Patient was admitted to the ETU for chest pain. Patient does have a history of dementia. Cardiology was consulted but daughter who has healthcare power of  made the decision not to pursue any interventions. Palliative care has been consulted and are awaiting their recommendations. Patient denies any pain to me today and appears well and comfortable resting in her bed. Patient is a confirmed DNR CCA. Will await palliative care Fredy and plan to discharge patient back to her skilled nursing facility. The HAL has been signed.     Shanon Mota MD  Attending Emergency  Physician

## 2022-11-11 NOTE — DISCHARGE INSTR - COC
Continuity of Care Form    Patient Name: Dinesh    :  1945  MRN:  [id number]    [facility] date:  2022  Discharge date:  22    Code Status Order: DNR-CCA   Advance Directives:     Admitting Physician:  Stephan Vernon MD  PCP: Shantell Sinha DO    [facility] 9177/5836-09  Discharging Unit Phone Number: [phone]    Emergency Contact:   Extended Emergency Contact Information  Primary Emergency Contact: [address] of [address] [place] [address] St Phone: [phone]  Relation: Child  Secondary Emergency Contact: Lady Fatuma Husain of [address] Phone: [phone]  Mobile Phone: [phone]  Relation: Grandchild    Past Surgical History:  Past Surgical History:   Procedure Laterality Date    ABSCESS DRAINAGE Right 2013    WOUND EXPLORATION AND I+D  RIGHT HIP    CYSTOSCOPY Left 2022    CYSTOSCOPY URETERAL STENT INSERTION performed by Essence Jay MD at [address] [place] [address] Road Left 2022    Cystoscopy       Immunization History:   Immunization History   Administered Date(s) Administered    COVID-19, PFIZER PURPLE top, DILUTE for use, (age 15 y+), 30mcg/0.3mL 2021, 2021, 2021    Influenza 10/09/2013    Influenza Virus Vaccine 2015    Influenza Whole 10/13/2004    Influenza, High Dose (Fluzone 65 yrs and older) 10/04/2017, 2018    Pneumococcal Conjugate 13-valent (Fpfavgg26) 2018    Pneumococcal Polysaccharide (Vauggpogz22) 2015    Tdap (Boostrix, Adacel) 05/15/2022       Active Problems:  Patient Active Problem List   Diagnosis Code    Seizure ([facility] [address] R56.9    Vitamin D deficiency E55.9    Anxiety F41.9    Hypercholesteremia E78.00    Cellulitis of right leg L03.115    MRSA (methicillin resistant staph aureus) culture positive Z22.322    Breakthrough seizure ([facility] [address] G40.919    Memory loss R41.3    Difficulty speaking R47.9    Seizure disorder ([facility] G40.909    Dementia with behavioral disturbance F03.918    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    Dementia due to Parkinson's disease with behavioral disturbance ([facility] [address] G20, F02.818    DVT of deep femoral vein, left (HCC) I82.412    Closed compression fracture of L1 lumbar vertebra[facility] S32.010A    Depression F32. A    Kidney stone N20.0    Mixed incontinence N39.46    Overactive bladder N32.81    Closed head injury S09.90XA    Chronic renal disease, stage III ([facility] [404638] N18.30    Community acquired pneumonia J18.9    Sepsis ([facility] A41.9    COPD exacerbation ([facility] J44.1    Hypokalemia E87.6    Bacterial UTI N39.0, A49.9    Urinary tract infection due to Proteus N39.0, B96.4    ESBL (extended spectrum beta-lactamase) producing bacteria infection A49.9, Z16.12    Acute cystitis with hematuria N30.01    Leukocytosis D72.829    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    Chest pain R07.9       Isolation/Infection:   Isolation            Contact          Patient Infection Status       Infection Onset Added Last Indicated Last Indicated By Review Planned Expiration Resolved Resolved By    ESBL (Extended Spectrum Beta Lactamase) 10/03/22 10/05/22 10/24/22 Culture, Urine        MRSA  12/16/13 12/16/13 Evalyn Burkitt, MITCH        Resolved    COVID-19 (Rule Out) 10/04/22 10/04/22 10/04/22 Respiratory Panel, Molecular, with COVID-19 (Restricted: peds pts or suitable admitted adults) (Ordered)   10/04/22 Rule-Out Test Resulted    COVID-19 (Rule Out) 10/03/22 10/03/22 10/03/22 COVID-19, Rapid (Ordered)   10/03/22 Rule-Out Test Resulted            Nurse Assessment:  Last Vital Signs: BP (!) 111/54   Pulse 86   Temp 98.1 °F (36.7 °C) (Oral)   Resp 18   SpO2 97%     Last documented pain score (0-10 scale): Pain Level: 0  Last Weight:   Wt Readings from Last 1 Encounters:   10/25/22 125 lb (56.7 kg)     Mental Status:  alert, coherent, and dementia    IV Access:  - None    Nursing Mobility/ADLs:  Walking   Assisted  Transfer  Assisted  Bathing Assisted  Dressing  Assisted  Toileting  Assisted  Feeding  Independent  Med Admin  Independent  Med Delivery   none    Wound Care Documentation and Therapy:  Wound 12/11/13 Other (Comment) Posterior;Right (Active)   Number of days: 3256       Incision 12/14/13 Leg Lateral (Active)   Number of days: 0009        Elimination:  Continence: Bowel: No  Bladder: No  Urinary Catheter: None   Colostomy/Ileostomy/Ileal Conduit: No       Date of Last BM: 11/10/22    Intake/Output Summary (Last 24 hours) at 11/11/2022 1538  Last data filed at 11/10/2022 1804  Gross per 24 hour   Intake 240 ml   Output --   Net 240 ml     I/O last 3 completed shifts: In: 5 [P.O.:540]  Out: -     Safety Concerns: At Risk for Falls    Impairments/Disabilities:      None    Nutrition Therapy:  Current Nutrition Therapy:   - Oral Diet:  General    Routes of Feeding: Oral  Liquids: No Restrictions  Daily Fluid Restriction: no  Last Modified Barium Swallow with Video (Video Swallowing Test): not done    Treatments at the Time of Hospital Discharge:   Respiratory Treatments: n/a  Oxygen Therapy:  is not on home oxygen therapy.   Ventilator:    - No ventilator support    Rehab Therapies: Physical Therapy and Occupational Therapy  Weight Bearing Status/Restrictions: No weight bearing restrictions  Other Medical Equipment (for information only, NOT a DME order):  n/a  Other Treatments: n/a    Patient's personal belongings (please select all that are sent with patient):  None    RN SIGNATURE:  Electronically signed by Antonia Dupont RN on 11/11/22 at 11:05 AM EST    CASE MANAGEMENT/SOCIAL WORK SECTION        Readmission Risk Assessment Score:  Readmission Risk              Risk of Unplanned Readmission:  0           Discharging to Facility/ Agency   Name: [facility]   Address:  [facility]:[phone]  Fax:    Dialysis Facility (if applicable)   Name:  Address:  Dialysis Schedule:  Phone:  Fax:    / signature: Electronically signed by Izaiah Quigley RN on 11/11/22 at 10:28 AM EST    PHYSICIAN SECTION    Prognosis: Fair    Condition at Discharge: Stable    Rehab Potential (if transferring to Rehab): Fair    Recommended Labs or Other Treatments After Discharge: none    Physician Certification: I certify the above information and transfer of Emi Cat  is necessary for the continuing treatment of the diagnosis listed and that she requires [place] for greater 30 days.      Update Admission H&P: No change in H&P    PHYSICIAN SIGNATURE:  Electronically signed by Guanako Mea MD on 11/11/22 at 11:41 AM EST

## 2022-11-14 ENCOUNTER — TELEPHONE (OUTPATIENT)
Dept: FAMILY MEDICINE CLINIC | Age: 77
End: 2022-11-14

## 2022-11-14 LAB
EKG ATRIAL RATE: 91 BPM
EKG Q-T INTERVAL: 390 MS
EKG QRS DURATION: 122 MS
EKG QTC CALCULATION (BAZETT): 495 MS
EKG R AXIS: 58 DEGREES
EKG T AXIS: 77 DEGREES
EKG VENTRICULAR RATE: 97 BPM

## 2022-11-15 ENCOUNTER — OFFICE VISIT (OUTPATIENT)
Dept: FAMILY MEDICINE CLINIC | Age: 77
End: 2022-11-15
Payer: COMMERCIAL

## 2022-11-15 VITALS
OXYGEN SATURATION: 96 % | DIASTOLIC BLOOD PRESSURE: 59 MMHG | BODY MASS INDEX: 20.09 KG/M2 | SYSTOLIC BLOOD PRESSURE: 105 MMHG | WEIGHT: 128 LBS | HEART RATE: 86 BPM | HEIGHT: 67 IN

## 2022-11-15 DIAGNOSIS — G40.909 SEIZURE DISORDER (HCC): ICD-10-CM

## 2022-11-15 DIAGNOSIS — R26.2 DIFFICULTY IN WALKING: Primary | ICD-10-CM

## 2022-11-15 PROCEDURE — 1123F ACP DISCUSS/DSCN MKR DOCD: CPT | Performed by: FAMILY MEDICINE

## 2022-11-15 PROCEDURE — 99214 OFFICE O/P EST MOD 30 MIN: CPT | Performed by: FAMILY MEDICINE

## 2022-11-15 RX ORDER — POTASSIUM CHLORIDE 20 MEQ/1
TABLET, EXTENDED RELEASE ORAL
Status: ON HOLD | COMMUNITY
Start: 2022-10-21

## 2022-11-15 SDOH — ECONOMIC STABILITY: FOOD INSECURITY: WITHIN THE PAST 12 MONTHS, THE FOOD YOU BOUGHT JUST DIDN'T LAST AND YOU DIDN'T HAVE MONEY TO GET MORE.: NEVER TRUE

## 2022-11-15 SDOH — ECONOMIC STABILITY: FOOD INSECURITY: WITHIN THE PAST 12 MONTHS, YOU WORRIED THAT YOUR FOOD WOULD RUN OUT BEFORE YOU GOT MONEY TO BUY MORE.: NEVER TRUE

## 2022-11-15 ASSESSMENT — SOCIAL DETERMINANTS OF HEALTH (SDOH): HOW HARD IS IT FOR YOU TO PAY FOR THE VERY BASICS LIKE FOOD, HOUSING, MEDICAL CARE, AND HEATING?: NOT HARD AT ALL

## 2022-11-15 NOTE — PROGRESS NOTES
Luis  FAMILY [address] [place] [address] [facility] [address]  Dept: [phone]      Marcos Dos Santos is a 76 y.o. female who presents today for follow up on her  medical conditions as noted below.       Chief Complaint   Patient presents with    Follow-Up from Hospital       Patient Active Problem List:     Seizure [facility])     Vitamin D deficiency     Anxiety     Hypercholesteremia     Cellulitis of right leg     MRSA (methicillin resistant staph aureus) culture positive     Breakthrough seizure ([address]     Memory loss     Difficulty speaking     Seizure disorder ([address]     Dementia with behavioral disturbance     History of encephalitis     Dementia due to Parkinson's disease with behavioral disturbance ([address]     DVT of deep femoral vein, left (HCC)     Closed compression fracture of L1 lumbar vertebra, initial encounter ([address]     Depression     Kidney stone     Mixed incontinence     Overactive bladder     Closed head injury     Chronic renal disease, stage III ([address] [770840]     Community acquired pneumonia     Sepsis ([address]     COPD exacerbation ([address]     Hypokalemia     Bacterial UTI     Urinary tract infection due to Proteus     ESBL (extended spectrum beta-lactamase) producing bacteria infection     Acute cystitis with hematuria     Leukocytosis     Mitral valve disorder     Chest pain     Encounter for palliative care     Past Medical History:   Diagnosis Date    Anxiety     Convulsion ([address]     Encephalopathy     Herpes     Hyperlipidemia     Left bundle branch block     MRSA (methicillin resistant Staphylococcus aureus)     Parkinson's disease ([address]     Seizures ([address]     secondary to encephalitis    Vitamin D deficiency       Past Surgical History:   Procedure Laterality Date    ABSCESS DRAINAGE Right 12/14/2013    WOUND EXPLORATION AND I+D  RIGHT HIP    CYSTOSCOPY Left 5/16/2022    CYSTOSCOPY URETERAL STENT INSERTION performed by Anni Rodriguze MD at [facility] OR    URETER STENT PLACEMENT Left 2022    Cystoscopy     No family history on file. Current Outpatient Medications   Medication Sig Dispense Refill    potassium chloride (KLOR-CON M) 20 MEQ extended release tablet TAKE 1 TABLET BY MOUTH EVERY DAY      fludrocortisone (FLORINEF) 0.1 MG tablet TAKE 1 TABLET BY MOUTH EVERY DAY 90 tablet 0    carBAMazepine (TEGRETOL) 200 MG tablet 1.5 tablets po q am and 2 po q pm (Patient taking differently: 400 mg 2 po q pm) 120 tablet 11    FLUoxetine (PROZAC) 20 MG capsule 2 po qd 180 capsule 3    mirabegron (MYRBETRIQ) 50 MG TB24 Take 50 mg by mouth daily 90 tablet 3    aspirin 81 MG EC tablet Take 81 mg by mouth daily      melatonin 5 MG TABS tablet Take 1 tablet by mouth nightly as needed (sleep) 10 tablet 0    tamsulosin (FLOMAX) 0.4 MG capsule Take 1 capsule by mouth daily (Patient not taking: Reported on 11/15/2022) 30 capsule 3     No current facility-administered medications for this visit.      ALLERGIES:    Allergies   Allergen Reactions    Haldol [Haloperidol Lactate]        Social History     Tobacco Use    Smoking status: Former     Packs/day: 1.00     Years: 30.00     Pack years: 30.00     Types: Cigarettes     Start date:      Quit date:      Years since quittin.8    Smokeless tobacco: Never    Tobacco comments:     unknown how many packs a day, or how many years   Substance Use Topics    Alcohol use: No        LDL Calculated (mg/dL)   Date Value   2016 127     LDL Cholesterol (mg/dL)   Date Value   2021 105     HDL (mg/dL)   Date Value   2021 70     BUN (mg/dL)   Date Value   11/10/2022 16     Creatinine (mg/dL)   Date Value   11/10/2022 0.87     Glucose (mg/dL)   Date Value   11/10/2022 93   2012 93              Subjective:      HPI  Here today for follow-up from the hospital she continues to decline in her function ability both mentally although her mental functioning was never very good as well as physical  She has now become completely incontinent  The daughter is agreeing to palliative care but states there is really nothing else she needs help with at this point because she was so used to being her mom's caregiver may be as things decline over time  She currently is taking the Cipro and there are no other new issues to discuss today    Review of Systems:     Constitutional: Negative for fever, appetite change and fatigue. Family social and medical history reviewed and unchanged     HENT: Negative. Negative for nosebleeds, trouble swallowing and neck pain. Eyes: Negative for photophobia and visual disturbance. Respiratory: Negative. Negative for chest tightness and shortness of breath. Cardiovascular: Negative. Negative for chest pain and leg swelling. Gastrointestinal: Negative. Negative for abdominal pain and blood in stool. Endocrine: Negative for cold intolerance and polyuria. Genitourinary: Negative for dysuria and hematuria. Musculoskeletal: Negative. Skin: Negative for rash. Allergic/Immunologic: Negative. Neurological: Negative. Negative for dizziness, weakness and numbness. Hematological: Negative. Negative for adenopathy. Does not bruise/bleed easily. Psychiatric/Behavioral: Negative for sleep disturbance, dysphoric mood and  decreased concentration. The patient is not nervous/anxious. Objective:     Physical Exam:     Nursing note and vitals reviewed. BP (!) 105/59   Pulse 86   Ht 5' 7\" (1.702 m)   Wt 128 lb (58.1 kg)   SpO2 96%   BMI 20.05 kg/m²   Constitutional: She is oriented to person, place, and time. She   appears well-developed and well-nourished. HENT:   Head: Normocephalic and atraumatic. Right Ear: External ear normal. Tympanic membrane is not erythematous. No middle ear effusion. Left Ear: External ear normal. Tympanic membrane is not erythematous. No middle ear effusion. Nose: No mucosal edema.    Mouth/Throat: Oropharynx is clear and moist. No posterior oropharyngeal erythema. Eyes: Conjunctivae and EOM are normal. Pupils are equal, round, and reactive to light. Neck: Normal range of motion. Neck supple. No thyromegaly present. Cardiovascular: Normal rate, regular rhythm and normal heart sounds. No murmur heard. Pulmonary/Chest: Effort normal and breath sounds normal. She has no wheezes. Shehas no rales. Abdominal: Soft. Bowel sounds are normal. She exhibits no distension and no mass. There is no tenderness. There is no rebound and no guarding. Genitourinary/Anorectal:deferred  Musculoskeletal: Normal range of motion. She exhibits no edema or tenderness. Lymphadenopathy: She has no cervical adenopathy. Neurological: She is alert and oriented to person, place, and time. She has normal reflexes. Skin: Skin is warm and dry. No rash noted. Psychiatric: She has a normal mood and affect. Her   behavior is normal.       Assessment:      1. Difficulty in walking    2. Seizure disorder [facility])          Plan:      Call or return to clinic prn if these symptoms worsen or fail to improve as anticipated. I have reviewed the instructions with the patient, answering all questions to her satisfaction. No follow-ups on file. No orders of the defined types were placed in this encounter. No orders of the defined types were placed in this encounter.     Discussed with the daughter she feels at some point she does need more help we can and see if palliative care or when time is necessary palliative and hospice  Electronically signed by Mary Grace Aviles DO on 11/15/2022 at 3:24 PM

## 2022-11-28 ENCOUNTER — HOSPITAL ENCOUNTER (INPATIENT)
Age: 77
LOS: 4 days | Discharge: HOME | End: 2022-12-02
Attending: EMERGENCY MEDICINE | Admitting: INTERNAL MEDICINE
Payer: COMMERCIAL

## 2022-11-28 DIAGNOSIS — N30.01 ACUTE CYSTITIS WITH HEMATURIA: Primary | ICD-10-CM

## 2022-11-28 PROBLEM — N39.0 COMPLICATED UTI (URINARY TRACT INFECTION): Status: ACTIVE | Noted: 2022-11-28

## 2022-11-28 LAB
ABSOLUTE EOS #: 2.6 K/UL (ref 0–0.4)
ABSOLUTE LYMPH #: 2.6 K/UL (ref 1–4.8)
ABSOLUTE MONO #: 0.65 K/UL (ref 0.1–1.3)
ANION GAP SERPL CALCULATED.3IONS-SCNC: 13 MMOL/L (ref 9–17)
BACTERIA: ABNORMAL
BASOPHILS # BLD: 1 % (ref 0–2)
BASOPHILS ABSOLUTE: 0.09 K/UL (ref 0–0.2)
BILIRUBIN URINE: NEGATIVE
BUN BLDV-MCNC: 16 MG/DL (ref 8–23)
CALCIUM SERPL-MCNC: 8.4 MG/DL (ref 8.6–10.4)
CASTS UA: ABNORMAL /LPF
CASTS UA: ABNORMAL /LPF
CHLORIDE BLD-SCNC: 101 MMOL/L (ref 98–107)
CO2: 22 MMOL/L (ref 20–31)
COLOR: YELLOW
CREAT SERPL-MCNC: 0.98 MG/DL (ref 0.5–0.9)
EOSINOPHILS RELATIVE PERCENT: 28 % (ref 0–4)
EPITHELIAL CELLS UA: ABNORMAL /HPF
GFR SERPL CREATININE-BSD FRML MDRD: 59 ML/MIN/1.73M2
GLUCOSE BLD-MCNC: 111 MG/DL (ref 70–99)
GLUCOSE URINE: NEGATIVE
HCT VFR BLD CALC: 36.1 % (ref 36–46)
HEMOGLOBIN: 12.3 G/DL (ref 12–16)
KETONES, URINE: NEGATIVE
LEUKOCYTE ESTERASE, URINE: ABNORMAL
LYMPHOCYTES # BLD: 28 % (ref 24–44)
MCH RBC QN AUTO: 32 PG (ref 26–34)
MCHC RBC AUTO-ENTMCNC: 34 G/DL (ref 31–37)
MCV RBC AUTO: 94.2 FL (ref 80–100)
MONOCYTES # BLD: 7 % (ref 1–7)
MORPHOLOGY: NORMAL
NITRITE, URINE: NEGATIVE
PDW BLD-RTO: 14.5 % (ref 11.5–14.9)
PH UA: 6.5 (ref 5–8)
PLATELET # BLD: 416 K/UL (ref 150–450)
PMV BLD AUTO: 6 FL (ref 6–12)
POTASSIUM SERPL-SCNC: 3.9 MMOL/L (ref 3.7–5.3)
PROTEIN UA: ABNORMAL
RBC # BLD: 3.83 M/UL (ref 4–5.2)
RBC UA: ABNORMAL /HPF
SEG NEUTROPHILS: 36 % (ref 36–66)
SEGMENTED NEUTROPHILS ABSOLUTE COUNT: 3.36 K/UL (ref 1.3–9.1)
SODIUM BLD-SCNC: 136 MMOL/L (ref 135–144)
SPECIFIC GRAVITY UA: 1.02 (ref 1–1.03)
TURBIDITY: ABNORMAL
URINE HGB: ABNORMAL
UROBILINOGEN, URINE: NORMAL
WBC # BLD: 9.3 K/UL (ref 3.5–11)
WBC UA: ABNORMAL /HPF

## 2022-11-28 PROCEDURE — 2580000003 HC RX 258: Performed by: EMERGENCY MEDICINE

## 2022-11-28 PROCEDURE — 85045 AUTOMATED RETICULOCYTE COUNT: CPT

## 2022-11-28 PROCEDURE — 87077 CULTURE AEROBIC IDENTIFY: CPT

## 2022-11-28 PROCEDURE — 1200000000 HC SEMI PRIVATE

## 2022-11-28 PROCEDURE — 2580000003 HC RX 258: Performed by: NURSE PRACTITIONER

## 2022-11-28 PROCEDURE — 36415 COLL VENOUS BLD VENIPUNCTURE: CPT

## 2022-11-28 PROCEDURE — 87186 SC STD MICRODIL/AGAR DIL: CPT

## 2022-11-28 PROCEDURE — 85025 COMPLETE CBC W/AUTO DIFF WBC: CPT

## 2022-11-28 PROCEDURE — P9612 CATHETERIZE FOR URINE SPEC: HCPCS

## 2022-11-28 PROCEDURE — 99285 EMERGENCY DEPT VISIT HI MDM: CPT

## 2022-11-28 PROCEDURE — 6360000002 HC RX W HCPCS: Performed by: EMERGENCY MEDICINE

## 2022-11-28 PROCEDURE — 87086 URINE CULTURE/COLONY COUNT: CPT

## 2022-11-28 PROCEDURE — 81001 URINALYSIS AUTO W/SCOPE: CPT

## 2022-11-28 PROCEDURE — 80048 BASIC METABOLIC PNL TOTAL CA: CPT

## 2022-11-28 RX ORDER — SODIUM CHLORIDE 0.9 % (FLUSH) 0.9 %
10 SYRINGE (ML) INJECTION PRN
Status: DISCONTINUED | OUTPATIENT
Start: 2022-11-28 | End: 2022-12-02 | Stop reason: HOSPADM

## 2022-11-28 RX ORDER — SODIUM CHLORIDE 9 MG/ML
INJECTION, SOLUTION INTRAVENOUS CONTINUOUS
Status: DISCONTINUED | OUTPATIENT
Start: 2022-11-28 | End: 2022-12-02 | Stop reason: HOSPADM

## 2022-11-28 RX ORDER — ACETAMINOPHEN 650 MG/1
650 SUPPOSITORY RECTAL EVERY 6 HOURS PRN
Status: DISCONTINUED | OUTPATIENT
Start: 2022-11-28 | End: 2022-12-02 | Stop reason: HOSPADM

## 2022-11-28 RX ORDER — SODIUM CHLORIDE 9 MG/ML
INJECTION, SOLUTION INTRAVENOUS PRN
Status: DISCONTINUED | OUTPATIENT
Start: 2022-11-28 | End: 2022-12-02 | Stop reason: HOSPADM

## 2022-11-28 RX ORDER — SODIUM CHLORIDE 0.9 % (FLUSH) 0.9 %
5-40 SYRINGE (ML) INJECTION EVERY 12 HOURS SCHEDULED
Status: DISCONTINUED | OUTPATIENT
Start: 2022-11-28 | End: 2022-12-02 | Stop reason: HOSPADM

## 2022-11-28 RX ORDER — ONDANSETRON 2 MG/ML
4 INJECTION INTRAMUSCULAR; INTRAVENOUS EVERY 6 HOURS PRN
Status: DISCONTINUED | OUTPATIENT
Start: 2022-11-28 | End: 2022-12-02 | Stop reason: HOSPADM

## 2022-11-28 RX ORDER — ACETAMINOPHEN 325 MG/1
650 TABLET ORAL EVERY 6 HOURS PRN
Status: DISCONTINUED | OUTPATIENT
Start: 2022-11-28 | End: 2022-12-02 | Stop reason: HOSPADM

## 2022-11-28 RX ORDER — ONDANSETRON 4 MG/1
4 TABLET, ORALLY DISINTEGRATING ORAL EVERY 8 HOURS PRN
Status: DISCONTINUED | OUTPATIENT
Start: 2022-11-28 | End: 2022-12-02 | Stop reason: HOSPADM

## 2022-11-28 RX ORDER — ENOXAPARIN SODIUM 100 MG/ML
40 INJECTION SUBCUTANEOUS DAILY
Status: DISCONTINUED | OUTPATIENT
Start: 2022-11-29 | End: 2022-12-02 | Stop reason: HOSPADM

## 2022-11-28 RX ADMIN — MEROPENEM 1000 MG: 1 INJECTION, POWDER, FOR SOLUTION INTRAVENOUS at 21:58

## 2022-11-28 RX ADMIN — SODIUM CHLORIDE: 9 INJECTION, SOLUTION INTRAVENOUS at 23:26

## 2022-11-28 ASSESSMENT — PAIN - FUNCTIONAL ASSESSMENT: PAIN_FUNCTIONAL_ASSESSMENT: NONE - DENIES PAIN

## 2022-11-28 ASSESSMENT — LIFESTYLE VARIABLES
HOW OFTEN DO YOU HAVE A DRINK CONTAINING ALCOHOL: NEVER
HOW MANY STANDARD DRINKS CONTAINING ALCOHOL DO YOU HAVE ON A TYPICAL DAY: PATIENT DOES NOT DRINK

## 2022-11-28 NOTE — ED TRIAGE NOTES
Mode of arrival (squad #, walk in, police, etc) : Walk-in        Chief complaint(s): Blood in Urine        Arrival Note (brief scenario, treatment PTA, etc). : Pt brought to ED by her daughter with complaints of blood in her urine and increased tremors. Pt has a history of dementia and parkinson's with an orientation level of 2. Pt was at her adult  center and they called her daughter and informed her that when they changed her brief they found blood. C= \"Have you ever felt that you should Cut down on your drinking? \"  No  A= \"Have people Annoyed you by criticizing your drinking? \"  No  G= \"Have you ever felt bad or Guilty about your drinking? \"  No  E= \"Have you ever had a drink as an Eye-opener first thing in the morning to steady your nerves or to help a hangover? \"  No      Deferred []      Reason for deferring: N/A    *If yes to two or more: probable alcohol abuse. *

## 2022-11-29 PROBLEM — N18.30 CHRONIC RENAL DISEASE, STAGE III (HCC): Status: RESOLVED | Noted: 2022-08-22 | Resolved: 2022-11-29

## 2022-11-29 PROBLEM — R32 URINARY INCONTINENCE: Status: ACTIVE | Noted: 2022-05-17

## 2022-11-29 PROBLEM — Z96.0 URETERAL STENT PRESENT: Status: ACTIVE | Noted: 2022-11-29

## 2022-11-29 LAB
ABSOLUTE RETIC #: 0.03 M/UL (ref 0.02–0.1)
ANION GAP SERPL CALCULATED.3IONS-SCNC: 9 MMOL/L (ref 9–17)
BUN BLDV-MCNC: 12 MG/DL (ref 8–23)
CALCIUM SERPL-MCNC: 8.2 MG/DL (ref 8.6–10.4)
CHLORIDE BLD-SCNC: 106 MMOL/L (ref 98–107)
CO2: 26 MMOL/L (ref 20–31)
CREAT SERPL-MCNC: 0.91 MG/DL (ref 0.5–0.9)
GFR SERPL CREATININE-BSD FRML MDRD: >60 ML/MIN/1.73M2
GLUCOSE BLD-MCNC: 91 MG/DL (ref 70–99)
MAGNESIUM: 2.1 MG/DL (ref 1.6–2.6)
PATHOLOGIST REVIEW: NORMAL
POTASSIUM SERPL-SCNC: 3.3 MMOL/L (ref 3.7–5.3)
RETIC %: 0.9 % (ref 0.5–2)
SODIUM BLD-SCNC: 141 MMOL/L (ref 135–144)
SURGICAL PATHOLOGY REPORT: NORMAL

## 2022-11-29 PROCEDURE — 6370000000 HC RX 637 (ALT 250 FOR IP): Performed by: NURSE PRACTITIONER

## 2022-11-29 PROCEDURE — 6360000002 HC RX W HCPCS: Performed by: NURSE PRACTITIONER

## 2022-11-29 PROCEDURE — 80048 BASIC METABOLIC PNL TOTAL CA: CPT

## 2022-11-29 PROCEDURE — 1200000000 HC SEMI PRIVATE

## 2022-11-29 PROCEDURE — 2580000003 HC RX 258: Performed by: NURSE PRACTITIONER

## 2022-11-29 PROCEDURE — 36415 COLL VENOUS BLD VENIPUNCTURE: CPT

## 2022-11-29 PROCEDURE — 99223 1ST HOSP IP/OBS HIGH 75: CPT | Performed by: INTERNAL MEDICINE

## 2022-11-29 PROCEDURE — 6370000000 HC RX 637 (ALT 250 FOR IP): Performed by: INTERNAL MEDICINE

## 2022-11-29 PROCEDURE — 83735 ASSAY OF MAGNESIUM: CPT

## 2022-11-29 RX ORDER — CARBAMAZEPINE 200 MG/1
300 TABLET ORAL DAILY
COMMUNITY

## 2022-11-29 RX ORDER — LANOLIN ALCOHOL/MO/W.PET/CERES
6 CREAM (GRAM) TOPICAL NIGHTLY PRN
Status: DISCONTINUED | OUTPATIENT
Start: 2022-11-29 | End: 2022-12-02 | Stop reason: HOSPADM

## 2022-11-29 RX ORDER — FLUDROCORTISONE ACETATE 0.1 MG/1
100 TABLET ORAL DAILY
Status: DISCONTINUED | OUTPATIENT
Start: 2022-11-29 | End: 2022-12-02 | Stop reason: HOSPADM

## 2022-11-29 RX ORDER — FLUOXETINE HYDROCHLORIDE 20 MG/1
40 CAPSULE ORAL DAILY
Status: DISCONTINUED | OUTPATIENT
Start: 2022-11-29 | End: 2022-12-02 | Stop reason: HOSPADM

## 2022-11-29 RX ORDER — POTASSIUM CHLORIDE 20 MEQ/1
20 TABLET, EXTENDED RELEASE ORAL DAILY
Status: DISCONTINUED | OUTPATIENT
Start: 2022-11-29 | End: 2022-12-02 | Stop reason: HOSPADM

## 2022-11-29 RX ORDER — CARBAMAZEPINE 200 MG/1
400 TABLET ORAL NIGHTLY
Status: DISCONTINUED | OUTPATIENT
Start: 2022-11-29 | End: 2022-12-02 | Stop reason: HOSPADM

## 2022-11-29 RX ORDER — POTASSIUM CHLORIDE 20 MEQ/1
40 TABLET, EXTENDED RELEASE ORAL ONCE
Status: COMPLETED | OUTPATIENT
Start: 2022-11-29 | End: 2022-11-29

## 2022-11-29 RX ORDER — CARBAMAZEPINE 200 MG/1
300 TABLET ORAL DAILY
Status: DISCONTINUED | OUTPATIENT
Start: 2022-11-29 | End: 2022-12-02 | Stop reason: HOSPADM

## 2022-11-29 RX ORDER — ASPIRIN 81 MG/1
81 TABLET ORAL DAILY
Status: DISCONTINUED | OUTPATIENT
Start: 2022-11-29 | End: 2022-12-02 | Stop reason: HOSPADM

## 2022-11-29 RX ORDER — TROSPIUM CHLORIDE 20 MG/1
20 TABLET, FILM COATED ORAL
Status: DISCONTINUED | OUTPATIENT
Start: 2022-11-29 | End: 2022-12-02 | Stop reason: HOSPADM

## 2022-11-29 RX ADMIN — POTASSIUM CHLORIDE 40 MEQ: 1500 TABLET, EXTENDED RELEASE ORAL at 09:27

## 2022-11-29 RX ADMIN — CARBAMAZEPINE 400 MG: 200 TABLET ORAL at 20:34

## 2022-11-29 RX ADMIN — FLUOXETINE 40 MG: 20 CAPSULE ORAL at 09:28

## 2022-11-29 RX ADMIN — ENOXAPARIN SODIUM 40 MG: 100 INJECTION SUBCUTANEOUS at 09:27

## 2022-11-29 RX ADMIN — FLUDROCORTISONE ACETATE 100 MCG: 0.1 TABLET ORAL at 09:28

## 2022-11-29 RX ADMIN — TROSPIUM CHLORIDE 20 MG: 20 TABLET, FILM COATED ORAL at 16:12

## 2022-11-29 RX ADMIN — Medication 6 MG: at 20:34

## 2022-11-29 RX ADMIN — CARBAMAZEPINE 300 MG: 200 TABLET ORAL at 09:28

## 2022-11-29 RX ADMIN — SODIUM CHLORIDE, PRESERVATIVE FREE 10 ML: 5 INJECTION INTRAVENOUS at 20:43

## 2022-11-29 RX ADMIN — TROSPIUM CHLORIDE 20 MG: 20 TABLET, FILM COATED ORAL at 06:26

## 2022-11-29 RX ADMIN — SODIUM CHLORIDE: 9 INJECTION, SOLUTION INTRAVENOUS at 16:01

## 2022-11-29 RX ADMIN — POTASSIUM CHLORIDE 20 MEQ: 1500 TABLET, EXTENDED RELEASE ORAL at 09:46

## 2022-11-29 RX ADMIN — MEROPENEM 1000 MG: 1 INJECTION, POWDER, FOR SOLUTION INTRAVENOUS at 20:43

## 2022-11-29 RX ADMIN — ASPIRIN 81 MG: 81 TABLET, COATED ORAL at 09:27

## 2022-11-29 RX ADMIN — MEROPENEM 1000 MG: 1 INJECTION, POWDER, FOR SOLUTION INTRAVENOUS at 09:30

## 2022-11-29 ASSESSMENT — ENCOUNTER SYMPTOMS
CONSTIPATION: 0
CHEST TIGHTNESS: 0
WHEEZING: 0
SHORTNESS OF BREATH: 0
VOMITING: 0
DIARRHEA: 0
COUGH: 0
ABDOMINAL PAIN: 0
BACK PAIN: 0
BLOOD IN STOOL: 0
NAUSEA: 0

## 2022-11-29 NOTE — H&P
[address]      [address] [facility]     HISTORY AND PHYSICAL EXAMINATION            Date:   11/29/2022  Patient name:  José Ramirez  Date of admission:  11/28/2022  6:17 PM  MRN:   [id number]  Account:  [de-identified]  YOB: 1945  PCP:    Edgardo Key DO  Room:   2060/2060-01  Code Status:    DNR-CCA    Chief Complaint:     Chief Complaint   Patient presents with    Hematuria       History Obtained From:     patient, family member -daughter, electronic medical record    History of Present Illness: The patient is a 76 y.o. Non- / non  female who presents withHematuria   and she is admitted to the hospital for the management of hematuria. Roxana Fuentes is a 76year old female with known history of dementia , multiple falls, atrial fibrillation, not on anticoagulation, smoker , seizure disorder, Parkinson disease presented with chief complaints of hematuria. Patient was brought to the hospital from home by her daughter when she had episodes of hematuria associated with urinary incontinence. Patient started developing urinary incontinence years ago and became completely incontinent a year ago. Ever since patient became incontinent patient has been having episodes of UTI and is her third episode of UTI in the past 1 month. In October patient developed an episode of complicated UTI which is multidrug-resistant ESBL treated with meropenem. Patient also has history of kidney stones. Patient denies any episodes of fevers or chills or dysuria however it is difficult to ascertain given patient's history of dementia and Parkinson's disease. UA showed moderate bacteremia, large leukocyte esterase, large urine hemoglobin. Urine cultures pending given patient's history of ESBL patient was started on IV meropenem.       Past Medical History: Past Medical History:   Diagnosis Date    Anxiety     Convulsion ([facility]     Dementia ([facility]     Encephalopathy     Herpes     Hyperlipidemia     Left bundle branch block     MRSA (methicillin resistant Staphylococcus aureus)     Parkinson's disease ([facility]     Seizures ([facility]     secondary to encephalitis    Vitamin D deficiency         Past SurgicalHistory:     Past Surgical History:   Procedure Laterality Date    ABSCESS DRAINAGE Right 12/14/2013    WOUND EXPLORATION AND I+D  RIGHT HIP    CYSTOSCOPY Left 5/16/2022    CYSTOSCOPY URETERAL STENT INSERTION performed by Sam Greenwood MD at [address] Left 05/16/2022    Cystoscopy        Medications Prior to Admission:        Prior to Admission medications    Medication Sig Start Date End Date Taking? Authorizing Provider   carBAMazepine (TEGRETOL) 200 MG tablet Take 300 mg by mouth daily Takes along with 400 mg at hs   Yes Historical Provider, MD   potassium chloride (KLOR-CON M) 20 MEQ extended release tablet TAKE 1 TABLET BY MOUTH EVERY DAY 10/21/22   Historical Provider, MD   fludrocortisone (FLORINEF) 0.1 MG tablet TAKE 1 TABLET BY MOUTH EVERY DAY 11/3/22   Sravani Wu DO   carBAMazepine (TEGRETOL) 200 MG tablet 1.5 tablets po q am and 2 po q pm  Patient taking differently: 400 mg 2 po q pm 8/23/22   Sravani Wu, DO   FLUoxetine (PROZAC) 20 MG capsule 2 po qd 8/22/22   Sravani Wu DO   mirabegron (MYRBETRIQ) 50 MG TB24 Take 50 mg by mouth daily 8/22/22   Sravani Wu DO   melatonin 5 MG TABS tablet Take 1 tablet by mouth nightly as needed (sleep) 5/19/22 5/29/22  Tere Sharma MD   aspirin 81 MG EC tablet Take 81 mg by mouth daily    Historical Provider, MD        Allergies:     Haldol [haloperidol lactate]    Social History:     Tobacco:    reports that she quit smoking about 23 years ago. Her smoking use included cigarettes. She started smoking about 62 years ago. She has a 30.00 pack-year smoking history.  She has never used smokeless tobacco.  Alcohol:      reports no history of alcohol use. Drug Use:  reports no history of drug use. Family History:     History reviewed. No pertinent family history. Review of Systems:     Positive and Negative as described in HPI. Review of Systems   Constitutional:  Negative for activity change, appetite change and fever. HENT:  Negative for congestion. Respiratory:  Negative for cough, chest tightness, shortness of breath and wheezing. Cardiovascular:  Negative for chest pain, palpitations and leg swelling. Gastrointestinal:  Negative for abdominal pain, blood in stool, constipation, diarrhea, nausea and vomiting. Genitourinary:  Positive for difficulty urinating. Negative for dysuria, flank pain and frequency. Musculoskeletal:  Negative for arthralgias and back pain. Neurological:  Negative for dizziness, light-headedness and headaches. Psychiatric/Behavioral:  Negative for behavioral problems, confusion and decreased concentration. Physical Exam:   /67   Pulse 76   Temp 98 °F (36.7 °C) (Oral)   Resp 14   Ht 5' 7\" (1.702 m)   Wt 132 lb (59.9 kg)   SpO2 95%   BMI 20.67 kg/m²   Temp (24hrs), Av.8 °F (36.6 °C), Min:97.2 °F (36.2 °C), Max:98.3 °F (36.8 °C)    No results for input(s): POCGLU in the last 72 hours. Intake/Output Summary (Last 24 hours) at 2022 0920  Last data filed at 2022 0507  Gross per 24 hour   Intake 800 ml   Output 50 ml   Net 750 ml       Physical Exam  Constitutional:       General: She is not in acute distress. Appearance: Normal appearance. She is not ill-appearing. HENT:      Head: Normocephalic and atraumatic. Cardiovascular:      Rate and Rhythm: Normal rate. Pulses: Normal pulses. Pulmonary:      Effort: Pulmonary effort is normal. No respiratory distress. Breath sounds: No wheezing. Abdominal:      General: Abdomen is flat. There is no distension. Palpations: Abdomen is soft. Tenderness: There is no abdominal tenderness. There is no guarding. Musculoskeletal:      Right lower leg: No edema. Left lower leg: No edema. Neurological:      Mental Status: She is alert. Mental status is at baseline.    Psychiatric:         Mood and Affect: Mood normal.       Investigations:     Laboratory Testing:  Recent Results (from the past 24 hour(s))   Urinalysis    Collection Time: 11/28/22  8:00 PM   Result Value Ref Range    Color, UA Yellow Yellow    Turbidity UA Cloudy (A) Clear    Glucose, Ur NEGATIVE NEGATIVE    Bilirubin Urine NEGATIVE NEGATIVE    Ketones, Urine NEGATIVE NEGATIVE    Specific Gravity, UA 1.018 1.000 - 1.030    Urine Hgb LARGE (A) NEGATIVE    pH, UA 6.5 5.0 - 8.0    Protein, UA 2+ (A) NEGATIVE    Urobilinogen, Urine Normal Normal    Nitrite, Urine NEGATIVE NEGATIVE    Leukocyte Esterase, Urine LARGE (A) NEGATIVE   Microscopic Urinalysis    Collection Time: 11/28/22  8:00 PM   Result Value Ref Range    WBC, UA TOO NUMEROUS TO COUNT /HPF    RBC, UA TOO NUMEROUS TO COUNT /HPF    Casts UA HYALINE /LPF    Casts UA 0 TO 2 /LPF    Epithelial Cells UA 21 TO 50 /HPF    Bacteria, UA MODERATE (A) None   BMP    Collection Time: 11/28/22 10:00 PM   Result Value Ref Range    Glucose 111 (H) 70 - 99 mg/dL    BUN 16 8 - 23 mg/dL    Creatinine 0.98 (H) 0.50 - 0.90 mg/dL    Est, Glom Filt Rate 59 (L) >60 mL/min/1.73m2    Calcium 8.4 (L) 8.6 - 10.4 mg/dL    Sodium 136 135 - 144 mmol/L    Potassium 3.9 3.7 - 5.3 mmol/L    Chloride 101 98 - 107 mmol/L    CO2 22 20 - 31 mmol/L    Anion Gap 13 9 - 17 mmol/L   CBC with Auto Differential    Collection Time: 11/28/22 10:00 PM   Result Value Ref Range    WBC 9.3 3.5 - 11.0 k/uL    RBC 3.83 (L) 4.0 - 5.2 m/uL    Hemoglobin 12.3 12.0 - 16.0 g/dL    Hematocrit 36.1 36 - 46 %    MCV 94.2 80 - 100 fL    MCH 32.0 26 - 34 pg    MCHC 34.0 31 - 37 g/dL    RDW 14.5 11.5 - 14.9 %    Platelets 485 724 - 417 k/uL    MPV 6.0 6.0 - 12.0 fL    Seg Neutrophils 36 36 - 66 %    Lymphocytes 28 24 - 44 %    Monocytes 7 1 - 7 %    Eosinophils % 28 (H) 0 - 4 %    Basophils 1 0 - 2 %    Segs Absolute 3.36 1.3 - 9.1 k/uL    Absolute Lymph # 2.60 1.0 - 4.8 k/uL    Absolute Mono # 0.65 0.1 - 1.3 k/uL    Absolute Eos # 2.60 (H) 0.0 - 0.4 k/uL    Basophils Absolute 0.09 0.0 - 0.2 k/uL    Morphology Normal    Reticulocytes    Collection Time: 11/28/22 10:00 PM   Result Value Ref Range    Retic % 0.9 0.5 - 2.0 %    Absolute Retic # 0.034 0.0245 - 0.098 M/uL   Basic Metabolic Panel w/ Reflex to MG    Collection Time: 11/29/22  6:31 AM   Result Value Ref Range    Glucose 91 70 - 99 mg/dL    BUN 12 8 - 23 mg/dL    Creatinine 0.91 (H) 0.50 - 0.90 mg/dL    Est, Glom Filt Rate >60 >60 mL/min/1.73m2    Calcium 8.2 (L) 8.6 - 10.4 mg/dL    Sodium 141 135 - 144 mmol/L    Potassium 3.3 (L) 3.7 - 5.3 mmol/L    Chloride 106 98 - 107 mmol/L    CO2 26 20 - 31 mmol/L    Anion Gap 9 9 - 17 mmol/L   Magnesium    Collection Time: 11/29/22  6:31 AM   Result Value Ref Range    Magnesium 2.1 1.6 - 2.6 mg/dL       Imaging/Diagnostics:  XR CHEST PORTABLE    Result Date: 11/9/2022  EXAMINATION: ONE XRAY VIEW OF THE CHEST 11/9/2022 1:51 pm COMPARISON: Portable frontal view of the chest October 24, 2022. HISTORY: ORDERING SYSTEM PROVIDED HISTORY: Chest Pain TECHNOLOGIST PROVIDED HISTORY: Chest Pain FINDINGS: The heart is unchanged in size and is within limits of normal for size. Some calcification is present within the aorta. Tortuous brachiocephalic vasculature. Mild rotatory scoliosis of the cervicothoracic spine and some asymmetry of the thoracic cage. No evidence of pneumothorax, pleural effusion, infiltrate, or abnormal lung mass. Central pulmonary vascularity appears normal.     Senescent changes compatible with the age of the patient. Findings appear stable when compared to the previous study.        Assessment :      Primary Problem  Complicated UTI (urinary tract infection)    Active Hospital Problems [phone]

## 2022-11-29 NOTE — PROGRESS NOTES
11/29/22 6633   Encounter Summary   Encounter Overview/Reason  Onur Blase Encounter   Service Provided For: Patient   Referral/Consult From: Toney   Last Encounter  11/29/22   Complexity of Encounter Low   Begin Time 0900   End Time  0915   Total Time Calculated 15 min   Rituals, Rites and Sacraments   Type Anointing  (Fr Rodriguez Forbes 11/29/22)

## 2022-11-29 NOTE — ED NOTES
Report given to , RN from . Report method by phone     The following was reviewed with receiving RN:   Current vital signs:  /72   Pulse 90   Temp 97.2 °F (36.2 °C) (Oral)   Resp 16   Ht 5' 7\" (1.702 m)   Wt 132 lb (59.9 kg)   SpO2 97%   BMI 20.67 kg/m²                MEWS Score: 1     Any medication or safety alerts were reviewed. Any pending diagnostics and notifications were also reviewed, as well as any safety concerns or issues, abnormal labs, abnormal imaging, and abnormal assessment findings. Questions were answered.             Pamela Mix  11/28/22 5309

## 2022-11-29 NOTE — PLAN OF CARE
Problem: Discharge Planning  Goal: Discharge to home or other facility with appropriate resources  Outcome: Progressing     Problem: Safety - Adult  Goal: Free from fall injury  Outcome: Progressing  Flowsheets (Taken 11/29/2022 1029 by Merline Broody, RN)  Free From Fall Injury: Based on caregiver fall risk screen, instruct family/caregiver to ask for assistance with transferring infant if caregiver noted to have fall risk factors     Problem: ABCDS Injury Assessment  Goal: Absence of physical injury  Outcome: Progressing  Flowsheets (Taken 11/29/2022 1029 by Merline Broody, RN)  Absence of Physical Injury: Implement safety measures based on patient assessment     Problem: Skin/Tissue Integrity  Goal: Absence of new skin breakdown  Description: 1. Monitor for areas of redness and/or skin breakdown  2. Assess vascular access sites hourly  3. Every 4-6 hours minimum:  Change oxygen saturation probe site  4. Every 4-6 hours:  If on nasal continuous positive airway pressure, respiratory therapy assess nares and determine need for appliance change or resting period.   Outcome: Progressing

## 2022-11-29 NOTE — PROGRESS NOTES
Pharmacy Medication History Note      List of current medications patient is taking is complete. Source of information: Shabnam Duffy made to medication list:  Medications removed (include reason, ex. therapy complete or physician discontinued, noncompliance):  None     Medications added/doses adjusted:  Fluoxetine 40 mg daily    Other notes (ex. Recent course of antibiotics, Coumadin dosing):  None      Current Home Medication List at Time of Admission:  Prior to Admission medications    Medication Sig Start Date End Date Taking? Authorizing Provider   carBAMazepine (TEGRETOL) 200 MG tablet Take 300 mg by mouth daily Takes along with 400 mg at hs   Yes Historical Provider, MD   potassium chloride (KLOR-CON M) 20 MEQ extended release tablet TAKE 1 TABLET BY MOUTH EVERY DAY 10/21/22   Historical Provider, MD   fludrocortisone (FLORINEF) 0.1 MG tablet TAKE 1 TABLET BY MOUTH EVERY DAY  Patient taking differently: Take 0.1 mg by mouth daily 11/3/22   Sravani Wu DO   carBAMazepine (TEGRETOL) 200 MG tablet 1.5 tablets po q am and 2 po q pm  Patient taking differently: 400 mg 2 po q pm 8/23/22   Sravani Wu DO   FLUoxetine (PROZAC) 20 MG capsule 2 po qd  Patient taking differently: Take 40 mg by mouth daily 8/22/22   Sravani Wu, DO   mirabegron (MYRBETRIQ) 50 MG TB24 Take 50 mg by mouth daily 8/22/22   Sravani Wu, DO   melatonin 5 MG TABS tablet Take 1 tablet by mouth nightly as needed (sleep) 5/19/22 5/29/22  Claribel Kim MD   tamsulosin (FLOMAX) 0.4 MG capsule Take 1 capsule by mouth daily  Patient not taking: Reported on 11/15/2022 5/19/22 11/29/22  Claribel Kim MD   aspirin 81 MG EC tablet Take 81 mg by mouth daily    Historical Provider, MD         Please let me know if you have any questions about this encounter. Thank you!     Electronically signed by Raf Ramirez [facility] on 11/29/2022 at 2:58 PM

## 2022-11-29 NOTE — CARE COORDINATION
CASE MANAGEMENT NOTE:    Admission Date:  11/28/2022 Kim Moran is a 76 y.o.  female    Admitted for : Acute cystitis with hematuria [U93.24]  Complicated UTI (urinary tract infection) [N39.0]    Met with:  Patient's Daughter, Lucia Carrion, at the bedside    PCP:  [address]:  Solange Mendoza      Is patient alert and oriented at time of discussion:  Yes    Current Residence/ Living Arrangements:  independently at home  w/ Daughter Lucia Carrion, who is there 24/7           Current Services PTA:  No    Does patient go to outpatient dialysis: No  If yes, location and chair time: NA  Who is their nephrologist? NA    Is patient agreeable to VNS: No    Freedom of choice provided:  No    List of [address] provided: No    VNS chosen:  No, Denies the need    DME:  walker and wheelchair    Home Oxygen: No    Nebulizer: No    CPAP/BIPAP: No    Supplier: N/A    Potential Assistance Needed: No    SNF needed: No    Freedom of choice and list provided: NA    Pharmacy:  [facility]       Is patient currently receiving oral anticoagulation therapy? No    Is the Patient an [facility] with Readmission Risk Score greater than 14%? Yes  If yes, pt needs a follow up appointment made within 7 days. Family Members/Caregivers that pt would like involved in their care:    Yes    If yes, list name here:  Hipolito Taylor    Transportation Provider:  Family             Discharge Plan:  11/29/22 Erma Yu Pt. Lives in [address] w/Daughter,Abril, who is there 24/7. DME- [facility] Acharya. Denies VNS. IV Merrem.  Denies needs, Orange header 18%. //KB                 Electronically signed by: Britany Ang RN on 11/29/2022 at 2:11 PM

## 2022-11-29 NOTE — ED PROVIDER NOTES
[address] [facility]  Emergency Medicine Department    Pt Name: Ana Rosa Rossi  MRN: [id number]  [place] 1945  Date of evaluation: 11/28/2022  Provider: Jalen Anthony MD    CHIEF COMPLAINT     Chief Complaint   Patient presents with    Hematuria       HISTORY OF PRESENT ILLNESS  (Location/Symptom, Timing/Onset, Context/Setting,Quality, Duration, Modifying Factors, Severity.)   Ana Rosa Rossi is a 76 y.o. female with a history of Parkinson's and dementia who presents to the emergency department after staff at her  noticed bright red blood in her urine. She has been seen over the last few months a few times for UTIs that presented similarly. She is had no complaints though she normally does not complain due to her dementia. Nursing Notes were reviewed.     ALLERGIES     Haldol [haloperidol lactate]    CURRENT MEDICATIONS       Previous Medications    ASPIRIN 81 MG EC TABLET    Take 81 mg by mouth daily    CARBAMAZEPINE (TEGRETOL) 200 MG TABLET    1.5 tablets po q am and 2 po q pm    FLUDROCORTISONE (FLORINEF) 0.1 MG TABLET    TAKE 1 TABLET BY MOUTH EVERY DAY    FLUOXETINE (PROZAC) 20 MG CAPSULE    2 po qd    MELATONIN 5 MG TABS TABLET    Take 1 tablet by mouth nightly as needed (sleep)    MIRABEGRON (MYRBETRIQ) 50 MG TB24    Take 50 mg by mouth daily    POTASSIUM CHLORIDE (KLOR-CON M) 20 MEQ EXTENDED RELEASE TABLET    TAKE 1 TABLET BY MOUTH EVERY DAY    TAMSULOSIN (FLOMAX) 0.4 MG CAPSULE    Take 1 capsule by mouth daily       PAST MEDICAL HISTORY         Diagnosis Date    Anxiety     Convulsion (HCC)     Dementia (HCC)     Encephalopathy     Herpes     Hyperlipidemia     Left bundle branch block     MRSA (methicillin resistant Staphylococcus aureus)     Parkinson's disease ([facility]     Seizures (HCC)     secondary to encephalitis    Vitamin D deficiency        SURGICAL HISTORY           Procedure Laterality Date    ABSCESS DRAINAGE Right 12/14/2013    WOUND EXPLORATION AND I+D  RIGHT HIP CYSTOSCOPY Left 5/16/2022    CYSTOSCOPY URETERAL STENT INSERTION performed by Maddy Robbins MD at [address] Left 05/16/2022    Cystoscopy       FAMILY HISTORY     History reviewed. No pertinent family history. No family status information on file. SOCIAL HISTORY      reports that she quit smoking about 23 years ago. Her smoking use included cigarettes. She started smoking about 62 years ago. She has a 30.00 pack-year smoking history. She has never used smokeless tobacco. She reports that she does not drink alcohol and does not use drugs. REVIEW OF SYSTEMS    (2-9 systems for level 4, 10 or more for level 5)     Review of Systems   Unable to perform ROS: Dementia     PHYSICAL EXAM    (up to 7 for level 4, 8 or more for level 5)     ED Triage Vitals [11/28/22 1721]   BP Temp Temp Source Heart Rate Resp SpO2 Height Weight   126/69 97.5 °F (36.4 °C) Oral 89 18 97 % 5' 7\" (1.702 m) 132 lb (59.9 kg)       Physical Exam  Vitals and nursing note reviewed. Constitutional:       General: She is not in acute distress. Appearance: She is well-developed. HENT:      Head: Normocephalic and atraumatic. Eyes:      Extraocular Movements: Extraocular movements intact. Cardiovascular:      Rate and Rhythm: Normal rate and regular rhythm. Heart sounds: Normal heart sounds. Pulmonary:      Effort: Pulmonary effort is normal. No respiratory distress. Breath sounds: Normal breath sounds. Abdominal:      Palpations: Abdomen is soft. Tenderness: There is no abdominal tenderness. There is no guarding. Musculoskeletal:         General: No tenderness or deformity. Normal range of motion. Cervical back: Normal range of motion and neck supple. Lymphadenopathy:      Cervical: No cervical adenopathy. Skin:     General: Skin is warm and dry. Findings: No rash. Neurological:      Mental Status: She is alert and oriented to person, place, and time.    Psychiatric: Behavior: Behavior normal.         Thought Content: Thought content normal.         Judgment: Judgment normal.       DIAGNOSTIC RESULTS     RADIOLOGY:   Non-plain film images such as CT, Ultrasound and MRI are read by theradiologist. Plain radiographic images are visualized and preliminarily interpreted by the emergency physician with the below findings:    None indicated    ED BEDSIDE ULTRASOUND:   Performed by ED Physician - none    LABS:  Labs Reviewed   URINALYSIS - Abnormal; Notable for the following components:       Result Value    Turbidity UA Cloudy (*)     Urine Hgb LARGE (*)     Protein, UA 2+ (*)     Leukocyte Esterase, Urine LARGE (*)     All other components within normal limits   MICROSCOPIC URINALYSIS - Abnormal; Notable for the following components:    Bacteria, UA MODERATE (*)     All other components within normal limits   CULTURE, URINE   BASIC METABOLIC PANEL   CBC WITH AUTO DIFFERENTIAL       All other labs were within normal range or not returned as of this dictation. EMERGENCYDEPARTMENT COURSE and DIFFERENTIAL DIAGNOSIS/MDM:   Vitals:    Vitals:    11/28/22 1721   BP: 126/69   Pulse: 89   Resp: 18   Temp: 97.5 °F (36.4 °C)   TempSrc: Oral   SpO2: 97%   Weight: 132 lb (59.9 kg)   Height: 5' 7\" (1.702 m)     58-year-old female with a history of dementia presenting with bright red blood in her urine. Review of previous records show that this was how she presented with previous UTIs that grew ESBL requiring meropenem. Will check UA and culture. Patient will likely require admission for IV meropenem. CONSULTS:  IP CONSULT TO INTERNAL MEDICINE    PROCEDURES:  None indicated    FINAL IMPRESSION     1. Acute cystitis with hematuria          DISPOSITION/PLAN   DISPOSITION Decision To Admit 11/28/2022 08:45:08 PM    PATIENT REFERRED TO:   No follow-up provider specified.   DISCHARGE MEDICATIONS:     New Prescriptions    No medications on file     (Please note that portions of this note were completed with a voice recognition program.  Efforts were made to edit the dictations but occasionally words are mis-transcribed.)    Jalen Anthony MD  Attending Emergency Physician          Jalen Anthony MD  11/28/22 2053

## 2022-11-29 NOTE — PROGRESS NOTES
Patient is a 61-year-old  female with a history of dementia, MRSA, Parkinson's disease, and seizures, who presents to the ED after  staff noted hematuria. ED work-up revealed UTI. Patient has past history of ESBL requiring meropenem, which was initiated in ED. Urine culture pending. Patient has a history of dementia and is unable to provide any input into HPI. Home medications reconciled and additional admission orders entered.

## 2022-11-29 NOTE — CONSULTS
Urology Consultation    Patient:  Peter Guerrero  MRN: [id number]  YOB: 1945    CHIEF COMPLAINT:  UTI    HISTORY OF PRESENT ILLNESS:   The patient is a 76 y.o. female who presents with a UTI. She had a stent placed in May for a left ureteral stone. She did not follow up for stone treatment. She has no flank pain. She was found to have a UTI at this time. Patient's old records, notes and chart reviewed and summarized above. Past Medical History:    Past Medical History:   Diagnosis Date    Anxiety     Convulsion ([address]     Dementia ([address]     Encephalopathy     Herpes     Hyperlipidemia     Left bundle branch block     MRSA (methicillin resistant Staphylococcus aureus)     Parkinson's disease ([address]     Seizures ([address]     secondary to encephalitis    Vitamin D deficiency        Past Surgical History:    Past Surgical History:   Procedure Laterality Date    ABSCESS DRAINAGE Right 12/14/2013    WOUND EXPLORATION AND I+D  RIGHT HIP    CYSTOSCOPY Left 5/16/2022    CYSTOSCOPY URETERAL STENT INSERTION performed by Tho Laboy MD at [address] [place] [address] Left 05/16/2022    Cystoscopy     Previous  surgery:  cysto, left stent placement.      Medications:    Scheduled Meds:   meropenem  1,000 mg IntraVENous Q12H    aspirin  81 mg Oral Daily    carBAMazepine  300 mg Oral Daily    carBAMazepine  400 mg Oral Nightly    FLUoxetine  40 mg Oral Daily    trospium  20 mg Oral BID AC    potassium chloride  20 mEq Oral Daily    fludrocortisone  100 mcg Oral Daily    sodium chloride flush  5-40 mL IntraVENous 2 times per day    enoxaparin  40 mg SubCUTAneous Daily     Continuous Infusions:   sodium chloride      sodium chloride 75 mL/hr at 11/28/22 2326     PRN Meds:.melatonin, sodium chloride flush, sodium chloride, ondansetron **OR** ondansetron, magnesium hydroxide, acetaminophen **OR** acetaminophen    Allergies:  Haldol [haloperidol lactate]    Social History:    Social History Socioeconomic History    Marital status: Single     Spouse name: Not on file    Number of children: Not on file    Years of education: Not on file    Highest education level: Not on file   Occupational History    Not on file   Tobacco Use    Smoking status: Former     Packs/day: 1.00     Years: 30.00     Pack years: 30.00     Types: Cigarettes     Start date: 54     Quit date:      Years since quittin.9    Smokeless tobacco: Never    Tobacco comments:     unknown how many packs a day, or how many years   Vaping Use    Vaping Use: Never used   Substance and Sexual Activity    Alcohol use: No    Drug use: No    Sexual activity: Never   Other Topics Concern    Not on file   Social History Narrative    Not on file     Social Determinants of Health     Financial Resource Strain: Low Risk     Difficulty of Paying Living Expenses: Not hard at all   Food Insecurity: No Food Insecurity    Worried About Running Out of Food in the Last Year: Never true    Ran Out of Food in the Last Year: Never true   Transportation Needs: Not on file   Physical Activity: Not on file   Stress: Not on file   Social Connections: Not on file   Intimate Partner Violence: Not on file   Housing Stability: Not on file       Family History:  History reviewed. No pertinent family history.   Previous Urologic Family history: none  REVIEW OF SYSTEMS:  Constitutional: negative  Eyes: negative  Respiratory: negative  Cardiovascular: negative  Gastrointestinal: negative  Genitourinary: see HPI  Musculoskeletal: negative  Skin: negative   Neurological: negative  Hematological/Lymphatic: negative  Psychological: negative    Physical Exam:      This a 76 y.o. female   Patient Vitals for the past 24 hrs:   BP Temp Temp src Pulse Resp SpO2 Height Weight   22 1415 (!) 98/52 -- -- -- -- -- -- --   22 1400 (!) 99/50 98.1 °F (36.7 °C) Oral 95 16 94 % -- --   22 1148 108/62 97.9 °F (36.6 °C) Axillary 86 15 92 % -- --   22 0700 121/67 98 °F (36.7 °C) Oral 76 14 95 % -- --   11/28/22 2216 (!) 125/58 98.3 °F (36.8 °C) Oral 73 16 95 % -- --   11/28/22 2159 125/72 97.2 °F (36.2 °C) Oral 90 16 97 % -- --   11/28/22 1721 126/69 97.5 °F (36.4 °C) Oral 89 18 97 % 5' 7\" (1.702 m) 132 lb (59.9 kg)     Constitutional: Patient in no acute distress. Neuro: Alert and oriented to person, place and time. Psych: mood and affect normal  HEENT negative  Lungs: Respiratory effort is normal  Cardiovascular: Normal peripheral pulses  Abdomen: Soft, non-tender, non-distended with no CVA, flank pain or hepatosplenomegaly. No hernias. Kidneys normal.  Lymphatics: No palpable lymphadenopathy. Bladder non-tender and not distended.   Pelvic exam: deferred  Rectal exam not indicated    LABS:   Recent Labs     11/28/22 2200   WBC 9.3   HGB 12.3   HCT 36.1   MCV 94.2        Recent Labs     11/28/22  2200 11/29/22  0631    141   K 3.9 3.3*    106   CO2 22 26   BUN 16 12   CREATININE 0.98* 0.91*       Additional Lab/culture results:    Urinalysis:   Recent Labs     11/28/22 2000   COLORU Yellow   PHUR 6.5   WBCUA TOO NUMEROUS TO COUNT   RBCUA TOO NUMEROUS TO COUNT   BACTERIA MODERATE*   SPECGRAV 1.018   LEUKOCYTESUR LARGE*   UROBILINOGEN Normal   BILIRUBINUR NEGATIVE        -----------------------------------------------------------------  Imaging Results:      Assessment and Plan   Impression:    Patient Active Problem List   Diagnosis    Seizure ([facility]    Vitamin D deficiency    Anxiety    Hypercholesteremia    Cellulitis of right leg    MRSA (methicillin resistant staph aureus) culture positive    Breakthrough seizure ([facility]    Memory loss    Difficulty speaking    Seizure disorder ([facility]    Dementia with behavioral disturbance    History of encephalitis    Dementia due to Parkinson's disease with behavioral disturbance ([facility] [address]    DVT of deep femoral vein, left (HCC)    Closed compression fracture of L1 lumbar vertebra, initial encounter ([facility] [address] Depression    Kidney stone    Urinary incontinence    Overactive bladder    Closed head injury    Community acquired pneumonia    Sepsis ([facility] [address]    COPD exacerbation ([facility] [address]    Hypokalemia    Bacterial UTI    Urinary tract infection due to Proteus    ESBL (extended spectrum beta-lactamase) producing bacteria infection    Acute cystitis with hematuria    Leukocytosis    Mitral valve disorder    Chest pain    Encounter for palliative care    Complicated UTI (urinary tract infection)    Ureteral stent present       Plan:     Has left ureteral stent in place. Has UTI, antibiotics per ID. Will try and arrange for a stent change while here, followed by treatment of infection, followed by definitive stone therapy.      Jamil Schreiber MD  2:55 PM 11/29/2022

## 2022-11-29 NOTE — PROGRESS NOTES
A consult was placed for Dr Renetta Almaraz and Dr Ho Carey is on call and did read message at [phone]. .. thank you!

## 2022-11-29 NOTE — ACP (ADVANCE CARE PLANNING)
Advance Care Planning     Advance Care Planning Activator (Inpatient)  Conversation Note      Date of ACP Conversation: 11/29/2022     Conversation Conducted with: Pt's Daughter, Bear Hastings    ACP Activator: Gumaro Arevalo RN        Health Care Decision Maker:     Current Designated Health Care Decision Maker:     Primary Decision Maker: Margie Mcgovern - [phone]        Care Preferences    Ventilation: \"If you were in your present state of health and suddenly became very ill and were unable to breathe on your own, what would your preference be about the use of a ventilator (breathing machine) if it were available to you? \"      Would the patient desire the use of ventilator (breathing machine)?: no Pt. Has DNCCA , No Intubation Paperwork in Media Tab    \"If your health worsens and it becomes clear that your chance of recovery is unlikely, what would your preference be about the use of a ventilator (breathing machine) if it were available to you? \"     Would the patient desire the use of ventilator (breathing machine)?: No Pt. Has DNCCA , No Intubation Paperwork in Media Tab      Resuscitation  \"CPR works best to restart the heart when there is a sudden event, like a heart attack, in someone who is otherwise healthy. Unfortunately, CPR does not typically restart the heart for people who have serious health conditions or who are very sick. \"    \"In the event your heart stopped as a result of an underlying serious health condition, would you want attempts to be made to restart your heart (answer \"yes\" for attempt to resuscitate) or would you prefer a natural death (answer \"no\" for do not attempt to resuscitate)? \" no Pt. Has DNCCA , No Intubation Paperwork in Media Tab       [] Yes   [] No   Educated Patient / Decision Maker regarding differences between Advance Directives and portable DNR orders.     Length of ACP Conversation in minutes:      Conversation Outcomes:  [x] ACP discussion completed  [] Existing advance directive reviewed with patient; no changes to patient's previously recorded wishes  [] New Advance Directive completed  [] Portable Do Not Rescitate prepared for Provider review and signature  [] POLST/POST/MOLST/MOST prepared for Provider review and signature      Follow-up plan:    [] Schedule follow-up conversation to continue planning  [] Referred individual to Provider for additional questions/concerns   [] Advised patient/agent/surrogate to review completed ACP document and update if needed with changes in condition, patient preferences or care setting    [] This note routed to one or more involved healthcare providers

## 2022-11-30 ENCOUNTER — TELEPHONE (OUTPATIENT)
Dept: UROLOGY | Age: 77
End: 2022-11-30

## 2022-11-30 LAB
ANION GAP SERPL CALCULATED.3IONS-SCNC: 7 MMOL/L (ref 9–17)
BUN BLDV-MCNC: 13 MG/DL (ref 8–23)
CALCIUM SERPL-MCNC: 8 MG/DL (ref 8.6–10.4)
CHLORIDE BLD-SCNC: 108 MMOL/L (ref 98–107)
CO2: 23 MMOL/L (ref 20–31)
CREAT SERPL-MCNC: 0.9 MG/DL (ref 0.5–0.9)
CULTURE: ABNORMAL
GFR SERPL CREATININE-BSD FRML MDRD: >60 ML/MIN/1.73M2
GLUCOSE BLD-MCNC: 94 MG/DL (ref 70–99)
POTASSIUM SERPL-SCNC: 4.1 MMOL/L (ref 3.7–5.3)
SODIUM BLD-SCNC: 138 MMOL/L (ref 135–144)
SPECIMEN DESCRIPTION: ABNORMAL

## 2022-11-30 PROCEDURE — 36415 COLL VENOUS BLD VENIPUNCTURE: CPT

## 2022-11-30 PROCEDURE — 6360000002 HC RX W HCPCS: Performed by: NURSE PRACTITIONER

## 2022-11-30 PROCEDURE — 80048 BASIC METABOLIC PNL TOTAL CA: CPT

## 2022-11-30 PROCEDURE — 6360000002 HC RX W HCPCS: Performed by: INTERNAL MEDICINE

## 2022-11-30 PROCEDURE — 99232 SBSQ HOSP IP/OBS MODERATE 35: CPT | Performed by: INTERNAL MEDICINE

## 2022-11-30 PROCEDURE — 1200000000 HC SEMI PRIVATE

## 2022-11-30 PROCEDURE — 6370000000 HC RX 637 (ALT 250 FOR IP): Performed by: NURSE PRACTITIONER

## 2022-11-30 PROCEDURE — 2580000003 HC RX 258: Performed by: NURSE PRACTITIONER

## 2022-11-30 RX ORDER — LINEZOLID 2 MG/ML
600 INJECTION, SOLUTION INTRAVENOUS EVERY 12 HOURS
Status: DISCONTINUED | OUTPATIENT
Start: 2022-11-30 | End: 2022-12-02 | Stop reason: HOSPADM

## 2022-11-30 RX ADMIN — CARBAMAZEPINE 300 MG: 200 TABLET ORAL at 10:29

## 2022-11-30 RX ADMIN — POTASSIUM CHLORIDE 20 MEQ: 1500 TABLET, EXTENDED RELEASE ORAL at 11:14

## 2022-11-30 RX ADMIN — Medication 6 MG: at 22:32

## 2022-11-30 RX ADMIN — SODIUM CHLORIDE, PRESERVATIVE FREE 10 ML: 5 INJECTION INTRAVENOUS at 22:32

## 2022-11-30 RX ADMIN — ENOXAPARIN SODIUM 40 MG: 100 INJECTION SUBCUTANEOUS at 10:27

## 2022-11-30 RX ADMIN — FLUDROCORTISONE ACETATE 100 MCG: 0.1 TABLET ORAL at 11:14

## 2022-11-30 RX ADMIN — CARBAMAZEPINE 400 MG: 200 TABLET ORAL at 22:32

## 2022-11-30 RX ADMIN — TROSPIUM CHLORIDE 20 MG: 20 TABLET, FILM COATED ORAL at 06:07

## 2022-11-30 RX ADMIN — ASPIRIN 81 MG: 81 TABLET, COATED ORAL at 10:27

## 2022-11-30 RX ADMIN — TROSPIUM CHLORIDE 20 MG: 20 TABLET, FILM COATED ORAL at 17:19

## 2022-11-30 RX ADMIN — LINEZOLID 600 MG: 600 INJECTION, SOLUTION INTRAVENOUS at 17:41

## 2022-11-30 RX ADMIN — FLUOXETINE 40 MG: 20 CAPSULE ORAL at 10:27

## 2022-11-30 RX ADMIN — MEROPENEM 1000 MG: 1 INJECTION, POWDER, FOR SOLUTION INTRAVENOUS at 10:43

## 2022-11-30 ASSESSMENT — ENCOUNTER SYMPTOMS
ABDOMINAL PAIN: 0
WHEEZING: 0
BLOOD IN STOOL: 0
DIARRHEA: 0
NAUSEA: 0
VOMITING: 0
CONSTIPATION: 0
SHORTNESS OF BREATH: 0
BACK PAIN: 0
CHEST TIGHTNESS: 0
COUGH: 0

## 2022-11-30 NOTE — PROGRESS NOTES
11/30/22 1406   Encounter Summary   Encounter Overview/Reason  Volunteer Encounter   Service Provided For: Patient   Referral/Consult From: Toney   Last Encounter  11/30/22  (V)   Complexity of Encounter Low   Spiritual/Emotional needs   Type Spiritual Support   Assessment/Intervention/Outcome   Intervention Prayer (assurance of)/Balm

## 2022-11-30 NOTE — PROGRESS NOTES
[address]      [address] [facility]     Progress note          Date:   11/30/2022  Patient name:  Samir Tolbert  Date of admission:  11/28/2022  6:17 PM  MRN:   [id number]  Account:  [de-identified]  YOB: 1945  PCP:    Irene Oscar DO  Room:   2060/2060-01  Code Status:    DNR-CCA    Chief Complaint:     Chief Complaint   Patient presents with    Hematuria       History Obtained From:     patient, family member -daughter, electronic medical record    History of Present Illness: The patient is a 76 y.o. Non- / non  female who presents withHematuria   and she is admitted to the hospital for the management of hematuria. Norma Alba is a 76year old female with known history of dementia , multiple falls, atrial fibrillation, not on anticoagulation, smoker , seizure disorder, Parkinson disease presented with chief complaints of hematuria. Patient was brought to the hospital from home by her daughter when she had episodes of hematuria associated with urinary incontinence. Patient started developing urinary incontinence years ago and became completely incontinent a year ago. Ever since patient became incontinent patient has been having episodes of UTI and is her third episode of UTI in the past 1 month. In October patient developed an episode of complicated UTI which is multidrug-resistant ESBL treated with meropenem. Patient also has history of kidney stones. Patient denies any episodes of fevers or chills or dysuria however it is difficult to ascertain given patient's history of dementia and Parkinson's disease. UA showed moderate bacteremia, large leukocyte esterase, large urine hemoglobin. Urine cultures pending given patient's history of ESBL patient was started on IV meropenem.       Past Medical History:     Past Medical History:   Diagnosis Date    Anxiety     Convulsion (HCC)     Dementia (HCC)     Encephalopathy     Herpes     Hyperlipidemia     Left bundle branch block     MRSA (methicillin resistant Staphylococcus aureus)     Parkinson's disease ([facility]     Seizures ([facility]     secondary to encephalitis    Vitamin D deficiency         Past SurgicalHistory:     Past Surgical History:   Procedure Laterality Date    ABSCESS DRAINAGE Right 12/14/2013    WOUND EXPLORATION AND I+D  RIGHT HIP    CYSTOSCOPY Left 5/16/2022    CYSTOSCOPY URETERAL STENT INSERTION performed by Yayo Covington MD at [address] [place] [address] Road Left 05/16/2022    Cystoscopy        Medications Prior to Admission:        Prior to Admission medications    Medication Sig Start Date End Date Taking? Authorizing Provider   carBAMazepine (TEGRETOL) 200 MG tablet Take 300 mg by mouth daily Takes along with 400 mg at hs   Yes Historical Provider, MD   potassium chloride (KLOR-CON M) 20 MEQ extended release tablet TAKE 1 TABLET BY MOUTH EVERY DAY 10/21/22   Historical Provider, MD   fludrocortisone (FLORINEF) 0.1 MG tablet TAKE 1 TABLET BY MOUTH EVERY DAY  Patient taking differently: Take 0.1 mg by mouth daily 11/3/22   Sravani Wu DO   carBAMazepine (TEGRETOL) 200 MG tablet 1.5 tablets po q am and 2 po q pm  Patient taking differently: 400 mg 2 po q pm 8/23/22   Sravani Wu DO   FLUoxetine (PROZAC) 20 MG capsule 2 po qd  Patient taking differently: Take 40 mg by mouth daily 8/22/22   Sravani Wu DO   mirabegron (MYRBETRIQ) 50 MG TB24 Take 50 mg by mouth daily 8/22/22   Sravani Wu DO   melatonin 5 MG TABS tablet Take 1 tablet by mouth nightly as needed (sleep) 5/19/22 5/29/22  Sandra Garcia MD   aspirin 81 MG EC tablet Take 81 mg by mouth daily    Historical Provider, MD        Allergies:     Haldol [haloperidol lactate]    Social History:     Tobacco:    reports that she quit smoking about 23 years ago. Her smoking use included cigarettes.  She started smoking about 62 years ago. She has a 30.00 pack-year smoking history. She has never used smokeless tobacco.  Alcohol:      reports no history of alcohol use. Drug Use:  reports no history of drug use. Family History:     History reviewed. No pertinent family history. Review of Systems:     Positive and Negative as described in HPI. Review of Systems   Constitutional:  Negative for activity change, appetite change and fever. HENT:  Negative for congestion. Respiratory:  Negative for cough, chest tightness, shortness of breath and wheezing. Cardiovascular:  Negative for chest pain, palpitations and leg swelling. Gastrointestinal:  Negative for abdominal pain, blood in stool, constipation, diarrhea, nausea and vomiting. Genitourinary:  Positive for difficulty urinating. Negative for dysuria, flank pain and frequency. Musculoskeletal:  Negative for arthralgias and back pain. Neurological:  Negative for dizziness, light-headedness and headaches. Psychiatric/Behavioral:  Negative for behavioral problems, confusion and decreased concentration. Physical Exam:   BP (!) 115/53   Pulse 85   Temp 98.6 °F (37 °C)   Resp 16   Ht 5' 7\" (1.702 m)   Wt 132 lb (59.9 kg)   SpO2 93%   BMI 20.67 kg/m²   Temp (24hrs), Av.4 °F (36.9 °C), Min:98.1 °F (36.7 °C), Max:98.6 °F (37 °C)    No results for input(s): POCGLU in the last 72 hours. Intake/Output Summary (Last 24 hours) at 2022 1350  Last data filed at 2022 9484  Gross per 24 hour   Intake 1300 ml   Output --   Net 1300 ml         Physical Exam  Constitutional:       General: She is not in acute distress. Appearance: Normal appearance. She is not ill-appearing. HENT:      Head: Normocephalic and atraumatic. Cardiovascular:      Rate and Rhythm: Normal rate. Pulses: Normal pulses. Pulmonary:      Effort: Pulmonary effort is normal. No respiratory distress. Breath sounds: No wheezing.    Abdominal: General: Abdomen is flat. There is no distension. Palpations: Abdomen is soft. Tenderness: There is no abdominal tenderness. There is no guarding. Musculoskeletal:      Right lower leg: No edema. Left lower leg: No edema. Neurological:      Mental Status: She is alert. Mental status is at baseline. Psychiatric:         Mood and Affect: Mood normal.       Investigations:     Laboratory Testing:  Recent Results (from the past 24 hour(s))   Basic Metabolic Panel w/ Reflex to MG    Collection Time: 11/30/22  6:23 AM   Result Value Ref Range    Glucose 94 70 - 99 mg/dL    BUN 13 8 - 23 mg/dL    Creatinine 0.90 0.50 - 0.90 mg/dL    Est, Glom Filt Rate >60 >60 mL/min/1.73m2    Calcium 8.0 (L) 8.6 - 10.4 mg/dL    Sodium 138 135 - 144 mmol/L    Potassium 4.1 3.7 - 5.3 mmol/L    Chloride 108 (H) 98 - 107 mmol/L    CO2 23 20 - 31 mmol/L    Anion Gap 7 (L) 9 - 17 mmol/L       Imaging/Diagnostics:  XR CHEST PORTABLE    Result Date: 11/9/2022  EXAMINATION: ONE XRAY VIEW OF THE CHEST 11/9/2022 1:51 pm COMPARISON: Portable frontal view of the chest October 24, 2022. HISTORY: ORDERING SYSTEM PROVIDED HISTORY: Chest Pain TECHNOLOGIST PROVIDED HISTORY: Chest Pain FINDINGS: The heart is unchanged in size and is within limits of normal for size. Some calcification is present within the aorta. Tortuous brachiocephalic vasculature. Mild rotatory scoliosis of the cervicothoracic spine and some asymmetry of the thoracic cage. No evidence of pneumothorax, pleural effusion, infiltrate, or abnormal lung mass. Central pulmonary vascularity appears normal.     Senescent changes compatible with the age of the patient. Findings appear stable when compared to the previous study.        Assessment :      Primary Problem  Complicated UTI (urinary tract infection)    Active Hospital Problems    Diagnosis Date Noted    Ureteral stent present [Z96.0] 11/29/2022     Priority: Medium    Complicated UTI (urinary tract infection) [N39.0] 11/28/2022     Priority: Medium    Acute cystitis with hematuria [N30.01] 10/08/2022     Priority: Medium    Hypokalemia [E87.6] 10/04/2022     Priority: Medium    Urinary incontinence [R32] 05/17/2022     Priority: Medium    Dementia due to Parkinson's disease with behavioral disturbance ([address] [G20, F02.818] 08/15/2020    Seizure ([address] [R56.9] 08/24/2012       Plan:     Patient status Admit as inpatient in the  Med/Surge    Principal Problem:    Complicated UTI (urinary tract infection)  Active Problems:    Urinary incontinence    Hypokalemia    Acute cystitis with hematuria    Ureteral stent present    Seizure ([address]    Dementia due to Parkinson's disease with behavioral disturbance ([address]  Resolved Problems:    * No resolved hospital problems. *      63/80/56  -Complicated UTI: Given patient's history of ureteral stent, mild hyponasal process, kidney stones and 3 UTIs in the past 1 month complicated with ESBL patient started on meropenem. Waiting for urinary cultures. We will consult infectious diseases given patient has history of ESBL and 3 UTIs this past 1 month. We will consult urology for complete urinary incontinence.  -Hypokalemia: Potassium 3.3.  40 mEq replaced this morning. Started on 20 mEq daily. Consultations:   IP CONSULT TO INTERNAL MEDICINE  IP CONSULT TO UROLOGY     Patient is admitted as inpatient status because of co-morbiditieslisted above, severity of signs and symptoms as outlined, requirement for current medical therapies and most importantly because of direct risk to patient if care not provided in a hospital setting. Condsult urology   Hx esbl on meropenem       11/30/22    Confused , dementia . Seen by dr Rios Santiago    Stent replacement planned for Friday    Afebrile    Continue rx  On meropenem  Medications: Allergies:     Allergies   Allergen Reactions    Haldol [Haloperidol Lactate]        Current Meds:   Scheduled Meds:    meropenem  1,000 mg IntraVENous Q12H aspirin  81 mg Oral Daily    carBAMazepine  300 mg Oral Daily    carBAMazepine  400 mg Oral Nightly    FLUoxetine  40 mg Oral Daily    trospium  20 mg Oral BID AC    potassium chloride  20 mEq Oral Daily    fludrocortisone  100 mcg Oral Daily    sodium chloride flush  5-40 mL IntraVENous 2 times per day    enoxaparin  40 mg SubCUTAneous Daily     Continuous Infusions:    sodium chloride      sodium chloride 75 mL/hr at 11/29/22 1601     PRN Meds: melatonin, sodium chloride flush, sodium chloride, ondansetron **OR** ondansetron, magnesium hydroxide, acetaminophen **OR** acetaminophen          12/1/22    Cysto mtomorrow . improving            MD DEMARCO Arthur [address], [address] [place] [address].    Phone [phone]   Fax: [phone]  Answering Service: [phone]

## 2022-11-30 NOTE — CARE COORDINATION
ONGOING DISCHARGE PLAN:    Patient is currently asleep. No family at the bedside. Spoke with patient's Daughter Jennifer Nye, regarding discharge plan and she confirms that plan is still to DC to home w/ her. She is there 24/7. Pt. Remains on IV Merrem. Per Previous Urology notes, Try for Stent change, while she is here. Will continue to follow for additional discharge needs.     Electronically signed by Anusha Taylor RN on 11/30/2022 at 2:18 PM

## 2022-11-30 NOTE — TELEPHONE ENCOUNTER
Cysto, Left URS, HLL & Left Stent Placement    STV 12/19/2022 @ 8:30am (6:30am arrival)    PAT: on admit/same day    NPO: midnight    **Pt does NOT need to stop blood thinners**    PO: office will call       **Frances Peterson is pts POA. She will be present day of procedure and will be signing consent forms**      All procedure info given to patient frances Peterson over the phone. Felecia Peterson verbalized an understanding and all questions were answered. Per Felecia Peterson request, all procedure info emailed to her at [email or web address]

## 2022-11-30 NOTE — TELEPHONE ENCOUNTER
Per verbal instructions from Dr. Tere Moser, pt to have Cysto, Left URS, HLL & Left Stent Exchange 2 weeks following Cysto, Left Stent Exchange on 12/2/2022 . .. spoke to pts daughter, pt tentatively scheduled for 12/19/2022 @ 8:30am, writer will call back with all procedure info once scheduled

## 2022-11-30 NOTE — TELEPHONE ENCOUNTER
Cysto, Left Stent Exchange    [facility] 12/2/2022 @ 8am (7am pick-up)    PAT: patient in house # [id number]    NPO: midnight    PO: office will call     **Hipolito Echeverria is pts POA. She will be present day of procedure and will be signing consent forms**    Pts nurse Rosemary unavailable.  w/ Jayjay Level for nurse with all procedure info. Jayjay Level will have Rosemary call me once she is free to go over surgery info. Spoke to pts daughter Jared Clemons) & all procedure info given over the phone. She verbalized an understanding and all questions were answered.

## 2022-11-30 NOTE — TELEPHONE ENCOUNTER
Per verbal instructions from  [facility] - pt to have cysto, left shawn exchange on 12/2/2022 at [facility] - pt currently admitted to [facility] # [id number]

## 2022-12-01 ENCOUNTER — ANESTHESIA EVENT (OUTPATIENT)
Dept: OPERATING ROOM | Age: 77
End: 2022-12-01
Payer: COMMERCIAL

## 2022-12-01 LAB
ANION GAP SERPL CALCULATED.3IONS-SCNC: 7 MMOL/L (ref 9–17)
BUN BLDV-MCNC: 11 MG/DL (ref 8–23)
CALCIUM SERPL-MCNC: 8.1 MG/DL (ref 8.6–10.4)
CHLORIDE BLD-SCNC: 105 MMOL/L (ref 98–107)
CO2: 22 MMOL/L (ref 20–31)
CREAT SERPL-MCNC: 0.81 MG/DL (ref 0.5–0.9)
GFR SERPL CREATININE-BSD FRML MDRD: >60 ML/MIN/1.73M2
GLUCOSE BLD-MCNC: 114 MG/DL (ref 70–99)
POTASSIUM SERPL-SCNC: 3.7 MMOL/L (ref 3.7–5.3)
SODIUM BLD-SCNC: 134 MMOL/L (ref 135–144)

## 2022-12-01 PROCEDURE — 6360000002 HC RX W HCPCS: Performed by: NURSE PRACTITIONER

## 2022-12-01 PROCEDURE — 6360000002 HC RX W HCPCS: Performed by: INTERNAL MEDICINE

## 2022-12-01 PROCEDURE — 80048 BASIC METABOLIC PNL TOTAL CA: CPT

## 2022-12-01 PROCEDURE — 6370000000 HC RX 637 (ALT 250 FOR IP): Performed by: NURSE PRACTITIONER

## 2022-12-01 PROCEDURE — 36415 COLL VENOUS BLD VENIPUNCTURE: CPT

## 2022-12-01 PROCEDURE — 99232 SBSQ HOSP IP/OBS MODERATE 35: CPT | Performed by: INTERNAL MEDICINE

## 2022-12-01 PROCEDURE — 1200000000 HC SEMI PRIVATE

## 2022-12-01 RX ADMIN — LINEZOLID 600 MG: 600 INJECTION, SOLUTION INTRAVENOUS at 15:22

## 2022-12-01 RX ADMIN — TROSPIUM CHLORIDE 20 MG: 20 TABLET, FILM COATED ORAL at 08:54

## 2022-12-01 RX ADMIN — FLUDROCORTISONE ACETATE 100 MCG: 0.1 TABLET ORAL at 08:55

## 2022-12-01 RX ADMIN — TROSPIUM CHLORIDE 20 MG: 20 TABLET, FILM COATED ORAL at 17:42

## 2022-12-01 RX ADMIN — CARBAMAZEPINE 400 MG: 200 TABLET ORAL at 21:33

## 2022-12-01 RX ADMIN — Medication 6 MG: at 21:32

## 2022-12-01 RX ADMIN — CARBAMAZEPINE 300 MG: 200 TABLET ORAL at 08:55

## 2022-12-01 RX ADMIN — LINEZOLID 600 MG: 600 INJECTION, SOLUTION INTRAVENOUS at 04:16

## 2022-12-01 RX ADMIN — POTASSIUM CHLORIDE 20 MEQ: 1500 TABLET, EXTENDED RELEASE ORAL at 08:55

## 2022-12-01 RX ADMIN — ENOXAPARIN SODIUM 40 MG: 100 INJECTION SUBCUTANEOUS at 09:01

## 2022-12-01 RX ADMIN — FLUOXETINE 40 MG: 20 CAPSULE ORAL at 08:54

## 2022-12-01 RX ADMIN — ASPIRIN 81 MG: 81 TABLET, COATED ORAL at 08:55

## 2022-12-01 ASSESSMENT — LIFESTYLE VARIABLES: SMOKING_STATUS: 0

## 2022-12-01 ASSESSMENT — COPD QUESTIONNAIRES: CAT_SEVERITY: NO INTERVAL CHANGE

## 2022-12-01 ASSESSMENT — ENCOUNTER SYMPTOMS
SHORTNESS OF BREATH: 0
STRIDOR: 0

## 2022-12-01 NOTE — PROGRESS NOTES
Physician Progress Note      Rhianna Grajeda  [facility] #:                  [id number]  :                       1945  ADMIT DATE:       2022 6:17 PM  DISCH DATE:  RESPONDING  PROVIDER #:        Mervyn Harada MD          QUERY TEXT:    Pt admitted with UTI. Pt noted to have L ureteral stent. If possible, please   document in the progress notes and discharge summary if you are evaluating   and/or treating any of the following: The medical record reflects the following:  Risk Factors: L ureteral stent placed in May  Clinical Indicators: UA+ large LE, moderate bacteria, WBC too numerous to   count; per Urology consult--> Has left ureteral stent in place.; UC in   progress  Treatment: IV Merrem, UA/UC, Urology consult, monitoring, hospital admission  Options provided:  -- UTI due to ureteral stent  -- UTI not due to ureteral stent  -- Other - I will add my own diagnosis  -- Disagree - Not applicable / Not valid  -- Disagree - Clinically unable to determine / Unknown  -- Refer to Clinical Documentation Reviewer    PROVIDER RESPONSE TEXT:    UTI is due to ureteral stent.     Query created by: Sherry Isidro on 2022 10:24 AM      Electronically signed by:  Mervyn Harada MD 2022 8:23 AM

## 2022-12-01 NOTE — PLAN OF CARE
Problem: Discharge Planning  Goal: Discharge to home or other facility with appropriate resources  Outcome: Progressing  Flowsheets (Taken 11/30/2022 1217)  Discharge to home or other facility with appropriate resources:   Identify barriers to discharge with patient and caregiver   Arrange for needed discharge resources and transportation as appropriate   Identify discharge learning needs (meds, wound care, etc)   Refer to discharge planning if patient needs post-hospital services based on physician order or complex needs related to functional status, cognitive ability or social support system     Problem: Safety - Adult  Goal: Free from fall injury  Outcome: Progressing     Problem: ABCDS Injury Assessment  Goal: Absence of physical injury  Outcome: Progressing     Problem: Skin/Tissue Integrity  Goal: Absence of new skin breakdown  Description: 1.   Monitor for areas of redness and/or skin breakdown  Outcome: Progressing

## 2022-12-01 NOTE — CARE COORDINATION
ONGOING DISCHARGE PLAN:    Patient is eating her lunch, no family at bedside, Patient only oriented to self    Per chart notes, d/c plan is to return home with Daughter who is with her 24/7,     Remains on Iv merrem    Plan for Cysto with Stent exchange tomorrow    Will continue to follow for additional discharge needs.     Electronically signed by Radha Sage RN on 12/1/2022 at 6:05 PM

## 2022-12-01 NOTE — PROGRESS NOTES
Plan for cysto and stent change tomorrow. NPO after MN. ABx per ID. Definitive stone therapy in 2 weeks.

## 2022-12-01 NOTE — ANESTHESIA PRE PROCEDURE
Department of Anesthesiology  Preprocedure Note       Name:  Man Faulkner   Age:  76 y.o.  :  1945                                          MRN:  [id number]         Date:  2022      Surgeon: Ravinder Ponce):  Desirae Schroeder MD    Procedure: Procedure(s):  CYSTOSCOPY WITH LEFT STENT EXCHANGE    Medications prior to admission:   Prior to Admission medications    Medication Sig Start Date End Date Taking?  Authorizing Provider   carBAMazepine (TEGRETOL) 200 MG tablet Take 300 mg by mouth daily Takes along with 400 mg at hs   Yes Historical Provider, MD   potassium chloride (KLOR-CON M) 20 MEQ extended release tablet TAKE 1 TABLET BY MOUTH EVERY DAY 10/21/22   Historical Provider, MD   fludrocortisone (FLORINEF) 0.1 MG tablet TAKE 1 TABLET BY MOUTH EVERY DAY  Patient taking differently: Take 0.1 mg by mouth daily 11/3/22   Sravani Wu DO   carBAMazepine (TEGRETOL) 200 MG tablet 1.5 tablets po q am and 2 po q pm  Patient taking differently: 400 mg 2 po q pm 22   Sravani Wu DO   FLUoxetine (PROZAC) 20 MG capsule 2 po qd  Patient taking differently: Take 40 mg by mouth daily 22   Sravani Wu DO   mirabegron (MYRBETRIQ) 50 MG TB24 Take 50 mg by mouth daily 22   Sravani Wu DO   melatonin 5 MG TABS tablet Take 1 tablet by mouth nightly as needed (sleep) 22  Crystal Watson MD   aspirin 81 MG EC tablet Take 81 mg by mouth daily    Historical Provider, MD       Current medications:    Current Facility-Administered Medications   Medication Dose Route Frequency Provider Last Rate Last Admin    linezolid (ZYVOX) IVPB 600 mg  600 mg IntraVENous Q12H Cristobal Mahmood  mL/hr at 22 1522 600 mg at 22 1522    aspirin EC tablet 81 mg  81 mg Oral Daily LAURIE Garcia CNP   81 mg at 22 0855    carBAMazepine (TEGRETOL) tablet 300 mg  300 mg Oral Daily LAURIE Garcia - CNP   300 mg at 22 0855    carBAMazepine (TEGRETOL) tablet 400 mg  400 mg Oral Nightly Ayesha Merharley, APRN - CNP   400 mg at 11/30/22 2232    FLUoxetine (PROZAC) capsule 40 mg  40 mg Oral Daily Ayeshaearl Claudio, APRN - CNP   40 mg at 12/01/22 0115    trospium (SANCTURA) tablet 20 mg  20 mg Oral BID AC Ayesha Claudio, APRN - CNP   20 mg at 12/01/22 0854    potassium chloride (KLOR-CON M) extended release tablet 20 mEq  20 mEq Oral Daily Ayesha Merles, APRN - CNP   20 mEq at 12/01/22 0855    melatonin tablet 6 mg  6 mg Oral Nightly PRN Ayesha Merharley, APRN - CNP   6 mg at 11/30/22 2232    fludrocortisone (FLORINEF) tablet 100 mcg  100 mcg Oral Daily Ayesha Merharley, APRN - CNP   100 mcg at 12/01/22 0030    sodium chloride flush 0.9 % injection 5-40 mL  5-40 mL IntraVENous 2 times per day Ayesha Claudio, APRN - CNP   10 mL at 11/30/22 2232    sodium chloride flush 0.9 % injection 10 mL  10 mL IntraVENous PRN Ayeshaearl Claudio, APRN - CNP        0.9 % sodium chloride infusion   IntraVENous PRN Ayesha Claudio, APRN - CNP        ondansetron (ZOFRAN-ODT) disintegrating tablet 4 mg  4 mg Oral Q8H PRN Ayesha Claudio, APRN - CNP        Or    ondansetron [facility]) injection 4 mg  4 mg IntraVENous Q6H PRN Ayesha Claudio, APRN - CNP        magnesium hydroxide (MILK OF MAGNESIA) 400 MG/5ML suspension 30 mL  30 mL Oral Daily PRN Ayeshaearl Claudio, APRN - CNP        acetaminophen (TYLENOL) tablet 650 mg  650 mg Oral Q6H PRN Ayeshaearl Claudio, APRN - CNP        Or    acetaminophen (TYLENOL) suppository 650 mg  650 mg Rectal Q6H PRN Ayesha Claudio, APRN - CNP        0.9 % sodium chloride infusion   IntraVENous Continuous Ayesha Claudio, APRN - CNP 75 mL/hr at 11/29/22 1601 New Bag at 11/29/22 1601    enoxaparin (LOVENOX) injection 40 mg  40 mg SubCUTAneous Daily Ayesha Claudio, APRN - CNP   40 mg at 12/01/22 0901       Allergies:     Allergies   Allergen Reactions    Haldol [Haloperidol Lactate]        Problem List:    Patient Active Problem List   Diagnosis Code    Seizure ([facility] R56.9    Vitamin D deficiency E55.9    Anxiety F41.9    Hypercholesteremia E78.00    Cellulitis of right leg L03.115    MRSA (methicillin resistant staph aureus) culture positive Z22.322    Breakthrough seizure ([facility] G40.919    Memory loss R41.3    Difficulty speaking R47.9    Seizure disorder ([facility]) G40.909    Dementia with behavioral disturbance F03.918    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    Dementia due to Parkinson's disease with behavioral disturbance ([facility] 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 A    Kidney stone N20.0    Urinary incontinence R32    Overactive bladder N32.81    Closed head injury S09.90XA    Community acquired pneumonia J18.9    Sepsis ([facility] A41.9    COPD exacerbation ([facility]) J44.1    Hypokalemia E87.6    Bacterial UTI N39.0, A49.9    Urinary tract infection due to Proteus N39.0, B96.4    ESBL (extended spectrum beta-lactamase) producing bacteria infection A49.9, Z16.12    Acute cystitis with hematuria N30.01    Leukocytosis D72.829    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    Chest pain R07.9    Encounter for palliative care S37.5    Complicated UTI (urinary tract infection) N39.0    Ureteral stent present Z96.0       Past Medical History:        Diagnosis Date    Anxiety     Convulsion ([facility])     Dementia ([facility])     Encephalopathy     Herpes     Hyperlipidemia     Left bundle branch block     MRSA (methicillin resistant Staphylococcus aureus)     Parkinson's disease ([facility]     Seizures ([facility]     secondary to encephalitis    Vitamin D deficiency        Past Surgical History:        Procedure Laterality Date    ABSCESS DRAINAGE Right 12/14/2013    WOUND EXPLORATION AND I+D  RIGHT HIP    CYSTOSCOPY Left 5/16/2022    CYSTOSCOPY URETERAL STENT INSERTION performed by Steven Guzmán MD at [address] [place] [address] Left 05/16/2022    Cystoscopy       Social History:    Social History Tobacco Use    Smoking status: Former     Packs/day: 1.00     Years: 30.00     Pack years: 30.00     Types: Cigarettes     Start date: 54     Quit date:      Years since quittin.9    Smokeless tobacco: Never    Tobacco comments:     unknown how many packs a day, or how many years   Substance Use Topics    Alcohol use: No                                Counseling given: Not Answered  Tobacco comments: unknown how many packs a day, or how many years      Vital Signs (Current):   Vitals:    22 1838 22 1950 22 0640 22 0642   BP: (!) 186/152 118/62 (!) 106/52    Pulse: 84 95 81    Resp: 18 20 18 18   Temp: 98.2 °F (36.8 °C) 98.8 °F (37.1 °C) 98.2 °F (36.8 °C) 98.2 °F (36.8 °C)   TempSrc:       SpO2: 95% 94% 90%    Weight:       Height:                                                  BP Readings from Last 3 Encounters:   22 (!) 106/52   11/15/22 (!) 105/59   22 (!) 111/54       NPO Status:                                                                                 BMI:   Wt Readings from Last 3 Encounters:   22 132 lb (59.9 kg)   11/15/22 128 lb (58.1 kg)   10/25/22 125 lb (56.7 kg)     Body mass index is 20.67 kg/m².     CBC:   Lab Results   Component Value Date/Time    WBC 9.3 2022 10:00 PM    RBC 3.83 2022 10:00 PM    RBC 4.07 2012 03:18 PM    HGB 12.3 2022 10:00 PM    HCT 36.1 2022 10:00 PM    MCV 94.2 2022 10:00 PM    RDW 14.5 2022 10:00 PM     2022 10:00 PM     2012 03:18 PM     K 3.7    CMP:   Lab Results   Component Value Date/Time     2022 05:51 AM    K 3.7 2022 05:51 AM     2022 05:51 AM    CO2 22 2022 05:51 AM    BUN 11 2022 05:51 AM    CREATININE 0.81 2022 05:51 AM    GFRAA >60 2022 03:16 AM    LABGLOM >60 2022 05:51 AM    GLUCOSE 114 2022 05:51 AM    GLUCOSE 93 2012 03:18 PM    PROT 7.1 2022 01:28 PM CALCIUM 8.1 12/01/2022 05:51 AM    BILITOT 0.2 11/09/2022 01:28 PM    ALKPHOS 246 11/09/2022 01:28 PM    AST 27 11/09/2022 01:28 PM    ALT 14 11/09/2022 01:28 PM       POC Tests: No results for input(s): POCGLU, POCNA, POCK, POCCL, POCBUN, POCHEMO, POCHCT in the last 72 hours. Coags:   Lab Results   Component Value Date/Time    PROTIME 13.9 10/03/2022 08:21 PM    INR 1.1 10/03/2022 08:21 PM    APTT 26.1 09/07/2018 07:13 PM       HCG (If Applicable): No results found for: PREGTESTUR, PREGSERUM, HCG, HCGQUANT     ABGs: No results found for: PHART, PO2ART, CAX9XXS, NBU0XAB, BEART, H3GRZWNJ     Type & Screen (If Applicable):  No results found for: LABABO, LABRH    Drug/Infectious Status (If Applicable):  No results found for: HIV, HEPCAB    COVID-19 Screening (If Applicable):   Lab Results   Component Value Date/Time    COVID19 Not Detected 11/09/2022 07:24 PM    COVID19 Not Detected 10/04/2022 01:00 PM           Anesthesia Evaluation  Patient summary reviewed and Nursing notes reviewed no history of anesthetic complications:   Airway: Mallampati: Unable to assess / NA  TM distance: >3 FB   Neck ROM: full  Mouth opening: > = 3 FB   Dental:          Pulmonary:normal exam  breath sounds clear to auscultation  (+) pneumonia: no interval change,  COPD: no interval change,      (-) asthma, shortness of breath, recent URI, sleep apnea, rhonchi, wheezes, rales, stridor, not a current smoker and no decreased breath sounds          Patient did not smoke on day of surgery.                  Cardiovascular:  Exercise tolerance: good (>4 METS),   (+) dysrhythmias: atrial fibrillation, hyperlipidemia    (-) pacemaker, hypertension, valvular problems/murmurs, past MI, CAD, CABG/stent,  angina,  CHF, orthopnea, PND,  HOPKINS, murmur, weak pulses,  friction rub, systolic click, carotid bruit,  JVD, peripheral edema and no pulmonary hypertension    ECG reviewed  Rhythm: regular  Rate: normal  Echocardiogram reviewed         Beta Blocker:  Not on Beta Blocker         Neuro/Psych:   (+) seizures:, neuromuscular disease: Parkinson's disease, psychiatric history: stable with treatmentdepression/anxiety dementia   (-) TIA, CVA and headaches           GI/Hepatic/Renal:   (+) renal disease ( admitted with UTI;  urinary incontinence): kidney stones,      (-) hiatal hernia, GERD, PUD, hepatitis, liver disease, bowel prep and no morbid obesity       Endo/Other:    (+) electrolyte abnormalities (hyponatremia), no malignancy/cancer. (-) diabetes mellitus, hypothyroidism, hyperthyroidism, blood dyscrasia, arthritis, no malignancy/cancer                ROS comment: Ambulates with walker and help Abdominal:             Vascular:   + DVT, .  - PVD and PE. Other Findings:           Anesthesia Plan      general     ASA 3       Induction: intravenous. MIPS: Postoperative opioids intended and Prophylactic antiemetics administered. Anesthetic plan and risks discussed with patient. Plan discussed with CRNA. Patient has Dementia and memory issues. Daughter at bedside and is also the POA. The daughter signed her anesthesia consent and agreed to have the DNR status rescinded during the perioperative period.         Veronika Sherwood MD   12/1/2022

## 2022-12-02 ENCOUNTER — ANESTHESIA (OUTPATIENT)
Dept: OPERATING ROOM | Age: 77
End: 2022-12-02
Payer: COMMERCIAL

## 2022-12-02 ENCOUNTER — APPOINTMENT (OUTPATIENT)
Dept: GENERAL RADIOLOGY | Age: 77
End: 2022-12-02
Payer: COMMERCIAL

## 2022-12-02 VITALS
OXYGEN SATURATION: 94 % | HEART RATE: 79 BPM | RESPIRATION RATE: 17 BRPM | TEMPERATURE: 97.5 F | SYSTOLIC BLOOD PRESSURE: 123 MMHG | BODY MASS INDEX: 20.72 KG/M2 | WEIGHT: 132 LBS | HEIGHT: 67 IN | DIASTOLIC BLOOD PRESSURE: 61 MMHG

## 2022-12-02 PROCEDURE — 99239 HOSP IP/OBS DSCHRG MGMT >30: CPT | Performed by: INTERNAL MEDICINE

## 2022-12-02 PROCEDURE — 3600000012 HC SURGERY LEVEL 2 ADDTL 15MIN: Performed by: UROLOGY

## 2022-12-02 PROCEDURE — 2709999900 HC NON-CHARGEABLE SUPPLY: Performed by: UROLOGY

## 2022-12-02 PROCEDURE — 7100000001 HC PACU RECOVERY - ADDTL 15 MIN: Performed by: UROLOGY

## 2022-12-02 PROCEDURE — 6360000002 HC RX W HCPCS: Performed by: UROLOGY

## 2022-12-02 PROCEDURE — 2500000003 HC RX 250 WO HCPCS: Performed by: NURSE ANESTHETIST, CERTIFIED REGISTERED

## 2022-12-02 PROCEDURE — 3700000000 HC ANESTHESIA ATTENDED CARE: Performed by: UROLOGY

## 2022-12-02 PROCEDURE — 2580000003 HC RX 258: Performed by: ANESTHESIOLOGY

## 2022-12-02 PROCEDURE — 6370000000 HC RX 637 (ALT 250 FOR IP): Performed by: UROLOGY

## 2022-12-02 PROCEDURE — 3209999900 FLUORO FOR SURGICAL PROCEDURES

## 2022-12-02 PROCEDURE — 3700000001 HC ADD 15 MINUTES (ANESTHESIA): Performed by: UROLOGY

## 2022-12-02 PROCEDURE — 7100000000 HC PACU RECOVERY - FIRST 15 MIN: Performed by: UROLOGY

## 2022-12-02 PROCEDURE — 2580000003 HC RX 258: Performed by: UROLOGY

## 2022-12-02 PROCEDURE — 6360000002 HC RX W HCPCS: Performed by: NURSE ANESTHETIST, CERTIFIED REGISTERED

## 2022-12-02 PROCEDURE — 3600000002 HC SURGERY LEVEL 2 BASE: Performed by: UROLOGY

## 2022-12-02 PROCEDURE — 6360000002 HC RX W HCPCS: Performed by: INTERNAL MEDICINE

## 2022-12-02 PROCEDURE — 0TJ98ZZ INSPECTION OF URETER, VIA NATURAL OR ARTIFICIAL OPENING ENDOSCOPIC: ICD-10-PCS | Performed by: UROLOGY

## 2022-12-02 PROCEDURE — C1769 GUIDE WIRE: HCPCS | Performed by: UROLOGY

## 2022-12-02 RX ORDER — SODIUM CHLORIDE 0.9 % (FLUSH) 0.9 %
5-40 SYRINGE (ML) INJECTION PRN
Status: DISCONTINUED | OUTPATIENT
Start: 2022-12-02 | End: 2022-12-02 | Stop reason: HOSPADM

## 2022-12-02 RX ORDER — ONDANSETRON 2 MG/ML
4 INJECTION INTRAMUSCULAR; INTRAVENOUS
Status: DISCONTINUED | OUTPATIENT
Start: 2022-12-02 | End: 2022-12-02 | Stop reason: HOSPADM

## 2022-12-02 RX ORDER — LINEZOLID 600 MG/1
600 TABLET, FILM COATED ORAL 2 TIMES DAILY
Qty: 6 TABLET | Refills: 0 | Status: SHIPPED | OUTPATIENT
Start: 2022-12-02 | End: 2022-12-05

## 2022-12-02 RX ORDER — FENTANYL CITRATE 50 UG/ML
INJECTION, SOLUTION INTRAMUSCULAR; INTRAVENOUS PRN
Status: DISCONTINUED | OUTPATIENT
Start: 2022-12-02 | End: 2022-12-02 | Stop reason: SDUPTHER

## 2022-12-02 RX ORDER — FENTANYL CITRATE 0.05 MG/ML
50 INJECTION, SOLUTION INTRAMUSCULAR; INTRAVENOUS EVERY 5 MIN PRN
Status: DISCONTINUED | OUTPATIENT
Start: 2022-12-02 | End: 2022-12-02 | Stop reason: HOSPADM

## 2022-12-02 RX ORDER — LIDOCAINE HYDROCHLORIDE 20 MG/ML
JELLY TOPICAL PRN
Status: DISCONTINUED | OUTPATIENT
Start: 2022-12-02 | End: 2022-12-02 | Stop reason: ALTCHOICE

## 2022-12-02 RX ORDER — SODIUM CHLORIDE 0.9 % (FLUSH) 0.9 %
5-40 SYRINGE (ML) INJECTION EVERY 12 HOURS SCHEDULED
Status: DISCONTINUED | OUTPATIENT
Start: 2022-12-02 | End: 2022-12-02 | Stop reason: HOSPADM

## 2022-12-02 RX ORDER — EPHEDRINE SULFATE/0.9% NACL/PF 50 MG/5 ML
SYRINGE (ML) INTRAVENOUS PRN
Status: DISCONTINUED | OUTPATIENT
Start: 2022-12-02 | End: 2022-12-02 | Stop reason: SDUPTHER

## 2022-12-02 RX ORDER — FENTANYL CITRATE 0.05 MG/ML
25 INJECTION, SOLUTION INTRAMUSCULAR; INTRAVENOUS EVERY 5 MIN PRN
Status: DISCONTINUED | OUTPATIENT
Start: 2022-12-02 | End: 2022-12-02 | Stop reason: HOSPADM

## 2022-12-02 RX ORDER — SODIUM CHLORIDE, SODIUM LACTATE, POTASSIUM CHLORIDE, CALCIUM CHLORIDE 600; 310; 30; 20 MG/100ML; MG/100ML; MG/100ML; MG/100ML
INJECTION, SOLUTION INTRAVENOUS CONTINUOUS
Status: DISCONTINUED | OUTPATIENT
Start: 2022-12-02 | End: 2022-12-02 | Stop reason: HOSPADM

## 2022-12-02 RX ORDER — SODIUM CHLORIDE 9 MG/ML
INJECTION, SOLUTION INTRAVENOUS PRN
Status: DISCONTINUED | OUTPATIENT
Start: 2022-12-02 | End: 2022-12-02 | Stop reason: HOSPADM

## 2022-12-02 RX ORDER — ONDANSETRON 2 MG/ML
INJECTION INTRAMUSCULAR; INTRAVENOUS PRN
Status: DISCONTINUED | OUTPATIENT
Start: 2022-12-02 | End: 2022-12-02 | Stop reason: SDUPTHER

## 2022-12-02 RX ORDER — DIPHENHYDRAMINE HYDROCHLORIDE 50 MG/ML
12.5 INJECTION INTRAMUSCULAR; INTRAVENOUS
Status: DISCONTINUED | OUTPATIENT
Start: 2022-12-02 | End: 2022-12-02 | Stop reason: HOSPADM

## 2022-12-02 RX ORDER — LIDOCAINE HYDROCHLORIDE 20 MG/ML
INJECTION, SOLUTION EPIDURAL; INFILTRATION; INTRACAUDAL; PERINEURAL PRN
Status: DISCONTINUED | OUTPATIENT
Start: 2022-12-02 | End: 2022-12-02 | Stop reason: SDUPTHER

## 2022-12-02 RX ORDER — PROPOFOL 10 MG/ML
INJECTION, EMULSION INTRAVENOUS PRN
Status: DISCONTINUED | OUTPATIENT
Start: 2022-12-02 | End: 2022-12-02 | Stop reason: SDUPTHER

## 2022-12-02 RX ORDER — DEXAMETHASONE SODIUM PHOSPHATE 4 MG/ML
INJECTION, SOLUTION INTRA-ARTICULAR; INTRALESIONAL; INTRAMUSCULAR; INTRAVENOUS; SOFT TISSUE PRN
Status: DISCONTINUED | OUTPATIENT
Start: 2022-12-02 | End: 2022-12-02 | Stop reason: SDUPTHER

## 2022-12-02 RX ADMIN — ASPIRIN 81 MG: 81 TABLET, COATED ORAL at 10:10

## 2022-12-02 RX ADMIN — FENTANYL CITRATE 50 MCG: 50 INJECTION, SOLUTION INTRAMUSCULAR; INTRAVENOUS at 08:14

## 2022-12-02 RX ADMIN — FLUOXETINE 40 MG: 20 CAPSULE ORAL at 10:10

## 2022-12-02 RX ADMIN — ENOXAPARIN SODIUM 40 MG: 100 INJECTION SUBCUTANEOUS at 10:18

## 2022-12-02 RX ADMIN — PROPOFOL 120 MG: 10 INJECTION, EMULSION INTRAVENOUS at 08:15

## 2022-12-02 RX ADMIN — SODIUM CHLORIDE: 9 INJECTION, SOLUTION INTRAVENOUS at 14:13

## 2022-12-02 RX ADMIN — POTASSIUM CHLORIDE 20 MEQ: 1500 TABLET, EXTENDED RELEASE ORAL at 10:10

## 2022-12-02 RX ADMIN — LINEZOLID 600 MG: 600 INJECTION, SOLUTION INTRAVENOUS at 04:37

## 2022-12-02 RX ADMIN — DEXAMETHASONE SODIUM PHOSPHATE 4 MG: 4 INJECTION, SOLUTION INTRAMUSCULAR; INTRAVENOUS at 08:20

## 2022-12-02 RX ADMIN — ENOXAPARIN SODIUM 40 MG: 100 INJECTION SUBCUTANEOUS at 10:09

## 2022-12-02 RX ADMIN — TROSPIUM CHLORIDE 20 MG: 20 TABLET, FILM COATED ORAL at 15:39

## 2022-12-02 RX ADMIN — ONDANSETRON 4 MG: 2 INJECTION INTRAMUSCULAR; INTRAVENOUS at 08:20

## 2022-12-02 RX ADMIN — CARBAMAZEPINE 300 MG: 200 TABLET ORAL at 09:00

## 2022-12-02 RX ADMIN — FLUOXETINE 40 MG: 20 CAPSULE ORAL at 10:14

## 2022-12-02 RX ADMIN — FLUDROCORTISONE ACETATE 100 MCG: 0.1 TABLET ORAL at 10:16

## 2022-12-02 RX ADMIN — LINEZOLID 600 MG: 600 INJECTION, SOLUTION INTRAVENOUS at 14:14

## 2022-12-02 RX ADMIN — SODIUM CHLORIDE, POTASSIUM CHLORIDE, SODIUM LACTATE AND CALCIUM CHLORIDE: 600; 310; 30; 20 INJECTION, SOLUTION INTRAVENOUS at 08:04

## 2022-12-02 RX ADMIN — ASPIRIN 81 MG: 81 TABLET, COATED ORAL at 10:12

## 2022-12-02 RX ADMIN — TROSPIUM CHLORIDE 20 MG: 20 TABLET, FILM COATED ORAL at 10:10

## 2022-12-02 RX ADMIN — Medication 15 MG: at 08:23

## 2022-12-02 RX ADMIN — POTASSIUM CHLORIDE 20 MEQ: 1500 TABLET, EXTENDED RELEASE ORAL at 10:14

## 2022-12-02 RX ADMIN — LIDOCAINE HYDROCHLORIDE 80 MG: 20 INJECTION, SOLUTION EPIDURAL; INFILTRATION; INTRACAUDAL; PERINEURAL at 08:15

## 2022-12-02 ASSESSMENT — ENCOUNTER SYMPTOMS
ABDOMINAL PAIN: 0
SHORTNESS OF BREATH: 0
NAUSEA: 0
CHEST TIGHTNESS: 0
WHEEZING: 0
COUGH: 0
CONSTIPATION: 0
VOMITING: 0
BLOOD IN STOOL: 0
DIARRHEA: 0
BACK PAIN: 0

## 2022-12-02 ASSESSMENT — PAIN - FUNCTIONAL ASSESSMENT: PAIN_FUNCTIONAL_ASSESSMENT: NONE - DENIES PAIN

## 2022-12-02 NOTE — PROGRESS NOTES
Urology Progress Note    Subjective: no new  issues     Patient Vitals for the past 24 hrs:   BP Temp Pulse Resp SpO2   12/02/22 0000 120/67 97.9 °F (36.6 °C) 77 17 96 %   12/01/22 1928 129/66 98.1 °F (36.7 °C) 79 18 96 %       Intake/Output Summary (Last 24 hours) at 12/2/2022 0749  Last data filed at 12/2/2022 0546  Gross per 24 hour   Intake --   Output 1700 ml   Net -1700 ml       No results for input(s): WBC, HGB, HCT, MCV, PLT in the last 72 hours. Recent Labs     11/30/22  0623 12/01/22  0551    134*   K 4.1 3.7   * 105   CO2 23 22   BUN 13 11   CREATININE 0.90 0.81       No results for input(s): COLORU, PHUR, LABCAST, WBCUA, RBCUA, MUCUS, TRICHOMONAS, YEAST, BACTERIA, CLARITYU, SPECGRAV, LEUKOCYTESUR, UROBILINOGEN, BILIRUBINUR, BLOODU in the last 72 hours.     Invalid input(s): NITRATE, GLUCOSEUKETONESUAMORPHOUS    Additional Lab/culture results:    Physical Exam: NAD    Interval Imaging Findings:    Impression:    Patient Active Problem List   Diagnosis    Seizure ([address]    Vitamin D deficiency    Anxiety    Hypercholesteremia    Cellulitis of right leg    MRSA (methicillin resistant staph aureus) culture positive    Breakthrough seizure ([address]    Memory loss    Difficulty speaking    Seizure disorder ([address]    Dementia with behavioral disturbance    History of encephalitis    Dementia due to Parkinson's disease with behavioral disturbance ([address]    DVT of deep femoral vein, left (HCC)    Closed compression fracture of L1 lumbar vertebra, initial encounter ([address]    Depression    Kidney stone    Urinary incontinence    Overactive bladder    Closed head injury    Community acquired pneumonia    Sepsis ([address]    COPD exacerbation ([address]    Hypokalemia    Bacterial UTI    Urinary tract infection due to Proteus    ESBL (extended spectrum beta-lactamase) producing bacteria infection    Acute cystitis with hematuria    Leukocytosis    Mitral valve disorder    Chest pain    Encounter for palliative care Complicated UTI (urinary tract infection)    Ureteral stent present       Plan:     Cysto, left stent exchange.      Sam Greenwood MD  7:49 AM 12/2/2022

## 2022-12-02 NOTE — PLAN OF CARE
Problem: Discharge Planning  Goal: Discharge to home or other facility with appropriate resources  12/2/2022 1041 by Adry Ponce RN  Outcome: Progressing     Problem: Safety - Adult  Goal: Free from fall injury  12/2/2022 1041 by Adry Ponce RN  Outcome: Progressing  Flowsheets  Taken 12/2/2022 1040 by Adry Ponce RN  Free From Fall Injury: Based on caregiver fall risk screen, instruct family/caregiver to ask for assistance with transferring infant if caregiver noted to have fall risk factors    Problem: ABCDS Injury Assessment  Goal: Absence of physical injury  12/2/2022 1041 by Adry Ponce RN  Outcome: Progressing  Flowsheets  Taken 12/2/2022 1040 by Adry Ponce RN  Absence of Physical Injury: Implement safety measures based on patient assessment

## 2022-12-02 NOTE — DISCHARGE INSTR - COC
Continuity of Care Form    Patient Name: Pan Carney   :  1945  MRN:  [id number]    Admit date:  2022  Discharge date:  ***    Code Status Order: DNR-CCA   Advance Directives:     Admitting Physician:  Alton Guerrero MD  PCP: Kelton Arzate DO    Discharging Nurse: [facility] Unit/Room#: /-01  Discharging Unit Phone Number: ***    Emergency Contact:   Extended Emergency Contact Information  Primary Emergency Contact: [address] [place] [address] [facility] Phone: [phone]  Mobile Phone: [phone]  Relation: Child  Secondary Emergency Contact: [facility] [address] [facility] Phone: [phone]  Mobile Phone: [phone]  Relation: Grandchild    Past Surgical History:  Past Surgical History:   Procedure Laterality Date    ABSCESS DRAINAGE Right 2013    WOUND EXPLORATION AND I+D  RIGHT HIP    CYSTOSCOPY Left 2022    CYSTOSCOPY URETERAL STENT INSERTION performed by Any Lake MD at [address] Left 2022    ATTEMPTED CYSTOSCOPY WITH LEFT STENT EXCHANGE performed by Any Lake MD at [address] Left 2022    Cystoscopy       Immunization History:   Immunization History   Administered Date(s) Administered    COVID-19, PFIZER PURPLE top, DILUTE for use, (age 15 y+), 30mcg/0.3mL 2021, 2021, 2021    Influenza 10/09/2013    Influenza Virus Vaccine 2015    Influenza Whole 10/13/2004    Influenza, High Dose (Fluzone 65 yrs and older) 10/04/2017, 2018    Pneumococcal Conjugate 13-valent ([id number]) 2018    Pneumococcal Polysaccharide (Sitldgxgs35) 2015    Tdap (Boostrix, Adacel) 05/15/2022       Active Problems:  Patient Active Problem List   Diagnosis Code    Seizure ([address] R56.9    Vitamin D deficiency E55.9    Anxiety F41.9    Hypercholesteremia E78.00    Cellulitis of right leg L03.115    MRSA (methicillin resistant staph aureus) culture positive Z22.322 Breakthrough seizure ([facility] G40.919    Memory loss R41.3    Difficulty speaking R47.9    Seizure disorder ([facility] G40.909    Dementia with behavioral disturbance F03.918    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    Dementia due to Parkinson's disease with behavioral disturbance ([facility] G20, F02.818    DVT of deep femoral vein, left (HCC) I82.412    Closed compression fracture of L1 lumbar vertebra, initial encounter ([facility] S32.010A    Depression F32. A    Kidney stone N20.0    Urinary incontinence R32    Overactive bladder N32.81    Closed head injury S09.90XA    Community acquired pneumonia J18.9    Sepsis ([facility] A41.9    COPD exacerbation ([facility] J44.1    Hypokalemia E87.6    Bacterial UTI N39.0, A49.9    Urinary tract infection due to Proteus N39.0, B96.4    ESBL (extended spectrum beta-lactamase) producing bacteria infection A49.9, Z16.12    Acute cystitis with hematuria N30.01    Leukocytosis D72.829    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    Chest pain R07.9    Encounter for palliative care E40.8    Complicated UTI (urinary tract infection) N39.0    Ureteral stent present Z96.0       Isolation/Infection:   Isolation            Contact          Patient Infection Status       Infection Onset Added Last Indicated Last Indicated By Review Planned Expiration Resolved Resolved By    VRE 11/28/22 11/30/22 11/28/22 Culture, Urine        ESBL (Extended Spectrum Beta Lactamase) 10/03/22 10/05/22 10/24/22 Culture, Urine        MRSA  12/16/13 12/16/13 Nestor Oviedo RN        Resolved    COVID-19 (Rule Out) 10/04/22 10/04/22 10/04/22 Respiratory Panel, Molecular, with COVID-19 (Restricted: peds pts or suitable admitted adults) (Ordered)   10/04/22 Rule-Out Test Resulted    COVID-19 (Rule Out) 10/03/22 10/03/22 10/03/22 COVID-19, Rapid (Ordered)   10/03/22 Rule-Out Test Resulted            Nurse Assessment:  Last Vital Signs: /61   Pulse 79   Temp 97.5 °F (36.4 °C)   Resp 17   Ht 5' 7\" (1.702 m)   Wt 132 lb (59.9 kg)   SpO2 94% BMI 20.67 kg/m²     Last documented pain score (0-10 scale):    Last Weight:   Wt Readings from Last 1 Encounters:   22 132 lb (59.9 kg)     Mental Status:  {IP PT MENTAL STATUS:}    IV Access:  { HAL IV ACCESS:040299092}    Nursing Mobility/ADLs:  Walking   {CHP DME BZRQ:275470909}  Transfer  {CHP DME MZBX:982740573}  Bathing  {CHP DME TFXF:722941232}  Dressing  {CHP DME DSPX:499486114}  Toileting  {CHP DME ADQK:085210428}  Feeding  {CHP DME VZMV:129346678}  Med Admin  {CHP DME HPWA:803800065}  Med Delivery   { HAL MED Delivery:718619239}    Wound Care Documentation and Therapy:        Elimination:  Continence: Bowel: {YES / GR:20301}  Bladder: {YES / YA:44193}  Urinary Catheter: {Urinary Catheter:792620482}   Colostomy/Ileostomy/Ileal Conduit: {YES / FB:35982}       Date of Last BM: ***    Intake/Output Summary (Last 24 hours) at 2022 1528  Last data filed at 2022 0920  Gross per 24 hour   Intake 500 ml   Output 900 ml   Net -400 ml     I/O last 3 completed shifts:   In: 3700 [I.V.:3700]  Out: 65 [Urine:1700]    Safety Concerns:     [facility] [employer] [facility] Safety Concerns:819811704}    Impairments/Disabilities:      [facility] [employer] Impairments/Disabilities:185606517}    Nutrition Therapy:  Current Nutrition Therapy:   [facility] [employer] Diet List:551648497}    Routes of Feeding: {P DME Other Feedings:943653395}  Liquids: {Slp liquid thickness:77604}  Daily Fluid Restriction: {CHP DME Yes amt example:602765891}  Last Modified Barium Swallow with Video (Video Swallowing Test): {Done Not Done KTBU:013175318}    Treatments at the Time of Hospital Discharge:   Respiratory Treatments: ***  Oxygen Therapy:  {Therapy; copd oxygen:34098}  Ventilator:    { CC Vent ZVFT:648346559}    Rehab Therapies: {THERAPEUTIC INTERVENTION:8904349301}  Weight Bearing Status/Restrictions: {[facility] Weight Bearin}  Other Medical Equipment (for information only, NOT a DME order):  {EQUIPMENT:399124617}  Other Treatments: ***    Patient's personal belongings (please select all that are sent with patient):  {CHP DME Belongings:199416340}    RN SIGNATURE:  {Esignature:720481289}    CASE MANAGEMENT/SOCIAL WORK SECTION    Inpatient Status Date: ***    Readmission Risk Assessment Score:  Readmission Risk              Risk of Unplanned Readmission:  23           Discharging to Facility/ Agency   Name:   Address:  Phone:  Fax:    Dialysis Facility (if applicable)   Name:  Address:  Dialysis Schedule:  Phone:  Fax:    / signature: {Esignature:614762885}    PHYSICIAN SECTION    Prognosis: {Prognosis:9770023273}    Condition at Discharge: [place] [place] [address] Patient Condition:125638725}    Rehab Potential (if transferring to Rehab): {Prognosis:8777429147}    Recommended Labs or Other Treatments After Discharge: ***    Physician Certification: I certify the above information and transfer of Caryl Fields  is necessary for the continuing treatment of the diagnosis listed and that she requires {Admit to Appropriate Level of Care:95144} for {GREATER/LESS:037029806} 30 days.      Update Admission H&P: {CHP DME Changes in MFQBF:291697019}    PHYSICIAN SIGNATURE:  Electronically signed by Nai Miranda MD on 12/2/22 at 3:28 PM EST

## 2022-12-02 NOTE — PROGRESS NOTES
[address] [facility]     Progress note          Date:   12/1/22  Patient name:  Hannah Ledbetter  Date of admission:  11/28/2022  6:17 PM  MRN:   [id number]  Account:  [de-identified]  YOB: 1945  PCP:    Abigail Ennis DO  Room:   2060/2060-01  Code Status:    DNR-CCA    Chief Complaint:     Chief Complaint   Patient presents with    Hematuria       History Obtained From:     patient, family member -daughter, electronic medical record    History of Present Illness: The patient is a 76 y.o. Non- / non  female who presents withHematuria   and she is admitted to the hospital for the management of hematuria. Audra Funes is a 76year old female with known history of dementia , multiple falls, atrial fibrillation, not on anticoagulation, smoker , seizure disorder, Parkinson disease presented with chief complaints of hematuria. Patient was brought to the hospital from home by her daughter when she had episodes of hematuria associated with urinary incontinence. Patient started developing urinary incontinence years ago and became completely incontinent a year ago. Ever since patient became incontinent patient has been having episodes of UTI and is her third episode of UTI in the past 1 month. In October patient developed an episode of complicated UTI which is multidrug-resistant ESBL treated with meropenem. Patient also has history of kidney stones. Patient denies any episodes of fevers or chills or dysuria however it is difficult to ascertain given patient's history of dementia and Parkinson's disease. UA showed moderate bacteremia, large leukocyte esterase, large urine hemoglobin. Urine cultures pending given patient's history of ESBL patient was started on IV meropenem.       Past Medical History:     Past Medical History: Diagnosis Date    Anxiety     Convulsion ([facility]     Dementia ([facility]     Encephalopathy     Herpes     Hyperlipidemia     Left bundle branch block     MRSA (methicillin resistant Staphylococcus aureus)     Parkinson's disease ([facility]     Seizures ([facility]     secondary to encephalitis    Vitamin D deficiency         Past SurgicalHistory:     Past Surgical History:   Procedure Laterality Date    ABSCESS DRAINAGE Right 12/14/2013    WOUND EXPLORATION AND I+D  RIGHT HIP    CYSTOSCOPY Left 5/16/2022    CYSTOSCOPY URETERAL STENT INSERTION performed by Essence Jay MD at [address] Left 12/2/2022    ATTEMPTED CYSTOSCOPY WITH LEFT STENT EXCHANGE performed by Essence Jay MD at [address] Left 05/16/2022    Cystoscopy        Medications Prior to Admission:        Prior to Admission medications    Medication Sig Start Date End Date Taking?  Authorizing Provider   carBAMazepine (TEGRETOL) 200 MG tablet Take 300 mg by mouth daily Takes along with 400 mg at hs   Yes Historical Provider, MD   potassium chloride (KLOR-CON M) 20 MEQ extended release tablet TAKE 1 TABLET BY MOUTH EVERY DAY 10/21/22   Historical Provider, MD   fludrocortisone (FLORINEF) 0.1 MG tablet TAKE 1 TABLET BY MOUTH EVERY DAY  Patient taking differently: Take 0.1 mg by mouth daily 11/3/22   Sravani Wu DO   carBAMazepine (TEGRETOL) 200 MG tablet 1.5 tablets po q am and 2 po q pm  Patient taking differently: 400 mg 2 po q pm 8/23/22   Sravani Wu DO   FLUoxetine (PROZAC) 20 MG capsule 2 po qd  Patient taking differently: Take 40 mg by mouth daily 8/22/22   Sravani Wu DO   mirabegron (MYRBETRIQ) 50 MG TB24 Take 50 mg by mouth daily 8/22/22   Sravani Wu DO   melatonin 5 MG TABS tablet Take 1 tablet by mouth nightly as needed (sleep) 5/19/22 5/29/22  Kyleigh Perez MD   aspirin 81 MG EC tablet Take 81 mg by mouth daily    Historical Provider, MD        Allergies:     Adhesive tape and Haldol [haloperidol lactate]    Social History:     Tobacco:    reports that she quit smoking about 23 years ago. Her smoking use included cigarettes. She started smoking about 62 years ago. She has a 30.00 pack-year smoking history. She has never used smokeless tobacco.  Alcohol:      reports no history of alcohol use. Drug Use:  reports no history of drug use. Family History:     History reviewed. No pertinent family history. Review of Systems:     Positive and Negative as described in HPI. Review of Systems   Constitutional:  Negative for activity change, appetite change and fever. HENT:  Negative for congestion. Respiratory:  Negative for cough, chest tightness, shortness of breath and wheezing. Cardiovascular:  Negative for chest pain, palpitations and leg swelling. Gastrointestinal:  Negative for abdominal pain, blood in stool, constipation, diarrhea, nausea and vomiting. Genitourinary:  Positive for difficulty urinating. Negative for dysuria, flank pain and frequency. Musculoskeletal:  Negative for arthralgias and back pain. Neurological:  Negative for dizziness, light-headedness and headaches. Psychiatric/Behavioral:  Negative for behavioral problems, confusion and decreased concentration. Physical Exam:   /61   Pulse 79   Temp 97.5 °F (36.4 °C)   Resp 17   Ht 5' 7\" (1.702 m)   Wt 132 lb (59.9 kg)   SpO2 94%   BMI 20.67 kg/m²   Temp (24hrs), Av.8 °F (36.6 °C), Min:97.3 °F (36.3 °C), Max:98.1 °F (36.7 °C)    No results for input(s): POCGLU in the last 72 hours. Intake/Output Summary (Last 24 hours) at 2022 1524  Last data filed at 2022 0920  Gross per 24 hour   Intake 500 ml   Output 900 ml   Net -400 ml         Physical Exam  Constitutional:       General: She is not in acute distress. Appearance: Normal appearance. She is not ill-appearing. HENT:      Head: Normocephalic and atraumatic. Cardiovascular:      Rate and Rhythm: Normal rate.       Pulses: Normal pulses. Pulmonary:      Effort: Pulmonary effort is normal. No respiratory distress. Breath sounds: No wheezing. Abdominal:      General: Abdomen is flat. There is no distension. Palpations: Abdomen is soft. Tenderness: There is no abdominal tenderness. There is no guarding. Musculoskeletal:      Right lower leg: No edema. Left lower leg: No edema. Neurological:      Mental Status: She is alert. Mental status is at baseline. Psychiatric:         Mood and Affect: Mood normal.       Investigations:     Laboratory Testing:  No results found for this or any previous visit (from the past 24 hour(s)). Imaging/Diagnostics:  XR CHEST PORTABLE    Result Date: 11/9/2022  EXAMINATION: ONE XRAY VIEW OF THE CHEST 11/9/2022 1:51 pm COMPARISON: Portable frontal view of the chest October 24, 2022. HISTORY: ORDERING SYSTEM PROVIDED HISTORY: Chest Pain TECHNOLOGIST PROVIDED HISTORY: Chest Pain FINDINGS: The heart is unchanged in size and is within limits of normal for size. Some calcification is present within the aorta. Tortuous brachiocephalic vasculature. Mild rotatory scoliosis of the cervicothoracic spine and some asymmetry of the thoracic cage. No evidence of pneumothorax, pleural effusion, infiltrate, or abnormal lung mass. Central pulmonary vascularity appears normal.     Senescent changes compatible with the age of the patient. Findings appear stable when compared to the previous study.        Assessment :      Primary Problem  Complicated UTI (urinary tract infection)    Active Hospital Problems    Diagnosis Date Noted    Ureteral stent present [Z96.0] 11/29/2022     Priority: Medium    Complicated UTI (urinary tract infection) [N39.0] 11/28/2022     Priority: Medium    Acute cystitis with hematuria [N30.01] 10/08/2022     Priority: Medium    Hypokalemia [E87.6] 10/04/2022     Priority: Medium    Urinary incontinence [R32] 05/17/2022     Priority: Medium    Dementia due to Parkinson's disease with behavioral disturbance ([facility] [G20, F02.818] 08/15/2020    Seizure ([facility] [R56.9] 08/24/2012       Plan:     Patient status Admit as inpatient in the  Med/Surge    Principal Problem:    Complicated UTI (urinary tract infection)  Active Problems:    Urinary incontinence    Hypokalemia    Acute cystitis with hematuria    Ureteral stent present    Seizure ([facility]    Dementia due to Parkinson's disease with behavioral disturbance ([facility]  Resolved Problems:    * No resolved hospital problems. *      11/51/39  -Complicated UTI: Given patient's history of ureteral stent, mild hyponasal process, kidney stones and 3 UTIs in the past 1 month complicated with ESBL patient started on meropenem. Waiting for urinary cultures. We will consult infectious diseases given patient has history of ESBL and 3 UTIs this past 1 month. We will consult urology for complete urinary incontinence.  -Hypokalemia: Potassium 3.3.  40 mEq replaced this morning. Started on 20 mEq daily. Consultations:   IP CONSULT TO INTERNAL MEDICINE  IP CONSULT TO UROLOGY     Patient is admitted as inpatient status because of co-morbiditieslisted above, severity of signs and symptoms as outlined, requirement for current medical therapies and most importantly because of direct risk to patient if care not provided in a hospital setting. Condsult urology   Hx esbl on meropenem       11/30/22    Confused , dementia . Seen by dr Correia Titus    Stent replacement planned for Friday    Afebrile    Continue rx  On meropenem  Medications: Allergies:     Allergies   Allergen Reactions    Adhesive Tape     Haldol [Haloperidol Lactate]        Current Meds:   Scheduled Meds:    linezolid  600 mg IntraVENous Q12H    aspirin  81 mg Oral Daily    carBAMazepine  300 mg Oral Daily    carBAMazepine  400 mg Oral Nightly    FLUoxetine  40 mg Oral Daily    trospium  20 mg Oral BID AC    potassium chloride  20 mEq Oral Daily    fludrocortisone  100 mcg Oral Daily sodium chloride flush  5-40 mL IntraVENous 2 times per day    enoxaparin  40 mg SubCUTAneous Daily     Continuous Infusions:    sodium chloride      sodium chloride 75 mL/hr at 12/02/22 1413     PRN Meds: melatonin, sodium chloride flush, sodium chloride, ondansetron **OR** ondansetron, magnesium hydroxide, acetaminophen **OR** acetaminophen          12/1/22    Cysto mtomorrow . improving            MD DEMARCO [address]    Phone [phone]   Fax: [phone]  Answering Service: [phone]

## 2022-12-02 NOTE — PROGRESS NOTES
76year-old female with retained left ureteral stent. I was unable to remove the stent today. Continue antibiotics. Will arrange for ESWL of encrusted stent and ureteroscopy in 1-2 weeks as outpatient.

## 2022-12-02 NOTE — BRIEF OP NOTE
Brief Postoperative Note      Patient: Nancy Reaves  YOB: 1945  MRN: [id number]    Date of Procedure: 12/2/2022    Pre-Op Diagnosis: ACUTE CYSTITIS W/ HEMATURIA  COMPLICATED UTI         IN PATIENT ROOM 2060    Post-Op Diagnosis: Same       Procedure(s):  CYSTOSCOPY WITH LEFT STENT EXCHANGE    Surgeon(s):  Christiano Murray MD    Assistant:  * No surgical staff found *    Anesthesia: General    Estimated Blood Loss (mL): Minimal    Complications: None    Specimens:   * No specimens in log *    Implants:  * No implants in log *      Drains:   External Urinary Catheter (Active)   Site Assessment Clean,dry & intact 12/02/22 0546   Placement Replaced 12/02/22 0546   Securement Method Other (Comment) 12/01/22 1852   Suction 40 mmgHg continuous 12/01/22 1852   Urine Color Yellow 12/02/22 0546   Urine Appearance Clear 12/02/22 0546   Output (mL) 900 mL 12/02/22 0546       Findings: unable to change stent due to proximal encrustation.      Electronically signed by Christiano Murray MD on 12/2/2022 at 8:34 AM

## 2022-12-02 NOTE — ANESTHESIA POSTPROCEDURE EVALUATION
Department of Anesthesiology  Postprocedure Note    Patient: Kathy Ward  MRN: [id number]  YOB: 1945  Date of evaluation: 12/2/2022      Procedure Summary     Date: 12/02/22 Room / Location: [address] [place] [address]: YOSEF LITTLEJOHN    Anesthesia Start: [facility] Anesthesia Stop: 5020    Procedure: ATTEMPTED CYSTOSCOPY WITH LEFT STENT EXCHANGE (Left: Bladder) Diagnosis:       Acute cystitis with hematuria      Complicated urinary tract infection      (ACUTE CYSTITIS W/ HEMATURIA)      (COMPLICATED UTI       )      (IN PATIENT ROOM 2060)    Surgeons: Janna Robin MD Responsible Provider: Ethan Dunbar MD    Anesthesia Type: General ASA Status: 3          Anesthesia Type: General    Betty Phase I: Betty Score: 10    Betty Phase II:        Anesthesia Post Evaluation    Comments: POST- ANESTHESIA EVALUATION       Pt Name: Kathy Ward  MRN: [id number]  [place]: 1945  Date of evaluation: 12/2/2022  Time:  9:30 AM      BP (!) 122/59   Pulse 82   Temp 98.1 °F (36.7 °C)   Resp 17   Ht 5' 7\" (1.702 m)   Wt 132 lb (59.9 kg)   SpO2 96%   BMI 20.67 kg/m²      Consciousness Level  Awake  Cardiopulmonary Status  Stable  Pain Adequately Treated YES  Nausea / Vomiting  NO  Adequate Hydration  YES  Anesthesia Related Complications NONE      Electronically signed by Ethan Dunbar MD on 12/2/2022 at 9:30 AM

## 2022-12-02 NOTE — PLAN OF CARE
Problem: Discharge Planning  Goal: Discharge to home or other facility with appropriate resources  12/2/2022 1809 by Jen Epps RN  Outcome: Completed     Problem: Safety - Adult  Goal: Free from fall injury  12/2/2022 1809 by Jen Epps RN  Outcome: Completed     Problem: ABCDS Injury Assessment  Goal: Absence of physical injury  12/2/2022 1809 by Jen Epps RN  Outcome: Completed     Problem: Skin/Tissue Integrity  Goal: Absence of new skin breakdown  Description: 1. Monitor for areas of redness and/or skin breakdown  2. Assess vascular access sites hourly  3. Every 4-6 hours minimum:  Change oxygen saturation probe site  4. Every 4-6 hours:  If on nasal continuous positive airway pressure, respiratory therapy assess nares and determine need for appliance change or resting period.   12/2/2022 1809 by Jen Epps RN  Outcome: Completed

## 2022-12-02 NOTE — DISCHARGE SUMMARY
[place] [address] Internal Medicine    Discharge Summary     Patient ID: Mike Quan  :  1945   MRN: [id number]     ACCOUNT:  [de-identified]   Patient's PCP: Trina Landeros DO  Admit Date: 2022   Discharge Date: 2022    Length of Stay: 4  Code Status:  DNR-CCA  Admitting Physician: Don Moreno MD  Discharge Physician: Don Moreno MD     Active Discharge Diagnoses:     Primary Problem  Complicated UTI (urinary tract infection)      [facility] Problems    Diagnosis Date Noted    Ureteral stent present [Z96.0] 2022     Priority: Medium    Complicated UTI (urinary tract infection) [N39.0] 2022     Priority: Medium    Acute cystitis with hematuria [N30.01] 10/08/2022     Priority: Medium    Hypokalemia [E87.6] 10/04/2022     Priority: Medium    Urinary incontinence [R32] 2022     Priority: Medium    Dementia due to Parkinson's disease with behavioral disturbance ([facility] [G20, F02.818] 08/15/2020    Seizure ([facility] [R56.9] 2012       Admission Condition:  fair     Discharged Condition: fair    Hospital Stay:     Hospital Course:  Mike Quan is a 76 y.o. female who was admitted for the management of Complicated UTI (urinary tract infection) , presented to ER with Hematuria    The patient is a 76 y.o. Non- / non  female who presents withHematuria   and she is admitted to the hospital for the management of hematuria. Ivy Mohan is a 76year old female with known history of dementia , multiple falls, atrial fibrillation, not on anticoagulation, smoker , seizure disorder, Parkinson disease presented with chief complaints of hematuria. Plan for cysto and stent change tomorrow. NPO after MN. ABx per ID.    Definitive stone therapy in 2 weeks             Significant therapeutic interventions:     Significant Diagnostic Studies:   Labs / Micro:        ,     Radiology:    XR CHEST PORTABLE    Result Date: 11/9/2022  EXAMINATION: ONE XRAY VIEW OF THE CHEST 11/9/2022 1:51 pm COMPARISON: Portable frontal view of the chest October 24, 2022. HISTORY: ORDERING SYSTEM PROVIDED HISTORY: Chest Pain TECHNOLOGIST PROVIDED HISTORY: Chest Pain FINDINGS: The heart is unchanged in size and is within limits of normal for size. Some calcification is present within the aorta. Tortuous brachiocephalic vasculature. Mild rotatory scoliosis of the cervicothoracic spine and some asymmetry of the thoracic cage. No evidence of pneumothorax, pleural effusion, infiltrate, or abnormal lung mass. Central pulmonary vascularity appears normal.     Senescent changes compatible with the age of the patient. Findings appear stable when compared to the previous study. FLUORO FOR SURGICAL PROCEDURES    Result Date: 12/2/2022  Radiology exam is complete. No Radiologist dictation. Please follow up with ordering provider. Consultations:    Consults:     Final Specialist Recommendations/Findings:   IP CONSULT TO INTERNAL MEDICINE  IP CONSULT TO UROLOGY      The patient was seen and examined on day of discharge and this discharge summary is in conjunction with any daily progress note from day of discharge. Discharge plan:     Disposition: Home    Physician Follow Up:     No follow-up provider specified. Requiring Further Evaluation/Follow Up POST HOSPITALIZATION/Incidental Findings:    Diet: cardiac diet    Activity: As tolerated    Instructions to Patient:     Discharge Medications:      Medication List        START taking these medications      linezolid 600 MG tablet  Commonly known as: Zyvox  Take 1 tablet by mouth 2 times daily for 3 days            CHANGE how you take these medications      * carBAMazepine 200 MG tablet  Commonly known as: TEGRETOL  What changed: Another medication with the same name was changed. Make sure you understand how and when to take each.      * carBAMazepine 200 MG tablet  Commonly known as: TEGretol  1.5 tablets po q am and 2 po q pm  What changed:   how much to take  additional instructions     fludrocortisone 0.1 MG tablet  Commonly known as: FLORINEF  TAKE 1 TABLET BY MOUTH EVERY DAY  What changed: See the new instructions. FLUoxetine 20 MG capsule  Commonly known as: PROZAC  2 po qd  What changed:   how much to take  how to take this  when to take this  additional instructions           * This list has 2 medication(s) that are the same as other medications prescribed for you. Read the directions carefully, and ask your doctor or other care provider to review them with you. CONTINUE taking these medications      aspirin 81 MG EC tablet     melatonin 5 MG Tabs tablet  Take 1 tablet by mouth nightly as needed (sleep)     mirabegron 50 MG Tb24  Commonly known as: Myrbetriq  Take 50 mg by mouth daily     potassium chloride 20 MEQ extended release tablet  Commonly known as: KLOR-CON M               Where to Get Your Medications        These medications were sent to [address] [place] [address] [phone] -  [phone]  [address] [place] [address]      Phone: [phone]   linezolid 600 MG tablet         Time Spent on discharge is  35 mins in patient examination, evaluation, counseling as well as medication reconciliation, prescriptions for required medications, discharge plan and follow up. Electronically signed by   Margarita Islas MD  12/2/2022  3:30 PM      Thank you Dr. Cathy Love DO for the opportunity to be involved in this patient's care.

## 2022-12-02 NOTE — PROGRESS NOTES
[address]      [address] [facility]     Progress note          Date:   12/2/2022    Patient name:  Caryl Fields  Date of admission:  11/28/2022  6:17 PM  MRN:   [id number]  Account:  [de-identified]  YOB: 1945  PCP:    Juliette Lopez DO  Room:   2060/2060-01  Code Status:    DNR-CCA    Chief Complaint:     Chief Complaint   Patient presents with    Hematuria       History Obtained From:     patient, family member -daughter, electronic medical record    History of Present Illness: The patient is a 76 y.o. Non- / non  female who presents withHematuria   and she is admitted to the hospital for the management of hematuria. Kanika Barrera is a 76year old female with known history of dementia , multiple falls, atrial fibrillation, not on anticoagulation, smoker , seizure disorder, Parkinson disease presented with chief complaints of hematuria. Patient was brought to the hospital from home by her daughter when she had episodes of hematuria associated with urinary incontinence. Patient started developing urinary incontinence years ago and became completely incontinent a year ago. Ever since patient became incontinent patient has been having episodes of UTI and is her third episode of UTI in the past 1 month. In October patient developed an episode of complicated UTI which is multidrug-resistant ESBL treated with meropenem. Patient also has history of kidney stones. Patient denies any episodes of fevers or chills or dysuria however it is difficult to ascertain given patient's history of dementia and Parkinson's disease. UA showed moderate bacteremia, large leukocyte esterase, large urine hemoglobin. Urine cultures pending given patient's history of ESBL patient was started on IV meropenem.       Past Medical History:     Past Medical History:   Diagnosis Date    Anxiety     Convulsion (HCC)     Dementia (HCC)     Encephalopathy     Herpes     Hyperlipidemia     Left bundle branch block     MRSA (methicillin resistant Staphylococcus aureus)     Parkinson's disease ([address] [facility]     Seizures ([address]     secondary to encephalitis    Vitamin D deficiency         Past SurgicalHistory:     Past Surgical History:   Procedure Laterality Date    ABSCESS DRAINAGE Right 12/14/2013    WOUND EXPLORATION AND I+D  RIGHT HIP    CYSTOSCOPY Left 5/16/2022    CYSTOSCOPY URETERAL STENT INSERTION performed by Edilberto Gómez MD at [address] Left 12/2/2022    ATTEMPTED CYSTOSCOPY WITH LEFT STENT EXCHANGE performed by Edilberto Gómez MD at [address] Left 05/16/2022    Cystoscopy        Medications Prior to Admission:        Prior to Admission medications    Medication Sig Start Date End Date Taking?  Authorizing Provider   carBAMazepine (TEGRETOL) 200 MG tablet Take 300 mg by mouth daily Takes along with 400 mg at hs   Yes Historical Provider, MD   potassium chloride (KLOR-CON M) 20 MEQ extended release tablet TAKE 1 TABLET BY MOUTH EVERY DAY 10/21/22   Historical Provider, MD   fludrocortisone (FLORINEF) 0.1 MG tablet TAKE 1 TABLET BY MOUTH EVERY DAY  Patient taking differently: Take 0.1 mg by mouth daily 11/3/22   Sravani Wu DO   carBAMazepine (TEGRETOL) 200 MG tablet 1.5 tablets po q am and 2 po q pm  Patient taking differently: 400 mg 2 po q pm 8/23/22   Sravani Wu DO   FLUoxetine (PROZAC) 20 MG capsule 2 po qd  Patient taking differently: Take 40 mg by mouth daily 8/22/22   Sravani Wu DO   mirabegron (MYRBETRIQ) 50 MG TB24 Take 50 mg by mouth daily 8/22/22   Sravani Wu DO   melatonin 5 MG TABS tablet Take 1 tablet by mouth nightly as needed (sleep) 5/19/22 5/29/22  Eloy Torres MD   aspirin 81 MG EC tablet Take 81 mg by mouth daily    Historical Provider, MD        Allergies:     Adhesive tape and Haldol [haloperidol lactate]    Social History:     Tobacco:    reports that she quit smoking about 23 years ago. Her smoking use included cigarettes. She started smoking about 62 years ago. She has a 30.00 pack-year smoking history. She has never used smokeless tobacco.  Alcohol:      reports no history of alcohol use. Drug Use:  reports no history of drug use. Family History:     History reviewed. No pertinent family history. Review of Systems:     Positive and Negative as described in HPI. Review of Systems   Constitutional:  Negative for activity change, appetite change and fever. HENT:  Negative for congestion. Respiratory:  Negative for cough, chest tightness, shortness of breath and wheezing. Cardiovascular:  Negative for chest pain, palpitations and leg swelling. Gastrointestinal:  Negative for abdominal pain, blood in stool, constipation, diarrhea, nausea and vomiting. Genitourinary:  Positive for difficulty urinating. Negative for dysuria, flank pain and frequency. Musculoskeletal:  Negative for arthralgias and back pain. Neurological:  Negative for dizziness, light-headedness and headaches. Psychiatric/Behavioral:  Negative for behavioral problems, confusion and decreased concentration. Physical Exam:   /61   Pulse 79   Temp 97.5 °F (36.4 °C)   Resp 17   Ht 5' 7\" (1.702 m)   Wt 132 lb (59.9 kg)   SpO2 94%   BMI 20.67 kg/m²   Temp (24hrs), Av.8 °F (36.6 °C), Min:97.3 °F (36.3 °C), Max:98.1 °F (36.7 °C)    No results for input(s): POCGLU in the last 72 hours. Intake/Output Summary (Last 24 hours) at 2022 1526  Last data filed at 2022 0920  Gross per 24 hour   Intake 500 ml   Output 900 ml   Net -400 ml         Physical Exam  Constitutional:       General: She is not in acute distress. Appearance: Normal appearance. She is not ill-appearing. HENT:      Head: Normocephalic and atraumatic. Cardiovascular:      Rate and Rhythm: Normal rate. Pulses: Normal pulses. Pulmonary:      Effort: Pulmonary effort is normal. No respiratory distress. Breath sounds: No wheezing. Abdominal:      General: Abdomen is flat. There is no distension. Palpations: Abdomen is soft. Tenderness: There is no abdominal tenderness. There is no guarding. Musculoskeletal:      Right lower leg: No edema. Left lower leg: No edema. Neurological:      Mental Status: She is alert. Mental status is at baseline. Psychiatric:         Mood and Affect: Mood normal.       Investigations:     Laboratory Testing:  No results found for this or any previous visit (from the past 24 hour(s)). Imaging/Diagnostics:  XR CHEST PORTABLE    Result Date: 11/9/2022  EXAMINATION: ONE XRAY VIEW OF THE CHEST 11/9/2022 1:51 pm COMPARISON: Portable frontal view of the chest October 24, 2022. HISTORY: ORDERING SYSTEM PROVIDED HISTORY: Chest Pain TECHNOLOGIST PROVIDED HISTORY: Chest Pain FINDINGS: The heart is unchanged in size and is within limits of normal for size. Some calcification is present within the aorta. Tortuous brachiocephalic vasculature. Mild rotatory scoliosis of the cervicothoracic spine and some asymmetry of the thoracic cage. No evidence of pneumothorax, pleural effusion, infiltrate, or abnormal lung mass. Central pulmonary vascularity appears normal.     Senescent changes compatible with the age of the patient. Findings appear stable when compared to the previous study.        Assessment :      Primary Problem  Complicated UTI (urinary tract infection)    Active Hospital Problems    Diagnosis Date Noted    Ureteral stent present [Z96.0] 11/29/2022     Priority: Medium    Complicated UTI (urinary tract infection) [N39.0] 11/28/2022     Priority: Medium    Acute cystitis with hematuria [N30.01] 10/08/2022     Priority: Medium    Hypokalemia [E87.6] 10/04/2022     Priority: Medium    Urinary incontinence [R32] 05/17/2022     Priority: Medium    Dementia due to Parkinson's disease with behavioral disturbance ([facility] [G20, F02.818] 08/15/2020    Seizure ([facility] [R56.9] 08/24/2012       Plan:     Patient status Admit as inpatient in the  Med/Surge    Principal Problem:    Complicated UTI (urinary tract infection)  Active Problems:    Urinary incontinence    Hypokalemia    Acute cystitis with hematuria    Ureteral stent present    Seizure ([facility]    Dementia due to Parkinson's disease with behavioral disturbance ([facility]  Resolved Problems:    * No resolved hospital problems. *      09/77/06  -Complicated UTI: Given patient's history of ureteral stent, mild hyponasal process, kidney stones and 3 UTIs in the past 1 month complicated with ESBL patient started on meropenem. Waiting for urinary cultures. We will consult infectious diseases given patient has history of ESBL and 3 UTIs this past 1 month. We will consult urology for complete urinary incontinence.  -Hypokalemia: Potassium 3.3.  40 mEq replaced this morning. Started on 20 mEq daily. Consultations:   IP CONSULT TO INTERNAL MEDICINE  IP CONSULT TO UROLOGY     Patient is admitted as inpatient status because of co-morbiditieslisted above, severity of signs and symptoms as outlined, requirement for current medical therapies and most importantly because of direct risk to patient if care not provided in a hospital setting. Condsult urology   Hx esbl on meropenem       11/30/22    Confused , dementia . Seen by dr Tavarez Courser    Stent replacement planned for Friday    Afebrile    Continue rx  On meropenem  Medications: Allergies:     Allergies   Allergen Reactions    Adhesive Tape     Haldol [Haloperidol Lactate]        Current Meds:   Scheduled Meds:    linezolid  600 mg IntraVENous Q12H    aspirin  81 mg Oral Daily    carBAMazepine  300 mg Oral Daily    carBAMazepine  400 mg Oral Nightly    FLUoxetine  40 mg Oral Daily    trospium  20 mg Oral BID AC    potassium chloride  20 mEq Oral Daily    fludrocortisone  100 mcg Oral Daily    sodium chloride flush  5-40 mL IntraVENous 2 times per day    enoxaparin  40 mg SubCUTAneous Daily     Continuous Infusions:    sodium chloride      sodium chloride 75 mL/hr at 12/02/22 1413     PRN Meds: melatonin, sodium chloride flush, sodium chloride, ondansetron **OR** ondansetron, magnesium hydroxide, acetaminophen **OR** acetaminophen          12/1/22    Cysto mtomorrow . improving          152/22    76year-old female with retained left ureteral stent. I was unable to remove the stent today. Continue antibiotics. Will arrange for ESWL of encrusted stent and ureteroscopy in 1-2 weeks as outpatient. Micheline Shabazz MD      [address], [address] [place] [address].    Phone [phone]   Fax: [phone]  Answering Service: [phone]

## 2022-12-05 ENCOUNTER — CARE COORDINATION (OUTPATIENT)
Dept: CASE MANAGEMENT | Age: 77
End: 2022-12-05

## 2022-12-05 DIAGNOSIS — N39.0 COMPLICATED UTI (URINARY TRACT INFECTION): Primary | ICD-10-CM

## 2022-12-05 PROCEDURE — 1111F DSCHRG MED/CURRENT MED MERGE: CPT | Performed by: FAMILY MEDICINE

## 2022-12-05 NOTE — CARE COORDINATION
[facility] Care Transitions Initial Follow Up Call    Call within 2 business days of discharge: Yes    Care Transition Nurse contacted the family by telephone to perform post hospital discharge assessment. Verified name and  with family as identifiers. Provided introduction to self, and explanation of the Care Transition Nurse role. Patient: Kasi Chino Patient : 1945   MRN: [id number]  Reason for Admission: Acute cystitis with hematuria  Discharge Date: 22 RARS: Readmission Risk Score: 16.5      Last Discharge  [facility]       Date Complaint Diagnosis Description Type Department Provider    22 Hematuria Acute cystitis with hematuria ED to Hosp-Admission (Discharged) (ADMITTED) [facility] Any Unger MD; Donald Roberson. .. Was this an external facility discharge? No Discharge Facility: [address] [facility] to be reviewed by the provider   Additional needs identified to be addressed with provider: No  none               Method of communication with provider: none. Attempted to contact Mili Church, but her daughter, Luis Miguel Min answered the phone. She stated that her mother was doing well,  Anne Mohan having any fever/chills, increased confusion, N/V and continues with a little hematuria on occasion. She stated that she had all of Yasmine's medications and is holding her Prozac until she is done with the antibiotic. The Pharmacist advised on holding. She spoke with PCP and they do not need to follow up. Mili Church to have outpt cysto. Luis Miguel Min stated that they are planning on moving to [place] permanently. Tentative date is set for , but if cysto can be moved up sooner, they will leave sooner. She had no question or concerns. Care Transition Nurse reviewed discharge instructions with family who verbalized understanding. The family was given an opportunity to ask questions and does not have any further questions or concerns at this time.  Were discharge instructions available to patient? Yes. Reviewed appropriate site of care based on symptoms and resources available to patient including: PCP  Specialist  When to call 911. The family agrees to contact the PCP office for questions related to their healthcare. Advance Care Planning:   Does patient have an Advance Directive: reviewed and current. Medication reconciliation was performed with family, who verbalizes understanding of administration of home medications. Medications reviewed, 1111F entered: yes    Was patient discharged with a pulse oximeter? no    Non-face-to-face services provided:  Obtained and reviewed discharge summary and/or continuity of care documents  Assessment and support for treatment adherence and medication management-[facility]    Offered patient enrollment in the Remote Patient Monitoring (RPM) program for in-home monitoring:  no pt will be moving out of state . Care Transitions 24 Hour Call    Do you have a copy of your discharge instructions?: Yes  Do you have all of your prescriptions and are they filled?: Yes  Have you been contacted by a [employer] [address]?: No  Have you scheduled your follow up appointment?: No  Do you have support at home?: Child  Do you feel like you have everything you need to keep you well at home?: Yes  Are you an active caregiver in your home?: No  Care Transitions Interventions         Follow Up  No future appointments. Care Transition Nurse provided contact information. Plan for follow-up call in 3-5 days based on severity of symptoms and risk factors.   Plan for next call: symptom management-follow up on s/s of UTI, if cysto was moved up    Rhoda Stroud RN

## 2022-12-07 ENCOUNTER — TELEPHONE (OUTPATIENT)
Dept: UROLOGY | Age: 77
End: 2022-12-07

## 2022-12-07 NOTE — OP NOTE
[address]                 [address] [facility], [address]                                OPERATIVE REPORT    PATIENT NAME: Frantz Paredes                   :        1945  MED REC NO:   [id number]                              ROOM:       2060  ACCOUNT NO:   [de-identified]                           ADMIT DATE: 2022  PROVIDER:     Misty Ordaz    DATE OF PROCEDURE:  2022    PREOPERATIVE DIAGNOSIS:  Ureteral stone. POSTOPERATIVE DIAGNOSES:  Ureteral stone and encrusted ureteral stent. OPERATION PERFORMED:  Cystoscopy with attempted left ureteral stent  exchange. SURGEON:  Misty Ordaz MD.    ANESTHESIA:  General.    COMPLICATIONS:  Inability to remove stent. HISTORY OF PRESENT ILLNESS:  The patient is a 66-year-old female who had  an obstructing stones seen over the summer. She had a stent placed at  that time. Unfortunately, she did not follow up for definitive stone  therapy. She is here now with recurrent UTIs and attempted stent  exchange. PROCEDURE IN DETAIL:  The patient was brought back to the operating room  and placed on the operating table in the supine position. Once general  anesthesia was obtained, she was placed in the dorsal lithotomy  position, prepped and draped in the usual sterile fashion. A time-out  was performed. She was properly identified. Antibiotics were  administered. The cystoscope was inserted through the urethra and into  the bladder. Looking at the stent, a large amount of calcifications  were seen in the distal curl. I did grasp the stent and attempt to pull  it down, but unfortunately was unable to pull the stent past the level  of the ureteropelvic junction. Multiple tries were done and it was  unsuccessful. I did not want to risk any damage to the ureter, so at  that point, the procedure was completed.   She awoke from anesthesia  without complications and was taken back to postoperative anesthesia  care in good condition. She will continue on antibiotics and will need  shockwave lithotripsy of the proximal encrusted stent as an outpatient  in the near future, followed by ureteroscopy as well to treat any  residual stones in the ureter. I discussed this with her daughter at  length and they were willing to proceed. We will get her scheduled in  the near future.         Shana Amador    D: 12/06/2022 23:11:55       T: 12/06/2022 23:14:10     [id number]  Job#: [id number]     Doc#: [id number]    CC:

## 2022-12-07 NOTE — TELEPHONE ENCOUNTER
Daughter stated light pink urine and bad smell. Writer spoke with daughter and answered surgery questions. Patient is to return to office/ED if there is any fever, nausea, vomiting, unable to urinate, increased pain or if the hematuria increases or worsens. Daughter understands all instruction. Message sent to Dr. Johny Rivas for any further instruction.

## 2022-12-08 ENCOUNTER — CARE COORDINATION (OUTPATIENT)
Dept: CASE MANAGEMENT | Age: 77
End: 2022-12-08

## 2022-12-08 NOTE — CARE COORDINATION
[facility] Care Transitions Follow Up Call    Care Transition Nurse contacted the family by telephone to follow up after admission. Verified name and  with family as identifiers. Patient: Darling Santo  Patient : 1945   MRN: [id number]  Reason for Admission: Cystitis with hematuria  Discharge Date: 22 RARS: Readmission Risk Score: 16.5      Needs to be reviewed by the provider   Additional needs identified to be addressed with provider: No  none             Method of communication with provider: none. Abril-daughter and legal guardian states that her mom is doing OK. The cystoscopy second attempt is scheduled for 22. States a small amount of blood in urine on occasion. [place] has spoken to the surgeon about this and he stated that was to be expected. Addressed changes since last contact:   see note    Follow Up  No future appointments. Care Transition Nurse reviewed medical action plan and red flags with family and discussed any barriers to care and/or understanding of plan of care after discharge. Discussed appropriate site of care based on symptoms and resources available to patient including: PCP  Specialist  When to call 911. The family agrees to contact the PCP office for questions related to their healthcare. Advance Care Planning:   reviewed and current. Patients top risk factors for readmission: medical condition-COPD, Cystitis, memory loss  Interventions to address risk factors:  nurse assessment    Offered patient enrollment in the Remote Patient Monitoring (RPM) program for in-home monitoring:  family declines-moving out of state at the end of the month.  .     Care Transitions Subsequent and Final Call    Subsequent and Final Calls  Do you have any ongoing symptoms?: Yes  Onset of Patient-reported symptoms: Other  Patient-reported symptoms: Other  Interventions for patient-reported symptoms: Other  Have your medications changed?: No  Do you have any questions related to your medications?: No  Do you currently have any active services?: No  Do you have any needs or concerns that I can assist you with?: No  Care Transitions Interventions  Other Interventions:             Care Transition Nurse provided contact information for future needs. Plan for follow-up call in 7-10 days based on severity of symptoms and risk factors.   Plan for next call:  nurse assessment, Cystoscopy, stent removal scheduled for 12/19/22    Linsey Jarrett RN

## 2022-12-10 ENCOUNTER — APPOINTMENT (OUTPATIENT)
Dept: CT IMAGING | Age: 77
End: 2022-12-10
Payer: COMMERCIAL

## 2022-12-10 ENCOUNTER — HOSPITAL ENCOUNTER (OUTPATIENT)
Age: 77
Setting detail: OBSERVATION
Discharge: HOME | End: 2022-12-11
Attending: EMERGENCY MEDICINE | Admitting: EMERGENCY MEDICINE
Payer: COMMERCIAL

## 2022-12-10 DIAGNOSIS — R07.9 CHEST PAIN, UNSPECIFIED TYPE: Primary | ICD-10-CM

## 2022-12-10 LAB
ABSOLUTE EOS #: 0.95 K/UL (ref 0–0.44)
ABSOLUTE IMMATURE GRANULOCYTE: 0.04 K/UL (ref 0–0.3)
ABSOLUTE LYMPH #: 1.97 K/UL (ref 1.1–3.7)
ABSOLUTE MONO #: 1.42 K/UL (ref 0.1–1.2)
ANION GAP SERPL CALCULATED.3IONS-SCNC: 11 MMOL/L (ref 9–17)
BASOPHILS # BLD: 1 % (ref 0–2)
BASOPHILS ABSOLUTE: 0.08 K/UL (ref 0–0.2)
BUN BLDV-MCNC: 16 MG/DL (ref 8–23)
CALCIUM IONIZED: 1.11 MMOL/L (ref 1.13–1.33)
CALCIUM SERPL-MCNC: 8.4 MG/DL (ref 8.6–10.4)
CHLORIDE BLD-SCNC: 94 MMOL/L (ref 98–107)
CO2: 23 MMOL/L (ref 20–31)
CREAT SERPL-MCNC: 0.99 MG/DL (ref 0.5–0.9)
EOSINOPHILS RELATIVE PERCENT: 10 % (ref 1–4)
GFR SERPL CREATININE-BSD FRML MDRD: 58 ML/MIN/1.73M2
GLUCOSE BLD-MCNC: 119 MG/DL (ref 70–99)
HCT VFR BLD CALC: 35.2 % (ref 36.3–47.1)
HEMOGLOBIN: 11.7 G/DL (ref 11.9–15.1)
IMMATURE GRANULOCYTES: 0 %
LYMPHOCYTES # BLD: 22 % (ref 24–43)
MCH RBC QN AUTO: 31.7 PG (ref 25.2–33.5)
MCHC RBC AUTO-ENTMCNC: 33.2 G/DL (ref 28.4–34.8)
MCV RBC AUTO: 95.4 FL (ref 82.6–102.9)
MONOCYTES # BLD: 16 % (ref 3–12)
NRBC AUTOMATED: 0 PER 100 WBC
PDW BLD-RTO: 14.7 % (ref 11.8–14.4)
PLATELET # BLD: 376 K/UL (ref 138–453)
PMV BLD AUTO: 8.2 FL (ref 8.1–13.5)
POTASSIUM SERPL-SCNC: 3.8 MMOL/L (ref 3.7–5.3)
PRO-BNP: 299 PG/ML
RBC # BLD: 3.69 M/UL (ref 3.95–5.11)
RBC # BLD: ABNORMAL 10*6/UL
SEG NEUTROPHILS: 51 % (ref 36–65)
SEGMENTED NEUTROPHILS ABSOLUTE COUNT: 4.64 K/UL (ref 1.5–8.1)
SODIUM BLD-SCNC: 128 MMOL/L (ref 135–144)
TROPONIN, HIGH SENSITIVITY: 39 NG/L (ref 0–14)
TROPONIN, HIGH SENSITIVITY: 41 NG/L (ref 0–14)
WBC # BLD: 9.1 K/UL (ref 3.5–11.3)

## 2022-12-10 PROCEDURE — 6360000004 HC RX CONTRAST MEDICATION: Performed by: EMERGENCY MEDICINE

## 2022-12-10 PROCEDURE — 99285 EMERGENCY DEPT VISIT HI MDM: CPT

## 2022-12-10 PROCEDURE — 93005 ELECTROCARDIOGRAM TRACING: CPT | Performed by: EMERGENCY MEDICINE

## 2022-12-10 PROCEDURE — 74160 CT ABDOMEN W/CONTRAST: CPT

## 2022-12-10 PROCEDURE — 83880 ASSAY OF NATRIURETIC PEPTIDE: CPT

## 2022-12-10 PROCEDURE — 71260 CT THORAX DX C+: CPT | Performed by: EMERGENCY MEDICINE

## 2022-12-10 PROCEDURE — 82330 ASSAY OF CALCIUM: CPT

## 2022-12-10 PROCEDURE — 2580000003 HC RX 258: Performed by: STUDENT IN AN ORGANIZED HEALTH CARE EDUCATION/TRAINING PROGRAM

## 2022-12-10 PROCEDURE — 6360000004 HC RX CONTRAST MEDICATION: Performed by: STUDENT IN AN ORGANIZED HEALTH CARE EDUCATION/TRAINING PROGRAM

## 2022-12-10 PROCEDURE — 85025 COMPLETE CBC W/AUTO DIFF WBC: CPT

## 2022-12-10 PROCEDURE — 84484 ASSAY OF TROPONIN QUANT: CPT

## 2022-12-10 PROCEDURE — 80048 BASIC METABOLIC PNL TOTAL CA: CPT

## 2022-12-10 RX ORDER — 0.9 % SODIUM CHLORIDE 0.9 %
1000 INTRAVENOUS SOLUTION INTRAVENOUS ONCE
Status: COMPLETED | OUTPATIENT
Start: 2022-12-10 | End: 2022-12-11

## 2022-12-10 RX ADMIN — DIATRIZOATE MEGLUMINE AND DIATRIZOATE SODIUM 30 ML: 660; 100 LIQUID ORAL; RECTAL at 22:54

## 2022-12-10 RX ADMIN — SODIUM CHLORIDE 1000 ML: 9 INJECTION, SOLUTION INTRAVENOUS at 21:48

## 2022-12-10 RX ADMIN — IOPAMIDOL 75 ML: 755 INJECTION, SOLUTION INTRAVENOUS at 22:54

## 2022-12-10 ASSESSMENT — PAIN - FUNCTIONAL ASSESSMENT: PAIN_FUNCTIONAL_ASSESSMENT: 0-10

## 2022-12-10 ASSESSMENT — PAIN DESCRIPTION - LOCATION: LOCATION: CHEST

## 2022-12-10 ASSESSMENT — PAIN DESCRIPTION - ORIENTATION: ORIENTATION: LEFT

## 2022-12-11 VITALS
RESPIRATION RATE: 16 BRPM | HEIGHT: 67 IN | OXYGEN SATURATION: 98 % | SYSTOLIC BLOOD PRESSURE: 109 MMHG | WEIGHT: 126 LBS | BODY MASS INDEX: 19.78 KG/M2 | HEART RATE: 88 BPM | TEMPERATURE: 97.9 F | DIASTOLIC BLOOD PRESSURE: 64 MMHG

## 2022-12-11 LAB
REASON FOR REJECTION: NORMAL
SARS-COV-2, RAPID: NOT DETECTED
SPECIMEN DESCRIPTION: NORMAL
TROPONIN, HIGH SENSITIVITY: 36 NG/L (ref 0–14)
ZZ NTE CLEAN UP: ORDERED TEST: NORMAL
ZZ NTE WITH NAME CLEAN UP: SPECIMEN SOURCE: NORMAL

## 2022-12-11 PROCEDURE — 93005 ELECTROCARDIOGRAM TRACING: CPT | Performed by: EMERGENCY MEDICINE

## 2022-12-11 PROCEDURE — 2580000003 HC RX 258: Performed by: STUDENT IN AN ORGANIZED HEALTH CARE EDUCATION/TRAINING PROGRAM

## 2022-12-11 PROCEDURE — G0378 HOSPITAL OBSERVATION PER HR: HCPCS

## 2022-12-11 PROCEDURE — 87635 SARS-COV-2 COVID-19 AMP PRB: CPT

## 2022-12-11 PROCEDURE — 6360000002 HC RX W HCPCS: Performed by: STUDENT IN AN ORGANIZED HEALTH CARE EDUCATION/TRAINING PROGRAM

## 2022-12-11 PROCEDURE — 6370000000 HC RX 637 (ALT 250 FOR IP): Performed by: STUDENT IN AN ORGANIZED HEALTH CARE EDUCATION/TRAINING PROGRAM

## 2022-12-11 PROCEDURE — 96372 THER/PROPH/DIAG INJ SC/IM: CPT

## 2022-12-11 PROCEDURE — 84484 ASSAY OF TROPONIN QUANT: CPT

## 2022-12-11 RX ORDER — FLUOXETINE HYDROCHLORIDE 20 MG/1
40 CAPSULE ORAL DAILY
Status: DISCONTINUED | OUTPATIENT
Start: 2022-12-11 | End: 2022-12-11 | Stop reason: HOSPADM

## 2022-12-11 RX ORDER — POLYETHYLENE GLYCOL 3350 17 G/17G
17 POWDER, FOR SOLUTION ORAL DAILY PRN
Status: DISCONTINUED | OUTPATIENT
Start: 2022-12-11 | End: 2022-12-11 | Stop reason: HOSPADM

## 2022-12-11 RX ORDER — ONDANSETRON 2 MG/ML
4 INJECTION INTRAMUSCULAR; INTRAVENOUS EVERY 4 HOURS PRN
Status: DISCONTINUED | OUTPATIENT
Start: 2022-12-11 | End: 2022-12-11 | Stop reason: HOSPADM

## 2022-12-11 RX ORDER — TROSPIUM CHLORIDE 20 MG/1
20 TABLET, FILM COATED ORAL NIGHTLY
Status: DISCONTINUED | OUTPATIENT
Start: 2022-12-11 | End: 2022-12-11 | Stop reason: HOSPADM

## 2022-12-11 RX ORDER — CARBAMAZEPINE 200 MG/1
400 TABLET ORAL NIGHTLY
Status: DISCONTINUED | OUTPATIENT
Start: 2022-12-11 | End: 2022-12-11 | Stop reason: HOSPADM

## 2022-12-11 RX ORDER — SODIUM CHLORIDE 9 MG/ML
INJECTION, SOLUTION INTRAVENOUS CONTINUOUS
Status: DISCONTINUED | OUTPATIENT
Start: 2022-12-11 | End: 2022-12-11 | Stop reason: HOSPADM

## 2022-12-11 RX ORDER — SODIUM CHLORIDE 9 MG/ML
25 INJECTION, SOLUTION INTRAVENOUS PRN
Status: DISCONTINUED | OUTPATIENT
Start: 2022-12-11 | End: 2022-12-11 | Stop reason: HOSPADM

## 2022-12-11 RX ORDER — ASPIRIN 81 MG/1
81 TABLET ORAL DAILY
Status: DISCONTINUED | OUTPATIENT
Start: 2022-12-11 | End: 2022-12-11 | Stop reason: HOSPADM

## 2022-12-11 RX ORDER — SODIUM CHLORIDE 0.9 % (FLUSH) 0.9 %
5-40 SYRINGE (ML) INJECTION PRN
Status: DISCONTINUED | OUTPATIENT
Start: 2022-12-11 | End: 2022-12-11 | Stop reason: HOSPADM

## 2022-12-11 RX ORDER — CARBAMAZEPINE 200 MG/1
300 TABLET ORAL DAILY
Status: DISCONTINUED | OUTPATIENT
Start: 2022-12-11 | End: 2022-12-11 | Stop reason: HOSPADM

## 2022-12-11 RX ORDER — FLUDROCORTISONE ACETATE 0.1 MG/1
0.1 TABLET ORAL DAILY
Status: DISCONTINUED | OUTPATIENT
Start: 2022-12-11 | End: 2022-12-11 | Stop reason: HOSPADM

## 2022-12-11 RX ORDER — ACETAMINOPHEN 650 MG/1
650 SUPPOSITORY RECTAL EVERY 6 HOURS PRN
Status: DISCONTINUED | OUTPATIENT
Start: 2022-12-11 | End: 2022-12-11 | Stop reason: HOSPADM

## 2022-12-11 RX ORDER — POTASSIUM CHLORIDE 7.45 MG/ML
10 INJECTION INTRAVENOUS PRN
Status: DISCONTINUED | OUTPATIENT
Start: 2022-12-11 | End: 2022-12-11 | Stop reason: HOSPADM

## 2022-12-11 RX ORDER — POTASSIUM CHLORIDE 20 MEQ/1
40 TABLET, EXTENDED RELEASE ORAL PRN
Status: DISCONTINUED | OUTPATIENT
Start: 2022-12-11 | End: 2022-12-11 | Stop reason: HOSPADM

## 2022-12-11 RX ORDER — ENOXAPARIN SODIUM 100 MG/ML
40 INJECTION SUBCUTANEOUS DAILY
Status: DISCONTINUED | OUTPATIENT
Start: 2022-12-11 | End: 2022-12-11 | Stop reason: HOSPADM

## 2022-12-11 RX ORDER — SODIUM CHLORIDE 0.9 % (FLUSH) 0.9 %
5-40 SYRINGE (ML) INJECTION EVERY 12 HOURS SCHEDULED
Status: DISCONTINUED | OUTPATIENT
Start: 2022-12-11 | End: 2022-12-11 | Stop reason: HOSPADM

## 2022-12-11 RX ORDER — ACETAMINOPHEN 325 MG/1
650 TABLET ORAL EVERY 6 HOURS PRN
Status: DISCONTINUED | OUTPATIENT
Start: 2022-12-11 | End: 2022-12-11 | Stop reason: HOSPADM

## 2022-12-11 RX ADMIN — Medication 81 MG: at 08:34

## 2022-12-11 RX ADMIN — SODIUM CHLORIDE: 9 INJECTION, SOLUTION INTRAVENOUS at 03:50

## 2022-12-11 RX ADMIN — FLUOXETINE HYDROCHLORIDE 40 MG: 20 CAPSULE ORAL at 08:34

## 2022-12-11 RX ADMIN — ENOXAPARIN SODIUM 40 MG: 100 INJECTION SUBCUTANEOUS at 08:34

## 2022-12-11 RX ADMIN — CARBAMAZEPINE 300 MG: 200 TABLET ORAL at 08:34

## 2022-12-11 RX ADMIN — FLUDROCORTISONE ACETATE 0.1 MG: 0.1 TABLET ORAL at 08:34

## 2022-12-11 ASSESSMENT — ENCOUNTER SYMPTOMS
CONSTIPATION: 0
CHOKING: 1
RHINORRHEA: 0
DIARRHEA: 0
VOMITING: 0
SHORTNESS OF BREATH: 0
COUGH: 0
SORE THROAT: 0
ABDOMINAL PAIN: 0
NAUSEA: 0

## 2022-12-11 ASSESSMENT — PAIN DESCRIPTION - LOCATION: LOCATION: CHEST

## 2022-12-11 ASSESSMENT — PAIN DESCRIPTION - ORIENTATION: ORIENTATION: LEFT

## 2022-12-11 ASSESSMENT — PAIN SCALES - GENERAL: PAINLEVEL_OUTOF10: 6

## 2022-12-11 NOTE — H&P
[address]  CDU / OBSERVATION ENCOUNTER  RESIDENT NOTE     Pt Name: José Ramirez  MRN: [id number]  [place] 1945  Date of evaluation: 12/11/22  Patient's PCP is :  Edgardo Key DO    CHIEF COMPLAINT       Chief Complaint   Patient presents with    [facility]    José Ramirez is a 76 y.o. female who presents following an episode of choking. Patient was eating roast beef at home and she started coughing. Patient attempted to do the Heimlich maneuver. Patient did not lose consciousness, did cough up some peaches. She had subsequent 10 out of 10 chest pain in her left upper chest.  Patient has a history of dementia. On arrival to the emergency department, she reported she did not recall anything. On assessment this morning, the patient continues to have no recollection of yesterday. She states she \"does not even remember this morning\". Patient's daughter who is at bedside states she has severe dementia. Patient has no complaints this morning including chest pain, shortness of breath, nausea, vomiting, or diaphoresis. She entirely incontinent at baseline. Location/Symptom: Chest pain  Timing/Onset: After an episode of choking  Provocation: Choking  Quality: Sharp  Radiation: None  Severity: 10 out of 10 at the time of presentation  Timing/Duration: Transient  Modifying Factors: Secondary to choking    REVIEW OF SYSTEMS       Review of Systems   Constitutional:  Negative for chills and fever. HENT:  Negative for congestion, rhinorrhea and sore throat. Eyes:  Negative for visual disturbance. Respiratory:  Positive for choking. Negative for cough and shortness of breath. Cardiovascular:  Positive for chest pain. Negative for leg swelling. Gastrointestinal:  Negative for abdominal pain, constipation, diarrhea, nausea and vomiting. Endocrine: Negative for polyuria. Genitourinary:  Negative for dysuria and hematuria. Musculoskeletal:  Negative for arthralgias and myalgias. Skin:  Negative for rash. Neurological:  Negative for light-headedness and headaches. Psychiatric/Behavioral:  Negative for behavioral problems. (PQRS) Advance directives on face sheet per hospital policy. No change unless specifically mentioned in chart    PAST MEDICAL HISTORY    has a past medical history of Anxiety, Convulsion ([facility] [address] Dementia ([facility] [address] Encephalopathy, Herpes, Hyperlipidemia, Left bundle branch block, MRSA (methicillin resistant Staphylococcus aureus), Parkinson's disease ([facility] [address] Seizures ([facility] [address] and Vitamin D deficiency. I have reviewed the past medical history with the patient and it is pertinent to this complaint. SURGICAL HISTORY      has a past surgical history that includes Abscess Drainage (Right, 12/14/2013); Ureter stent placement (Left, 05/16/2022); Cystoscopy (Left, 5/16/2022); and Cystoscopy (Left, 12/2/2022). I have reviewed and agree with Surgical History entered and it is pertinent to this complaint.      CURRENT MEDICATIONS     aspirin EC tablet 81 mg, Daily  carBAMazepine (TEGRETOL) tablet 400 mg, Nightly  carBAMazepine (TEGRETOL) tablet 300 mg, Daily  fludrocortisone (FLORINEF) tablet 0.1 mg, Daily  FLUoxetine (PROZAC) capsule 40 mg, Daily  trospium (SANCTURA) tablet 20 mg, Nightly  0.9 % sodium chloride infusion, Continuous  sodium chloride flush 0.9 % injection 5-40 mL, 2 times per day  sodium chloride flush 0.9 % injection 5-40 mL, PRN  0.9 % sodium chloride infusion, PRN  potassium chloride (KLOR-CON M) extended release tablet 40 mEq, PRN   Or  potassium bicarb-citric acid (EFFER-K) effervescent tablet 40 mEq, PRN   Or  potassium chloride 10 mEq/100 mL IVPB (Peripheral Line), PRN  enoxaparin (LOVENOX) injection 40 mg, Daily  ondansetron (ZOFRAN) injection 4 mg, Q4H PRN  polyethylene glycol (GLYCOLAX) packet 17 g, Daily PRN  acetaminophen (TYLENOL) tablet 650 mg, Q6H PRN   Or  acetaminophen (TYLENOL) suppository 650 mg, Q6H PRN  diatrizoate meglumine-sodium (GASTROGRAFIN) 66-10 % solution 30 mL, ONCE PRN        All medication charted and reviewed. ALLERGIES     is allergic to adhesive tape and haldol [haloperidol lactate]. FAMILY HISTORY     has no family status information on file. family history is not on file. The patient denies any pertinent family history. I have reviewed and agree with the family history entered. I have reviewed the Family History and it is not significant to the case    SOCIAL HISTORY      reports that she quit smoking about 23 years ago. Her smoking use included cigarettes. She started smoking about 62 years ago. She has a 30.00 pack-year smoking history. She has never used smokeless tobacco. She reports that she does not drink alcohol and does not use drugs. I have reviewed and agree with all Social.  There are no concerns for substance abuse/use. PHYSICAL EXAM     INITIAL VITALS:  height is 5' 7\" (1.702 m) and weight is 126 lb (57.2 kg). Her oral temperature is 98.2 °F (36.8 °C). Her blood pressure is 105/60 and her pulse is 85. Her respiration is 12 and oxygen saturation is 99%. Physical Exam  Constitutional:       General: She is not in acute distress. Appearance: She is not toxic-appearing. HENT:      Head: Normocephalic. Nose: No rhinorrhea. Mouth/Throat:      Mouth: Mucous membranes are moist.   Eyes:      Conjunctiva/sclera: Conjunctivae normal.      Pupils: Pupils are equal, round, and reactive to light. Cardiovascular:      Rate and Rhythm: Normal rate and regular rhythm. Pulses: Normal pulses. Pulmonary:      Effort: Pulmonary effort is normal. No respiratory distress. Breath sounds: Normal breath sounds. No wheezing. Abdominal:      General: Bowel sounds are normal. There is no distension. Palpations: Abdomen is soft. Tenderness: There is no abdominal tenderness. There is no rebound.    Musculoskeletal: Right lower leg: No edema. Left lower leg: No edema. Skin:     General: Skin is warm and dry. Neurological:      Mental Status: She is alert and oriented to person, place, and time. Psychiatric:         Mood and Affect: Mood normal.         Behavior: Behavior normal.         Judgment: Judgment normal.           DIFFERENTIAL DIAGNOSIS/MDM:     DDx: Chest pain secondary to episode of choking    DIAGNOSTIC RESULTS     EKG: All EKG's are interpreted by the Observation Physician who either signs or Co-signs this chart in the absence of a cardiologist.    EKG Interpretation    Interpreted by observation physician    Rhythm: normal sinus   Rate: 80  Axis: normal  Ectopy: none  Conduction: left bundle branch block (complete) and 1st degree AV block  ST Segments: no acute change  T Waves: no acute change  Q Waves: none    Clinical Impression: left bundle branch block and 1st degree AV block    Aditya Morton DO    RADIOLOGY:   I directly visualized the following  images and reviewed the radiologist interpretations:    CT ABDOMEN W CONTRAST Additional Contrast? None    Result Date: 12/10/2022  EXAMINATION: CT ABDOMEN WITH CONTRAST 12/10/2022 9:54 pm TECHNIQUE: CT of the abdomen was performed with the administration of intravenous contrast. Multiplanar reformatted images are provided for review. Automated exposure control, iterative reconstruction, and/or weight based adjustment of the mA/kV was utilized to reduce the radiation dose to as low as reasonably achievable. COMPARISON: CT abdomen and pelvis performed 10/28/2022. HISTORY: ORDERING SYSTEM PROVIDED HISTORY: concern for esophageal perforation TECHNOLOGIST PROVIDED HISTORY: concern for esophageal perforation Decision Support Exception - unselect if not a suspected or confirmed emergency medical condition->Emergency Medical Condition (MA) Reason for Exam: concern for esophageal perforation FINDINGS: Lower Chest: There is atelectasis in the lung bases.   The visualized cardiac structures are unremarkable. Organs: The liver is normal size and overall attenuation. There are few splenules in the left upper quadrant. The pancreas is unremarkable. There is mild adrenal hyperplasia. There are nonobstructing renal stones bilaterally. There is mild prominence of the right renal collecting system. There is left-sided hydronephrosis with multiple stones in the visualized left ureter. There is a left ureteral stent. GI/Bowel: The stomach is unremarkable. Loops of small bowel are normal in caliber without evidence for obstruction. The visualized colon contains air and fecal residue. There are uncomplicated diverticula. There is no free air or free fluid. Peritoneum/Retroperitoneum: The psoas muscles are symmetric. The abdominal aorta is normal in caliber. The inferior vena cava is unremarkable. There is no retroperitoneal or mesenteric adenopathy. Bones/Soft Tissues: The extra-abdominal soft tissues are unremarkable. There is diffuse osseous demineralization. There is no acute osseous abnormality. There are multiple remote compression deformities. No acute abdominal or pelvic abnormality. Nonobstructing renal stones bilaterally. Left-sided hydronephrosis with a ureteral stent and multiple stones within the visualized left ureter. CT CHEST PULMONARY EMBOLISM W CONTRAST    Result Date: 12/10/2022  EXAMINATION: CTA OF THE CHEST 12/10/2022 9:54 pm TECHNIQUE: CTA of the chest was performed after the administration of intravenous contrast.  Multiplanar reformatted images are provided for review. MIP images are provided for review. Automated exposure control, iterative reconstruction, and/or weight based adjustment of the mA/kV was utilized to reduce the radiation dose to as low as reasonably achievable. COMPARISON: CT chest performed 05/15/2022.  HISTORY: ORDERING SYSTEM PROVIDED HISTORY: Concern for esophogeal rupture TECHNOLOGIST PROVIDED HISTORY: Add oral contrast for perforation Concern for esophogeal rupture Reason for Exam: Concern for esophogeal rupture FINDINGS: Pulmonary Arteries: Pulmonary arteries are adequately opacified for evaluation. No evidence of intraluminal filling defect to suggest pulmonary embolism. Main pulmonary artery is normal in caliber. Mediastinum: No evidence of mediastinal lymphadenopathy. The heart and pericardium demonstrate no acute abnormality. There is no acute abnormality of the thoracic aorta. Lungs/pleura: The lungs are without acute process. There is atelectasis in the lung bases. No focal consolidation or pulmonary edema. No evidence of pleural effusion or pneumothorax. Upper Abdomen: There is partial visualization of left renal hydronephrosis versus parapelvic cysts. Soft Tissues/Bones: The extrathoracic soft tissues are unremarkable. There is no axillary adenopathy. There are multiple stable remote compression deformities. There is no acute or chronic pulmonary embolism. Atelectasis in the lung bases. Esophagus appears grossly unremarkable without acute process. LABS:  I have reviewed and interpreted all available lab results.   Labs Reviewed   CBC WITH AUTO DIFFERENTIAL - Abnormal; Notable for the following components:       Result Value    RBC 3.69 (*)     Hemoglobin 11.7 (*)     Hematocrit 35.2 (*)     RDW 14.7 (*)     Lymphocytes 22 (*)     Monocytes 16 (*)     Eosinophils % 10 (*)     Absolute Mono # 1.42 (*)     Absolute Eos # 0.95 (*)     All other components within normal limits   BASIC METABOLIC PANEL - Abnormal; Notable for the following components:    Glucose 119 (*)     Creatinine 0.99 (*)     Est, Glom Filt Rate 58 (*)     Calcium 8.4 (*)     Sodium 128 (*)     Chloride 94 (*)     All other components within normal limits   TROPONIN - Abnormal; Notable for the following components:    Troponin, High Sensitivity 39 (*)     All other components within normal limits   TROPONIN - Abnormal; Notable for the following components:    Troponin, High Sensitivity 41 (*)     All other components within normal limits   CALCIUM, IONIZED - Abnormal; Notable for the following components:    Calcium, Ionized 1.11 (*)     All other components within normal limits   TROPONIN - Abnormal; Notable for the following components:    Troponin, High Sensitivity 36 (*)     All other components within normal limits   COVID-19, RAPID   BRAIN NATRIURETIC PEPTIDE   SPECIMEN REJECTION       SCREENING TOOLS:    HEART Risk Score for Chest Pain Patients  History and Physical Exam Suspicion Level  (Nausea, Vomiting, Diaphoresis, Radiation, Exertion)  Slightly Suspicious (0 pts)  Moderately Suspicious (1 pt)  Highly Suspicious (2 pts)  EKG Interpretation  Normal (0 pts)  Non-Specific Repolarization Disturbance (1 pt)  Significant ST-Depression (2 pts)  Age of Patient (in years)  = 39 (0 pts)  46-64 (1 pt)  = 65 (2 pts)  Risk Factors  No Risk Factors (0 pts)  1-2 Risk Factors (1 pt)  = 3 Risk Factors (2 pts)  Risk Factors Include:  Hypercholesterolemia  Hypertension  Diabetes Mellitus  Cigarette smoking  Positive family history  Obesity  CAD  (SLE, CKDz, HIV, Cocaine abuse)  Troponin Levels  = Normal Limit (0 pts)  1-3 Times Normal Limit (1 pt)  > 3 Times Normal Limit (2 pts)  TOTAL:    Percent Risk for Major Adverse Cardiac Event (MACE)  0-3 pts indicates low risk for MACE   2.5% (DISCHARGE)   4-7 pts indicates moderate risk for MACE  20.3% (OBS)  8-10 pts indicates high risk for MACE  72.7% (EARLY INVASIVE TX)    CDU IMPRESSION / Wayne Cintron is a 76 y.o. female who presents with chest pain following an episode of choking    Chest pain  Given the patient's age and comorbidities, the patient was admitted to observation for cardiac evaluation  Cardiology to see the patient this morning, appreciate recommendations.     Continue home medications and pain control  Monitor vitals, labs, and imaging  DISPO: pending consults and clinical improvement    CONSULTS:    IP CONSULT TO CARDIOLOGY    PROCEDURES:  Not indicated       PATIENT REFERRED TO:    No follow-up provider specified. --  Marcia Kenny DO   Emergency Medicine Resident     This dictation was generated by voice recognition computer software. Although all attempts are made to edit the dictation for accuracy, there may be errors in the transcription that are not intended.

## 2022-12-11 NOTE — ED NOTES
Pt to ED via EMS. Pt's daughter states that pt was at home eating roast beef when she began to cough. Pt's daughter states she gave the pt the heimlich maneuver despite the pt being able to cough. Pt did not lose LOC. Pt is now complaining of chest pain and points to her left upper chest. Pt is on stretcher, placed on monitor, call light within reach.            Tiff Strickland RN  12/10/22 [id number]

## 2022-12-11 NOTE — PROGRESS NOTES
[address] [facility]  CDU / OBSERVATION ENCOUNTER  ATTENDING NOTE       I performed a history and physical examination of the patient and discussed management with the resident. I reviewed the residents note and agree with the documented findings and plan of care. Any areas of disagreement are noted on the chart. I was personally present for the key portions of any procedures. I have documented in the chart those procedures where I was not present during the key portions. I have reviewed the nurses notes. I agree with the chief complaint, past medical history, past surgical history, allergies, medications, social and family history as documented unless otherwise noted below. The Family history, social history, and ROS are effectively unchanged since admission unless noted elsewhere in the chart. The patient is a 76year old female with history of kidney stones, COPD, seizures, dementia, parkinson's disease and DVT who presents for evaluation of midsternal chest pain and low neck pain after choking on a piece of roast beef. She vomited and cleared the obstruction but continues to have pain to her chest. ED workup revealed hyponatremia of 128, ionized calcium of 1.1, trop 41 and 36, and negative CT PE study. She has a left ureteral stent in place with chronic left hydro and nephrolithiasis. The patient was admitted to the observation unit for cardiology consult. Cardiology evaluated the patient and does not recommend any additional testing. The patient denies any complaints this morning and was able to eat breakfast without difficulty. Plan for discharge.      Neal Munoz DO  Attending Emergency Physician

## 2022-12-11 NOTE — DISCHARGE SUMMARY
CDU Discharge Summary        Patient:  Alex Herman  YOB: 1945    MRN: [id number]   Acct: [de-identified]    Primary Care Physician: Andre Lowe DO    Admit date:  12/10/2022  8:40 PM  Discharge date: 12/11/2022 11:51 AM    Discharge Diagnoses:     Acute chest pain due to episode of choking  Improved with rest.    Follow-up:  Call today/tomorrow for a follow up appointment with Andre Lowe DO , or return to the Emergency Room with worsening symptoms    Stressed to patient the importance of following up with primary care doctor for further workup/management of symptoms. Pt verbalizes understanding and agrees with plan. Discharge Medications:  Changes to medications none          Medication List        CHANGE how you take these medications      * carBAMazepine 200 MG tablet  Commonly known as: TEGRETOL  What changed: Another medication with the same name was changed. Make sure you understand how and when to take each. * carBAMazepine 200 MG tablet  Commonly known as: TEGretol  1.5 tablets po q am and 2 po q pm  What changed:   how much to take  additional instructions     fludrocortisone 0.1 MG tablet  Commonly known as: FLORINEF  TAKE 1 TABLET BY MOUTH EVERY DAY  What changed: See the new instructions. FLUoxetine 20 MG capsule  Commonly known as: PROZAC  2 po qd  What changed:   how much to take  how to take this  when to take this  additional instructions           * This list has 2 medication(s) that are the same as other medications prescribed for you. Read the directions carefully, and ask your doctor or other care provider to review them with you.                 CONTINUE taking these medications      aspirin 81 MG EC tablet     melatonin 5 MG Tabs tablet  Take 1 tablet by mouth nightly as needed (sleep)     mirabegron 50 MG Tb24  Commonly known as: Myrbetriq  Take 50 mg by mouth daily     potassium chloride 20 MEQ extended release tablet  Commonly known as: Ermalinda Balalexsandra Diet:  Diet NPO  ADULT DIET; Dysphagia - Soft and Bite Sized , Advance as tolerated     Activity:  As tolerated    Consultants: IP CONSULT TO CARDIOLOGY    Procedures:  Not indicated     Diagnostic Test:   Results for orders placed or performed during the hospital encounter of 12/10/22   COVID-19, Rapid    Specimen: Nasopharyngeal Swab   Result Value Ref Range    Specimen Description . NASOPHARYNGEAL SWAB     SARS-CoV-2, Rapid Not Detected Not Detected   CBC with Auto Differential   Result Value Ref Range    WBC 9.1 3.5 - 11.3 k/uL    RBC 3.69 (L) 3.95 - 5.11 m/uL    Hemoglobin 11.7 (L) 11.9 - 15.1 g/dL    Hematocrit 35.2 (L) 36.3 - 47.1 %    MCV 95.4 82.6 - 102.9 fL    MCH 31.7 25.2 - 33.5 pg    MCHC 33.2 28.4 - 34.8 g/dL    RDW 14.7 (H) 11.8 - 14.4 %    Platelets 089 104 - 658 k/uL    MPV 8.2 8.1 - 13.5 fL    NRBC Automated 0.0 0.0 per 100 WBC    Seg Neutrophils 51 36 - 65 %    Lymphocytes 22 (L) 24 - 43 %    Monocytes 16 (H) 3 - 12 %    Eosinophils % 10 (H) 1 - 4 %    Basophils 1 0 - 2 %    Immature Granulocytes 0 0 %    Segs Absolute 4.64 1.50 - 8.10 k/uL    Absolute Lymph # 1.97 1.10 - 3.70 k/uL    Absolute Mono # 1.42 (H) 0.10 - 1.20 k/uL    Absolute Eos # 0.95 (H) 0.00 - 0.44 k/uL    Basophils Absolute 0.08 0.00 - 0.20 k/uL    Absolute Immature Granulocyte 0.04 0.00 - 0.30 k/uL    RBC Morphology ANISOCYTOSIS PRESENT    Basic Metabolic Panel   Result Value Ref Range    Glucose 119 (H) 70 - 99 mg/dL    BUN 16 8 - 23 mg/dL    Creatinine 0.99 (H) 0.50 - 0.90 mg/dL    Est, Glom Filt Rate 58 (L) >60 mL/min/1.73m2    Calcium 8.4 (L) 8.6 - 10.4 mg/dL    Sodium 128 (L) 135 - 144 mmol/L    Potassium 3.8 3.7 - 5.3 mmol/L    Chloride 94 (L) 98 - 107 mmol/L    CO2 23 20 - 31 mmol/L    Anion Gap 11 9 - 17 mmol/L   Brain Natriuretic Peptide   Result Value Ref Range    Pro- <300 pg/mL   Troponin   Result Value Ref Range    Troponin, High Sensitivity 39 (H) 0 - 14 ng/L   Troponin   Result Value Ref Range Troponin, High Sensitivity 41 (H) 0 - 14 ng/L   Calcium, Ionized   Result Value Ref Range    Calcium, Ionized 1.11 (L) 1.13 - 1.33 mmol/L   Troponin   Result Value Ref Range    Troponin, High Sensitivity 36 (H) 0 - 14 ng/L   SPECIMEN REJECTION   Result Value Ref Range    Specimen Source . NASOPHARYNGEAL SWAB     Ordered Test COVRB     Reason for Rejection       Unable to perform testing: Specimen age beyond stability limit. EKG 12 Lead   Result Value Ref Range    Ventricular Rate 95 BPM    Atrial Rate 95 BPM    P-R Interval 198 ms    QRS Duration 118 ms    Q-T Interval 412 ms    QTc Calculation (Bazett) 517 ms    P Axis 80 degrees    R Axis 2 degrees    T Axis 90 degrees   EKG 12 lead   Result Value Ref Range    Ventricular Rate 80 BPM    Atrial Rate 80 BPM    P-R Interval 212 ms    QRS Duration 124 ms    Q-T Interval 418 ms    QTc Calculation (Bazett) 482 ms    P Axis 108 degrees    R Axis 60 degrees    T Axis 61 degrees     CT ABDOMEN W CONTRAST Additional Contrast? None    Result Date: 12/10/2022  EXAMINATION: CT ABDOMEN WITH CONTRAST 12/10/2022 9:54 pm TECHNIQUE: CT of the abdomen was performed with the administration of intravenous contrast. Multiplanar reformatted images are provided for review. Automated exposure control, iterative reconstruction, and/or weight based adjustment of the mA/kV was utilized to reduce the radiation dose to as low as reasonably achievable. COMPARISON: CT abdomen and pelvis performed 10/28/2022. HISTORY: ORDERING SYSTEM PROVIDED HISTORY: concern for esophageal perforation TECHNOLOGIST PROVIDED HISTORY: concern for esophageal perforation Decision Support Exception - unselect if not a suspected or confirmed emergency medical condition->Emergency Medical Condition (MA) Reason for Exam: concern for esophageal perforation FINDINGS: Lower Chest: There is atelectasis in the lung bases. The visualized cardiac structures are unremarkable. Organs:  The liver is normal size and overall attenuation. There are few splenules in the left upper quadrant. The pancreas is unremarkable. There is mild adrenal hyperplasia. There are nonobstructing renal stones bilaterally. There is mild prominence of the right renal collecting system. There is left-sided hydronephrosis with multiple stones in the visualized left ureter. There is a left ureteral stent. GI/Bowel: The stomach is unremarkable. Loops of small bowel are normal in caliber without evidence for obstruction. The visualized colon contains air and fecal residue. There are uncomplicated diverticula. There is no free air or free fluid. Peritoneum/Retroperitoneum: The psoas muscles are symmetric. The abdominal aorta is normal in caliber. The inferior vena cava is unremarkable. There is no retroperitoneal or mesenteric adenopathy. Bones/Soft Tissues: The extra-abdominal soft tissues are unremarkable. There is diffuse osseous demineralization. There is no acute osseous abnormality. There are multiple remote compression deformities. No acute abdominal or pelvic abnormality. Nonobstructing renal stones bilaterally. Left-sided hydronephrosis with a ureteral stent and multiple stones within the visualized left ureter. CT CHEST PULMONARY EMBOLISM W CONTRAST    Result Date: 12/10/2022  EXAMINATION: CTA OF THE CHEST 12/10/2022 9:54 pm TECHNIQUE: CTA of the chest was performed after the administration of intravenous contrast.  Multiplanar reformatted images are provided for review. MIP images are provided for review. Automated exposure control, iterative reconstruction, and/or weight based adjustment of the mA/kV was utilized to reduce the radiation dose to as low as reasonably achievable. COMPARISON: CT chest performed 05/15/2022.  HISTORY: ORDERING SYSTEM PROVIDED HISTORY: Concern for esophogeal rupture TECHNOLOGIST PROVIDED HISTORY: Add oral contrast for perforation Concern for esophogeal rupture Reason for Exam: Concern for esophogeal rupture FINDINGS: Pulmonary Arteries: Pulmonary arteries are adequately opacified for evaluation. No evidence of intraluminal filling defect to suggest pulmonary embolism. Main pulmonary artery is normal in caliber. Mediastinum: No evidence of mediastinal lymphadenopathy. The heart and pericardium demonstrate no acute abnormality. There is no acute abnormality of the thoracic aorta. Lungs/pleura: The lungs are without acute process. There is atelectasis in the lung bases. No focal consolidation or pulmonary edema. No evidence of pleural effusion or pneumothorax. Upper Abdomen: There is partial visualization of left renal hydronephrosis versus parapelvic cysts. Soft Tissues/Bones: The extrathoracic soft tissues are unremarkable. There is no axillary adenopathy. There are multiple stable remote compression deformities. There is no acute or chronic pulmonary embolism. Atelectasis in the lung bases. Esophagus appears grossly unremarkable without acute process. FLUORO FOR SURGICAL PROCEDURES    Result Date: 12/2/2022  Radiology exam is complete. No Radiologist dictation. Please follow up with ordering provider. Physical Exam:    General appearance - NAD, mentation at baseline  Lungs -CTAB, no R/R/R  Heart - RRR, no M/R/G  Abdomen - Soft, NT/ND  Neurological:  MAEx4, No focal motor deficit, sensory loss  Extremities - Cap refil <2 sec in all ext., no edema  Skin -warm, dry      Hospital Course:  Clinical course has improved, labs and imaging reviewed. Mack Moore originally presented to the hospital on 12/10/2022  8:40 PM. with chest pain. At that time it was determined that She required further observation and cardiology evaluation. She was admitted and labs and imaging were followed daily. Imaging results as above. She is medically stable to be discharged.        Disposition: Home    Patient stated that they will not drive themselves home from the hospital if they have gotten pain killers/ narcotics earlier that day and that they will arrange for transportation on their own or work with the  for a ride. Patient counseled NOT to drive while under the influence of narcotics/ pain killers. Condition: Good    Patient stable and ready for discharge home. I have discussed plan of care with patient and they are in understanding. They were instructed to read discharge paperwork. All of their questions and concerns were addressed. Time Spent: 0 day      --  Issac Cedeno,   Emergency Medicine Resident Physician    This dictation was generated by voice recognition computer software. Although all attempts are made to edit the dictation for accuracy, there may be errors in the transcription that are not intended.

## 2022-12-11 NOTE — PROGRESS NOTES
[facility]  PROGRESS NOTE    Shift date: 12/10/2022  Shift day: Saturday   Shift # 3    Room # ED15  Name: Shu Geller                Sikhism: Alis Felix 33 of Scientology: Unknown    Referral: Routine Visit    Admit Date & Time: 12/10/2022  8:40 PM    Assessment:  Shu Geller is a 76 y.o. female in the hospital because of \"Choking. \" Upon entering the room writer observes patient resting in hospital bed, with family present in ED15. Intervention:  Writer introduced self and title as .  inquired about patient's well-being. Per patient, she is \"feeling mixed up. \" Per chart, patient is diagnosed with \"Dementia. \" Patient's support person was present in room. Patient and support person were coping well and indicated no needs at time of visit. Patient has a named Legal Guardian, Caroline Salcido ([phone]), listed in chart. Outcome:  Patient's family thanked  for visit and care. Plan:  Chaplains will remain available to offer spiritual and emotional support as needed.      12/11/22 0051   Encounter Summary   Service Provided For: Patient and family together   Referral/Consult From: Rounding  (ED Rounding)   Support System Family members   Last Encounter  12/10/22   Complexity of Encounter Low   Begin Time 0051   End Time  0102   Total Time Calculated 11 min   Encounter    Type Initial Screen/Assessment   Spiritual/Emotional needs   Type Spiritual Support   Assessment/Intervention/Outcome   Assessment Calm;Coping   Intervention Sustaining Presence/Ministry of presence   Outcome Receptive   Plan and Referrals   Plan/Referrals Continue to visit, (comment)  (as needed)     Electronically signed by Kaz Braga on 12/11/2022 at 5:37 AM.  Bhaskar Shelby  [phone]

## 2022-12-11 NOTE — DISCHARGE INSTRUCTIONS
Take your medication as indicated and as prescribed. For pain use ibuprofen (Motrin / Advil) or acetaminophen (Tylenol), unless prescribed medications that have acetaminophen in it. You can take over the counter acetaminophen tablets (1 - 2 tablets of the 500-mg strength every 6 hours) or ibuprofen tablets (2 tablets every 4 hours). If you have not had a stress test in over a year your primary care physician may order this test as further work-up for your chest pain. If you have a cardiologist, then you should also call them to discuss further treatment options. PLEASE RETURN TO THE EMERGENCY DEPARTMENT IMMEDIATELY for worsening symptoms of increasing pain, shortness of breath, feeling of your heart fluttering or racing, swelling to your feet, unable to lay flat, or if you develop any concerning symptoms such as: high fever not relieved by acetaminophen (Tylenol) and/or ibuprofen (Motrin / Advil), chills, persistent nausea and/or vomiting, loss of consciousness, numbness, weakness or tingling in the arms or legs or change in color of the extremities, changes in mental status, persistent headache, blurry vision, loss of bladder / bowel control, unable to follow up with your physician, or other any other care or concern.

## 2022-12-11 NOTE — PLAN OF CARE
Problem: Pain  Goal: Verbalizes/displays adequate comfort level or baseline comfort level  Outcome: Progressing     Problem: Skin/Tissue Integrity  Goal: Absence of new skin breakdown  Description: 1. Monitor for areas of redness and/or skin breakdown  2. Assess vascular access sites hourly  3. Every 4-6 hours minimum:  Change oxygen saturation probe site  4. Every 4-6 hours:  If on nasal continuous positive airway pressure, respiratory therapy assess nares and determine need for appliance change or resting period.   Outcome: Progressing     Problem: ABCDS Injury Assessment  Goal: Absence of physical injury  Outcome: Progressing

## 2022-12-11 NOTE — ED NOTES
Daughter of pt informed of pt being admitted. Pt daughter headed out for the night.       Katy Barlow, RN  12/11/22 5968

## 2022-12-11 NOTE — DISCHARGE INSTR - COC
Continuity of Care Form    Patient Name: Kasi Chino   :  1945  MRN:  [id number]    [facility] date:  12/10/2022  Discharge date:  ***    Code Status Order: Full Code   Advance Directives:     Admitting Physician:  Juan R Restrepo MD  PCP: Dominique Palomino DO    Discharging Nurse: [facility] Unit/Room#: 6252/8958-52  Discharging Unit Phone Number: ***    Emergency Contact:   Extended Emergency Contact Information  Primary Emergency Contact: [address] [place] [address] Phone: [phone]  Mobile Phone: [phone]  Relation: Child  Secondary Emergency Contact: [address] Phone: [phone]  Mobile Phone: [phone]  Relation: Grandchild    Past Surgical History:  Past Surgical History:   Procedure Laterality Date    ABSCESS DRAINAGE Right 2013    WOUND EXPLORATION AND I+D  RIGHT HIP    CYSTOSCOPY Left 2022    CYSTOSCOPY URETERAL STENT INSERTION performed by Shani Maza MD at [address] Left 2022    ATTEMPTED CYSTOSCOPY WITH LEFT STENT EXCHANGE performed by Shani Maza MD at [address] Left 2022    Cystoscopy       Immunization History:   Immunization History   Administered Date(s) Administered    COVID-19, PFIZER PURPLE top, DILUTE for use, (age 15 y+), 30mcg/0.3mL 2021, 2021, 2021    Influenza 10/09/2013    Influenza Virus Vaccine 2015    Influenza Whole 10/13/2004    Influenza, High Dose (Fluzone 65 yrs and older) 10/04/2017, 2018    Pneumococcal Conjugate 13-valent (Hgmmhzi28) 2018    Pneumococcal Polysaccharide (Ofxslctvm44) 2015    Tdap (Boostrix, Adacel) 05/15/2022       Active Problems:  Patient Active Problem List   Diagnosis Code    Seizure ([facility] R56.9    Vitamin D deficiency E55.9    Anxiety F41.9    Hypercholesteremia E78.00    Cellulitis of right leg L03.115    MRSA (methicillin resistant staph aureus) culture positive Z22.322    Breakthrough seizure ([facility] G40.919    Memory loss R41.3    Difficulty speaking R47.9    Seizure disorder ([facility] G40.909    Dementia with behavioral disturbance F03.918    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    Chest pain R07.9    Encounter for palliative care B76.5    Complicated UTI (urinary tract infection) N39.0    Ureteral stent present Z96.0       Isolation/Infection:   Isolation            No Isolation          Patient Infection Status       Infection Onset Added Last Indicated Last Indicated By Review Planned Expiration Resolved Resolved By    VRE 11/28/22 11/30/22 11/28/22 Culture, Urine        ESBL (Extended Spectrum Beta Lactamase) 10/03/22 10/05/22 10/24/22 Culture, Urine        MRSA  12/16/13 12/16/13 Veronika Hills RN        Resolved    COVID-19 (Rule Out) 12/11/22 12/11/22 12/11/22 COVID-19, Rapid (Ordered)   12/11/22 Rule-Out Test Resulted    COVID-19 (Rule Out) 10/04/22 10/04/22 10/04/22 Respiratory Panel, Molecular, with COVID-19 (Restricted: peds pts or suitable admitted adults) (Ordered)   10/04/22 Rule-Out Test Resulted    COVID-19 (Rule Out) 10/03/22 10/03/22 10/03/22 COVID-19, Rapid (Ordered)   10/03/22 Rule-Out Test Resulted            Nurse Assessment:  Last Vital Signs: /60   Pulse 85   Temp 98.2 °F (36.8 °C) (Oral)   Resp 12   Ht 5' 7\" (1.702 m)   Wt 126 lb (57.2 kg)   SpO2 99%   BMI 19.73 kg/m²     Last documented pain score (0-10 scale): Pain Level: 6  Last Weight:   Wt Readings from Last 1 Encounters:   12/10/22 126 lb (57.2 kg)     Mental Status:  {IP PT MENTAL STATUS:35876}    IV Access:  { HAL IV ACCESS:126839479}    Nursing Mobility/ADLs:  Walking   {CHP DME BZPT:411570346}  Transfer  {CHP DME GAOC:729471783}  Bathing  {CHP DME VRKE:411310182}  Dressing  {CHP DME ZVAP:587260472}  Toileting  {CHP DME BLOR:012301586}  Feeding  {CHP DME WDIS:852858089}  Med Admin  {CHP DME WQPZ:951986530}  Med Delivery   { HAL MED Delivery:890751467}    Wound Care Documentation and Therapy:        Elimination:  Continence: Bowel: {YES / N}  Bladder: {YES / YB:97524}  Urinary Catheter: {Urinary Catheter:031658817}   Colostomy/Ileostomy/Ileal Conduit: {YES / VN:15052}       Date of Last BM: ***    Intake/Output Summary (Last 24 hours) at 2022 0714  Last data filed at 2022 0008  Gross per 24 hour   Intake 999 ml   Output --   Net 999 ml     I/O last 3 completed shifts:   In: 200 [IV Piggyback:999]  Out: -     Safety Concerns:     [address] Safety Concerns:174466819}    Impairments/Disabilities:      [address] Impairments/Disabilities:228397277}    Nutrition Therapy:  Current Nutrition Therapy:   [address] Diet List:603655464}    Routes of Feeding: {CHP DME Other Feedings:095109635}  Liquids: {Slp liquid thickness:82984}  Daily Fluid Restriction: {CHP DME Yes amt example:109135513}  Last Modified Barium Swallow with Video (Video Swallowing Test): {Done Not Done QKOR:689883492}    Treatments at the Time of Hospital Discharge:   Respiratory Treatments: ***  Oxygen Therapy:  {Therapy; copd oxygen:70840}  Ventilator:    {CHEPE PERDUE Vent PRKZ:230313065}    Rehab Therapies: {THERAPEUTIC INTERVENTION:0550173221}  Weight Bearing Status/Restrictions: {CHEPE PERDUE Weight Bearin}  Other Medical Equipment (for information only, NOT a DME order):  {EQUIPMENT:988301177}  Other Treatments: ***    Patient's personal belongings (please select all that are sent with patient):  {CHP DME Belongings:329868344}    RN SIGNATURE:  {Esignature:558644413}    CASE MANAGEMENT/SOCIAL WORK SECTION    Inpatient Status Date: ***    Readmission Risk Assessment Score:  Readmission Risk              Risk of Unplanned Readmission:  0           Discharging to Facility/ Agency   Name:   Address:  Phone:  Fax:    Dialysis Facility (if applicable)   Name:  Address:  Dialysis Schedule:  Phone:  Fax:    / signature: {Esignature:153299873}    PHYSICIAN SECTION    Prognosis: {Prognosis:4218410567}    Condition at Discharge: [address] [place] [address] Patient Condition:798993972}    Rehab Potential (if transferring to Rehab): {Prognosis:2544706567}    Recommended Labs or Other Treatments After Discharge: ***    Physician Certification: I certify the above information and transfer of Shu Geller  is necessary for the continuing treatment of the diagnosis listed and that she requires {Admit to Appropriate Level of Care:66617} for {GREATER/LESS:773605866} 30 days.      Update Admission H&P: {CHP DME Changes in IVCWQ:493948017}    PHYSICIAN SIGNATURE:  Electronically signed by Conor Mcdaniels DO on 22 at 7:14 AM EST

## 2022-12-11 NOTE — CONSULTS
[facility] Cardiology    Consult / H&P               Today's Date: 12/11/2022  Patient Name: Pan Carney  Date of admission: 12/10/2022  8:40 PM  Patient's age: 76 y.o., 1945  Admission Dx: Chest pain [R07.9]    Reason for Consult:  Cardiac evaluation    Requesting Physician: Lisbeth Sterling MD    CHIEF COMPLAINT:  chest pain     History Obtained From:  family member - daughter, electronic medical record    HISTORY OF PRESENT ILLNESS:      The patient is a 76 y.o.  female with PMH of COPD, dementia, parkinson and DVT who is admitted to the hospital for chest pain. Per patient's daughter, patient started to have a left upper chest pain after chocking on a solid food. No history of chest pain. Patient is not able to provide further history due to dementia. Past Medical History:   has a past medical history of Anxiety, Convulsion ([address] Dementia ([address] Encephalopathy, Herpes, Hyperlipidemia, Left bundle branch block, MRSA (methicillin resistant Staphylococcus aureus), Parkinson's disease ([address] Seizures ([address] and Vitamin D deficiency. Past Surgical History:   has a past surgical history that includes Abscess Drainage (Right, 12/14/2013); Ureter stent placement (Left, 05/16/2022); Cystoscopy (Left, 5/16/2022); and Cystoscopy (Left, 12/2/2022). Home Medications:    Prior to Admission medications    Medication Sig Start Date End Date Taking?  Authorizing Provider   carBAMazepine (TEGRETOL) 200 MG tablet Take 300 mg by mouth daily Takes along with 400 mg at hs    Historical Provider, MD   potassium chloride (KLOR-CON M) 20 MEQ extended release tablet TAKE 1 TABLET BY MOUTH EVERY DAY 10/21/22   Historical Provider, MD   fludrocortisone (FLORINEF) 0.1 MG tablet TAKE 1 TABLET BY MOUTH EVERY DAY  Patient taking differently: Take 0.1 mg by mouth daily 11/3/22   Sravani Wu DO   carBAMazepine (TEGRETOL) 200 MG tablet 1.5 tablets po q am and 2 po q pm  Patient taking differently: 400 mg 2 po q pm 8/23/22   Sravani Wu DO   FLUoxetine (PROZAC) 20 MG capsule 2 po qd  Patient taking differently: Take 40 mg by mouth daily 8/22/22   Sravani Wu DO   mirabegron (MYRBETRIQ) 50 MG TB24 Take 50 mg by mouth daily 8/22/22   Sravani Wu DO   melatonin 5 MG TABS tablet Take 1 tablet by mouth nightly as needed (sleep) 5/19/22 5/29/22  Eamon Puente MD   aspirin 81 MG EC tablet Take 81 mg by mouth daily    Historical MD Kyle      Current Facility-Administered Medications: aspirin EC tablet 81 mg, 81 mg, Oral, Daily  carBAMazepine (TEGRETOL) tablet 400 mg, 400 mg, Oral, Nightly  carBAMazepine (TEGRETOL) tablet 300 mg, 300 mg, Oral, Daily  fludrocortisone (FLORINEF) tablet 0.1 mg, 0.1 mg, Oral, Daily  FLUoxetine (PROZAC) capsule 40 mg, 40 mg, Oral, Daily  trospium (SANCTURA) tablet 20 mg, 20 mg, Oral, Nightly  0.9 % sodium chloride infusion, , IntraVENous, Continuous  sodium chloride flush 0.9 % injection 5-40 mL, 5-40 mL, IntraVENous, 2 times per day  sodium chloride flush 0.9 % injection 5-40 mL, 5-40 mL, IntraVENous, PRN  0.9 % sodium chloride infusion, 25 mL, IntraVENous, PRN  potassium chloride (KLOR-CON M) extended release tablet 40 mEq, 40 mEq, Oral, PRN **OR** potassium bicarb-citric acid (EFFER-K) effervescent tablet 40 mEq, 40 mEq, Oral, PRN **OR** potassium chloride 10 mEq/100 mL IVPB (Peripheral Line), 10 mEq, IntraVENous, PRN  enoxaparin (LOVENOX) injection 40 mg, 40 mg, SubCUTAneous, Daily  ondansetron (ZOFRAN) injection 4 mg, 4 mg, IntraVENous, Q4H PRN  polyethylene glycol (GLYCOLAX) packet 17 g, 17 g, Oral, Daily PRN  acetaminophen (TYLENOL) tablet 650 mg, 650 mg, Oral, Q6H PRN **OR** acetaminophen (TYLENOL) suppository 650 mg, 650 mg, Rectal, Q6H PRN  diatrizoate meglumine-sodium (GASTROGRAFIN) 66-10 % solution 30 mL, 30 mL, Oral, ONCE PRN    Allergies:  Adhesive tape and Haldol [haloperidol lactate]    Social History:   reports that she quit smoking about 23 years ago.  Her smoking use included cigarettes. She started smoking about 62 years ago. She has a 30.00 pack-year smoking history. She has never used smokeless tobacco. She reports that she does not drink alcohol and does not use drugs. Family History: family history is not on file. No h/o sudden cardiac death. No for premature CAD    REVIEW OF SYSTEMS:    Constitutional: there has been no unanticipated weight loss. There's been No change in energy level, No change in activity level. Eyes: No visual changes or diplopia. No scleral icterus. ENT: No Headaches  Cardiovascular: No cardiac history  Respiratory: No previous pulmonary problems, No cough  Gastrointestinal: No abdominal pain. No change in bowel or bladder habits. Genitourinary: No dysuria, trouble voiding, or hematuria. Musculoskeletal:  No gait disturbance, No weakness or joint complaints. Integumentary: No rash or pruritis. Neurological: No headache, diplopia, change in muscle strength, numbness or tingling. No change in gait, balance, coordination, mood, affect, memory, mentation, behavior. Psychiatric: No anxiety, or depression. Endocrine: No temperature intolerance. No excessive thirst, fluid intake, or urination. No tremor. Hematologic/Lymphatic: No abnormal bruising or bleeding, blood clots or swollen lymph nodes. Allergic/Immunologic: No nasal congestion or hives. PHYSICAL EXAM:      /60   Pulse 85   Temp 98.2 °F (36.8 °C) (Oral)   Resp 12   Ht 5' 7\" (1.702 m)   Wt 126 lb (57.2 kg)   SpO2 99%   BMI 19.73 kg/m²    Constitutional and General Appearance: alert, cooperative, no distress and appears stated age  HEENT: PERRL, no cervical lymphadenopathy. No masses palpable. Normal oral mucosa  Respiratory:  Normal excursion and expansion without use of accessory muscles  Resp Auscultation: Good respiratory effort. No for increased work of breathing. On auscultation: clear to auscultation bilaterally  Cardiovascular:   The apical impulse is not displaced  Heart tones are crisp and normal. regular S1 and S2.  Jugular venous pulsation Normal  The carotid upstroke is normal in amplitude and contour without delay or bruit  Peripheral pulses are symmetrical and full   Abdomen:   No masses or tenderness  Bowel sounds present  Extremities:   No Cyanosis or Clubbing   Lower extremity edema: No   Skin: Warm and dry  Neurological:  Alert and oriented. Moves all extremities well  No abnormalities of mood, affect, memory, mentation, or behavior are noted    DATA:    Diagnostics:    EKG: normal sinus rhythm, LBBB, 1st degree AV block, unchanged from previous tracings. ECHO: not obtained. Stress Test: not obtained. Cardiac Angiography: not obtained. Labs:     CBC:   Recent Labs     12/10/22  2054   WBC 9.1   HGB 11.7*   HCT 35.2*        BMP:   Recent Labs     12/10/22  2054   *   K 3.8   CO2 23   BUN 16   CREATININE 0.99*   LABGLOM 58*   GLUCOSE 119*     BNP: No results for input(s): BNP in the last 72 hours. PT/INR: No results for input(s): PROTIME, INR in the last 72 hours. APTT:No results for input(s): APTT in the last 72 hours. CARDIAC ENZYMES:No results for input(s): CKTOTAL, CKMB, CKMBINDEX, TROPONINI in the last 72 hours. FASTING LIPID PANEL:  Lab Results   Component Value Date/Time    HDL 70 03/05/2021 09:20 AM    LDLCALC 127 01/05/2016 12:00 AM    TRIG 109 03/05/2021 09:20 AM     LIVER PROFILE:No results for input(s): AST, ALT, LABALBU in the last 72 hours. IMPRESSION:    Chest pain     RECOMMENDATIONS:  We discussed the management plan with the daughter. We offered more aggressive diagnostic options. However, they were reluctant and don't want a stress test as this moment    [id number] Ramandeep Ku to discharge from our stand point       Discussed with family and Nurse.     Electronically signed by Chaka Drew MD on 12/11/2022 at 6:35 AM    [facility]      [phone]     I reviewed the patient history personally, and examined by me during the visit. I have reviewed the H&P/Consult / Progress note as completed, and made appropriate changes to the patient exam and treatment plans.       I have reviewed the case in details including physical exam and treatment plan with resident / fellow / NP    Patient treatment plan was explained to patient, correction in notes was made as appropriate, and discussed final arrangement based on  my evaluation and exam.    Additional Recommendations:      Steff iKmball MD  [facility]

## 2022-12-11 NOTE — ED PROVIDER NOTES
Faculty Sign-Out Attestation  Handoff taken on the following patient from prior Attending Physician: Yariel Padilla    I was available and discussed any additional care issues that arose and coordinated the management plans with the resident(s) caring for the patient during my duty period. Any areas of disagreement with residents documentation of care or procedures are noted on the chart. I was personally present for the key portions of any/all procedures during my duty period. I have documented in the chart those procedures where I was not present during the key portions.     Choking episode, / cp,   \\\ ct chest / abdomen pending,   Will need admit,     Wilda Hernandez,   Attending Physician       Wilda Hernandez, DO  12/10/22 2312    Ct chest stable,   Ct abdomen stable, \\ admitted     Wlida Hernandez, DO  12/11/22 [address], DO  12/11/22 [id number]

## 2022-12-11 NOTE — ED PROVIDER NOTES
[facility]  Emergency Department Encounter  EmergencyMedicine Resident     Pt Harjeet Almaguer  MRN: [id number]  [place] 1945  Date of evaluation: 12/10/22  PCP:  John Morgan DO    CHIEF COMPLAINT       Chief Complaint   Patient presents with    Choking       HISTORY OF PRESENT ILLNESS  (Location/Symptom, Timing/Onset, Context/Setting, Quality, Duration, Modifying Factors, Severity.)      Lyly Wood is a 76 y.o. female who presents with 2 to choking. Patient was eating roast beef at home when she began to cough. Patient started to do the Heimlich maneuver, patient did not lose consciousness and did cough up some peaches. Patient then complained of 10 out of 10 chest pain in her left upper chest, has a known history of dementia. On arrival when questioned about the pain patient states that she does not remember anything. Denies any shortness of breath at this time. PAST MEDICAL / SURGICAL / SOCIAL / FAMILY HISTORY      has a past medical history of Anxiety, Convulsion ([address] Dementia ([address] Encephalopathy, Herpes, Hyperlipidemia, Left bundle branch block, MRSA (methicillin resistant Staphylococcus aureus), Parkinson's disease ([address] Seizures ([address] and Vitamin D deficiency. has a past surgical history that includes Abscess Drainage (Right, 2013); Ureter stent placement (Left, 2022); Cystoscopy (Left, 2022); and Cystoscopy (Left, 2022).       Social History     Socioeconomic History    Marital status: Single     Spouse name: Not on file    Number of children: Not on file    Years of education: Not on file    Highest education level: Not on file   Occupational History    Not on file   Tobacco Use    Smoking status: Former     Packs/day: 1.00     Years: 30.00     Pack years: 30.00     Types: Cigarettes     Start date:      Quit date:      Years since quittin.9    Smokeless tobacco: Never    Tobacco comments:     unknown how many packs a day, or how many years   Vaping Use    Vaping Use: Never used   Substance and Sexual Activity    Alcohol use: No    Drug use: No    Sexual activity: Never   Other Topics Concern    Not on file   Social History Narrative    Not on file     Social Determinants of Health     Financial Resource Strain: Low Risk     Difficulty of Paying Living Expenses: Not hard at all   Food Insecurity: No Food Insecurity    Worried About Running Out of Food in the Last Year: Never true    Ran Out of Food in the Last Year: Never true   Transportation Needs: Not on file   Physical Activity: Not on file   Stress: Not on file   Social Connections: Not on file   Intimate Partner Violence: Not on file   Housing Stability: Not on file       History reviewed. No pertinent family history. Allergies:  Adhesive tape and Haldol [haloperidol lactate]    Home Medications:  Prior to Admission medications    Medication Sig Start Date End Date Taking?  Authorizing Provider   carBAMazepine (TEGRETOL) 200 MG tablet Take 300 mg by mouth daily Takes along with 400 mg at hs    Historical Provider, MD   potassium chloride (KLOR-CON M) 20 MEQ extended release tablet TAKE 1 TABLET BY MOUTH EVERY DAY 10/21/22   Historical Provider, MD   fludrocortisone (FLORINEF) 0.1 MG tablet TAKE 1 TABLET BY MOUTH EVERY DAY  Patient taking differently: Take 0.1 mg by mouth daily 11/3/22   Sravani Wu DO   carBAMazepine (TEGRETOL) 200 MG tablet 1.5 tablets po q am and 2 po q pm  Patient taking differently: 400 mg 2 po q pm 8/23/22   Sravani Wu DO   FLUoxetine (PROZAC) 20 MG capsule 2 po qd  Patient taking differently: Take 40 mg by mouth daily 8/22/22   Sravani Wu DO   mirabegron (MYRBETRIQ) 50 MG TB24 Take 50 mg by mouth daily 8/22/22   Sravani Wu DO   melatonin 5 MG TABS tablet Take 1 tablet by mouth nightly as needed (sleep) 5/19/22 5/29/22  Kyleigh Perez MD   aspirin 81 MG EC tablet Take 81 mg by mouth daily    Historical Provider, MD       REVIEW OF SYSTEMS    (2-9 systems for level 4, 10 or more for level 5)      Review of Systems   Unable to perform ROS: Dementia     PHYSICAL EXAM   (up to 7 for level 4, 8 or more for level 5)      INITIAL VITALS:   /60   Pulse 85   Temp 98.2 °F (36.8 °C) (Oral)   Resp 12   Ht 5' 7\" (1.702 m)   Wt 126 lb (57.2 kg)   SpO2 99%   BMI 19.73 kg/m²     Physical Exam  Constitutional:       Appearance: Normal appearance. HENT:      Head: Normocephalic and atraumatic. Right Ear: External ear normal.      Left Ear: External ear normal.   Eyes:      Extraocular Movements: Extraocular movements intact. Neck:      Comments: No notes to the neck or chest  Cardiovascular:      Rate and Rhythm: Normal rate. Pulses: Normal pulses. Pulmonary:      Effort: Pulmonary effort is normal.      Breath sounds: Normal breath sounds. Abdominal:      Palpations: Abdomen is soft. Tenderness: There is no abdominal tenderness. Musculoskeletal:         General: Normal range of motion. Cervical back: Normal range of motion. Neurological:      General: No focal deficit present. Mental Status: She is alert and oriented to person, place, and time. Psychiatric:         Mood and Affect: Mood normal.       DIFFERENTIAL  DIAGNOSIS     PLAN (LABS / IMAGING / EKG):  Orders Placed This Encounter   Procedures    COVID-19, Rapid    CT ABDOMEN W CONTRAST Additional Contrast? None    CT CHEST PULMONARY EMBOLISM W CONTRAST    CBC with Auto Differential    Basic Metabolic Panel    Brain Natriuretic Peptide    Troponin    Calcium, Ionized    Troponin    SPECIMEN REJECTION    Diet NPO    ADULT DIET;  Regular    Vital signs per unit routine    Telemetry monitoring - 72 hour duration    Notify physician    Up as tolerated    Admission/Observation order previously placed    Full Code    Inpatient consult to Cardiology    Initiate Oxygen Therapy Protocol    EKG 12 Lead    EKG 12 lead    Insert peripheral IV    Place in Observation Service       MEDICATIONS ORDERED:  Orders Placed This Encounter   Medications    0.9 % sodium chloride bolus    iopamidol (ISOVUE-370) 76 % injection 75 mL    diatrizoate meglumine-sodium (GASTROGRAFIN) 66-10 % solution 30 mL    aspirin EC tablet 81 mg    carBAMazepine (TEGRETOL) tablet 400 mg    carBAMazepine (TEGRETOL) tablet 300 mg    fludrocortisone (FLORINEF) tablet 0.1 mg    FLUoxetine (PROZAC) capsule 40 mg    trospium (SANCTURA) tablet 20 mg    0.9 % sodium chloride infusion    sodium chloride flush 0.9 % injection 5-40 mL    sodium chloride flush 0.9 % injection 5-40 mL    0.9 % sodium chloride infusion    OR Linked Order Group     potassium chloride (KLOR-CON M) extended release tablet 40 mEq     potassium bicarb-citric acid (EFFER-K) effervescent tablet 40 mEq     potassium chloride 10 mEq/100 mL IVPB (Peripheral Line)    enoxaparin (LOVENOX) injection 40 mg     Order Specific Question:   Indication of Use     Answer:   Prophylaxis-DVT/PE    ondansetron (ZOFRAN) injection 4 mg    polyethylene glycol (GLYCOLAX) packet 17 g    OR Linked Order Group     acetaminophen (TYLENOL) tablet 650 mg     acetaminophen (TYLENOL) suppository 650 mg       DDX: Boerhaave syndrome, Johanny-Garcia tear, ACS, arrhythmia, pulmonary embolism, pneumonia, food bolus    MDM: 76 y.o. female presents today with chest pain. Concern for poor hip syndrome, CT chest and abdomen ordered. Due to concern for possible PE CT pulmonary embolism ordered. A cardiac work-up is ordered. Patient is found to be hypocalcemic, ionized calcium ordered. Imaging is unremarkable. Patient's troponins are currently uptrending, will order a third troponin. Patient will likely be admitted.     EMERGENCY DEPARTMENT COURSE:  ED Course as of 12/11/22 0531   Sat Dec 10, 2022   2118 EKG Interpretation   Interpreted by Toma Medina DO    Rhythm: normal sinus   Rate: normal  Axis: normal  Ectopy: none  Conduction: LBBB and QTc prolognation  ST Segments: normal  T Waves: normal  Q Waves: none    Clinical Impression: prolonged QTc wide complex LBBB [WK]   2321 Initial troponin 39, repeat 41. Uptrending troponins. [SS]      ED Course User Index  [SS] Sergio Flores MD  [WK] [place] Fraction, DO            Ken Coma Scale  Eye Opening: Spontaneous  Best Verbal Response: Oriented  Best Motor Response: Obeys commands  Ekn Coma Scale Score: 15  DIAGNOSTIC RESULTS / EMERGENCY DEPARTMENT COURSE / MDM   LAB RESULTS:  Results for orders placed or performed during the hospital encounter of 12/10/22   COVID-19, Rapid    Specimen: Nasopharyngeal Swab   Result Value Ref Range    Specimen Description . NASOPHARYNGEAL SWAB     SARS-CoV-2, Rapid Not Detected Not Detected   CBC with Auto Differential   Result Value Ref Range    WBC 9.1 3.5 - 11.3 k/uL    RBC 3.69 (L) 3.95 - 5.11 m/uL    Hemoglobin 11.7 (L) 11.9 - 15.1 g/dL    Hematocrit 35.2 (L) 36.3 - 47.1 %    MCV 95.4 82.6 - 102.9 fL    MCH 31.7 25.2 - 33.5 pg    MCHC 33.2 28.4 - 34.8 g/dL    RDW 14.7 (H) 11.8 - 14.4 %    Platelets 141 164 - 111 k/uL    MPV 8.2 8.1 - 13.5 fL    NRBC Automated 0.0 0.0 per 100 WBC    Seg Neutrophils 51 36 - 65 %    Lymphocytes 22 (L) 24 - 43 %    Monocytes 16 (H) 3 - 12 %    Eosinophils % 10 (H) 1 - 4 %    Basophils 1 0 - 2 %    Immature Granulocytes 0 0 %    Segs Absolute 4.64 1.50 - 8.10 k/uL    Absolute Lymph # 1.97 1.10 - 3.70 k/uL    Absolute Mono # 1.42 (H) 0.10 - 1.20 k/uL    Absolute Eos # 0.95 (H) 0.00 - 0.44 k/uL    Basophils Absolute 0.08 0.00 - 0.20 k/uL    Absolute Immature Granulocyte 0.04 0.00 - 0.30 k/uL    RBC Morphology ANISOCYTOSIS PRESENT    Basic Metabolic Panel   Result Value Ref Range    Glucose 119 (H) 70 - 99 mg/dL    BUN 16 8 - 23 mg/dL    Creatinine 0.99 (H) 0.50 - 0.90 mg/dL    Est, Glom Filt Rate 58 (L) >60 mL/min/1.73m2    Calcium 8.4 (L) 8.6 - 10.4 mg/dL    Sodium 128 (L) 135 - 144 mmol/L    Potassium 3.8 3.7 - 5.3 mmol/L    Chloride 94 (L) 98 - 107 mmol/L    CO2 23 20 - 31 mmol/L    Anion Gap 11 9 - 17 mmol/L   Brain Natriuretic Peptide   Result Value Ref Range    Pro- <300 pg/mL   Troponin   Result Value Ref Range    Troponin, High Sensitivity 39 (H) 0 - 14 ng/L   Troponin   Result Value Ref Range    Troponin, High Sensitivity 41 (H) 0 - 14 ng/L   Calcium, Ionized   Result Value Ref Range    Calcium, Ionized 1.11 (L) 1.13 - 1.33 mmol/L   Troponin   Result Value Ref Range    Troponin, High Sensitivity 36 (H) 0 - 14 ng/L   SPECIMEN REJECTION   Result Value Ref Range    Specimen Source . NASOPHARYNGEAL SWAB     Ordered Test COVRB     Reason for Rejection       Unable to perform testing: Specimen age beyond stability limit. RADIOLOGY:  CT ABDOMEN W CONTRAST Additional Contrast? None   Final Result   No acute abdominal or pelvic abnormality. Nonobstructing renal stones bilaterally. Left-sided hydronephrosis with a   ureteral stent and multiple stones within the visualized left ureter. CT CHEST PULMONARY EMBOLISM W CONTRAST   Final Result   There is no acute or chronic pulmonary embolism. Atelectasis in the lung bases. Esophagus appears grossly unremarkable without acute process. EKG  EKG Interpretation    Interpreted by emergency department physician    Rhythm: normal sinus   Rate: normal  Axis: normal  Ectopy: none  Conduction: Left bundle branch block  ST Segments: no acute change  T Waves: no acute change  Q Waves: nonspecific    Clinical Impression: non-specific EKG    Lauren Watts MD     All EKG's are interpreted by the Emergency Department Physician who either signs or Co-signs this chart in the absence of a cardiologist.        PROCEDURES:  None    CONSULTS:  IP CONSULT TO CARDIOLOGY    CRITICAL CARE:  There was significant risk of life threatening deterioration of patient's condition requiring my direct management. Critical care time 5 minutes, excluding any documented procedures. FINAL IMPRESSION      1.  Chest pain, unspecified type          DISPOSITION / [address] Admitted 12/11/2022 01:34:31 AM      PATIENT REFERRED TO:  No follow-up provider specified.     DISCHARGE MEDICATIONS:  Current Discharge Medication List          Matti Zhu MD  Emergency Medicine Resident    (Please note that portions of thisnote were completed with a voice recognition program.  Efforts were made to edit the dictations but occasionally words are mis-transcribed.)       Matti Zhu MD  Resident  12/11/22 6539

## 2022-12-11 NOTE — ED PROVIDER NOTES
[facility]     Emergency Department     Faculty Note/ Attestation      Pt Name: Nancy Reaves                                       MRN: [id number]  Ke[place]falbert 1945  Date of evaluation: 12/10/2022    Patients PCP:    Sarina Apgar, DO    Attestation  I performed a history and physical examination of the patient and discussed management with the resident. I reviewed the residents note and agree with the documented findings and plan of care. Any areas of disagreement are noted on the chart. I was personally present for the key portions of any procedures. I have documented in the chart those procedures where I was not present during the key portions. I have reviewed the emergency nurses triage note. I agree with the chief complaint, past medical history, past surgical history, allergies, medications, social and family history as documented unless otherwise noted below. For Physician Assistant/ Nurse Practitioner cases/documentation I have personally evaluated this patient and have completed at least one if not all key elements of the E/M (history, physical exam, and MDM). Additional findings are as noted. Initial Screens:        [place] Coma Scale  Eye Opening: Spontaneous  Best Verbal Response: Confused  Best Motor Response: Obeys commands  [place] Coma Scale Score: 14    Vitals:    Vitals:    12/10/22 2041   Pulse: 93   Resp: 20   Temp: 98.5 °F (36.9 °C)   TempSrc: Oral   Weight: 126 lb (57.2 kg)   Height: 5' 7\" (1.702 m)       CHIEF COMPLAINT       Chief Complaint   Patient presents with    Choking       The pt is a severely demented 75 YO F who most of the history needs to be obtained by the daughter. The pt was eating tonight had roast beef and chocked so bad that she was vomiting which seemed to clear the obstruction. The pt has been having severe chest and low neck pain pt pointing to the sternum and the clavicle region. The pt notes it is severe but unable to describe. No fevers coughing or shortness of breath at this time. EMERGENCY DEPARTMENT COURSE:     -------------------------   , Temp: 98.5 °F (36.9 °C), Heart Rate: 93, Resp: 20  Physical Exam  Constitutional:       Appearance: She is well-developed. She is not diaphoretic. HENT:      Head: Normocephalic and atraumatic. Right Ear: External ear normal.      Left Ear: External ear normal.   Eyes:      General: No scleral icterus. Right eye: No discharge. Left eye: No discharge. Neck:      Trachea: No tracheal deviation. Cardiovascular:      Rate and Rhythm: Normal rate. Pulses: Normal pulses. Heart sounds: No murmur heard. Pulmonary:      Effort: Pulmonary effort is normal. No respiratory distress. Breath sounds: No stridor. No wheezing or rhonchi. Musculoskeletal:         General: Normal range of motion. Cervical back: Normal range of motion. Skin:     General: Skin is warm and dry. Neurological:      Mental Status: She is alert and oriented to person, place, and time. Coordination: Coordination normal.      Comments: Tremmors present diffusely   Psychiatric:         Behavior: Behavior normal.         Comments  The patient presents with complaint of chest pain. Based on history and physical exam the patient is unlikely to have pneumonia with the clear lungs and no signs. There is no signs of pneumothorax. The pt having the pain sudden onset with the vomiting is concerning though for esophageal rupture will need CT scan this can also exclude PE given the tachycardia that the pt was having at points. The pt has less likely signs of ACS but will obtain EKG and troponins.   The pt will need re evaluation    ED Course as of 12/10/22 2327   Sat Dec 10, 2022   2118 EKG Interpretation   Interpreted by Lori Sierra DO    Rhythm: normal sinus   Rate: normal  Axis: normal  Ectopy: none  Conduction: LBBB and QTc prolognation  ST Segments: normal  T Waves: normal  Q Waves: none    Clinical Impression: prolonged QTc wide complex LBBB [WK]   2321 Initial troponin 39, repeat 41. Uptrending troponins. [SS]      ED Course User Index  [SS] Sautrnino Gutierrez MD  [WK] Desiree Smith DO   Signed out to Dr. Desiree Cordero for likely admission for ACS eval    Desiree Smith DO,, DO, RDMS.   Attending Emergency Physician         Desiree Smith DO  12/10/22 2182

## 2022-12-11 NOTE — ED NOTES
Pt brief was changed, pt lying on stretcher, no needs at this time     Tony Urbina [facility]  12/10/22 0502

## 2022-12-12 ENCOUNTER — CARE COORDINATION (OUTPATIENT)
Dept: CASE MANAGEMENT | Age: 77
End: 2022-12-12

## 2022-12-12 DIAGNOSIS — R07.9 CHEST PAIN, UNSPECIFIED TYPE: Primary | ICD-10-CM

## 2022-12-12 PROCEDURE — 1111F DSCHRG MED/CURRENT MED MERGE: CPT | Performed by: FAMILY MEDICINE

## 2022-12-13 LAB
EKG ATRIAL RATE: 80 BPM
EKG ATRIAL RATE: 95 BPM
EKG P AXIS: 108 DEGREES
EKG P AXIS: 80 DEGREES
EKG P-R INTERVAL: 198 MS
EKG P-R INTERVAL: 212 MS
EKG Q-T INTERVAL: 412 MS
EKG Q-T INTERVAL: 418 MS
EKG QRS DURATION: 118 MS
EKG QRS DURATION: 124 MS
EKG QTC CALCULATION (BAZETT): 482 MS
EKG QTC CALCULATION (BAZETT): 517 MS
EKG R AXIS: 2 DEGREES
EKG R AXIS: 60 DEGREES
EKG T AXIS: 61 DEGREES
EKG T AXIS: 90 DEGREES
EKG VENTRICULAR RATE: 80 BPM
EKG VENTRICULAR RATE: 95 BPM

## 2022-12-13 PROCEDURE — 93010 ELECTROCARDIOGRAM REPORT: CPT | Performed by: INTERNAL MEDICINE

## 2022-12-15 RX ORDER — DESMOPRESSIN ACETATE 0.2 MG/1
0.6 TABLET ORAL DAILY
COMMUNITY

## 2022-12-16 ENCOUNTER — CARE COORDINATION (OUTPATIENT)
Dept: CASE MANAGEMENT | Age: 77
End: 2022-12-16

## 2022-12-16 NOTE — CARE COORDINATION
[facility] Care Transitions Follow Up Call    LPN Care Coordinator contacted the family by telephone to follow up after admission on 12/10/22. Verified name and  with family as identifiers. Patient: Shu Geller  Patient : 1945   MRN: [id number]  Reason for Admission: chest pain r/t choking- cystitis  with hematuria KIDNEY STONES  Discharge Date: 22 RARS: Readmission Risk Score: 16.5      Needs to be reviewed by the provider   Additional needs identified to be addressed with provider: No  none             Method of communication with provider: none. Subsequent transitional call. Spoke to :  Daughter Ayad Lomeli. Diya Price has dementia and unable to speak on the phone. Ayad Lomeli states her mother is doing fine. No chest pain, fever, chills, nausea or vomiting. She states her mother did not have chest pain- the pain was related to choking on a dry piece of pot roast. Pt has no dentures. Eating soft, pureed foods now. Dx  of UTI. Incontinent of urine. Kidney stones noted and cystoscopy scheduled on 22. Ayad Lomeli declines to follow up with her mother's PCP, She will follow up with Urology after surgery. Pt and family are moving to [place] day after [place]. No new issues or concerns. Final call. Addressed changes since last contact:  none  Discussed follow-up appointments. If no appointment was previously scheduled, appointment scheduling offered: Yes. Is follow up appointment scheduled within 7 days of discharge? No.    Follow Up  No future appointments. [facility] follow [facility] appointment(s): M/A    LPN Care Coordinator reviewed discharge instructions with family and discussed any barriers to care and/or understanding of plan of care after discharge. Discussed appropriate site of care based on symptoms and resources available to patient including: PCP  Specialist  When to call [address] The family agrees to contact the PCP office for questions related to their healthcare.      Advance Care Planning:   reviewed and current. Patients top risk factors for readmission: medical condition-choking UTI cystitis  Interventions to address risk factors: Obtained and reviewed discharge summary and/or continuity of care documents    Offered patient enrollment in the Remote Patient Monitoring (RPM) program for in-home monitoring: Patient declined. Moving to [facility] Transitions Subsequent and Final Call    Subsequent and Final Calls  Care Transitions Interventions  Other Interventions:             LPN Care Coordinator provided contact information for future needs. No further follow-up call indicated based on severity of symptoms and risk factors.   Plan for next call:  ana Nieves LPN   [facility] LPN Care Transitions Nurse   [phone]

## 2022-12-19 ENCOUNTER — HOSPITAL ENCOUNTER (INPATIENT)
Age: 77
LOS: 3 days | Discharge: HOME | End: 2022-12-22
Attending: EMERGENCY MEDICINE | Admitting: INTERNAL MEDICINE
Payer: COMMERCIAL

## 2022-12-19 ENCOUNTER — APPOINTMENT (OUTPATIENT)
Dept: GENERAL RADIOLOGY | Age: 77
End: 2022-12-19
Payer: COMMERCIAL

## 2022-12-19 ENCOUNTER — HOSPITAL ENCOUNTER (OUTPATIENT)
Age: 77
Setting detail: OUTPATIENT SURGERY
Discharge: HOME | End: 2022-12-19
Attending: UROLOGY | Admitting: UROLOGY
Payer: COMMERCIAL

## 2022-12-19 ENCOUNTER — ANESTHESIA (OUTPATIENT)
Dept: OPERATING ROOM | Age: 77
End: 2022-12-19
Payer: COMMERCIAL

## 2022-12-19 ENCOUNTER — ANESTHESIA EVENT (OUTPATIENT)
Dept: OPERATING ROOM | Age: 77
End: 2022-12-19
Payer: COMMERCIAL

## 2022-12-19 ENCOUNTER — APPOINTMENT (OUTPATIENT)
Dept: CT IMAGING | Age: 77
End: 2022-12-19
Payer: COMMERCIAL

## 2022-12-19 VITALS
HEART RATE: 99 BPM | RESPIRATION RATE: 19 BRPM | WEIGHT: 130 LBS | OXYGEN SATURATION: 94 % | SYSTOLIC BLOOD PRESSURE: 117 MMHG | DIASTOLIC BLOOD PRESSURE: 71 MMHG | BODY MASS INDEX: 20.4 KG/M2 | HEIGHT: 67 IN | TEMPERATURE: 98.2 F

## 2022-12-19 DIAGNOSIS — K92.2 GASTROINTESTINAL HEMORRHAGE, UNSPECIFIED GASTROINTESTINAL HEMORRHAGE TYPE: ICD-10-CM

## 2022-12-19 DIAGNOSIS — N39.0 COMPLICATED UTI (URINARY TRACT INFECTION): ICD-10-CM

## 2022-12-19 DIAGNOSIS — A41.9 SEPTIC SHOCK (HCC): Primary | ICD-10-CM

## 2022-12-19 DIAGNOSIS — Z96.0 URETERAL STENT PRESENT: Primary | ICD-10-CM

## 2022-12-19 DIAGNOSIS — R65.21 SEPTIC SHOCK (HCC): Primary | ICD-10-CM

## 2022-12-19 PROBLEM — N12 PYELONEPHRITIS: Status: ACTIVE | Noted: 2022-12-19

## 2022-12-19 LAB
ABSOLUTE EOS #: 0 K/UL (ref 0–0.44)
ABSOLUTE IMMATURE GRANULOCYTE: 0 K/UL (ref 0–0.3)
ABSOLUTE LYMPH #: 0.26 K/UL (ref 1.1–3.7)
ABSOLUTE MONO #: 0.13 K/UL (ref 0.1–1.2)
ALBUMIN SERPL-MCNC: 3.3 G/DL (ref 3.5–5.2)
ALBUMIN/GLOBULIN RATIO: 0.8 (ref 1–2.5)
ALP BLD-CCNC: 362 U/L (ref 35–104)
ALT SERPL-CCNC: 13 U/L (ref 5–33)
ANION GAP SERPL CALCULATED.3IONS-SCNC: 18 MMOL/L (ref 9–17)
AST SERPL-CCNC: 26 U/L
BASOPHILS # BLD: 1 % (ref 0–2)
BASOPHILS ABSOLUTE: 0.13 K/UL (ref 0–0.2)
BILIRUB SERPL-MCNC: 0.3 MG/DL (ref 0.3–1.2)
BILIRUBIN DIRECT: 0.2 MG/DL
BILIRUBIN URINE: ABNORMAL
BILIRUBIN, INDIRECT: 0.1 MG/DL (ref 0–1)
BUN BLDV-MCNC: 20 MG/DL (ref 8–23)
CALCIUM SERPL-MCNC: 8.6 MG/DL (ref 8.6–10.4)
CASTS UA: ABNORMAL /LPF (ref 0–8)
CHLORIDE BLD-SCNC: 96 MMOL/L (ref 98–107)
CO2: 21 MMOL/L (ref 20–31)
COLOR: ABNORMAL
CREAT SERPL-MCNC: 1.27 MG/DL (ref 0.5–0.9)
EOSINOPHILS RELATIVE PERCENT: 0 % (ref 1–4)
EPITHELIAL CELLS UA: ABNORMAL /HPF (ref 0–5)
GFR SERPL CREATININE-BSD FRML MDRD: 43 ML/MIN/1.73M2
GLUCOSE BLD-MCNC: 93 MG/DL (ref 70–99)
GLUCOSE URINE: NEGATIVE
HCT VFR BLD CALC: 39.7 % (ref 36.3–47.1)
HEMOGLOBIN: 12.7 G/DL (ref 11.9–15.1)
IMMATURE GRANULOCYTES: 0 %
INR BLD: 1
KETONES, URINE: NEGATIVE
LACTIC ACID, WHOLE BLOOD: 3.8 MMOL/L (ref 0.7–2.1)
LACTIC ACID, WHOLE BLOOD: 4.7 MMOL/L (ref 0.7–2.1)
LEUKOCYTE ESTERASE, URINE: ABNORMAL
LIPASE: 40 U/L (ref 13–60)
LYMPHOCYTES # BLD: 2 % (ref 24–43)
MCH RBC QN AUTO: 31.7 PG (ref 25.2–33.5)
MCHC RBC AUTO-ENTMCNC: 32 G/DL (ref 28.4–34.8)
MCV RBC AUTO: 99 FL (ref 82.6–102.9)
MONOCYTES # BLD: 1 % (ref 3–12)
MORPHOLOGY: ABNORMAL
NITRITE, URINE: POSITIVE
NRBC AUTOMATED: 0 PER 100 WBC
PARTIAL THROMBOPLASTIN TIME: 21.4 SEC (ref 20.5–30.5)
PDW BLD-RTO: 15.4 % (ref 11.8–14.4)
PH UA: 7 (ref 5–8)
PLATELET # BLD: 395 K/UL (ref 138–453)
PMV BLD AUTO: 8.6 FL (ref 8.1–13.5)
POC POTASSIUM: 3.9 MMOL/L (ref 3.5–4.5)
POTASSIUM SERPL-SCNC: 4.2 MMOL/L (ref 3.7–5.3)
PROTEIN UA: ABNORMAL
PROTHROMBIN TIME: 10.4 SEC (ref 9.1–12.3)
RBC # BLD: 4.01 M/UL (ref 3.95–5.11)
RBC UA: ABNORMAL /HPF (ref 0–2)
SEG NEUTROPHILS: 96 % (ref 36–65)
SEGMENTED NEUTROPHILS ABSOLUTE COUNT: 12.58 K/UL (ref 1.5–8.1)
SODIUM BLD-SCNC: 135 MMOL/L (ref 135–144)
SPECIFIC GRAVITY UA: 1.01 (ref 1–1.03)
TOTAL PROTEIN: 7.3 G/DL (ref 6.4–8.3)
TROPONIN, HIGH SENSITIVITY: 51 NG/L (ref 0–14)
TROPONIN, HIGH SENSITIVITY: 58 NG/L (ref 0–14)
TURBIDITY: ABNORMAL
URINE HGB: ABNORMAL
UROBILINOGEN, URINE: NORMAL
WBC # BLD: 13.1 K/UL (ref 3.5–11.3)
WBC UA: ABNORMAL /HPF (ref 0–5)

## 2022-12-19 PROCEDURE — 2500000003 HC RX 250 WO HCPCS

## 2022-12-19 PROCEDURE — 80048 BASIC METABOLIC PNL TOTAL CA: CPT

## 2022-12-19 PROCEDURE — 0TP98DZ REMOVAL OF INTRALUMINAL DEVICE FROM URETER, VIA NATURAL OR ARTIFICIAL OPENING ENDOSCOPIC: ICD-10-PCS | Performed by: UROLOGY

## 2022-12-19 PROCEDURE — 6360000002 HC RX W HCPCS

## 2022-12-19 PROCEDURE — 74177 CT ABD & PELVIS W/CONTRAST: CPT

## 2022-12-19 PROCEDURE — 87040 BLOOD CULTURE FOR BACTERIA: CPT

## 2022-12-19 PROCEDURE — 6360000002 HC RX W HCPCS: Performed by: STUDENT IN AN ORGANIZED HEALTH CARE EDUCATION/TRAINING PROGRAM

## 2022-12-19 PROCEDURE — 3600000014 HC SURGERY LEVEL 4 ADDTL 15MIN: Performed by: UROLOGY

## 2022-12-19 PROCEDURE — 84132 ASSAY OF SERUM POTASSIUM: CPT

## 2022-12-19 PROCEDURE — 2580000003 HC RX 258: Performed by: UROLOGY

## 2022-12-19 PROCEDURE — 7100000000 HC PACU RECOVERY - FIRST 15 MIN: Performed by: UROLOGY

## 2022-12-19 PROCEDURE — 96374 THER/PROPH/DIAG INJ IV PUSH: CPT

## 2022-12-19 PROCEDURE — 99221 1ST HOSP IP/OBS SF/LOW 40: CPT | Performed by: INTERNAL MEDICINE

## 2022-12-19 PROCEDURE — 2580000003 HC RX 258

## 2022-12-19 PROCEDURE — 7100000001 HC PACU RECOVERY - ADDTL 15 MIN: Performed by: UROLOGY

## 2022-12-19 PROCEDURE — 3700000000 HC ANESTHESIA ATTENDED CARE: Performed by: UROLOGY

## 2022-12-19 PROCEDURE — 3700000001 HC ADD 15 MINUTES (ANESTHESIA): Performed by: UROLOGY

## 2022-12-19 PROCEDURE — 93005 ELECTROCARDIOGRAM TRACING: CPT

## 2022-12-19 PROCEDURE — 87641 MR-STAPH DNA AMP PROBE: CPT

## 2022-12-19 PROCEDURE — 85610 PROTHROMBIN TIME: CPT

## 2022-12-19 PROCEDURE — 80076 HEPATIC FUNCTION PANEL: CPT

## 2022-12-19 PROCEDURE — 2709999900 HC NON-CHARGEABLE SUPPLY: Performed by: UROLOGY

## 2022-12-19 PROCEDURE — 2580000003 HC RX 258: Performed by: SPECIALIST

## 2022-12-19 PROCEDURE — 85025 COMPLETE CBC W/AUTO DIFF WBC: CPT

## 2022-12-19 PROCEDURE — 83605 ASSAY OF LACTIC ACID: CPT

## 2022-12-19 PROCEDURE — 6360000002 HC RX W HCPCS: Performed by: SPECIALIST

## 2022-12-19 PROCEDURE — 87086 URINE CULTURE/COLONY COUNT: CPT

## 2022-12-19 PROCEDURE — 81001 URINALYSIS AUTO W/SCOPE: CPT

## 2022-12-19 PROCEDURE — 71045 X-RAY EXAM CHEST 1 VIEW: CPT

## 2022-12-19 PROCEDURE — 7100000011 HC PHASE II RECOVERY - ADDTL 15 MIN: Performed by: UROLOGY

## 2022-12-19 PROCEDURE — 3600000004 HC SURGERY LEVEL 4 BASE: Performed by: UROLOGY

## 2022-12-19 PROCEDURE — 83690 ASSAY OF LIPASE: CPT

## 2022-12-19 PROCEDURE — C2617 STENT, NON-COR, TEM W/O DEL: HCPCS | Performed by: UROLOGY

## 2022-12-19 PROCEDURE — 2580000003 HC RX 258: Performed by: STUDENT IN AN ORGANIZED HEALTH CARE EDUCATION/TRAINING PROGRAM

## 2022-12-19 PROCEDURE — 71260 CT THORAX DX C+: CPT

## 2022-12-19 PROCEDURE — 2500000003 HC RX 250 WO HCPCS: Performed by: SPECIALIST

## 2022-12-19 PROCEDURE — 7100000010 HC PHASE II RECOVERY - FIRST 15 MIN: Performed by: UROLOGY

## 2022-12-19 PROCEDURE — 6370000000 HC RX 637 (ALT 250 FOR IP)

## 2022-12-19 PROCEDURE — 6360000004 HC RX CONTRAST MEDICATION

## 2022-12-19 PROCEDURE — 99285 EMERGENCY DEPT VISIT HI MDM: CPT

## 2022-12-19 PROCEDURE — 85730 THROMBOPLASTIN TIME PARTIAL: CPT

## 2022-12-19 PROCEDURE — 2000000000 HC ICU R&B

## 2022-12-19 PROCEDURE — 02HV33Z INSERTION OF INFUSION DEVICE INTO SUPERIOR VENA CAVA, PERCUTANEOUS APPROACH: ICD-10-PCS | Performed by: EMERGENCY MEDICINE

## 2022-12-19 PROCEDURE — 0T778DZ DILATION OF LEFT URETER WITH INTRALUMINAL DEVICE, VIA NATURAL OR ARTIFICIAL OPENING ENDOSCOPIC: ICD-10-PCS | Performed by: UROLOGY

## 2022-12-19 PROCEDURE — 36415 COLL VENOUS BLD VENIPUNCTURE: CPT

## 2022-12-19 PROCEDURE — 0TC48ZZ EXTIRPATION OF MATTER FROM LEFT KIDNEY PELVIS, VIA NATURAL OR ARTIFICIAL OPENING ENDOSCOPIC: ICD-10-PCS | Performed by: UROLOGY

## 2022-12-19 PROCEDURE — C9113 INJ PANTOPRAZOLE SODIUM, VIA: HCPCS

## 2022-12-19 PROCEDURE — 84484 ASSAY OF TROPONIN QUANT: CPT

## 2022-12-19 PROCEDURE — A4216 STERILE WATER/SALINE, 10 ML: HCPCS

## 2022-12-19 DEVICE — URETERAL STENT
Type: IMPLANTABLE DEVICE | Site: URETER | Status: FUNCTIONAL
Brand: POLARIS™ ULTRA

## 2022-12-19 RX ORDER — SODIUM CHLORIDE 0.9 % (FLUSH) 0.9 %
5-40 SYRINGE (ML) INJECTION EVERY 12 HOURS SCHEDULED
Status: DISCONTINUED | OUTPATIENT
Start: 2022-12-19 | End: 2022-12-19 | Stop reason: HOSPADM

## 2022-12-19 RX ORDER — MAGNESIUM HYDROXIDE 1200 MG/15ML
LIQUID ORAL CONTINUOUS PRN
Status: DISCONTINUED | OUTPATIENT
Start: 2022-12-19 | End: 2022-12-19 | Stop reason: HOSPADM

## 2022-12-19 RX ORDER — MAGNESIUM HYDROXIDE 1200 MG/15ML
LIQUID ORAL PRN
Status: DISCONTINUED | OUTPATIENT
Start: 2022-12-19 | End: 2022-12-19 | Stop reason: HOSPADM

## 2022-12-19 RX ORDER — HYDRALAZINE HYDROCHLORIDE 20 MG/ML
10 INJECTION INTRAMUSCULAR; INTRAVENOUS
Status: DISCONTINUED | OUTPATIENT
Start: 2022-12-19 | End: 2022-12-19 | Stop reason: HOSPADM

## 2022-12-19 RX ORDER — SODIUM CHLORIDE, SODIUM LACTATE, POTASSIUM CHLORIDE, CALCIUM CHLORIDE 600; 310; 30; 20 MG/100ML; MG/100ML; MG/100ML; MG/100ML
INJECTION, SOLUTION INTRAVENOUS CONTINUOUS PRN
Status: DISCONTINUED | OUTPATIENT
Start: 2022-12-19 | End: 2022-12-19 | Stop reason: SDUPTHER

## 2022-12-19 RX ORDER — LABETALOL HYDROCHLORIDE 5 MG/ML
10 INJECTION, SOLUTION INTRAVENOUS
Status: DISCONTINUED | OUTPATIENT
Start: 2022-12-19 | End: 2022-12-19 | Stop reason: HOSPADM

## 2022-12-19 RX ORDER — ACETAMINOPHEN 325 MG/1
650 TABLET ORAL EVERY 6 HOURS PRN
Status: DISCONTINUED | OUTPATIENT
Start: 2022-12-19 | End: 2022-12-22 | Stop reason: HOSPADM

## 2022-12-19 RX ORDER — SODIUM CHLORIDE 0.9 % (FLUSH) 0.9 %
5-40 SYRINGE (ML) INJECTION PRN
Status: DISCONTINUED | OUTPATIENT
Start: 2022-12-19 | End: 2022-12-19 | Stop reason: HOSPADM

## 2022-12-19 RX ORDER — ONDANSETRON 4 MG/1
4 TABLET, ORALLY DISINTEGRATING ORAL EVERY 8 HOURS PRN
Status: DISCONTINUED | OUTPATIENT
Start: 2022-12-19 | End: 2022-12-22 | Stop reason: HOSPADM

## 2022-12-19 RX ORDER — LINEZOLID 2 MG/ML
600 INJECTION, SOLUTION INTRAVENOUS ONCE
Status: COMPLETED | OUTPATIENT
Start: 2022-12-19 | End: 2022-12-19

## 2022-12-19 RX ORDER — SODIUM CHLORIDE 9 MG/ML
INJECTION, SOLUTION INTRAVENOUS CONTINUOUS
Status: DISCONTINUED | OUTPATIENT
Start: 2022-12-19 | End: 2022-12-22

## 2022-12-19 RX ORDER — 0.9 % SODIUM CHLORIDE 0.9 %
500 INTRAVENOUS SOLUTION INTRAVENOUS ONCE
Status: COMPLETED | OUTPATIENT
Start: 2022-12-19 | End: 2022-12-19

## 2022-12-19 RX ORDER — HYDROCODONE BITARTRATE AND ACETAMINOPHEN 5; 325 MG/1; MG/1
1 TABLET ORAL EVERY 8 HOURS PRN
Qty: 9 TABLET | Refills: 0 | Status: SHIPPED | OUTPATIENT
Start: 2022-12-19 | End: 2022-12-22

## 2022-12-19 RX ORDER — SODIUM CHLORIDE 9 MG/ML
INJECTION, SOLUTION INTRAVENOUS PRN
Status: DISCONTINUED | OUTPATIENT
Start: 2022-12-19 | End: 2022-12-22 | Stop reason: HOSPADM

## 2022-12-19 RX ORDER — SODIUM CHLORIDE, SODIUM LACTATE, POTASSIUM CHLORIDE, CALCIUM CHLORIDE 600; 310; 30; 20 MG/100ML; MG/100ML; MG/100ML; MG/100ML
INJECTION, SOLUTION INTRAVENOUS CONTINUOUS
Status: DISCONTINUED | OUTPATIENT
Start: 2022-12-19 | End: 2022-12-19 | Stop reason: HOSPADM

## 2022-12-19 RX ORDER — ONDANSETRON 2 MG/ML
4 INJECTION INTRAMUSCULAR; INTRAVENOUS
Status: DISCONTINUED | OUTPATIENT
Start: 2022-12-19 | End: 2022-12-19 | Stop reason: HOSPADM

## 2022-12-19 RX ORDER — FENTANYL CITRATE 50 UG/ML
INJECTION, SOLUTION INTRAMUSCULAR; INTRAVENOUS PRN
Status: DISCONTINUED | OUTPATIENT
Start: 2022-12-19 | End: 2022-12-19 | Stop reason: SDUPTHER

## 2022-12-19 RX ORDER — LINEZOLID 600 MG/1
600 TABLET, FILM COATED ORAL 2 TIMES DAILY
Qty: 14 TABLET | Refills: 0 | Status: ON HOLD | OUTPATIENT
Start: 2022-12-19 | End: 2022-12-22 | Stop reason: HOSPADM

## 2022-12-19 RX ORDER — PROPOFOL 10 MG/ML
INJECTION, EMULSION INTRAVENOUS PRN
Status: DISCONTINUED | OUTPATIENT
Start: 2022-12-19 | End: 2022-12-19 | Stop reason: SDUPTHER

## 2022-12-19 RX ORDER — PHENYLEPHRINE HCL IN 0.9% NACL 0.5 MG/5ML
SYRINGE (ML) INTRAVENOUS PRN
Status: DISCONTINUED | OUTPATIENT
Start: 2022-12-19 | End: 2022-12-19 | Stop reason: SDUPTHER

## 2022-12-19 RX ORDER — ONDANSETRON 2 MG/ML
INJECTION INTRAMUSCULAR; INTRAVENOUS PRN
Status: DISCONTINUED | OUTPATIENT
Start: 2022-12-19 | End: 2022-12-19 | Stop reason: SDUPTHER

## 2022-12-19 RX ORDER — SODIUM CHLORIDE 0.9 % (FLUSH) 0.9 %
5-40 SYRINGE (ML) INJECTION PRN
Status: DISCONTINUED | OUTPATIENT
Start: 2022-12-19 | End: 2022-12-22 | Stop reason: HOSPADM

## 2022-12-19 RX ORDER — CARBAMAZEPINE 200 MG/1
300 TABLET ORAL DAILY
Status: DISCONTINUED | OUTPATIENT
Start: 2022-12-20 | End: 2022-12-22 | Stop reason: HOSPADM

## 2022-12-19 RX ORDER — POLYETHYLENE GLYCOL 3350 17 G/17G
17 POWDER, FOR SOLUTION ORAL DAILY PRN
Status: DISCONTINUED | OUTPATIENT
Start: 2022-12-19 | End: 2022-12-22 | Stop reason: HOSPADM

## 2022-12-19 RX ORDER — TAMSULOSIN HYDROCHLORIDE 0.4 MG/1
0.4 CAPSULE ORAL DAILY
Qty: 30 CAPSULE | Refills: 0 | Status: SHIPPED | OUTPATIENT
Start: 2022-12-19

## 2022-12-19 RX ORDER — CARBAMAZEPINE 200 MG/1
400 TABLET ORAL NIGHTLY
Status: DISCONTINUED | OUTPATIENT
Start: 2022-12-19 | End: 2022-12-22 | Stop reason: HOSPADM

## 2022-12-19 RX ORDER — SODIUM CHLORIDE 0.9 % (FLUSH) 0.9 %
5-40 SYRINGE (ML) INJECTION EVERY 12 HOURS SCHEDULED
Status: DISCONTINUED | OUTPATIENT
Start: 2022-12-19 | End: 2022-12-22 | Stop reason: HOSPADM

## 2022-12-19 RX ORDER — SODIUM CHLORIDE 9 MG/ML
INJECTION, SOLUTION INTRAVENOUS PRN
Status: DISCONTINUED | OUTPATIENT
Start: 2022-12-19 | End: 2022-12-19 | Stop reason: HOSPADM

## 2022-12-19 RX ORDER — HYOSCYAMINE SULFATE 0.12 MG/1
30 TABLET SUBLINGUAL EVERY 6 HOURS PRN
Qty: 30 EACH | Refills: 0 | Status: SHIPPED | OUTPATIENT
Start: 2022-12-19

## 2022-12-19 RX ORDER — LINEZOLID 2 MG/ML
600 INJECTION, SOLUTION INTRAVENOUS EVERY 12 HOURS
Status: DISCONTINUED | OUTPATIENT
Start: 2022-12-19 | End: 2022-12-19

## 2022-12-19 RX ORDER — LIDOCAINE HYDROCHLORIDE 10 MG/ML
INJECTION, SOLUTION EPIDURAL; INFILTRATION; INTRACAUDAL; PERINEURAL PRN
Status: DISCONTINUED | OUTPATIENT
Start: 2022-12-19 | End: 2022-12-19 | Stop reason: SDUPTHER

## 2022-12-19 RX ORDER — ONDANSETRON 2 MG/ML
4 INJECTION INTRAMUSCULAR; INTRAVENOUS EVERY 6 HOURS PRN
Status: DISCONTINUED | OUTPATIENT
Start: 2022-12-19 | End: 2022-12-22 | Stop reason: HOSPADM

## 2022-12-19 RX ORDER — 0.9 % SODIUM CHLORIDE 0.9 %
1250 INTRAVENOUS SOLUTION INTRAVENOUS ONCE
Status: COMPLETED | OUTPATIENT
Start: 2022-12-19 | End: 2022-12-19

## 2022-12-19 RX ORDER — ACETAMINOPHEN 650 MG/1
650 SUPPOSITORY RECTAL EVERY 6 HOURS PRN
Status: DISCONTINUED | OUTPATIENT
Start: 2022-12-19 | End: 2022-12-22 | Stop reason: HOSPADM

## 2022-12-19 RX ORDER — NOREPINEPHRINE BIT/0.9 % NACL 16MG/250ML
1-100 INFUSION BOTTLE (ML) INTRAVENOUS CONTINUOUS
Status: DISCONTINUED | OUTPATIENT
Start: 2022-12-19 | End: 2022-12-21

## 2022-12-19 RX ADMIN — ONDANSETRON 4 MG: 2 INJECTION INTRAMUSCULAR; INTRAVENOUS at 09:19

## 2022-12-19 RX ADMIN — CARBAMAZEPINE 400 MG: 200 TABLET ORAL at 21:41

## 2022-12-19 RX ADMIN — PROPOFOL 150 MG: 10 INJECTION, EMULSION INTRAVENOUS at 08:37

## 2022-12-19 RX ADMIN — Medication 500 MG: at 16:21

## 2022-12-19 RX ADMIN — SODIUM CHLORIDE: 9 INJECTION, SOLUTION INTRAVENOUS at 20:53

## 2022-12-19 RX ADMIN — LINEZOLID 600 MG: 600 INJECTION, SOLUTION INTRAVENOUS at 09:04

## 2022-12-19 RX ADMIN — Medication 5 MCG/MIN: at 18:32

## 2022-12-19 RX ADMIN — Medication 200 MCG: at 09:07

## 2022-12-19 RX ADMIN — SODIUM CHLORIDE, PRESERVATIVE FREE 10 ML: 5 INJECTION INTRAVENOUS at 20:54

## 2022-12-19 RX ADMIN — MEROPENEM 1000 MG: 1 INJECTION, POWDER, FOR SOLUTION INTRAVENOUS at 08:44

## 2022-12-19 RX ADMIN — Medication 200 MCG: at 08:45

## 2022-12-19 RX ADMIN — SODIUM CHLORIDE 500 ML: 9 INJECTION, SOLUTION INTRAVENOUS at 14:38

## 2022-12-19 RX ADMIN — Medication 1000 MG: at 17:49

## 2022-12-19 RX ADMIN — Medication 7 MCG/MIN: at 18:52

## 2022-12-19 RX ADMIN — LIDOCAINE HYDROCHLORIDE 50 MG: 10 INJECTION, SOLUTION EPIDURAL; INFILTRATION; INTRACAUDAL; PERINEURAL at 08:37

## 2022-12-19 RX ADMIN — Medication 200 MCG: at 08:55

## 2022-12-19 RX ADMIN — MEROPENEM 1000 MG: 1 INJECTION, POWDER, FOR SOLUTION INTRAVENOUS at 20:52

## 2022-12-19 RX ADMIN — Medication 9 MCG/MIN: at 19:04

## 2022-12-19 RX ADMIN — IOPAMIDOL 75 ML: 755 INJECTION, SOLUTION INTRAVENOUS at 15:23

## 2022-12-19 RX ADMIN — SODIUM CHLORIDE, PRESERVATIVE FREE 40 MG: 5 INJECTION INTRAVENOUS at 14:38

## 2022-12-19 RX ADMIN — SODIUM CHLORIDE 1250 ML: 9 INJECTION, SOLUTION INTRAVENOUS at 16:22

## 2022-12-19 RX ADMIN — SODIUM CHLORIDE, POTASSIUM CHLORIDE, SODIUM LACTATE AND CALCIUM CHLORIDE: 600; 310; 30; 20 INJECTION, SOLUTION INTRAVENOUS at 07:30

## 2022-12-19 RX ADMIN — FENTANYL CITRATE 50 MCG: 50 INJECTION, SOLUTION INTRAMUSCULAR; INTRAVENOUS at 08:37

## 2022-12-19 RX ADMIN — SODIUM CHLORIDE, POTASSIUM CHLORIDE, SODIUM LACTATE AND CALCIUM CHLORIDE: 600; 310; 30; 20 INJECTION, SOLUTION INTRAVENOUS at 08:29

## 2022-12-19 ASSESSMENT — ENCOUNTER SYMPTOMS
NAUSEA: 0
COUGH: 0
VOMITING: 1
STRIDOR: 0
SHORTNESS OF BREATH: 1
PHOTOPHOBIA: 0
RHINORRHEA: 0
WHEEZING: 0
ABDOMINAL DISTENTION: 0
SORE THROAT: 0
SHORTNESS OF BREATH: 0
ABDOMINAL PAIN: 0
CONSTIPATION: 1
NAUSEA: 1
SHORTNESS OF BREATH: 0
CONSTIPATION: 0
DIARRHEA: 0

## 2022-12-19 ASSESSMENT — COPD QUESTIONNAIRES: CAT_SEVERITY: NO INTERVAL CHANGE

## 2022-12-19 ASSESSMENT — PAIN - FUNCTIONAL ASSESSMENT: PAIN_FUNCTIONAL_ASSESSMENT: 0-10

## 2022-12-19 ASSESSMENT — LIFESTYLE VARIABLES: SMOKING_STATUS: 0

## 2022-12-19 NOTE — ED NOTES
The following labs were labeled with appropriate pt sticker and tubed to lab:     [] Blue     [] Lavender   [] on ice  [] Green/yellow  [] Green/black [] on ice  [] Scherrie Flattery  [] on ice  [] Yellow  [] Red  [] Type/ Screen  [] ABG  [] VBG    [] COVID-19 swab    [] Rapid  [] PCR  [] Flu swab  [] Peds Viral Panel     [x] Urine Sample  [] Fecal Sample  [] Pelvic Cultures  [] Blood Cultures  [] X 2  [] STREP Cultures         Chema Vázquez RN  12/19/22 4644

## 2022-12-19 NOTE — OP NOTE
FACILITY:  [address] [place] [address], [address]   1945  [id number]    DATE: 12/19/22  SURGEON:  Dr. Jaime Brock MD  ASSISTANT: Sinan Shetty MD  PREOPERATIVE DIAGNOSIS: Retained left ureteral stent  Complicated UTI  POSTOPERATIVE DIAGNOSIS: Same  PROCEDURES PERFORMED:  1. Cystoscopy  2. Left sided ureteroscopy with holmium laser lithotripsy  3. Left sided stent exchange  4. Left extracorporeal shockwave lithotripsy  DRAINS: Left sided 6 Fr x 26 cm double J ureteral stent (with string)  SPECIMEN: None  ANESTHESIA: General endotracheal  ESTIMATED BLOOD LOSS: None  COMPLICATIONS: None     INDICATIONS FOR PROCEDURE:  Jasper Phillips is a 76 y.o. female who had an obstructing left ureteral stone seen over the summer. She had a stent placed at that time. She underwent cystoscopy with attempted left ureteral stent exchange on 12/2/2022. She presents for definitive stone treatment. After the risks, benefits, alternatives, of the procedure were discussed with the patient, informed consent was obtained. The patient elected to proceed. DETAILS OF THE PROCEDURE:  The patient was brought back from the preoperative holding area to the operating suite, and was transferred to the operating table where the patient lay in supine position. EPC's were in place, connected to the machine and the machine was turned on before induction. General endotracheal anesthesia was induced, and patient was prepped and draped in standard surgical fashion after being placed in dorsolithotomy position. A proper timeout was performed, preoperative antibiotics were given. The lithotriptor was positioned using fluoroscopic guidance. 500 shocks were administered to the left side. There was good fragmentation of the stone on fluoroscopy. We began by inserting a cystoscope with a 22 Panamanian sheath and 30 degree lens into the patient's urethral meatus and advancing into the bladder without complication.   We then focused our attention on the left ureteral orifice, which had left ureteral stent in place. The distal end of the stent appeared encrusted. We cannulated the left ureteral orifice with a straight Glidewire alongside the stent. We advanced the wire up to the renal pelvis, confirmed on fluoroscopy. Using a stent grasper, we carefully pulled the stent and removed it from the body. We then used a dual lumen catheter to place a second wire. Once in good position, we advanced our flexible ureteroscope over the working wire to the renal pelvis under direct visualization. We did not identify any stones in the left ureter. We identified a stone in the left mi pole and using a 200 micron holmium laser fiber we fragmented the calculus. It was dusted and the fragments appeared to be under 1 millimeter and could pass easily. We lasered a few other small fragments in the kidney. At this time a complete pyeloscopy was performed and no other substantial fragments were identified. We withdrew the ureteroscope and visualized the entire ureter. No stone fragments were identified. No damage to the ureter was identified. At this time, over the remaining glidewire, we placed a 6 Fr x 26 cm double J ureteral stent in the usual fashion, and we noted appropriate placement in the upper collecting system using fluoroscopy. There was a good curl noted in the bladder. We decided to leave a string at the end of the stent, which was attached with benzoin and steri-strips. The patient's bladder was drained and removed the scope and the procedure was subsequently terminated. Dr. Casillas was present for all critical portions of the procedure.     Plan:  Discharge home in good condition  The patient can pull the stent in 5 days

## 2022-12-19 NOTE — ED PROVIDER NOTES
[facility]     Emergency Department     Faculty Attestation    I performed a history and physical examination of the patient and discussed management with the resident. I reviewed the residents note and agree with the documented findings and plan of care. Any areas of disagreement are noted on the chart. I was personally present for the key portions of any procedures. I have documented in the chart those procedures where I was not present during the key portions. I have reviewed the emergency nurses triage note. I agree with the chief complaint, past medical history, past surgical history, allergies, medications, social and family history as documented unless otherwise noted below. For Physician Assistant/ Nurse Practitioner cases/documentation I have personally evaluated this patient and have completed at least one if not all key elements of the E/M (history, physical exam, and MDM). Additional findings are as noted.       Primary Care Physician:  Abigail Ennis DO    CHIEF COMPLAINT       Chief Complaint   Patient presents with    Nausea       RECENT VITALS:   Temp: (!) 100.5 °F (38.1 °C),  Heart Rate: (!) 114, Resp: 20, BP: 127/60    LABS:  Labs Reviewed   CBC WITH AUTO DIFFERENTIAL   BASIC METABOLIC PANEL   TROPONIN   TROPONIN   LIPASE   HEPATIC FUNCTION PANEL   PROTIME-INR   APTT     EKG A. fib with a flutter variable block rate of 102 bpm QRS durations 120 ms QT corrected is 4 to 510 ms axis is 45    PERTINENT ATTENDING PHYSICIAN COMMENTS:    Presents with low-grade temp nausea vomiting of coffee-ground material after having urology procedure this morning    Critical Care          Jordyn Aguilera MD,  MD, [facility]  Attending Emergency  Physician              Jordyn Aguilera MD  12/19/22 5755

## 2022-12-19 NOTE — ED PROVIDER NOTES
Faculty Sign-Out Attestation  Handoff taken on the following patient from prior Attending Physician: Jeff Whitaker    I was available and discussed any additional care issues that arose and coordinated the management plans with the resident(s) caring for the patient during my duty period. Any areas of disagreement with residents documentation of care or procedures are noted on the chart. I was personally present for the key portions of any/all procedures during my duty period. I have documented in the chart those procedures where I was not present during the key portions. CT CHEST PULMONARY EMBOLISM W CONTRAST   Preliminary Result   1. Interval improvement in left hydronephrosis. Left ureteral stent is noted   with persistent mild-to-moderate hydronephrosis and perinephric stranding. There is a 2 to thickening and enhancement of the left renal pelvis and   proximal ureter suggestive of superimposed infection or inflammation. 2. Nonobstructing bilateral renal calculi, as well as a 3 mm mid left   ureteral calculus, and bladder calculi. 3. No pulmonary embolus. 4. Slightly increasing right basilar opacity either reflecting atelectasis   versus pneumonia. Given patulous esophagus with an air-fluid level,   aspiration can be a consideration. 5. Mild nonspecific periportal edema. CT ABDOMEN PELVIS W IV CONTRAST Additional Contrast? None   Preliminary Result   1. Interval improvement in left hydronephrosis. Left ureteral stent is noted   with persistent mild-to-moderate hydronephrosis and perinephric stranding. There is a 2 to thickening and enhancement of the left renal pelvis and   proximal ureter suggestive of superimposed infection or inflammation. 2. Nonobstructing bilateral renal calculi, as well as a 3 mm mid left   ureteral calculus, and bladder calculi. 3. No pulmonary embolus. 4. Slightly increasing right basilar opacity either reflecting atelectasis   versus pneumonia.   Given patulous esophagus with an air-fluid level,   aspiration can be a consideration. 5. Mild nonspecific periportal edema. XR CHEST PORTABLE   Final Result   Increasing interstitial and bibasilar opacities with probable small left   pleural effusion. Findings may reflect edema in the appropriate clinical   setting versus pneumonia.                Donna Bojorquez MD  Attending Physician       Donna Bojorquez MD  12/19/22 0800

## 2022-12-19 NOTE — H&P
[facility]     Department of Internal Medicine - Staff Internal Medicine Teaching Service          ADMISSION NOTE/HISTORY AND PHYSICAL EXAMINATION   Date: 12/19/2022  Patient Name: Pan Carney  Date of admission: 12/19/2022  1:52 PM  YOB: 1945  PCP: Kelton Arzate DO  History Obtained From:  patient, family member - Daughter, electronic medical record    CHIEF COMPLAINT     CC: Coffee-ground emesis    HISTORY OF PRESENTING ILLNESS     The patient is a 76 y.o. 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 The patient underwent cystoscopy with Left sided laser lithotripsy, left sided stent exchange, and left extracorporeal shockwave lithotripsy. The patient does not complain of anything and is a poor historian d/t h/o dementia, history taken from daughter and EMR. Her daughter stated that they went home and she had soft diet of yogurt and apple sauce when she subsequently developed emesis, described as \"Black\". She had 4 episodes all of the same, and did have some hematuria that was noted, but to be expected after her procedure. The patient has no complaints. Review of Systems   Constitutional:  Negative for chills, fatigue and fever. Respiratory:  Negative for cough, shortness of breath and wheezing. Cardiovascular:  Negative for chest pain, palpitations and leg swelling. Gastrointestinal:  Positive for constipation (intermittent, per daughter), nausea and vomiting. Negative for abdominal distention, abdominal pain and diarrhea. Endocrine: Positive for polyuria. Genitourinary:  Positive for hematuria. Negative for difficulty urinating, dysuria, frequency and urgency.       Initial Vitals on presentation :  Temp: 100.5, RR: 20, , /60, SPO2 90% on 1LO2 NC    Initial Course in the ED:   Pt presented as above, found to be febrile and tachycardic. GI consulted from ED. Lab work showed lactic acidosis, elevated troponin(down trending), leukocytosis, and elevated alk phos. UA suspicious for UTI. Blood and urine cultures collected. Imaging showed signs of pyelonephritis and multiple kidney stones. The patient developed hypotension and did not improve after 2L boluses of NS.     Significant Labs :    Creatinine 1.27, Lactic Acid 4.7 - 3.8, Troponin 58 - 51, Alk phos 362, WBC 13.1  UA: red and turbid, 2+ protein, + nitrite, Large leukocyte esterase, and moderate urine Hgb    Treatment in ED :    2L NS boluses, azithromycin 500mg x1, protonix 40mg x1, vancomycin 1g x1, iopamidol x1    PAST MEDICAL HISTORY     Past Medical History:   Diagnosis Date    Anxiety     Convulsion (HCC)     Dementia ([facility])     Encephalopathy     Herpes     Hx of blood clots     Hyperlipidemia     Left bundle branch block     MRSA (methicillin resistant Staphylococcus aureus)     Parkinson's disease ([facility])     family states tremors not parkinson    Seizures ([address]     secondary to encephalitis    Under care of service provider 12/15/2022    [id number]-last visit dec 2022    Vitamin D deficiency        PAST SURGICAL HISTORY     Past Surgical History:   Procedure Laterality Date    ABSCESS DRAINAGE Right 12/14/2013    WOUND EXPLORATION AND I+D  RIGHT HIP    CYSTOSCOPY Left 05/16/2022    CYSTOSCOPY URETERAL STENT INSERTION performed by Ann Chino MD at [place] [id number] Left 12/02/2022    ATTEMPTED CYSTOSCOPY WITH LEFT STENT EXCHANGE performed by Ann Chino MD at [place] [address] ([address] [place] [address])      OTHER SURGICAL HISTORY      dtr states hemorrhoid surgery but unsure what type    SPLENECTOMY      at age 10 after [address] Left 05/16/2022    Cystoscopy    URETEROSCOPY Left 12/19/2022    stent       ALLERGIES     Adhesive tape and Haldol [haloperidol lactate]    MEDICATIONS PRIOR TO ADMISSION     Prior to Admission medications    Medication Sig Start Date End Date Taking? Authorizing Provider   HYDROcodone-acetaminophen (NORCO) 5-325 MG per tablet Take 1 tablet by mouth every 8 hours as needed for Pain for up to 3 days. Intended supply: 3 days.  Take lowest dose possible to manage pain 12/19/22 12/22/22  Thomas Ferrara MD   tamsulosin [facility]) 0.4 MG capsule Take 1 capsule by mouth daily 12/19/22   Thomas Ferrara MD   Hyoscyamine Sulfate SL (LEVSIN/SL) 0.125 MG SUBL Place 30 mg under the tongue every 6 hours as needed (bladder spasms/cramps) 12/19/22   Thomas Ferrara MD   linezolid (ZYVOX) 600 MG tablet Take 1 tablet by mouth 2 times daily for 7 days 12/19/22 12/26/22  Thomas Ferrara MD   desmopressin (DDAVP) 0.2 MG tablet Take 0.6 mg by mouth daily Take 3 tablets every pm    Historical Provider, MD   carBAMazepine (TEGRETOL) 200 MG tablet Take 300 mg by mouth daily Takes along with 400 mg at hs    Historical Provider, MD   potassium chloride (KLOR-CON M) 20 MEQ extended release tablet TAKE 1 TABLET BY MOUTH EVERY DAY 10/21/22   Historical Provider, MD   fludrocortisone (FLORINEF) 0.1 MG tablet TAKE 1 TABLET BY MOUTH EVERY DAY  Patient taking differently: Take 0.1 mg by mouth daily 11/3/22   Sravani Wu DO   carBAMazepine (TEGRETOL) 200 MG tablet 1.5 tablets po q am and 2 po q pm  Patient taking differently: 400 mg 2 po q pm 8/23/22   Sravani Wu DO   FLUoxetine (PROZAC) 20 MG capsule 2 po qd  Patient taking differently: Take 40 mg by mouth daily 8/22/22   Sravani Wu DO   mirabegron (MYRBETRIQ) 50 MG TB24 Take 50 mg by mouth daily 8/22/22   Sravani Wu DO   melatonin 5 MG TABS tablet Take 1 tablet by mouth nightly as needed (sleep)  Patient not taking: Reported on 12/15/2022 5/19/22 5/29/22  Teri Whaley MD   aspirin 81 MG EC tablet Take 81 mg by mouth daily    Historical Provider, MD       SOCIAL HISTORY     Tobacco: former  Social History     Tobacco Use   Smoking Status Former    Packs/day: 1.00    Years: 30.00 Pack years: 30.00    Types: Cigarettes    Start date: 54    Quit date:     Years since quittin.9   Smokeless Tobacco Never   Tobacco Comments    unknown how many packs a day, or how many years      Alcohol: denies  Illicits: denies    FAMILY HISTORY     Family History   Problem Relation Age of Onset    Asthma Mother     No Known Problems Father        PHYSICAL EXAM     Vitals: BP (!) 89/48   Pulse 89   Temp (!) 100.5 °F (38.1 °C) (Oral)   Resp 15   SpO2 90%   Tmax: Temp (24hrs), Av.6 °F (37 °C), Min:97.5 °F (36.4 °C), Max:100.5 °F (38.1 °C)    Last Body weight:   Wt Readings from Last 3 Encounters:   22 130 lb (59 kg)   12/10/22 126 lb (57.2 kg)   22 132 lb (59.9 kg)       PHYSICAL EXAMINATION:  Constitutional: This is a well developed, well nourished, 18.5-24.9 - Normal 76y.o. year old female who is alert and oriented only to self, cooperative and in no apparent distress. Head: Atraumatic and Normocephalic   EENT:  PERRLA. No conjunctival injections. Septum was midline, mucosa was without erythema, exudates or cobblestoning. No thrush was noted. Neck: Supple without thyromegaly. No elevated JVP. Trachea was midline. Respiratory: Chest was symmetrical without dullness to percussion. Breath sounds bilaterally were clear to auscultation. There were no wheezes, rhonchi or rales. There is no intercostal retraction or use of accessory muscles. Cardiovascular: tachycardic without murmur, clicks, gallops or rubs. Abdomen: Slightly rounded and soft without organomegaly. No rebound, rigidity or guarding was appreciated. Dawson present with darlene hematuria noted. Musculoskeletal: Normal curvature of the spine. No gross muscle weakness. Extremities:  No lower extremity edema, ulcerations, tenderness, varicosities or erythema. Muscle size, tone and strength are normal.  No involuntary movements are noted. Skin:  cool and clammy. Good color, turgor and pigmentation.  No lesions or scars.   No cyanosis or clubbing  Neurological/Psychiatric: The patient's general behavior, level of consciousness, thought content and emotional status is normal.          INVESTIGATIONS     Laboratory Testing:     Recent Results (from the past 24 hour(s))   POTASSIUM (POC)    Collection Time: 12/19/22  7:29 AM   Result Value Ref Range    POC Potassium 3.9 3.5 - 4.5 mmol/L   CBC with Auto Differential    Collection Time: 12/19/22  2:30 PM   Result Value Ref Range    WBC 13.1 (H) 3.5 - 11.3 k/uL    RBC 4.01 3.95 - 5.11 m/uL    Hemoglobin 12.7 11.9 - 15.1 g/dL    Hematocrit 39.7 36.3 - 47.1 %    MCV 99.0 82.6 - 102.9 fL    MCH 31.7 25.2 - 33.5 pg    MCHC 32.0 28.4 - 34.8 g/dL    RDW 15.4 (H) 11.8 - 14.4 %    Platelets 448 413 - 505 k/uL    MPV 8.6 8.1 - 13.5 fL    NRBC Automated 0.0 0.0 per 100 WBC    Immature Granulocytes 0 0 %    Seg Neutrophils 96 (H) 36 - 65 %    Lymphocytes 2 (L) 24 - 43 %    Monocytes 1 (L) 3 - 12 %    Eosinophils % 0 (L) 1 - 4 %    Basophils 1 0 - 2 %    Absolute Immature Granulocyte 0.00 0.00 - 0.30 k/uL    Segs Absolute 12.58 (H) 1.50 - 8.10 k/uL    Absolute Lymph # 0.26 (L) 1.10 - 3.70 k/uL    Absolute Mono # 0.13 0.10 - 1.20 k/uL    Absolute Eos # 0.00 0.00 - 0.44 k/uL    Basophils Absolute 0.13 0.00 - 0.20 k/uL    Morphology ANISOCYTOSIS PRESENT    Basic Metabolic Panel    Collection Time: 12/19/22  2:30 PM   Result Value Ref Range    Glucose 93 70 - 99 mg/dL    BUN 20 8 - 23 mg/dL    Creatinine 1.27 (H) 0.50 - 0.90 mg/dL    Est, Glom Filt Rate 43 (L) >60 mL/min/1.73m2    Calcium 8.6 8.6 - 10.4 mg/dL    Sodium 135 135 - 144 mmol/L    Potassium 4.2 3.7 - 5.3 mmol/L    Chloride 96 (L) 98 - 107 mmol/L    CO2 21 20 - 31 mmol/L    Anion Gap 18 (H) 9 - 17 mmol/L   Troponin    Collection Time: 12/19/22  2:30 PM   Result Value Ref Range    Troponin, High Sensitivity 58 (HH) 0 - 14 ng/L   Lipase    Collection Time: 12/19/22  2:30 PM   Result Value Ref Range    Lipase 40 13 - 60 U/L Hepatic Function Panel    Collection Time: 12/19/22  2:30 PM   Result Value Ref Range    Albumin 3.3 (L) 3.5 - 5.2 g/dL    Alkaline Phosphatase 362 (H) 35 - 104 U/L    ALT 13 5 - 33 U/L    AST 26 <32 U/L    Total Bilirubin 0.3 0.3 - 1.2 mg/dL    Bilirubin, Direct 0.2 <0.3 mg/dL    Bilirubin, Indirect 0.1 0.0 - 1.0 mg/dL    Total Protein 7.3 6.4 - 8.3 g/dL    Albumin/Globulin Ratio 0.8 (L) 1.0 - 2.5   Protime-INR    Collection Time: 12/19/22  2:30 PM   Result Value Ref Range    Protime 10.4 9.1 - 12.3 sec    INR 1.0    APTT    Collection Time: 12/19/22  2:30 PM   Result Value Ref Range    PTT 21.4 20.5 - 30.5 sec   Troponin    Collection Time: 12/19/22  3:48 PM   Result Value Ref Range    Troponin, High Sensitivity 51 (H) 0 - 14 ng/L   Lactic Acid    Collection Time: 12/19/22  3:48 PM   Result Value Ref Range    Lactic Acid, Whole Blood 4.7 (H) 0.7 - 2.1 mmol/L   Culture, Blood 1    Collection Time: 12/19/22  3:50 PM    Specimen: Blood   Result Value Ref Range    Specimen Description . BLOOD     Special Requests  RT ACUB 2ML     Culture NO GROWTH <24 HRS    Urinalysis with Microscopic    Collection Time: 12/19/22  3:50 PM   Result Value Ref Range    Color, UA Red (A) Yellow    Turbidity UA Turbid (A) Clear    Glucose, Ur NEGATIVE NEGATIVE    Bilirubin Urine NEGATIVE  Verified by ictotest. (A) NEGATIVE    Ketones, Urine NEGATIVE NEGATIVE    Specific Gravity, UA 1.012 1.005 - 1.030    Urine Hgb MODERATE (A) NEGATIVE    pH, UA 7.0 5.0 - 8.0    Protein, UA 2+ (A) NEGATIVE    Urobilinogen, Urine Normal Normal    Nitrite, Urine POSITIVE (A) NEGATIVE    Leukocyte Esterase, Urine LARGE (A) NEGATIVE    WBC, UA None 0 - 5 /HPF    RBC, UA TOO NUMEROUS TO COUNT 0 - 2 /HPF    Casts UA  0 - 8 /LPF     0 TO 2 HYALINE Reference range defined for non-centrifuged specimen.     Epithelial Cells UA 0 TO 2 0 - 5 /HPF   Culture, Blood 1    Collection Time: 12/19/22  3:57 PM    Specimen: Blood   Result Value Ref Range    Specimen Description . BLOOD     Special Requests  R WRIST 8 ML     Culture NO GROWTH <24 HRS    Lactic Acid    Collection Time: 12/19/22  5:44 PM   Result Value Ref Range    Lactic Acid, Whole Blood 3.8 (H) 0.7 - 2.1 mmol/L       Imaging:   CT ABDOMEN PELVIS W IV CONTRAST Additional Contrast? None    Result Date: 12/19/2022  1. Interval improvement in left hydronephrosis. Left ureteral stent is noted with persistent mild-to-moderate hydronephrosis and perinephric stranding. There is a 2 to thickening and enhancement of the left renal pelvis and proximal ureter suggestive of superimposed infection or inflammation. 2. Nonobstructing bilateral renal calculi, as well as a 3 mm mid left ureteral calculus, and bladder calculi. 3. No pulmonary embolus. 4. Slightly increasing right basilar opacity either reflecting atelectasis versus pneumonia. Given patulous esophagus with an air-fluid level, aspiration can be a consideration. 5. Mild nonspecific periportal edema. XR CHEST PORTABLE    Result Date: 12/19/2022  Increasing interstitial and bibasilar opacities with probable small left pleural effusion. Findings may reflect edema in the appropriate clinical setting versus pneumonia. CT CHEST PULMONARY EMBOLISM W CONTRAST    Result Date: 12/19/2022  1. Interval improvement in left hydronephrosis. Left ureteral stent is noted with persistent mild-to-moderate hydronephrosis and perinephric stranding. There is a 2 to thickening and enhancement of the left renal pelvis and proximal ureter suggestive of superimposed infection or inflammation. 2. Nonobstructing bilateral renal calculi, as well as a 3 mm mid left ureteral calculus, and bladder calculi. 3. No pulmonary embolus. 4. Slightly increasing right basilar opacity either reflecting atelectasis versus pneumonia. Given patulous esophagus with an air-fluid level, aspiration can be a consideration. 5. Mild nonspecific periportal edema.        ASSESSMENT & PLAN     ASSESSMENT / PLAN:     IMPRESSION    This is a 76 y.o. female who presented with coffee ground emesis and found to have sepsis likely from UTI. Patient admitted to inpatient status for antibiotics and GI work up of coffee ground emesis. Principal Problem:    Pyelonephritis  Active Problems:    Ureteral stent present    GI bleed  Resolved Problems:    * No resolved hospital problems. *     #pyelonephritis  #sepsis likely d/t above  #concern for septic shock  #nephrolithiasis  #hematuria, likely 2/2 urological procedure today  -UA showing UTI, patient got vancomycin and azithromycin in ED  -Continue Abx  -Patient has hypotension not responsive to fluids  -Critical Care consult and possible transfer for pressure support  -blood and urine cx collected and pending  -urology on board, patient is s/p cystoscopy with lithotripsy today    #Coffee ground emesis, likely upper GI bleed  -monitor BMP and for signs of overt bleeding  -Keep Hgb > 7, transfuse as needed  -GI consulted from ED, appreciate recommendations  -start protonix gtt  -NPO at midnight for possible EGD      DVT ppx: SCDs  GI ppx: Protonix   PT/OT/SW: Consulted  Discharge Planning:  consulted to assist with discharge planning    Gael Estrella MD  Internal Medicine Resident, PGY-1  [facility]; [place], [place]  12/19/2022, 5:55 PM    Attending Physician Statement  I have discussed the care of Hannah Ledbetter and I have examined the patient myselft and taken ros and hpi , including pertinent history and exam findings,  with the resident. I have reviewed the key elements of all parts of the encounter with the resident. I agree with the assessment, plan and orders as documented by the resident. Spent 55 minutes in reviewing data/medicines/talking to patient/family,  explaining and answering all the questions.         Electronically signed by Christiano Bauer MD

## 2022-12-19 NOTE — H&P
Critical Care - History and Physical Examination    Patient's name:  Lyly Wood  Medical Record Number: [id number]  Patient's account/billing number: [de-identified]  Patient's YOB: 1945  Age: 76 y.o. Date of Admission: 12/19/2022  1:52 PM  Date of History and Physical Examination: 12/19/2022      Primary Care Physician: John Morgan DO  Attending Physician: Dr Elif Kerns    Code Status: Prior    Chief complaint:   Chief Complaint   Patient presents with    Nausea         HISTORY OF PRESENT ILLNESS:      History was obtained from chart review. Lyly Wood is a 76 y.o. 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 Patient had left ureteral stent exchange this morning at [place] went back home and as per the ED notes after going back home patient had a bite of applesauce and then had coffee-ground emesis of 4 episodes. Patient has baseline dementia and Parkinson's disease. Patient is awake alert but disoriented. When asked about what happened this morning or why she here she says she does not remember anything and she do not know anything. From the chart review looks like this is her baseline. Daughter was not at bedside on my evaluation so all the history was obtained from chart review. Patient is not on any anticoagulation but does take aspirin which was stopped for the procedure. No history of GI bleed in the past.  On my evaluation patient did not complain of any chest pain or shortness of breath, abdominal pain. No active bleeding episodes in the ED.   Patient was comfortable speaking in full sentences. Initially on arrival to the ED patient was tachycardic mildly hypoxic and normotensive. Sepsis work-up was initiated due to fever and tachycardia. Patient did receive fluids as per sepsis protocol. Patient eventually got hypotensive did not respond to fluids. Both GI and urology were contacted and were updated about the patient. Decision was made to start on pressors in the ED. CT PE was done to rule out PE which showed interval improvement of left-sided hydronephrosis, nonobstructing bilateral renal calculi, no PE, right basilar opacity. CT abdomen pelvis was done to rule out any bleeding which was negative. Admitted to ICU for the management of shock secondary to sepsis/hemorrhagic.     Initial lab work in the ED showed -   WBC 13.1, hemoglobin 12.7, platelets 145  Sodium 135, potassium 4.2, creatinine 1.27  Lactic acid 3.8  Troponin 58> 51  Alk phos 362, ALT 39 AST 26  UA showed UTI and hematuria      PAST MEDICAL HISTORY:         Diagnosis Date    Anxiety     Convulsion ([address]     Dementia (HCC)     Encephalopathy     Herpes     Hx of blood clots     Hyperlipidemia     Left bundle branch block     MRSA (methicillin resistant Staphylococcus aureus)     Parkinson's disease ([address]     family states tremors not parkinson    Seizures ([address]     secondary to encephalitis    Under care of service provider 12/15/2022    [id number]-last visit dec 2022    Vitamin D deficiency          PAST SURGICAL HISTORY:         Procedure Laterality Date    ABSCESS DRAINAGE Right 12/14/2013    WOUND EXPLORATION AND I+D  RIGHT HIP    CYSTOSCOPY Left 05/16/2022    CYSTOSCOPY URETERAL STENT INSERTION performed by Any Lake MD at [place] [id number] Left 12/02/2022    ATTEMPTED CYSTOSCOPY WITH LEFT STENT EXCHANGE performed by Any Lake MD at [address] [place] [address] ([address] [place] [address])      OTHER SURGICAL HISTORY      dtr states hemorrhoid surgery but unsure what type SPLENECTOMY      at age 10 after [address] Left 05/16/2022    Cystoscopy    URETEROSCOPY Left 12/19/2022    stent       ALLERGIES:      Allergies   Allergen Reactions    Adhesive Tape Other (See Comments)     Pulls skin off    Haldol [Haloperidol Lactate] Other (See Comments)     hallucinates         HOME MEDS: :      Prior to Admission medications    Medication Sig Start Date End Date Taking? Authorizing Provider   HYDROcodone-acetaminophen (NORCO) 5-325 MG per tablet Take 1 tablet by mouth every 8 hours as needed for Pain for up to 3 days. Intended supply: 3 days.  Take lowest dose possible to manage pain 12/19/22 12/22/22  Duc Haskins MD   tamsulosin [facility]) 0.4 MG capsule Take 1 capsule by mouth daily 12/19/22   Duc Haskins MD   Hyoscyamine Sulfate SL (LEVSIN/SL) 0.125 MG SUBL Place 30 mg under the tongue every 6 hours as needed (bladder spasms/cramps) 12/19/22   Duc Haskins MD   linezolid (ZYVOX) 600 MG tablet Take 1 tablet by mouth 2 times daily for 7 days 12/19/22 12/26/22  Duc Haskins MD   desmopressin (DDAVP) 0.2 MG tablet Take 0.6 mg by mouth daily Take 3 tablets every pm    Historical Provider, MD   carBAMazepine (TEGRETOL) 200 MG tablet Take 300 mg by mouth daily Takes along with 400 mg at hs    Historical Provider, MD   potassium chloride (KLOR-CON M) 20 MEQ extended release tablet TAKE 1 TABLET BY MOUTH EVERY DAY 10/21/22   Historical Provider, MD   fludrocortisone (FLORINEF) 0.1 MG tablet TAKE 1 TABLET BY MOUTH EVERY DAY  Patient taking differently: Take 0.1 mg by mouth daily 11/3/22   Sravani Wu DO   carBAMazepine (TEGRETOL) 200 MG tablet 1.5 tablets po q am and 2 po q pm  Patient taking differently: 400 mg 2 po q pm 8/23/22   Sravani Wu DO   FLUoxetine (PROZAC) 20 MG capsule 2 po qd  Patient taking differently: Take 40 mg by mouth daily 8/22/22   Sravani Wu DO   mirabegron (MYRBETRIQ) 50 MG TB24 Take 50 mg by mouth daily 8/22/22   Sravani Wu DO   melatonin 5 MG TABS tablet Take 1 tablet by mouth nightly as needed (sleep)  Patient not taking: Reported on 12/15/2022 5/19/22 5/29/22  Cari Cole MD   aspirin 81 MG EC tablet Take 81 mg by mouth daily    Historical Provider, MD       SOCIAL HISTORY:       TOBACCO:   reports that she quit smoking about 33 years ago. Her smoking use included cigarettes. She started smoking about 63 years ago. She has a 30.00 pack-year smoking history. She has never used smokeless tobacco.  ETOH:   reports no history of alcohol use. DRUGS:  reports no history of drug use.      FAMILY HISTORY:          Problem Relation Age of Onset    Asthma Mother     No Known Problems Father        REVIEW OF SYSTEMS (ROS):     Review of Systems -   General ROS: negative  Psychological ROS: negative  Ophthalmic ROS: negative  ENT ROS: negative  Allergy and Immunology ROS: negative  Hematological and Lymphatic ROS: negative  Endocrine ROS: negative  Breast ROS: negative  Respiratory ROS: no cough, shortness of breath, or wheezing  Cardiovascular ROS: no chest pain or dyspnea on exertion  Gastrointestinal ROS:negative  Genito-Urinary ROS: negative  Musculoskeletal ROS: negative  Neurological ROS: negative  Dermatological ROS: negative    OBJECTIVE:     VITAL SIGNS:  BP (!) 89/48   Pulse 89   Temp (!) 100.5 °F (38.1 °C) (Oral)   Resp 15   SpO2 90%   Tmax over 24 hours:  Temp (24hrs), Av.6 °F (37 °C), Min:97.5 °F (36.4 °C), Max:100.5 °F (38.1 °C)      Patient Vitals for the past 8 hrs:   BP Temp Temp src Pulse Resp SpO2   22 1730 (!) 89/48 -- -- 89 15 90 %   22 1715 (!) 91/47 -- -- 90 13 --   22 1700 (!) 94/37 -- -- 93 13 --   22 1630 (!) 100/49 -- -- (!) 106 11 96 %   22 1622 (!) 109/56 -- -- (!) 102 17 98 %   22 1515 (!) 104/59 -- -- (!) 105 15 --   22 1501 -- -- -- (!) 107 18 93 %   22 1459 -- -- -- (!) 108 13 99 %   22 1457 -- -- -- (!) 109 15 94 %   22 1456 -- -- -- (!) 103 15 95 %   22 1454 -- -- -- (!) 108 12 93 %   12/19/22 1445 (!) 110/52 -- -- (!) 106 15 94 %   12/19/22 1430 (!) 108/53 -- -- (!) 102 19 92 %   12/19/22 1415 128/65 -- -- 99 19 93 %   12/19/22 1400 127/60 -- -- (!) 108 22 94 %   12/19/22 1353 -- (!) 100.5 °F (38.1 °C) Oral (!) 114 20 90 %       No intake or output data in the 24 hours ending 12/19/22 1846    Wt Readings from Last 3 Encounters:   12/19/22 130 lb (59 kg)   12/10/22 126 lb (57.2 kg)   12/02/22 132 lb (59.9 kg)     There is no height or weight on file to calculate BMI. PHYSICAL EXAM:  Constitutional: Appears well, no distress  EENT: neck supple with midline trachea. Neck: Supple, symmetrical, trachea midline, no adenopathy,  no jvd, skin normal  Respiratory: clear to auscultation, no wheezes or rales and unlabored breathing.  No intercostal tenderness  Cardiovascular: regular rate and rhythm, normal S1, S2, no murmur noted  Abdomen: soft, nontender, nondistended, no masses or organomegaly  Extremities:   no pedal edema, no clubbing or cyanosis    MEDICATIONS:  Scheduled Meds:   pantoprazole (PROTONIX) 40 mg injection  40 mg IntraVENous Daily    meropenem  1,000 mg IntraVENous Q12H    vancomycin  15 mg/kg IntraVENous Once     Continuous Infusions:   norepinephrine 5 mcg/min (12/19/22 1832)     PRN Meds:          ABGs:   No results found for: PHART, PH, [id number], PCO2, PO2ART, PO2, SIV9IVP, HCO3, BEART, BE, THGBART, THB, RVH3ADQ, A6GMUDSO, O2SAT, FIO2    DATA:  Complete Blood Count:   Recent Labs     12/19/22  1430   WBC 13.1*   RBC 4.01   HGB 12.7   HCT 39.7   MCV 99.0   MCH 31.7   MCHC 32.0   RDW 15.4*      MPV 8.6        Last 3 Blood Glucose:   Recent Labs     12/19/22  1430   GLUCOSE 93        PT/INR:    Lab Results   Component Value Date/Time    PROTIME 10.4 12/19/2022 02:30 PM    INR 1.0 12/19/2022 02:30 PM     PTT:    Lab Results   Component Value Date/Time    APTT 21.4 12/19/2022 02:30 PM       Comprehensive Metabolic Profile:   Recent Labs 12/19/22  1430      K 4.2   CL 96*   CO2 21   BUN 20   CREATININE 1.27*   GLUCOSE 93   CALCIUM 8.6   PROT 7.3   LABALBU 3.3*   BILITOT 0.3   ALKPHOS 362*   AST 26   ALT 13      Magnesium:   Lab Results   Component Value Date/Time    MG 2.1 11/29/2022 06:31 AM    MG 2.9 05/18/2022 03:53 AM    MG 2.0 05/17/2022 03:20 PM     Phosphorus:   Lab Results   Component Value Date/Time    PHOS 3.3 05/18/2022 03:53 AM     Ionized Calcium:   Lab Results   Component Value Date/Time    CAION 1.11 12/10/2022 09:46 PM        Urinalysis:   Lab Results   Component Value Date/Time    NITRU POSITIVE 12/19/2022 03:50 PM    COLORU Red 12/19/2022 03:50 PM    PHUR 7.0 12/19/2022 03:50 PM    WBCUA None 12/19/2022 03:50 PM    RBCUA TOO NUMEROUS TO COUNT 12/19/2022 03:50 PM    MUCUS 1+ 11/13/2019 03:30 PM    TRICHOMONAS NOT REPORTED 11/13/2019 03:30 PM    YEAST NOT REPORTED 11/13/2019 03:30 PM    BACTERIA MODERATE 11/28/2022 08:00 PM    SPECGRAV 1.012 12/19/2022 03:50 PM    LEUKOCYTESUR LARGE 12/19/2022 03:50 PM    UROBILINOGEN Normal 12/19/2022 03:50 PM    BILIRUBINUR NEGATIVE  Verified by ictotest. 12/19/2022 03:50 PM    GLUCOSEU NEGATIVE 12/19/2022 03:50 PM    KETUA NEGATIVE 12/19/2022 03:50 PM    AMORPHOUS NOT REPORTED 11/13/2019 03:30 PM       HgBA1c:  No results found for: LABA1C  TSH:    Lab Results   Component Value Date/Time    TSH 1.94 10/03/2022 06:17 PM       Lactic Acid:   Lab Results   Component Value Date/Time    LACTA 2.1 10/03/2022 06:17 PM      Troponin: No results for input(s): TROPONINI in the last 72 hours. Radiological imaging  CT ABDOMEN W CONTRAST Additional Contrast? None    Result Date: 12/10/2022  EXAMINATION: CT ABDOMEN WITH CONTRAST 12/10/2022 9:54 pm TECHNIQUE: CT of the abdomen was performed with the administration of intravenous contrast. Multiplanar reformatted images are provided for review.  Automated exposure control, iterative reconstruction, and/or weight based adjustment of the mA/kV was utilized to reduce the radiation dose to as low as reasonably achievable. COMPARISON: CT abdomen and pelvis performed 10/28/2022. HISTORY: ORDERING SYSTEM PROVIDED HISTORY: concern for esophageal perforation TECHNOLOGIST PROVIDED HISTORY: concern for esophageal perforation Decision Support Exception - unselect if not a suspected or confirmed emergency medical condition->Emergency Medical Condition (MA) Reason for Exam: concern for esophageal perforation FINDINGS: Lower Chest: There is atelectasis in the lung bases. The visualized cardiac structures are unremarkable. Organs: The liver is normal size and overall attenuation. There are few splenules in the left upper quadrant. The pancreas is unremarkable. There is mild adrenal hyperplasia. There are nonobstructing renal stones bilaterally. There is mild prominence of the right renal collecting system. There is left-sided hydronephrosis with multiple stones in the visualized left ureter. There is a left ureteral stent. GI/Bowel: The stomach is unremarkable. Loops of small bowel are normal in caliber without evidence for obstruction. The visualized colon contains air and fecal residue. There are uncomplicated diverticula. There is no free air or free fluid. Peritoneum/Retroperitoneum: The psoas muscles are symmetric. The abdominal aorta is normal in caliber. The inferior vena cava is unremarkable. There is no retroperitoneal or mesenteric adenopathy. Bones/Soft Tissues: The extra-abdominal soft tissues are unremarkable. There is diffuse osseous demineralization. There is no acute osseous abnormality. There are multiple remote compression deformities. No acute abdominal or pelvic abnormality. Nonobstructing renal stones bilaterally. Left-sided hydronephrosis with a ureteral stent and multiple stones within the visualized left ureter.      CT ABDOMEN PELVIS W IV CONTRAST Additional Contrast? None    Result Date: 12/19/2022  EXAMINATION: CTA OF THE CHEST; CT OF THE ABDOMEN AND PELVIS WITH CONTRAST 12/19/2022 3:18 pm TECHNIQUE: CTA of the chest was performed after the administration of intravenous contrast.  Multiplanar reformatted images are provided for review. MIP images are provided for review. Automated exposure control, iterative reconstruction, and/or weight based adjustment of the mA/kV was utilized to reduce the radiation dose to as low as reasonably achievable.; CT of the abdomen and pelvis was performed with the administration of intravenous contrast. Multiplanar reformatted images are provided for review. Automated exposure control, iterative reconstruction, and/or weight based adjustment of the mA/kV was utilized to reduce the radiation dose to as low as reasonably achievable. COMPARISON: 12/10/2022, 10/28/2022, 05/12/2022 HISTORY: ORDERING SYSTEM PROVIDED HISTORY: 75 yo female, sob, tachycardia, pe r/o TECHNOLOGIST PROVIDED HISTORY: 75 yo female, sob, tachycardia, pe r/o Decision Support Exception - unselect if not a suspected or confirmed emergency medical condition->Emergency Medical Condition (MA); ORDERING SYSTEM PROVIDED HISTORY: 75 yo, coffee ground emesis, s/p bladder stent placement today TECHNOLOGIST PROVIDED HISTORY: 75 yo, coffee ground emesis, s/p bladder stent placement today Decision Support Exception - unselect if not a suspected or confirmed emergency medical condition->Emergency Medical Condition (MA) Exam is mild to moderately limited due to motion artifact. FINDINGS: Chest: Pulmonary Arteries: Pulmonary arteries are adequately opacified for evaluation. No evidece of intraluminal filling defect to suggest pulmonary embolism. Main pulmonary artery is borderline dilated measuring up to 3.0 cm. Mediastinum: The heart size is stable. The thoracic aorta is normal in caliber with mild atherosclerosis. Coronary arterial calcifications. No pericardial effusion. No pathologically enlarged adenopathy.   The esophagus is patulous with an air-fluid level. Lungs/pleura: Mild emphysematous changes. Focal consolidation in the right lung base with likely areas of atelectasis in the right middle lobe and left lower lobe. The central airways are patent. No pleural effusion or pneumothorax. Diffuse bronchial wall thickening with a few foci of mucoid impaction in the lower lobes. Soft Tissues/Bones: No acute bone or soft tissue abnormality. Remote posterior right rib fractures. Osteopenia. Scoliotic curvature of the thoracic spine. Mild volume loss along the superior endplate of R56, H22, and T7. Abdomen/Pelvis: Organs: Stable 2.0 cm exophytic lesion arising from segment 2 of the liver. The gallbladder is mildly distended. There is mild nonspecific periportal edema. The liver, pancreas and adrenal glands are stable without new focal abnormality. Splenules in the left upper quadrant. No biliary duct dilation. Interval placement of a left ureteral stent. There is mild-to-moderate left hydronephrosis with urothelial thickening and stranding within the left renal pelvis. There are bilateral nonobstructing renal calculi the largest measuring up to 6 mm. There is mild left perinephric stranding. No right hydronephrosis or perinephric stranding. There is a 3 mm mid left ureteral calculus which has otherwise decreased in size when compared to 05/15/2022. Overall left hydronephrosis has improved since 12/10/2022. GI/Bowel: No dilated loops of bowel or bowel wall thickening. Moderate amount stool in the colon. Colonic diverticulosis without acute diverticulitis. No free air. The appendix is not visualized. Pelvis: Multiple bladder calculi are noted measuring up to 2-4 mm. Small amount of gas in the bladder likely related to recent procedure. Mild bladder wall thickening, likely related to underdistention. No pathologically enlarged adenopathy or free fluid. Status post hysterectomy.  Peritoneum/Retroperitoneum: The aorta is normal in caliber with moderate atherosclerosis. The celiac axis, SMA and DEMOND are patent. The portal venous system is patent. There are subcentimeter retroperitoneal and mesenteric lymph nodes, nonspecific. Multiple soft tissue nodules are noted in the left mid quadrant anteriorly the largest measuring up to 2.4 x 1.7 cm similar in appearance and location to the prior exams dating back to 05/15/2022. Findings likely reflect splenosis. No ascites or drainable fluid collection. Bones/Soft Tissues: Diffuse osteopenia. Stable compression deformities in the lumbar spine. 1. Interval improvement in left hydronephrosis. Left ureteral stent is noted with persistent mild-to-moderate hydronephrosis and perinephric stranding. There is a 2 to thickening and enhancement of the left renal pelvis and proximal ureter suggestive of superimposed infection or inflammation. 2. Nonobstructing bilateral renal calculi, as well as a 3 mm mid left ureteral calculus, and bladder calculi. 3. No pulmonary embolus. 4. Slightly increasing right basilar opacity either reflecting atelectasis versus pneumonia. Given patulous esophagus with an air-fluid level, aspiration can be a consideration. 5. Mild nonspecific periportal edema. XR CHEST PORTABLE    Result Date: 12/19/2022  EXAMINATION: ONE XRAY VIEW OF THE CHEST 12/19/2022 2:36 pm COMPARISON: 11/09/2022 HISTORY: ORDERING SYSTEM PROVIDED HISTORY: cp TECHNOLOGIST PROVIDED HISTORY: cp Initial encounter FINDINGS: The patient is rotated. Diffuse osteopenia. Small left pleural effusion. There is overall increasing interstitial and bibasilar airspace opacities. Stable cardiac silhouette. The osseous structures otherwise stable stable chronic deformity left proximal humerus. Increasing interstitial and bibasilar opacities with probable small left pleural effusion. Findings may reflect edema in the appropriate clinical setting versus pneumonia.      CT CHEST PULMONARY EMBOLISM W CONTRAST    Result Date: 12/19/2022  EXAMINATION: CTA OF THE CHEST; CT OF THE ABDOMEN AND PELVIS WITH CONTRAST 12/19/2022 3:18 pm TECHNIQUE: CTA of the chest was performed after the administration of intravenous contrast.  Multiplanar reformatted images are provided for review. MIP images are provided for review. Automated exposure control, iterative reconstruction, and/or weight based adjustment of the mA/kV was utilized to reduce the radiation dose to as low as reasonably achievable.; CT of the abdomen and pelvis was performed with the administration of intravenous contrast. Multiplanar reformatted images are provided for review. Automated exposure control, iterative reconstruction, and/or weight based adjustment of the mA/kV was utilized to reduce the radiation dose to as low as reasonably achievable. COMPARISON: 12/10/2022, 10/28/2022, 05/12/2022 HISTORY: ORDERING SYSTEM PROVIDED HISTORY: 75 yo female, sob, tachycardia, pe r/o TECHNOLOGIST PROVIDED HISTORY: 75 yo female, sob, tachycardia, pe r/o Decision Support Exception - unselect if not a suspected or confirmed emergency medical condition->Emergency Medical Condition (MA); ORDERING SYSTEM PROVIDED HISTORY: 75 yo, coffee ground emesis, s/p bladder stent placement today TECHNOLOGIST PROVIDED HISTORY: 75 yo, coffee ground emesis, s/p bladder stent placement today Decision Support Exception - unselect if not a suspected or confirmed emergency medical condition->Emergency Medical Condition (MA) Exam is mild to moderately limited due to motion artifact. FINDINGS: Chest: Pulmonary Arteries: Pulmonary arteries are adequately opacified for evaluation. No evidece of intraluminal filling defect to suggest pulmonary embolism. Main pulmonary artery is borderline dilated measuring up to 3.0 cm. Mediastinum: The heart size is stable. The thoracic aorta is normal in caliber with mild atherosclerosis. Coronary arterial calcifications. No pericardial effusion.   No pathologically enlarged adenopathy. The esophagus is patulous with an air-fluid level. Lungs/pleura: Mild emphysematous changes. Focal consolidation in the right lung base with likely areas of atelectasis in the right middle lobe and left lower lobe. The central airways are patent. No pleural effusion or pneumothorax. Diffuse bronchial wall thickening with a few foci of mucoid impaction in the lower lobes. Soft Tissues/Bones: No acute bone or soft tissue abnormality. Remote posterior right rib fractures. Osteopenia. Scoliotic curvature of the thoracic spine. Mild volume loss along the superior endplate of V45, A60, and T7. Abdomen/Pelvis: Organs: Stable 2.0 cm exophytic lesion arising from segment 2 of the liver. The gallbladder is mildly distended. There is mild nonspecific periportal edema. The liver, pancreas and adrenal glands are stable without new focal abnormality. Splenules in the left upper quadrant. No biliary duct dilation. Interval placement of a left ureteral stent. There is mild-to-moderate left hydronephrosis with urothelial thickening and stranding within the left renal pelvis. There are bilateral nonobstructing renal calculi the largest measuring up to 6 mm. There is mild left perinephric stranding. No right hydronephrosis or perinephric stranding. There is a 3 mm mid left ureteral calculus which has otherwise decreased in size when compared to 05/15/2022. Overall left hydronephrosis has improved since 12/10/2022. GI/Bowel: No dilated loops of bowel or bowel wall thickening. Moderate amount stool in the colon. Colonic diverticulosis without acute diverticulitis. No free air. The appendix is not visualized. Pelvis: Multiple bladder calculi are noted measuring up to 2-4 mm. Small amount of gas in the bladder likely related to recent procedure. Mild bladder wall thickening, likely related to underdistention. No pathologically enlarged adenopathy or free fluid. Status post hysterectomy. Peritoneum/Retroperitoneum: The aorta is normal in caliber with moderate atherosclerosis. The celiac axis, SMA and DEMOND are patent. The portal venous system is patent. There are subcentimeter retroperitoneal and mesenteric lymph nodes, nonspecific. Multiple soft tissue nodules are noted in the left mid quadrant anteriorly the largest measuring up to 2.4 x 1.7 cm similar in appearance and location to the prior exams dating back to 05/15/2022. Findings likely reflect splenosis. No ascites or drainable fluid collection. Bones/Soft Tissues: Diffuse osteopenia. Stable compression deformities in the lumbar spine. 1. Interval improvement in left hydronephrosis. Left ureteral stent is noted with persistent mild-to-moderate hydronephrosis and perinephric stranding. There is a 2 to thickening and enhancement of the left renal pelvis and proximal ureter suggestive of superimposed infection or inflammation. 2. Nonobstructing bilateral renal calculi, as well as a 3 mm mid left ureteral calculus, and bladder calculi. 3. No pulmonary embolus. 4. Slightly increasing right basilar opacity either reflecting atelectasis versus pneumonia. Given patulous esophagus with an air-fluid level, aspiration can be a consideration. 5. Mild nonspecific periportal edema. CT CHEST PULMONARY EMBOLISM W CONTRAST    Result Date: 12/10/2022  EXAMINATION: CTA OF THE CHEST 12/10/2022 9:54 pm TECHNIQUE: CTA of the chest was performed after the administration of intravenous contrast.  Multiplanar reformatted images are provided for review. MIP images are provided for review. Automated exposure control, iterative reconstruction, and/or weight based adjustment of the mA/kV was utilized to reduce the radiation dose to as low as reasonably achievable. COMPARISON: CT chest performed 05/15/2022.  HISTORY: ORDERING SYSTEM PROVIDED HISTORY: Concern for esophogeal rupture TECHNOLOGIST PROVIDED HISTORY: Add oral contrast for perforation Concern for esophogeal rupture Reason for Exam: Concern for esophogeal rupture FINDINGS: Pulmonary Arteries: Pulmonary arteries are adequately opacified for evaluation. No evidence of intraluminal filling defect to suggest pulmonary embolism. Main pulmonary artery is normal in caliber. Mediastinum: No evidence of mediastinal lymphadenopathy. The heart and pericardium demonstrate no acute abnormality. There is no acute abnormality of the thoracic aorta. Lungs/pleura: The lungs are without acute process. There is atelectasis in the lung bases. No focal consolidation or pulmonary edema. No evidence of pleural effusion or pneumothorax. Upper Abdomen: There is partial visualization of left renal hydronephrosis versus parapelvic cysts. Soft Tissues/Bones: The extrathoracic soft tissues are unremarkable. There is no axillary adenopathy. There are multiple stable remote compression deformities. There is no acute or chronic pulmonary embolism. Atelectasis in the lung bases. Esophagus appears grossly unremarkable without acute process. FLUORO FOR SURGICAL PROCEDURES    Result Date: 12/2/2022  Radiology exam is complete. No Radiologist dictation. Please follow up with ordering provider. ASSESSMENT:     Principal Problem:    Pyelonephritis  Active Problems:    Ureteral stent present    GI bleed  Resolved Problems:    * No resolved hospital problems. *      PLAN:     Shock -  - Secondary to sepsis vs hemorrhagic  - Status post left ureteral stent exchange this morning  - Continue fluids  - Continue pressor support, wean as tolerated  - Past history of VRE and ESBL  - Continue meropenem  - Ordered Zyvox for VRE but due to drug interactions between carbamazepine and Zyvox we will hold off on it for tonight and have ID recommend antibiotics  - We will consult ID  - GI consult for suspected upper GI bleed  - Continue Protonix    2.   Nephrolithiasis associated with hydronephrosis -   - Status post left ureteral stent exchange this morning  - Appreciate urology recommendations    3. Suspected upper GI bleed -  - Continue Protonix  - GI consult  - N.p.o. except sips with meds    4. History of seizures -   - Continue carbamazepine 300 mg in the morning and 400 mg nightly    5. History of dementia    6. History of DVT -   - Found initially in September 2021, as per notes likely chronic  - Was on Eliquis in the past stopped due to unclear reasons, follows with vascular surgery is okay with it        Reida Hodgkin, MD, MD  ICU resident   [facility]  12/19/2022 6:46 PM    The critical care team assigned to the patient will be following up the patient in the intensive care unit. I have discussed the current plan with the critical care attending. The above mentioned assessment and plan will be reviewed again in detail by the critical care attending at bedside, and can be further changed or modified accordingly by the attending physician.

## 2022-12-19 NOTE — H&P
Dee Mayer, Jsaon Munson, Blanca Crow, Yoly Olmos, & Bao   Urology History & Physical      Patient:  Gardenia Herron  MRN: [id number]  YOB: 1945    HISTORY OF PRESENT ILLNESS:   The patient is a 76 y.o. female who had an obstructing left ureteral stone seen over the summer. She had a stent placed at that time. She underwent cystoscopy with attempted left ureteral stent exchange on 12/2/2022. Patient's old records, notes and chart reviewed and summarized above. 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[address]     secondary to encephalitis    Under care of service provider 12/15/2022    [id number]-last visit dec 2022    Vitamin D deficiency        Past Surgical History:    Past Surgical History:   Procedure Laterality Date    ABSCESS DRAINAGE Right 12/14/2013    WOUND EXPLORATION AND I+D  RIGHT HIP    CYSTOSCOPY Left 05/16/2022    CYSTOSCOPY URETERAL STENT INSERTION [facility]  Left 12/02/2022    ATTEMPTED CYSTOSCOPY WITH LEFT STENT EXCHANGE performed by Yayo Covington MD at [address] [place] [address] ([address] [place] [address])      OTHER SURGICAL HISTORY      dtr states hemorrhoid surgery but unsure what type    SPLENECTOMY      at age 10 after [address] Left 05/16/2022    Cystoscopy       Medications:      Current Facility-Administered Medications:     lactated ringers infusion, , IntraVENous, Continuous, Yayo Covington MD    Allergies:     Allergies   Allergen Reactions    Adhesive Tape Other (See Comments)     Pulls skin off    Haldol [Haloperidol Lactate] Other (See Comments)     hallucinates Social History:   Social History     Socioeconomic History    Marital status: Single     Spouse name: Not on file    Number of children: Not on file    Years of education: Not on file    Highest education level: Not on file   Occupational History    Not on file   Tobacco Use    Smoking status: Former     Packs/day: 1.00     Years: 30.00     Pack years: 30.00     Types: Cigarettes     Start date: 54     Quit date:      Years since quittin.9    Smokeless tobacco: Never    Tobacco comments:     unknown how many packs a day, or how many years   Vaping Use    Vaping Use: Never used   Substance and Sexual Activity    Alcohol use: No    Drug use: No    Sexual activity: Not Currently   Other Topics Concern    Not on file   Social History Narrative    Not on file     Social Determinants of Health     Financial Resource Strain: Low Risk     Difficulty of Paying Living Expenses: Not hard at all   Food Insecurity: No Food Insecurity    Worried About Running Out of Food in the Last Year: Never true    Ran Out of Food in the Last Year: Never true   Transportation Needs: Not on file   Physical Activity: Not on file   Stress: Not on file   Social Connections: Not on file   Intimate Partner Violence: Not on file   Housing Stability: Not on file       Family History:    Family History   Problem Relation Age of Onset    Asthma Mother     No Known Problems Father        REVIEW OF SYSTEMS:  A comprehensive 14 point review of systems was obtained. Constitutional: No fatigue  Eyes: No blurry vision  Ears, nose, mouth, throat, face: No ringing in the ears; no facial droop. Respiratory: No cough or cold. Cardiovascular: No palpitations  Gastrointestinal: No diarrhea or constipation. Genitourinary: No burning with urination  Integument/Skin: No rashes  Hematologic/Lymphatic: No easy bruising  Musculoskeletal: No muscle pains  Neurologic: No weakness in the extremities.    Psychiatric: No depression or suicidal thoughts. Endocrine: No heat or cold intolerances. Allergic/Immunologic: No current seasonal allergies; no skin hives. Physical Exam:      Constitutional: Patient in no acute distress. Neuro: alert and oriented to person place and time. Head: Atraumatic and normocephalic. Neck: Trachea midline. Ext: 2+ radial pulses bilaterally. Psych: Mood and affect normal.  Skin: No rashes or bruising present. Lungs: Respiratory effort normal.  Cardiovascular:  Regular rhythm. Abdomen: Soft, non-tender, non-distended. Bladder non-tender and not distended. Lymphatics: no palpable lymphadenopathy    Labs:  No results for input(s): WBC, HGB, HCT, MCV, PLT in the last 72 hours. No results for input(s): NA, K, CL, CO2, PHOS, BUN, CREATININE, CA in the last 72 hours. No results for input(s): COLORU, PHUR, LABCAST, WBCUA, RBCUA, MUCUS, TRICHOMONAS, YEAST, BACTERIA, CLARITYU, SPECGRAV, LEUKOCYTESUR, UROBILINOGEN, Ebbie Rattler in the last 72 hours. Invalid input(s): NITRATE, GLUCOSEUKETONESUAMORPHOUS        -----------------------------------------------------------------  Imaging Results:  CT ABDOMEN W CONTRAST Additional Contrast? None    Result Date: 12/10/2022  EXAMINATION: CT ABDOMEN WITH CONTRAST 12/10/2022 9:54 pm TECHNIQUE: CT of the abdomen was performed with the administration of intravenous contrast. Multiplanar reformatted images are provided for review. Automated exposure control, iterative reconstruction, and/or weight based adjustment of the mA/kV was utilized to reduce the radiation dose to as low as reasonably achievable. COMPARISON: CT abdomen and pelvis performed 10/28/2022.  HISTORY: ORDERING SYSTEM PROVIDED HISTORY: concern for esophageal perforation TECHNOLOGIST PROVIDED HISTORY: concern for esophageal perforation Decision Support Exception - unselect if not a suspected or confirmed emergency medical condition->Emergency Medical Condition (MA) Reason for Exam: concern for esophageal perforation FINDINGS: Lower Chest: There is atelectasis in the lung bases. The visualized cardiac structures are unremarkable. Organs: The liver is normal size and overall attenuation. There are few splenules in the left upper quadrant. The pancreas is unremarkable. There is mild adrenal hyperplasia. There are nonobstructing renal stones bilaterally. There is mild prominence of the right renal collecting system. There is left-sided hydronephrosis with multiple stones in the visualized left ureter. There is a left ureteral stent. GI/Bowel: The stomach is unremarkable. Loops of small bowel are normal in caliber without evidence for obstruction. The visualized colon contains air and fecal residue. There are uncomplicated diverticula. There is no free air or free fluid. Peritoneum/Retroperitoneum: The psoas muscles are symmetric. The abdominal aorta is normal in caliber. The inferior vena cava is unremarkable. There is no retroperitoneal or mesenteric adenopathy. Bones/Soft Tissues: The extra-abdominal soft tissues are unremarkable. There is diffuse osseous demineralization. There is no acute osseous abnormality. There are multiple remote compression deformities. No acute abdominal or pelvic abnormality. Nonobstructing renal stones bilaterally. Left-sided hydronephrosis with a ureteral stent and multiple stones within the visualized left ureter.          Assessment and Plan   Impression:    77 yo F  Retained left ureteral stent placed for left ureteral stone      Plan:   Cystoscopy, left ureteroscopy, holmium laser, stent exchange, left ESWL    Amadou Jimenez MD

## 2022-12-19 NOTE — ED PROVIDER NOTES
[address] ED  Emergency Department Encounter  Emergency Medicine Resident     Pt Elisabeth Burns  MRN: [id number]  Eric 1945  Date of evaluation: 12/19/22  PCP:  Aide Reynoso Che       Chief Complaint   Patient presents with    Nausea       HISTORY OF PRESENT ILLNESS  (Location/Symptom, Timing/Onset, Context/Setting, Quality, Duration, Modifying Factors, Severity.)      José Ramirez is a 76 y.o. female who presents with emesis after a surgical procedure. Patient had a urologic procedure replacing a stent this morning at [facility]. Her daughter states that after they got home the patient had a bite of applesauce and proceeded to have coffee-ground emesis x4. She states that patient has not anticoagulation however does take aspirin however was not taking the aspirin for the last several days for the procedure. Took dose of aspirin directly after the procedure and immediately before having the emesis. Patient has a history of dementia and Parkinson's and is ANO x1. History gathered from daughter. Daughter states she has never had problem with GI bleed in the past.  Denies any history of MI or stent placement. States she takes her medications as prescribed, daughter handles medications. At this time patient is complaining of chest pain and leg pain. Patient not complaining of any abdominal pain, nausea, or diarrhea. EMS arrived and patient was reportedly satting 90% on room air, patient was placed on 2 L of oxygen. Patient does not require oxygen at home. Has no history of COPD.     PAST MEDICAL / SURGICAL / SOCIAL / FAMILY HISTORY      has a past medical history of Anxiety, Convulsion ([address] Dementia ([address] Encephalopathy, Herpes, Hx of blood clots, Hyperlipidemia, Left bundle branch block, MRSA (methicillin resistant Staphylococcus aureus), Parkinson's disease ([address] Seizures ([address] Under care of service provider, and Vitamin D deficiency. has a past surgical history that includes Abscess Drainage (Right, 2013); Ureter stent placement (Left, 2022); Cystoscopy (Left, 2022); Cystoscopy (Left, 2022); Hysterectomy; Splenectomy; other surgical history; and Ureteroscopy (Left, 2022). Social History     Socioeconomic History    Marital status: Single     Spouse name: Not on file    Number of children: Not on file    Years of education: Not on file    Highest education level: Not on file   Occupational History    Not on file   Tobacco Use    Smoking status: Former     Packs/day: 1.00     Years: 30.00     Pack years: 30.00     Types: Cigarettes     Start date:      Quit date:      Years since quittin.9    Smokeless tobacco: Never    Tobacco comments:     unknown how many packs a day, or how many years   Vaping Use    Vaping Use: Never used   Substance and Sexual Activity    Alcohol use: No    Drug use: No    Sexual activity: Not Currently   Other Topics Concern    Not on file   Social History Narrative    Not on file     Social Determinants of Health     Financial Resource Strain: Low Risk     Difficulty of Paying Living Expenses: Not hard at all   Food Insecurity: No Food Insecurity    Worried About Running Out of Food in the Last Year: Never true    [place] of Food in the Last Year: Never true   Transportation Needs: Not on file   Physical Activity: Not on file   Stress: Not on file   Social Connections: Not on file   Intimate Partner Violence: Not on file   Housing Stability: Not on file       Family History   Problem Relation Age of Onset    Asthma Mother     No Known Problems Father        Allergies:  Adhesive tape and Haldol [haloperidol lactate]    Home Medications:  Prior to Admission medications    Medication Sig Start Date End Date Taking?  Authorizing Provider   HYDROcodone-acetaminophen (NORCO) 5-325 MG per tablet Take 1 tablet by mouth every 8 hours as needed for Pain for up to 3 days. Intended supply: 3 days. Take lowest dose possible to manage pain 12/19/22 12/22/22  Amairani Avalos MD   tamsulosin [facility]) 0.4 MG capsule Take 1 capsule by mouth daily 12/19/22   Amairani Avalos MD   Hyoscyamine Sulfate SL (LEVSIN/SL) 0.125 MG SUBL Place 30 mg under the tongue every 6 hours as needed (bladder spasms/cramps) 12/19/22   Amairani Avalos MD   linezolid (ZYVOX) 600 MG tablet Take 1 tablet by mouth 2 times daily for 7 days 12/19/22 12/26/22  Amairani Avalos MD   desmopressin (DDAVP) 0.2 MG tablet Take 0.6 mg by mouth daily Take 3 tablets every pm    Historical Provider, MD   carBAMazepine (TEGRETOL) 200 MG tablet Take 300 mg by mouth daily Takes along with 400 mg at hs    Historical Provider, MD   potassium chloride (KLOR-CON M) 20 MEQ extended release tablet TAKE 1 TABLET BY MOUTH EVERY DAY 10/21/22   Historical Provider, MD   fludrocortisone (FLORINEF) 0.1 MG tablet TAKE 1 TABLET BY MOUTH EVERY DAY  Patient taking differently: Take 0.1 mg by mouth daily 11/3/22   Sravani Wu DO   carBAMazepine (TEGRETOL) 200 MG tablet 1.5 tablets po q am and 2 po q pm  Patient taking differently: 400 mg 2 po q pm 8/23/22   Sravani Wu DO   FLUoxetine (PROZAC) 20 MG capsule 2 po qd  Patient taking differently: Take 40 mg by mouth daily 8/22/22   Sravani Wu DO   mirabegron (MYRBETRIQ) 50 MG TB24 Take 50 mg by mouth daily 8/22/22   Sravani Wu DO   melatonin 5 MG TABS tablet Take 1 tablet by mouth nightly as needed (sleep)  Patient not taking: Reported on 12/15/2022 5/19/22 5/29/22  Dominique Ugalde MD   aspirin 81 MG EC tablet Take 81 mg by mouth daily    Historical Provider, MD       REVIEW OF SYSTEMS    (2-9 systems for level 4, 10 or more for level 5)      Review of Systems   Constitutional:  Negative for chills, fatigue and fever. HENT:  Negative for congestion, rhinorrhea and sore throat. Eyes:  Negative for photophobia and visual disturbance. Respiratory:  Positive for shortness of breath. Negative for cough and wheezing. Cardiovascular:  Positive for chest pain. Negative for palpitations. Gastrointestinal:  Positive for vomiting. Negative for abdominal pain, constipation, diarrhea and nausea. Coffee ground emesis   Genitourinary:  Negative for difficulty urinating, frequency and urgency. Musculoskeletal:  Negative for arthralgias and myalgias. Skin:  Negative for rash. Neurological:  Negative for dizziness, syncope, weakness and headaches. Psychiatric/Behavioral:  Negative for agitation and confusion. PHYSICAL EXAM   (up to 7 for level 4, 8 or more for level 5)      INITIAL VITALS:   BP (!) 94/50   Pulse (!) 101   Temp 100.4 °F (38 °C) (Oral)   Resp 16   SpO2 99%     Physical Exam  Constitutional:       Appearance: Normal appearance. She is normal weight. HENT:      Head: Normocephalic and atraumatic. Mouth/Throat:      Mouth: Mucous membranes are moist.      Pharynx: Oropharynx is clear. Eyes:      Extraocular Movements: Extraocular movements intact. Pupils: Pupils are equal, round, and reactive to light. Cardiovascular:      Rate and Rhythm: Regular rhythm. Tachycardia present. Pulses: Normal pulses. Heart sounds: Normal heart sounds. Pulmonary:      Effort: Pulmonary effort is normal. No respiratory distress. Breath sounds: Normal breath sounds. Abdominal:      General: Abdomen is flat. Palpations: Abdomen is soft. Tenderness: There is no abdominal tenderness. Musculoskeletal:         General: No tenderness. Normal range of motion. Cervical back: Normal range of motion and neck supple. No tenderness. Skin:     General: Skin is warm and dry. Capillary Refill: Capillary refill takes less than 2 seconds. Neurological:      General: No focal deficit present. Mental Status: She is alert and oriented to person, place, and time. Mental status is at baseline.    Psychiatric:         Mood and Affect: Mood normal. Behavior: Behavior normal.       DIFFERENTIAL  DIAGNOSIS     PLAN (LABS / IMAGING / EKG):  Orders Placed This Encounter   Procedures    Culture, Blood 1    Culture, Blood 1    Culture, Urine    XR CHEST PORTABLE    CT CHEST PULMONARY EMBOLISM W CONTRAST    CT ABDOMEN PELVIS W IV CONTRAST Additional Contrast? None    CBC with Auto Differential    Basic Metabolic Panel    Troponin    Troponin    Lipase    Hepatic Function Panel    Protime-INR    APTT    Lactic Acid    Urinalysis with Microscopic    Lactic Acid    Diet NPO    Inpatient consult to Urology    Inpatient consult to GI    Inpatient consult to Internal Medicine    Inpatient consult to Critical Care    EKG 12 Lead    ADMIT TO INPATIENT    ADMIT TO INPATIENT    Transfer patient       MEDICATIONS ORDERED:  Orders Placed This Encounter   Medications    pantoprazole (PROTONIX) 40 mg in sodium chloride (PF) 0.9 % 10 mL injection    0.9 % sodium chloride bolus    iopamidol (ISOVUE-370) 76 % injection 75 mL    DISCONTD: cefTRIAXone (ROCEPHIN) 1,000 mg in sodium chloride 0.9 % 50 mL IVPB mini-bag     Order Specific Question:   Antimicrobial Indications     Answer:   Pneumonia (CAP)    azithromycin (ZITHROMAX) 500 mg in dextrose 5% 250 mL IVPB     Order Specific Question:   Antimicrobial Indications     Answer:   Pneumonia (CAP)    DISCONTD: vancomycin (VANCOCIN) 1000 mg in sodium chloride 0.9% 250 mL IVPB     Order Specific Question:   Antimicrobial Indications     Answer:   Pneumonia (CAP)    DISCONTD: meropenem (MERREM) 1,000 mg in sodium chloride 0.9 % 100 mL IVPB (mini-bag)     Order Specific Question:   Antimicrobial Indications     Answer:   Urinary Tract Infection     Order Specific Question:   UTI duration of therapy     Answer:   10 days    meropenem (MERREM) 1,000 mg in sodium chloride 0.9 % 100 mL IVPB (mini-bag)     Order Specific Question:   Antimicrobial Indications     Answer:   Urinary Tract Infection     Order Specific Question:   UTI duration of therapy     Answer:   10 days    0.9 % sodium chloride bolus    vancomycin (VANCOCIN) 1000 mg in sodium chloride 0.9% 250 mL IVPB     Order Specific Question:   Antimicrobial Indications     Answer:   Pneumonia (CAP)    norepinephrine (LEVOPHED) 16 mg in sodium chloride 0.9 % 250 mL infusion     Order Specific Question:   Titrate Infusion? Answer:   Yes     Order Specific Question:   Initial Infusion Dose: Answer:   5 mcg/min     Order Specific Question:   Goal of Therapy is: Answer:   MAP greater than 65 mmHg     Order Specific Question:   Contact Provider if:     Answer:   Patient is receiving the maximum dose and is not achieving the goal of therapy       DDX: Upper GI bleed, postoperative complication, MI, PE, sepsis    MDM: Patient is a 75-year-old female who presents with coffee-ground emesis status post urologic stent placement this morning. Patient is GCS 15, nontoxic-appearing, nonacute distress, speaking full sentences, unable to ambulate under her own power. Patient is febrile to 38.1, normotensive, tachycardic to 114, satting 90% on room air. Lungs are clear to auscultation bilaterally. Abdomen is soft and nontender. Peripheral pulses are 2+. Tachycardia most likely secondary to hemorrhage however also possible secondary to MI, PE, sepsis as patient meet SIRS criteria. At this time I do not believe this patient has active infection as there are several more likely reasons for the tachycardia and fever. Will not pursue septic work-up at this time. Chart review shows patient's last EF was greater than 55% earlier this year. Plan on treating patient with Protonix and IV fluids. Will obtain CBC, CMP, troponin, EKG, chest x-ray, CT pulmonary embolism, CT abdomen pelvis with contrast, PT/INR, PTT. Will contact urology and GI and patient likely admit to medicine.     Sepsis Times and Checklist  Vital Signs: BP: (!) 94/50  Heart Rate: (!) 101  Resp: 16  Temp: 100.4 °F (38 °C) SpO2: 99 %  SIRS (>2) Non Pregnant       Temp > 38.3C or < 36C   HR > 90   RR > 20   WBC > 12 or < 4 or >10% bands  SIRS (>2) Pregnant 20 weeks until 3 days postpartum   Temp > 38C or <36C   HR >110   RR > 24   WBC >15 or < 4 or >10% bands   SIRS (>2) and confirmed or suspected source of infection = Sepsis  Is Sepsis due to likely bacterial infection?: Yes      Sepsis Identified:  Date: 12/19/22   Time: 3:20pm  Sepsis Orders:   CBC(required): Yes   CMP: Yes   PT/PTT: Yes   Blood Cultures x2(required): Yes   Urinalysis and Urine Culture: Yes   Lactate(required): Yes   Antibiotics Given (within 3 hours of sepsis identification, after blood cultures):  Yes    (If unable to obtain IV access for IV antibiotics within 3 hours of identification of sepsis, IM antibiotics is acceptable.)              If lactate >2.0 MUST repeat within 6 hours    If elevated, is elevated lactate from a likely infectious source?: Yes. IV Fluid Bolus:  Is lactate > 4.0:  Yes  If lactate >  4.0 OR hypotension (MAP<65 mmHg,BP<90 or SBP<85 pregnancy 20 weeks to 3 days  postpartum) (with 2 BP readings) 30 ml/kg crystalloid MUST be ordered. Fluids must be initiated within 3 hours of sepsis identification. These fluids must have a rate > 125 ml/hr. Is the patient Morbidly Obese (BMI > 30): No  IV Fluids given prior to arrival can be used in this calculation but the following information must be documented:  Start time: 1438, Type of fluid NS, Volume of fluid 1750, and Rate (Duration or End time) 1630. Does the patient or patient advocate refuse the entire 30 ml/kg IV fluid bolus? No      Septic Shock Identified (Initial lactate > 4.0 or 2 Hypotensive Blood Pressure readings within the first 6 hours): For septic shock sepsis focus physical exam must be completed AND documented (within 6 hours).     Upon reevaluation patient remained hypotensive and tachycardic after the 30 mL/kg IV fluid bolus with an initial lactic acid of 4.7. At this time the decision was made to place a central venous catheter and begin the patient on vasopressors. Date: 12/19/22  Time: [address]      Sepsis focus exam completed. For persistent hypotension after 30ml/kg fluid bolus vasopressors must be started (within 6 hours)      DIAGNOSTIC RESULTS / EMERGENCY DEPARTMENT COURSE / MDM   LAB RESULTS:  Results for orders placed or performed during the hospital encounter of 12/19/22   Culture, Blood 1    Specimen: Blood   Result Value Ref Range    Specimen Description . BLOOD     Special Requests  RT ACUB 2ML     Culture NO GROWTH <24 HRS    Culture, Blood 1    Specimen: Blood   Result Value Ref Range    Specimen Description . BLOOD     Special Requests  R WRIST 8 ML     Culture NO GROWTH <24 HRS    CBC with Auto Differential   Result Value Ref Range    WBC 13.1 (H) 3.5 - 11.3 k/uL    RBC 4.01 3.95 - 5.11 m/uL    Hemoglobin 12.7 11.9 - 15.1 g/dL    Hematocrit 39.7 36.3 - 47.1 %    MCV 99.0 82.6 - 102.9 fL    MCH 31.7 25.2 - 33.5 pg    MCHC 32.0 28.4 - 34.8 g/dL    RDW 15.4 (H) 11.8 - 14.4 %    Platelets 093 235 - 753 k/uL    MPV 8.6 8.1 - 13.5 fL    NRBC Automated 0.0 0.0 per 100 WBC    Immature Granulocytes 0 0 %    Seg Neutrophils 96 (H) 36 - 65 %    Lymphocytes 2 (L) 24 - 43 %    Monocytes 1 (L) 3 - 12 %    Eosinophils % 0 (L) 1 - 4 %    Basophils 1 0 - 2 %    Absolute Immature Granulocyte 0.00 0.00 - 0.30 k/uL    Segs Absolute 12.58 (H) 1.50 - 8.10 k/uL    Absolute Lymph # 0.26 (L) 1.10 - 3.70 k/uL    Absolute Mono # 0.13 0.10 - 1.20 k/uL    Absolute Eos # 0.00 0.00 - 0.44 k/uL    Basophils Absolute 0.13 0.00 - 0.20 k/uL    Morphology ANISOCYTOSIS PRESENT    Basic Metabolic Panel   Result Value Ref Range    Glucose 93 70 - 99 mg/dL    BUN 20 8 - 23 mg/dL    Creatinine 1.27 (H) 0.50 - 0.90 mg/dL    Est, Glom Filt Rate 43 (L) >60 mL/min/1.73m2    Calcium 8.6 8.6 - 10.4 mg/dL    Sodium 135 135 - 144 mmol/L    Potassium 4.2 3.7 - 5.3 mmol/L Chloride 96 (L) 98 - 107 mmol/L    CO2 21 20 - 31 mmol/L    Anion Gap 18 (H) 9 - 17 mmol/L   Troponin   Result Value Ref Range    Troponin, High Sensitivity 58 (HH) 0 - 14 ng/L   Troponin   Result Value Ref Range    Troponin, High Sensitivity 51 (H) 0 - 14 ng/L   Lipase   Result Value Ref Range    Lipase 40 13 - 60 U/L   Hepatic Function Panel   Result Value Ref Range    Albumin 3.3 (L) 3.5 - 5.2 g/dL    Alkaline Phosphatase 362 (H) 35 - 104 U/L    ALT 13 5 - 33 U/L    AST 26 <32 U/L    Total Bilirubin 0.3 0.3 - 1.2 mg/dL    Bilirubin, Direct 0.2 <0.3 mg/dL    Bilirubin, Indirect 0.1 0.0 - 1.0 mg/dL    Total Protein 7.3 6.4 - 8.3 g/dL    Albumin/Globulin Ratio 0.8 (L) 1.0 - 2.5   Protime-INR   Result Value Ref Range    Protime 10.4 9.1 - 12.3 sec    INR 1.0    APTT   Result Value Ref Range    PTT 21.4 20.5 - 30.5 sec   Lactic Acid   Result Value Ref Range    Lactic Acid, Whole Blood 4.7 (H) 0.7 - 2.1 mmol/L   Urinalysis with Microscopic   Result Value Ref Range    Color, UA Red (A) Yellow    Turbidity UA Turbid (A) Clear    Glucose, Ur NEGATIVE NEGATIVE    Bilirubin Urine NEGATIVE  Verified by ictotest. (A) NEGATIVE    Ketones, Urine NEGATIVE NEGATIVE    Specific Gravity, UA 1.012 1.005 - 1.030    Urine Hgb MODERATE (A) NEGATIVE    pH, UA 7.0 5.0 - 8.0    Protein, UA 2+ (A) NEGATIVE    Urobilinogen, Urine Normal Normal    Nitrite, Urine POSITIVE (A) NEGATIVE    Leukocyte Esterase, Urine LARGE (A) NEGATIVE    WBC, UA None 0 - 5 /HPF    RBC, UA TOO NUMEROUS TO COUNT 0 - 2 /HPF    Casts UA  0 - 8 /LPF     0 TO 2 HYALINE Reference range defined for non-centrifuged specimen. Epithelial Cells UA 0 TO 2 0 - 5 /HPF   Lactic Acid   Result Value Ref Range    Lactic Acid, Whole Blood 3.8 (H) 0.7 - 2.1 mmol/L         RADIOLOGY:  CT CHEST PULMONARY EMBOLISM W CONTRAST   Preliminary Result   1. Interval improvement in left hydronephrosis.   Left ureteral stent is noted   with persistent mild-to-moderate hydronephrosis and perinephric stranding. There is a 2 to thickening and enhancement of the left renal pelvis and   proximal ureter suggestive of superimposed infection or inflammation. 2. Nonobstructing bilateral renal calculi, as well as a 3 mm mid left   ureteral calculus, and bladder calculi. 3. No pulmonary embolus. 4. Slightly increasing right basilar opacity either reflecting atelectasis   versus pneumonia. Given patulous esophagus with an air-fluid level,   aspiration can be a consideration. 5. Mild nonspecific periportal edema. CT ABDOMEN PELVIS W IV CONTRAST Additional Contrast? None   Preliminary Result   1. Interval improvement in left hydronephrosis. Left ureteral stent is noted   with persistent mild-to-moderate hydronephrosis and perinephric stranding. There is a 2 to thickening and enhancement of the left renal pelvis and   proximal ureter suggestive of superimposed infection or inflammation. 2. Nonobstructing bilateral renal calculi, as well as a 3 mm mid left   ureteral calculus, and bladder calculi. 3. No pulmonary embolus. 4. Slightly increasing right basilar opacity either reflecting atelectasis   versus pneumonia. Given patulous esophagus with an air-fluid level,   aspiration can be a consideration. 5. Mild nonspecific periportal edema. XR CHEST PORTABLE   Final Result   Increasing interstitial and bibasilar opacities with probable small left   pleural effusion. Findings may reflect edema in the appropriate clinical   setting versus pneumonia.                EKG  Normal sinus rhythm, normal axis, rate of 102, no ST elevations or depressions, no T wave inversions, poor quality EKG due to patient's shaking secondary to Parkinson's, nonspecific EKG    All EKG's are interpreted by the Emergency Department Physician who either signs or Co-signs this chart in the absence of a cardiologist.    EMERGENCY DEPARTMENT COURSE:      ED Course as of 12/19/22 1923   Mon Dec 19, 2022   5898 Spoke to GI and urology. Both are aware of the patient. GI states they will scope tomorrow morning and recommend patient be n.p.o. starting at midnight. [AK]   1922 Patient reevaluated after 30 mL/kg IV fluid bolus and remained hypotensive and tachycardic. Decision was made to insert central venous catheter and begin patient on vasopressors. Patient was already admitted to internal medicine however admission will be changed to intensive care unit as patient is now on pressors. [AK]      ED Course User Index  [AK] Katelyn Arriola MD       No notes of [facility] Admission Criteria type on file. PROCEDURES:  Central Line Placement Procedure Note    Performed by: Katelyn Arriola MD    Supervised by: Dr. Lloita Walton    Indication: vascular access and centrally administered medications    Consent: The patient provided verbal consent for this procedure. Time out performed: Immediately prior to the procedure a \"time out\" was called to verify the correct patient, the correct procedure, equipment, support staff and site/side marked as required. All elements of maximal sterile barrier techniques were followed. Procedure: The patient was positioned appropriately and the skin over the right femoral vein was prepped with betadine and draped in a sterile fashion. Local anesthesia was obtained by infiltration using 1% Lidocaine without epinephrine. A large bore needle was used to identify the vein. A guide wire was then inserted into the vein through the needle. A triple lumen catheter was then inserted into the vessel over the guide wire using the Seldinger technique. All ports showed good, free flowing blood return and were flushed with saline solution. The catheter was then securely fastened to the skin with sutures and covered with a sterile dressing. A post procedure X-ray was not indicated. The patient tolerated the procedure well.     Complications: None      CONSULTS:  IP CONSULT TO UROLOGY  IP CONSULT TO GI  IP CONSULT TO INTERNAL MEDICINE  IP CONSULT TO CRITICAL CARE    CRITICAL CARE:  None      FINAL IMPRESSION      1. Septic shock ([facility] [address]    2. Gastrointestinal hemorrhage, unspecified gastrointestinal hemorrhage type          DISPOSITION / PLAN     DISPOSITION Admitted 12/19/2022 07:07:29 PM      PATIENT REFERRED TO:  No follow-up provider specified.     DISCHARGE MEDICATIONS:  New Prescriptions    No medications on file       Ayde Olivera MD  Emergency Medicine Resident    (Please note that portions of thisnote were completed with a voice recognition program.  Efforts were made to edit the dictations but occasionally words are mis-transcribed.)       Ayde Olivera MD  Resident  12/19/22 [id number]

## 2022-12-19 NOTE — DISCHARGE INSTRUCTIONS
Cystoscopy stent placement with string: You may see blood in the urine after the procedure. This should resolve over the next couple days. Please stay hydrated. You may see intermittent blood in the urine while the stent is in place. This is expected. You may experience flank pain, and/or frequency/urgency of urination while the stent is in place. Pt ok to discharge home in good condition  No heavy lifting, >10 lbs for today  Pt should avoid strenuous activity for today  Pt should walk moderately at home  Pt ok to shower   Pt may resume diet as tolerated  Pt should take Rx as directed  No driving while on narcotics  Please call attending physician or hospital  with questions  Call or Present to ED if fever (> 101F), intractable nausea vomiting or pain. Pt should follow up with Dr. Gayle Doherty, in 6 weeks, call to confirm appointment     Pt should Pull stent in 5 days. There may be some pain associated with the stent removal, which is usually self limiting. We suggest using the pain medication prescribed for you and a nonsteroidal anti-inflammatory such as Ibuprofen, if you are able to take this medication, to control symptoms. Take Ibuprofen as directed for 24 hrs after stent pull. Please stay hydrated. Please call with questions. Ureteral Stent Information  -Ureteral stents are hollow tubes with multiple side holes that coils in your kidney and proceeds down your ureter where it then coils in your bladder                              -Most stents are temporary, if you have a chronic stent it will need to be exchanged or removed within 3 months.     If left in longer than recommended, serious complications of severe encrustation, UTI, and/or obstruction and potential loss of kidney can occur     -Ureteral stents are used to relieve ureteral obstruction and promote ureteral healing following surgery     Why you may have a Stent:  -Ureteral obstruction secondary to kidney stones, ureteral stenosis, ureteral anastomosis, post op swelling after surgery, or preventative (prior to ESWL for large stone)     Stent Symptoms  You may not have any symptoms at all   Irritating voiding symptoms; urgency, frequency, feeling of incomplete bladder emptying, burning, blood in urine, straining (all likely due to stent irritating the bladder)  Suprapubic pressure/discomfort  Flank pain  If stent migrates out of urethra you will constantly leak urine. You may see or feel the tube at the urethra. Please call for instructions. Stent Management  -You will likely be discharged home with:  Pain medication- take as needed for severe pain  Anticholinergics (Oxybutynin, Urispas) - These medications will decrease ureteral and bladder spasms that are likely causing discomfort  Flomax - relaxes ureter  Pyridium - help sooth bladder pain  Antibiotic-treats or prevents infection-take medication until completed  *Take all medications as prescribed     *If pain is severe take pain pill, take a hot shower for 10-15 minutes, and then apply heating pad to affected area        -Diet  Normal diet,                                                                                     Increase water intake!!!! Try to consume at least 2 liters of water a day  AVOID Caffeine-this can make symptoms worse!  -Activity  Avoid strenuous activity!! Rest when tired  Do not drive if taking narcotic pain medicine  *Call provider if developing symptoms of infection; fever, chills, pus in your urine, new onset body aches     Follow-up is Key part of treatment and safety!!! No alcoholic beverages, no driving or operating machinery, no making important decisions for 24 hours. Children should maintain quiet play ( games, movies, books ) for 24 hours. You may have a normal diet but should eat lightly day of surgery. Drink plenty of fluids.   Urinate within 8 hours after surgery, if unable to urinate call your doctor

## 2022-12-19 NOTE — ED NOTES
The following labs were labeled with appropriate pt sticker and tubed to lab:     [x] Blue     [x] Lavender   [] on ice  [x] Green/yellow  [] Green/black [] on ice  [] Rolo Crate  [] on ice  [] Yellow  [] Red  [] Type/ Screen  [] ABG  [] VBG    [] COVID-19 swab    [] Rapid  [] PCR  [] Flu swab  [] Peds Viral Panel     [] Urine Sample  [] Fecal Sample  [] Pelvic Cultures  [] Blood Cultures  [] X 2  [] STREP Cultures           Marrion Kussmaul, RN  12/19/22 9153

## 2022-12-19 NOTE — CONSULTS
Dee Chandra, [facility], [place], [facility] [place]  Urology Consultation      Patient:  Dinesh   MRN: [id number]  YOB: 1945    CHIEF COMPLAINT:  sepsis    HISTORY OF PRESENT ILLNESS:   The patient is a 76 y.o. female who presents with low grade fevers, elevated troponin and tachycardia with concern of afib on EKG after  L ESWL L URS HLL and stent placement today. Pt has a hx of stent placement over the summer and did not follow up for definitive treatment. She then had a failed stent exchange due to encrustation in 11/2022. She also had issues with recurrent UTIs and most recently had VRE. She was given preop merrem and linezolid to avoid septic episode after this procedure today. WBC 13 from , Cr 1.27 from 0.9. Cultures pending. Patient's old records, notes and chart reviewed and summarized above.     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[address]     secondary to encephalitis    Under care of service provider 12/15/2022    [id number]-last visit dec 2022    Vitamin D deficiency        Past Surgical History:    Past Surgical History:   Procedure Laterality Date    ABSCESS DRAINAGE Right 12/14/2013    WOUND EXPLORATION AND I+D  RIGHT HIP    CYSTOSCOPY Left 05/16/2022    CYSTOSCOPY URETERAL STENT INSERTION performed by Essence Jay MD at [facility] Left 12/02/2022    ATTEMPTED CYSTOSCOPY WITH LEFT STENT EXCHANGE performed by Essence Jay MD at [address] [place] [address] ([address] [place] [address])      OTHER SURGICAL HISTORY      dtr states hemorrhoid surgery but unsure what type    SPLENECTOMY      at age 10 after [place] [id number] PLACEMENT Left 05/16/2022    Cystoscopy    URETEROSCOPY Left 12/19/2022    stent       Medications:      Current Facility-Administered Medications:     pantoprazole (PROTONIX) 40 mg in sodium chloride (PF) 0.9 % 10 mL injection, 40 mg, IntraVENous, Daily, Emerson Muniz MD, 40 mg at 12/19/22 1438    azithromycin (ZITHROMAX) 500 mg in dextrose 5% 250 mL IVPB, 500 mg, IntraVENous, Once, Emerson Muniz MD    meropenem (MERREM) 1,000 mg in sodium chloride 0.9 % 100 mL IVPB (mini-bag), 1,000 mg, IntraVENous, Q12H, Galen Ferguson MD    Current Outpatient Medications:     HYDROcodone-acetaminophen (NORCO) 5-325 MG per tablet, Take 1 tablet by mouth every 8 hours as needed for Pain for up to 3 days. Intended supply: 3 days.  Take lowest dose possible to manage pain, Disp: 9 tablet, Rfl: 0    tamsulosin (FLOMAX) 0.4 MG capsule, Take 1 capsule by mouth daily, Disp: 30 capsule, Rfl: 0    Hyoscyamine Sulfate SL (LEVSIN/SL) 0.125 MG SUBL, Place 30 mg under the tongue every 6 hours as needed (bladder spasms/cramps), Disp: 30 each, Rfl: 0    linezolid (ZYVOX) 600 MG tablet, Take 1 tablet by mouth 2 times daily for 7 days, Disp: 14 tablet, Rfl: 0    desmopressin (DDAVP) 0.2 MG tablet, Take 0.6 mg by mouth daily Take 3 tablets every pm, Disp: , Rfl:     carBAMazepine (TEGRETOL) 200 MG tablet, Take 300 mg by mouth daily Takes along with 400 mg at hs, Disp: , Rfl:     potassium chloride (KLOR-CON M) 20 MEQ extended release tablet, TAKE 1 TABLET BY MOUTH EVERY DAY, Disp: , Rfl:     fludrocortisone (FLORINEF) 0.1 MG tablet, TAKE 1 TABLET BY MOUTH EVERY DAY (Patient taking differently: Take 0.1 mg by mouth daily), Disp: 90 tablet, Rfl: 0    carBAMazepine (TEGRETOL) 200 MG tablet, 1.5 tablets po q am and 2 po q pm (Patient taking differently: 400 mg 2 po q pm), Disp: 120 tablet, Rfl: 11    FLUoxetine (PROZAC) 20 MG capsule, 2 po qd (Patient taking differently: Take 40 mg by mouth daily), Disp: 180 capsule, Rfl: 3    mirabegron (MYRBETRIQ) 50 MG TB24, Take 50 mg by mouth daily, Disp: 90 tablet, Rfl: 3    melatonin 5 MG TABS tablet, Take 1 tablet by mouth nightly as needed (sleep) (Patient not taking: Reported on 12/15/2022), Disp: 10 tablet, Rfl: 0    aspirin 81 MG EC tablet, Take 81 mg by mouth daily, Disp: , Rfl:     Allergies: Allergies   Allergen Reactions    Adhesive Tape Other (See Comments)     Pulls skin off    Haldol [Haloperidol Lactate] Other (See Comments)     hallucinates       Social History:   Social History     Socioeconomic History    Marital status: Single     Spouse name: Not on file    Number of children: Not on file    Years of education: Not on file    Highest education level: Not on file   Occupational History    Not on file   Tobacco Use    Smoking status: Former     Packs/day: 1.00     Years: 30.00     Pack years: 30.00     Types: Cigarettes     Start date:      Quit date:      Years since quittin.9    Smokeless tobacco: Never    Tobacco comments:     unknown how many packs a day, or how many years   Vaping Use    Vaping Use: Never used   Substance and Sexual Activity    Alcohol use: No    Drug use: No    Sexual activity: Not Currently   Other Topics Concern    Not on file   Social History Narrative    Not on file     Social Determinants of Health     Financial Resource Strain: Low Risk     Difficulty of Paying Living Expenses: Not hard at all   Food Insecurity: No Food Insecurity    Worried About Running Out of Food in the Last Year: Never true    Ran Out of Food in the Last Year: Never true   Transportation Needs: Not on file   Physical Activity: Not on file   Stress: Not on file   Social Connections: Not on file   Intimate Partner Violence: Not on file   Housing Stability: Not on file       Family History:    Family History   Problem Relation Age of Onset    Asthma Mother     No Known Problems Father        REVIEW OF SYSTEMS:  A comprehensive 14 point review of systems was obtained.   Constitutional: No fatigue  Eyes: No blurry vision  Ears, nose, mouth, throat, face: No ringing in the ears; no facial droop. Respiratory: No cough or cold. Cardiovascular: No palpitations  Gastrointestinal: No diarrhea or constipation. Genitourinary: No burning with urination  Integument/Skin: No rashes  Hematologic/Lymphatic: No easy bruising  Musculoskeletal: No muscle pains  Neurologic: No weakness in the extremities. Psychiatric: No depression or suicidal thoughts. Endocrine: No heat or cold intolerances. Allergic/Immunologic: No current seasonal allergies; no skin hives. Physical Exam:    This a 76 y.o. male   Vitals:    12/19/22 1515   BP: (!) 104/59   Pulse: (!) 105   Resp: 15   Temp:    SpO2:      Constitutional: Patient in no acute distress. Skin: No rashes or bruising present. Lungs: Respiratory effort normal.  Cardiovascular:  Tachycardic   Abdomen: Soft, non-tender, non-distended. Bladder non-tender and not distended. Labs:  Recent Labs     12/19/22  1430   WBC 13.1*   HGB 12.7   HCT 39.7   MCV 99.0        Recent Labs     12/19/22  1430      K 4.2   CL 96*   CO2 21   BUN 20   CREATININE 1.27*       No results for input(s): COLORU, PHUR, LABCAST, WBCUA, RBCUA, MUCUS, TRICHOMONAS, YEAST, BACTERIA, CLARITYU, SPECGRAV, LEUKOCYTESUR, UROBILINOGEN, BILIRUBINUR, BLOODU in the last 72 hours. Invalid input(s): NITRATE, GLUCOSEUKETONESUAMORPHOUS    Culture Results:  @[facility]@      -----------------------------------------------------------------  Imaging Results:  XR CHEST PORTABLE    Result Date: 12/19/2022  EXAMINATION: ONE XRAY VIEW OF THE CHEST 12/19/2022 2:36 pm COMPARISON: 11/09/2022 HISTORY: ORDERING SYSTEM PROVIDED HISTORY: cp TECHNOLOGIST PROVIDED HISTORY: cp Initial encounter FINDINGS: The patient is rotated. Diffuse osteopenia. Small left pleural effusion. There is overall increasing interstitial and bibasilar airspace opacities. Stable cardiac silhouette.   The osseous structures otherwise stable stable chronic deformity left proximal humerus. Increasing interstitial and bibasilar opacities with probable small left pleural effusion. Findings may reflect edema in the appropriate clinical setting versus pneumonia. 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)    DVT of deep femoral vein, left (HCC)    Closed compression fracture of L1 lumbar vertebra, initial encounter ([facility]    Depression    Kidney stone    Urinary incontinence    Overactive bladder    Closed head injury    Community acquired pneumonia    Sepsis ([facility]    COPD exacerbation ([facility]    Hypokalemia    Bacterial UTI    Urinary tract infection due to Proteus    ESBL (extended spectrum beta-lactamase) producing bacteria infection    Acute cystitis with hematuria    Leukocytosis    Mitral valve disorder    Chest pain    Encounter for palliative care    Complicated UTI (urinary tract infection)    Ureteral stent present           Plan:   Keep stent in place, do not remove or pull the string, trend Cr     Started merrem however recommend ID recs regarding recent VRE and if linezolid should be given etc     FU CT AP     Further care per primary team regarding troponemis and CT PE protocol     FU Ucx and Bcx       Thank you for involving us in the care of Lyly Wood. Should you have any questions, please do not hesitate to contact us at any time.     Clara Guido MD  4:02 PM 12/19/2022

## 2022-12-19 NOTE — ED NOTES
Pt to ED via [address]  Pt had 3-4 episodes of emesis, family and ems states color in nature was black charcoal   Pt c/o chest pain  Pt was seen this morning by urology and had bladder stents placed  Bleeding noted at surgical site  Was discharged home  Pt has hx of dementia at baseline  Alert to name and  only, disoriented to situation, time  Pt is awake, resting on stretcher, call light in reach, attached to monitor, RR even and non labored     Brandie Sparks RN  22 1612

## 2022-12-20 LAB
ABSOLUTE EOS #: 0 K/UL (ref 0–0.4)
ABSOLUTE IMMATURE GRANULOCYTE: 0 K/UL (ref 0–0.3)
ABSOLUTE LYMPH #: 1.8 K/UL (ref 1–4.8)
ABSOLUTE MONO #: 3.23 K/UL (ref 0.1–0.8)
ANION GAP SERPL CALCULATED.3IONS-SCNC: 14 MMOL/L (ref 9–17)
BASOPHILS # BLD: 0 % (ref 0–2)
BASOPHILS ABSOLUTE: 0 K/UL (ref 0–0.2)
BUN BLDV-MCNC: 16 MG/DL (ref 8–23)
CALCIUM IONIZED: 1.09 MMOL/L (ref 1.13–1.33)
CALCIUM SERPL-MCNC: 7.5 MG/DL (ref 8.6–10.4)
CHLORIDE BLD-SCNC: 105 MMOL/L (ref 98–107)
CO2: 19 MMOL/L (ref 20–31)
CREAT SERPL-MCNC: 1.02 MG/DL (ref 0.5–0.9)
CULTURE: NORMAL
EKG ATRIAL RATE: 326 BPM
EKG Q-T INTERVAL: 392 MS
EKG QRS DURATION: 120 MS
EKG QTC CALCULATION (BAZETT): 510 MS
EKG R AXIS: 45 DEGREES
EKG T AXIS: 74 DEGREES
EKG VENTRICULAR RATE: 102 BPM
EOSINOPHILS RELATIVE PERCENT: 0 % (ref 1–4)
GFR SERPL CREATININE-BSD FRML MDRD: 56 ML/MIN/1.73M2
GLUCOSE BLD-MCNC: 101 MG/DL (ref 70–99)
HCT VFR BLD CALC: 31.5 % (ref 36.3–47.1)
HCT VFR BLD CALC: 32.5 % (ref 36.3–47.1)
HEMOGLOBIN: 10.8 G/DL (ref 11.9–15.1)
HEMOGLOBIN: 9.9 G/DL (ref 11.9–15.1)
IMMATURE GRANULOCYTES: 0 %
LACTIC ACID, WHOLE BLOOD: 2 MMOL/L (ref 0.7–2.1)
LYMPHOCYTES # BLD: 5 % (ref 24–44)
MAGNESIUM: 1.9 MG/DL (ref 1.6–2.6)
MCH RBC QN AUTO: 32.2 PG (ref 25.2–33.5)
MCHC RBC AUTO-ENTMCNC: 33.2 G/DL (ref 28.4–34.8)
MCV RBC AUTO: 97 FL (ref 82.6–102.9)
MONOCYTES # BLD: 9 % (ref 1–7)
MORPHOLOGY: ABNORMAL
MRSA, DNA, NASAL: NEGATIVE
NRBC AUTOMATED: 0 PER 100 WBC
PDW BLD-RTO: 15.5 % (ref 11.8–14.4)
PLATELET # BLD: ABNORMAL K/UL (ref 138–453)
PLATELET, FLUORESCENCE: 267 K/UL (ref 138–453)
PLATELET, IMMATURE FRACTION: 2.2 % (ref 1.1–10.3)
POTASSIUM SERPL-SCNC: 4 MMOL/L (ref 3.7–5.3)
RBC # BLD: 3.35 M/UL (ref 3.95–5.11)
SEG NEUTROPHILS: 86 % (ref 36–66)
SEGMENTED NEUTROPHILS ABSOLUTE COUNT: 30.87 K/UL (ref 1.8–7.7)
SODIUM BLD-SCNC: 138 MMOL/L (ref 135–144)
SPECIMEN DESCRIPTION: NORMAL
SPECIMEN DESCRIPTION: NORMAL
WBC # BLD: 35.9 K/UL (ref 3.5–11.3)

## 2022-12-20 PROCEDURE — 85014 HEMATOCRIT: CPT

## 2022-12-20 PROCEDURE — 2580000003 HC RX 258

## 2022-12-20 PROCEDURE — 36415 COLL VENOUS BLD VENIPUNCTURE: CPT

## 2022-12-20 PROCEDURE — 85025 COMPLETE CBC W/AUTO DIFF WBC: CPT

## 2022-12-20 PROCEDURE — 93005 ELECTROCARDIOGRAM TRACING: CPT | Performed by: STUDENT IN AN ORGANIZED HEALTH CARE EDUCATION/TRAINING PROGRAM

## 2022-12-20 PROCEDURE — 94761 N-INVAS EAR/PLS OXIMETRY MLT: CPT

## 2022-12-20 PROCEDURE — 2500000003 HC RX 250 WO HCPCS

## 2022-12-20 PROCEDURE — 85018 HEMOGLOBIN: CPT

## 2022-12-20 PROCEDURE — 83605 ASSAY OF LACTIC ACID: CPT

## 2022-12-20 PROCEDURE — 82330 ASSAY OF CALCIUM: CPT

## 2022-12-20 PROCEDURE — C9113 INJ PANTOPRAZOLE SODIUM, VIA: HCPCS

## 2022-12-20 PROCEDURE — C9113 INJ PANTOPRAZOLE SODIUM, VIA: HCPCS | Performed by: INTERNAL MEDICINE

## 2022-12-20 PROCEDURE — 2580000003 HC RX 258: Performed by: STUDENT IN AN ORGANIZED HEALTH CARE EDUCATION/TRAINING PROGRAM

## 2022-12-20 PROCEDURE — 80048 BASIC METABOLIC PNL TOTAL CA: CPT

## 2022-12-20 PROCEDURE — 93010 ELECTROCARDIOGRAM REPORT: CPT | Performed by: INTERNAL MEDICINE

## 2022-12-20 PROCEDURE — 6360000002 HC RX W HCPCS: Performed by: STUDENT IN AN ORGANIZED HEALTH CARE EDUCATION/TRAINING PROGRAM

## 2022-12-20 PROCEDURE — 2700000000 HC OXYGEN THERAPY PER DAY

## 2022-12-20 PROCEDURE — 2000000000 HC ICU R&B

## 2022-12-20 PROCEDURE — 85055 RETICULATED PLATELET ASSAY: CPT

## 2022-12-20 PROCEDURE — 6370000000 HC RX 637 (ALT 250 FOR IP)

## 2022-12-20 PROCEDURE — 83735 ASSAY OF MAGNESIUM: CPT

## 2022-12-20 PROCEDURE — 2580000003 HC RX 258: Performed by: INTERNAL MEDICINE

## 2022-12-20 PROCEDURE — 6360000002 HC RX W HCPCS: Performed by: INTERNAL MEDICINE

## 2022-12-20 PROCEDURE — 99233 SBSQ HOSP IP/OBS HIGH 50: CPT | Performed by: INTERNAL MEDICINE

## 2022-12-20 PROCEDURE — 99291 CRITICAL CARE FIRST HOUR: CPT | Performed by: INTERNAL MEDICINE

## 2022-12-20 PROCEDURE — 99221 1ST HOSP IP/OBS SF/LOW 40: CPT | Performed by: NURSE PRACTITIONER

## 2022-12-20 PROCEDURE — 6360000002 HC RX W HCPCS

## 2022-12-20 RX ORDER — CALCIUM GLUCONATE 20 MG/ML
2000 INJECTION, SOLUTION INTRAVENOUS ONCE
Status: COMPLETED | OUTPATIENT
Start: 2022-12-20 | End: 2022-12-20

## 2022-12-20 RX ADMIN — SODIUM CHLORIDE, PRESERVATIVE FREE 40 MG: 5 INJECTION INTRAVENOUS at 07:53

## 2022-12-20 RX ADMIN — CARBAMAZEPINE 300 MG: 200 TABLET ORAL at 07:53

## 2022-12-20 RX ADMIN — SODIUM CHLORIDE: 9 INJECTION, SOLUTION INTRAVENOUS at 04:08

## 2022-12-20 RX ADMIN — MEROPENEM 1000 MG: 1 INJECTION, POWDER, FOR SOLUTION INTRAVENOUS at 20:40

## 2022-12-20 RX ADMIN — SODIUM CHLORIDE 8 MG/HR: 9 INJECTION, SOLUTION INTRAVENOUS at 10:02

## 2022-12-20 RX ADMIN — SODIUM CHLORIDE, PRESERVATIVE FREE 10 ML: 5 INJECTION INTRAVENOUS at 08:05

## 2022-12-20 RX ADMIN — Medication 9 MCG/MIN: at 19:54

## 2022-12-20 RX ADMIN — SODIUM CHLORIDE, PRESERVATIVE FREE 10 ML: 5 INJECTION INTRAVENOUS at 20:39

## 2022-12-20 RX ADMIN — SODIUM CHLORIDE 80 MG: 9 INJECTION, SOLUTION INTRAVENOUS at 09:59

## 2022-12-20 RX ADMIN — CALCIUM GLUCONATE 2000 MG: 20 INJECTION, SOLUTION INTRAVENOUS at 13:14

## 2022-12-20 RX ADMIN — SODIUM CHLORIDE: 9 INJECTION, SOLUTION INTRAVENOUS at 12:09

## 2022-12-20 RX ADMIN — CARBAMAZEPINE 400 MG: 200 TABLET ORAL at 20:39

## 2022-12-20 RX ADMIN — SODIUM CHLORIDE 8 MG/HR: 9 INJECTION, SOLUTION INTRAVENOUS at 19:30

## 2022-12-20 RX ADMIN — MEROPENEM 1000 MG: 1 INJECTION, POWDER, FOR SOLUTION INTRAVENOUS at 08:04

## 2022-12-20 RX ADMIN — SODIUM CHLORIDE: 9 INJECTION, SOLUTION INTRAVENOUS at 19:55

## 2022-12-20 ASSESSMENT — PAIN SCALES - GENERAL
PAINLEVEL_OUTOF10: 0
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
## 2022-12-20 NOTE — CARE COORDINATION
12/20/22 1325   Readmission Assessment   Number of Days since last admission? 8-30 days   Previous Disposition Home with Family   Who is being Tramaine Oro   What was the patient's/caregiver's perception as to why they think they needed to return back to the hospital? Other (Comment)  (pt had planned surgery- was dc'd and had bloddy emesis)   Did you visit your Primary Care Physician after you left the hospital, before you returned this time? No   Why weren't you able to visit your PCP? Other (Comment)  (did not have an appointment in between hospital admissions)   Did you see a specialist, such as Cardiac, Pulmonary, Orthopedic Physician, etc. after you left the hospital? No   Who advised the patient to return to the hospital? Self-referral   Does the patient report anything that got in the way of taking their medications? No   In our efforts to provide the best possible care to you and others like you, can you think of anything that we could have done to help you after you left the hospital the first time, so that you might not have needed to return so soon?  Other (Comment)  (pt's daughter states dc instructions were understood.)

## 2022-12-20 NOTE — ANESTHESIA POSTPROCEDURE EVALUATION
Department of Anesthesiology  Postprocedure Note    Patient: Marvin Huff  MRN: [id number]  [place]: 1945  Date of evaluation: 12/20/2022      Procedure Summary     Date: 12/19/22 Room / Location: [address]    Anesthesia Start: 3778 Anesthesia Stop: 4204    Procedures:       HOLMIUM LASER CYSTOSCOPY,URETEROSCOPY, STENT EXCHANGE (Left)      ESWL EXTRACORPOREAL SHOCK WAVE LITHOTRIPSY (Left) Diagnosis:       Acute cystitis with hematuria      Complicated urinary tract infection      (ACUTE CYSTITIS WITH HEMATURIA, COMPLICATED UTI)    Surgeons: Waldo Aschoff, MD Responsible Provider: Ocie Lennox, MD    Anesthesia Type: general ASA Status: 3          Anesthesia Type: No value filed.     Betty Phase I: Betty Score: 10    Betty Phase II: Betty Score: 10      Anesthesia Post Evaluation    Patient location during evaluation: PACU  Patient participation: complete - patient participated  Level of consciousness: awake  Airway patency: patent  Nausea & Vomiting: no nausea and no vomiting  Complications: no  Cardiovascular status: blood pressure returned to baseline  Respiratory status: acceptable  Hydration status: euvolemic  Comments: /71   Pulse 99   Temp 98.2 °F (36.8 °C)   Resp 19   Ht 5' 7\" (1.702 m)   Wt 130 lb (59 kg)   SpO2 94%   BMI 20.36 kg/m²

## 2022-12-20 NOTE — CONSULTS
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  Parkinson's disease  Severe vascular dementia  Left femoral vein DVT  Emesis with coffee grounds 12-19-22 with hypotension    Recommendations:   Wait for urine culture from 12-19-22  Continue meropenem for now  Supportive care    Medical Decision Making/Summary/Discussion:12/19/2022       Infection Control Recommendations   [place] Precautions  Contact Isolation ESBL E coli, VRE    Antimicrobial Stewardship Recommendations     Simplification of therapy    Coordination of Outpatient Care:   Estimated Length of IV antimicrobials: TBD  Patient will need Midline Catheter Insertion: No  Patient will need PICC line Insertion:No  Patient will need: Home IV , [place],  SNF,  LTAC: TBD  Patient will need outpatient wound care:No    Chief complaint/reason for consultation:   Hypotension post urologic procedure and GI bleed      History of Present Illness:   Anamaria Shaw is a 76y.o.-year-old  female who was initially admitted on 12/19/2022. Patient seen at the request of Delvis Cochran. INITIAL HISTORY:    Patient with a history of renal stones. She had a left ureteral stent placed on 5/16/2022 because of hydronephrosis. The stent was replaced on 12/19/2022. The patient did well with the procedure but returned to the hospital later in the day after developing coffee-ground emesis x 4 episodes. She developed associated hypotension requiring vasopressors.   Question was raised as to whether she had hypotension on the basis of GI bleeding versus of the presence of a urinary tract infection. She has a past history of having UTIs. She had E coli ESBL+ UTI on 10-24-22 and VRE E faecium  UTI on 11-28-22. Your urine culture from 12/19/2022 is in progress. 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 Additional problems include Parkinson's disease, Severe vascular dementia and Left femoral vein DVT      Labs, X rays reviewed: 12/19/2022    BUN: 20  Cr: 1.27    WBC: 13.1  Hb: 12.7  Plat: 395    Cultures:  Urine:  7/18/2022  Blood:  12/19/2022  Sputum :    Wound:      Discussed with patient, RN, CC. I have personally reviewed the past medical history, past surgical history, medications, social history, and family history, and I have updated the database accordingly.   Past Medical History:     Past Medical History:   Diagnosis Date    Anxiety     Convulsion ([address]     Dementia ([address]     Encephalopathy     Herpes     Hx of blood clots     Hyperlipidemia     Left bundle branch block     MRSA (methicillin resistant Staphylococcus aureus)     Parkinson's disease ([address]     family states tremors not parkinson    Seizures ([address]     secondary to encephalitis    Under care of service provider 12/15/2022    [id number]-last visit dec 2022    Vitamin D deficiency        Past Surgical  History:     Past Surgical History:   Procedure Laterality Date    ABSCESS DRAINAGE Right 12/14/2013    WOUND EXPLORATION AND I+D  RIGHT HIP    CYSTOSCOPY Left 05/16/2022    [facility]STOS[facility]20[facility]    ATTEMPTED CYSTOSCOPY WITH LEFT STENT EXCHANGE performed by Jaime Brock MD at [address] [place] [address] ([address] [place] [address])      OTHER SURGICAL HISTORY      dtr states hemorrhoid surgery but unsure what type    SPLENECTOMY      at age 10 after [address] Left 05/16/2022    Cystoscopy    URETEROSCOPY Left 12/19/2022    stent       Medications:      pantoprazole (PROTONIX) 40 mg injection  40 mg IntraVENous Daily    meropenem  1,000 mg IntraVENous Q12H    sodium chloride flush  5-40 mL IntraVENous 2 times per day    [START ON 2022] carBAMazepine  300 mg Oral Daily    carBAMazepine  400 mg Oral Nightly       Social History:     Social History     Socioeconomic History    Marital status: Single     Spouse name: Not on file    Number of children: Not on file    Years of education: Not on file    Highest education level: Not on file   Occupational History    Not on file   Tobacco Use    Smoking status: Former     Packs/day: 1.00     Years: 30.00     Pack years: 30.00     Types: Cigarettes     Start date:      Quit date:      Years since quittin.9    Smokeless tobacco: Never    Tobacco comments:     unknown how many packs a day, or how many years   Vaping Use    Vaping Use: Never used   Substance and Sexual Activity    Alcohol use: No    Drug use: No    Sexual activity: Not Currently   Other Topics Concern    Not on file   Social History Narrative    Not on file     Social Determinants of Health     Financial Resource Strain: Low Risk     Difficulty of Paying Living Expenses: Not hard at all   Food Insecurity: No Food Insecurity    Worried About Running Out of Food in the Last Year: Never true    Ran Out of Food in the Last Year: Never true   Transportation Needs: Not on file   Physical Activity: Not on file   Stress: Not on file   Social Connections: Not on file   Intimate Partner Violence: Not on file   Housing Stability: Not on file       Family History:     Family History   Problem Relation Age of Onset    Asthma Mother     No Known Problems Father         Allergies:   Adhesive tape and Haldol [haloperidol lactate]     Review of Systems:     Patient not able to provide a coherent history review of systems. She has dementia. 2022. Constitutional: No fevers or chills.  No systemic complaints  Head: No headaches  Eyes: No double vision or blurry vision. No conjunctival inflammation. ENT: No sore throat or runny nose. . No hearing loss, tinnitus or vertigo. Cardiovascular: No chest pain or palpitations. No shortness of breath. No HOPKINS  Lung: No shortness of breath or cough. No sputum production  Abdomen: No nausea, vomiting, diarrhea, or abdominal pain. Sallyanne Chain No cramps. Genitourinary: No increased urinary frequency, or dysuria. No hematuria. No suprapubic or CVA pain  Musculoskeletal: No muscle aches or pains. No joint effusions, swelling or deformities  Hematologic: No bleeding or bruising. Neurologic: No headache, weakness, numbness, or tingling. Integument: No rash, no ulcers. Psychiatric: No depression. Endocrine: No polyuria, no polydipsia, no polyphagia.     Physical Examination :   Patient Vitals for the past 8 hrs:   BP Temp Temp src Pulse Resp SpO2   12/19/22 2215 -- -- -- 91 19 100 %   12/19/22 2200 (!) 113/58 -- -- 97 15 100 %   12/19/22 2145 (!) 115/56 -- -- 92 15 99 %   12/19/22 2130 (!) 114/54 -- -- 89 16 100 %   12/19/22 2115 (!) 117/56 -- -- 92 19 99 %   12/19/22 2105 -- -- -- (!) 102 18 100 %   12/19/22 2100 -- -- -- 97 18 100 %   12/19/22 2056 (!) 119/51 -- -- 89 16 100 %   12/19/22 2045 -- -- -- -- -- 100 %   12/19/22 2041 (!) 114/50 -- -- 92 18 100 %   12/19/22 2030 (!) 116/55 -- -- 93 16 100 %   12/19/22 2026 (!) 114/49 99.1 °F (37.3 °C) Oral 93 12 99 %   12/19/22 1908 -- 100.4 °F (38 °C) Oral -- -- --   12/19/22 1905 (!) 94/50 -- -- (!) 101 16 --   12/19/22 1900 (!) 101/49 -- -- 97 13 --   12/19/22 1855 (!) 95/46 -- -- 99 14 --   12/19/22 1850 (!) 87/47 -- -- 99 15 --   12/19/22 1845 (!) 89/57 -- -- 97 13 --   12/19/22 1840 (!) 88/49 -- -- 99 15 99 %   12/19/22 1835 (!) 80/49 -- -- 95 14 98 %   12/19/22 1830 (!) 83/48 -- -- 95 15 97 %   12/19/22 1815 (!) 81/46 -- -- 96 14 98 %   12/19/22 1810 (!) 81/48 -- -- 96 13 95 %   12/19/22 1800 (!) 83/46 -- -- 93 14 93 %   12/19/22 1730 (!) 89/48 -- -- 89 15 90 %   12/19/22 1715 (!) 91/47 -- -- 90 13 --   12/19/22 1700 (!) 94/37 -- -- 93 13 --   12/19/22 1630 (!) 100/49 -- -- (!) 106 11 96 %   12/19/22 1622 (!) 109/56 -- -- (!) 102 17 98 %   12/19/22 1515 (!) 104/59 -- -- (!) 105 15 --   12/19/22 1501 -- -- -- (!) 107 18 93 %   12/19/22 1459 -- -- -- (!) 108 13 99 %   12/19/22 1457 -- -- -- (!) 109 15 94 %   12/19/22 1456 -- -- -- (!) 103 15 95 %   12/19/22 1454 -- -- -- (!) 108 12 93 %   12/19/22 1445 (!) 110/52 -- -- (!) 106 15 94 %   12/19/22 1430 (!) 108/53 -- -- (!) 102 19 92 %     General Appearance: Awake, alert, and in no apparent distress  Head:  Normocephalic, no trauma  Eyes: Pupils equal, round, reactive to light and accommodation; extraocular movements intact; sclera anicteric; conjunctivae pink. No embolic phenomena. ENT: Oropharynx clear, without erythema, exudate, or thrush. No tenderness of sinuses. Mouth/throat: mucosa pink and moist. No lesions. Dentition in good repair. Neck:Supple, without lymphadenopathy. Thyroid normal, No bruits. Pulmonary/Chest: Clear to auscultation, without wheezes, rales, or rhonchi. No dullness to percussion. Cardiovascular: Regular rate and rhythm without murmurs, rubs, or gallops. Abdomen: Soft, non tender. Bowel sounds normal. No organomegaly  All four Extremities: No cyanosis, clubbing, edema, or effusions. Neurologic: No gross sensory or motor deficits. Skin: Warm and dry with good turgor. Signs of peripheral arterial insufficiency. No ulcerations. No open wounds.     Medical Decision Making -Laboratory:   I have independently reviewed/ordered the following labs:    CBC with Differential:   Recent Labs     12/19/22  1430   WBC 13.1*   HGB 12.7   HCT 39.7      LYMPHOPCT 2*   MONOPCT 1*     BMP:   Recent Labs     12/19/22  1430      K 4.2   CL 96*   CO2 21   BUN 20   CREATININE 1.27*     Hepatic Function Panel:   Recent Labs     12/19/22  1430   PROT 7.3   LABALBU 3.3*   BILIDIR 0.2   IBILI 0.1   BILITOT 0.3   ALKPHOS 362*   ALT 13   AST 26     No results for input(s): RPR in the last 72 hours. No results for input(s): HIV in the last 72 hours. No results for input(s): BC in the last 72 hours. Lab Results   Component Value Date/Time    MUCUS 1+ 11/13/2019 03:30 PM    RBC 4.01 12/19/2022 02:30 PM    RBC 4.07 03/12/2012 03:18 PM    TRICHOMONAS NOT REPORTED 11/13/2019 03:30 PM    WBC 13.1 12/19/2022 02:30 PM    YEAST NOT REPORTED 11/13/2019 03:30 PM    TURBIDITY Turbid 12/19/2022 03:50 PM     Lab Results   Component Value Date/Time    CREATININE 1.27 12/19/2022 02:30 PM    GLUCOSE 93 12/19/2022 02:30 PM    GLUCOSE 93 03/12/2012 03:18 PM       Medical Decision Making-Imaging:     EXAMINATION:   CTA OF THE CHEST; CT OF THE ABDOMEN AND PELVIS WITH CONTRAST 12/19/2022 3:18   pm       TECHNIQUE:   CTA of the chest was performed after the administration of intravenous   contrast.  Multiplanar reformatted images are provided for review. MIP   images are provided for review. Automated exposure control, iterative   reconstruction, and/or weight based adjustment of the mA/kV was utilized to   reduce the radiation dose to as low as reasonably achievable.; CT of the   abdomen and pelvis was performed with the administration of intravenous   contrast. Multiplanar reformatted images are provided for review. Automated   exposure control, iterative reconstruction, and/or weight based adjustment of   the mA/kV was utilized to reduce the radiation dose to as low as reasonably   achievable.        COMPARISON:   12/10/2022, 10/28/2022, 05/12/2022       HISTORY:   ORDERING SYSTEM PROVIDED HISTORY: 77 yo female, sob, tachycardia, pe r/o   TECHNOLOGIST PROVIDED HISTORY:   77 yo female, sob, tachycardia, pe r/o   Decision Support Exception - unselect if not a suspected or confirmed   emergency medical condition->Emergency Medical Condition (MA); ORDERING   SYSTEM PROVIDED HISTORY: 77 yo, coffee ground emesis, s/p bladder stent   placement today   TECHNOLOGIST PROVIDED HISTORY:   75 yo, coffee ground emesis, s/p bladder stent placement today       Decision Support Exception - unselect if not a suspected or confirmed   emergency medical condition->Emergency Medical Condition (MA)       Exam is mild to moderately limited due to motion artifact. FINDINGS:       Chest:       Pulmonary Arteries: Pulmonary arteries are adequately opacified for   evaluation. No evidece of intraluminal filling defect to suggest pulmonary   embolism. Main pulmonary artery is borderline dilated measuring up to 3.0 cm. Mediastinum: The heart size is stable. The thoracic aorta is normal in   caliber with mild atherosclerosis. Coronary arterial calcifications. No   pericardial effusion. No pathologically enlarged adenopathy. The esophagus   is patulous with an air-fluid level. Lungs/pleura: Mild emphysematous changes. Focal consolidation in the right   lung base with likely areas of atelectasis in the right middle lobe and left   lower lobe. The central airways are patent. No pleural effusion or   pneumothorax. Diffuse bronchial wall thickening with a few foci of mucoid   impaction in the lower lobes. Soft Tissues/Bones: No acute bone or soft tissue abnormality. Remote   posterior right rib fractures. Osteopenia. Scoliotic curvature of the   thoracic spine. Mild volume loss along the superior endplate of D16, D38,   and T7. Abdomen/Pelvis:       Organs: Stable 2.0 cm exophytic lesion arising from segment 2 of the liver. The gallbladder is mildly distended. There is mild nonspecific periportal   edema. The liver, pancreas and adrenal glands are stable without new focal   abnormality. Splenules in the left upper quadrant. No biliary duct dilation. Interval placement of a left ureteral stent. There is mild-to-moderate left   hydronephrosis with urothelial thickening and stranding within the left renal   pelvis.   There are bilateral nonobstructing renal calculi the largest   measuring up to 6 mm. There is mild left perinephric stranding. No right   hydronephrosis or perinephric stranding. There is a 3 mm mid left ureteral   calculus which has otherwise decreased in size when compared to 05/15/2022. Overall left hydronephrosis has improved since 12/10/2022. GI/Bowel: No dilated loops of bowel or bowel wall thickening. Moderate   amount stool in the colon. Colonic diverticulosis without acute   diverticulitis. No free air. The appendix is not visualized. Pelvis: Multiple bladder calculi are noted measuring up to 2-4 mm. Small   amount of gas in the bladder likely related to recent procedure. Mild   bladder wall thickening, likely related to underdistention. No   pathologically enlarged adenopathy or free fluid. Status post hysterectomy. Peritoneum/Retroperitoneum: The aorta is normal in caliber with moderate   atherosclerosis. The celiac axis, SMA and DEMOND are patent. The portal venous   system is patent. There are subcentimeter retroperitoneal and mesenteric   lymph nodes, nonspecific. Multiple soft tissue nodules are noted in the left   mid quadrant anteriorly the largest measuring up to 2.4 x 1.7 cm similar in   appearance and location to the prior exams dating back to 05/15/2022. Findings likely reflect splenosis. No ascites or drainable fluid collection. Bones/Soft Tissues: Diffuse osteopenia. Stable compression deformities in   the lumbar spine. Impression   1. Interval improvement in left hydronephrosis. Left ureteral stent is noted   with persistent mild-to-moderate hydronephrosis and perinephric stranding. There is a 2 to thickening and enhancement of the left renal pelvis and   proximal ureter suggestive of superimposed infection or inflammation. 2. Nonobstructing bilateral renal calculi, as well as a 3 mm mid left   ureteral calculus, and bladder calculi. 3. No pulmonary embolus.    4. Slightly increasing right basilar opacity either reflecting atelectasis   versus pneumonia. Given patulous esophagus with an air-fluid level,   aspiration can be a consideration. 5. Mild nonspecific periportal edema. Order History        Medical Decision [id number]:       Medical Decision Making-Other:     Note:  Labs, medications, radiologic studies were reviewed with personal review of films  Large amounts of data were reviewed  Discussed with nursing Staff, Discharge planner  Infection Control and Prevention measures reviewed  All prior entries were reviewed  Administer medications as ordered  Prognosis: [address]  Discharge planning reviewed  Follow up as outpatient. Thank you for allowing us to participate in the care of this patient. Please call with questions.     Geoff Dias MD  Pager: [phone] - Office: [phone]
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
## 2022-12-20 NOTE — PLAN OF CARE
Problem: Discharge Planning  Goal: Discharge to home or other facility with appropriate resources  Outcome: Progressing     Problem: Pain  Goal: Verbalizes/displays adequate comfort level or baseline comfort level  Outcome: Progressing     Problem: Skin/Tissue Integrity  Goal: Absence of new skin breakdown  Description: 1. Monitor for areas of redness and/or skin breakdown  2. Assess vascular access sites hourly  3. Every 4-6 hours minimum:  Change oxygen saturation probe site  4. Every 4-6 hours:  If on nasal continuous positive airway pressure, respiratory therapy assess nares and determine need for appliance change or resting period.   Outcome: Progressing     Problem: Safety - Adult  Goal: Free from fall injury  Outcome: Progressing  Flowsheets (Taken 12/20/2022 1242)  Free From Fall Injury: Instruct family/caregiver on patient safety     Problem: ABCDS Injury Assessment  Goal: Absence of physical injury  Outcome: Progressing  Flowsheets (Taken 12/20/2022 1242)  Absence of Physical Injury: Implement safety measures based on patient assessment

## 2022-12-20 NOTE — PROGRESS NOTES
Jerrilyn Klinefelter, Santacroce, Charm Dessert, Radha Covarrubias  Urology Progress Note     Subjective:   No acute events overnight  Patient denies any fevers, chills, nausea vomiting  Remains on norepinephrine overnight  Labs this morning pending    Patient Vitals for the past 24 hrs:   BP Temp Temp src Pulse Resp SpO2 Height Weight   12/20/22 0615 85/60 -- -- 83 15 100 % -- --   12/20/22 0600 (!) 96/48 -- -- 98 13 100 % -- --   12/20/22 0545 (!) 88/55 -- -- 84 13 100 % -- --   12/20/22 0530 (!) 98/52 -- -- 94 16 100 % -- --   12/20/22 0515 (!) 100/49 -- -- 83 15 100 % -- --   12/20/22 0400 (!) 108/54 98.6 °F (37 °C) Axillary 85 14 100 % -- --   12/20/22 0345 (!) 107/58 -- -- 84 15 100 % -- --   12/20/22 0330 (!) 108/55 -- -- 83 15 100 % -- --   12/20/22 0315 107/62 -- -- 83 16 100 % -- --   12/20/22 0300 (!) 116/91 -- -- 85 16 100 % -- --   12/20/22 0245 (!) 95/54 -- -- 83 14 100 % -- --   12/20/22 0230 (!) 98/53 -- -- 83 15 99 % -- --   12/20/22 0215 (!) 105/51 -- -- 84 16 99 % -- --   12/20/22 0200 (!) 116/58 99.1 °F (37.3 °C) Axillary 83 15 100 % -- --   12/20/22 0145 (!) 101/52 -- -- 86 14 100 % -- --   12/20/22 0130 (!) 105/53 -- -- 83 15 100 % -- --   12/20/22 0115 (!) 110/54 -- -- 83 15 100 % -- --   12/20/22 0100 (!) 107/53 -- -- 83 14 100 % -- --   12/20/22 0045 (!) 104/53 -- -- 85 15 100 % -- --   12/20/22 0030 (!) 119/55 -- -- 92 13 100 % -- --   12/20/22 0025 -- -- -- 88 -- 100 % -- --   12/20/22 0015 (!) 115/55 -- -- 96 18 100 % 5' 7\" (1.702 m) 128 lb 12 oz (58.4 kg)   12/20/22 0000 (!) 110/56 99.1 °F (37.3 °C) Axillary 83 13 100 % -- --   12/19/22 2345 (!) 111/55 -- -- 99 20 100 % -- --   12/19/22 2330 (!) 114/54 -- -- 92 15 100 % -- --   12/19/22 2315 (!) 104/50 -- -- 93 15 100 % -- --   12/19/22 2300 (!) 106/55 -- -- 86 13 100 % -- --   12/19/22 2245 (!) 114/56 -- -- 98 15 100 % -- --   12/19/22 2230 (!) 104/56 -- -- 91 17 100 % -- --   12/19/22 2215 (!) 113/50 -- -- 91 19 100 % -- --   12/19/22 2200 (!) 113/58 -- -- 97 15 100 % -- --   12/19/22 2145 (!) 115/56 -- -- 92 15 99 % -- --   12/19/22 2130 (!) 114/54 -- -- 89 16 100 % -- --   12/19/22 2115 (!) 117/56 -- -- 92 19 99 % -- --   12/19/22 2105 -- -- -- (!) 102 18 100 % -- --   12/19/22 2100 -- -- -- 97 18 100 % -- 128 lb 12 oz (58.4 kg)   12/19/22 2056 (!) 119/51 -- -- 89 16 100 % -- --   12/19/22 2045 -- -- -- -- -- 100 % -- --   12/19/22 2041 (!) 114/50 -- -- 92 18 100 % -- --   12/19/22 2030 (!) 116/55 -- -- 93 16 100 % -- --   12/19/22 2026 (!) 114/49 99.1 °F (37.3 °C) Oral 93 12 99 % -- --   12/19/22 1908 -- 100.4 °F (38 °C) Oral -- -- -- -- --   12/19/22 1905 (!) 94/50 -- -- (!) 101 16 -- -- --   12/19/22 1900 (!) 101/49 -- -- 97 13 -- -- --   12/19/22 1855 (!) 95/46 -- -- 99 14 -- -- --   12/19/22 1850 (!) 87/47 -- -- 99 15 -- -- --   12/19/22 1845 (!) 89/57 -- -- 97 13 -- -- --   12/19/22 1840 (!) 88/49 -- -- 99 15 99 % -- --   12/19/22 1835 (!) 80/49 -- -- 95 14 98 % -- --   12/19/22 1830 (!) 83/48 -- -- 95 15 97 % -- --   12/19/22 1815 (!) 81/46 -- -- 96 14 98 % -- --   12/19/22 1810 (!) 81/48 -- -- 96 13 95 % -- --   12/19/22 1800 (!) 83/46 -- -- 93 14 93 % -- --   12/19/22 1730 (!) 89/48 -- -- 89 15 90 % -- --   12/19/22 1715 (!) 91/47 -- -- 90 13 -- -- --   12/19/22 1700 (!) 94/37 -- -- 93 13 -- -- --   12/19/22 1630 (!) 100/49 -- -- (!) 106 11 96 % -- --   12/19/22 1622 (!) 109/56 -- -- (!) 102 17 98 % -- --   12/19/22 1515 (!) 104/59 -- -- (!) 105 15 -- -- --   12/19/22 1501 -- -- -- (!) 107 18 93 % -- --   12/19/22 1459 -- -- -- (!) 108 13 99 % -- --   12/19/22 1457 -- -- -- (!) 109 15 94 % -- --   12/19/22 1456 -- -- -- (!) 103 15 95 % -- --   12/19/22 1454 -- -- -- (!) 108 12 93 % -- --   12/19/22 1445 (!) 110/52 -- -- (!) 106 15 94 % -- --   12/19/22 1430 (!) 108/53 -- -- (!) 102 19 92 % -- --   12/19/22 1415 128/65 -- -- 99 19 93 % -- --   12/19/22 1400 127/60 -- -- (!) 108 22 94 % -- --   12/19/22 1353 -- (!) 100.5 °F (38.1 °C) Oral (!) 114 20 90 % -- --       Intake/Output Summary (Last 24 hours) at 12/20/2022 0719  Last data filed at 12/20/2022 4821  Gross per 24 hour   Intake 1892.62 ml   Output 1000 ml   Net 892.62 ml       Recent Labs     12/19/22  1430   WBC 13.1*   HGB 12.7   HCT 39.7   MCV 99.0        Recent Labs     12/19/22  1430      K 4.2   CL 96*   CO2 21   BUN 20   CREATININE 1.27*       Recent Labs     12/19/22  1550   COLORU Red*   PHUR 7.0   WBCUA None   RBCUA TOO NUMEROUS TO COUNT   SPECGRAV 1.012   LEUKOCYTESUR LARGE*   UROBILINOGEN Normal   BILIRUBINUR NEGATIVE  Verified by ictotest.*       Additional Lab/culture results: None    Physical Exam:   AAOx1  NAD  Unlabored breathing  Normal rate  Abdomen soft, appropriately tender to palpation, nondistended  No calf tenderness to palpation     Interval Imaging Findings: None    Impression:   76 y.o. female POD #1 extracorporal shockwave lithotripsy, left ureteroscopy, left laser lithotripsy postop day 1 presenting with post ureteroscopy sepsis    Plan:   F/u AM labs. Trend leukocytosis, replete electrolytes as needed  If patient becomes febrile, tachycardic, continue pressor support please place Dawson catheter for optimal decompression of collecting system  Pain and nausea control as needed  Continue antibiotics pending urine and blood culture susceptibilities. Previous cultures grew ESBL and VRE.   Ambulate/out of bed  Appreciate medicine team recommendation for medical management    Catherine Rothman MD  PGY-4,Urology  7:19 AM 12/20/2022

## 2022-12-20 NOTE — PROGRESS NOTES
RR 16  Breath Sounds: Clear      Bronchodilator assessment at level  0      [x]    Bronchodilator Assessment  BRONCHODILATOR ASSESSMENT SCORE  Score 0 1 2 3 4 5   Breath Sounds   [x]  Patient Baseline [x]  No Wheeze good aeration []  Faint, scattered wheezing, good aeration []  Expiratory Wheezing and or moderately diminished []  Insp/Exp wheeze and/or very diminished []  Insp/Exp and/ or marked distress   Respiratory Rate   [x]  Patient Baseline [x]  Less than 20 []  Less than 20 []  20-25 []  Greater than 25 []  Greater than 25   Peak flow % of Pred or PB [x]  NA   []  Greater than 90%  []  81-90% []  71-80% []  Less than or equal to 70%  or unable to perform []  Unable due to Respiratory Distress   Dyspnea re []  Patient Baseline [x]  No SOB []  No SOB []  SOB on exertion []  SOB min activity []  At rest/acute   e FEV% Predicted       [x]  NA []  Above 69%  []  Unable []  Above 60-69%  []  Unable []  Above 50-59%  []  Unable []  Above 35-49%  []  Unable []  Less than 35%  []  Unable

## 2022-12-20 NOTE — PROGRESS NOTES
Comprehensive Nutrition Assessment    Type and Reason for Visit:  Initial, Positive Nutrition Screen (Weight Loss; Poor Intakes/Appetite)    Nutrition Recommendations/Plan:   Continue current Clear Liquid diet. Encourage intakes and monitor plans for diet advancement. Will provide clear liquid oral supplements x 2 per day. Monitor labs, weights, and plan of care. Malnutrition Assessment:  Malnutrition Status: At risk for malnutrition (12/20/22 1546)    Context:  Acute Illness     Findings of the 6 clinical characteristics of malnutrition:  Energy Intake:  Mild decrease in energy intake  Weight Loss:  No significant weight loss     Body Fat Loss:  No significant body fat loss   Muscle Mass Loss:  No significant muscle mass loss  Fluid Accumulation:  Mild Generalized   Strength:  Not Performed    Nutrition Assessment:    Pt admitted with c/o multiple episodes of emesis s/p recent bladder stent placement. PMH: Dementia, HLD, Parkinson's. Pt sleeping at attempted visit. Reported h/o weight loss. Per chart review, mostly stable weight history noted with minimal weight loss. Pt has been started on a Clear Liquid diet - will provide oral supplements with meals and monitor for diet advancement. Labs/Meds reviewed. Nutrition Related Findings:    Labs/Meds reviewed. Hypoactive bowel sounds. Wound Type: None       Current Nutrition Intake & Therapies:    Average Meal Intake:  (Diet recently started.)  Average Supplements Intake: None Ordered  ADULT DIET; Clear Liquid    Anthropometric Measures:  Height: 5' 7\" (170.2 cm)  Ideal Body Weight (IBW): 135 lbs (61 kg)    Admission Body Weight: 128 lb 12 oz (58.4 kg)  Current Body Weight: 128 lb 12 oz (58.4 kg), 95.4 % IBW.  Weight Source: Bed Scale  Current BMI (kg/m2): 20.2  Usual Body Weight: 131 lb 1.6 oz (59.5 kg) (10/8/22 bed scale per chart review)  % Weight Change (Calculated): -1.8                    BMI Categories: Normal Weight (BMI 18.5-24. 9)    Estimated Daily Nutrient Needs:  Energy Requirements Based On: Kcal/kg  Weight Used for Energy Requirements: Admission  Energy (kcal/day): 1540-4062 kcals/day  Weight Used for Protein Requirements: Admission  Protein (g/day): 70-90 gm pro/day  Method Used for Fluid Requirements: Other (Comment)  Fluid (ml/day): per MD    Nutrition Diagnosis:   Inadequate oral intake related to  (recent surgery; current condition) as evidenced by NPO or clear liquid status due to medical condition, poor intake prior to admission    Nutrition Interventions:   Food and/or Nutrient Delivery: Continue Current Diet, Start Oral Nutrition Supplement (Provide clear liquid Ensure ONS x 2 per day. Monitor for diet advancement.)  Nutrition Education/Counseling: No recommendation at this time  Coordination of Nutrition Care: Continue to monitor while inpatient       Goals:  Previous Goal Met:  (Goal Set)  Goals: PO intake 75% or greater, prior to discharge       Nutrition Monitoring and Evaluation:   Behavioral-Environmental Outcomes: None Identified  Food/Nutrient Intake Outcomes: Diet Advancement/Tolerance, Food and Nutrient Intake, Supplement Intake  Physical Signs/Symptoms Outcomes: Biochemical Data, GI Status, Fluid Status or Edema, Nutrition Focused Physical Findings, Skin, Weight    Discharge Planning:     Too soon to determine     Zulema Vail RD, LD  Contact: [phone]

## 2022-12-20 NOTE — PROGRESS NOTES
INTENSIVE CARE UNIT  Resident Physician Progress Note    Patient - Kathy Ward  Date of Admission -  12/19/2022  1:52 PM  Date of Evaluation -  12/20/2022  Room and Bed Number -  8782/8586-45   Hospital Day - 1    SUBJECTIVE:   HISTORY OF PRESENT ILLNESS:    Kathy Ward is a 76 y.o. woman w/hx of parkinsons, seizures and recurrent kidney stone and urine infection w/VRE and ESBL who was admitted to medicine on 12-19 for concern for GI bleed after a urologic procedure earlier in the day. The patient underwent cystoscopy and urologic stenting due to obstructive stone and was discharged. Developed coffee ground emesis at home. She was admitted for GI assessment and while boarding in the ER developed worsening hypotension despite fluid resuscitation, remained febrile and tachycardic. Sepsis w/u initiated. CT PE was negative. CT AP showed no bleeding in the abdomen. Hypotension suspected to be secondary to sepsis rather than GI bleed. She required pressors and was transferred to MICU. OVERNIGHT EVENTS:      Requiring 6mcg/min of levophed   ID consulted due to abx interactions w/pt home seizure medications  Fevers resolved, HR controlled  Lactate downtrending    Fluids: NS 125cc/hr  Feeding:NPO, if not too obtunded bedside swallow and start diet.  GI okay w/CLD  Analgesics: PRN tylenol  Sedation: none  Thrombo-prophylaxis: held due to concerns for GI bleed  Mobilization:therapy ordered   Head Up:yes   Hemodynamics:  requiring pressors  Ulcer Prophylaxis:  protonix QD  Glycemic Control:no insulin requirement  Spontaneous breathing trial:none  Bowel management:no BM's recorded   Indwelling catheter: external urinary catheter, put out 1L, R fem CVC  Drug De-escalation: ID recommendations for abx currently on meropenem    TODAY:     AWAKE & FOLLOWING COMMANDS: [x]   No  []   Yes                           SECRETIONS                 Amount:  []   Small []   Moderate []   Large []   None        Color: []   White [] Colored []   Bloody                         SEDATION:                 RAAS Score: []   +1 to -1 []   -2 []   -3 []   -4        Sedation Agent: []   Propofol gtt []   Versed gtt  []   Ativan gtt  []   Precedex gtt        Paralytic Agent: []   Norcuron []   Nimbex []   None                         VASOPRESSORS:  []   No  [x]   Yes            Vasopressor Agent [x] Levophed [] Dopamine [] Vasopressin [] Dobutamine [] Phenylephrine [] Epinephrine                  HOLDER [x] No [] Yes                          CENTRAL LINE [] No [x] Yes                          ARTERIAL LINE [x] No [] Yes                            OBJECTIVE:   VITAL SIGNS:  Patient Vitals for the past 8 hrs:   BP Temp Temp src Pulse Resp SpO2 Height Weight   12/20/22 0615 85/60 -- -- 83 15 100 % -- --   12/20/22 0600 (!) 96/48 -- -- 98 13 100 % -- --   12/20/22 0545 (!) 88/55 -- -- 84 13 100 % -- --   12/20/22 0530 (!) 98/52 -- -- 94 16 100 % -- --   12/20/22 0515 (!) 100/49 -- -- 83 15 100 % -- --   12/20/22 0400 (!) 108/54 98.6 °F (37 °C) Axillary 85 14 100 % -- --   12/20/22 0345 (!) 107/58 -- -- 84 15 100 % -- --   12/20/22 0330 (!) 108/55 -- -- 83 15 100 % -- --   12/20/22 0315 107/62 -- -- 83 16 100 % -- --   12/20/22 0300 (!) 116/91 -- -- 85 16 100 % -- --   12/20/22 0245 (!) 95/54 -- -- 83 14 100 % -- --   12/20/22 0230 (!) 98/53 -- -- 83 15 99 % -- --   12/20/22 0215 (!) 105/51 -- -- 84 16 99 % -- --   12/20/22 0200 (!) 116/58 99.1 °F (37.3 °C) Axillary 83 15 100 % -- --   12/20/22 0145 (!) 101/52 -- -- 86 14 100 % -- --   12/20/22 0130 (!) 105/53 -- -- 83 15 100 % -- --   12/20/22 0115 (!) 110/54 -- -- 83 15 100 % -- --   12/20/22 0100 (!) 107/53 -- -- 83 14 100 % -- --   12/20/22 0045 (!) 104/53 -- -- 85 15 100 % -- --   12/20/22 0030 (!) 119/55 -- -- 92 13 100 % -- --   12/20/22 0025 -- -- -- 88 -- 100 % -- --   12/20/22 0015 (!) 115/55 -- -- 96 18 100 % 5' 7\" (1.702 m) 128 lb 12 oz (58.4 kg)   12/20/22 0000 (!) 110/56 99.1 °F (37.3 °C) Axillary 83 13 100 % -- --   22 2345 (!) 111/55 -- -- 99 20 100 % -- --   22 2330 (!) 114/54 -- -- 92 15 100 % -- --   22 2315 (!) 104/50 -- -- 93 15 100 % -- --       Last Body weight:   Wt Readings from Last 3 Encounters:   22 128 lb 12 oz (58.4 kg)   22 130 lb (59 kg)   12/10/22 126 lb (57.2 kg)       Body Mass Index : Body mass index is 20.16 kg/m². Tmax over 24 hours: Temp (24hrs), Av.2 °F (37.3 °C), Min:98.2 °F (36.8 °C), Max:100.5 °F (38.1 °C)      Ins/Outs: In: 1892.6 [I.V.:1290.9]  Out: 1000 [Urine:1000]    PHYSICAL EXAM:  Constitutional: Awake and alert, well developed and well nourished, in no acute distress  EENT: PERRLA, EOMI, sclera clear, anicteric, oropharynx clear, no lesions  Neck: Supple, symmetrical, trachea midline  Respiratory: clear to auscultation, no wheezes, rales, or rhonchi  Cardiovascular: regular rate and rhythm, normal S1, S2, no murmur noted and 2+ distal pulses throughout  Abdomen: soft, nontender, nondistended, no masses or organomegaly  Extremities:  peripheral pulses normal, no pedal edema, no clubbing or cyanosis. R fem CVC site is well appearing.      MEDICATIONS:  Scheduled Meds:   pantoprazole (PROTONIX) 40 mg injection  40 mg IntraVENous Daily    meropenem  1,000 mg IntraVENous Q12H    sodium chloride flush  5-40 mL IntraVENous 2 times per day    carBAMazepine  300 mg Oral Daily    carBAMazepine  400 mg Oral Nightly       Continuous Infusions:   norepinephrine 6 mcg/min (22 2505)    sodium chloride      sodium chloride 125 mL/hr at 22 0621       PRN Meds:   sodium chloride flush, 5-40 mL, PRN  sodium chloride, , PRN  ondansetron, 4 mg, Q8H PRN   Or  ondansetron, 4 mg, Q6H PRN  polyethylene glycol, 17 g, Daily PRN  acetaminophen, 650 mg, Q6H PRN   Or  acetaminophen, 650 mg, Q6H PRN        DATA:  Complete Blood Count:   Recent Labs     22  1430   WBC 13.1*   RBC 4.01   HGB 12.7   HCT 39.7   MCV 99.0   MCH 31.7   MCHC 32.0   RDW 15.4*      MPV 8.6        Last 3 Blood Glucose:   Recent Labs     12/19/22  1430   GLUCOSE 93        PT/INR:    Lab Results   Component Value Date/Time    PROTIME 10.4 12/19/2022 02:30 PM    INR 1.0 12/19/2022 02:30 PM     PTT:    Lab Results   Component Value Date/Time    APTT 21.4 12/19/2022 02:30 PM       Basic Metabolic Profile:   Recent Labs     12/19/22  1430      K 4.2   CL 96*   CO2 21   BUN 20   CREATININE 1.27*   GLUCOSE 93       Liver Function:  Recent Labs     12/19/22  1430   PROT 7.3   LABALBU 3.3*   ALT 13   AST 26   ALKPHOS 362*   BILITOT 0.3       Magnesium:   Lab Results   Component Value Date/Time    MG 2.1 11/29/2022 06:31 AM    MG 2.9 05/18/2022 03:53 AM    MG 2.0 05/17/2022 03:20 PM     Phosphorus:   Lab Results   Component Value Date/Time    PHOS 3.3 05/18/2022 03:53 AM     Ionized Calcium:   Lab Results   Component Value Date/Time    CAION 1.11 12/10/2022 09:46 PM        Urinalysis:   Lab Results   Component Value Date/Time    NITRU POSITIVE 12/19/2022 03:50 PM    COLORU Red 12/19/2022 03:50 PM    PHUR 7.0 12/19/2022 03:50 PM    WBCUA None 12/19/2022 03:50 PM    RBCUA TOO NUMEROUS TO COUNT 12/19/2022 03:50 PM    MUCUS 1+ 11/13/2019 03:30 PM    TRICHOMONAS NOT REPORTED 11/13/2019 03:30 PM    YEAST NOT REPORTED 11/13/2019 03:30 PM    BACTERIA MODERATE 11/28/2022 08:00 PM    SPECGRAV 1.012 12/19/2022 03:50 PM    LEUKOCYTESUR LARGE 12/19/2022 03:50 PM    UROBILINOGEN Normal 12/19/2022 03:50 PM    BILIRUBINUR NEGATIVE  Verified by ictotest. 12/19/2022 03:50 PM    GLUCOSEU NEGATIVE 12/19/2022 03:50 PM    KETUA NEGATIVE 12/19/2022 03:50 PM    AMORPHOUS NOT REPORTED 11/13/2019 03:30 PM       HgBA1c:  No results found for: LABA1C  TSH:    Lab Results   Component Value Date/Time    TSH 1.94 10/03/2022 06:17 PM     Lactic Acid:   Lab Results   Component Value Date/Time    LACTA 2.1 10/03/2022 06:17 PM      Troponin: No results for input(s): TROPONINI in the last 72 hours.    Microbiology:  Urine Culture:  No components found for: CURINE  Blood Culture:  No components found for: CBLOOD, CFUNGUSBL    Radiology/Imaging:  CT CHEST PULMONARY EMBOLISM W CONTRAST   Preliminary Result   1. Interval improvement in left hydronephrosis. Left ureteral stent is noted   with persistent mild-to-moderate hydronephrosis and perinephric stranding. There is a 2 to thickening and enhancement of the left renal pelvis and   proximal ureter suggestive of superimposed infection or inflammation. 2. Nonobstructing bilateral renal calculi, as well as a 3 mm mid left   ureteral calculus, and bladder calculi. 3. No pulmonary embolus. 4. Slightly increasing right basilar opacity either reflecting atelectasis   versus pneumonia. Given patulous esophagus with an air-fluid level,   aspiration can be a consideration. 5. Mild nonspecific periportal edema. CT ABDOMEN PELVIS W IV CONTRAST Additional Contrast? None   Preliminary Result   1. Interval improvement in left hydronephrosis. Left ureteral stent is noted   with persistent mild-to-moderate hydronephrosis and perinephric stranding. There is a 2 to thickening and enhancement of the left renal pelvis and   proximal ureter suggestive of superimposed infection or inflammation. 2. Nonobstructing bilateral renal calculi, as well as a 3 mm mid left   ureteral calculus, and bladder calculi. 3. No pulmonary embolus. 4. Slightly increasing right basilar opacity either reflecting atelectasis   versus pneumonia. Given patulous esophagus with an air-fluid level,   aspiration can be a consideration. 5. Mild nonspecific periportal edema. XR CHEST PORTABLE   Final Result   Increasing interstitial and bibasilar opacities with probable small left   pleural effusion. Findings may reflect edema in the appropriate clinical   setting versus pneumonia.              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    Bacterial UTI 10/08/2022    Urinary tract infection due to Proteus 10/08/2022    ESBL (extended spectrum beta-lactamase) producing bacteria infection 10/08/2022    Acute cystitis with hematuria 10/08/2022    Leukocytosis 10/08/2022    Sepsis ([address] 10/04/2022    COPD exacerbation ([address] 10/04/2022    Hypokalemia 10/04/2022    Community acquired pneumonia 10/03/2022    Closed head injury     Kidney stone 05/17/2022    Urinary incontinence 05/17/2022    Overactive bladder 05/17/2022    Closed compression fracture of L1 lumbar vertebra, initial encounter ([facility] 05/15/2022    Depression 08/15/2020    DVT of deep femoral vein, left ([address] 07/10/2021    Dementia due to Parkinson's disease with behavioral disturbance ([address] 08/15/2020    Seizure disorder ([address] 10/13/2015    Dementia with behavioral disturbance 10/13/2015    History of encephalitis 10/13/2015    Breakthrough seizure ([address]     Memory loss     Difficulty speaking     MRSA (methicillin resistant staph aureus) culture positive 12/14/2013    Cellulitis of right leg 12/12/2013    Seizure ([address] 08/24/2012    Vitamin D deficiency 08/24/2012    Anxiety 08/24/2012    Hypercholesteremia 08/24/2012        PLAN:      WEAN PER PROTOCOL:  []   No  []   Yes [x]   N/A                         ICU PROPHYLAXIS:                Stress ulcer:  [x]   PPI Agent []   [id number] []   Sucralfate []   Other []   None      VTE:  []   Enoxaparin []   SQ Heparin [x]   EPC Cuffs []  Other [] Contraindicated                     NUTRITION:   [x]  NPO []   TF:  []   TPN []   PO                       HOME MEDS RECONCILED:  [x]   No  []   Yes                           CONSULTATION NEEDED:  []   No  [x]   Yes FAMILY UPDATED:  []   No  [x]   Yes                           TRANSFER OUT OF ICU:  [x]   No  []   Yes             PLAN/MEDICAL DECISION MAKING:  Neurologic:   Neuro checks per protocol  Sedation: none  Pain management: tylenol PRN  Hx seizures  Home carbemazepine  Cardiovascular:  HR: controlled  MAP goal >65  Levophed 6mcg/min  Pulmonary:  Maintain oxygen sats >92%  Pulmonary toilet  CT PE negative for PE, slightly inccreased right basilar opacity, possible atalectasis vs pneumonia. On meropenem currently. Continue to monitor  GI/Nutrition  Bowel regimen: miralax PRN  Ulcer Prophylaxis: PPI gtt  Diet:ADULT DIET; Clear Liquid  Last BM: note recorded  Concern for GI bleed. GI consulted, f/u recs  Suspect stress ulcer  Endoscopy once hemodynamically stable  CLD w/supplements okay  Renal/Fluid/Electrolyte  IV Fluids: Saline Lock @ 125 mL/Hr   I/O: In: 1892.6 [I.V.:1290.9]  Out: 1000 [Urine:1000]  BUN/Cr: 16/1.02  Monitor electrolytes, replace PRN   ID  WBC:   Lab Results   Component Value Date    WBC 13.1 (H) 2022     Tmax: Temp (24hrs), Av.2 °F (37.3 °C), Min:98.2 °F (36.8 °C), Max:100.5 °F (38.1 °C)  ID consulted. Likely sepsis w/urinary source  Antimicrobials: meropenem day 2  Hematology:  H/H: 10.8./32.5 (12.7)  Concern for GI bleed and has gross hematuria. GI to see. Continue to monitor.  No bloody emesis since arrival.  Endocrine:   Glucose no insulin required  DVT Prophylaxis  EPC Cuffs  Held due to concern for bleed    Discharge Needs:  PT, OT, ST, and Case Management      CODE STATUS: DNR-CCA    DISPOSITION:  [x] To remain ICU:   [] OK for out of ICU from Critical Care standpoint    ---  Baldomero Gonzalez DO, [address]  Emergency Medicine Resident  [id number]  22 7:10 AM

## 2022-12-20 NOTE — CARE COORDINATION
12/20/22 1311   Service Assessment   Patient Orientation Unable to Assess   Cognition Dementia / Early Alzheimer's  (A & O X 1)   History Provided By Child/Family   Primary Caregiver Family   Accompanied By/Relationship Daughter Maria Luz Grayson and sister   Support Systems Children;Family Members   PCP Verified by CM Yes  (Kenrick Handy DO)   Last Visit to PCP Within last 3 months   Prior Functional Level Assistance with the following:   Can patient return to prior living arrangement Yes   Ability to make needs known: Poor   Family able to assist with home care needs: Yes   Social/Functional History   Lives With Family;Daughter   Type of [address] One level   Home Access Stairs to enter without rails   Entrance Stairs - Number of Steps 1   Receives Help From Family   ADL Assistance Needs assistance   Ambulation Assistance Independent   Transfer Assistance Independent   Active  No   Patient's  Info daughter transports   Occupation On disability   [facility] Prior To Admission None   Potential Assistance Needed Other (Comment)  (Palliative Care)   Potential Assistance Purchasing Medications No   Type of Home Care Services None   Patient expects to be discharged to: Other (comment)  (Plans are for pt to go to her other daughter's home in [place])   History of falls? [address] Provided? No   Mode of Transport at Discharge Other (see comment)  (daughter will transport)   Condition of Participation: Discharge Planning   The Plan for Transition of Care is related to the following treatment goals: safety   The Patient and/or Patient Representative was provided with a Choice of Provider? Patient Representative   Name of the Patient Representative who was provided with the Choice of Provider and agrees with the Discharge Plan? Pt's daughter Nima Castillo   The Patient and/Or Patient Representative agree with the Discharge Plan? Yes   Freedom of Choice list was provided with basic dialogue that supports the patient's individualized plan of care/goals, treatment preferences, and shares the quality data associated with the providers? Yes  (deny need for SNF or [facility] [place], [facility] cares for pt. Palliative Care consult placed per family wishes)     Case Management Assessment  Initial Evaluation    Date/Time of Evaluation: 12/20/2022 1:19 PM  Assessment Completed by: Gopi Lazo RN    If patient is discharged prior to next notation, then this note serves as note for discharge by case management. Patient Name: Eleni Paez                   YOB: 1945  Diagnosis: GI bleed [K92.2]  Septic shock ([facility] [A41.9, R65.21]  Gastrointestinal hemorrhage, unspecified gastrointestinal hemorrhage type [K92.2]                   Date / Time: 12/19/2022  1:52 PM    Patient Admission Status: Inpatient   Readmission Risk (Low < 19, Mod (19-27), High > 27): Readmission Risk Score: 24.3    Current PCP: [address] [place] [address], DO  PCP verified by CM? (P) Yes (Sravani Wu DO)    Chart Reviewed: Yes      History Provided by: (P) Child/Family  Patient Orientation: (P) Unable to Assess    Patient Cognition: (P) Dementia / Early Alzheimer's (A & O X 1)    Hospitalization in the last 30 days (Readmission):  Yes    If yes, Readmission Assessment in CM Navigator will be completed.     Advance Directives:      Code Status: DNR-CCA   Patient's Primary Decision Maker is: Named in Scanned ACP Document    Primary Decision Maker: [address], Legal Guardian - [phone]    Discharge Planning:    Patient lives with:   Type of Home:    Primary Care Giver: (P) Family  Patient Support Systems include: (P) Children, Family Members   Current Financial resources:    Current community resources:    Current services prior to admission: (P) None            Current DME:              Type of Home Care services:  (P) None    ADLS  Prior functional level: (P) Assistance with the following:  Current functional level:      PT AM-PAC:   /24  OT AM-PAC:   /24    Family can provide assistance at DC: (P) Yes  Would you like Case Management to discuss the discharge plan with any other family members/significant others, and if so, who? Plans to Return to Present Housing: (P) Yes  Other Identified Issues/Barriers to RETURNING to current housing: yes  Potential Assistance needed at discharge: (P) Other (Comment) (Palliative Care)            Potential DME:    Patient expects to discharge to: (P) Other (comment) (Plans are for pt to go to her other daughter's home in [place])  Plan for transportation at discharge:      Financial    Payor: Riky Shafer / Plan: Jude Ag / Product Type: *No Product type* /     Does insurance require precert for SNF: Yes    Potential assistance Purchasing Medications: (P) No  Meds-to-Beds request: Yes      [facility] [id number] [phone] - f [phone]  [place] [address]  Phone: [phone] Fax: [phone] [facility], [address] [place] [address] [phone] - F [phone]   [facility] [address]  [address] [place] [address]  Phone: [phone] Fax: [phone]      Notes:    Factors facilitating achievement of predicted outcomes: Family support    Barriers to discharge: Medical complications    Additional Case Management Notes: Home w/ family support, family will transport, has established PCP, on [address]. [facility] pt has dementia and will stay with her other daughter in [place] for 1 month when dc'd. Palliative Care consulted. ID, GI and Urology consulted.      The Plan for Transition of Care is related to the following treatment goals of GI bleed [K92.2]  Septic shock ([address] [A41.9, R65.21]  Gastrointestinal hemorrhage, unspecified gastrointestinal hemorrhage type [K30.8]    IF APPLICABLE: The Patient and/or patient representative Mili Church and her family were provided with a choice of provider and agrees with the discharge plan. Freedom of choice list with basic dialogue that supports the patient's individualized plan of care/goals and shares the quality data associated with the providers was provided to: (P) Patient Representative   Patient Representative Name: (P) Pt's daughter Homero Corona     The Patient and/or Patient Representative Agree with the Discharge Plan?  (P) Yes    Aislinn Espinal RN  Case Management Department  Ph: [phone] Fax: [phone]

## 2022-12-20 NOTE — PLAN OF CARE
Writer spoke to the daughter Ms. Anay Romero and updated her about her mom's clinical status. Updated her that she is in ICU and currently requiring pressors for her blood pressure support. Also confirmed about the patient's CODE STATUS and it is DNR CCA.         Tim Duggan  PGY-2  Internal Medicine  [facility]   10:18 [address] 12/19/2022

## 2022-12-21 PROBLEM — Z86.19 VRE INFECTION WITHIN LAST 3 MONTHS: Status: ACTIVE | Noted: 2022-12-21

## 2022-12-21 PROBLEM — R57.9 SHOCK (HCC): Status: ACTIVE | Noted: 2022-12-21

## 2022-12-21 PROBLEM — Z86.19 HISTORY OF EXTENDED-SPECTRUM BETA-LACTAMASE PRODUCING ESCHERICHIA COLI INFECTION: Status: ACTIVE | Noted: 2022-12-21

## 2022-12-21 LAB
ABSOLUTE EOS #: 0.27 K/UL (ref 0–0.4)
ABSOLUTE IMMATURE GRANULOCYTE: 0 K/UL (ref 0–0.3)
ABSOLUTE LYMPH #: 1.91 K/UL (ref 1–4.8)
ABSOLUTE MONO #: 2.18 K/UL (ref 0.1–0.8)
ANION GAP SERPL CALCULATED.3IONS-SCNC: 13 MMOL/L (ref 9–17)
BASOPHILS # BLD: 0 % (ref 0–2)
BASOPHILS ABSOLUTE: 0 K/UL (ref 0–0.2)
BUN BLDV-MCNC: 13 MG/DL (ref 8–23)
CALCIUM IONIZED: 1.02 MMOL/L (ref 1.13–1.33)
CALCIUM SERPL-MCNC: 7.5 MG/DL (ref 8.6–10.4)
CHLORIDE BLD-SCNC: 108 MMOL/L (ref 98–107)
CO2: 14 MMOL/L (ref 20–31)
CREAT SERPL-MCNC: 0.76 MG/DL (ref 0.5–0.9)
EKG ATRIAL RATE: 82 BPM
EKG P AXIS: 95 DEGREES
EKG P-R INTERVAL: 206 MS
EKG Q-T INTERVAL: 412 MS
EKG QRS DURATION: 126 MS
EKG QTC CALCULATION (BAZETT): 481 MS
EKG R AXIS: 47 DEGREES
EKG T AXIS: 65 DEGREES
EKG VENTRICULAR RATE: 82 BPM
EOSINOPHILS RELATIVE PERCENT: 1 % (ref 1–4)
GFR SERPL CREATININE-BSD FRML MDRD: >60 ML/MIN/1.73M2
GLUCOSE BLD-MCNC: 98 MG/DL (ref 70–99)
HCT VFR BLD CALC: 32.7 % (ref 36.3–47.1)
HCT VFR BLD CALC: 35.3 % (ref 36.3–47.1)
HEMOGLOBIN: 11.1 G/DL (ref 11.9–15.1)
HEMOGLOBIN: 9.6 G/DL (ref 11.9–15.1)
IMMATURE GRANULOCYTES: 0 %
LYMPHOCYTES # BLD: 7 % (ref 24–44)
MAGNESIUM: 1.8 MG/DL (ref 1.6–2.6)
MCH RBC QN AUTO: 31.8 PG (ref 25.2–33.5)
MCHC RBC AUTO-ENTMCNC: 29.4 G/DL (ref 28.4–34.8)
MCV RBC AUTO: 108.3 FL (ref 82.6–102.9)
MONOCYTES # BLD: 8 % (ref 1–7)
MORPHOLOGY: ABNORMAL
MORPHOLOGY: ABNORMAL
NRBC AUTOMATED: 0 PER 100 WBC
PDW BLD-RTO: 15.9 % (ref 11.8–14.4)
PLATELET # BLD: ABNORMAL K/UL (ref 138–453)
PLATELET, FLUORESCENCE: NORMAL K/UL (ref 138–453)
POTASSIUM SERPL-SCNC: 3.5 MMOL/L (ref 3.7–5.3)
RBC # BLD: 3.02 M/UL (ref 3.95–5.11)
SEG NEUTROPHILS: 84 % (ref 36–66)
SEGMENTED NEUTROPHILS ABSOLUTE COUNT: 22.94 K/UL (ref 1.8–7.7)
SODIUM BLD-SCNC: 135 MMOL/L (ref 135–144)
WBC # BLD: 27.3 K/UL (ref 3.5–11.3)

## 2022-12-21 PROCEDURE — 6360000002 HC RX W HCPCS: Performed by: INTERNAL MEDICINE

## 2022-12-21 PROCEDURE — 97530 THERAPEUTIC ACTIVITIES: CPT

## 2022-12-21 PROCEDURE — 2000000000 HC ICU R&B

## 2022-12-21 PROCEDURE — 99223 1ST HOSP IP/OBS HIGH 75: CPT | Performed by: FAMILY MEDICINE

## 2022-12-21 PROCEDURE — 85025 COMPLETE CBC W/AUTO DIFF WBC: CPT

## 2022-12-21 PROCEDURE — 97166 OT EVAL MOD COMPLEX 45 MIN: CPT

## 2022-12-21 PROCEDURE — 6370000000 HC RX 637 (ALT 250 FOR IP): Performed by: STUDENT IN AN ORGANIZED HEALTH CARE EDUCATION/TRAINING PROGRAM

## 2022-12-21 PROCEDURE — 82330 ASSAY OF CALCIUM: CPT

## 2022-12-21 PROCEDURE — 99233 SBSQ HOSP IP/OBS HIGH 50: CPT | Performed by: INTERNAL MEDICINE

## 2022-12-21 PROCEDURE — 97162 PT EVAL MOD COMPLEX 30 MIN: CPT

## 2022-12-21 PROCEDURE — 2580000003 HC RX 258

## 2022-12-21 PROCEDURE — 83735 ASSAY OF MAGNESIUM: CPT

## 2022-12-21 PROCEDURE — 93010 ELECTROCARDIOGRAM REPORT: CPT | Performed by: INTERNAL MEDICINE

## 2022-12-21 PROCEDURE — 2580000003 HC RX 258: Performed by: INTERNAL MEDICINE

## 2022-12-21 PROCEDURE — APPSS30 APP SPLIT SHARED TIME 16-30 MINUTES: Performed by: NURSE PRACTITIONER

## 2022-12-21 PROCEDURE — 99291 CRITICAL CARE FIRST HOUR: CPT | Performed by: INTERNAL MEDICINE

## 2022-12-21 PROCEDURE — 6360000002 HC RX W HCPCS: Performed by: STUDENT IN AN ORGANIZED HEALTH CARE EDUCATION/TRAINING PROGRAM

## 2022-12-21 PROCEDURE — 2580000003 HC RX 258: Performed by: STUDENT IN AN ORGANIZED HEALTH CARE EDUCATION/TRAINING PROGRAM

## 2022-12-21 PROCEDURE — 36415 COLL VENOUS BLD VENIPUNCTURE: CPT

## 2022-12-21 PROCEDURE — 80048 BASIC METABOLIC PNL TOTAL CA: CPT

## 2022-12-21 PROCEDURE — 99232 SBSQ HOSP IP/OBS MODERATE 35: CPT | Performed by: INTERNAL MEDICINE

## 2022-12-21 PROCEDURE — 6360000002 HC RX W HCPCS

## 2022-12-21 PROCEDURE — 85055 RETICULATED PLATELET ASSAY: CPT

## 2022-12-21 PROCEDURE — C9113 INJ PANTOPRAZOLE SODIUM, VIA: HCPCS | Performed by: INTERNAL MEDICINE

## 2022-12-21 PROCEDURE — 6370000000 HC RX 637 (ALT 250 FOR IP)

## 2022-12-21 PROCEDURE — 97110 THERAPEUTIC EXERCISES: CPT

## 2022-12-21 PROCEDURE — 85014 HEMATOCRIT: CPT

## 2022-12-21 PROCEDURE — 97535 SELF CARE MNGMENT TRAINING: CPT

## 2022-12-21 PROCEDURE — 85018 HEMOGLOBIN: CPT

## 2022-12-21 PROCEDURE — 99232 SBSQ HOSP IP/OBS MODERATE 35: CPT | Performed by: NURSE PRACTITIONER

## 2022-12-21 RX ORDER — POTASSIUM CHLORIDE 29.8 MG/ML
20 INJECTION INTRAVENOUS
Status: COMPLETED | OUTPATIENT
Start: 2022-12-21 | End: 2022-12-21

## 2022-12-21 RX ORDER — MIDODRINE HYDROCHLORIDE 5 MG/1
5 TABLET ORAL
Status: DISCONTINUED | OUTPATIENT
Start: 2022-12-21 | End: 2022-12-22

## 2022-12-21 RX ORDER — CALCIUM GLUCONATE 94 MG/ML
2000 INJECTION, SOLUTION INTRAVENOUS ONCE
Status: DISCONTINUED | OUTPATIENT
Start: 2022-12-21 | End: 2022-12-21 | Stop reason: SDUPTHER

## 2022-12-21 RX ORDER — NOREPINEPHRINE BIT/0.9 % NACL 16MG/250ML
1-100 INFUSION BOTTLE (ML) INTRAVENOUS CONTINUOUS
Status: DISCONTINUED | OUTPATIENT
Start: 2022-12-21 | End: 2022-12-21

## 2022-12-21 RX ORDER — CALCIUM GLUCONATE 20 MG/ML
2000 INJECTION, SOLUTION INTRAVENOUS ONCE
Status: COMPLETED | OUTPATIENT
Start: 2022-12-21 | End: 2022-12-21

## 2022-12-21 RX ORDER — NOREPINEPHRINE BIT/0.9 % NACL 16MG/250ML
1-100 INFUSION BOTTLE (ML) INTRAVENOUS CONTINUOUS
Status: DISCONTINUED | OUTPATIENT
Start: 2022-12-21 | End: 2022-12-22

## 2022-12-21 RX ORDER — FLUDROCORTISONE ACETATE 0.1 MG/1
100 TABLET ORAL DAILY
Status: DISCONTINUED | OUTPATIENT
Start: 2022-12-21 | End: 2022-12-22 | Stop reason: HOSPADM

## 2022-12-21 RX ORDER — MAGNESIUM SULFATE IN WATER 40 MG/ML
2000 INJECTION, SOLUTION INTRAVENOUS ONCE
Status: COMPLETED | OUTPATIENT
Start: 2022-12-21 | End: 2022-12-21

## 2022-12-21 RX ADMIN — SODIUM CHLORIDE: 9 INJECTION, SOLUTION INTRAVENOUS at 03:34

## 2022-12-21 RX ADMIN — SODIUM CHLORIDE 8 MG/HR: 9 INJECTION, SOLUTION INTRAVENOUS at 23:38

## 2022-12-21 RX ADMIN — FLUDROCORTISONE ACETATE 100 MCG: 0.1 TABLET ORAL at 12:31

## 2022-12-21 RX ADMIN — CALCIUM GLUCONATE 2000 MG: 20 INJECTION, SOLUTION INTRAVENOUS at 12:24

## 2022-12-21 RX ADMIN — SODIUM CHLORIDE: 9 INJECTION, SOLUTION INTRAVENOUS at 20:39

## 2022-12-21 RX ADMIN — MIDODRINE HYDROCHLORIDE 5 MG: 5 TABLET ORAL at 18:01

## 2022-12-21 RX ADMIN — CARBAMAZEPINE 400 MG: 200 TABLET ORAL at 20:30

## 2022-12-21 RX ADMIN — MIDODRINE HYDROCHLORIDE 5 MG: 5 TABLET ORAL at 12:31

## 2022-12-21 RX ADMIN — SODIUM CHLORIDE, PRESERVATIVE FREE 10 ML: 5 INJECTION INTRAVENOUS at 08:03

## 2022-12-21 RX ADMIN — SODIUM CHLORIDE 8 MG/HR: 9 INJECTION, SOLUTION INTRAVENOUS at 14:09

## 2022-12-21 RX ADMIN — MEROPENEM 1000 MG: 1 INJECTION, POWDER, FOR SOLUTION INTRAVENOUS at 20:15

## 2022-12-21 RX ADMIN — SODIUM CHLORIDE 8 MG/HR: 9 INJECTION, SOLUTION INTRAVENOUS at 04:53

## 2022-12-21 RX ADMIN — POTASSIUM CHLORIDE 20 MEQ: 29.8 INJECTION, SOLUTION INTRAVENOUS at 06:44

## 2022-12-21 RX ADMIN — CARBAMAZEPINE 300 MG: 200 TABLET ORAL at 08:06

## 2022-12-21 RX ADMIN — MAGNESIUM SULFATE HEPTAHYDRATE 2000 MG: 40 INJECTION, SOLUTION INTRAVENOUS at 13:28

## 2022-12-21 RX ADMIN — POTASSIUM CHLORIDE 20 MEQ: 29.8 INJECTION, SOLUTION INTRAVENOUS at 05:42

## 2022-12-21 RX ADMIN — MEROPENEM 1000 MG: 1 INJECTION, POWDER, FOR SOLUTION INTRAVENOUS at 08:04

## 2022-12-21 RX ADMIN — SODIUM CHLORIDE, PRESERVATIVE FREE 10 ML: 5 INJECTION INTRAVENOUS at 20:30

## 2022-12-21 RX ADMIN — SODIUM CHLORIDE: 9 INJECTION, SOLUTION INTRAVENOUS at 11:28

## 2022-12-21 RX ADMIN — SODIUM CHLORIDE: 9 INJECTION, SOLUTION INTRAVENOUS at 23:35

## 2022-12-21 ASSESSMENT — ENCOUNTER SYMPTOMS
EYES NEGATIVE: 1
GASTROINTESTINAL NEGATIVE: 1
SHORTNESS OF BREATH: 0

## 2022-12-21 NOTE — PROGRESS NOTES
[facility]. [facility]   Gastroenterology Progress Note    Marvin Huff is a 76 y.o. female patient. Hospitalization Day:2      Chief consult reason:   S/P urethral stent placement and dev coffee ground emesis    Subjective:  Patient seen and examined. No acute events overnight. No coffee-ground emesis, melena or hematochezia. Patient still on low-dose levo. Hemoglobin stable at 9.6 g/dL  Tolerating clears    VITALS:  BP (!) 105/45   Pulse 79   Temp 98.4 °F (36.9 °C) (Oral)   Resp 17   Ht 5' 7\" (1.702 m)   Wt 132 lb 11.5 oz (60.2 kg)   SpO2 96%   BMI 20.79 kg/m²   TEMPERATURE:  Current - Temp: 98.4 °F (36.9 °C); Max - Temp  Av.2 °F (37.3 °C)  Min: 98.4 °F (36.9 °C)  Max: 99.9 °F (37.7 °C)    Physical Assessment:  General appearance:  alert, cooperative and no distress  Mental Status:  oriented to person, place and time and normal affect  Lungs:  clear to auscultation bilaterally, normal effort  Heart:  regular rate and rhythm, no murmur  Abdomen:  soft, nontender, nondistended, normal bowel sounds, no masses, hepatomegaly, splenomegaly  Extremities:  no edema, redness, tenderness in the calves  Skin:  no gross lesions, rashes, induration    Data Review:    Labs and Imaging:     CBC:  Recent Labs     22  1430 22  0758 22  2203 22  0428   WBC 13.1* 35.9*  --  27.3*   HGB 12.7 10.8* 9.9* 9.6*   MCV 99.0 97.0  --  108.3*   RDW 15.4* 15.5*  --  15.9*    See Reflexed IPF Result  --  See Reflexed IPF Result       ANEMIA STUDIES:  No results for input(s): LABIRON, TIBC, FERRITIN, [id number], FOLATE, OCCULTBLD in the last 72 hours.     BMP:  Recent Labs     22  1430 22  0758 22  0428    138 135   K 4.2 4.0 3.5*   CL 96* 105 108*   CO2 21 19* 14*   BUN 20 16 13   CREATININE 1.27* 1.02* 0.76   GLUCOSE 93 101* 98   CALCIUM 8.6 7.5* 7.5*       LFTS:  Recent Labs     22  1430   ALKPHOS 362*   ALT 13   AST 26   BILITOT 0.3   BILIDIR 0.2 LABALBU 3.3*       Amylase/Lipase and Ammonia:  Recent Labs     12/19/22  1430   LIPASE 40       Acute Hepatitis Panel:  No results found for: HEPBSAG, HEPCAB, HEPBIGM, HEPAIGM    HCV Genotype:  No results found for: HEPATITISCGENOTYPE    HCV Quantitative:  No results found for: HCVQNT    LIVER WORK UP:    AFP  No results found for: AFP    Alpha 1 antitrypsin   No results found for: A1A    JUSTYNA  No results found for: JUSTYNA    AMA  No results found for: MITOAB    ASMA  No results found for: SMOOTHMUSCAB    PT/INR  Recent Labs     12/19/22  1430   PROTIME 10.4   INR 1.0       Cancer Markers:  CEA:  No results for input(s): CEA in the last 72 hours. Ca 125:  No results for input(s):  in the last 72 hours. Ca 19-9:   Invalid input(s):   AFP: No results for input(s): AFP in the last 72 hours. Lactic acid:Invalid input(s): LACTIC ACID    Radiology Review:    No results found. Principal Problem:    Pyelonephritis  Active Problems:    Ureteral stent present    GI bleed    Septic shock ([address]  Resolved Problems:    * No resolved hospital problems. *       GI Impression:    76 yr old female with urosepsis and coffee ground emesis.  -Suspect stress ulcer due to ongoing chronic illnesses, recent hospitalization etc. DDX included peptic ulcer disease, esophagitis, AVM  Dementia  Parkinson's    Plan and Recommendations:    Continue Protonix drip x48 more hours  Advance diet as tolerated  Monitor for active bleeding   Daily labs include CBC BMP. Hemoglobin every 8 hours and transfuse as clinically indicated  Will consider endoscopy once patient is more stable-if daughter is agreeable        This plan was formulated in collaboration with Dr. Frank Richards MD    Thank you for allowing me to participate in the care of your patient. Please feel free to contact me with any questions or concerns.      [facility]. [facility]   Patricio Xie, [facility]   [phone]  12/21/2022  10:59 AM    GI Attending Attestation  I have seen and examined the patient and reviewed the key elements of the history, physical exam findings and review of systems and discussed the care of this patient with Ms. Won Valladares CNP. I agree with the assessment, management plan and orders  with Ms. Petey Westbrook and primary team.        Garland Cruz MD   [address] [place]     Estimated time of 30 mins reviewing chart, assessing patient and formulating plan of care    This note was created with the assistance of a speech-recognition program.  Although the intention is to generate a document that actually reflects the content of the visit, no guarantees can be provided that every mistake has been identified and corrected by editing.

## 2022-12-21 NOTE — PROGRESS NOTES
Occupational Therapy  Facility/Department: [facility] 3- MICU  Occupational Therapy Initial Assessment    Name: Selam Barker  : 1945  MRN: [id number]  Date of Service: 2022    Chief Complaint   Patient presents with    Nausea       Discharge Recommendations:  Patient would benefit from continued therapy after discharge  OT Equipment Recommendations  Equipment Needed: Yes  Mobility Devices: ADL Assistive Devices; Unk Tammie: Rolling  ADL Assistive Devices: Shower Chair with back; Toileting - 3-in-1 Commode       Patient Diagnosis(es): The primary encounter diagnosis was Septic shock ([address] A diagnosis of Gastrointestinal hemorrhage, unspecified gastrointestinal hemorrhage type was also pertinent to this visit. Past Medical History:  has a past medical history of Anxiety, Convulsion ([address] Dementia ([address] Encephalopathy, Herpes, Hx of blood clots, Hyperlipidemia, Left bundle branch block, MRSA (methicillin resistant Staphylococcus aureus), Parkinson's disease ([address]izures [address] [address] Under care of service provider, and Vitamin D deficiency. Past Surgical History:  has a past surgical history that includes Abscess Drainage (Right, 2013); Ureter stent placement (Left, 2022); Cystoscopy (Left, 2022); Cystoscopy (Left, 2022); Hysterectomy; Splenectomy; other surgical history; Ureteroscopy (Left, 2022); Ureter surgery (Left, 2022); and Lithotripsy (Left, 2022). Assessment   Performance deficits / Impairments: Decreased functional mobility ; Decreased ADL status; Decreased safe awareness;Decreased cognition;Decreased balance;Decreased high-level IADLs;Decreased endurance;Decreased posture  Assessment: Pt agreeable to OT eval this date. Pt engaged in LB dressing activity while seated requiring Min A to initiate task and VCs/TCs for sequencing. Pt completed functional transfers 2X, requiring Min A for both trials.  Pt participate in functional mobility with Min A and RW use required d/t balance deficits. Pt demonstrates deficits in cognition, safety awareness, balance, endurance, and posture and will benefit from continued OT services to increase functional independence and safety. Prognosis: Good  Decision Making: Medium Complexity  REQUIRES OT FOLLOW-UP: Yes  Activity Tolerance  Activity Tolerance: Patient Tolerated treatment well;Treatment limited secondary to decreased cognition        Plan   Occupational Therapy Plan  Times Per Week: 3-5 x/wk  Current Treatment Recommendations: Balance training, Functional mobility training, Endurance training, Cognitive reorientation, Safety education & training, Patient/Caregiver education & training, Equipment evaluation, education, & procurement, Self-Care / ADL, Home management training     Restrictions  Restrictions/Precautions  Restrictions/Precautions: Fall Risk, General Precautions, Up as Tolerated  Required Braces or Orthoses?: No    Subjective   General  Patient assessed for rehabilitation services?: Yes  General Comment  Comments: RN ok'd pt for therapy this date. Pt agreeable to session and very pleasant/cooperative yet confused throughout.  Pt denies pain  Pt repeated multiple times throughout session \"It's just scary when I can't remember anything\"    Social/Functional History  Social/Functional History  Lives With: Family, Daughter (dtr, son-in-law and grandson, but will be going to stay with dtr in [place] when dc'd from the hospital per dtr)  Type of Home: House  Home Layout: One level  Home Access: Stairs to enter without rails  Entrance Stairs - Number of Steps: 1  Receives Help From: Family  ADL Assistance: Needs assistance  Ambulation Assistance: Needs assistance (per dtr, pt ambulates w/o device, but always has someone walking with pt)  Transfer Assistance: Needs assistance (SBA)  Active : No  Patient's  Info: daughter transports  Occupation: On disability  Additional Comments: Above information was obtained from PT nini as pt is a poor historian and was unable to provide appropriate social/functional history and there was no family at bedside this date. Objective   Safety Devices  Type of Devices: Call light within reach; Chair alarm in place;Gait belt;Left in chair;Nurse notified  Restraints  Restraints Initially in Place: No    Bed Mobility Training  Bed Mobility Training: No (pt in recliner upon  entrance and retired to recliner at [employer] exit)  Balance  Sitting: With support (CGA grossly while seated at edge of recliner with forward flexion noted ~10 minutes collectively)  Standing: With support (pt engaged in static and dynamic standing with use of RW for support and CGA ~1 min X2 trials)  Transfer Training  Transfer Training: Yes  Overall Level of Assistance: Minimum assistance; Additional time; Adaptive equipment (pt engaged in transfers from recliner 2X with Min A and use of RW, appropriate hand placement utilized following VCs)  Interventions: Safety awareness training; Tactile cues; Verbal cues  Sit to Stand: Minimum assistance; Additional time; Adaptive equipment  Stand to Sit: Minimum assistance; Additional time; Adaptive equipment  Gait  Overall Level of Assistance: Minimum assistance; Adaptive equipment; Additional time (pt engaged in short functional mobility within hospital room with Min A and use of RW. Pt demonstrates mild balance deficits and decreased safety awareness throughout and requires VCs to decrease risk of falls)  Interventions: Safety awareness training;Verbal cues    AROM: Generally decreased, functional (L shoulder deficits, otherwise WFL)  Strength: Generally decreased, functional (L shoulder not assessed; otherwise 4/5)  Coordination: Within functional limits  Tone: Normal  Sensation: Impaired (chronic numbness/tingling to BLE)    ADL  Feeding: Stand by assistance;Setup;Verbal cueing; Increased time to complete  Grooming: Stand by assistance;Setup;Verbal cueing; Increased time to complete  UE Bathing: Minimal assistance;Setup;Verbal cueing; Increased time to complete  LE Bathing: Minimal assistance;Setup;Verbal cueing; Increased time to complete  UE Dressing: Minimal assistance;Setup;Verbal cueing; Increased time to complete  LE Dressing: Minimal assistance;Setup;Verbal cueing; Increased time to complete  LE Dressing Skilled Clinical Factors: pt engaged in LB dressing task while seated requiring Min A to initiate threading to B feet however able to continue with task and appropriately pull socks up over heels and lower legs. Pt requires increased time and VCs to initiate and sequence  Toileting: Minimal assistance;Setup; Increased time to complete    Vision  Vision: Impaired  Vision Exceptions: Wears glasses at all times  Hearing  Hearing: Within functional limits    Cognition  Overall Cognitive Status: Exceptions  Arousal/Alertness: Delayed responses to stimuli  Following Commands: Follows one step commands with increased time; Follows one step commands with repetition  Attention Span: Attends with cues to redirect  Memory: Decreased recall of biographical Information;Decreased recall of recent events;Decreased short term memory;Decreased long term memory  Safety Judgement: Decreased awareness of need for safety;Decreased awareness of need for assistance  Problem Solving: Assistance required to identify errors made;Assistance required to correct errors made  Insights: Decreased awareness of deficits  Initiation: Requires cues for some  Sequencing: Requires cues for some  Orientation  Overall Orientation Status: Impaired  Orientation Level: Oriented to person;Disoriented to place; Disoriented to time;Disoriented to situation    Education Provided Comments: Pt ed on OT role, OT POC, orientation, transfer training, DME use, fall prevention, and importance of continued OT.  Pt with poor understanding d/t cognitive and memory deficits    LUE AROM (degrees)  LUE AROM : Exceptions  L Shoulder Flexion 0-180: 0-30; chronic deficits  L Elbow Flexion 0-145: WFL  L Elbow Extension 145-0: WFL  Left Hand AROM (degrees)  Left Hand AROM: WFL  Left Hand General AROM: L 5th digit amp  RUE AROM (degrees)  RUE AROM : WFL  Right Hand AROM (degrees)  Right Hand AROM: WFL    Hand Dominance  Hand Dominance: Right            AM-PAC Score  AM-PAC Inpatient Daily Activity Raw Score: 18 (12/21/22 1707)  AM-PAC Inpatient ADL T-Scale Score : 38.66 (12/21/22 1707)  ADL Inpatient CMS 0-100% Score: 46.65 (12/21/22 1707)  ADL Inpatient CMS G-Code Modifier : CK (12/21/22 1707)    Goals  Short Term Goals  Time Frame for Short Term Goals: By discharge, pt will:  Short Term Goal 1: Demo SBA for functional transfers and functional mobility with use of LRD PRN for engagement in ADLs/IADLs  Short Term Goal 2: Demo SUP for UB ADLs  Short Term Goal 3: Demo SBA for LB ADLs and toileting tasks with use of adaptive tech PRN  Short Term Goal 4: Demo 8 min dynamic standing and reaching outside RIKI with unilateral hand release and SBA for improved balance during ADL/IADL participation  Short Term Goal 5: Follow 75% of 2-step commands with Min VCs for appropriate sequencing to increase functional independence       Therapy Time   Individual Concurrent Group Co-treatment   Time In 1446         Time Out 1511         Minutes 25         Timed Code Treatment Minutes: 23 Minutes       JORGE Menendez/WILLY

## 2022-12-21 NOTE — PROGRESS NOTES
SPIRITUAL CARE DEPARTMENT - [address]  PROGRESS NOTE    Shift date: 12/21/22    Shift day: Wednesday   Shift # 2    Room # 0467/6052-48   Name: Olayinka Schultz                  Referral: Routine Visit    Admit Date & Time: 12/19/2022  1:52 PM    Assessment:  Olayinka Schultz is a 76 y.o. female in the hospital. Brenna Hollingsworth responded to patient's room while rounding, was welcomed by patient, and observed patient to be calm and coping well. Through conversation,  heard what appeared to be resilience impaired by mild confusion around how/when patient arrived at the hospital.      Intervention:  Writer introduced self and title as  Writer offered space for the patient  to express feelings, needs, and concerns and provided a ministry presence.  engaged in conversation, provided a non-anxious presence, and offered a blessing of peace. Outcome:  Patient appeared to receive comfort from  presence. Plan:  Chaplains will remain available to offer spiritual and emotional support as needed. 12/21/22 1517   Encounter Summary   Service Provided For: Patient   Referral/Consult From: Rounding   Support System Unknown   Last Encounter  12/21/22   Complexity of Encounter Moderate   Begin Time 1430   End Time  1440   Total Time Calculated 10 min   Spiritual/Emotional needs   Type Spiritual Support   Assessment/Intervention/Outcome   Assessment Impaired resilience;Calm;Fearful; Hopeful   Intervention Active listening;Sustaining Presence/Ministry of presence;Prayer (assurance of)/[place]   Outcome Comfort   Plan and Referrals   Plan/Referrals Continue Support (comment)     Electronically signed by Joshua Metcalf, on 12/21/2022 at 3:18 PM.  Bhaskar Shelby  [phone]

## 2022-12-21 NOTE — PLAN OF CARE
Problem: Discharge Planning  Goal: Discharge to home or other facility with appropriate resources  12/20/2022 2133 by Sigifredo Nguyen RN  Outcome: Progressing  Flowsheets (Taken 12/20/2022 2000)  Discharge to home or other facility with appropriate resources: Identify barriers to discharge with patient and caregiver  12/20/2022 1243 by Lonie Felty, RN  Outcome: Progressing     Problem: Pain  Goal: Verbalizes/displays adequate comfort level or baseline comfort level  12/20/2022 2133 by Sigifredo Nguyen RN  Outcome: Progressing  12/20/2022 1243 by Lonie Felty, RN  Outcome: Progressing     Problem: Skin/Tissue Integrity  Goal: Absence of new skin breakdown  Description: 1. Monitor for areas of redness and/or skin breakdown  2. Assess vascular access sites hourly  3. Every 4-6 hours minimum:  Change oxygen saturation probe site  4. Every 4-6 hours:  If on nasal continuous positive airway pressure, respiratory therapy assess nares and determine need for appliance change or resting period.   12/20/2022 2133 by Sigifredo Nguyen RN  Outcome: Progressing  12/20/2022 1243 by Lonie Felty, RN  Outcome: Progressing     Problem: Safety - Adult  Goal: Free from fall injury  12/20/2022 2133 by Sigifredo Nguyen RN  Outcome: Venu Loyamer (Taken 12/20/2022 2000)  Free From Fall Injury: Instruct family/caregiver on patient safety  12/20/2022 1243 by Lonie Felty, RN  Outcome: Progressing  Flowsheets (Taken 12/20/2022 1242)  Free From Fall Injury: Instruct family/caregiver on patient safety     Problem: ABCDS Injury Assessment  Goal: Absence of physical injury  12/20/2022 2133 by Sigifredo Nguyen RN  Outcome: Progressing  Flowsheets (Taken 12/20/2022 2000)  Absence of Physical Injury: Implement safety measures based on patient assessment  12/20/2022 1243 by Lonie Felty, RN  Outcome: Progressing  Flowsheets (Taken 12/20/2022 1242)  Absence of Physical Injury: Implement safety measures based on patient assessment Problem: Nutrition Deficit:  Goal: Optimize nutritional status  Outcome: Progressing

## 2022-12-21 NOTE — PROGRESS NOTES
INTENSIVE CARE UNIT  Resident Physician Progress Note    Patient - Darling Santo  Date of Admission -  12/19/2022  1:52 PM  Date of Evaluation -  12/21/2022  Room and Bed Number -  3105/9270-35   Hospital Day - 2    SUBJECTIVE:   HISTORY OF PRESENT ILLNESS:    Darling Santo is a 76 y.o. woman w/hx of parkinsons, seizures and recurrent kidney stone and urine infection w/VRE and ESBL who was admitted to medicine on 12-19 for concern for GI bleed after a urologic procedure earlier in the day. The patient underwent cystoscopy and urologic stenting due to obstructive stone and was discharged. Developed coffee ground emesis at home. She was admitted for GI assessment and while boarding in the ER developed worsening hypotension despite fluid resuscitation, remained febrile and tachycardic. Sepsis w/u initiated. CT PE was negative. CT AP showed no bleeding in the abdomen. Hypotension suspected to be secondary to sepsis rather than GI bleed. She required pressors and was transferred to MICU.      OVERNIGHT EVENTS:     Afebrile overnight, HR controlled, on room  air  Requiring 5 mcg/min of levophed   ID consulted due to abx interactions w/pt home seizure medications, currently on meropenem, cultures negative    WBC 27.3  Potassium 3.5, replaced  Hemoglobin 9.6 stable    Fluids: NS 125cc/hr  Feeding:NPO, on clear liquid diet with supplements  Analgesics: PRN tylenol  Sedation: none  Thrombo-prophylaxis: held due to concerns for GI bleed  Mobilization:therapy ordered   Head Up:yes   Hemodynamics:  requiring pressors  Ulcer Prophylaxis:  protonix QD  Glycemic Control:controlled  Spontaneous breathing trial:none  Bowel management:had bowel movement, no concern for bleed   Indwelling catheter: external urinary catheter, put out 1890 ml output,  R fem CVC  Drug De-escalation: ID recommendations for abx currently on meropenem        OBJECTIVE:   VITAL SIGNS:  Patient Vitals for the past 8 hrs:   BP Temp Temp src Pulse Resp SpO2 Weight   22 0645 (!) 109/59 -- -- 78 13 97 % --   22 0630 (!) 94/46 -- -- 90 16 97 % --   22 0615 (!) 104/54 -- -- 88 19 97 % --   22 0603 93/61 -- -- -- -- -- --   22 0600 (!) 94/48 99.1 °F (37.3 °C) Oral 83 16 93 % --   22 0545 (!) 99/52 -- -- 84 15 93 % --   22 0530 (!) 99/49 -- -- 83 14 96 % --   22 0515 108/61 -- -- 89 15 97 % --   22 0504 -- -- -- -- -- -- 132 lb 11.5 oz (60.2 kg)   22 0500 99/60 -- -- 88 14 96 % --   22 0445 (!) 109/50 -- -- 91 14 97 % --   22 0430 (!) 108/58 -- -- 88 16 97 % --   22 0415 (!) 103/56 -- -- 79 16 93 % --   22 0400 (!) 94/42 99.5 °F (37.5 °C) Oral 81 16 92 % --   22 0345 (!) 98/45 -- -- 81 18 92 % --   22 0330 (!) 90/53 -- -- 89 19 93 % --   22 0315 102/63 -- -- 94 17 95 % --   22 0300 (!) 108/59 -- -- 97 16 94 % --   22 0245 114/66 -- -- 83 17 94 % --   22 0230 117/78 -- -- 84 16 100 % --   22 0215 (!) 108/54 -- -- (!) 104 17 100 % --   22 0200 (!) 105/54 99.3 °F (37.4 °C) Oral (!) 103 14 100 % --   22 0145 (!) 104/48 -- -- (!) 103 17 100 % --   22 0130 (!) 116/56 -- -- (!) 101 16 100 % --   22 0115 120/61 -- -- 100 16 100 % --   22 0100 (!) 113/59 -- -- 100 14 100 % --   22 0045 (!) 115/57 -- -- 100 14 100 % --   22 0030 (!) 113/59 -- -- 90 14 99 % --   22 0015 (!) 115/54 -- -- (!) 101 14 100 % --   22 0000 106/62 99.9 °F (37.7 °C) Oral (!) 106 30 100 % --   22 2345 115/61 -- -- 96 15 100 % --   22 2330 123/61 -- -- (!) 101 15 100 % --   22 2315 118/67 -- -- (!) 105 16 100 % --   22 2300 99/72 -- -- 99 15 100 % --         Last Body weight:   Wt Readings from Last 3 Encounters:   22 132 lb 11.5 oz (60.2 kg)   22 130 lb (59 kg)   12/10/22 126 lb (57.2 kg)       Body Mass Index : Body mass index is 20.79 kg/m².       Tmax over 24 hours: Temp (24hrs), Av.2 °F (37.3 °C), Min:98.6 °F (37 °C), Max:99.9 °F (37.7 °C)      Ins/Outs: In: 5722.6 [I.V.:4730.5]  Out: 2890 [Urine:2890]    PHYSICAL EXAM:  Constitutional: Awake and alert, well developed and well nourished, in no acute distress  EENT: PERRLA, EOMI, sclera clear, anicteric, oropharynx clear, no lesions  Neck: Supple, symmetrical, trachea midline  Respiratory: clear to auscultation, no wheezes, rales, or rhonchi  Cardiovascular: regular rate and rhythm, normal S1, S2, no murmur noted and 2+ distal pulses throughout  Abdomen: soft, nontender, nondistended, no masses or organomegaly  Extremities:  peripheral pulses normal, no pedal edema, no clubbing or cyanosis. R fem CVC site is well appearing.      MEDICATIONS:  Scheduled Meds:   meropenem  1,000 mg IntraVENous Q12H    sodium chloride flush  5-40 mL IntraVENous 2 times per day    carBAMazepine  300 mg Oral Daily    carBAMazepine  400 mg Oral Nightly       Continuous Infusions:   pantoprazole 8 mg/hr (12/21/22 0646)    norepinephrine 5 mcg/min (12/21/22 0646)    sodium chloride Stopped (12/21/22 0644)    sodium chloride 125 mL/hr at 12/21/22 0646       PRN Meds:   sodium chloride flush, 5-40 mL, PRN  sodium chloride, , PRN  ondansetron, 4 mg, Q8H PRN   Or  ondansetron, 4 mg, Q6H PRN  polyethylene glycol, 17 g, Daily PRN  acetaminophen, 650 mg, Q6H PRN   Or  acetaminophen, 650 mg, Q6H PRN      DATA:  Complete Blood Count:   Recent Labs     12/19/22  1430 12/20/22  0758 12/20/22  2203 12/21/22  0428   WBC 13.1* 35.9*  --  27.3*   RBC 4.01 3.35*  --  3.02*   HGB 12.7 10.8* 9.9* 9.6*   HCT 39.7 32.5* 31.5* 32.7*   MCV 99.0 97.0  --  108.3*   MCH 31.7 32.2  --  31.8   MCHC 32.0 33.2  --  29.4   RDW 15.4* 15.5*  --  15.9*    See Reflexed IPF Result  --  See Reflexed IPF Result   MPV 8.6  --   --   --           Last 3 Blood Glucose:   Recent Labs     12/19/22  1430 12/20/22  0758 12/21/22  0428   GLUCOSE 93 101* 98          PT/INR:    Lab Results   Component Value Date/Time PROTIME 10.4 12/19/2022 02:30 PM    INR 1.0 12/19/2022 02:30 PM     PTT:    Lab Results   Component Value Date/Time    APTT 21.4 12/19/2022 02:30 PM       Basic Metabolic Profile:   Recent Labs     12/19/22  1430 12/20/22  0758 12/21/22  0428    138 135   K 4.2 4.0 3.5*   CL 96* 105 108*   CO2 21 19* 14*   BUN 20 16 13   CREATININE 1.27* 1.02* 0.76   GLUCOSE 93 101* 98         Liver Function:  Recent Labs     12/19/22  1430   PROT 7.3   LABALBU 3.3*   ALT 13   AST 26   ALKPHOS 362*   BILITOT 0.3         Magnesium:   Lab Results   Component Value Date/Time    MG 1.8 12/21/2022 04:28 AM    MG 1.9 12/20/2022 12:21 PM    MG 2.1 11/29/2022 06:31 AM     Phosphorus:   Lab Results   Component Value Date/Time    PHOS 3.3 05/18/2022 03:53 AM     Ionized Calcium:   Lab Results   Component Value Date/Time    CAION 1.09 12/20/2022 12:21 PM    CAION 1.11 12/10/2022 09:46 PM        Urinalysis:   Lab Results   Component Value Date/Time    NITRU POSITIVE 12/19/2022 03:50 PM    COLORU Red 12/19/2022 03:50 PM    PHUR 7.0 12/19/2022 03:50 PM    WBCUA None 12/19/2022 03:50 PM    RBCUA TOO NUMEROUS TO COUNT 12/19/2022 03:50 PM    MUCUS 1+ 11/13/2019 03:30 PM    TRICHOMONAS NOT REPORTED 11/13/2019 03:30 PM    YEAST NOT REPORTED 11/13/2019 03:30 PM    BACTERIA MODERATE 11/28/2022 08:00 PM    SPECGRAV 1.012 12/19/2022 03:50 PM    LEUKOCYTESUR LARGE 12/19/2022 03:50 PM    UROBILINOGEN Normal 12/19/2022 03:50 PM    BILIRUBINUR NEGATIVE  Verified by ictotest. 12/19/2022 03:50 PM    GLUCOSEU NEGATIVE 12/19/2022 03:50 PM    KETUA NEGATIVE 12/19/2022 03:50 PM    AMORPHOUS NOT REPORTED 11/13/2019 03:30 PM       HgBA1c:  No results found for: LABA1C  TSH:    Lab Results   Component Value Date/Time    TSH 1.94 10/03/2022 06:17 PM     Lactic Acid:   Lab Results   Component Value Date/Time    LACTA 2.1 10/03/2022 06:17 PM      Troponin: No results for input(s): TROPONINI in the last 72 hours.     Microbiology:  Urine Culture:  No components found for: LATHA  Blood Culture:  No components found for: CBLOOD, CFUNGUSBL    Radiology/Imaging:  CT CHEST PULMONARY EMBOLISM W CONTRAST   Preliminary Result   1. Interval improvement in left hydronephrosis. Left ureteral stent is noted   with persistent mild-to-moderate hydronephrosis and perinephric stranding. There is a 2 to thickening and enhancement of the left renal pelvis and   proximal ureter suggestive of superimposed infection or inflammation. 2. Nonobstructing bilateral renal calculi, as well as a 3 mm mid left   ureteral calculus, and bladder calculi. 3. No pulmonary embolus. 4. Slightly increasing right basilar opacity either reflecting atelectasis   versus pneumonia. Given patulous esophagus with an air-fluid level,   aspiration can be a consideration. 5. Mild nonspecific periportal edema. CT ABDOMEN PELVIS W IV CONTRAST Additional Contrast? None   Preliminary Result   1. Interval improvement in left hydronephrosis. Left ureteral stent is noted   with persistent mild-to-moderate hydronephrosis and perinephric stranding. There is a 2 to thickening and enhancement of the left renal pelvis and   proximal ureter suggestive of superimposed infection or inflammation. 2. Nonobstructing bilateral renal calculi, as well as a 3 mm mid left   ureteral calculus, and bladder calculi. 3. No pulmonary embolus. 4. Slightly increasing right basilar opacity either reflecting atelectasis   versus pneumonia. Given patulous esophagus with an air-fluid level,   aspiration can be a consideration. 5. Mild nonspecific periportal edema. XR CHEST PORTABLE   Final Result   Increasing interstitial and bibasilar opacities with probable small left   pleural effusion. Findings may reflect edema in the appropriate clinical   setting versus pneumonia.              ASSESSMENT:     Patient Active Problem List    Diagnosis Date Noted    GI bleed 12/19/2022    Pyelonephritis 12/19/2022    Septic shock ([address] 12/19/2022    Ureteral stent present 67/18/8887    Complicated UTI (urinary tract infection) 11/28/2022    Encounter for palliative care 11/11/2022    Chest pain 11/09/2022    Mitral valve disorder 10/26/2022    Bacterial UTI 10/08/2022    Urinary tract infection due to Proteus 10/08/2022    ESBL (extended spectrum beta-lactamase) producing bacteria infection 10/08/2022    Acute cystitis with hematuria 10/08/2022    Leukocytosis 10/08/2022    Sepsis ([address] 10/04/2022    COPD exacerbation ([address] 10/04/2022    Hypokalemia 10/04/2022    Community acquired pneumonia 10/03/2022    Closed head injury     Kidney stone 05/17/2022    Urinary incontinence 05/17/2022    Overactive bladder 05/17/2022    Closed compression fracture of L1 lumbar vertebra, initial encounter ([facility] [address] 05/15/2022    Depression 08/15/2020    DVT of deep femoral vein, left ([address] 07/10/2021    Dementia due to Parkinson's disease with behavioral disturbance ([address] 08/15/2020    Seizure disorder ([address] 10/13/2015    Dementia with behavioral disturbance 10/13/2015    History of encephalitis 10/13/2015    Breakthrough seizure ([address]     Memory loss     Difficulty speaking     MRSA (methicillin resistant staph aureus) culture positive 12/14/2013    Cellulitis of right leg 12/12/2013    Seizure ([address] 08/24/2012    Vitamin D deficiency 08/24/2012    Anxiety 08/24/2012    Hypercholesteremia 08/24/2012        PLAN:      WEAN PER PROTOCOL:  []   No  []   Yes [x]   N/A                         ICU PROPHYLAXIS:                Stress ulcer:  [x]   PPI Agent []   [id number] []   Sucralfate []   Other []   None      VTE:  []   Enoxaparin []   SQ Heparin [x]   EPC Cuffs []  Other [] Contraindicated                     NUTRITION:   [x]  NPO []   TF:  []   TPN []   PO                       HOME MEDS RECONCILED:  [x]   No  []   Yes                           CONSULTATION NEEDED:  []   No  [x]   Yes                           FAMILY UPDATED:  []   No  [x]   Yes TRANSFER OUT OF ICU:  [x]   No  []   Yes             PLAN/MEDICAL DECISION MAKING:  Neurologic:   Neuro checks per protocol  Sedation: none  Pain management: tylenol PRN  Hx seizures  Home carbemazepine  Cardiovascular:  HR: controlled  MAP goal >65  Levophed 5 mcg/min  Pulmonary:  Maintain oxygen sats >92%  Pulmonary toilet  CT PE negative for PE, slightly inccreased right basilar opacity, possible atalectasis vs pneumonia. On meropenem currently. Continue to monitor  GI/Nutrition  Bowel regimen: miralax PRN  Ulcer Prophylaxis: PPI gtt  Diet:ADULT DIET; Full Liquid  ADULT ORAL NUTRITION SUPPLEMENT; Breakfast, Dinner, Lunch; Standard High Calorie/High Protein Oral Supplement  Last BM: no recorded  Concern for GI bleed. GI consulted, f/u recs  Suspect stress ulcer  Endoscopy once hemodynamically stable  CLD w/supplements okay  Renal/Fluid/Electrolyte  IV Fluids: Saline Lock @ 125 mL/Hr   I/O: In: 5722.6 [I.V.:4730.5]  Out: 2890 [Urine:2890]  Monitor electrolytes, replace PRN   ID     POD #2 extracorporal shockwave lithotripsy, left ureteroscopy, left laser lithotripsy postop day 1 presenting with post ureteroscopy sepsis  WBC:   Lab Results   Component Value Date    WBC 27.3 (H) 2022     Tmax: Temp (24hrs), Av.2 °F (37.3 °C), Min:98.6 °F (37 °C), Max:99.9 °F (37.7 °C)  ID consulted. Likely sepsis w/urinary source  Antimicrobials: meropenem   Hematology:  Concern for GI bleed and has gross hematuria.  GI on board, No bloody emesis since arrival.  Endocrine:   Glucose no insulin required  DVT Prophylaxis  EPC Cuffs  Held due to concern for bleed    Discharge Needs:  PT, OT, ST, and Case Management      CODE STATUS: DNR-CCA    DISPOSITION:  [x] To remain ICU:   [] OK for out of ICU from [address] standpoint    ---  Crys Martin MD  Internal Medicine Resident, PGY-3  MADONNA Gutiérrezjanae Baileyluis , [facility]  2022,6:59 AM

## 2022-12-21 NOTE — PROGRESS NOTES
Christos Chuyita, [address] [facility], [address], [place] [facility], [facility]  Urology Progress Note     Subjective:   No acute events overnight  A/O x 1  Patient denies any fevers, chills, nausea vomiting  Remains on norepinephrine overnight  UOP 1.890L/24 hours  wBC 27.3 (35.9)  Cr 0.76 (1.02)    Patient Vitals for the past 24 hrs:   BP Temp Temp src Pulse Resp SpO2 Height Weight   12/21/22 0800 -- 98.4 °F (36.9 °C) Oral -- -- -- -- --   12/21/22 0715 (!) 105/45 -- -- 79 17 96 % -- --   12/21/22 0700 (!) 104/55 -- -- 81 15 96 % -- --   12/21/22 0645 (!) 109/59 -- -- 78 13 97 % -- --   12/21/22 0630 (!) 94/46 -- -- 90 16 97 % -- --   12/21/22 0615 (!) 104/54 -- -- 88 19 97 % -- --   12/21/22 0603 93/61 -- -- -- -- -- -- --   12/21/22 0600 (!) 94/48 99.1 °F (37.3 °C) Oral 83 16 93 % -- --   12/21/22 0545 (!) 99/52 -- -- 84 15 93 % -- --   12/21/22 0530 (!) 99/49 -- -- 83 14 96 % -- --   12/21/22 0515 108/61 -- -- 89 15 97 % -- --   12/21/22 0504 -- -- -- -- -- -- -- 132 lb 11.5 oz (60.2 kg)   12/21/22 0500 99/60 -- -- 88 14 96 % -- --   12/21/22 0445 (!) 109/50 -- -- 91 14 97 % -- --   12/21/22 0430 (!) 108/58 -- -- 88 16 97 % -- --   12/21/22 0415 (!) 103/56 -- -- 79 16 93 % -- --   12/21/22 0400 (!) 94/42 99.5 °F (37.5 °C) Oral 81 16 92 % -- --   12/21/22 0345 (!) 98/45 -- -- 81 18 92 % -- --   12/21/22 0330 (!) 90/53 -- -- 89 19 93 % -- --   12/21/22 0315 102/63 -- -- 94 17 95 % -- --   12/21/22 0300 (!) 108/59 -- -- 97 16 94 % -- --   12/21/22 0245 114/66 -- -- 83 17 94 % -- --   12/21/22 0230 117/78 -- -- 84 16 100 % -- --   12/21/22 0215 (!) 108/54 -- -- (!) 104 17 100 % -- --   12/21/22 0200 (!) 105/54 99.3 °F (37.4 °C) Oral (!) 103 14 100 % -- --   12/21/22 0145 (!) 104/48 -- -- (!) 103 17 100 % -- --   12/21/22 0130 (!) 116/56 -- -- (!) 101 16 100 % -- --   12/21/22 0115 120/61 -- -- 100 16 100 % -- --   12/21/22 0100 (!) 113/59 -- -- 100 14 100 % -- --   12/21/22 0045 (!) 115/57 -- -- 100 14 100 % -- --   12/21/22 0030 (!) 113/59 -- -- 90 14 99 % -- --   12/21/22 0015 (!) 115/54 -- -- (!) 101 14 100 % -- --   12/21/22 0000 106/62 99.9 °F (37.7 °C) Oral (!) 106 30 100 % -- --   12/20/22 2345 115/61 -- -- 96 15 100 % -- --   12/20/22 2330 123/61 -- -- (!) 101 15 100 % -- --   12/20/22 2315 118/67 -- -- (!) 105 16 100 % -- --   12/20/22 2300 99/72 -- -- 99 15 100 % -- --   12/20/22 2245 (!) 114/52 -- -- 94 17 100 % -- --   12/20/22 2230 111/62 -- -- 87 18 100 % -- --   12/20/22 2200 115/69 99.5 °F (37.5 °C) Oral (!) 101 19 100 % -- --   12/20/22 2145 119/62 -- -- 96 17 100 % -- --   12/20/22 2130 (!) 125/58 -- -- (!) 103 17 100 % -- --   12/20/22 2115 120/65 -- -- 100 19 100 % -- --   12/20/22 2100 126/64 -- -- (!) 105 18 100 % -- --   12/20/22 2045 121/66 -- -- 87 15 100 % -- --   12/20/22 2030 121/62 -- -- (!) 105 17 100 % -- --   12/20/22 2015 (!) 116/52 -- -- 86 16 100 % -- --   12/20/22 2000 98/61 99.3 °F (37.4 °C) Oral 95 17 100 % -- --   12/20/22 1945 (!) 109/46 -- -- (!) 104 23 100 % -- --   12/20/22 1845 (!) 94/41 -- -- (!) 103 17 97 % -- --   12/20/22 1830 (!) 102/49 -- -- 87 15 99 % -- --   12/20/22 1815 (!) 102/52 -- -- 92 18 100 % -- --   12/20/22 1800 (!) 100/48 -- -- (!) 106 17 100 % -- --   12/20/22 1745 (!) 103/58 -- -- 86 16 100 % -- --   12/20/22 1730 102/61 -- -- 92 18 100 % -- --   12/20/22 1715 108/64 -- -- (!) 105 18 100 % -- --   12/20/22 1700 119/60 -- -- 100 24 99 % -- --   12/20/22 1630 110/77 -- -- 94 20 99 % -- --   12/20/22 1615 (!) 113/47 -- -- 79 14 99 % -- --   12/20/22 1600 (!) 103/58 98.8 °F (37.1 °C) Oral 79 15 100 % -- --   12/20/22 1545 (!) 114/50 -- -- 78 13 100 % -- --   12/20/22 1533 -- -- -- -- -- -- 5' 7\" (1.702 m) --   12/20/22 1530 (!) 114/47 -- -- 77 12 99 % -- --   12/20/22 1515 (!) 112/53 -- -- 82 14 98 % -- --   12/20/22 1500 (!) 119/51 -- -- 83 14 100 % -- --   12/20/22 1445 (!) 111/46 -- -- 81 13 100 % -- --   12/20/22 1430 (!) 112/46 -- -- 79 14 100 % -- --   12/20/22 1415 (!) 114/49 -- -- 79 13 100 % -- --   12/20/22 1400 (!) 102/44 -- -- 82 14 100 % -- --   12/20/22 1345 (!) 107/45 -- -- 76 13 100 % -- --   12/20/22 1330 (!) 111/45 -- -- 85 13 100 % -- --   12/20/22 1315 (!) 103/58 -- -- 85 11 100 % -- --   12/20/22 1300 (!) 108/45 -- -- 84 13 100 % -- --   12/20/22 1245 (!) 109/47 -- -- 88 11 100 % -- --   12/20/22 1230 (!) 113/49 -- -- 85 12 100 % -- --   12/20/22 1215 (!) 119/49 -- -- 76 12 100 % -- --   12/20/22 1200 (!) 121/56 98.9 °F (37.2 °C) Oral (!) 101 13 100 % -- --   12/20/22 1145 (!) 110/42 -- -- 77 14 100 % -- --   12/20/22 1130 (!) 102/44 -- -- 97 18 100 % -- --   12/20/22 1115 (!) 98/45 -- -- 81 14 100 % -- --   12/20/22 1100 (!) 102/45 -- -- 78 14 100 % -- --   12/20/22 1045 (!) 105/44 -- -- 88 12 100 % -- --   12/20/22 1030 (!) 104/44 -- -- 90 13 100 % -- --   12/20/22 1015 (!) 107/47 -- -- 89 13 100 % -- --   12/20/22 1000 (!) 110/51 98.6 °F (37 °C) Oral 88 12 100 % -- --   12/20/22 0945 (!) 96/50 -- -- 90 14 100 % -- --   12/20/22 0930 (!) 113/50 -- -- 87 13 100 % -- --   12/20/22 0915 (!) 119/52 -- -- 83 12 100 % -- --   12/20/22 0900 (!) 110/49 -- -- 86 12 100 % -- --   12/20/22 0845 (!) 102/47 -- -- 88 14 100 % -- --   12/20/22 0830 (!) 102/45 -- -- 91 13 100 % -- --         Intake/Output Summary (Last 24 hours) at 12/21/2022 0823  Last data filed at 12/21/2022 0803  Gross per 24 hour   Intake 4061.08 ml   Output 1640 ml   Net 2421.08 ml         Recent Labs     12/19/22  1430 12/20/22  0758 12/20/22  2203 12/21/22  0428   WBC 13.1* 35.9*  --  27.3*   HGB 12.7 10.8* 9.9* 9.6*   HCT 39.7 32.5* 31.5* 32.7*   MCV 99.0 97.0  --  108.3*    See Reflexed IPF Result  --  See Reflexed IPF Result       Recent Labs     12/19/22  1430 12/20/22  0758 12/21/22  0428    138 135   K 4.2 4.0 3.5*   CL 96* 105 108*   CO2 21 19* 14*   BUN 20 16 13   CREATININE 1.27* 1.02* 0.76         Recent Labs     12/19/22  1550   COLORU Red*   PHUR 7.0   WBCUA None   RBCUA TOO NUMEROUS TO COUNT   SPECGRAV 1.012   LEUKOCYTESUR LARGE*   UROBILINOGEN Normal   BILIRUBINUR NEGATIVE  Verified by ictotest.*         Additional Lab/culture results: None    Physical Exam:   AAOx1  NAD  Unlabored breathing  Normal rate  Abdomen soft, appropriately tender to palpation, nondistended  No calf tenderness to palpation     Interval Imaging Findings: None    Impression:   76 y.o. female POD #2 extracorporal shockwave lithotripsy, left ureteroscopy, left laser lithotripsy presenting with post ureteroscopy sepsis    Plan:   Trend leukocytosis, replete electrolytes as needed  If patient becomes febrile, tachycardic, continue pressor support please place indwelling Dawson catheter for optimal decompression of collecting system  Pain and nausea control as needed  Blood and urine culture showing no growth. Previous cultures grew ESBL and VRE.   Appreciate ID recommendations regarding antibiotic recommendations, on meropenem   Ambulate/out of bed  Appreciate medicine team recommendation for medical management    Olayinka Christiansen MD  PGY-4,Urology  8:23 AM 12/21/2022

## 2022-12-21 NOTE — CONSULTS
Palliative Care Inpatient Consult    NAME:  Ciara 76 RECORD NUMBER:  [id number]  AGE: 76 y.o. GENDER: female  : 1945  TODAY'S DATE:  2022    Reasons for Consultation:    Symptom and/or pain management  Provision of information regarding PC and/or hospice philosophies  Complex, time-intensive communication and interdisciplinary psychosocial support  Clarification of goals of care and/or assistance with difficult decision-making  Guidance in regards to resources and transition(s)    Members of PC team contributing to this consultation are :  Dr. Margy Pizarro and Dr. Brendan Frankel:  Met with patient RN Teo. We got a update about patient. Chart and hospital course reviewed. Patient is still requiring pressor support and she is AAO X1. Dr. Margy Pizarro and myself visited room. Patient is sitting comfortably on recliner watching TV. Daughter Melita Hastings at bedside. We introduced ourselves and explained our roles that we are extra layer of support. Daughter Melita Hastings reported that Alex Trotter lives with her and she is  when she Ashley Keily) was 13years old. 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 Alex Trotter has 4 biological childern 2 daughters and 2 sons. Code status clarified with her. She wants to continue with [facility] with no intubation and CPR. She also mentioned that they are going to move to [place] on 27 dec and her mom will go to [place] with her other sister for few months. She wants to know  about further health plan/care for he patient. We informed her that we will let  know about the arrangement. We updated the RN and MADHURI Roberts about her [place] plan. We will continue to provide comfort and support to the patient and the family.     Education/support to staff  Education/support to family  Education/support to patient  Communications with primary service  Providing support for coping/adaptation/distress of family  Providing support for coping/adaptation/distress of patient  Discussing meaning/purpose   Code status clarified: [facility]  Validating patient/family distress  Continued communication updates  Recognizing, reflecting, and empathizing with family members' anticipatory grief      History of Present Illness     The patient is a 76 y.o. Non- / non  female who presents with Nausea      Referred to Palliative Care by   [x] Physician   [] Nursing  [] Family Request   [] Other:       She was admitted to the critical care service for GI bleed [K92.2]  Septic shock ([facility] [A41.9, R65.21]  Gastrointestinal hemorrhage, unspecified gastrointestinal hemorrhage type [K92.2]. Her hospital course has been associated with Pyelonephritis. The patient has a complicated medical history and has been hospitalized since 12/19/2022  1:52 PM. 76 y.o. woman w/hx of parkinsons, seizures and recurrent kidney stone and urine infection w/VRE and ESBL who was admitted to medicine on 12-19 for concern for GI bleed after a urologic procedure earlier in the day. The patient underwent cystoscopy and urologic stenting due to obstructive stone and was discharged. Developed coffee ground emesis at home. She was admitted for GI assessment and while boarding in the ER developed worsening hypotension despite fluid resuscitation, remained febrile and tachycardic. Sepsis w/u initiated. CT PE was negative. CT AP showed no bleeding in the abdomen. Hypotension suspected to be secondary to sepsis rather than GI bleed. She required pressors and was transferred to MICU.   Palliative care consulted for goals of care     Active Hospital Problems    Diagnosis Date Noted    GI bleed [K92.2] 12/19/2022     Priority: Medium    Pyelonephritis [N12] 12/19/2022     Priority: Medium    Septic shock ([facility] [A41.9, R65.21] 12/19/2022     Priority: Medium    Ureteral stent present [Z96.0] 11/29/2022     Priority: Medium       PAST MEDICAL HISTORY      Diagnosis Date    Anxiety     Convulsion ([facility]     Dementia ([facility]     Encephalopathy     Herpes     Hx of blood clots     Hyperlipidemia     Left bundle branch block     MRSA (methicillin resistant Staphylococcus aureus)     Parkinson's disease ([facility] [facility]     family states tremors not parkinson    Seizures ([facility] [facility]     secondary to encephalitis    Under care of service provider 12/15/2022    bup-Ibdnx-[address]-last visit dec 2022    Vitamin D deficiency        PAST SURGICAL HISTORY  Past Surgical History:   Procedure Laterality Date    ABSCESS DRAINAGE Right 2013    WOUND EXPLORATION AND I+D  RIGHT HIP    CYSTOSCOPY Left 2022    CYSTOSCOPY [facility]TERAL STENT [facility]  Left 2022    ATTEMPTED CYSTOSCOPY WITH LEFT STENT EXCHANGE performed by Any Lake MD at [address] [place] [address] ([facility])      LITHOTRIPSY Left 2022    ESWL EXTRACORPOREAL SHOCK WAVE LITHOTRIPSY performed by Any Lake MD at . [facility]      dtr states hemorrhoid surgery but unsure what type    SPLENECTOMY      at age 10 after [address] Left 2022    Cystoscopy    URETER SURGERY Left 2022    HOLMIUM LASER CYSTOSCOPY,URETEROSCOPY, STENT EXCHANGE performed by Any Lake MD at [place] [address]    URETEROSCOPY Left 2022    stent       SOCIAL HISTORY  Social History     Tobacco Use    Smoking status: Former     Packs/day: 1.00     Years: 30.00     Pack years: 30.00     Types: Cigarettes     Start date: 54     Quit date:      Years since quittin.9    Smokeless tobacco: Never    Tobacco comments:     unknown how many packs a day, or how many years   Vaping Use    Vaping Use: Never used   Substance Use Topics    Alcohol use: No    Drug use: No       ALLERGIES  Allergies   Allergen Reactions    Adhesive Tape Other (See Comments)     Pulls skin off    Haldol [Haloperidol Lactate] Other (See Comments) hallucinates         MEDICATIONS  Current Medications    meropenem  1,000 mg IntraVENous Q12H    sodium chloride flush  5-40 mL IntraVENous 2 times per day    carBAMazepine  300 mg Oral Daily    carBAMazepine  400 mg Oral Nightly     sodium chloride flush, sodium chloride, ondansetron **OR** ondansetron, polyethylene glycol, acetaminophen **OR** acetaminophen  IV Drips/Infusions   pantoprazole 8 mg/hr (12/21/22 0803)    norepinephrine 5 mcg/min (12/21/22 0803)    sodium chloride 10 mL/hr at 12/21/22 0803    sodium chloride 125 mL/hr at 12/21/22 0803     Home Medications  No current facility-administered medications on file prior to encounter. Current Outpatient Medications on File Prior to Encounter   Medication Sig Dispense Refill    HYDROcodone-acetaminophen (NORCO) 5-325 MG per tablet Take 1 tablet by mouth every 8 hours as needed for Pain for up to 3 days. Intended supply: 3 days.  Take lowest dose possible to manage pain 9 tablet 0    tamsulosin (FLOMAX) 0.4 MG capsule Take 1 capsule by mouth daily 30 capsule 0    Hyoscyamine Sulfate SL (LEVSIN/SL) 0.125 MG SUBL Place 30 mg under the tongue every 6 hours as needed (bladder spasms/cramps) 30 each 0    linezolid (ZYVOX) 600 MG tablet Take 1 tablet by mouth 2 times daily for 7 days 14 tablet 0    desmopressin (DDAVP) 0.2 MG tablet Take 0.6 mg by mouth daily Take 3 tablets every pm      carBAMazepine (TEGRETOL) 200 MG tablet Take 300 mg by mouth daily Takes along with 400 mg at hs      potassium chloride (KLOR-CON M) 20 MEQ extended release tablet TAKE 1 TABLET BY MOUTH EVERY DAY      fludrocortisone (FLORINEF) 0.1 MG tablet TAKE 1 TABLET BY MOUTH EVERY DAY (Patient taking differently: Take 0.1 mg by mouth daily) 90 tablet 0    carBAMazepine (TEGRETOL) 200 MG tablet 1.5 tablets po q am and 2 po q pm (Patient taking differently: 400 mg 2 po q pm) 120 tablet 11    FLUoxetine (PROZAC) 20 MG capsule 2 po qd (Patient taking differently: Take 40 mg by mouth daily) 180 capsule 3    mirabegron (MYRBETRIQ) 50 MG TB24 Take 50 mg by mouth daily 90 tablet 3    melatonin 5 MG TABS tablet Take 1 tablet by mouth nightly as needed (sleep) (Patient not taking: Reported on 12/15/2022) 10 tablet 0    aspirin 81 MG EC tablet Take 81 mg by mouth daily         Data         BP (!) 105/45   Pulse 79   Temp 98.4 °F (36.9 °C) (Oral)   Resp 17   Ht 5' 7\" (1.702 m)   Wt 132 lb 11.5 oz (60.2 kg)   SpO2 96%   BMI 20.79 kg/m²     Wt Readings from Last 3 Encounters:   22 132 lb 11.5 oz (60.2 kg)   22 130 lb (59 kg)   12/10/22 126 lb (57.2 kg)        Code Status: DNR-CCA     ADVANCED CARE PLANNING:  Patient has capacity for medical decisions: no  Health Care Power of : no  Living Will: no     Personal, Social, and Family History  Marital Status:   Living situation: with family:  daughter  Importance of queta/Yarsani/spiritual beliefs: [] Very [x] Somewhat [] Not   Psychological Distress: no  Does patient understand diagnosis/treatment? No   Does caregiver understand diagnosis/treatment?  yes    Past Medical History:   Diagnosis Date    Anxiety     Convulsion ([address]     Dementia ([address]     Encephalopathy     Herpes     Hx of blood clots     Hyperlipidemia     Left bundle branch block     MRSA (methicillin resistant Staphylococcus aureus)     Parkinson's disease ([address]     family states tremors not parkinson    Seizures ([facility]     secondary to encephalitis    Under care of service provider 12/15/2022    wgz-[address]-last visit dec 2022    Vitamin D deficiency          Family History   Problem Relation Age of Onset    Asthma Mother     No Known Problems Father        Social History     Tobacco Use    Smoking status: Former     Packs/day: 1.00     Years: 30.00     Pack years: 30.00     Types: Cigarettes     Start date:      Quit date:      Years since quittin.9    Smokeless tobacco: Never    Tobacco comments:     unknown how many packs a day, or Problem/Diagnosis:  Pyelonephritis    Principal Problem:    Pyelonephritis  Active Problems:    Ureteral stent present    GI bleed    Septic shock (HCC)    1- Symptom management/ pain control     Pain Assessment:  Pain is controlled with current analgesics. Medication(s) being used: acetaminophen. Anxiety:  none                          Dyspnea:  none                          Fatigue:  none     We feel the patient symptoms are being controlled. her current regimen is reviewed by myself and discussed with the staff. 2- Goals of care evaluation   The patient goals of care are improve or maintain function/quality of life, provide comfort care/support/palliation/relieve suffering, strengthening relationships, preserve independence/autonomy/control, and support for family/caregiver   Goals of care discussed with:    [] Patient independently    [] Patient and Family    [x] Family or Healthcare DPOA independently    [] Unable to discuss with patient, family/DPOA not present    3- Code Status  DNR-CCA    4- Other recommendations   - We will continue to provide comfort and support to the patient and the family    Please call with any palliative questions or concerns. Palliative Care Team is available via perfect serve or via phone. Palliative Care will continue to follow Ms. Morrison's care as needed. Thank you for allowing Palliative Care to participate in the care of Ms. Ajith Douglas . This note has been dictated by dragon, typing errors may be a possibility.     The total time I spent in seeing the patient, discussing goals of care, advanced directives, code status, greater than 50% time in counseling and other major issues was more than 70  minutes      Electronically signed by   Alvin Hanson MD  Palliative Care Team  on 12/21/2022 at 10:16 AM    Palliative care office: [phone]:    I have discussed the care of Teddy Singh, including pertinent history and exam findings, with the resident/fellow/NP. I have seen and examined the patient and the key elements of all parts of the encounter have been performed by me. I agree with the assessment, plan and orders as documented by the fellow/resident/NP, after I modified the exam findings and the plan of treatments and the final version is my approved version of the assessment. Additional Comments:    The patient was seen today along with resident Dr. Dane Lee  Patient was sitting comfortably in the recliner and patient's daughter Eloina Mondragon was also present  We introduced ourselves and explained the role of palliative care  Nan Barbosa told that patient is , has 4 biological children and lives with her  Nan Barbosa explained that patient has dementia since age 38 after encephalitis  Nan Barbosa said that she and family along with patient are moving to [place] but initially for few months patient will be going to [place] to live with her other daughter  Nan Barbosa wanted information regarding health care for the patient and we explained that  can help her with that  We discussed patient's current medical conditions with Nan Barbosa in detail  We explained the different types of codes  We offered comfort and emotional support to the patient and family    We met with patient's RN and MADHURI Gale and updated them regarding patient's [place] plans and also told MADHURI Gale that family wanted information regarding patient's health care at [place]    We will continue to provide comfort and support to the patient and family        Electronically signed by Selma Brennan MD on 12/21/2022 at 1:58 PM

## 2022-12-21 NOTE — PROGRESS NOTES
Physical Therapy  Facility/Department: [facility] 3- MICU  Physical Therapy Initial Assessment    Name: Hannah Ledbetter  : 1945  MRN: [id number]  Date of Service: 2022  History copied and pasted from H+P:  Presented to ER with complaints of fever, nausea, coffee-ground emesis. Patient had left ureteral stent exchange this morning at [facility]. [facility] went back home and as per the ED notes after going back home patient had a bite of applesauce and then had coffee-ground emesis of 4 episodes. Patient has baseline dementia and Parkinson's disease. Patient is awake alert but disoriented. When asked about what happened this morning or why she here she says she does not remember anything and she do not know anything. From the chart review looks like this is her baseline. Daughter was not at bedside on my evaluation so all the history was obtained from chart review. Patient is not on any anticoagulation but does take aspirin which was stopped for the procedure. No history of GI bleed in the past.  On my evaluation patient did not complain of any chest pain or shortness of breath, abdominal pain. No active bleeding episodes in the ED. Patient was comfortable speaking in full sentences. Initially on arrival to the ED patient was tachycardic mildly hypoxic and normotensive. Sepsis work-up was initiated due to fever and tachycardia. Patient did receive fluids as per sepsis protocol. Patient eventually got hypotensive did not respond to fluids. Both GI and urology were contacted and were updated about the patient. Decision was made to start on pressors in the ED. CT PE was done to rule out PE which showed interval improvement of left-sided hydronephrosis, nonobstructing bilateral renal calculi, no PE, right basilar opacity. CT abdomen pelvis was done to rule out any bleeding which was negative. Admitted to ICU for the management of shock secondary to sepsis/hemorrhagic.   Discharge Recommendations:  No further therapy required at discharge. PT Equipment Recommendations  Equipment Needed: No      Patient Diagnosis(es): The primary encounter diagnosis was Septic shock ([address] A diagnosis of Gastrointestinal hemorrhage, unspecified gastrointestinal hemorrhage type was also pertinent to this visit. Past Medical History:  has a past medical history of Anxiety, Convulsion ([address] Dementia ([address] Encephalopathy, Herpes, Hx of blood clots, Hyperlipidemia, Left bundle branch block, MRSA (methicillin resistant Staphylococcus aureus), Parkinson's disease ([address] Seizures ([address] Under care of service provider, and Vitamin D deficiency. Past Surgical History:  has a past surgical history that includes Abscess Drainage (Right, 12/14/2013); Ureter stent placement (Left, 05/16/2022); Cystoscopy (Left, 05/16/2022); Cystoscopy (Left, 12/02/2022); Hysterectomy; Splenectomy; other surgical history; Ureteroscopy (Left, 12/19/2022); Ureter surgery (Left, 12/19/2022); and Lithotripsy (Left, 12/19/2022). Assessment   Pt very pleasantly confused, stated \"My mind doesn't remember and it's kind of scary\"--reassurance given. Pt able to get OOB and up to chair with min A+1 which dtr states is fairly normal for her. She was hypotensive (see readings below) but denied any symptoms of dizziness or light headedness--denied feeling like she might \"pass out\". Per dtr (care giver) she and her family are going to [place], and her mom is going to stay with another dtr in [place] when dc'd from the hospital.    Body Structures, Functions, Activity Limitations Requiring Skilled Therapeutic Intervention: Decreased functional mobility ; Decreased strength;Decreased safe awareness;Decreased cognition;Decreased balance  Therapy Prognosis: Fair  Decision Making: Medium Complexity  Barriers to Learning: cognition/dementia  Requires PT Follow-Up: Yes  Activity Tolerance  Activity Tolerance: Patient tolerated treatment well (hypotensive but denied any symptoms; dtr present after pt up to chair, states she \"always\" has low BP)     Plan   Physcial Therapy Plan  General Plan:  (5 visits weekly)  Current Treatment Recommendations: Strengthening, ROM, Balance training, Functional mobility training, Transfer training, Endurance training, Gait training, Stair training, Cognitive reorientation, Safety education & training, Patient/Caregiver education & training, Positioning, Therapeutic activities  Safety Devices  Type of Devices: Call light within reach, Chair alarm in place, Gait belt, Patient at risk for falls, Left in chair, Nurse notified (dtr present and sitting with pt at end of PT session)  Restraints  Restraints Initially in Place: No     Restrictions  Restrictions/Precautions  Restrictions/Precautions: Fall Risk, General Precautions, Up as Tolerated  Required Braces or Orthoses?: No     Subjective   General  Patient assessed for rehabilitation services?: Yes  Response To Previous Treatment: Not applicable  Family / Caregiver Present: Yes (dtr)  Follows Commands: Within Functional Limits (needs occasional visual/tactile cues)  Subjective  Subjective: denies pain         Social/Functional History  Social/Functional History  Lives With: Family, Daughter (dtr, son-in-law and grandson, but will be going to stay with her dtr in [place] when she's dc'd from the hospital per her caregiver dtr)  Type of Home: House  Home Layout: One level  Home Access: Stairs to enter without rails  Entrance Stairs - Number of Steps: 1  Receives Help From: Family  ADL Assistance: Needs assistance  Ambulation Assistance: Needs assistance (per dtr, pt ambulates w/o device, but always has someone walking with her)  Transfer Assistance: Needs assistance (SBA)  Active : No  Patient's  Info: daughter transports  Occupation: On disability  Vision/Hearing  Vision  Vision: Impaired  Vision Exceptions: Wears glasses at all times  Hearing  Hearing: Within functional limits Cognition   Orientation  Overall Orientation Status: Impaired  Orientation Level: Oriented to person;Disoriented to place; Disoriented to time;Disoriented to situation (pt has dementia)  Cognition  Overall Cognitive Status: Exceptions  Arousal/Alertness: Delayed responses to stimuli  Following Commands: Follows multistep commands with increased time (occasionally requires visual and tactile cues for understanding)  Attention Span: Attends with cues to redirect  Memory: Decreased recall of biographical Information;Decreased recall of recent events;Decreased short term memory;Decreased long term memory  Safety Judgement: Decreased awareness of need for safety  Insights: Decreased awareness of deficits  Initiation: Requires cues for some  Sequencing: Requires cues for some     Objective   Heart Rate: 79  Heart Rate Source: Monitor  BP: 89/61 (102/60 initially)  BP Location: Left upper arm  BP Method: Automatic  Patient Position: Semi fowlers  MAP (Calculated): 70  Resp: 17  SpO2: 96 %  O2 Device: None (Room air)     Observation/Palpation  Posture: Good        PROM RLE (degrees)  RLE PROM: WFL  PROM LLE (degrees)  LLE PROM: WFL  PROM RUE (degrees)  RUE PROM: WFL  PROM LUE (degrees)  LUE PROM: WFL  Strength RLE  Strength RLE: WFL  Strength LLE  Strength LLE: WFL  Strength RUE  Strength RUE: WFL  Strength LUE  Strength LUE: Exception-shoulder elevation 1/5; elbow distal WFL           Bed mobility  Rolling to Left: Minimal assistance  Supine to Sit: Minimal assistance  Scooting: Minimal assistance  Transfers  Sit to Stand: Minimal Assistance; Moderate Assistance (min A from bed; mod A from lower chair)  Stand to Sit: Minimal Assistance  Bed to Chair: Minimal assistance  Stand Pivot Transfers: Minimal Assistance  Ambulation  Surface: Level tile  Device: No Device  Assistance: Minimal assistance  Gait Deviations: Slow Anju  Distance: 2' d/t hypotensive, still on levo (per RN, levo likely to be dc'd today.   Dtr states Carmen Wick carries a low BP\")  Stairs/Curb  Stairs?: No     Balance  Posture: Good  Sitting - Static: Good  Sitting - Dynamic: Good  Standing - Static: Fair  Standing - Dynamic: Fair  A/AROM Exercises: AROM x 10, occasional visual and tactile cues to complete; REINAOM L shoulder d/t weakness  Static Sitting Balance Exercises: dangle EOB ~8 minutes with SBA+1     AM-PAC Score  AM-PAC Inpatient Mobility Raw Score : 17 (12/21/22 1318)  AM-PAC Inpatient T-Scale Score : 42.13 (12/21/22 1318)  Mobility Inpatient CMS 0-100% Score: 50.57 (12/21/22 1318)  Mobility Inpatient CMS G-Code Modifier : CK (12/21/22 1318)            Goals  Short Term Goals  Time Frame for Short Term Goals: 12 visits  Short Term Goal 1: prevent loss of strength, mobility and independence while pt is in the hospital  Short Term Goal 2: bed mob with CG+1  Short Term Goal 3: transfers with CG+1  Short Term Goal 4: gait w/o device x 50' with min A+1  Short Term Goal 5: stair ambulation x 4 steps with 1 HR and min  A+1  Patient Goals   Patient Goals : pt didn't state goal       Education  Patient Education  Education Given To: Patient; Family  Education Provided: Role of Therapy;Plan of Care  Education Method: Verbal  Barriers to Learning: Cognition  Education Outcome: Continued education needed      Therapy Time   Individual Concurrent Group Co-treatment   Time In 1103         Time Out 1145         Minutes 42         Timed Code Treatment Minutes: [address], PT

## 2022-12-21 NOTE — PROGRESS NOTES
Joanna Perez ...... 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  Parkinson's disease  Severe vascular dementia  Left femoral vein DVT  Emesis with coffee grounds 12-19-22 with hypotension and hemoglobin drop    Discussion / summary of stay / plan of care       Recommendations   Wait for urine culture from 12-19-22 - no growth to date  Continue meropenem for now  Supportive care    Infection Control Recommendations   Ollie Precautions  Contact Isolation ESBL E coli, VRE    Antimicrobial Stewardship Recommendations   Simplification of therapy    Coordination of Outpatient Care:   Estimated Length of IV antimicrobials: TBD  Patient will need Midline Catheter Insertion: No  Patient will need PICC line Insertion:No  Patient will need: Home IV , [place],  SNF,  LTAC: TBD  Patient will need outpatient wound care:No      History of Present Illness:   Darling Santo is a 76y.o.-year-old  female who was initially admitted on 12/19/2022. Patient seen at the request of Lieutenant Burt. INITIAL HISTORY:  Patient with a history of renal stones. She had a left ureteral stent placed on 5/16/2022 because of hydronephrosis. The stent was replaced on 12/19/2022. The patient did well with the procedure but returned to the hospital later in the day after developing coffee-ground emesis x 4 episodes. She developed associated hypotension requiring vasopressors.   Question was raised as to whether she had hypotension on the basis of GI bleeding versus of the presence of a urinary tract infection. She has a past history of having UTIs. She had E coli ESBL+ UTI on 10-24-22 and VRE E faecium  UTI on 22. Your urine culture from 2022 is in progress. 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 Additional problems include Parkinson's disease, Severe vascular dementia and Left femoral vein DVT      Interval changes  2022   Afebrile  VS stable, SpO2 97% RA  Patient stable, sitting up in chair, no complaints  WBC trending down, 27.3 today from 35.9    Patient Vitals for the past 8 hrs:   BP Temp Temp src   22 1302 89/61 -- --   22 1200 -- 97.3 °F (36.3 °C) Oral       Summary of relevant labs:  Labs:  BUN: 20-->16  Cr: 1.27-->1.02     WBC: 13.1--35.9-27.3  Hb: 12.7 --10.8-11.1  Plat: 395--267    Micro:  Urine:  2022: no growth to date  Blood:  2022: No growth to date  Sputum :     Wound:     MRSA probe negative    Imagin/19 CT CHEST PULMONARY EMBOLISM W CONTRAST   1. Interval improvement in left hydronephrosis. Left ureteral stent is noted with persistent mild-to-moderate hydronephrosis and perinephric stranding. There is urothelial thickening and enhancement of the left renal pelvis and proximal ureter suggestive of superimposed infection or inflammation. 2. Nonobstructing bilateral renal calculi, as well as a 3 mm mid left ureteral calculus, and bladder calculi. 3. No pulmonary embolus. 4. Slightly increasing right basilar opacity either reflecting atelectasis versus pneumonia. Given patulous esophagus with an air-fluid level, aspiration can be a consideration. 5. Mild nonspecific periportal edema.  CXR  Increasing interstitial and bibasilar opacities with probable small left pleural effusion. Findings may reflect edema in the appropriate clinical setting versus pneumonia.       I have personally reviewed the past medical history, past surgical history, medications, social history, and family history, and I have updated the database accordingly. I have personally reviewed the past medical history, past surgical history, medications, social history, and family history, and I haveupdated the database accordingly. Allergies:   Adhesive tape and Haldol [haloperidol lactate]     Review of Systems:     Review of Systems   Constitutional: Negative. Negative for chills and fever. HENT: Negative. Eyes: Negative. Respiratory:  Negative for shortness of breath. Cardiovascular: Negative. Negative for chest pain. Gastrointestinal: Negative. Genitourinary: Negative. Musculoskeletal: Negative. Neurological: Negative. Psychiatric/Behavioral: Negative. Physical Examination :       Physical Exam  Vitals and nursing note reviewed. Constitutional:       General: She is not in acute distress. Appearance: Normal appearance. She is not ill-appearing. HENT:      Head: Normocephalic and atraumatic. Right Ear: External ear normal.      Left Ear: External ear normal.      Mouth/Throat:      Mouth: Mucous membranes are moist.      Pharynx: Oropharynx is clear. Eyes:      Conjunctiva/sclera: Conjunctivae normal.   Cardiovascular:      Rate and Rhythm: Normal rate and regular rhythm. Heart sounds: Normal heart sounds. Pulmonary:      Effort: Pulmonary effort is normal. No respiratory distress. Abdominal:      General: There is no distension. Palpations: Abdomen is soft. Tenderness: There is no abdominal tenderness. Genitourinary:     Comments: No sood  Musculoskeletal:      Cervical back: Normal range of motion. No rigidity. Right lower leg: No edema. Left lower leg: No edema. Skin:     General: Skin is warm and dry. Neurological:      Mental Status: She is alert. Comments: Oriented to person   Psychiatric:         Behavior: Behavior normal. Behavior is cooperative.        Past Medical History:     Past Medical History:   Diagnosis Date    Anxiety     Convulsion ([facility]     Dementia ([facility]     Encephalopathy     Herpes     Hx of blood clots     Hyperlipidemia     Left bundle branch block     MRSA (methicillin resistant Staphylococcus aureus)     Parkinson's disease ([address]     family states tremors not parkinson    Seizures ([facility]     secondary to encephalitis    Under care of service provider 12/15/2022    [id number]-last visit dec 2022    Vitamin D deficiency        Past Surgical  History:     Past Surgical History:   Procedure Laterality Date    ABSCESS DRAINAGE Right 12/14/2013    WOUND EXPLORATION AND I+D  RIGHT HIP    CYSTOSCOPY Left 05/16/2022    CYSTOSCOPY URETERAL STENT INSERTION performed by Edilberto Gómez MD at [place] [address] Left 12/02/2022    ATTEMPTED CYSTOSCOPY WITH LEFT STENT EXCHANGE performed by Edilberto Gómez MD at [facility] ([address] [place] [address])      LITHOTRIPSY Left 12/19/2022    ESWL EXTRACORPOREAL SHOCK WAVE LITHOTRIPSY performed by Edilberto Gómez MD at [address] [place] [address]      dtr states hemorrhoid surgery but unsure what type    SPLENECTOMY      at age 10 after [address] Left 05/16/2022    Cystoscopy    URETER SURGERY Left 12/19/2022    HOLMIUM LASER CYSTOSCOPY,URETEROSCOPY, STENT EXCHANGE performed by Edilberto Gómez MD at [place] [address]    URETEROSCOPY Left 12/19/2022    stent       Medications:      midodrine  5 mg Oral TID WC    fludrocortisone  100 mcg Oral Daily    meropenem  1,000 mg IntraVENous Q12H    sodium chloride flush  5-40 mL IntraVENous 2 times per day    carBAMazepine  300 mg Oral Daily    carBAMazepine  400 mg Oral Nightly       Social History:     Social History     Socioeconomic History    Marital status: Single     Spouse name: Not on file    Number of children: Not on file    Years of education: Not on file    Highest education level: Not on file   Occupational History    Not on file   Tobacco Use    Smoking status: Former     Packs/day: 1.00     Years: 30.00     Pack years: 30.00     Types: Cigarettes     Start date: 54     Quit date:      Years since quittin.9    Smokeless tobacco: Never    Tobacco comments:     unknown how many packs a day, or how many years   Vaping Use    Vaping Use: Never used   Substance and Sexual Activity    Alcohol use: No    Drug use: No    Sexual activity: Not Currently   Other Topics Concern    Not on file   Social History Narrative    Not on file     Social Determinants of Health     Financial Resource Strain: Low Risk     Difficulty of Paying Living Expenses: Not hard at all   Food Insecurity: No Food Insecurity    Worried About Running Out of Food in the Last Year: Never true    Ran Out of Food in the Last Year: Never true   Transportation Needs: Not on file   Physical Activity: Not on file   Stress: Not on file   Social Connections: Not on file   Intimate Partner Violence: Not on file   Housing Stability: Not on file       Family History:     Family History   Problem Relation Age of Onset    Asthma Mother     No Known Problems Father       Medical Decision Making:   I have independently reviewed/ordered the following labs:    CBC with Differential:   Recent Labs     22  0758 22  2203 22  0428 22  1322   WBC 35.9*  --  27.3*  --    HGB 10.8*   < > 9.6* 11.1*   HCT 32.5*   < > 32.7* 35.3*   PLT See Reflexed IPF Result  --  See Reflexed IPF Result  --    LYMPHOPCT 5*  --  7*  --    MONOPCT 9*  --  8*  --     < > = values in this interval not displayed.      BMP:  Recent Labs     22  0758 22  1221 22  0428     --  135   K 4.0  --  3.5*     --  108*   CO2 19*  --  14*   BUN 16  --  13   CREATININE 1.02*  --  0.76   MG  --  1.9 1.8     Hepatic Function Panel:   Recent Labs     22  1430   PROT 7.3   LABALBU 3.3*   BILIDIR 0.2   IBILI 0.1   BILITOT 0.3   ALKPHOS 362*   ALT 13   AST 26     No results for input(s): RPR in the last 72 hours. No results for input(s): HIV in the last 72 hours. No results for input(s): BC in the last 72 hours. Lab Results   Component Value Date/Time    CREATININE 0.76 12/21/2022 04:28 AM    GLUCOSE 98 12/21/2022 04:28 AM    GLUCOSE 93 03/12/2012 03:18 PM       Detailed results: Thank you for allowing us to participate in the care of this patient. Please call with questions. This note is created with the assistance of a speech recognition program.  While intending to generate adocument that actually reflects the content of the visit, the document can still have some errors including those of syntax and sound a like substitutions which may escape proof reading. It such instances, actual meaningcan be extrapolated by contextual diversion. Oleksandr Manrique APRN - CNP  Office: [phone]  Perfect serve / office [phone]    ATTESTATION:    I have discussed the case, including pertinent history and exam findings with the APRN. I have evaluated the  History, physical findings and pictures of the patient and the key elements of the encounter have been performed by me. I have reviewed the laboratory data, other diagnostic studies and discussed them with the APRN. I have updated the medical record where necessary. I agree with the assessment, plan and orders as documented by the APRN.     Jerod Rivers MD.

## 2022-12-22 VITALS
DIASTOLIC BLOOD PRESSURE: 59 MMHG | RESPIRATION RATE: 18 BRPM | SYSTOLIC BLOOD PRESSURE: 120 MMHG | OXYGEN SATURATION: 95 % | WEIGHT: 137.35 LBS | HEART RATE: 86 BPM | HEIGHT: 67 IN | BODY MASS INDEX: 21.56 KG/M2 | TEMPERATURE: 98.4 F

## 2022-12-22 PROBLEM — R65.21 SEPTIC SHOCK (HCC): Status: RESOLVED | Noted: 2022-12-19 | Resolved: 2022-12-22

## 2022-12-22 PROBLEM — A41.9 SEPTIC SHOCK (HCC): Status: RESOLVED | Noted: 2022-12-19 | Resolved: 2022-12-22

## 2022-12-22 PROBLEM — R57.9 SHOCK (HCC): Status: RESOLVED | Noted: 2022-12-21 | Resolved: 2022-12-22

## 2022-12-22 LAB
ABSOLUTE EOS #: 0.73 K/UL (ref 0–0.44)
ABSOLUTE IMMATURE GRANULOCYTE: 0.11 K/UL (ref 0–0.3)
ABSOLUTE LYMPH #: 2.11 K/UL (ref 1.1–3.7)
ABSOLUTE MONO #: 1.36 K/UL (ref 0.1–1.2)
ANION GAP SERPL CALCULATED.3IONS-SCNC: 8 MMOL/L (ref 9–17)
BASOPHILS # BLD: 1 % (ref 0–2)
BASOPHILS ABSOLUTE: 0.08 K/UL (ref 0–0.2)
BUN BLDV-MCNC: 17 MG/DL (ref 8–23)
CALCIUM SERPL-MCNC: 7.3 MG/DL (ref 8.6–10.4)
CHLORIDE BLD-SCNC: 112 MMOL/L (ref 98–107)
CO2: 18 MMOL/L (ref 20–31)
CREAT SERPL-MCNC: 0.66 MG/DL (ref 0.5–0.9)
EOSINOPHILS RELATIVE PERCENT: 6 % (ref 1–4)
GFR SERPL CREATININE-BSD FRML MDRD: >60 ML/MIN/1.73M2
GLUCOSE BLD-MCNC: 110 MG/DL (ref 70–99)
HCT VFR BLD CALC: 28.3 % (ref 36.3–47.1)
HCT VFR BLD CALC: 31 % (ref 36.3–47.1)
HEMOGLOBIN: 10 G/DL (ref 11.9–15.1)
HEMOGLOBIN: 8.8 G/DL (ref 11.9–15.1)
IMMATURE GRANULOCYTES: 1 %
LYMPHOCYTES # BLD: 16 % (ref 24–43)
MAGNESIUM: 2 MG/DL (ref 1.6–2.6)
MCH RBC QN AUTO: 31.9 PG (ref 25.2–33.5)
MCHC RBC AUTO-ENTMCNC: 31.1 G/DL (ref 28.4–34.8)
MCV RBC AUTO: 102.5 FL (ref 82.6–102.9)
MONOCYTES # BLD: 10 % (ref 3–12)
NRBC AUTOMATED: 0 PER 100 WBC
PDW BLD-RTO: 15.9 % (ref 11.8–14.4)
PLATELET # BLD: 216 K/UL (ref 138–453)
PMV BLD AUTO: 9.6 FL (ref 8.1–13.5)
POTASSIUM SERPL-SCNC: 3.4 MMOL/L (ref 3.7–5.3)
RBC # BLD: 2.76 M/UL (ref 3.95–5.11)
RBC # BLD: ABNORMAL 10*6/UL
SEG NEUTROPHILS: 67 % (ref 36–65)
SEGMENTED NEUTROPHILS ABSOLUTE COUNT: 8.81 K/UL (ref 1.5–8.1)
SODIUM BLD-SCNC: 138 MMOL/L (ref 135–144)
WBC # BLD: 13.2 K/UL (ref 3.5–11.3)

## 2022-12-22 PROCEDURE — 99291 CRITICAL CARE FIRST HOUR: CPT | Performed by: INTERNAL MEDICINE

## 2022-12-22 PROCEDURE — 6370000000 HC RX 637 (ALT 250 FOR IP)

## 2022-12-22 PROCEDURE — 2580000003 HC RX 258: Performed by: INTERNAL MEDICINE

## 2022-12-22 PROCEDURE — 36415 COLL VENOUS BLD VENIPUNCTURE: CPT

## 2022-12-22 PROCEDURE — 85014 HEMATOCRIT: CPT

## 2022-12-22 PROCEDURE — 99232 SBSQ HOSP IP/OBS MODERATE 35: CPT | Performed by: INTERNAL MEDICINE

## 2022-12-22 PROCEDURE — C9113 INJ PANTOPRAZOLE SODIUM, VIA: HCPCS | Performed by: INTERNAL MEDICINE

## 2022-12-22 PROCEDURE — 85025 COMPLETE CBC W/AUTO DIFF WBC: CPT

## 2022-12-22 PROCEDURE — 2580000003 HC RX 258: Performed by: STUDENT IN AN ORGANIZED HEALTH CARE EDUCATION/TRAINING PROGRAM

## 2022-12-22 PROCEDURE — 6360000002 HC RX W HCPCS: Performed by: INTERNAL MEDICINE

## 2022-12-22 PROCEDURE — 6360000002 HC RX W HCPCS: Performed by: STUDENT IN AN ORGANIZED HEALTH CARE EDUCATION/TRAINING PROGRAM

## 2022-12-22 PROCEDURE — 83735 ASSAY OF MAGNESIUM: CPT

## 2022-12-22 PROCEDURE — 2580000003 HC RX 258

## 2022-12-22 PROCEDURE — 99239 HOSP IP/OBS DSCHRG MGMT >30: CPT | Performed by: INTERNAL MEDICINE

## 2022-12-22 PROCEDURE — 6370000000 HC RX 637 (ALT 250 FOR IP): Performed by: STUDENT IN AN ORGANIZED HEALTH CARE EDUCATION/TRAINING PROGRAM

## 2022-12-22 PROCEDURE — 85018 HEMOGLOBIN: CPT

## 2022-12-22 PROCEDURE — 80048 BASIC METABOLIC PNL TOTAL CA: CPT

## 2022-12-22 RX ORDER — MIDODRINE HYDROCHLORIDE 10 MG/1
10 TABLET ORAL
Qty: 90 TABLET | Refills: 1 | Status: SHIPPED | OUTPATIENT
Start: 2022-12-22

## 2022-12-22 RX ORDER — CARBAMAZEPINE 200 MG/1
400 TABLET ORAL NIGHTLY
Qty: 90 TABLET | Refills: 0 | Status: SHIPPED | OUTPATIENT
Start: 2022-12-22

## 2022-12-22 RX ORDER — MIDODRINE HYDROCHLORIDE 5 MG/1
10 TABLET ORAL
Status: DISCONTINUED | OUTPATIENT
Start: 2022-12-22 | End: 2022-12-22 | Stop reason: HOSPADM

## 2022-12-22 RX ORDER — CARBAMAZEPINE 200 MG/1
400 TABLET ORAL NIGHTLY
Qty: 90 TABLET | Refills: 1 | Status: SHIPPED | OUTPATIENT
Start: 2022-12-22 | End: 2022-12-22 | Stop reason: SDUPTHER

## 2022-12-22 RX ORDER — PANTOPRAZOLE SODIUM 40 MG/1
40 TABLET, DELAYED RELEASE ORAL DAILY
Qty: 60 TABLET | Refills: 0 | Status: SHIPPED | OUTPATIENT
Start: 2022-12-22

## 2022-12-22 RX ORDER — CARBAMAZEPINE 200 MG/1
300 TABLET ORAL DAILY
Qty: 90 TABLET | Refills: 1 | Status: SHIPPED | OUTPATIENT
Start: 2022-12-23 | End: 2022-12-22 | Stop reason: SDUPTHER

## 2022-12-22 RX ORDER — CARBAMAZEPINE 200 MG/1
300 TABLET ORAL DAILY
Qty: 90 TABLET | Refills: 0 | Status: SHIPPED | OUTPATIENT
Start: 2022-12-23

## 2022-12-22 RX ADMIN — CARBAMAZEPINE 300 MG: 200 TABLET ORAL at 08:06

## 2022-12-22 RX ADMIN — MEROPENEM 1000 MG: 1 INJECTION, POWDER, FOR SOLUTION INTRAVENOUS at 07:26

## 2022-12-22 RX ADMIN — MIDODRINE HYDROCHLORIDE 10 MG: 5 TABLET ORAL at 08:07

## 2022-12-22 RX ADMIN — SODIUM CHLORIDE: 9 INJECTION, SOLUTION INTRAVENOUS at 03:55

## 2022-12-22 RX ADMIN — POTASSIUM BICARBONATE 40 MEQ: 782 TABLET, EFFERVESCENT ORAL at 08:07

## 2022-12-22 RX ADMIN — SODIUM CHLORIDE, PRESERVATIVE FREE 10 ML: 5 INJECTION INTRAVENOUS at 08:07

## 2022-12-22 RX ADMIN — SODIUM CHLORIDE 8 MG/HR: 9 INJECTION, SOLUTION INTRAVENOUS at 07:25

## 2022-12-22 RX ADMIN — MIDODRINE HYDROCHLORIDE 10 MG: 5 TABLET ORAL at 16:52

## 2022-12-22 RX ADMIN — MIDODRINE HYDROCHLORIDE 10 MG: 5 TABLET ORAL at 12:55

## 2022-12-22 RX ADMIN — FLUDROCORTISONE ACETATE 100 MCG: 0.1 TABLET ORAL at 08:07

## 2022-12-22 ASSESSMENT — ENCOUNTER SYMPTOMS
SHORTNESS OF BREATH: 0
EYES NEGATIVE: 1
GASTROINTESTINAL NEGATIVE: 1

## 2022-12-22 NOTE — PROGRESS NOTES
[facility]   Gastroenterology Progress Note    Caryl Fields is a 76 y.o. female patient. Hospitalization Day:3      Chief consult reason:   S/P urethral stent placement and dev coffee ground emesis    Subjective:  Patient seen and examined. No acute events overnight. No coffee-ground emesis, melena or hematochezia. Pt is not on pressor support any longer and was moved out of ICU. Hgb stable  VITALS:  BP (!) 119/58   Pulse 90   Temp 98.3 °F (36.8 °C) (Oral)   Resp 16   Ht 5' 7\" (1.702 m)   Wt 137 lb 5.6 oz (62.3 kg)   SpO2 95%   BMI 21.51 kg/m²   TEMPERATURE:  Current - Temp: 98.3 °F (36.8 °C); Max - Temp  Av.9 °F (37.2 °C)  Min: 98 °F (36.7 °C)  Max: 99.5 °F (37.5 °C)    Physical Assessment:  General appearance:  alert, cooperative and no distress  Mental Status:  oriented to person, place and time and normal affect  Lungs:  clear to auscultation bilaterally, normal effort  Heart:  regular rate and rhythm, no murmur  Abdomen:  soft, nontender, nondistended, normal bowel sounds, no masses, hepatomegaly, splenomegaly  Extremities:  no edema, redness, tenderness in the calves  Skin:  no gross lesions, rashes, induration    Data Review:    Labs and Imaging:     CBC:  Recent Labs     22  1430 22  0758 22  2203 22  0428 22  1322 22  0539   WBC 13.1* 35.9*  --  27.3*  --  13.2*   HGB 12.7 10.8* 9.9* 9.6* 11.1* 8.8*   MCV 99.0 97.0  --  108.3*  --  102.5   RDW 15.4* 15.5*  --  15.9*  --  15.9*    See Reflexed IPF Result  --  See Reflexed IPF Result  --  216         ANEMIA STUDIES:  No results for input(s): LABIRON, TIBC, FERRITIN, ZOGPWHPB60, FOLATE, OCCULTBLD in the last 72 hours.     BMP:  Recent Labs     22  0758 22  0428 22  0539    135 138   K 4.0 3.5* 3.4*    108* 112*   CO2 19* 14* 18*   BUN 16 13 17   CREATININE 1.02* 0.76 0.66   GLUCOSE 101* 98 110*   CALCIUM 7.5* 7.5* 7.3*         LFTS:  Recent Labs 12/19/22  1430   ALKPHOS 362*   ALT 13   AST 26   BILITOT 0.3   BILIDIR 0.2   LABALBU 3.3*         Amylase/Lipase and Ammonia:  Recent Labs     12/19/22  1430   LIPASE 40         Acute Hepatitis Panel:  No results found for: HEPBSAG, HEPCAB, HEPBIGM, HEPAIGM    HCV Genotype:  No results found for: HEPATITISCGENOTYPE    HCV Quantitative:  No results found for: HCVQNT    LIVER WORK UP:    AFP  No results found for: AFP    Alpha 1 antitrypsin   No results found for: A1A    JUSTYNA  No results found for: JUSTYNA    AMA  No results found for: MITOAB    ASMA  No results found for: SMOOTHMUSCAB    PT/INR  Recent Labs     12/19/22  1430   PROTIME 10.4   INR 1.0         Cancer Markers:  CEA:  No results for input(s): CEA in the last 72 hours. Ca 125:  No results for input(s):  in the last 72 hours. Ca 19-9:   Invalid input(s):   AFP: No results for input(s): AFP in the last 72 hours. Lactic acid:Invalid input(s): LACTIC ACID    Radiology Review:    No results found. Principal Problem:    Pyelonephritis  Active Problems:    Ureteral stent present    GI bleed    History of extended-spectrum beta-lactamase producing Escherichia coli infection    VRE infection within last 3 months  Resolved Problems:    Septic shock (HCC)    Shock (HCC)       GI Impression:    Coffee ground emesis-resolved. No bleeding x 2 days  Dementia  Parkinson's    -Long discussion with daughter POA and she would like to hold off on endoscopy given lack of overt bleeding. If pt develops bleeding again, then she would re-consider    Plan and Recommendations:    Protonix 40 mg po daily  Avoid Nsaids  Advance diet as tolerated  No further recommendations at this time-GI will sign off. Pt to follow up in the GI clinic PRN        This plan was formulated in collaboration with Dr. Cassandria Claude, MD    Thank you for allowing me to participate in the care of your patient. Please feel free to contact me with any questions or concerns.      Jane Ochoa [facility]   [facility] Elias, LAURIE - CNP   [phone]  12/22/2022  2:12 PM

## 2022-12-22 NOTE — PROGRESS NOTES
[facility] ...... 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  Parkinson's disease  Severe vascular dementia  Left femoral vein DVT  Emesis with coffee grounds 12-19-22 with hypotension and hemoglobin drop    Discussion / summary of stay / plan of care       Recommendations   Wait for urine culture from 12-19-22 - no growth to date  Continue meropenem for now  Supportive care    Infection Control Recommendations   [place] Precautions  Contact Isolation ESBL E coli, VRE    Antimicrobial Stewardship Recommendations   Simplification of therapy    Coordination of Outpatient Care:   Estimated Length of IV antimicrobials: TBD  Patient will need Midline Catheter Insertion: No  Patient will need PICC line Insertion:No  Patient will need: Home IV , [place],  SNF,  LTAC: TBD  Patient will need outpatient wound care:No      History of Present Illness:   Lyly Wood is a 76y.o.-year-old  female who was initially admitted on 12/19/2022. Patient seen at the request of Cami Sam. INITIAL HISTORY:  Patient with a history of renal stones. She had a left ureteral stent placed on 5/16/2022 because of hydronephrosis. The stent was replaced on 12/19/2022. The patient did well with the procedure but returned to the hospital later in the day after developing coffee-ground emesis x 4 episodes. She developed associated hypotension requiring vasopressors.   Question was raised as to whether she had hypotension on the basis of GI bleeding versus of the presence of a urinary tract infection. She has a past history of having UTIs. She had E coli ESBL+ UTI on 10-24-22 and VRE E faecium  UTI on 22. Your urine culture from 2022 is in progress. 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 Additional problems include Parkinson's disease, Severe vascular dementia and Left femoral vein DVT      Interval changes  2022   Afebrile  VS stable, SpO2 95% RA  Patient sitting up in bed, no complaints  WBC trending down, 13.2 today from 27.3    Patient Vitals for the past 8 hrs:   BP Temp Temp src Pulse Resp SpO2 Weight   22 0700 (!) 108/57 -- -- 81 16 98 % --   22 0600 120/68 99.5 °F (37.5 °C) Oral 92 14 96 % --   22 0506 -- -- -- -- -- -- 137 lb 5.6 oz (62.3 kg)   22 0500 (!) 106/50 -- -- 87 17 96 % --   22 0400 (!) 115/59 99.1 °F (37.3 °C) Oral 92 19 97 % --   22 0300 127/63 -- -- 89 18 96 % --   22 0200 (!) 122/102 99.5 °F (37.5 °C) Oral 92 19 98 % --   22 0100 121/68 -- -- 95 23 97 % --       Summary of relevant labs:  Labs:  BUN:   Cr: 1.27- 0.76-0.66  WBC: 13.1-35.9-27.3-13.2  Hb: 12.7 --10.8-11.1  Plat: 395--216    Micro:  Urine:  2022: no growth to date  Blood:  2022: No growth to date  Sputum :     Wound:     MRSA probe negative    Imagin/19 CT CHEST PULMONARY EMBOLISM W CONTRAST   1. Interval improvement in left hydronephrosis. Left ureteral stent is noted with persistent mild-to-moderate hydronephrosis and perinephric stranding. There is urothelial thickening and enhancement of the left renal pelvis and proximal ureter suggestive of superimposed infection or inflammation. 2. Nonobstructing bilateral renal calculi, as well as a 3 mm mid left ureteral calculus, and bladder calculi. 3. No pulmonary embolus. 4. Slightly increasing right basilar opacity either reflecting atelectasis versus pneumonia.   Given patulous esophagus with an air-fluid level, aspiration can be a consideration. 5. Mild nonspecific periportal edema. 12/19 CXR  Increasing interstitial and bibasilar opacities with probable small left pleural effusion. Findings may reflect edema in the appropriate clinical setting versus pneumonia. I have personally reviewed the past medical history, past surgical history, medications, social history, and family history, and I have updated the database accordingly. I have personally reviewed the past medical history, past surgical history, medications, social history, and family history, and I haveupdated the database accordingly. Allergies:   Adhesive tape and Haldol [haloperidol lactate]     Review of Systems:     Review of Systems   Constitutional: Negative. Negative for chills and fever. HENT: Negative. Eyes: Negative. Respiratory:  Negative for shortness of breath. Cardiovascular: Negative. Negative for chest pain. Gastrointestinal: Negative. Genitourinary: Negative. Musculoskeletal: Negative. Neurological: Negative. Psychiatric/Behavioral: Negative. Physical Examination :       Physical Exam  Vitals reviewed. Constitutional:       General: She is not in acute distress. Appearance: Normal appearance. She is not ill-appearing. HENT:      Head: Normocephalic and atraumatic. Right Ear: External ear normal.      Left Ear: External ear normal.      Mouth/Throat:      Mouth: Mucous membranes are moist.      Pharynx: Oropharynx is clear. Eyes:      Conjunctiva/sclera: Conjunctivae normal.   Cardiovascular:      Rate and Rhythm: Normal rate and regular rhythm. Heart sounds: Normal heart sounds. Pulmonary:      Effort: Pulmonary effort is normal. No respiratory distress. Abdominal:      General: There is no distension. Palpations: Abdomen is soft. Tenderness: There is no abdominal tenderness.    Genitourinary:     Comments: No sood  Musculoskeletal: Cervical back: Normal range of motion. No rigidity. Right lower leg: No edema. Left lower leg: No edema. Skin:     General: Skin is warm and dry. Neurological:      Mental Status: She is alert. Comments: Oriented to person   Psychiatric:         Behavior: Behavior normal. Behavior is cooperative.        Past Medical History:     Past Medical History:   Diagnosis Date    Anxiety     Convulsion ([address]     Dementia ([address]     Encephalopathy     Herpes     Hx of blood clots     Hyperlipidemia     Left bundle branch block     MRSA (methicillin resistant Staphylococcus aureus)     Parkinson's disease ([address]     family states tremors not parkinson    Seizures ([address]     secondary to encephalitis    Under care of service provider 12/15/2022    [id number]-last visit dec 2022    Vitamin D deficiency        Past Surgical  History:     Past Surgical History:   Procedure Laterality Date    ABSCESS DRAINAGE Right 12/14/2013    WOUND EXPLORATION AND I+D  RIGHT HIP    CYSTOSCOPY Left 05/16/2022    CYSTOS[facility]  Left 12/02/2022    ATTEMPTED CYSTOSCOPY WITH LEFT STENT EXCHANGE performed by Luis A Jacobsen MD at [place] [address] ([address] [place] [address])      LITHOTRIPSY Left 12/19/2022    ESWL EXTRACORPOREAL SHOCK WAVE LITHOTRIPSY performed by Luis A Jacobsen MD at [address] [place] [address]      dtr states hemorrhoid surgery but unsure what type    SPLENECTOMY      at age 10 after [address] Left 05/16/2022    Cystoscopy    URETER SURGERY Left 12/19/2022    HOLMIUM LASER CYSTOSCOPY,URETEROSCOPY, STENT EXCHANGE performed by Luis A Jacobsen MD at [place] [address]    URETEROSCOPY Left 12/19/2022    stent       Medications:      midodrine  10 mg Oral TID WC    fludrocortisone  100 mcg Oral Daily    meropenem  1,000 mg IntraVENous Q12H    sodium chloride flush  5-40 mL IntraVENous 2 times per day carBAMazepine  300 mg Oral Daily    carBAMazepine  400 mg Oral Nightly       Social History:     Social History     Socioeconomic History    Marital status: Single     Spouse name: Not on file    Number of children: Not on file    Years of education: Not on file    Highest education level: Not on file   Occupational History    Not on file   Tobacco Use    Smoking status: Former     Packs/day: 1.00     Years: 30.00     Pack years: 30.00     Types: Cigarettes     Start date:      Quit date:      Years since quittin.9    Smokeless tobacco: Never    Tobacco comments:     unknown how many packs a day, or how many years   Vaping Use    Vaping Use: Never used   Substance and Sexual Activity    Alcohol use: No    Drug use: No    Sexual activity: Not Currently   Other Topics Concern    Not on file   Social History Narrative    Not on file     Social Determinants of Health     Financial Resource Strain: Low Risk     Difficulty of Paying Living Expenses: Not hard at all   Food Insecurity: No Food Insecurity    Worried About Running Out of Food in the Last Year: Never true    Ran Out of Food in the Last Year: Never true   Transportation Needs: Not on file   Physical Activity: Not on file   Stress: Not on file   Social Connections: Not on file   Intimate Partner Violence: Not on file   Housing Stability: Not on file       Family History:     Family History   Problem Relation Age of Onset    Asthma Mother     No Known Problems Father       Medical Decision Making:   I have independently reviewed/ordered the following labs:    CBC with Differential:   Recent Labs     228 22  1322 22  0539   WBC 27.3*  --  13.2*   HGB 9.6* 11.1* 8.8*   HCT 32.7* 35.3* 28.3*   PLT See Reflexed IPF Result  --  216   LYMPHOPCT 7*  --  16*   MONOPCT 8*  --  10     BMP:  Recent Labs     22  0428 22  0539    138   K 3.5* 3.4*   * 112*   CO2 14* 18*   BUN 13 17   CREATININE 0.76 0.66   MG 1.8 2.0 Hepatic Function Panel:   Recent Labs     12/19/22  1430   PROT 7.3   LABALBU 3.3*   BILIDIR 0.2   IBILI 0.1   BILITOT 0.3   ALKPHOS 362*   ALT 13   AST 26     No results for input(s): RPR in the last 72 hours. No results for input(s): HIV in the last 72 hours. No results for input(s): BC in the last 72 hours. Lab Results   Component Value Date/Time    CREATININE 0.66 12/22/2022 05:39 AM    GLUCOSE 110 12/22/2022 05:39 AM    GLUCOSE 93 03/12/2012 03:18 PM       Detailed results: Thank you for allowing us to participate in the care of this patient. Please call with questions. This note is created with the assistance of a speech recognition program.  While intending to generate adocument that actually reflects the content of the visit, the document can still have some errors including those of syntax and sound a like substitutions which may escape proof reading. It such instances, actual meaningcan be extrapolated by contextual diversion. LAURIE Rosario - CNP  Office: [phone]  Perfect serve / office [phone]    ATTESTATION:    I have discussed the case, including pertinent history and exam findings with the APRN. I have evaluated the  History, physical findings and pictures of the patient and the key elements of the encounter have been performed by me. I have reviewed the laboratory data, other diagnostic studies and discussed them with the APRN. I have updated the medical record where necessary. I agree with the assessment, plan and orders as documented by the APRN.     Irma Mae MD.

## 2022-12-22 NOTE — PROGRESS NOTES
ICU PATIENT TRANSFER NOTE        Patient:  Emi Cat  YOB: 1945    MRN: [id number]     Acct: [de-identified]     Admit date: 12/19/2022    Code Status:-  [facility]    Reason for ICU Admission:-   Septic Shock    SUPPORT DEVICES: [] Ventilator [] BIPAP  [] Nasal Cannula [x] Room Air    Consultations:- [] Cardiology [] Nephrology  [] Hemo onco  [x] GI                               [] ID [] ENT  [] Rheum [] Endo   []Physiotherapy                                 Others:- Palliative care    NUTRITION:  [] NPO [] Tube Feeding (Specify: ) [] TPN  [x] PO    Central Lines:- [] No   [x] Yes           If yes - Days/Date of Insertion 12/19/22    Pt seen and Chart reviewed. ICU COURSE :-      77-year-old female with history of nephrolithiasis s/p left ureteral stent placement on 5/16/2022, changed on 12/19/2022, history of ESBL/VRE UTI, history of seizures secondary to herpes encephalitis, Parkinson, severe vascular dementia and left femoral DVT    C/C :  fever, nausea, coffee-ground emesis after patient had left ureteral stent exchange on 12/19, she had gone home after that stent exchange and came back due to coffee-ground emesis at least 4 episodes. Not on any blood thinner at home, only on aspirin which was stopped for the procedure, no active bleeding episodes in ED or while in ICU, GI was on board, no intervention done    Patient was also found to be tachycardic, hypoxic, febrile, received fluids per sepsis protocol, eventually became hypotensive, started on pressors. ID on board continues to be on meropenem, cultures negative till date.   Urology was also consulted, imaging showed improvement of left-sided hydronephrosis with nonobstructing bilateral renal stones,    no active bleeding on imaging,  CT PE negative     Since patient has been in ICU, her blood pressure has improved she has been off pressors,Continues to be on Protonix drip for 48 hours, started on liquid diet with supplements, H&H has been stable    12/22/22-  Off Pressors on Protonix drip, GI following , pt family leaning towards hospice eval and hospice consult placed      Physical Exam:  Vitals: BP (!) 108/57   Pulse 81   Temp 98.8 °F (37.1 °C) (Oral)   Resp 16   Ht 5' 7\" (1.702 m)   Wt 137 lb 5.6 oz (62.3 kg)   SpO2 98%   BMI 21.51 kg/m²   24 hour intake/output:  Intake/Output Summary (Last 24 hours) at 12/22/2022 0846  Last data filed at 12/22/2022 0800  Gross per 24 hour   Intake 4424.93 ml   Output 1850 ml   Net 2574.93 ml     Last 3 weights: Wt Readings from Last 3 Encounters:   12/22/22 137 lb 5.6 oz (62.3 kg)   12/19/22 130 lb (59 kg)   12/10/22 126 lb (57.2 kg)       General appearance: alert and cooperative with exam  HEENT: Head: Normocephalic, no lesions, without obvious abnormality. Neck: no adenopathy, no carotid bruit, no JVD, supple, symmetrical, trachea midline, and thyroid not enlarged, symmetric, no tenderness/mass/nodules  Lungs: clear to auscultation bilaterally  Heart: regular rate and rhythm, S1, S2 normal, no murmur, click, rub or gallop  Abdomen: soft, non-tender; bowel sounds normal; no masses,  no organomegaly  Extremities: Pt has central line in right femoral vein, extremities normal, atraumatic, no cyanosis or edema  Neurologic: Mental status: No focal deficit and has dementia at base line.     Medications:Current Inpatient  Scheduled Meds:     midodrine  10 mg Oral TID WC    fludrocortisone  100 mcg Oral Daily    meropenem  1,000 mg IntraVENous Q12H    sodium chloride flush  5-40 mL IntraVENous 2 times per day    carBAMazepine  300 mg Oral Daily    carBAMazepine  400 mg Oral Nightly     Continuous Infusions:   pantoprazole 8 mg/hr (12/22/22 0725)    sodium chloride 10 mL/hr at 12/22/22 0705    sodium chloride 125 mL/hr at 12/22/22 0705     PRN Meds:sodium chloride flush, sodium chloride, ondansetron **OR** ondansetron, polyethylene glycol, acetaminophen **OR** acetaminophen    Objective:    CBC:   Recent Labs     12/20/22  0758 12/20/22  2203 12/21/22  0428 12/21/22  1322 12/22/22  0539   WBC 35.9*  --  27.3*  --  13.2*   HGB 10.8*   < > 9.6* 11.1* 8.8*   PLT See Reflexed IPF Result  --  See Reflexed IPF Result  --  216    < > = values in this interval not displayed. BMP:    Recent Labs     12/20/22  0758 12/21/22  0428 12/22/22  0539    135 138   K 4.0 3.5* 3.4*    108* 112*   CO2 19* 14* 18*   BUN 16 13 17   CREATININE 1.02* 0.76 0.66   GLUCOSE 101* 98 110*     Calcium:  Recent Labs     12/22/22  0539   CALCIUM 7.3*     Ionized Calcium:No results for input(s): IONCA in the last 72 hours. Magnesium:  Recent Labs     12/22/22  0539   MG 2.0     Phosphorus:No results for input(s): PHOS in the last 72 hours. BNP:No results for input(s): BNP in the last 72 hours. Glucose:No results for input(s): POCGLU in the last 72 hours. HgbA1C: No results for input(s): LABA1C in the last 72 hours. INR:   Recent Labs     12/19/22  1430   INR 1.0     Hepatic:   Recent Labs     12/19/22  1430   ALKPHOS 362*   ALT 13   AST 26   PROT 7.3   BILITOT 0.3   BILIDIR 0.2   LABALBU 3.3*     Amylase and Lipase:No results for input(s): LACTA, AMYLASE in the last 72 hours. Lactic Acid: No results for input(s): LACTA in the last 72 hours. CARDIAC ENZYMES:No results for input(s): CKTOTAL, CKMB, CKMBINDEX, TROPONINI in the last 72 hours. BNP: No results for input(s): BNP in the last 72 hours. Lipids: No results for input(s): CHOL, TRIG, HDL, LDL, LDLCALC in the last 72 hours.   ABGs: No results found for: PH, PCO2, PO2, HCO3, O2SAT  Thyroid:   Lab Results   Component Value Date/Time    TSH 1.94 10/03/2022 06:17 PM      Urinalysis:   Recent Labs     12/19/22  1550   COLORU Red*   PHUR 7.0   PROTEINU 2+*   RBCUA TOO NUMEROUS TO COUNT   SPECGRAV 1.012   BILIRUBINUR NEGATIVE  Verified by ictotest.*   NITRU POSITIVE*   WBCUA None   LEUKOCYTESUR LARGE*   GLUCOSEU NEGATIVE CULTURES: Urine culture is ESBL       EKG: normal EKG, normal sinus rhythm, LBBB, unchanged from previous tracings. ECHO: not obtained. Stress Test: not obtained. Cardiac Angiography: not obtained. Current Rehabilitation Assessments:  PHYSICAL THERAPY:   on board  OCCUPATIONAL THERAPY:   on board  SPEECH:      Assessment:  Patient Active Problem List   Diagnosis    Seizure ([facility]    Vitamin D deficiency    Anxiety    Hypercholesteremia    Cellulitis of right leg    MRSA (methicillin resistant staph aureus) culture positive    Breakthrough seizure ([facility]    Memory loss    Difficulty speaking    Seizure disorder ([facility]    Dementia with behavioral disturbance    History of encephalitis    Dementia due to Parkinson's disease with behavioral disturbance ([facility]    DVT of deep femoral vein, left ([facility])    Closed compression fracture of L1 lumbar vertebra, initial encounter ([facility]    Depression    Kidney stone    Urinary incontinence    Overactive bladder    Closed head injury    Community acquired pneumonia    Sepsis ([facility]    COPD exacerbation ([facility]    Hypokalemia    Bacterial UTI    Urinary tract infection due to Proteus    ESBL (extended spectrum beta-lactamase) producing bacteria infection    Acute cystitis with hematuria    Leukocytosis    Mitral valve disorder    Chest pain    Encounter for palliative care    Complicated UTI (urinary tract infection)    Ureteral stent present    GI bleed    Pyelonephritis    Septic shock ([address]    History of extended-spectrum beta-lactamase producing Escherichia coli infection    VRE infection within last 3 months    Shock ([address]       Plan:    Principal Problem:    Pyelonephritis  Active Problems:    Ureteral stent present    GI bleed    Septic shock ([facility])    History of extended-spectrum beta-lactamase producing Escherichia coli infection    VRE infection within last 3 months    Shock ([facility]  Resolved Problems:    * No resolved hospital problems.  *    ESBL pyelonephritis with septic shock likely secondary to recent instrumentation with left ureteral stent replacement on 12/19/2022 with underlying history of nephrolithiasis  -Discontinue meropenem as per infectious disease recommendation, continue contact isolation. We will continue to follow urology recommendations, monitor urine output, outpatient urology follow-up  -Currently off pressors monitor blood pressure    Coffee-ground emesis with suspected upper GI bleed with acute anemia hemoglobin dropped from 12-8.8   -We will continue IV Protonix drip and discharged on p.o. Protonix as per GI recommendations, no need for EGD as per GI. Family inclining towards hospice evaluation given underlying dementia    Progressive dementia-family leaning towards a hospice evaluation, patient has clinical decline    DVT prophylaxis with pneumatic compressions, IV Protonix for ulcer prophylaxis. Physical therapy and Occupational Therapy on board, patient daughter wants to take her home today. They are trying to take her to either [place] or [place]. Vivienne Love MD  Internal Medicine Resident, PGY- [facility]; [place], [place]  12/22/2022, 12:04 PM       Attending Physician Statement  I have discussed the care of Noe Stuart and I have examined the patient myselft and taken ros and hpi , including pertinent history and exam findings,  with the resident. I have reviewed the key elements of all parts of the encounter with the resident. I agree with the assessment, plan and orders as documented by the resident. Spent 35 minutes in reviewing data/medicines/talking to patient/family,  explaining and answering all the questions.         Electronically signed by Adolph Miller MD Detail Level: Zone 98

## 2022-12-22 NOTE — PLAN OF CARE
Problem: Discharge Planning  Goal: Discharge to home or other facility with appropriate resources  12/21/2022 2126 by Anca Chance RN  Outcome: Progressing  Flowsheets (Taken 12/21/2022 2000)  Discharge to home or other facility with appropriate resources: Identify barriers to discharge with patient and caregiver  12/21/2022 1920 by Dina Lugo RN  Outcome: Progressing     Problem: Pain  Goal: Verbalizes/displays adequate comfort level or baseline comfort level  12/21/2022 2126 by Anca Chance RN  Outcome: Progressing  12/21/2022 1920 by Dina Lugo RN  Outcome: Progressing     Problem: Skin/Tissue Integrity  Goal: Absence of new skin breakdown  Description: 1. Monitor for areas of redness and/or skin breakdown  2. Assess vascular access sites hourly  3. Every 4-6 hours minimum:  Change oxygen saturation probe site  4. Every 4-6 hours:  If on nasal continuous positive airway pressure, respiratory therapy assess nares and determine need for appliance change or resting period.   12/21/2022 2126 by Anca Chance RN  Outcome: Progressing  12/21/2022 1920 by Dina Lugo RN  Outcome: Progressing     Problem: Safety - Adult  Goal: Free from fall injury  12/21/2022 2126 by Anca Chance RN  Outcome: [phone] (Taken 12/21/2022 2000)  Free From Fall Injury: Instruct family/caregiver on patient safety  12/21/2022 1920 by Dina Lugo RN  Outcome: Progressing     Problem: ABCDS Injury Assessment  Goal: Absence of physical injury  12/21/2022 2126 by Anca Chance RN  Outcome: Progressing  Flowsheets (Taken 12/21/2022 2000)  Absence of Physical Injury: Implement safety measures based on patient assessment  12/21/2022 1920 by Dina Lugo RN  Outcome: Progressing     Problem: Nutrition Deficit:  Goal: Optimize nutritional status  12/21/2022 2126 by Anca Chance RN  Outcome: Progressing  12/21/2022 1920 by Dina Lugo RN  Outcome: Progressing

## 2022-12-22 NOTE — PROGRESS NOTES
INTENSIVE CARE UNIT  Resident Physician Progress Note    Patient - Ana Rosa Rossi  Date of Admission -  12/19/2022  1:52 PM  Date of Evaluation -  12/22/2022  Room and Bed Number -  0628/8650-84   Hospital Day - 3    SUBJECTIVE:   HISTORY OF PRESENT ILLNESS:    Ana Rosa Rossi is a 76 y.o. woman w/hx of parkinsons, seizures and recurrent kidney stone and urine infection w/VRE and ESBL who was admitted to medicine on 12-19 for concern for GI bleed after a urologic procedure earlier in the day. The patient underwent cystoscopy and urologic stenting due to obstructive stone and was discharged. Developed coffee ground emesis at home. She was admitted for GI assessment and while boarding in the ER developed worsening hypotension despite fluid resuscitation, remained febrile and tachycardic. Sepsis w/u initiated. CT PE was negative. CT AP showed no bleeding in the abdomen. Hypotension suspected to be secondary to sepsis rather than GI bleed. She required pressors and was transferred to MICU.      OVERNIGHT EVENTS:     Afebrile overnight, HR controlled, on room  air  Off pressors, on midodrine, florinef  ID consulted due to abx interactions w/pt home seizure medications, currently on meropenem, cultures negative    WBC 27.3>13.2  Potassium 3.4, replaced  Hemoglobin 8.8    Fluids: NS 125cc/hr  Feeding:NPO, on clear liquid diet with supplements  Analgesics: PRN tylenol  Sedation: none  Thrombo-prophylaxis: held due to concerns for GI bleed  Mobilization:therapy ordered   Head Up:yes   Hemodynamics:  off pressors  Ulcer Prophylaxis:  protonix drip  Glycemic Control:controlled  Spontaneous breathing trial:none  Bowel management:had bowel movement, no concern for bleed   Indwelling catheter: external urinary catheter, put out 1650 ml output,  R fem CVC  Drug De-escalation: ID recommendations for abx currently on meropenem        OBJECTIVE:   VITAL SIGNS:  Patient Vitals for the past 8 hrs:   BP Temp Temp src Pulse Resp SpO2 Weight   22 0700 (!) 108/57 -- -- 81 16 98 % --   22 0600 120/68 99.5 °F (37.5 °C) Oral 92 14 96 % --   22 0506 -- -- -- -- -- -- 137 lb 5.6 oz (62.3 kg)   22 0500 (!) 106/50 -- -- 87 17 96 % --   22 0400 (!) 115/59 99.1 °F (37.3 °C) Oral 92 19 97 % --   22 0300 127/63 -- -- 89 18 96 % --   22 0200 (!) 122/102 99.5 °F (37.5 °C) Oral 92 19 98 % --   22 0100 121/68 -- -- 95 23 97 % --   22 0000 (!) 105/55 99.5 °F (37.5 °C) Oral 96 23 97 % --         Last Body weight:   Wt Readings from Last 3 Encounters:   22 137 lb 5.6 oz (62.3 kg)   22 130 lb (59 kg)   12/10/22 126 lb (57.2 kg)       Body Mass Index : Body mass index is 21.51 kg/m². Tmax over 24 hours: Temp (24hrs), Av.8 °F (37.1 °C), Min:97.3 °F (36.3 °C), Max:99.5 °F (37.5 °C)      Ins/Outs: In: 3515.4 [P.O.:760; I.V.:3500.6]  Out: 1650 [Urine:1650]    PHYSICAL EXAM:  Constitutional: Awake and alert, well developed and well nourished, in no acute distress  EENT: PERRLA, EOMI, sclera clear, anicteric, oropharynx clear, no lesions  Neck: Supple, symmetrical, trachea midline  Respiratory: clear to auscultation, no wheezes, rales, or rhonchi  Cardiovascular: regular rate and rhythm, normal S1, S2, no murmur noted and 2+ distal pulses throughout  Abdomen: soft, nontender, nondistended, no masses or organomegaly  Extremities:  peripheral pulses normal, no pedal edema, no clubbing or cyanosis. R fem CVC site is well appearing.      MEDICATIONS:  Scheduled Meds:   midodrine  10 mg Oral TID WC    potassium bicarb-citric acid  40 mEq Oral Daily    fludrocortisone  100 mcg Oral Daily    meropenem  1,000 mg IntraVENous Q12H    sodium chloride flush  5-40 mL IntraVENous 2 times per day    carBAMazepine  300 mg Oral Daily    carBAMazepine  400 mg Oral Nightly       Continuous Infusions:   pantoprazole 8 mg/hr (22)    sodium chloride 10 mL/hr at 22    sodium chloride 125 mL/hr at 12/22/22 0705       PRN Meds:   sodium chloride flush, 5-40 mL, PRN  sodium chloride, , PRN  ondansetron, 4 mg, Q8H PRN   Or  ondansetron, 4 mg, Q6H PRN  polyethylene glycol, 17 g, Daily PRN  acetaminophen, 650 mg, Q6H PRN   Or  acetaminophen, 650 mg, Q6H PRN      DATA:  Complete Blood Count:   Recent Labs     12/19/22  1430 12/20/22  0758 12/20/22  2203 12/21/22  0428 12/21/22  1322 12/22/22  0539   WBC 13.1* 35.9*  --  27.3*  --  13.2*   RBC 4.01 3.35*  --  3.02*  --  2.76*   HGB 12.7 10.8*   < > 9.6* 11.1* 8.8*   HCT 39.7 32.5*   < > 32.7* 35.3* 28.3*   MCV 99.0 97.0  --  108.3*  --  102.5   MCH 31.7 32.2  --  31.8  --  31.9   MCHC 32.0 33.2  --  29.4  --  31.1   RDW 15.4* 15.5*  --  15.9*  --  15.9*    See Reflexed IPF Result  --  See Reflexed IPF Result  --  216   MPV 8.6  --   --   --   --  9.6    < > = values in this interval not displayed.           Last 3 Blood Glucose:   Recent Labs     12/19/22  1430 12/20/22  0758 12/21/22  0428 12/22/22  0539   GLUCOSE 93 101* 98 110*          PT/INR:    Lab Results   Component Value Date/Time    PROTIME 10.4 12/19/2022 02:30 PM    INR 1.0 12/19/2022 02:30 PM     PTT:    Lab Results   Component Value Date/Time    APTT 21.4 12/19/2022 02:30 PM       Basic Metabolic Profile:   Recent Labs     12/20/22  0758 12/21/22  0428 12/22/22  0539    135 138   K 4.0 3.5* 3.4*    108* 112*   CO2 19* 14* 18*   BUN 16 13 17   CREATININE 1.02* 0.76 0.66   GLUCOSE 101* 98 110*         Liver Function:  Recent Labs     12/19/22  1430   PROT 7.3   LABALBU 3.3*   ALT 13   AST 26   ALKPHOS 362*   BILITOT 0.3         Magnesium:   Lab Results   Component Value Date/Time    MG 1.8 12/21/2022 04:28 AM    MG 1.9 12/20/2022 12:21 PM    MG 2.1 11/29/2022 06:31 AM     Phosphorus:   Lab Results   Component Value Date/Time    PHOS 3.3 05/18/2022 03:53 AM     Ionized Calcium:   Lab Results   Component Value Date/Time    CAION 1.02 12/21/2022 08:21 AM    CAION 1.09 12/20/2022 12:21 PM CAION 1.11 12/10/2022 09:46 PM        Urinalysis:   Lab Results   Component Value Date/Time    NITRU POSITIVE 12/19/2022 03:50 PM    COLORU Red 12/19/2022 03:50 PM    PHUR 7.0 12/19/2022 03:50 PM    WBCUA None 12/19/2022 03:50 PM    RBCUA TOO NUMEROUS TO COUNT 12/19/2022 03:50 PM    MUCUS 1+ 11/13/2019 03:30 PM    TRICHOMONAS NOT REPORTED 11/13/2019 03:30 PM    YEAST NOT REPORTED 11/13/2019 03:30 PM    BACTERIA MODERATE 11/28/2022 08:00 PM    SPECGRAV 1.012 12/19/2022 03:50 PM    LEUKOCYTESUR LARGE 12/19/2022 03:50 PM    UROBILINOGEN Normal 12/19/2022 03:50 PM    BILIRUBINUR NEGATIVE  Verified by ictotest. 12/19/2022 03:50 PM    GLUCOSEU NEGATIVE 12/19/2022 03:50 PM    KETUA NEGATIVE 12/19/2022 03:50 PM    AMORPHOUS NOT REPORTED 11/13/2019 03:30 PM       HgBA1c:  No results found for: LABA1C  TSH:    Lab Results   Component Value Date/Time    TSH 1.94 10/03/2022 06:17 PM     Lactic Acid:   Lab Results   Component Value Date/Time    LACTA 2.1 10/03/2022 06:17 PM      Troponin: No results for input(s): TROPONINI in the last 72 hours. Microbiology:  Urine Culture:  No components found for: CURINE  Blood Culture:  No components found for: CBLOOD, CFUNGUSBL    Radiology/Imaging:  CT CHEST PULMONARY EMBOLISM W CONTRAST   Final Result   1. Interval improvement in left hydronephrosis. Left ureteral stent is noted   with persistent mild-to-moderate hydronephrosis and perinephric stranding. There is urothelial thickening and enhancement of the left renal pelvis and   proximal ureter suggestive of superimposed infection or inflammation. 2. Nonobstructing bilateral renal calculi, as well as a 3 mm mid left   ureteral calculus, and bladder calculi. 3. No pulmonary embolus. 4. Slightly increasing right basilar opacity either reflecting atelectasis   versus pneumonia. Given patulous esophagus with an air-fluid level,   aspiration can be a consideration. 5. Mild nonspecific periportal edema.          CT ABDOMEN PELVIS W IV CONTRAST Additional Contrast? None   Final Result   1. Interval improvement in left hydronephrosis. Left ureteral stent is noted   with persistent mild-to-moderate hydronephrosis and perinephric stranding. There is urothelial thickening and enhancement of the left renal pelvis and   proximal ureter suggestive of superimposed infection or inflammation. 2. Nonobstructing bilateral renal calculi, as well as a 3 mm mid left   ureteral calculus, and bladder calculi. 3. No pulmonary embolus. 4. Slightly increasing right basilar opacity either reflecting atelectasis   versus pneumonia. Given patulous esophagus with an air-fluid level,   aspiration can be a consideration. 5. Mild nonspecific periportal edema. XR CHEST PORTABLE   Final Result   Increasing interstitial and bibasilar opacities with probable small left   pleural effusion. Findings may reflect edema in the appropriate clinical   setting versus pneumonia.              ASSESSMENT:     Patient Active Problem List    Diagnosis Date Noted    History of extended-spectrum beta-lactamase producing Escherichia coli infection 12/21/2022    VRE infection within last 3 months 12/21/2022    Shock ([address] 12/21/2022    GI bleed 12/19/2022    Pyelonephritis 12/19/2022    Septic shock ([address] 12/19/2022    Ureteral stent present 60/70/9629    Complicated UTI (urinary tract infection) 11/28/2022    Encounter for palliative care 11/11/2022    Chest pain 11/09/2022    Mitral valve disorder 10/26/2022    Bacterial UTI 10/08/2022    Urinary tract infection due to Proteus 10/08/2022    ESBL (extended spectrum beta-lactamase) producing bacteria infection 10/08/2022    Acute cystitis with hematuria 10/08/2022    Leukocytosis 10/08/2022    Sepsis ([address] 10/04/2022    COPD exacerbation ([address] 10/04/2022    Hypokalemia 10/04/2022    Community acquired pneumonia 10/03/2022    Closed head injury     Kidney stone 05/17/2022    Urinary incontinence 05/17/2022 Overactive bladder 2022    Closed compression fracture of L1 lumbar vertebra, initial encounter ([facility] 05/15/2022    Depression 08/15/2020    DVT of deep femoral vein, left ([facility] 07/10/2021    Dementia due to Parkinson's disease with behavioral disturbance ([facility] 08/15/2020    Seizure disorder ([facility] 10/13/2015    Dementia with behavioral disturbance 10/13/2015    History of encephalitis 10/13/2015    Breakthrough seizure ([facility]     Memory loss     Difficulty speaking     MRSA (methicillin resistant staph aureus) culture positive 2013    Cellulitis of right leg 2013    Seizure ([facility] 2012    Vitamin D deficiency 2012    Anxiety 2012    Hypercholesteremia 2012        PLAN:       PLAN/MEDICAL DECISION MAKING:  Neurologic:   Neuro checks per protocol  Sedation: none  Pain management: tylenol PRN  Hx seizures  Home carbemazepine  Cardiovascular:  HR: controlled  MAP goal >65  Off pressors, on home dose florinef, on midodrine  Pulmonary:  Maintain oxygen sats >92%  Pulmonary toilet  CT PE negative for PE, slightly inccreased right basilar opacity, possible atalectasis vs pneumonia. On meropenem currently. Continue to monitor  GI/Nutrition  Bowel regimen: miralax PRN  Ulcer Prophylaxis: PPI gtt  Diet:ADULT DIET; Full Liquid  ADULT ORAL NUTRITION SUPPLEMENT; Breakfast, Dinner, Lunch; Standard High Calorie/High Protein Oral Supplement  Concern for GI bleed. GI consulted, f/u recs  Suspect stress ulcer  Endoscopy once hemodynamically stable  CLD w/supplements okay  Renal/Fluid/Electrolyte  IV Fluids: Saline Lock @ 125 mL/Hr   I/O: In: 4656.1 [P.O.:760;  I.V.:3500.6]  Out: 1650 [Urine:1650]  Monitor electrolytes, replace PRN   ID     POD #2 extracorporal shockwave lithotripsy, left ureteroscopy, left laser lithotripsy postop day 1 presenting with post ureteroscopy sepsis  WBC:   Lab Results   Component Value Date    WBC 13.2 (H) 2022     Tmax: Temp (24hrs), Av.8 °F (37.1 °C), Min:97.3 °F (36.3 °C), Max:99.5 °F (37.5 °C)  ID consulted. Likely sepsis w/urinary source  Antimicrobials: meropenem   Cultures negative  Hematology:  Concern for GI bleed and has gross hematuria.  GI on board, No bloody emesis since arrival.  Endocrine:   Glucose no insulin required  DVT Prophylaxis  EPC Cuffs  Held due to concern for bleed    Discharge Needs:  PT, OT, ST, and Case Management      CODE STATUS: DNR-CCA    DISPOSITION:  [x] To remain ICU:   [] OK for out of ICU from Critical Care standpoint    ---  Lusi Zhao MD  Internal Medicine Resident, PGY-3  [address]  01/83/1323,8:14 AM

## 2022-12-22 NOTE — PROGRESS NOTES
Critical care team - Resident sign-out to medicine service      Date and time: 12/22/2022 8:29 AM  Patient's name:  Sharyle Killer Record Number: [id number]  Patient's account/billing number: [de-identified]  Patient's YOB: 1945  Age: 76 y.o. Date of Admission: 12/19/2022  1:52 PM  Length of stay during current admission: 3    Primary Care Physician: Jeremías Lea DO    Code Status: DNR-CCA    Mode of physician to physician communication:        [x] Via telephone   [] In person     Date and time of sign-out: 12/22/2022 8:29 AM    Accepting Internal Medicine resident: Dr. Robbie Craig    Accepting Medicine team: IM Team med 3    Accepting team's attending: Dr. 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 Not on any blood thinner at home, only on aspirin which was stopped for the procedure, no active bleeding episodes in ED or while in ICU, GI was on board, no intervention done  Patient was also found to be tachycardic, hypoxic, febrile, received fluids per sepsis protocol, eventually became hypotensive, started on pressors. ID on board continues to be on meropenem, cultures negative till date.   Urology was also consulted, imaging showed improvement of left-sided hydronephrosis with nonobstructing bilateral renal stones, no active bleeding on imaging,  CT PE negative    Since patient has been in ICU, her blood pressure has improved she has been off pressors,Continues to be on Protonix drip for 48 hours, started on liquid diet with supplements, H&H has been stable      Procedures during patient's ICU stay:     None    Current Vitals:     BP (!) 108/57   Pulse 81   Temp 98.8 °F (37.1 °C) (Oral)   Resp 16   Ht 5' 7\" (1.702 m)   Wt 137 lb 5.6 oz (62.3 kg)   SpO2 98%   BMI 21.51 kg/m²       Consults:     GI  Urology  Palliative    Assessment:     Patient Active Problem List    Diagnosis Date Noted    History of extended-spectrum beta-lactamase producing Escherichia coli infection 12/21/2022    VRE infection within last 3 months 12/21/2022    Shock ([address] 12/21/2022    GI bleed 12/19/2022    Pyelonephritis 12/19/2022    Septic shock ([address] 12/19/2022    Ureteral stent present 00/08/1555    Complicated UTI (urinary tract infection) 11/28/2022    Encounter for palliative care 11/11/2022    Chest pain 11/09/2022    Mitral valve disorder 10/26/2022    Bacterial UTI 10/08/2022    Urinary tract infection due to Proteus 10/08/2022    ESBL (extended spectrum beta-lactamase) producing bacteria infection 10/08/2022    Acute cystitis with hematuria 10/08/2022    Leukocytosis 10/08/2022    Sepsis ([address] 10/04/2022    COPD exacerbation ([address] 10/04/2022    Hypokalemia 10/04/2022    Community acquired pneumonia 10/03/2022    Closed head injury     Kidney stone 05/17/2022    Urinary incontinence 05/17/2022    Overactive bladder 05/17/2022    Closed compression fracture of L1 lumbar vertebra, initial encounter ([address] 05/15/2022    Depression 08/15/2020    DVT of deep femoral vein, left ([address] 07/10/2021    Dementia due to Parkinson's disease with behavioral disturbance ([address] 08/15/2020    Seizure disorder ([address] 10/13/2015    Dementia with behavioral disturbance 10/13/2015    History of encephalitis 10/13/2015    Breakthrough seizure ([facility]     Memory loss     Difficulty speaking     MRSA (methicillin resistant staph aureus) culture positive 12/14/2013    Cellulitis of right leg 12/12/2013    Seizure ([address] 08/24/2012    Vitamin D deficiency 08/24/2012    Anxiety 08/24/2012    Hypercholesteremia 08/24/2012           Recommended Follow-up:     Switch to Protonix twice daily per GI recommendations  Follow-up with plan for endoscopy with GI  Follow ID regarding antibiotics, continues to be on meropenem, pending cultures  Can discontinue fluids once diet is advanced. Continue to monitor H&H        Above mentioned assessment and plan was discussed by me with the admitting medicine resident. The medicine team assigned to the patient by medicine admitting resident will be following up the patient from now onwards on the floor.      Jack Jarquin MD, M.D.  PGY-3 IM Resident   12/22/2022, 8:29 AM

## 2022-12-22 NOTE — PROGRESS NOTES
CLINICAL PHARMACY NOTE: MEDS TO BEDS    Total # of Prescriptions Filled: 3   The following medications were delivered to the patient:  Midodrine  Pantoprazole  carbamazepine    Additional Documentation:  Delivered to patient and daughter at 4:34pm. No copay.  HR
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
## 2022-12-22 NOTE — PROGRESS NOTES
Occupational [address]  Occupational Therapy Not Seen Note    DATE: 2022    NAME: Afshin Ngo  MRN: [id number]   : 1945      Patient not seen this date for Occupational Therapy due to:      Pt in process of transferring to new floor. Will continue as able on new floor.       Electronically signed by CODY Ragsdale on 2022 at 9:50 AM

## 2022-12-22 NOTE — DISCHARGE INSTRUCTIONS
Flora anthony with GI and urology after discharge  Start taking protonix   If any acute bleed call 911 and come to ED for further eval.

## 2022-12-23 ENCOUNTER — CARE COORDINATION (OUTPATIENT)
Dept: CASE MANAGEMENT | Age: 77
End: 2022-12-23

## 2022-12-23 DIAGNOSIS — N12 PYELONEPHRITIS: Primary | ICD-10-CM

## 2022-12-23 PROCEDURE — 1111F DSCHRG MED/CURRENT MED MERGE: CPT | Performed by: FAMILY MEDICINE

## 2022-12-23 NOTE — CARE COORDINATION
[facility] Care Transitions Initial Follow Up Call    Call within 2 business days of discharge: Yes    Care Transition Nurse contacted the family by telephone to perform post hospital discharge assessment. Verified name and  with family as identifiers. Provided introduction to self, and explanation of the Care Transition Nurse role. Patient: Joel Martel Patient : 1945   MRN: [id number]  Reason for Admission: Pyelonephritis/Septic shock  Discharge Date: 22 RARS: Readmission Risk Score: 26.9      Last Discharge  [facility]       Date Complaint Diagnosis Description Type Department Provider    22 Nausea Septic shock ([facility] [address] . .. ED to Hosp-Admission (Discharged) (ADMITTED) STEPHEN 5A Daryll Gilford, MD; [facility] ..    22  Ureteral stent present . .. Admission (Discharged) Miryam Mar MD            Was this an external facility discharge? No Discharge Facility: [facility]    Challenges to be reviewed by the provider   Additional needs identified to be addressed with provider: No  none               Method of communication with provider: none. Call placed to [facility], Julio Vazquez. She stated that her mother was doing \"ok\". She denied any further coffee ground emesis, no fever/chills, no increased confusion and no hematuria. She stated that she had all her medications and will not be making follow ups with physicians, due to the fact that they are moving out of state on . She had no concerns/questions or needs at this time. Care Transition Nurse reviewed discharge instructions with family who verbalized understanding. The family was given an opportunity to ask questions and does not have any further questions or concerns at this time. Were discharge instructions available to patient? Yes. Reviewed appropriate site of care based on symptoms and resources available to patient including: PCP  Specialist  When to call 911.  The family agrees to contact the PCP office for questions related to their healthcare. Advance Care Planning:   Does patient have an Advance Directive: reviewed and current. Medication reconciliation was performed with family, who verbalizes understanding of administration of home medications. Medications reviewed, 1111F entered: yes    Was patient discharged with a pulse oximeter? no    Non-face-to-face services provided:  Obtained and reviewed discharge summary and/or continuity of care documents    Offered patient enrollment in the Remote Patient Monitoring (RPM) program for in-home monitoring:  pt is moving out of state. .    Care Transitions 24 Hour Call    Do you have a copy of your discharge instructions?: Yes  Do you have all of your prescriptions and are they filled?: Yes  Have you been contacted by a [address]?: No  Have you scheduled your follow up appointment?: No  Do you have support at home?: Child  Do you feel like you have everything you need to keep you well at home?: Yes  Are you an active caregiver in your home?: No  Care Transitions Interventions         Follow Up  No future appointments. Care Transition Nurse provided contact information. No further follow-up call indicated based on severity of symptoms and risk factors. Plan for next call:  Pt will be moving to [place] 12/27,  episode ended.     Kristi Buck RN

## 2022-12-26 LAB — INTERVENTION: NORMAL

## 2022-12-27 DIAGNOSIS — I95.0 IDIOPATHIC HYPOTENSION: ICD-10-CM

## 2022-12-28 RX ORDER — FLUDROCORTISONE ACETATE 0.1 MG/1
TABLET ORAL
Qty: 90 TABLET | Refills: 0 | Status: SHIPPED | OUTPATIENT
Start: 2022-12-28

## 2022-12-28 NOTE — DISCHARGE SUMMARY
[address]     Department of Internal Medicine - Staff Internal Medicine Teaching Service    INPATIENT DISCHARGE SUMMARY      Patient Identification:  Emi Cat is a 76 y.o. female. :  1945  MRN: [id number]     Acct: [de-identified]   PCP: Maddy Joshi DO  Admit Date:  2022  Discharge date and time: 2022  5:10 PM   Attending Provider: No att. providers found                                     [facility]ists:  Principal Problem:    Pyelonephritis  Active Problems:    Ureteral stent present    GI bleed    History of extended-spectrum beta-lactamase producing Escherichia coli infection    VRE infection within last 3 months  Resolved Problems:    Septic shock ([facility] [address]    [facility])      [facility] course:   Emi Cat is a 76 y.o. female who was admitted for the management of Pyelonephritis, presented to the emergency department with C/C :  fever, nausea, coffee-ground emesis after patient had left ureteral stent exchange on , has coffee-ground emesis at least 4 episodes. Not on any blood thinner at home, only on aspirin which was stopped for the procedure, no active bleeding episodes in ED or she was monitored in ICU for septic shock from UTI requiring pressors .  GI was on board, no intervention done     Urology was also consulted, imaging showed improvement of left-sided hydronephrosis with nonobstructing bilateral renal stones,  no active bleeding on imaging,CT PE is also negative     Since patient has been in ICU, her blood pressure has improved she has been off pressors,Continues to be on Protonix drip for 48 hours, started on liquid diet with supplements, H&H has been stable     On 22-  Off Pressors on Protonix drip transitioned to PO, pt family leaning towards hospice eval and hospice consult was ordered and she was discharged to home       Procedures/ Significant Interventions: Central line for pressors    Consults:     Consults:     Final Specialist Recommendations/Findings:   IP CONSULT TO UROLOGY  IP CONSULT TO GI  IP CONSULT TO INTERNAL MEDICINE  IP CONSULT TO CRITICAL CARE  IP CONSULT TO INFECTIOUS DISEASES  IP CONSULT TO PALLIATIVE CARE  IP CONSULT TO HOSPICE      Any Hospital Acquired Infections: none    Discharge Functional Status:  stable    DISCHARGE PLAN     Disposition: home    Patient Instructions:   Discharge Medication List as of 12/22/2022  4:47 PM        START taking these medications    Details   midodrine (PROAMATINE) 10 MG tablet Take 1 tablet by mouth 3 times daily (with meals), Disp-90 tablet, R-1Normal      pantoprazole (PROTONIX) 40 MG tablet Take 1 tablet by mouth daily, Disp-60 tablet, R-0Normal           CONTINUE these medications which have CHANGED    Details   !! carBAMazepine (TEGRETOL) 200 MG tablet Take 1.5 tablets by mouth daily, Disp-90 tablet, R-0Normal      !! carBAMazepine (TEGRETOL) 200 MG tablet Take 2 tablets by mouth at bedtime, Disp-90 tablet, R-0Normal       !! - Potential duplicate medications found. Please discuss with provider. CONTINUE these medications which have NOT CHANGED    Details   HYDROcodone-acetaminophen (NORCO) 5-325 MG per tablet Take 1 tablet by mouth every 8 hours as needed for Pain for up to 3 days. Intended supply: 3 days.  Take lowest dose possible to manage pain, Disp-9 tablet, R-0Normal      tamsulosin (FLOMAX) 0.4 MG capsule Take 1 capsule by mouth daily, Disp-30 capsule, R-0Normal      Hyoscyamine Sulfate SL (LEVSIN/SL) 0.125 MG SUBL Place 30 mg under the tongue every 6 hours as needed (bladder spasms/cramps), Disp-30 each, R-0Normal      desmopressin (DDAVP) 0.2 MG tablet Take 0.6 mg by mouth daily Take 3 tablets every pmHistorical Med      potassium chloride (KLOR-CON M) 20 MEQ extended release tablet TAKE 1 TABLET BY MOUTH EVERY DAYHistorical Med      fludrocortisone (FLORINEF) 0.1 MG tablet TAKE 1 TABLET BY MOUTH EVERY DAY, Disp-90 tablet, R-0Normal      FLUoxetine (PROZAC) 20 MG capsule 2 po qd, Disp-180 capsule, R-3Normal      mirabegron (MYRBETRIQ) 50 MG TB24 Take 50 mg by mouth daily, Disp-90 tablet, R-3Normal      aspirin 81 MG EC tablet Take 81 mg by mouth dailyHistorical Med           STOP taking these medications       linezolid (ZYVOX) 600 MG tablet Comments:   Reason for Stopping:         melatonin 5 MG TABS tablet Comments:   Reason for Stopping:               Activity: activity as tolerated    Diet: regular diet    Follow-up:    Sarah Ochoa DO  [address] [facility] [address]  [phone]    Schedule an appointment as soon as possible for a visit  poast hopsital follow up    Sarah Ochoa DO  [address] [facility] [address] [facility] [address]  [address] [place] [address]  [phone]    Follow up  Upper GI bleed, on protonix, not had EGD    Steven Guzmán MD  [address] [address]  [phone]    Schedule an appointment as soon as possible for a visit in 1 week(s)  S/p post HOLMIUM LASER CYSTOSCOPY,URETEROSCOPY, STENT EXCHANGE  ESWL EXTRACORPOREAL SHOCK WAVE LITHOTRIPSY      Patient Instructions: Follow up with GI and urology after discharge  Start taking protonix   If any acute bleed call 911 and come to ED for further eval.  Follow up labs: none  Follow up imaging: none    Note that over 30 minutes was spent in preparing discharge papers, discussing discharge with patient, medication review, etc.      Lay Ivory MD, MD  Internal Medicine Resident, PGY-3  [facility]; [place], [place]  12/28/2022, 2:59 PM     Attending Physician Statement  I have discussed the care of Hugo Rivas and I have examined the patient myselft and taken ros and hpi , including pertinent history and exam findings,  with the resident.  I have reviewed the key elements of all parts of the encounter with the resident. I agree with the DC plan as documented by the resident.       Electronically signed by Deuce Abbott MD

## 2022-12-28 NOTE — TELEPHONE ENCOUNTER
Marvin Huff is calling to request a refill on the following medication(s):    Last Visit Date (If Applicable):  77/56/1287    Next Visit Date:    Visit date not found    Medication Request:  Requested Prescriptions     Pending Prescriptions Disp Refills    fludrocortisone (FLORINEF) 0.1 MG tablet [Pharmacy Med Name: Fludrocortisone Acetate Oral Tablet 0.1 MG] 90 tablet 0     Sig: TAKE 1 TABLET BY MOUTH EVERY DAY

## 2023-01-31 ENCOUNTER — TELEMEDICINE (OUTPATIENT)
Dept: FAMILY MEDICINE CLINIC | Age: 78
End: 2023-01-31
Payer: COMMERCIAL

## 2023-01-31 DIAGNOSIS — F02.818 DEMENTIA DUE TO PARKINSON'S DISEASE WITH BEHAVIORAL DISTURBANCE (HCC): ICD-10-CM

## 2023-01-31 DIAGNOSIS — A41.9 SEPSIS, DUE TO UNSPECIFIED ORGANISM, UNSPECIFIED WHETHER ACUTE ORGAN DYSFUNCTION PRESENT (HCC): ICD-10-CM

## 2023-01-31 DIAGNOSIS — Z86.61 HISTORY OF ENCEPHALITIS: Primary | ICD-10-CM

## 2023-01-31 DIAGNOSIS — G20 DEMENTIA DUE TO PARKINSON'S DISEASE WITH BEHAVIORAL DISTURBANCE (HCC): ICD-10-CM

## 2023-01-31 DIAGNOSIS — G40.909 SEIZURE DISORDER (HCC): ICD-10-CM

## 2023-01-31 DIAGNOSIS — J44.1 COPD EXACERBATION (HCC): ICD-10-CM

## 2023-01-31 DIAGNOSIS — R26.2 DIFFICULTY IN WALKING: ICD-10-CM

## 2023-01-31 DIAGNOSIS — I82.4Y2 ACUTE DEEP VEIN THROMBOSIS (DVT) OF PROXIMAL VEIN OF LEFT LOWER EXTREMITY (HCC): ICD-10-CM

## 2023-01-31 PROCEDURE — 1123F ACP DISCUSS/DSCN MKR DOCD: CPT | Performed by: FAMILY MEDICINE

## 2023-01-31 PROCEDURE — 99214 OFFICE O/P EST MOD 30 MIN: CPT | Performed by: FAMILY MEDICINE

## 2023-01-31 RX ORDER — CARBAMAZEPINE 200 MG/1
300 TABLET ORAL DAILY
Qty: 90 TABLET | Refills: 0 | Status: SHIPPED | OUTPATIENT
Start: 2023-01-31

## 2023-01-31 NOTE — PROGRESS NOTES
2023    TELEHEALTH EVALUATION -- Audio/Visual (During HSTKV-84 public health emergency)    HPI:    Hugo Rivas (:  1945) has requested an audio/video evaluation for the following concern(s):    She is being seen today with her daughter patient is nonverbal and has no capability to understand anything that is going on she has a longstanding history of herpes encephalitis seizure disorder essentially functions at the level of about a 1year-old  She has progressively gotten worse has been had recurrent UTI sepsis bouts seizure disorder increasing coronary artery worsening and it is been very progressive and daughter needs to get hospice help for her at this point    Review of Systems    Prior to Visit Medications    Medication Sig Taking?  Authorizing Provider   fludrocortisone (FLORINEF) 0.1 MG tablet TAKE 1 TABLET BY MOUTH EVERY DAY  Sravani Wu DO   midodrine (PROAMATINE) 10 MG tablet Take 1 tablet by mouth 3 times daily (with meals)  Diane Treadwell MD   pantoprazole (PROTONIX) 40 MG tablet Take 1 tablet by mouth daily  Diane Treadwell MD   carBAMazepine (TEGRETOL) 200 MG tablet Take 1.5 tablets by mouth daily  Diane Treadwell MD   carBAMazepine (TEGRETOL) 200 MG tablet Take 2 tablets by mouth at bedtime  Diane Treadwell MD   tamsulosin (FLOMAX) 0.4 MG capsule Take 1 capsule by mouth daily  Kat Cormier MD   Hyoscyamine Sulfate SL (LEVSIN/SL) 0.125 MG SUBL Place 30 mg under the tongue every 6 hours as needed (bladder spasms/cramps)  Kat Cormier MD   desmopressin (DDAVP) 0.2 MG tablet Take 0.6 mg by mouth daily Take 3 tablets every pm  Historical Provider, MD   potassium chloride (KLOR-CON M) 20 MEQ extended release tablet TAKE 1 TABLET BY MOUTH EVERY DAY  Historical Provider, MD   FLUoxetine (PROZAC) 20 MG capsule 2 po qd  Patient taking differently: Take 40 mg by mouth daily  Sravani Wu DO   mirabegron (MYRBETRIQ) 50 MG TB24 Take 50 mg by mouth daily  Sravani Wu DO   aspirin 81 MG EC tablet Take 81 mg by mouth daily  Historical Provider, MD       Social History     Tobacco Use    Smoking status: Former     Packs/day: 1.00     Years: 30.00     Pack years: 30.00     Types: Cigarettes     Start date:      Quit date:      Years since quittin.1    Smokeless tobacco: Never    Tobacco comments:     unknown how many packs a day, or how many years   Vaping Use    Vaping Use: Never used   Substance Use Topics    Alcohol use: No    Drug use: No        Allergies   Allergen Reactions    Adhesive Tape Other (See Comments)     Pulls skin off    Haldol [Haloperidol Lactate] Other (See Comments)     hallucinates   ,   Past Medical History:   Diagnosis Date    Anxiety     Convulsion ([address]     Dementia ([address]     Encephalopathy     Herpes     Hx of blood clots     Hyperlipidemia     Left bundle branch block     MRSA (methicillin resistant Staphylococcus aureus)     Parkinson's disease ([address]     family states tremors not parkinson    Seizures ([address]     secondary to encephalitis    Under care of service provider 12/15/2022    [id number]-last visit dec 2022    Vitamin D deficiency    ,   Past Surgical History:   Procedure Laterality Date    ABSCESS DRAINAGE Right 2013    WOUND EXPLORATION AND I+D  RIGHT HIP    CYSTOSCOPY Left 2022    CYSTOSCOPY URETERAL STENT INSERTION performed by Luis A Jacobsen MD at [place] [address] Left 2022    ATTEMPTED CYSTOSCOPY WITH LEFT STENT EXCHANGE performed by Luis A Jacobsen MD at [facility] ([address] [place] [address])      LITHOTRIPSY Left 2022    ESWL EXTRACORPOREAL SHOCK WAVE LITHOTRIPSY performed by Luis A Jacobsen MD at [address] [place] [address]      dtr states hemorrhoid surgery but unsure what type    SPLENECTOMY      at age 10 after [address] Left 2022    Cystoscopy    URETER SURGERY Left 2022    HOLMIUM LASER CYSTOSCOPY,URETEROSCOPY, STENT EXCHANGE performed by [facility] MD Stalin at [facility]    URETEROSCOPY Left 2022    stent   ,   Social History     Tobacco Use    Smoking status: Former     Packs/day: 1.00     Years: 30.00     Pack years: 30.00     Types: Cigarettes     Start date: 54     Quit date:      Years since quittin.1    Smokeless tobacco: Never    Tobacco comments:     unknown how many packs a day, or how many years   Vaping Use    Vaping Use: Never used   Substance Use Topics    Alcohol use: No    Drug use: No   ,   Family History   Problem Relation Age of Onset    Asthma Mother     No Known Problems Father    ,   Immunization History   Administered Date(s) Administered    COVID-19, PFIZER PURPLE top, DILUTE for use, (age 15 y+), 30mcg/0.3mL 2021, 2021, 2021    Influenza 10/09/2013    Influenza Virus Vaccine 2015    Influenza Whole 10/13/2004    Influenza, High Dose (Fluzone 65 yrs and older) 10/04/2017, 2018    Pneumococcal Conjugate 13-valent (Sdmbyhi73) 2018    Pneumococcal Polysaccharide (Ylmizeleg91) 2015    Tdap (Boostrix, Adacel) 05/15/2022       PHYSICAL EXAMINATION:  [ INSTRUCTIONS:  \"[x]\" Indicates a positive item  \"[]\" Indicates a negative item  -- DELETE ALL ITEMS NOT EXAMINED]  Vital Signs: (As obtained by patient/caregiver or practitioner observation)    Blood pressure-  Heart rate-    Respiratory rate-    Temperature-  Pulse oximetry-     Constitutional: [x] Appears well-developed and well-nourished [x] No apparent distress      [] Abnormal-   Mental status  [x] Alert and awake  [x] Oriented to person/place/time [x]Able to follow commands      Eyes:  EOM    [x]  Normal  [] Abnormal-  Sclera  [x]  Normal  [] Abnormal -         Discharge [x]  None visible  [] Abnormal -    HENT:   [x] Normocephalic, atraumatic.   [] Abnormal   [x] Mouth/Throat: Mucous membranes are moist.     External Ears [x] Normal  [] Abnormal-     Neck: [x] No visualized mass     Pulmonary/Chest: [x] Respiratory effort normal. [x] No visualized signs of difficulty breathing or respiratory distress        [] Abnormal-      Musculoskeletal:   [x] Normal gait with no signs of ataxia         [x] Normal range of motion of neck        [] Abnormal-       Neurological:        [x] No Facial Asymmetry (Cranial nerve 7 motor function) (limited exam to video visit)          [x] No gaze palsy        [] Abnormal-         Skin:        [x] No significant exanthematous lesions or discoloration noted on facial skin         [] Abnormal-            Psychiatric:       [x] Normal Affect [x] No Hallucinations        [] Abnormal-     Other pertinent observable physical exam findings-     ASSESSMENT/PLAN:   Diagnosis Orders   1. History of encephalitis  External Referral To Hospice      2. Difficulty in walking  External Referral To Hospice      3. Seizure disorder [facility])  External Referral To Hospice      4. Dementia due to Parkinson's disease with behavioral disturbance [facility])  External Referral To Hospice      5. Sepsis, due to unspecified organism, unspecified whether acute organ dysfunction present [facility])  External Referral To Hospice      6. COPD exacerbation ([facility]        7. Acute deep vein thrombosis (DVT) of proximal vein of left lower extremity ([facility]           Orders Placed This Encounter   Procedures    External Referral To Hospice     Requested Prescriptions      No prescriptions requested or ordered in this encounter       No follow-ups on file. Ana Rosa Rossi is a 76 y.o. female being evaluated by a Virtual Visit (video visit) encounter to address concerns as mentioned above. A caregiver was present when appropriate. Due to this being a TeleHealth encounter (During [facility]- public health emergency), evaluation of the following organ systems was limited: Vitals/Constitutional/EENT/Resp/CV/GI//MS/Neuro/Skin/Heme-Lymph-Imm.   Pursuant to the emergency declaration under the [facility] waiver authority and the Coronavirus Preparedness and Response Supplemental Appropriations Act, this Virtual Visit was conducted with patient's (and/or legal guardian's) consent, to reduce the patient's risk of exposure to COVID-19 and provide necessary medical care. The patient (and/or legal guardian) has also been advised to contact this office for worsening conditions or problems, and seek emergency medical treatment and/or call 911 if deemed necessary. Patient identification was verified at the start of the visit: Yes    Total time spent on this encounter: Not billed by time    Services were provided through a video synchronous discussion virtually to substitute for in-person clinic visit. Patient and provider were located at their individual homes. --Yu Bosch DO on 1/31/2023 at 1:43 PM    An electronic signature was used to authenticate this note.

## 2023-11-10 ENCOUNTER — TELEPHONE (OUTPATIENT)
Dept: FAMILY MEDICINE CLINIC | Age: 78
End: 2023-11-10

## 2023-11-10 NOTE — TELEPHONE ENCOUNTER
Xavier Lange was contacted as a part of [employer]. A voicemail message was left reminding the patient to schedule an AWV with their PCP.

## 2024-01-01 NOTE — PROGRESS NOTES
10/05/22 1313   Encounter Summary   Encounter Overview/Reason  Volunteer Encounter   Service Provided For: Patient   Referral/Consult From: Rounding   Last Encounter  10/05/22  (V)   Complexity of Encounter Low   Spiritual/Emotional needs   Type Spiritual Support   Rituals, Rites and [address] Maternal hx GBS UTI  4/2024 Current treatment  at time of delivery for Klebsiella UTI with rocephin, Hx BV, Candida and trichomonas with CECIEL. ROM at delivery. Infant with clinical illness. Admit CBC wnl with repeat increased WBC and segs. Blood culture and negative to date.  Ampicillin and gentamicin started; initial plan to discontinue at 36 hours but due to clinical course and worsening CXR will continue antibiotics for 5 days.  9/6 CRP 7.6  9/7 Infants clinical status and labs improved.   9/4 Blood culture: negative final  9/4-9/7 ampicillin and gentamicin    Plan:  Follow clinically

## 2024-02-26 NOTE — PROGRESS NOTES
BRONCHOSPASM/BRONCHOCONSTRICTION     [x]         IMPROVE AERATION/BREATH SOUNDS  [x]   ADMINISTER BRONCHODILATOR THERAPY AS APPROPRIATE  [x]   ASSESS BREATH SOUNDS  []   IMPLEMENT AEROSOL/MDI PROTOCOL  [x]   PATIENT EDUCATION AS NEEDED

## 2024-04-29 ENCOUNTER — HOSPITAL ENCOUNTER (OUTPATIENT)
Age: 79
Setting detail: SPECIMEN
Discharge: HOME | End: 2024-04-29

## 2024-04-29 ENCOUNTER — OFFICE VISIT (OUTPATIENT)
Dept: FAMILY MEDICINE CLINIC | Age: 79
End: 2024-04-29
Payer: MEDICARE

## 2024-04-29 VITALS
HEART RATE: 83 BPM | BODY MASS INDEX: 16.01 KG/M2 | HEIGHT: 67 IN | WEIGHT: 102 LBS | DIASTOLIC BLOOD PRESSURE: 67 MMHG | OXYGEN SATURATION: 98 % | SYSTOLIC BLOOD PRESSURE: 100 MMHG

## 2024-04-29 DIAGNOSIS — Z00.00 MEDICARE ANNUAL WELLNESS VISIT, SUBSEQUENT: Primary | ICD-10-CM

## 2024-04-29 DIAGNOSIS — Z01.84 IMMUNITY STATUS TESTING: ICD-10-CM

## 2024-04-29 DIAGNOSIS — Z91.81 AT HIGH RISK FOR FALLS: ICD-10-CM

## 2024-04-29 PROCEDURE — G0439 PPPS, SUBSEQ VISIT: HCPCS | Performed by: FAMILY MEDICINE

## 2024-04-29 PROCEDURE — 1123F ACP DISCUSS/DSCN MKR DOCD: CPT | Performed by: FAMILY MEDICINE

## 2024-04-29 SDOH — ECONOMIC STABILITY: FOOD INSECURITY: WITHIN THE PAST 12 MONTHS, THE FOOD YOU BOUGHT JUST DIDN'T LAST AND YOU DIDN'T HAVE MONEY TO GET MORE.: NEVER TRUE

## 2024-04-29 SDOH — ECONOMIC STABILITY: HOUSING INSECURITY
IN THE LAST 12 MONTHS, WAS THERE A TIME WHEN YOU DID NOT HAVE A STEADY PLACE TO SLEEP OR SLEPT IN A SHELTER (INCLUDING NOW)?: NO

## 2024-04-29 SDOH — ECONOMIC STABILITY: FOOD INSECURITY: WITHIN THE PAST 12 MONTHS, YOU WORRIED THAT YOUR FOOD WOULD RUN OUT BEFORE YOU GOT MONEY TO BUY MORE.: NEVER TRUE

## 2024-04-29 SDOH — ECONOMIC STABILITY: INCOME INSECURITY: HOW HARD IS IT FOR YOU TO PAY FOR THE VERY BASICS LIKE FOOD, HOUSING, MEDICAL CARE, AND HEATING?: NOT HARD AT ALL

## 2024-04-29 ASSESSMENT — PATIENT HEALTH QUESTIONNAIRE - PHQ9: DEPRESSION UNABLE TO ASSESS: PT REFUSES

## 2024-04-29 NOTE — PROGRESS NOTES
On the basis of positive falls risk screening, assessment and plan is as follows: home safety tips provided  [facility]  [facility]  [address]  [address]  [facility] [address]  Dept: [phone]      Yasmine Morrison is a 78 y.o. female who presents today for follow up on her  medical conditions as noted below.      Chief Complaint   Patient presents with    Medication Refill       Patient Active Problem List:     Seizure ([facility])     Vitamin D deficiency     Anxiety     Hypercholesteremia     Cellulitis of right leg     MRSA (methicillin resistant staph aureus) culture positive     Breakthrough seizure (HCC)     Memory loss     Difficulty speaking     Seizure disorder ([facility])     Dementia with behavioral disturbance (HCC)     History of encephalitis     Dementia due to Parkinson's disease with behavioral disturbance ([facility])     DVT of deep femoral vein, left ([facility])     Closed compression fracture of L1 lumbar vertebra, initial encounter ([facility])     Depression     Kidney stone     Urinary incontinence     Overactive bladder     Closed head injury     Community acquired pneumonia     Sepsis ([facility])     COPD exacerbation ([facility])     Hypokalemia     Bacterial UTI     Urinary tract infection due to Proteus     ESBL (extended spectrum beta-lactamase) producing bacteria infection     Acute cystitis with hematuria     Leukocytosis     Mitral valve disorder     Chest pain     Encounter for palliative care     Complicated UTI (urinary tract infection)     Ureteral stent present     GI bleed     Pyelonephritis     History of extended-spectrum beta-lactamase producing Escherichia coli infection     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

## 2024-05-02 LAB
QUANTI TB GOLD PLUS: NEGATIVE
QUANTI TB1 MINUS NIL: 0 IU/ML (ref 0–0.34)
QUANTI TB2 MINUS NIL: 0 IU/ML (ref 0–0.34)
QUANTIFERON MITOGEN: 0.65 IU/ML
QUANTIFERON NIL: 0.02 IU/ML

## 2024-07-26 ENCOUNTER — HOSPITAL ENCOUNTER (OUTPATIENT)
Age: 79
Setting detail: SPECIMEN
Discharge: HOME | End: 2024-07-26

## 2024-07-26 ENCOUNTER — APPOINTMENT (OUTPATIENT)
Dept: CT IMAGING | Age: 79
End: 2024-07-26
Payer: MEDICARE

## 2024-07-26 ENCOUNTER — HOSPITAL ENCOUNTER (EMERGENCY)
Age: 79
Discharge: HOME | End: 2024-07-26
Attending: EMERGENCY MEDICINE
Payer: MEDICARE

## 2024-07-26 VITALS
BODY MASS INDEX: 19.58 KG/M2 | WEIGHT: 125 LBS | TEMPERATURE: 97.7 F | SYSTOLIC BLOOD PRESSURE: 113 MMHG | RESPIRATION RATE: 18 BRPM | DIASTOLIC BLOOD PRESSURE: 57 MMHG | OXYGEN SATURATION: 98 % | HEART RATE: 76 BPM

## 2024-07-26 DIAGNOSIS — S00.83XA CONTUSION OF FACE, INITIAL ENCOUNTER: Primary | ICD-10-CM

## 2024-07-26 PROCEDURE — 70450 CT HEAD/BRAIN W/O DYE: CPT

## 2024-07-26 PROCEDURE — 99284 EMERGENCY DEPT VISIT MOD MDM: CPT

## 2024-07-26 RX ORDER — OMEPRAZOLE 20 MG/1
20 CAPSULE, DELAYED RELEASE ORAL DAILY
COMMUNITY

## 2024-07-26 RX ORDER — QUETIAPINE FUMARATE 25 MG/1
50 TABLET, FILM COATED ORAL 2 TIMES DAILY
COMMUNITY

## 2024-07-26 ASSESSMENT — ENCOUNTER SYMPTOMS
SINUS PRESSURE: 0
WHEEZING: 0
SORE THROAT: 0
CONSTIPATION: 0
DIARRHEA: 0
SHORTNESS OF BREATH: 0
NAUSEA: 0
ABDOMINAL PAIN: 0
BACK PAIN: 0
VOMITING: 0

## 2024-07-26 ASSESSMENT — PAIN - FUNCTIONAL ASSESSMENT: PAIN_FUNCTIONAL_ASSESSMENT: NONE - DENIES PAIN

## 2024-07-26 ASSESSMENT — LIFESTYLE VARIABLES: HOW OFTEN DO YOU HAVE A DRINK CONTAINING ALCOHOL: NEVER

## 2024-07-26 NOTE — DISCHARGE INSTRUCTIONS
You were seen in the emergency department for a fall. Imaging studies negative. You are cleared to return to your care facility. Please follow up with primary care. Please return for any new or worsening symptoms.

## 2024-07-26 NOTE — ED NOTES
Writer spoke to ECF  Per ECF, daughter who is legal guardian, was notified that pt has fell multi times and was transported to ER-per ECF no return call  Report given to ECF nurse; pt is in [facility] for Respite care and is ambulatory but will get confused due to HX Dementia and fall; therefore they have her in WC for safety  Will update Evy CAMPBELL

## 2024-07-26 NOTE — ED NOTES
Pt awoke and ambulated to  with minimal assist  Pt updated that she is to go to [facility]  \"Ok\"
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
## 2024-07-26 NOTE — ED PROVIDER NOTES
[facility] ED  Emergency Department Encounter  Emergency Medicine Resident     Pt Name:Yasmine Morrison  MRN: [id number]  Birthdate 1945  Date of evaluation: 7/26/24  PCP:  Sravani Wu DO  Note Started: 9:15 AM EDT      CHIEF COMPLAINT       Chief Complaint   Patient presents with    Fall     Reported pt \"slid\" out of her WC this morning around 6 am  No injury noted  NO LOC noted  Reported this is not new for pt; she often slides out of her W/C       HISTORY OF PRESENT ILLNESS  (Location/Symptom, Timing/Onset, Context/Setting, Quality, Duration, Modifying Factors, Severity.)      Yasmine Morrison is a 78 y.o. female who presents with fall.  Patient presents from nursing facility.  Has a history of dementia.  Apparently slid out of her wheelchair earlier today.  Has a history of doing this.  Initially was not concern for any injury but EMS noticed a bruise on the left side of the head so brought in for evaluation.  Patient denies any complaints.  No pain.  No other symptoms at this time.    PAST MEDICAL / SURGICAL / SOCIAL / FAMILY HISTORY      has a past medical history of Anxiety, Convulsion (HCC), Dementia (HCC), Encephalopathy, Herpes, Hx of blood clots, Hyperlipidemia, Left bundle branch block, MRSA (methicillin resistant Staphylococcus aureus), Parkinson's disease (HCC), Seizures (HCC), Under care of service provider, and Vitamin D deficiency.       has a past surgical history that includes Abscess Drainage (Right, 12/14/2013); Ureter stent placement (Left, 05/16/2022); Cystoscopy (Left, 05/16/2022); Cystoscopy (Left, 12/02/2022); Hysterectomy; Splenectomy; other surgical history; Ureteroscopy (Left, 12/19/2022); Ureter surgery (Left, 12/19/2022); and Lithotripsy (Left, 12/19/2022).      Social History     Socioeconomic History    Marital status: Single     Spouse name: Not on file    Number of children: Not on file    Years of education: Not on file    Highest education level: Not on  78-year-old female with above-stated medical history presents emergency department with fall.  Comes from the nursing home.  Is demented.  Currently slid out of her wheelchair.  This is fairly normal for her.  Does have a small bruise on the side of the head.  Will obtain CT scan to rule out intracranial injury.  Anticipate discharge back to nursing facility.    Amount and/or Complexity of Data Reviewed  Radiology: ordered.        EKG      All EKG's are interpreted by the Emergency Department Physician who either signs or Co-signs this chart in the absence of a cardiologist.    EMERGENCY DEPARTMENT COURSE:  Patient reevaluated.  Imaging studies unremarkable.  Patient still has no complaints.  Discharged in stable condition back to nursing facility.         PROCEDURES:  None    CONSULTS:  None    CRITICAL CARE:  There was significant risk of life threatening deterioration of patient's condition requiring my direct management. Critical care time 0 minutes, excluding any documented procedures.    FINAL IMPRESSION      1. Contusion of face, initial encounter          DISPOSITION / PLAN     DISPOSITION Decision To Discharge 07/26/2024 09:39:56 AM      PATIENT REFERRED TO:  [facility] ED  [address] [address]  [phone]    As needed, If symptoms worsen    Sravani Wu DO  [address]  [place] [address]  [phone]      As needed, If symptoms worsen      DISCHARGE MEDICATIONS:  Discharge Medication List as of 7/26/2024 10:01 AM          Frandy Crawford DO  Emergency Medicine Resident    (Please note that portions of thisnote were completed with a voice recognition program.  Efforts were made to edit the dictations but occasionally words are mis-transcribed.)

## 2024-07-26 NOTE — ED NOTES
Plan to discharge  Registration attempting to contact legal guardian  Pt resting on cot  Pt is alert   Pt denied concerns at this time

## 2024-07-26 NOTE — ED PROVIDER NOTES
[facility] ED     Emergency Department     Faculty Attestation    I performed a history and physical examination of the patient and discussed management with the resident. I reviewed the resident’s note and agree with the documented findings and plan of care. Any areas of disagreement are noted on the chart. I was personally present for the key portions of any procedures. I have documented in the chart those procedures where I was not present during the key portions. I have reviewed the emergency nurses triage note. I agree with the chief complaint, past medical history, past surgical history, allergies, medications, social and family history as documented unless otherwise noted below. For Physician Assistant/ Nurse Practitioner cases/documentation I have personally evaluated this patient and have completed at least one if not all key elements of the E/M (history, physical exam, and MDM). Additional findings are as noted.    Note Started: 8:33 AM EDT    Patient lives by EMS provides impending history after fall out of her wheelchair from her care facility.  Patient is unable provide any history no family here for additional information.  Per report this is not unusual for her.  Reviewing med list do not see any anticoagulants.  On exam small ecchymosis left parietal nontender.  Normal pupils.  No midline neck tenderness.  Full range of motion all joints no long bone tenderness deformities pelvis stable abdomen soft nontender equal for extremity pulses.  Will do head CT, possible discharge back to care facility      Critical Care     none    David Patel MD, FACEP, FAAEM  Attending Emergency  Physician           David Patel MD  07/26/24 7680

## 2024-07-31 ENCOUNTER — HOSPITAL ENCOUNTER (OUTPATIENT)
Age: 79
Setting detail: SPECIMEN
Discharge: HOME | End: 2024-07-31
Payer: MEDICARE

## 2024-07-31 LAB
ANION GAP SERPL CALCULATED.3IONS-SCNC: 9 MMOL/L (ref 9–17)
BUN SERPL-MCNC: 25 MG/DL (ref 8–23)
BUN/CREAT SERPL: 31 (ref 9–20)
CALCIUM SERPL-MCNC: 8.6 MG/DL (ref 8.6–10.4)
CHLORIDE SERPL-SCNC: 104 MMOL/L (ref 98–107)
CO2 SERPL-SCNC: 25 MMOL/L (ref 20–31)
CREAT SERPL-MCNC: 0.8 MG/DL (ref 0.5–0.9)
ERYTHROCYTE [DISTWIDTH] IN BLOOD BY AUTOMATED COUNT: 15 % (ref 11.8–14.4)
GFR, ESTIMATED: 74 ML/MIN/1.73M2
GLUCOSE SERPL-MCNC: 113 MG/DL (ref 70–99)
HCT VFR BLD AUTO: 35.2 % (ref 36.3–47.1)
HGB BLD-MCNC: 11.7 G/DL (ref 11.9–15.1)
MCH RBC QN AUTO: 33.9 PG (ref 25.2–33.5)
MCHC RBC AUTO-ENTMCNC: 33.2 G/DL (ref 28.4–34.8)
MCV RBC AUTO: 102 FL (ref 82.6–102.9)
NRBC BLD-RTO: 0 PER 100 WBC
PLATELET # BLD AUTO: 295 K/UL (ref 138–453)
PMV BLD AUTO: 10.5 FL (ref 8.1–13.5)
POTASSIUM SERPL-SCNC: 4.5 MMOL/L (ref 3.7–5.3)
RBC # BLD AUTO: 3.45 M/UL (ref 3.95–5.11)
SODIUM SERPL-SCNC: 138 MMOL/L (ref 135–144)
WBC OTHER # BLD: 6.1 K/UL (ref 3.5–11.3)

## 2024-07-31 PROCEDURE — P9603 ONE-WAY ALLOW PRORATED MILES: HCPCS

## 2024-07-31 PROCEDURE — 85027 COMPLETE CBC AUTOMATED: CPT

## 2024-07-31 PROCEDURE — 36415 COLL VENOUS BLD VENIPUNCTURE: CPT

## 2024-07-31 PROCEDURE — 80048 BASIC METABOLIC PNL TOTAL CA: CPT

## 2024-09-17 ENCOUNTER — TELEMEDICINE (OUTPATIENT)
Dept: FAMILY MEDICINE CLINIC | Age: 79
End: 2024-09-17
Payer: COMMERCIAL

## 2024-09-17 DIAGNOSIS — Z86.61 HISTORY OF ENCEPHALITIS: Primary | ICD-10-CM

## 2024-09-17 DIAGNOSIS — Z74.01 BED CONFINEMENT STATUS: ICD-10-CM

## 2024-09-17 DIAGNOSIS — R53.1 WEAKNESS: ICD-10-CM

## 2024-09-17 DIAGNOSIS — G40.909 SEIZURE DISORDER (HCC): ICD-10-CM

## 2024-09-17 PROCEDURE — G8400 PT W/DXA NO RESULTS DOC: HCPCS | Performed by: FAMILY MEDICINE

## 2024-09-17 PROCEDURE — G8427 DOCREV CUR MEDS BY ELIG CLIN: HCPCS | Performed by: FAMILY MEDICINE

## 2024-09-17 PROCEDURE — G8420 CALC BMI NORM PARAMETERS: HCPCS | Performed by: FAMILY MEDICINE

## 2024-09-17 PROCEDURE — 1036F TOBACCO NON-USER: CPT | Performed by: FAMILY MEDICINE

## 2024-09-17 PROCEDURE — 1123F ACP DISCUSS/DSCN MKR DOCD: CPT | Performed by: FAMILY MEDICINE

## 2024-09-17 PROCEDURE — 1090F PRES/ABSN URINE INCON ASSESS: CPT | Performed by: FAMILY MEDICINE

## 2024-09-17 PROCEDURE — 99214 OFFICE O/P EST MOD 30 MIN: CPT | Performed by: FAMILY MEDICINE

## 2024-09-18 ENCOUNTER — PATIENT MESSAGE (OUTPATIENT)
Dept: FAMILY MEDICINE CLINIC | Age: 79
End: 2024-09-18

## 2024-09-20 DIAGNOSIS — I95.0 IDIOPATHIC HYPOTENSION: ICD-10-CM

## 2024-09-20 DIAGNOSIS — R32 ENURESIS: ICD-10-CM

## 2024-09-20 RX ORDER — MIRABEGRON 50 MG/1
50 TABLET, EXTENDED RELEASE ORAL DAILY
Qty: 90 TABLET | Refills: 3 | Status: SHIPPED | OUTPATIENT
Start: 2024-09-20

## 2024-09-20 RX ORDER — CARBAMAZEPINE 200 MG/1
300 TABLET ORAL DAILY
Qty: 90 TABLET | Refills: 3 | Status: SHIPPED | OUTPATIENT
Start: 2024-09-20

## 2024-09-20 RX ORDER — FLUDROCORTISONE ACETATE 0.1 MG/1
0.1 TABLET ORAL DAILY
Qty: 90 TABLET | Refills: 3 | Status: SHIPPED | OUTPATIENT
Start: 2024-09-20

## 2024-09-23 ENCOUNTER — TELEPHONE (OUTPATIENT)
Dept: FAMILY MEDICINE CLINIC | Age: 79
End: 2024-09-23

## 2024-10-07 ENCOUNTER — TELEPHONE (OUTPATIENT)
Dept: FAMILY MEDICINE CLINIC | Age: 79
End: 2024-10-07

## 2024-10-07 NOTE — TELEPHONE ENCOUNTER
Evy with medical service company calling in to inform she need notes that will cover the wheelchair, stated the hospital bed was covered with those notes. Stated she is re faxing the list that shows what qualifies. Notes can be faxed to [phone]

## 2024-10-14 ENCOUNTER — TELEPHONE (OUTPATIENT)
Dept: FAMILY MEDICINE CLINIC | Age: 79
End: 2024-10-14

## 2024-10-14 NOTE — TELEPHONE ENCOUNTER
Darlene from [facility] office of aging called patient and her family is looking for a respite stay, for every weekend. Her daughter(care taker) is wanting to go back to work every weekend. They found one but they are needing our office to send a face sheet, h & P, medication list every 30 days. Darlene is wondering if that's something we can do. Please advise.       Darlene GEORGE is secure.   (P) [phone]

## 2024-10-29 ENCOUNTER — TELEPHONE (OUTPATIENT)
Dept: FAMILY MEDICINE CLINIC | Age: 79
End: 2024-10-29

## 2024-10-29 DIAGNOSIS — I95.0 IDIOPATHIC HYPOTENSION: ICD-10-CM

## 2024-10-29 DIAGNOSIS — R32 ENURESIS: ICD-10-CM

## 2024-10-29 RX ORDER — MIRABEGRON 50 MG/1
50 TABLET, FILM COATED, EXTENDED RELEASE ORAL DAILY
Qty: 90 TABLET | Refills: 3 | Status: SHIPPED | OUTPATIENT
Start: 2024-10-29

## 2024-10-29 RX ORDER — CARBAMAZEPINE 200 MG/1
300 TABLET ORAL DAILY
Qty: 90 TABLET | Refills: 3 | Status: SHIPPED | OUTPATIENT
Start: 2024-10-29

## 2024-10-29 RX ORDER — QUETIAPINE FUMARATE 25 MG/1
50 TABLET, FILM COATED ORAL 2 TIMES DAILY
Qty: 120 TABLET | Refills: 5 | Status: SHIPPED | OUTPATIENT
Start: 2024-10-29

## 2024-10-29 RX ORDER — FLUDROCORTISONE ACETATE 0.1 MG/1
0.1 TABLET ORAL DAILY
Qty: 90 TABLET | Refills: 3 | Status: SHIPPED | OUTPATIENT
Start: 2024-10-29

## 2024-10-29 NOTE — TELEPHONE ENCOUNTER
Yasmine Morrison is calling to request a refill on the following medication(s):    Last Visit Date (If Applicable):  9/17/2024    Next Visit Date:    Visit date not found    Medication Request:  Requested Prescriptions     Pending Prescriptions Disp Refills    mirabegron (MYRBETRIQ) 50 MG TB24 90 tablet 3     Sig: Take 50 mg by mouth daily    fludrocortisone (FLORINEF) 0.1 MG tablet 90 tablet 3     Sig: Take 1 tablet by mouth daily    carBAMazepine (TEGRETOL) 200 MG tablet 90 tablet 3     Sig: Take 1.5 tablets by mouth daily

## 2024-10-30 ENCOUNTER — PATIENT MESSAGE (OUTPATIENT)
Dept: FAMILY MEDICINE CLINIC | Age: 79
End: 2024-10-30

## 2024-10-30 ENCOUNTER — TELEPHONE (OUTPATIENT)
Dept: FAMILY MEDICINE CLINIC | Age: 79
End: 2024-10-30

## 2024-10-30 NOTE — TELEPHONE ENCOUNTER
Darlene from the area office of aging called in stating the patient is going to be in Respite stay the facility needs the paperwork sent over this will be her first 30 days    Is this okay to send over     Face sheet, H&P, visit summary, med list   fax number [phone]      Please advise

## 2024-10-30 NOTE — TELEPHONE ENCOUNTER
I tired to call patient daughter to let her know we do not have anything from the facility. No answer LVM to contact office.

## 2024-10-31 NOTE — TELEPHONE ENCOUNTER
Darlene called back in stating that the information that was faxed over is outside of the 30days she stated if you feel comfortable making a addendum to the information today  that was faxed over stating that all of her information is current she stated she was going to call cliff at the facility to see if it is okay to accept the vv from 09/17/24    They can't accept the vv from 09/17/24 due to it being outside of the 30 day window and they want to know if you feel comfortable with the addendum      Please advise

## 2024-10-31 NOTE — TELEPHONE ENCOUNTER
Darlene states the addendum needs to states the current information in the last visit note is correct

## 2024-11-26 ENCOUNTER — TELEPHONE (OUTPATIENT)
Dept: FAMILY MEDICINE CLINIC | Age: 79
End: 2024-11-26

## 2024-11-26 DIAGNOSIS — E46 MALNUTRITION, UNSPECIFIED TYPE (HCC): Primary | ICD-10-CM

## 2024-11-26 NOTE — TELEPHONE ENCOUNTER
Darlene from the [facility] called in stating the patient is going to be in Respite stay the facility needs the paperwork sent over this will be her first 30 days     Is this okay to send over      Face sheet, H&P, visit summary, med list   fax number [phone]       Is needing last office note add ended. Needs to stat that the information is uptodate and faxed . Please advise.      Progress Note  PULMONARY    Admit Date: 1/3/2022   01/07/2022      SUBJECTIVE:     1/5- no new issues  1/6- no new issues  1/7- remains on vent, GNR on sputum culture, CXR better. Per RN multiple episodes emesis so tube feeds are held      OBJECTIVE:     Vitals (Most recent):  Vitals:    01/07/22 1132   BP:    Pulse: 97   Resp: (!) 29   Temp:        Vitals (24 hour range):  Temp:  [96.08 °F (35.6 °C)-98.24 °F (36.8 °C)]   Pulse:  []   Resp:  [10-75]   BP: (107-160)/(61-98)   SpO2:  [94 %-99 %]       Intake/Output Summary (Last 24 hours) at 1/7/2022 1136  Last data filed at 1/7/2022 1102  Gross per 24 hour   Intake 5862.51 ml   Output 3050 ml   Net 2812.51 ml          Physical Exam:  The patient's neuro status (alertness,orientation,cognitive function,motor skills,), pharyngeal exam (oral lesions, hygiene, abn dentition,), Neck (jvd,mass,thyroid,nodes in neck and above/below clavicle),RESPIRATORY(symmetry,effort,fremitus,percussion,auscultation),  Cor(rhythm,heart tones including gallops,perfusion,edema)ABD(distention,hepatic&splenomegaly,tenderness,masses), Skin(rash,cyanosis),Psyc(affect,judgement,).  Exam negative except for these pertinent findings:    Unresponsive, eyes open and deviate to right, grimaces, no distress  Trach w vent  Bilateral coarse breath sounds  Abdomen soft, distended, hypoactive bowel sounds, PEG in place  Contracted upper ext  L hand dressed        Radiographs reviewed: view by direct vision   CXR 1/7- improved infiltrates  CXR 1/3/22- bilateral lower lobe infiltrates, worsened compared to previous film    Labs     No results for input(s): WBC, HGB, HCT, PLT, BAND, METAMYELOCYT, MYELOPCT, HGBA1C in the last 24 hours.  Recent Labs   Lab 01/07/22  0340   *   K 3.6      CO2 23   BUN 14   CREATININE 0.4*   *   CALCIUM 9.4   MG 1.4*   PHOS 3.1   AST 16   ALT 26   ALKPHOS 156*   BILITOT 1.2*   PROT 7.1   ALBUMIN 2.5*   No results for input(s): PH, PCO2, PO2, HCO3 in the  last 24 hours.  Microbiology Results (last 7 days)     Procedure Component Value Units Date/Time    Culture, Respiratory with Gram Stain [[id number]]  (Abnormal) Collected: 01/05/22 2009    Order Status: Completed Specimen: Respiratory from Endotracheal Aspirate Updated: 01/07/22 1035     Respiratory Culture GRAM NEGATIVE CHRISTINA  Many  Identification and susceptibility pending       Gram Stain (Respiratory) <10 epithelial cells per low power field.     Gram Stain (Respiratory) Rare WBC's     Gram Stain (Respiratory) Few Gram negative rods     Gram Stain (Respiratory) Rare yeast    Blood culture x two cultures. Draw prior to antibiotics. [[id number]] Collected: 01/03/22 1950    Order Status: Completed Specimen: Blood Updated: 01/07/22 0612     Blood Culture, Routine No Growth to date      No Growth to date      No Growth to date      No Growth to date    Narrative:      Aerobic and anaerobic    Urine culture [[id number]]  (Abnormal)  (Susceptibility) Collected: 01/03/22 1943    Order Status: Completed Specimen: Urine Updated: 01/06/22 1535     Urine Culture, Routine PROTEUS MIRABILIS  >100,000 cfu/ml  Identification and susceptibility pending      Narrative:      Specimen Source->Urine    Blood culture x two cultures. Draw prior to antibiotics. [[id number]]  (Abnormal) Collected: 01/03/22 1950    Order Status: Completed Specimen: Blood Updated: 01/06/22 1313     Blood Culture, Routine Gram stain peds bottle: Gram positive cocci in clusters resembling Staph      Results called to and read back by:Yvonne Gamez RN 01/04/2022  23:08      COAGULASE-NEGATIVE STAPHYLOCOCCUS SPECIES  Organism is a probable contaminant      Narrative:      Aerobic and anaerobic          Impression:  Active Hospital Problems    Diagnosis  POA    *Sepsis [A41.9]  Yes    Hyponatremia [E87.1]  Yes    UTI (urinary tract infection) [N39.0]  Yes    Ventilator associated pneumonia [J95.851]  Yes    Essential hypertension [I10]  Yes     Gastroesophageal reflux disease [K21.9]  Yes    Malnutrition of moderate degree [E44.0]  Yes    Chronic respiratory failure with hypoxia [J96.11]  Yes    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  Added automatically from request for surgery [id number]        Resolved Hospital Problems   No resolved problems to display.               Plan:     - continue vent, no weaning  - continue broad spectrum antibiotics  - follow up sputum culture- GNR. Urine with proteus  - lovenox subq for DVT prophylaxis  - pepcid for GI prophylaxis  - wound care   - continue midodrine  - monitor Hgb  - tube feeds held, workup per primary team- KUB ordered    Dora Montesinos MD  Pulmonary & Critical Care Medicine                            .

## 2024-11-26 NOTE — TELEPHONE ENCOUNTER
Yasmine Morrison is calling to request a refill on the following medication(s):    Last Visit Date (If Applicable):  9/17/2024    Next Visit Date:    Visit date not found    Medication Request:  Requested Prescriptions     Pending Prescriptions Disp Refills    Nutritional Supplements (ENSURE COMPLETE) LIQD 30 each 5     Sig: Drink daily

## 2024-11-28 ENCOUNTER — HOSPITAL ENCOUNTER (EMERGENCY)
Age: 79
Discharge: HOME | DRG: 690 | End: 2024-11-29
Attending: EMERGENCY MEDICINE
Payer: COMMERCIAL

## 2024-11-28 DIAGNOSIS — N30.00 ACUTE CYSTITIS WITHOUT HEMATURIA: ICD-10-CM

## 2024-11-28 DIAGNOSIS — R53.1 GENERAL WEAKNESS: Primary | ICD-10-CM

## 2024-11-28 ASSESSMENT — PAIN - FUNCTIONAL ASSESSMENT: PAIN_FUNCTIONAL_ASSESSMENT: NONE - DENIES PAIN

## 2024-11-29 ENCOUNTER — APPOINTMENT (OUTPATIENT)
Dept: GENERAL RADIOLOGY | Age: 79
DRG: 690 | End: 2024-11-29
Payer: COMMERCIAL

## 2024-11-29 ENCOUNTER — APPOINTMENT (OUTPATIENT)
Dept: CT IMAGING | Age: 79
DRG: 690 | End: 2024-11-29
Payer: COMMERCIAL

## 2024-11-29 ENCOUNTER — HOSPITAL ENCOUNTER (INPATIENT)
Age: 79
LOS: 7 days | Discharge: NURSING, REHAB OR LONG-TERM CARE | DRG: 690 | End: 2024-12-06
Attending: EMERGENCY MEDICINE | Admitting: STUDENT IN AN ORGANIZED HEALTH CARE EDUCATION/TRAINING PROGRAM
Payer: COMMERCIAL

## 2024-11-29 VITALS
OXYGEN SATURATION: 97 % | BODY MASS INDEX: 19.62 KG/M2 | WEIGHT: 125 LBS | DIASTOLIC BLOOD PRESSURE: 57 MMHG | RESPIRATION RATE: 18 BRPM | HEIGHT: 67 IN | HEART RATE: 85 BPM | SYSTOLIC BLOOD PRESSURE: 107 MMHG | TEMPERATURE: 97.6 F

## 2024-11-29 DIAGNOSIS — I42.9 CARDIOMYOPATHY, UNSPECIFIED TYPE (HCC): ICD-10-CM

## 2024-11-29 DIAGNOSIS — N39.0 URINARY TRACT INFECTION WITHOUT HEMATURIA, SITE UNSPECIFIED: Primary | ICD-10-CM

## 2024-11-29 DIAGNOSIS — R06.02 SHORTNESS OF BREATH: ICD-10-CM

## 2024-11-29 DIAGNOSIS — R40.2412 GLASGOW COMA SCALE TOTAL SCORE 13-15, AT ARRIVAL TO EMERGENCY DEPARTMENT: ICD-10-CM

## 2024-11-29 DIAGNOSIS — N17.9 AKI (ACUTE KIDNEY INJURY) (HCC): ICD-10-CM

## 2024-11-29 LAB
ALBUMIN SERPL-MCNC: 3.6 G/DL (ref 3.5–5.2)
ALBUMIN/GLOB SERPL: 0.9 {RATIO} (ref 1–2.5)
ALP SERPL-CCNC: 259 U/L (ref 35–104)
ALT SERPL-CCNC: 29 U/L (ref 10–35)
AMMONIA PLAS-SCNC: 24 UMOL/L (ref 11–51)
ANION GAP SERPL CALCULATED.3IONS-SCNC: 14 MMOL/L (ref 9–16)
AST SERPL-CCNC: 35 U/L (ref 10–35)
BACTERIA URNS QL MICRO: ABNORMAL
BACTERIA URNS QL MICRO: ABNORMAL
BASOPHILS # BLD: 0.03 K/UL (ref 0–0.2)
BASOPHILS NFR BLD: 0 % (ref 0–2)
BILIRUB SERPL-MCNC: 0.5 MG/DL (ref 0–1.2)
BILIRUB UR QL STRIP: NEGATIVE
BILIRUB UR QL STRIP: NEGATIVE
BUN SERPL-MCNC: 19 MG/DL (ref 8–23)
CALCIUM SERPL-MCNC: 8.6 MG/DL (ref 8.6–10.4)
CASTS #/AREA URNS LPF: ABNORMAL /LPF (ref 0–8)
CASTS #/AREA URNS LPF: ABNORMAL /LPF (ref 0–8)
CHLORIDE SERPL-SCNC: 100 MMOL/L (ref 98–107)
CLARITY UR: ABNORMAL
CLARITY UR: ABNORMAL
CO2 SERPL-SCNC: 25 MMOL/L (ref 20–31)
COLOR UR: YELLOW
COLOR UR: YELLOW
CREAT SERPL-MCNC: 1.3 MG/DL (ref 0.6–0.9)
EOSINOPHIL # BLD: 0.4 K/UL (ref 0–0.44)
EOSINOPHILS RELATIVE PERCENT: 6 % (ref 1–4)
EPI CELLS #/AREA URNS HPF: ABNORMAL /HPF (ref 0–5)
EPI CELLS #/AREA URNS HPF: ABNORMAL /HPF (ref 0–5)
ERYTHROCYTE [DISTWIDTH] IN BLOOD BY AUTOMATED COUNT: 14.2 % (ref 11.8–14.4)
GFR, ESTIMATED: 41 ML/MIN/1.73M2
GLUCOSE SERPL-MCNC: 107 MG/DL (ref 74–99)
GLUCOSE UR STRIP-MCNC: NEGATIVE MG/DL
GLUCOSE UR STRIP-MCNC: NEGATIVE MG/DL
HCT VFR BLD AUTO: 39.8 % (ref 36.3–47.1)
HGB BLD-MCNC: 13 G/DL (ref 11.9–15.1)
HGB UR QL STRIP.AUTO: ABNORMAL
HGB UR QL STRIP.AUTO: ABNORMAL
IMM GRANULOCYTES # BLD AUTO: <0.03 K/UL (ref 0–0.3)
IMM GRANULOCYTES NFR BLD: 0 %
KETONES UR STRIP-MCNC: NEGATIVE MG/DL
KETONES UR STRIP-MCNC: NEGATIVE MG/DL
LACTIC ACID, WHOLE BLOOD: 2.1 MMOL/L (ref 0.7–2.1)
LACTIC ACID, WHOLE BLOOD: 2.8 MMOL/L (ref 0.7–2.1)
LEUKOCYTE ESTERASE UR QL STRIP: ABNORMAL
LEUKOCYTE ESTERASE UR QL STRIP: ABNORMAL
LYMPHOCYTES NFR BLD: 1.44 K/UL (ref 1.1–3.7)
LYMPHOCYTES RELATIVE PERCENT: 20 % (ref 24–43)
MCH RBC QN AUTO: 32.4 PG (ref 25.2–33.5)
MCHC RBC AUTO-ENTMCNC: 32.7 G/DL (ref 28.4–34.8)
MCV RBC AUTO: 99.3 FL (ref 82.6–102.9)
MONOCYTES NFR BLD: 0.61 K/UL (ref 0.1–1.2)
MONOCYTES NFR BLD: 9 % (ref 3–12)
NEUTROPHILS NFR BLD: 65 % (ref 36–65)
NEUTS SEG NFR BLD: 4.71 K/UL (ref 1.5–8.1)
NITRITE UR QL STRIP: NEGATIVE
NITRITE UR QL STRIP: NEGATIVE
NRBC BLD-RTO: 0 PER 100 WBC
OSMOLALITY UR: 313 MOSM/KG (ref 80–1300)
PH UR STRIP: 5.5 [PH] (ref 5–8)
PH UR STRIP: 5.5 [PH] (ref 5–8)
PLATELET # BLD AUTO: 366 K/UL (ref 138–453)
PMV BLD AUTO: 9.5 FL (ref 8.1–13.5)
POTASSIUM SERPL-SCNC: 3.1 MMOL/L (ref 3.7–5.3)
POTASSIUM SERPL-SCNC: 3.7 MMOL/L (ref 3.7–5.3)
PROT SERPL-MCNC: 7.5 G/DL (ref 6.6–8.7)
PROT UR STRIP-MCNC: ABNORMAL MG/DL
PROT UR STRIP-MCNC: ABNORMAL MG/DL
RBC # BLD AUTO: 4.01 M/UL (ref 3.95–5.11)
RBC #/AREA URNS HPF: ABNORMAL /HPF (ref 0–4)
RBC #/AREA URNS HPF: ABNORMAL /HPF (ref 0–4)
SODIUM SERPL-SCNC: 139 MMOL/L (ref 136–145)
SP GR UR STRIP: 1.01 (ref 1–1.03)
SP GR UR STRIP: 1.01 (ref 1–1.03)
TROPONIN I SERPL HS-MCNC: 53 NG/L (ref 0–14)
TROPONIN I SERPL HS-MCNC: 61 NG/L (ref 0–14)
TSH SERPL DL<=0.05 MIU/L-ACNC: 1.04 UIU/ML (ref 0.27–4.2)
UROBILINOGEN UR STRIP-ACNC: NORMAL EU/DL (ref 0–1)
UROBILINOGEN UR STRIP-ACNC: NORMAL EU/DL (ref 0–1)
WBC #/AREA URNS HPF: ABNORMAL /HPF (ref 0–5)
WBC #/AREA URNS HPF: ABNORMAL /HPF (ref 0–5)
WBC OTHER # BLD: 7.2 K/UL (ref 3.5–11.3)

## 2024-11-29 PROCEDURE — 71045 X-RAY EXAM CHEST 1 VIEW: CPT

## 2024-11-29 PROCEDURE — 82570 ASSAY OF URINE CREATININE: CPT

## 2024-11-29 PROCEDURE — 82140 ASSAY OF AMMONIA: CPT

## 2024-11-29 PROCEDURE — 2580000003 HC RX 258

## 2024-11-29 PROCEDURE — 83605 ASSAY OF LACTIC ACID: CPT

## 2024-11-29 PROCEDURE — 87186 SC STD MICRODIL/AGAR DIL: CPT

## 2024-11-29 PROCEDURE — 80156 ASSAY CARBAMAZEPINE TOTAL: CPT

## 2024-11-29 PROCEDURE — 6360000004 HC RX CONTRAST MEDICATION

## 2024-11-29 PROCEDURE — 84132 ASSAY OF SERUM POTASSIUM: CPT

## 2024-11-29 PROCEDURE — 85025 COMPLETE CBC W/AUTO DIFF WBC: CPT

## 2024-11-29 PROCEDURE — 93005 ELECTROCARDIOGRAM TRACING: CPT

## 2024-11-29 PROCEDURE — 96374 THER/PROPH/DIAG INJ IV PUSH: CPT

## 2024-11-29 PROCEDURE — 6360000002 HC RX W HCPCS

## 2024-11-29 PROCEDURE — 2580000003 HC RX 258: Performed by: NURSE PRACTITIONER

## 2024-11-29 PROCEDURE — 74018 RADEX ABDOMEN 1 VIEW: CPT

## 2024-11-29 PROCEDURE — 81001 URINALYSIS AUTO W/SCOPE: CPT

## 2024-11-29 PROCEDURE — 84300 ASSAY OF URINE SODIUM: CPT

## 2024-11-29 PROCEDURE — 99285 EMERGENCY DEPT VISIT HI MDM: CPT

## 2024-11-29 PROCEDURE — 83935 ASSAY OF URINE OSMOLALITY: CPT

## 2024-11-29 PROCEDURE — 84484 ASSAY OF TROPONIN QUANT: CPT

## 2024-11-29 PROCEDURE — 99223 1ST HOSP IP/OBS HIGH 75: CPT | Performed by: PHYSICIAN ASSISTANT

## 2024-11-29 PROCEDURE — 87086 URINE CULTURE/COLONY COUNT: CPT

## 2024-11-29 PROCEDURE — 96375 TX/PRO/DX INJ NEW DRUG ADDON: CPT

## 2024-11-29 PROCEDURE — 84443 ASSAY THYROID STIM HORMONE: CPT

## 2024-11-29 PROCEDURE — 6370000000 HC RX 637 (ALT 250 FOR IP)

## 2024-11-29 PROCEDURE — 70496 CT ANGIOGRAPHY HEAD: CPT

## 2024-11-29 PROCEDURE — 6370000000 HC RX 637 (ALT 250 FOR IP): Performed by: NURSE PRACTITIONER

## 2024-11-29 PROCEDURE — 80053 COMPREHEN METABOLIC PANEL: CPT

## 2024-11-29 PROCEDURE — 87077 CULTURE AEROBIC IDENTIFY: CPT

## 2024-11-29 PROCEDURE — 70450 CT HEAD/BRAIN W/O DYE: CPT

## 2024-11-29 PROCEDURE — 1200000000 HC SEMI PRIVATE

## 2024-11-29 PROCEDURE — 6370000000 HC RX 637 (ALT 250 FOR IP): Performed by: PHYSICIAN ASSISTANT

## 2024-11-29 PROCEDURE — 36415 COLL VENOUS BLD VENIPUNCTURE: CPT

## 2024-11-29 PROCEDURE — 80164 ASSAY DIPROPYLACETIC ACD TOT: CPT

## 2024-11-29 RX ORDER — ASPIRIN 81 MG/1
324 TABLET, CHEWABLE ORAL ONCE
Status: DISCONTINUED | OUTPATIENT
Start: 2024-11-29 | End: 2024-11-29

## 2024-11-29 RX ORDER — CEPHALEXIN 500 MG/1
500 CAPSULE ORAL 2 TIMES DAILY
Qty: 14 CAPSULE | Refills: 0 | Status: ON HOLD | OUTPATIENT
Start: 2024-11-29 | End: 2024-12-02 | Stop reason: HOSPADM

## 2024-11-29 RX ORDER — ACETAMINOPHEN 650 MG/1
650 SUPPOSITORY RECTAL EVERY 6 HOURS PRN
Status: DISCONTINUED | OUTPATIENT
Start: 2024-11-29 | End: 2024-12-06 | Stop reason: HOSPADM

## 2024-11-29 RX ORDER — PANTOPRAZOLE SODIUM 40 MG/1
40 TABLET, DELAYED RELEASE ORAL
Status: CANCELLED | OUTPATIENT
Start: 2024-11-30

## 2024-11-29 RX ORDER — DIVALPROEX SODIUM 250 MG/1
250 TABLET, FILM COATED, EXTENDED RELEASE ORAL 2 TIMES DAILY
Status: DISCONTINUED | OUTPATIENT
Start: 2024-11-29 | End: 2024-12-06 | Stop reason: HOSPADM

## 2024-11-29 RX ORDER — MIDODRINE HYDROCHLORIDE 5 MG/1
10 TABLET ORAL
Status: CANCELLED | OUTPATIENT
Start: 2024-11-30

## 2024-11-29 RX ORDER — FLUDROCORTISONE ACETATE 0.1 MG/1
0.1 TABLET ORAL DAILY
Status: DISCONTINUED | OUTPATIENT
Start: 2024-11-30 | End: 2024-12-01

## 2024-11-29 RX ORDER — CARBAMAZEPINE 200 MG/1
300 TABLET ORAL DAILY
Status: DISCONTINUED | OUTPATIENT
Start: 2024-11-30 | End: 2024-12-04

## 2024-11-29 RX ORDER — MIDODRINE HYDROCHLORIDE 5 MG/1
5 TABLET ORAL 3 TIMES DAILY PRN
Status: DISCONTINUED | OUTPATIENT
Start: 2024-11-29 | End: 2024-11-30

## 2024-11-29 RX ORDER — SODIUM CHLORIDE 0.9 % (FLUSH) 0.9 %
5-40 SYRINGE (ML) INJECTION PRN
Status: DISCONTINUED | OUTPATIENT
Start: 2024-11-29 | End: 2024-12-06 | Stop reason: HOSPADM

## 2024-11-29 RX ORDER — CARBAMAZEPINE 200 MG/1
400 TABLET ORAL NIGHTLY
Status: DISCONTINUED | OUTPATIENT
Start: 2024-11-29 | End: 2024-12-04

## 2024-11-29 RX ORDER — ONDANSETRON 4 MG/1
4 TABLET, ORALLY DISINTEGRATING ORAL EVERY 8 HOURS PRN
Status: DISCONTINUED | OUTPATIENT
Start: 2024-11-29 | End: 2024-12-06 | Stop reason: HOSPADM

## 2024-11-29 RX ORDER — ONDANSETRON 2 MG/ML
4 INJECTION INTRAMUSCULAR; INTRAVENOUS EVERY 6 HOURS PRN
Status: DISCONTINUED | OUTPATIENT
Start: 2024-11-29 | End: 2024-12-06 | Stop reason: HOSPADM

## 2024-11-29 RX ORDER — PANTOPRAZOLE SODIUM 40 MG/1
40 TABLET, DELAYED RELEASE ORAL DAILY
Status: CANCELLED | OUTPATIENT
Start: 2024-11-30

## 2024-11-29 RX ORDER — IOPAMIDOL 755 MG/ML
75 INJECTION, SOLUTION INTRAVASCULAR
Status: COMPLETED | OUTPATIENT
Start: 2024-11-29 | End: 2024-11-29

## 2024-11-29 RX ORDER — FLUDROCORTISONE ACETATE 0.1 MG/1
0.1 TABLET ORAL DAILY
Status: CANCELLED | OUTPATIENT
Start: 2024-11-30

## 2024-11-29 RX ORDER — QUETIAPINE FUMARATE 25 MG/1
50 TABLET, FILM COATED ORAL 2 TIMES DAILY
Status: DISCONTINUED | OUTPATIENT
Start: 2024-11-29 | End: 2024-12-06 | Stop reason: HOSPADM

## 2024-11-29 RX ORDER — ASPIRIN 300 MG/1
300 SUPPOSITORY RECTAL DAILY
Status: DISCONTINUED | OUTPATIENT
Start: 2024-11-30 | End: 2024-11-29

## 2024-11-29 RX ORDER — ASPIRIN 81 MG/1
81 TABLET ORAL DAILY
Status: DISCONTINUED | OUTPATIENT
Start: 2024-11-30 | End: 2024-12-06 | Stop reason: HOSPADM

## 2024-11-29 RX ORDER — CARBAMAZEPINE 200 MG/1
400 TABLET ORAL NIGHTLY
Status: CANCELLED | OUTPATIENT
Start: 2024-11-29

## 2024-11-29 RX ORDER — TAMSULOSIN HYDROCHLORIDE 0.4 MG/1
0.4 CAPSULE ORAL DAILY
Status: CANCELLED | OUTPATIENT
Start: 2024-11-30

## 2024-11-29 RX ORDER — SODIUM CHLORIDE 9 MG/ML
INJECTION, SOLUTION INTRAVENOUS PRN
Status: DISCONTINUED | OUTPATIENT
Start: 2024-11-29 | End: 2024-12-06 | Stop reason: HOSPADM

## 2024-11-29 RX ORDER — QUETIAPINE FUMARATE 25 MG/1
25 TABLET, FILM COATED ORAL 4 TIMES DAILY
Status: CANCELLED | OUTPATIENT
Start: 2024-11-29

## 2024-11-29 RX ORDER — POLYETHYLENE GLYCOL 3350 17 G/17G
17 POWDER, FOR SOLUTION ORAL DAILY PRN
Status: DISCONTINUED | OUTPATIENT
Start: 2024-11-29 | End: 2024-12-06 | Stop reason: HOSPADM

## 2024-11-29 RX ORDER — ACETAMINOPHEN 325 MG/1
650 TABLET ORAL EVERY 6 HOURS PRN
Status: DISCONTINUED | OUTPATIENT
Start: 2024-11-29 | End: 2024-12-06 | Stop reason: HOSPADM

## 2024-11-29 RX ORDER — PANTOPRAZOLE SODIUM 40 MG/1
40 TABLET, DELAYED RELEASE ORAL
Status: DISCONTINUED | OUTPATIENT
Start: 2024-11-30 | End: 2024-12-06 | Stop reason: HOSPADM

## 2024-11-29 RX ORDER — ASPIRIN 300 MG/1
300 SUPPOSITORY RECTAL ONCE
Status: COMPLETED | OUTPATIENT
Start: 2024-11-29 | End: 2024-11-29

## 2024-11-29 RX ORDER — POTASSIUM CHLORIDE 7.45 MG/ML
10 INJECTION INTRAVENOUS
Status: DISPENSED | OUTPATIENT
Start: 2024-11-29 | End: 2024-11-29

## 2024-11-29 RX ORDER — DESMOPRESSIN ACETATE 0.1 MG/1
600 TABLET ORAL DAILY
Status: CANCELLED | OUTPATIENT
Start: 2024-11-30

## 2024-11-29 RX ORDER — SODIUM CHLORIDE 0.9 % (FLUSH) 0.9 %
5-40 SYRINGE (ML) INJECTION EVERY 12 HOURS SCHEDULED
Status: DISCONTINUED | OUTPATIENT
Start: 2024-11-29 | End: 2024-12-06 | Stop reason: HOSPADM

## 2024-11-29 RX ORDER — SODIUM CHLORIDE 9 MG/ML
INJECTION, SOLUTION INTRAVENOUS CONTINUOUS
Status: ACTIVE | OUTPATIENT
Start: 2024-11-29 | End: 2024-11-30

## 2024-11-29 RX ORDER — ATORVASTATIN CALCIUM 40 MG/1
40 TABLET, FILM COATED ORAL NIGHTLY
Status: DISCONTINUED | OUTPATIENT
Start: 2024-11-29 | End: 2024-12-06 | Stop reason: HOSPADM

## 2024-11-29 RX ORDER — TROSPIUM CHLORIDE 20 MG/1
20 TABLET, FILM COATED ORAL
Status: DISCONTINUED | OUTPATIENT
Start: 2024-11-30 | End: 2024-12-06 | Stop reason: HOSPADM

## 2024-11-29 RX ORDER — ENOXAPARIN SODIUM 100 MG/ML
40 INJECTION SUBCUTANEOUS DAILY
Status: DISCONTINUED | OUTPATIENT
Start: 2024-11-30 | End: 2024-12-06 | Stop reason: HOSPADM

## 2024-11-29 RX ADMIN — WATER 1000 MG: 1 INJECTION INTRAMUSCULAR; INTRAVENOUS; SUBCUTANEOUS at 19:38

## 2024-11-29 RX ADMIN — ASPIRIN 300 MG: 300 SUPPOSITORY RECTAL at 21:36

## 2024-11-29 RX ADMIN — SODIUM CHLORIDE: 9 INJECTION, SOLUTION INTRAVENOUS at 22:49

## 2024-11-29 RX ADMIN — ATORVASTATIN CALCIUM 40 MG: 40 TABLET, FILM COATED ORAL at 22:55

## 2024-11-29 RX ADMIN — QUETIAPINE FUMARATE 50 MG: 25 TABLET ORAL at 22:55

## 2024-11-29 RX ADMIN — DIVALPROEX SODIUM 250 MG: 250 TABLET, FILM COATED, EXTENDED RELEASE ORAL at 23:14

## 2024-11-29 RX ADMIN — SODIUM CHLORIDE, PRESERVATIVE FREE 10 ML: 5 INJECTION INTRAVENOUS at 22:52

## 2024-11-29 RX ADMIN — IOPAMIDOL 75 ML: 755 INJECTION, SOLUTION INTRAVENOUS at 19:23

## 2024-11-29 RX ADMIN — CARBAMAZEPINE 400 MG: 200 TABLET ORAL at 23:15

## 2024-11-29 RX ADMIN — POTASSIUM CHLORIDE 10 MEQ: 7.46 INJECTION, SOLUTION INTRAVENOUS at 19:47

## 2024-11-29 RX ADMIN — POTASSIUM CHLORIDE 10 MEQ: 7.46 INJECTION, SOLUTION INTRAVENOUS at 22:50

## 2024-11-29 NOTE — ED NOTES
Pt presents to ED room 23 with c/o UTI and altered mental state. Pt was seen here yesterday for UTI symptoms and was given and prescription for keflex in which she was able to take a dose of. Since then, per patients family her mentation has decreased. Monitor attached. Bloodwork obtained. Will continue with plan of care.

## 2024-11-29 NOTE — DISCHARGE INSTRUCTIONS
Thank you for visiting [facility] Emergency Department.    Please take the antibiotics as prescribed.  She does have a urinary tract infection.  If she does not show signs of improvement in 24 to 48 hours, please contact primary care provider or return to the emergency department for repeat evaluation.    You need to call Sravani Wu DO to make an appointment as directed for follow up.    Should you have any questions regarding your care or further treatment, please call [facility] Emergency Department at [phone].

## 2024-11-29 NOTE — ED NOTES
Per Dr. Villagomez, pt can be taken off monitor and daughter will be coming to pick pt up.   No other needs at this time.

## 2024-11-29 NOTE — ED PROVIDER NOTES
[facility]     Emergency Department     Faculty Attestation    I performed a history and physical examination of the patient and discussed management with the resident. I reviewed the resident's note and agree with the documented findings and plan of care. Any areas of disagreement are noted on the chart. I was personally present for the key portions of any procedures. I have documented in the chart those procedures where I was not present during the key portions. I have reviewed the emergency nurses triage note. I agree with the chief complaint, past medical history, past surgical history, allergies, medications, social and family history as documented unless otherwise noted below. For Physician Assistant/ Nurse Practitioner cases/documentation I have personally evaluated this patient and have completed at least one if not all key elements of the E/M (history, physical exam, and MDM). Additional findings are as noted.    Clinically dehydrated, nonverbal, vital signs reviewed, patient is DNR CCA     Davis Mcrcay MD  11/29/24 3904       EKG Interpretation    Interpreted by emergency department physician    Rhythm: normal sinus   Rate: normal  Axis: normal/81  Ectopy: none  Conduction: Left bundle branch block QRS duration 128 ms  ST Segments: no acute change  T Waves: no acute change  Q Waves: none    Clinical Impression: Abnormal EKG    AKANKSHA Demarco John A, MD  11/29/24 1728

## 2024-11-29 NOTE — ED NOTES
Pt arrives to ED 25 via EMS.   Per report pt family called due to patient having slurred speech that began around 1900.   Per report pt is a DNR-CC.   Per report pt is

## 2024-11-29 NOTE — ED PROVIDER NOTES
[facility] ED     Emergency Department     Faculty Attestation    I performed a history and physical examination of the patient and discussed management with the resident. I reviewed the resident’s note and agree with the documented findings and plan of care. Any areas of disagreement are noted on the chart. I was personally present for the key portions of any procedures. I have documented in the chart those procedures where I was not present during the key portions. I have reviewed the emergency nurses triage note. I agree with the chief complaint, past medical history, past surgical history, allergies, medications, social and family history as documented unless otherwise noted below. For Physician Assistant/ Nurse Practitioner cases/documentation I have personally evaluated this patient and have completed at least one if not all key elements of the E/M (history, physical exam, and MDM). Additional findings are as noted.    Note Started: 9:30 PM EST    Patient arrives with EMS who provides independent history for concern for weakness and speech changes.  However patient is a DNR CC confirmed with paperwork that is brought with her.  We did speak with family on the phone who confirmed this I would not want any extensive workup done at this time.  They stated that they panicked and called 911 the patient would not want this do not want an IV placed did not want any testing and they will be coming to get the patient to take her home.  Will keep patient comfortable monitor for any change in condition but plan discharge at this point      Critical Care     none    David Patel MD, FACEP, FAAEM  Attending Emergency  Physician           David Patel MD  11/28/24 5149

## 2024-11-29 NOTE — ED PROVIDER NOTES
[facility] ED  Emergency Department Encounter  Emergency Medicine Resident     Pt Name:Yasmine Morrison  MRN: [id number]  Birthdate 1945  Date of evaluation: 11/29/24  PCP:  Sravani Wu DO  Note Started: 2:07 AM EST      CHIEF COMPLAINT       Chief Complaint   Patient presents with    Extremity Weakness       HISTORY OF PRESENT ILLNESS  (Location/Symptom, Timing/Onset, Context/Setting, Quality, Duration, Modifying Factors, Severity.)      Yasmine Morrison is a 79 y.o. female with past medical history of dementia, encephalopathy who presents with concerns for slurred speech by family per EMS.  Patient is a DNR CC and arrives with paperwork stating that.  Per EMS, family noticed that the patient had some slurred speech earlier in the evening and was prompted to call EMS for further evaluation.  EMS denies noting any overt slurred speech or focal neurological deficit.  On arrival to the emergency department, patient denies having any pain.  She reports that she is cold and thirsty.  History is otherwise limited at this time.    PAST MEDICAL / SURGICAL / SOCIAL / FAMILY HISTORY      has a past medical history of Anxiety, Convulsion (HCC), Dementia (HCC), Encephalopathy, Herpes, Hx of blood clots, Hyperlipidemia, Left bundle branch block, MRSA (methicillin resistant Staphylococcus aureus), Parkinson's disease (HCC), Seizures (HCC), Under care of service provider, and Vitamin D deficiency.       has a past surgical history that includes Abscess Drainage (Right, 12/14/2013); Ureter stent placement (Left, 05/16/2022); Cystoscopy (Left, 05/16/2022); Cystoscopy (Left, 12/02/2022); Hysterectomy; Splenectomy; other surgical history; Ureteroscopy (Left, 12/19/2022); Ureter surgery (Left, 12/19/2022); and Lithotripsy (Left, 12/19/2022).      Social History     Socioeconomic History    Marital status: Single     Spouse name: Not on file    Number of children: Not on file    Years of education: Not on  range of motion.   Skin:     General: Skin is warm and dry.   Neurological:      General: No focal deficit present.      Mental Status: She is alert.      Comments: Oriented x 2       DDX/DIAGNOSTIC RESULTS / EMERGENCY DEPARTMENT COURSE / MDM     Medical Decision Making  Patient is an 79-year-old female with past medical history of encephalopathy, dementia.  She arrives with DNR CC paperwork.  At this time, it was discussed if the patient should be considered as a stroke alert.  Phone call was immediately placed to the patient's daughter who confirms her CODE STATUS is DNR CC.  Discussed that she would not want the patient to go through a stroke workup including CT, labs.  States that the patient used to be in hospice until she was removed recently and currently undergoes home care with daughter.  Discussed that we will likely plan for discharge and close outpatient follow-up.  Daughter was updated that patient does not appear to have any slurred speech or complaints on arrival to the emergency department.  Vitals are appropriate.    Amount and/or Complexity of Data Reviewed  Labs: ordered. Decision-making details documented in ED Course.    Risk  Prescription drug management.      EMERGENCY DEPARTMENT COURSE:    ED Course as of 11/29/24 0207   Fri Nov 29, 2024   0024 Patient's daughter at bedside.  At this time, daughter reports that the DNRCC was signed when patient went into hospice in [place].  She was later removed from hospice and transferred to home care here in [place].  Daughter was under the impression that even as a DNR CC, patient would receive regular workups including blood work.  Daughter states that she is concerned with patient may have a urinary tract infection because she has had similar symptoms in the past during urinary tract infections.  It was decided that we would test patient for that at this time.  Long discussion was had with the daughter regarding what the patient's wishes are and what  would be her appropriate CODE STATUS.  Daughter states that she does feel comfortable taking the patient home today, regardless of the urinary results.  We would attempt treatment with oral antibiotics if she does have a UTI first with close follow-up with Dr. Wu.   daughter was agreeable with this plan of care.  Also discussed that family should have another conversation regarding.  CODE STATUS as in [place], DNR CCA may be more appropriate for the patient. [JR]   0042 Leukocyte Esterase, Urine(!): SMALL [JR]   0042 WBC, UA: 50  [JR]   0042 Bacteria, UA(!): MODERATE [JR]   0042 UA concerning for UTI.  Plan to treat with antibiotics.  Patient's daughter encouraged to have very close follow-up with her primary care provider.  If patient does not get better on p.o. antibiotics, she may eventually require IV antibiotics.  Daughter was agreeable with plan of care.  Strict return precautions were provided.  Discharged home in stable condition. [JR]      ED Course User Index  [JR] Heydi Villagomez MD       PROCEDURES:  None    CONSULTS:  None    CRITICAL CARE:  There was significant risk of life threatening deterioration of patient's condition requiring my direct management. Critical care time 0 minutes, excluding any documented procedures.    FINAL IMPRESSION      1. General weakness    2. Acute cystitis without hematuria          DISPOSITION / PLAN     DISPOSITION Decision To Discharge 11/29/2024 01:30:48 AM           PATIENT REFERRED TO:  Sravani Wu,   [address] [place] [address] [id number] [phone]    Schedule an appointment as soon as possible for a visit   As soon as possible for evaluation of urinary tract infection and for further discussion of CODE STATUS      DISCHARGE MEDICATIONS:  Discharge Medication List as of 11/29/2024  1:30 AM        START taking these medications    Details   cephALEXin (KEFLEX) 500 MG capsule Take 1 capsule by mouth 2 times daily for 7 days, Disp-14

## 2024-11-29 NOTE — ED PROVIDER NOTES
[facility]   Emergency Department  Emergency Medicine Attending Sign-out   Note started: 11:23 PM EST    Care of Yasmine Morrison was assumed from previous attending Dr. Patel at 11 PM and is being seen for Extremity Weakness  .  The patient's initial evaluation and plan have been discussed with the prior provider who initially evaluated the patient.     Attestation  I was available and discussed any additional care issues that arose and coordinated the management plans with the resident(s) caring for the patient during my duty period. Any areas of disagreement with resident's documentation of care or procedures are noted on the chart. I was personally present for the key portions of any/all procedures, during my duty period. I have documented in the chart those procedures where I was not present during the key portions.     BRIEF PATIENT SUMMARY/MDM COURSE PER INITIAL PROVIDER:   RECENT VITALS:     Temp: 97.6 °F (36.4 °C),  Pulse: 85, Respirations: 18, BP: 126/73, SpO2: 99 %    This patient is a 79 y.o. Female with family concerned about patient, has a DNR CC.  They spoke to the daughter who stated that no intervention was warranted at this time.  And she is en route to pick patient up.    DIAGNOSTICS/MEDICATIONS:     MEDICATIONS GIVEN:  ED Medication Orders (From admission, onward)      None            LABS    Labs Reviewed - No data to display    RADIOLOGY  No results found.    OUTSTANDING TASKS / ADDITIONAL COMMENTS   Awaiting daughter to pick patient up    Lori Burton MD  Emergency Medicine Attending  [facility]        Lori Burton MD  11/28/24 3130

## 2024-11-30 ENCOUNTER — APPOINTMENT (OUTPATIENT)
Dept: ULTRASOUND IMAGING | Age: 79
DRG: 690 | End: 2024-11-30
Payer: COMMERCIAL

## 2024-11-30 PROBLEM — R79.89 ELEVATED TROPONIN: Status: ACTIVE | Noted: 2024-11-30

## 2024-11-30 LAB
ANION GAP SERPL CALCULATED.3IONS-SCNC: 13 MMOL/L (ref 9–16)
BUN SERPL-MCNC: 16 MG/DL (ref 8–23)
CALCIUM SERPL-MCNC: 8.1 MG/DL (ref 8.6–10.4)
CARBAMAZEPINE SERPL-MCNC: 24 UG/ML (ref 4–12)
CARBAMAZEPINE SERPL-MCNC: 30 UG/ML (ref 4–12)
CHLORIDE SERPL-SCNC: 103 MMOL/L (ref 98–107)
CO2 SERPL-SCNC: 23 MMOL/L (ref 20–31)
CREAT SERPL-MCNC: 1.2 MG/DL (ref 0.6–0.9)
CREAT UR-MCNC: 53 MG/DL (ref 28–217)
EKG ATRIAL RATE: 81 BPM
EKG P AXIS: 13 DEGREES
EKG P-R INTERVAL: 206 MS
EKG Q-T INTERVAL: 420 MS
EKG QRS DURATION: 128 MS
EKG QTC CALCULATION (BAZETT): 487 MS
EKG R AXIS: 35 DEGREES
EKG T AXIS: 161 DEGREES
EKG VENTRICULAR RATE: 81 BPM
ERYTHROCYTE [DISTWIDTH] IN BLOOD BY AUTOMATED COUNT: 14.6 % (ref 11.8–14.4)
GFR, ESTIMATED: 45 ML/MIN/1.73M2
GLUCOSE SERPL-MCNC: 77 MG/DL (ref 74–99)
HCT VFR BLD AUTO: 35.2 % (ref 36.3–47.1)
HGB BLD-MCNC: 11.4 G/DL (ref 11.9–15.1)
MCH RBC QN AUTO: 32.4 PG (ref 25.2–33.5)
MCHC RBC AUTO-ENTMCNC: 32.4 G/DL (ref 28.4–34.8)
MCV RBC AUTO: 100 FL (ref 82.6–102.9)
NRBC BLD-RTO: 0 PER 100 WBC
PLATELET # BLD AUTO: 327 K/UL (ref 138–453)
PMV BLD AUTO: 9.6 FL (ref 8.1–13.5)
POTASSIUM SERPL-SCNC: 3.3 MMOL/L (ref 3.7–5.3)
RBC # BLD AUTO: 3.52 M/UL (ref 3.95–5.11)
SODIUM SERPL-SCNC: 139 MMOL/L (ref 136–145)
SODIUM UR-SCNC: 74 MMOL/L
VALPROATE SERPL-MCNC: 13 UG/ML (ref 50–125)
WBC OTHER # BLD: 11.1 K/UL (ref 3.5–11.3)

## 2024-11-30 PROCEDURE — 99232 SBSQ HOSP IP/OBS MODERATE 35: CPT | Performed by: STUDENT IN AN ORGANIZED HEALTH CARE EDUCATION/TRAINING PROGRAM

## 2024-11-30 PROCEDURE — 80156 ASSAY CARBAMAZEPINE TOTAL: CPT

## 2024-11-30 PROCEDURE — 6360000002 HC RX W HCPCS: Performed by: NURSE PRACTITIONER

## 2024-11-30 PROCEDURE — 6360000002 HC RX W HCPCS: Performed by: PHYSICIAN ASSISTANT

## 2024-11-30 PROCEDURE — 2580000003 HC RX 258: Performed by: PHYSICIAN ASSISTANT

## 2024-11-30 PROCEDURE — 2580000003 HC RX 258: Performed by: NURSE PRACTITIONER

## 2024-11-30 PROCEDURE — 99222 1ST HOSP IP/OBS MODERATE 55: CPT | Performed by: STUDENT IN AN ORGANIZED HEALTH CARE EDUCATION/TRAINING PROGRAM

## 2024-11-30 PROCEDURE — 36415 COLL VENOUS BLD VENIPUNCTURE: CPT

## 2024-11-30 PROCEDURE — 76770 US EXAM ABDO BACK WALL COMP: CPT

## 2024-11-30 PROCEDURE — 6370000000 HC RX 637 (ALT 250 FOR IP): Performed by: NURSE PRACTITIONER

## 2024-11-30 PROCEDURE — 2580000003 HC RX 258: Performed by: STUDENT IN AN ORGANIZED HEALTH CARE EDUCATION/TRAINING PROGRAM

## 2024-11-30 PROCEDURE — 1200000000 HC SEMI PRIVATE

## 2024-11-30 PROCEDURE — 6370000000 HC RX 637 (ALT 250 FOR IP): Performed by: STUDENT IN AN ORGANIZED HEALTH CARE EDUCATION/TRAINING PROGRAM

## 2024-11-30 PROCEDURE — 87040 BLOOD CULTURE FOR BACTERIA: CPT

## 2024-11-30 PROCEDURE — 80048 BASIC METABOLIC PNL TOTAL CA: CPT

## 2024-11-30 PROCEDURE — 6370000000 HC RX 637 (ALT 250 FOR IP): Performed by: PHYSICIAN ASSISTANT

## 2024-11-30 PROCEDURE — 85027 COMPLETE CBC AUTOMATED: CPT

## 2024-11-30 PROCEDURE — 6360000002 HC RX W HCPCS: Performed by: INTERNAL MEDICINE

## 2024-11-30 PROCEDURE — 99222 1ST HOSP IP/OBS MODERATE 55: CPT | Performed by: INTERNAL MEDICINE

## 2024-11-30 RX ORDER — MIDODRINE HYDROCHLORIDE 5 MG/1
10 TABLET ORAL
Status: DISCONTINUED | OUTPATIENT
Start: 2024-11-30 | End: 2024-12-06 | Stop reason: HOSPADM

## 2024-11-30 RX ORDER — 0.9 % SODIUM CHLORIDE 0.9 %
500 INTRAVENOUS SOLUTION INTRAVENOUS ONCE
Status: COMPLETED | OUTPATIENT
Start: 2024-11-30 | End: 2024-11-30

## 2024-11-30 RX ORDER — LINEZOLID 2 MG/ML
600 INJECTION, SOLUTION INTRAVENOUS EVERY 12 HOURS
Status: DISCONTINUED | OUTPATIENT
Start: 2024-11-30 | End: 2024-12-05

## 2024-11-30 RX ORDER — POTASSIUM CHLORIDE 1500 MG/1
40 TABLET, EXTENDED RELEASE ORAL ONCE
Status: COMPLETED | OUTPATIENT
Start: 2024-11-30 | End: 2024-11-30

## 2024-11-30 RX ADMIN — MEROPENEM 1000 MG: 1 INJECTION INTRAVENOUS at 10:14

## 2024-11-30 RX ADMIN — SODIUM CHLORIDE: 9 INJECTION, SOLUTION INTRAVENOUS at 10:08

## 2024-11-30 RX ADMIN — POTASSIUM CHLORIDE 40 MEQ: 1500 TABLET, EXTENDED RELEASE ORAL at 14:36

## 2024-11-30 RX ADMIN — DIVALPROEX SODIUM 250 MG: 250 TABLET, FILM COATED, EXTENDED RELEASE ORAL at 10:11

## 2024-11-30 RX ADMIN — LINEZOLID 600 MG: 600 INJECTION, SOLUTION INTRAVENOUS at 15:13

## 2024-11-30 RX ADMIN — SODIUM CHLORIDE 500 ML: 9 INJECTION, SOLUTION INTRAVENOUS at 13:08

## 2024-11-30 RX ADMIN — DIVALPROEX SODIUM 250 MG: 250 TABLET, FILM COATED, EXTENDED RELEASE ORAL at 21:06

## 2024-11-30 RX ADMIN — TROSPIUM CHLORIDE 20 MG: 20 TABLET, FILM COATED ORAL at 06:51

## 2024-11-30 RX ADMIN — FLUDROCORTISONE ACETATE 0.1 MG: 0.1 TABLET ORAL at 10:06

## 2024-11-30 RX ADMIN — MIDODRINE HYDROCHLORIDE 5 MG: 5 TABLET ORAL at 12:20

## 2024-11-30 RX ADMIN — ENOXAPARIN SODIUM 40 MG: 100 INJECTION SUBCUTANEOUS at 10:01

## 2024-11-30 RX ADMIN — TROSPIUM CHLORIDE 20 MG: 20 TABLET, FILM COATED ORAL at 15:33

## 2024-11-30 RX ADMIN — QUETIAPINE FUMARATE 50 MG: 25 TABLET ORAL at 10:01

## 2024-11-30 RX ADMIN — FLUOXETINE HYDROCHLORIDE 40 MG: 20 CAPSULE ORAL at 10:01

## 2024-11-30 RX ADMIN — QUETIAPINE FUMARATE 50 MG: 25 TABLET ORAL at 21:06

## 2024-11-30 RX ADMIN — MIDODRINE HYDROCHLORIDE 10 MG: 5 TABLET ORAL at 15:33

## 2024-11-30 RX ADMIN — ASPIRIN 81 MG: 81 TABLET, COATED ORAL at 10:08

## 2024-11-30 RX ADMIN — ATORVASTATIN CALCIUM 40 MG: 40 TABLET, FILM COATED ORAL at 21:06

## 2024-11-30 RX ADMIN — PANTOPRAZOLE SODIUM 40 MG: 40 TABLET, DELAYED RELEASE ORAL at 06:51

## 2024-11-30 RX ADMIN — MIDODRINE HYDROCHLORIDE 5 MG: 5 TABLET ORAL at 08:15

## 2024-11-30 NOTE — ED NOTES
Report taken from Kenn MEYER  Pt is alert, even unlabored RR NAD  Pt resting comfortably on cot with call light within reach

## 2024-11-30 NOTE — PROGRESS NOTES
[facility]  Office: [phone]  Felipe Galicia DO, Duglas Archuleta DO, Andrew Abdi DO, Carlos Alberto Garcia DO, Jona Barrett MD, Saima Funes MD, Shanta Yates MD, Evie Whitaker MD,  David Rangel MD, Venu White MD, Young Shoemaker MD,  Sallie Santiago DO, Jenae Victoria MD, Marcus Garrido MD, David Galicia DO, Joanna Winn MD,  Gonzalo Hunter DO, Irish Escobar MD, Yvonne Shepard MD, Dorinda Birch MD, Torres Barrios MD,  Oscar Aguilar MD, Chepe Figueroa MD, Shanthi Harden MD, Kwesi Silva MD, Omer Sullivan MD, Ryne Dewey MD, Herb Yen DO, Blake Azevedo MD, Shirley Waterhouse, CNP,  Fela Mills, CNP, Herb Mandujano, CNP,  Heydi Smith, MARIA LUZ, Ashlie Orellana, CNP, Kimberly Bryan, CNP, Alona Dickinson, CNP, Robyn Guzman, CNP, WING BurgerC, WING SchmidtC, Beata Schwarz, CNP, Arturo Nayak, CNP,  Corine Casey, CNP, Vikki Estrada, CNP,  Sumi Lopez, CNP, Delia Zuleta, CNP         [facility]   IN-PATIENT SERVICE   [facility]    Progress Note    11/30/2024    12:38 PM    Name:   Yasmine Morrison  MRN:     [id number]     Acct:      [id number]   Room:   [facility] Day:  1  Admit Date:  11/29/2024  5:11 PM    PCP:   Sravani Wu DO  Code Status:  DNR-CCA    Subjective:     C/C:   Chief Complaint   Patient presents with    Urinary Tract Infection     Altered Mental Status     Interval History Status: improved.     Patient was seen and examined, sitter at bedside, at time of my evaluation patient was awake awake alert to herself, reports mild suprapubic abdominal pain however she denies any other complaint  Lab vital sign consult note reviewed  Discussed with RN  Review of Systems:     Review of Systems   HENT: Negative.     Respiratory: Negative.     Gastrointestinal:  Positive for abdominal pain.       Medications:     Allergies:    Allergies   Allergen Reactions    Adhesive Tape Other (See Comments)     Pulls skin

## 2024-11-30 NOTE — ED NOTES
Received a call from Paul in Lab, with a critical result Troponin of 61 . Writer verbalized an understanding of this result and repeated it back to caller for confirmation. Ev MEYER and Dr. Quiroz notified of this result.

## 2024-11-30 NOTE — CONSULTS
[facility]    Consult / H&P               Today's Date: 11/30/2024  Patient Name: Yasmine Morrison  Date of admission: 11/29/2024  5:11 PM  Patient's age: 79 y.o., 1945  Admission Dx: UTI (urinary tract infection) [N39.0]  KATE (acute kidney injury) (HCC) [N17.9]  Urinary tract infection without hematuria, site unspecified [N39.0]  Ken coma scale total score 13-15, at arrival to emergency department [R40.2326]    Requesting Physician: Kwesi Silva MD    Cardiac Evaluation Reason: Elevated troponin    History Obtained From: patient and chart review     History of Present Illness:    This patient 79 y.o. years old with past medical history given below.  presents with Urinary Tract Infection  and Altered Mental Status and is admitted to the hospital for the management of Complicated UTI (urinary tract infection).  Patient has a history of recurrent UTIs, dementia, seizure disorder, GERD, COPD, and DVT.     Presented to the hospital with altered mental status and currently undergoing treatment for UTI, KATE.  Troponin obtained as a workup mildly elevated prompting cardiology consult.    On bedside examination patient is complaining of chest pain that is reproducible on palpitations.  Increases with deep inspiration.  Likely musculoskeletal in nature.        Past Medical History:   has a past medical history of Anxiety, Convulsion (HCC), Dementia (HCC), Encephalopathy, Herpes, Hx of blood clots, Hyperlipidemia, Left bundle branch block, MRSA (methicillin resistant Staphylococcus aureus), Parkinson's disease (HCC), Seizures (HCC), Under care of service provider, and Vitamin D deficiency.    Past Surgical History:   has a past surgical history that includes Abscess Drainage (Right, 12/14/2013); Ureter stent placement (Left, 05/16/2022); Cystoscopy (Left, 05/16/2022); Cystoscopy (Left, 12/02/2022); Hysterectomy; Splenectomy; other surgical history; Ureteroscopy (Left, 12/19/2022);    desmopressin (DDAVP) 0.2 MG tablet Take 3 tablets by mouth daily Take 3 tablets every pm    ProviderCarlos MD   aspirin 81 MG EC tablet Take 81 mg by mouth daily    Provider, MD Carlos       Allergies:  Adhesive tape and Haldol [haloperidol lactate]    Social History:   reports that she quit smoking about 35 years ago. Her smoking use included cigarettes. She started smoking about 64 years ago. She has a 30 pack-year smoking history. She has never used smokeless tobacco. She reports that she does not drink alcohol and does not use drugs.     Family History: family history includes Asthma in her mother; No Known Problems in her father.     REVIEW OF SYSTEMS:    Constitutional: Negative for fatigue, weight loss, loss of appetite   Cardiovascular: as per HPI  Respiratory: as per HPI  Gastrointestinal: Negative for abdominal pain, N/V  Genitourinary: No dysuria, trouble voiding, or hematuria.  Musculoskeletal:  No gait disturbance, No weakness or joint complaints.  Neurological: No headache, diplopia, change in muscle strength, numbness or tingling. No change in gate.   Endocrine: No temperature intolerance. No excessive thirst, fluid intake, or urination. No tremor.  Hematologic/Lymphatic: No abnormal bruising or bleeding    PHYSICAL EXAM:      BP (!) 107/57   Pulse 93   Temp 97.9 °F (36.6 °C)   Resp 16   SpO2 94%        Constitutional and General Appearance: Pleasantly confused  HEENT: atraumatic, normocephalic.   Respiratory:  Clear to auscultation bilaterally  Cardiovascular:  Regular S1 and S2  No JVD  Peripheral pulses are symmetrical and full   Abdomen:   Soft, non tender   Bowel sounds present  Extremities:  No Le edema or cyanosis   Neurological:  Deferred       LABS:     CBC:   Recent Labs     11/29/24  1813 11/30/24  0802   WBC 7.2 11.1   HGB 13.0 11.4*   HCT 39.8 35.2*    327     BMP:   Recent Labs     11/29/24  1813 11/29/24  2305 11/30/24  0802     --  139   K 3.1* 3.7 3.3*

## 2024-11-30 NOTE — ED NOTES
ED to inpatient nurses report      Chief Complaint:  Chief Complaint   Patient presents with    Urinary Tract Infection     Altered Mental Status     Present to ED from: Home    MOA:     LOC: alert to only name  Mobility: Requires assistance * 2  Oxygen Baseline: RA    Current needs required: NA   Pending ED orders: NA  Present condition: Stable    Why did the patient come to the ED?     Pt presents to ED room 23 with c/o UTI and altered mental state. Pt was seen here yesterday for UTI symptoms and was given and prescription for keflex in which she was able to take a dose of. Since then, per patients family her mentation has decreased. Monitor attached. Bloodwork obtained. Will continue with plan of care.      What is the plan? Admit for cardiac consult, UTI  Any procedures or intervention occur? IV, labs, imaging, medication  Any safety concerns?? Fall risk     Mental Status:  Level of Consciousness: Responds to voice (1)    Psych Assessment:   Psychosocial  Psychosocial (WDL): (S) Exceptions to WDL (Lethargic appearance.)  Vital signs   Vitals:    11/29/24 1901 11/29/24 1915 11/29/24 2127 11/29/24 2128   BP: 105/68 107/66 120/71    Pulse: 97 90 93 91   Resp: 13 15 13 12   Temp:       TempSrc:       SpO2: 94% 96%  97%        Vitals:  Patient Vitals for the past 24 hrs:   BP Temp Temp src Pulse Resp SpO2   11/29/24 2128 -- -- -- 91 12 97 %   11/29/24 2127 120/71 -- -- 93 13 --   11/29/24 1915 107/66 -- -- 90 15 96 %   11/29/24 1901 105/68 -- -- 97 13 94 %   11/29/24 1807 119/68 -- -- 76 12 96 %   11/29/24 1720 134/79 97.4 °F (36.3 °C) Oral 82 12 97 %      Visit Vitals  /71   Pulse 91   Temp 97.4 °F (36.3 °C) (Oral)   Resp 12   SpO2 97%        LDAs:   Peripheral IV 11/29/24 Left;Anterior Cephalic (Active)       Ambulatory Status:  Presents to emergency department  because of falls (Syncope, seizure, or loss of consciousness): No, Age > 70: Yes, Altered Mental Status, Intoxication with alcohol or substance  STENT PLACEMENT Left 05/16/2022    Cystoscopy    URETER SURGERY Left 12/19/2022    HOLMIUM LASER CYSTOSCOPY,URETEROSCOPY, STENT EXCHANGE performed by Tk Gant MD at [facility] OR    URETEROSCOPY Left 12/19/2022    stent       PAST MEDICAL HISTORY       Past Medical History:   Diagnosis Date    Anxiety     Convulsion (HCC)     Dementia (HCC)     Encephalopathy     Herpes     Hx of blood clots     Hyperlipidemia     Left bundle branch block     MRSA (methicillin resistant Staphylococcus aureus)     Parkinson's disease (HCC)     family states tremors not parkinson    Seizures (HCC)     secondary to encephalitis    Under care of service provider 12/15/2022    [id number]-last visit dec 2022    Vitamin D deficiency        Labs:  Labs Reviewed   CBC WITH AUTO DIFFERENTIAL - Abnormal; Notable for the following components:       Result Value    Lymphocytes % 20 (*)     Eosinophils % 6 (*)     All other components within normal limits   COMPREHENSIVE METABOLIC PANEL - Abnormal; Notable for the following components:    Potassium 3.1 (*)     Glucose 107 (*)     Creatinine 1.3 (*)     Est, Glom Filt Rate 41 (*)     Albumin/Globulin Ratio 0.9 (*)     Alkaline Phosphatase 259 (*)     All other components within normal limits   LACTIC ACID - Abnormal; Notable for the following components:    Lactic Acid, Whole Blood 2.8 (*)     All other components within normal limits   TROPONIN - Abnormal; Notable for the following components:    Troponin, High Sensitivity 61 (*)     All other components within normal limits   URINALYSIS WITH REFLEX TO CULTURE - Abnormal; Notable for the following components:    Turbidity UA Cloudy (*)     Urine Hgb TRACE (*)     Protein, UA TRACE (*)     Leukocyte Esterase, Urine MODERATE (*)     All other components within normal limits   MICROSCOPIC URINALYSIS - Abnormal; Notable for the following components:    Bacteria, UA FEW (*)     All other components within normal limits   TROPONIN  - Abnormal; Notable for the following components:    Troponin, High Sensitivity 53 (*)     All other components within normal limits   CULTURE, URINE   AMMONIA   PREVIOUS SPECIMEN   TSH   LACTIC ACID       Electronically signed by Ev Turner RN on 11/29/2024 at 9:46 PM

## 2024-11-30 NOTE — H&P
[facility]  Office: [phone]  Felipe Galicia DO, Duglas Archuleta DO, Andrew Abdi DO, Carlos Alberto Garcia DO, Jona Barrett MD, Saima Funes MD, Shanta Yates MD, Evie Whitaker MD,  David Rangel MD, Venu White MD, Young Shoemaker MD,  Sallie Santiago DO, Jenae Victoria MD, Marcus Garrido MD, David Galicia DO, Joanna Winn MD,  Gonzalo Hunter DO, Irish Escobar MD, Yvonne Shepard MD, Dorinda Birch MD, Torres Barrios MD,  Oscar Aguilar MD, Chepe Figueroa MD, Shanthi Harden MD, Kwesi Silva MD, Omer Sullivan MD, Ryne Dewey MD, Gera Tejeda DO, Jose Leroy DO, Cong Troy MD,  Blake Azevedo MD, Shirley Waterhouse, CNP,  Fela Mills CNP, Herb Mandujano, CNP,  Heydi Smith, MARIA LUZ, Ashlie Orellana, CNP, Kimberly Bryan, CNP, Daniela Szymanski CNP, Alona Dickinson CNP, Robyn Guzman, CNP, Juliette Iraheta, PA-C, Twyla Cerda PA-C, Vikki Estrada, CNP, Lien Carter, CNS, Yumi Castle, CNP, Sumi Lopez, CNP, Tracy Schwab, CNP         [facility]   IN-PATIENT SERVICE   [facility]    HISTORY AND PHYSICAL EXAMINATION            Date:   11/29/2024  Patient name:  Yasmine Morrison  Date of admission:  11/29/2024  5:11 PM  MRN:   [id number]  Account:  [id number]  YOB: 1945  PCP:    Sravani Wu DO  Room:   23/23  Code Status:    DNR-CCA    Chief Complaint:     Chief Complaint   Patient presents with    Urinary Tract Infection     Altered Mental Status       History Obtained From:     patient, electronic medical record    History of Present Illness:     Yasmine Morrison is a 79 y.o. Non- / non  female who presents with Urinary Tract Infection  and Altered Mental Status and is admitted to the hospital for the management of Complicated UTI (urinary tract infection).  Patient has a history of recurrent UTIs, dementia, seizure disorder, GERD, COPD, and DVT.    Patient presented to the ED last night for concerns of altered  NEGATIVE mg/dL    Urobilinogen, Urine Normal 0.0 - 1.0 EU/dL    Nitrite, Urine NEGATIVE NEGATIVE    Leukocyte Esterase, Urine MODERATE (A) NEGATIVE   Microscopic Urinalysis    Collection Time: 11/29/24  6:39 PM   Result Value Ref Range    WBC, UA 50  0 - 5 /HPF    RBC, UA 0 TO 2 0 - 4 /HPF    Casts UA  0 - 8 /LPF     2 TO 5 HYALINE Reference range defined for non-centrifuged specimen.    Epithelial Cells, UA 0 TO 2 0 - 5 /HPF    Bacteria, UA FEW (A) None   Troponin    Collection Time: 11/29/24  8:12 PM   Result Value Ref Range    Troponin, High Sensitivity 53 (HH) 0 - 14 ng/L   Lactic Acid    Collection Time: 11/29/24  8:45 PM   Result Value Ref Range    Lactic Acid, Whole Blood 2.1 0.7 - 2.1 mmol/L       Imaging/Diagnostics:  CT HEAD WO CONTRAST    Result Date: 11/29/2024  1. Stable appearance of the brain without acute intracranial process identified. 2. Unremarkable CT angiogram of the head and neck.     CTA HEAD NECK W CONTRAST    Result Date: 11/29/2024  1. Stable appearance of the brain without acute intracranial process identified. 2. Unremarkable CT angiogram of the head and neck.     XR ABDOMEN (KUB) (SINGLE AP VIEW)    Result Date: 11/29/2024  No evidence of bowel obstruction. Constipation     XR CHEST PORTABLE    Result Date: 11/29/2024  1. No acute cardiopulmonary process. 2. Incidentally seen are air distended loops of small bowel in the upper abdomen.       Assessment :      Hospital Problems             Last Modified POA    * (Principal) Complicated UTI (urinary tract infection) 11/29/2024 Yes    ESBL (extended spectrum beta-lactamase) producing bacteria infection 11/29/2024 Yes    History of extended-spectrum beta-lactamase producing Escherichia coli infection 11/29/2024 Yes    Seizure disorder (HCC) 11/29/2024 Yes    Dementia with behavioral disturbance (HCC) 11/29/2024 Yes       Plan:     Patient status inpatient in the Med/Surge    Complicated UTI with history of VRE, ESBL, MRSA  UA with

## 2024-11-30 NOTE — PROGRESS NOTES
Writer was about to put a tele sitter to the patient and saw that the patient removed her IV. Writer and another nurse tried to insert a line but failed. Writer called the supervisor for help and arranged patient to have a sitter with staffing.

## 2024-11-30 NOTE — PROGRESS NOTES
Writer received a call from the chemistry lab that carbamazepine level was 24. Informed NP about the carbamazepine level and the IV line situation. The one who will try to insert have not yet come down. Writer will put an IV team consult. NP advised to informed her of any changes in the V/S.

## 2024-11-30 NOTE — CONSULTS
.          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  Patient presented to the ED on 11/28 for concerns of a possible stroke and patient arrived with DNR CC paperwork. Per chart review the daughter confirmed her code status and further workup was declined. Patient was diagnosed with UTI and discharged on keflex. Patient also has a history of advanced dementia and Parkinson. Patient returned to ED last night due to increased confusion from her baseline. She has a history of complicated UTI and in October 2022 she developed multidrug resistant ESBL treated with meropenem.    In the ED her vitals signs were stable. Labs were remarkable for hypokalemia 3.1. UA was showed moderate leukocytes,  WBC, moderate leukocyte esterase, and few bacteria. Patient was given 40 mEq potassium, Rocephin, and ASA.       Interval changes  11/30/2024   Patient Vitals for the past 8 hrs:   BP Temp Pulse     Received from [facility], [facility]    Hunger Screening     Within the past 12 months we worried whether our food would run out before we got money to buy more.: Never True     Within the past 12 months the food we bought just didn't last and we didn't have money to get more.: Never True   Transportation Needs: Unknown (4/29/2024)    PRAPARE - Transportation     Lack of Transportation (Medical): Not on file     Lack of Transportation (Non-Medical): No   Physical Activity: Inactive (12/10/2019)    Exercise Vital Sign     Days of Exercise per Week: 0 days     Minutes of Exercise per Session: 0 min   Stress: No Stress Concern Present (12/10/2019)    [employer] - Occupational Stress Questionnaire     Feeling of Stress : Only a little   Social Connections: Unknown (12/10/2019)    Social Connection and Isolation Panel [NHANES]     Frequency of Communication with Friends and Family: Three times a week     Frequency of Social Gatherings with Friends and Family: Twice a week     Attends Taoist Services: Never     Active Member of Clubs or Organizations: No     Attends Club or Organization Meetings: Never     Marital Status: Not on file   Intimate Partner Violence: Unknown (2/22/2024)    [facility]    UT Safety & Environment     Fear of Current or Ex-Partner: Not on file     Emotionally Abused: Not on file     Physically Abused: Not on file     Sexually Abused: Not on file     Physically or Sexually Abused: Not on file   Housing Stability: Unknown (4/29/2024)    Housing Stability Vital Sign     Unable to Pay for Housing in the Last Year: Not on file     Number of Places Lived in the Last Year: Not on file     Unstable Housing in the Last Year: No       Family History:     Family History   Problem Relation Age of Onset    Asthma Mother     No Known Problems Father       Medical Decision Making:   I have

## 2024-11-30 NOTE — PROGRESS NOTES
The assigned sitter is an RN and a ultrasound competent tried to insert the IV line but failed. Called again nursing supervisor to see if there is another ultrasound competent to insert the IV line. Nursing supervisor called back and said there is in 4B but she might take a while to come. Writer informed NP about the situation that meropenem is still to start but no IV line.

## 2024-12-01 LAB
ANION GAP SERPL CALCULATED.3IONS-SCNC: 9 MMOL/L (ref 9–16)
BUN SERPL-MCNC: 10 MG/DL (ref 8–23)
CALCIUM SERPL-MCNC: 7.3 MG/DL (ref 8.6–10.4)
CHLORIDE SERPL-SCNC: 109 MMOL/L (ref 98–107)
CO2 SERPL-SCNC: 20 MMOL/L (ref 20–31)
CREAT SERPL-MCNC: 1.1 MG/DL (ref 0.6–0.9)
ERYTHROCYTE [DISTWIDTH] IN BLOOD BY AUTOMATED COUNT: 14.3 % (ref 11.8–14.4)
GFR, ESTIMATED: 50 ML/MIN/1.73M2
GLUCOSE SERPL-MCNC: 129 MG/DL (ref 74–99)
HCT VFR BLD AUTO: 29.6 % (ref 36.3–47.1)
HGB BLD-MCNC: 9.3 G/DL (ref 11.9–15.1)
MCH RBC QN AUTO: 32.2 PG (ref 25.2–33.5)
MCHC RBC AUTO-ENTMCNC: 31.4 G/DL (ref 28.4–34.8)
MCV RBC AUTO: 102.4 FL (ref 82.6–102.9)
MICROORGANISM SPEC CULT: ABNORMAL
NRBC BLD-RTO: 0 PER 100 WBC
PLATELET # BLD AUTO: 323 K/UL (ref 138–453)
PMV BLD AUTO: 9.1 FL (ref 8.1–13.5)
POTASSIUM SERPL-SCNC: 3.6 MMOL/L (ref 3.7–5.3)
RBC # BLD AUTO: 2.89 M/UL (ref 3.95–5.11)
SERVICE CMNT-IMP: ABNORMAL
SODIUM SERPL-SCNC: 138 MMOL/L (ref 136–145)
SPECIMEN DESCRIPTION: ABNORMAL
SPECIMEN DESCRIPTION: ABNORMAL
WBC OTHER # BLD: 6.5 K/UL (ref 3.5–11.3)

## 2024-12-01 PROCEDURE — 6370000000 HC RX 637 (ALT 250 FOR IP): Performed by: NURSE PRACTITIONER

## 2024-12-01 PROCEDURE — 36415 COLL VENOUS BLD VENIPUNCTURE: CPT

## 2024-12-01 PROCEDURE — 6360000002 HC RX W HCPCS: Performed by: NURSE PRACTITIONER

## 2024-12-01 PROCEDURE — 6370000000 HC RX 637 (ALT 250 FOR IP): Performed by: STUDENT IN AN ORGANIZED HEALTH CARE EDUCATION/TRAINING PROGRAM

## 2024-12-01 PROCEDURE — 6370000000 HC RX 637 (ALT 250 FOR IP): Performed by: PHYSICIAN ASSISTANT

## 2024-12-01 PROCEDURE — 2580000003 HC RX 258: Performed by: NURSE PRACTITIONER

## 2024-12-01 PROCEDURE — 2580000003 HC RX 258: Performed by: STUDENT IN AN ORGANIZED HEALTH CARE EDUCATION/TRAINING PROGRAM

## 2024-12-01 PROCEDURE — 99232 SBSQ HOSP IP/OBS MODERATE 35: CPT | Performed by: INTERNAL MEDICINE

## 2024-12-01 PROCEDURE — 99233 SBSQ HOSP IP/OBS HIGH 50: CPT | Performed by: NURSE PRACTITIONER

## 2024-12-01 PROCEDURE — 2580000003 HC RX 258: Performed by: INTERNAL MEDICINE

## 2024-12-01 PROCEDURE — 80048 BASIC METABOLIC PNL TOTAL CA: CPT

## 2024-12-01 PROCEDURE — 6360000002 HC RX W HCPCS

## 2024-12-01 PROCEDURE — 2500000003 HC RX 250 WO HCPCS: Performed by: STUDENT IN AN ORGANIZED HEALTH CARE EDUCATION/TRAINING PROGRAM

## 2024-12-01 PROCEDURE — 85027 COMPLETE CBC AUTOMATED: CPT

## 2024-12-01 PROCEDURE — 87641 MR-STAPH DNA AMP PROBE: CPT

## 2024-12-01 PROCEDURE — 99232 SBSQ HOSP IP/OBS MODERATE 35: CPT | Performed by: STUDENT IN AN ORGANIZED HEALTH CARE EDUCATION/TRAINING PROGRAM

## 2024-12-01 PROCEDURE — 87081 CULTURE SCREEN ONLY: CPT

## 2024-12-01 PROCEDURE — 6360000002 HC RX W HCPCS: Performed by: INTERNAL MEDICINE

## 2024-12-01 PROCEDURE — 2000000000 HC ICU R&B

## 2024-12-01 RX ORDER — NOREPINEPHRINE BITARTRATE 0.06 MG/ML
1-100 INJECTION, SOLUTION INTRAVENOUS CONTINUOUS
Status: DISCONTINUED | OUTPATIENT
Start: 2024-12-01 | End: 2024-12-04

## 2024-12-01 RX ORDER — FUROSEMIDE 10 MG/ML
40 INJECTION INTRAMUSCULAR; INTRAVENOUS ONCE
Status: DISCONTINUED | OUTPATIENT
Start: 2024-12-01 | End: 2024-12-01

## 2024-12-01 RX ORDER — FLUDROCORTISONE ACETATE 0.1 MG/1
0.2 TABLET ORAL DAILY
Status: DISCONTINUED | OUTPATIENT
Start: 2024-12-01 | End: 2024-12-04

## 2024-12-01 RX ORDER — 0.9 % SODIUM CHLORIDE 0.9 %
500 INTRAVENOUS SOLUTION INTRAVENOUS ONCE
Status: COMPLETED | OUTPATIENT
Start: 2024-12-01 | End: 2024-12-01

## 2024-12-01 RX ORDER — HYDROCORTISONE SODIUM SUCCINATE 100 MG/2ML
50 INJECTION INTRAMUSCULAR; INTRAVENOUS EVERY 6 HOURS
Status: DISCONTINUED | OUTPATIENT
Start: 2024-12-01 | End: 2024-12-03

## 2024-12-01 RX ORDER — 0.9 % SODIUM CHLORIDE 0.9 %
500 INTRAVENOUS SOLUTION INTRAVENOUS ONCE
Status: DISCONTINUED | OUTPATIENT
Start: 2024-12-01 | End: 2024-12-01

## 2024-12-01 RX ADMIN — TROSPIUM CHLORIDE 20 MG: 20 TABLET, FILM COATED ORAL at 17:41

## 2024-12-01 RX ADMIN — MIDODRINE HYDROCHLORIDE 10 MG: 5 TABLET ORAL at 17:33

## 2024-12-01 RX ADMIN — MIDODRINE HYDROCHLORIDE 10 MG: 5 TABLET ORAL at 06:17

## 2024-12-01 RX ADMIN — ASPIRIN 81 MG: 81 TABLET, COATED ORAL at 08:29

## 2024-12-01 RX ADMIN — QUETIAPINE FUMARATE 50 MG: 25 TABLET ORAL at 08:29

## 2024-12-01 RX ADMIN — MEROPENEM 1000 MG: 1 INJECTION INTRAVENOUS at 21:28

## 2024-12-01 RX ADMIN — SODIUM CHLORIDE 500 ML: 9 INJECTION, SOLUTION INTRAVENOUS at 08:12

## 2024-12-01 RX ADMIN — MIDODRINE HYDROCHLORIDE 10 MG: 5 TABLET ORAL at 11:40

## 2024-12-01 RX ADMIN — TROSPIUM CHLORIDE 20 MG: 20 TABLET, FILM COATED ORAL at 06:17

## 2024-12-01 RX ADMIN — ENOXAPARIN SODIUM 40 MG: 100 INJECTION SUBCUTANEOUS at 08:28

## 2024-12-01 RX ADMIN — HYDROCORTISONE SODIUM SUCCINATE 50 MG: 100 INJECTION, POWDER, FOR SOLUTION INTRAMUSCULAR; INTRAVENOUS at 20:31

## 2024-12-01 RX ADMIN — DIVALPROEX SODIUM 250 MG: 250 TABLET, FILM COATED, EXTENDED RELEASE ORAL at 23:08

## 2024-12-01 RX ADMIN — ATORVASTATIN CALCIUM 40 MG: 40 TABLET, FILM COATED ORAL at 20:31

## 2024-12-01 RX ADMIN — HYDROCORTISONE SODIUM SUCCINATE 50 MG: 100 INJECTION, POWDER, FOR SOLUTION INTRAMUSCULAR; INTRAVENOUS at 14:18

## 2024-12-01 RX ADMIN — LINEZOLID 600 MG: 600 INJECTION, SOLUTION INTRAVENOUS at 01:00

## 2024-12-01 RX ADMIN — FLUDROCORTISONE ACETATE 0.2 MG: 0.1 TABLET ORAL at 09:00

## 2024-12-01 RX ADMIN — LINEZOLID 600 MG: 600 INJECTION, SOLUTION INTRAVENOUS at 13:50

## 2024-12-01 RX ADMIN — PANTOPRAZOLE SODIUM 40 MG: 40 TABLET, DELAYED RELEASE ORAL at 06:17

## 2024-12-01 RX ADMIN — Medication 5 MCG/MIN: at 13:39

## 2024-12-01 RX ADMIN — SODIUM CHLORIDE: 9 INJECTION, SOLUTION INTRAVENOUS at 13:48

## 2024-12-01 RX ADMIN — DIVALPROEX SODIUM 250 MG: 250 TABLET, FILM COATED, EXTENDED RELEASE ORAL at 08:29

## 2024-12-01 RX ADMIN — SODIUM CHLORIDE, PRESERVATIVE FREE 10 ML: 5 INJECTION INTRAVENOUS at 20:34

## 2024-12-01 RX ADMIN — SODIUM CHLORIDE 500 ML: 9 INJECTION, SOLUTION INTRAVENOUS at 10:50

## 2024-12-01 RX ADMIN — QUETIAPINE FUMARATE 50 MG: 25 TABLET ORAL at 20:31

## 2024-12-01 ASSESSMENT — PAIN SCALES - GENERAL: PAINLEVEL_OUTOF10: 0

## 2024-12-01 NOTE — H&P
(Details:  )     -----------------------------------------------------------------  Radiological reports:    CXR:    Renal Ultrasound  1. No evidence of hydronephrosis.  2. Suspected bilateral renal calculi which may measure up to 9 mm.  3. Cholelithiasis with distended gallbladder incidentally noted.    Electrocardiogram:   Rhythm: normal sinus   Rate: normal  Axis: normal/81  Ectopy: none  Conduction: Left bundle branch block QRS duration 128 ms  ST Segments: no acute change  T Waves: no acute change  Q Waves: none  Last Echocardiogram findings:   Echo 5/17/22  Normal LV size and wall thickness.  Abnormal septal wall motion  Normal LV systolic function with LVEF >55%.  Normal RV size and function. RV systolic pressure 48 mmHg  LA and RA appears normal in size.  No obvious significant structural valvular abnormality noted.  No significant valvular stenosis or regurgitation noted.  Normal aortic root dimension.  No significant pericardial effusion noted.  No obvious intra-cardiac mass or shunt noted.  IVC not well visualized    Assessment and Plan     Patient Active Problem List   Diagnosis    Seizure ([facility])    Vitamin D deficiency    Anxiety    Hypercholesteremia    Cellulitis of right leg    MRSA (methicillin resistant staph aureus) culture positive    Breakthrough seizure ([facility])    Memory loss    Difficulty speaking    Acute encephalopathy    Dementia with behavioral disturbance ([facility])    History of encephalitis    Dementia due to Parkinson's disease with behavioral disturbance ([facility])    DVT of deep femoral vein, left ([facility])    Closed compression fracture of L1 lumbar vertebra, initial encounter ([facility])    Depression    Kidney stone    Urinary incontinence    Overactive bladder    Closed head injury    Community acquired pneumonia    Sepsis ([facility])    COPD exacerbation ([facility])    Hypokalemia    Bacterial UTI    Urinary tract infection due to Proteus    ESBL (extended spectrum beta-lactamase) producing bacteria infection     Acute cystitis with hematuria    Leukocytosis    Mitral valve disorder    Chest pain    Encounter for palliative care    Complicated UTI (urinary tract infection)    Ureteral stent present    GI bleed    Pyelonephritis    History of extended-spectrum beta-lactamase producing Escherichia coli infection    VRE infection within last 3 months    Elevated troponin         Additional assessment:  Acute encephalopathy probably secondary due to UTI  Complicated UTI with urine culture positive for Enterococcus faecium with history of VRE, ESBL and MRSA  KATE  Chronic hypotension with further low systolic blood pressures and MAP secondary from UTI  Seizure disorder  Elevated troponins-flat trend, type II  Depression  GERD  Dementia  Plan:  Neuro:  Neurochecks per protocol  Patient has history of dementia and seizure disorder  Plan to continue Depakote, carbamazepine currently held in view of encephalopathy as the levels are high  Plan to continue home medication Seroquel  Home medication for depression Prozac stopped as patient started on Zyvox by ID    Resp:  Patient is saturating well on room air  Respiratory rate is 16/min  Monitor SpO2 and respiratory rate       CV:  Chest pain with elevated troponins 61-53 flat trend, probably musculoskeletal  Heart rate stable  Patient has history of chronic hypotension, currently further low systolic blood pressures.  Increased home dose fludrocortisone to 0.2 mg  Continue Midodrine 1 mg TID   Started on Hydrocortisone 50mg Q6H and Levophed plan to titrate as tolerated.  Cardiology on board and appreciate recommendations  Echo pending    GI/Nutrition:  Diet: ADULT DIET; Dysphagia - Soft and Bite Sized  Continue pantoprazole 40 Mg p.o. for GERD    /Fluids/Electrolytes:  Patient had KATE initially on admission, creatinine currently 1.1 and improving  Urine output in last 24 hours is 1700 mL  Monitor urea, creatinine and electrolytes daily  Patient is initially started on meropenem for

## 2024-12-01 NOTE — PLAN OF CARE
Problem: Safety - Adult  Goal: Free from fall injury  12/1/2024 1543 by Hailey Bowen RN  Outcome: Progressing  12/1/2024 0321 by Rudolph Bush RN  Outcome: Progressing     Problem: Discharge Planning  Goal: Discharge to home or other facility with appropriate resources  12/1/2024 1543 by Hailey Bowen RN  Outcome: Progressing  12/1/2024 0321 by Rudolph Bush RN  Outcome: Progressing     Problem: Skin/Tissue Integrity  Goal: Absence of new skin breakdown  Description: 1.  Monitor for areas of redness and/or skin breakdown  2.  Assess vascular access sites hourly  3.  Every 4-6 hours minimum:  Change oxygen saturation probe site  4.  Every 4-6 hours:  If on nasal continuous positive airway pressure, respiratory therapy assess nares and determine need for appliance change or resting period.  12/1/2024 1543 by Hailey Bowen RN  Outcome: Progressing  12/1/2024 0321 by Rudolph Bush RN  Outcome: Progressing     Problem: Cardiovascular - Adult  Goal: Maintains optimal cardiac output and hemodynamic stability  Outcome: Progressing  Goal: Absence of cardiac dysrhythmias or at baseline  Outcome: Progressing     Problem: Skin/Tissue Integrity - Adult  Goal: Skin integrity remains intact  Outcome: Progressing  Flowsheets (Taken 12/1/2024 1331)  Skin Integrity Remains Intact:   Monitor for areas of redness and/or skin breakdown   Assess vascular access sites hourly  Goal: Oral mucous membranes remain intact  Outcome: Progressing     Problem: Musculoskeletal - Adult  Goal: Return mobility to safest level of function  Outcome: Progressing     Problem: Genitourinary - Adult  Goal: Absence of urinary retention  Outcome: Progressing  Goal: Urinary catheter remains patent  Outcome: Progressing

## 2024-12-01 NOTE — CARE COORDINATION
Case Management Assessment  Initial Evaluation    Date/Time of Evaluation:  11/30/24 1000 AM  Assessment Completed by: Claribel Menard    If patient is discharged prior to next notation, then this note serves as note for discharge by case management.    Patient Name: Yasmine Morrison                   YOB: 1945  Diagnosis: UTI (urinary tract infection) [N39.0]  KATE (acute kidney injury) (HCC) [N17.9]  Urinary tract infection without hematuria, site unspecified [N39.0]  Ken coma scale total score 13-15, at arrival to emergency department [R40.2412]                   Date / Time: 11/29/2024  5:11 PM    Patient Admission Status: Inpatient   Readmission Risk (Low < 19, Mod (19-27), High > 27): Readmission Risk Score: 16.1    Current PCP: Sravani Wu, DO  PCP verified by CM? (P) Yes (Dr Sravani Wu)    Chart Reviewed: Yes      History Provided by: (P) Patient, Child/Family  Patient Orientation: Alert and Oriented, Person, Place, Situation, Self    Patient Cognition: Alert    Hospitalization in the last 30 days (Readmission):  No    If yes, Readmission Assessment in CM Navigator will be completed.    Advance Directives:      Code Status: DNR-CCA   Patient's Primary Decision Maker is: (P) Legal Next of Kin    Primary Decision Maker: UzmaCheyanne - Child, Legal Guardian - [phone]    Discharge Planning:    Patient lives with: Family Members Type of Home: House  Primary Care Giver: (P) Family  Patient Support Systems include: Children   Current Financial resources: (P) Medicaid, Medicare  Current community resources:    Current services prior to admission: (P) Durable Medical Equipment            Current DME: (P) Shower Chair, Wheelchair, Other (Comment) (has bathroom grab bars)            Type of Home Care services:  (P) None    ADLS  Prior functional level: (P) Assistance with the following:, Bathing, Dressing, Toileting, Cooking, Housework, Shopping, Mobility, Other (see comment) (needs assist d/t  Dementia)  Current functional level: (P) Assistance with the following:, Bathing, Dressing, Toileting, Cooking, Housework, Shopping, Mobility, Other (see comment) (needs assist d/t Dementia and recent decline/hospitalization)    PT AM-PAC:   /24  OT AM-PAC:   /24    Family can provide assistance at DC: (P) Yes  Would you like Case Management to discuss the discharge plan with any other family members/significant others, and if so, who? (P) Yes (daughter)  Plans to Return to Present Housing: (P) No  Other Identified Issues/Barriers to RETURNING to current housing: increased care needs, decreased cognitive status  Potential Assistance needed at discharge: (P) Skilled Nursing Facility            Potential DME:    Patient expects to discharge to: (P) Skilled nursing facility  Plan for transportation at discharge: (P) Other (see comment) (will need transportation to SNF)    Financial    Payor: [employer]EF[employer] / Plan: [employer] DUAL / Product Type: *No Product type* /     Does insurance require precert for SNF: Yes    Potential assistance Purchasing Medications: (P) No  Meds-to-Beds request: No      [id number] - [place], [place] - [address] [phone] - [facility] [phone]  [address] [place] [address]  Phone: [phone] Fax: [phone]    [address] [phone] - F [phone]  [address] [place] [address]  Phone: [phone] Fax: [phone]      Notes:    Factors facilitating achievement of predicted outcomes: Family support, Cooperative, and Pleasant    Barriers to discharge: Cognitive deficit, Limited safety awareness, Decreased motivation, Limited insight into deficits, Decreased endurance, Upper extremity weakness, Lower extremity weakness, Long standing deficits, Medical complications, and chronic health conditions    Additional Case Management Notes: plan is SNF ([facility] and referral sent) and will

## 2024-12-01 NOTE — PLAN OF CARE
Evaluated the patient at bedside.  Patient resting comfortably in her bed, eating her breakfast.  Patient last blood pressure was 86/44, MAP 58.  Patient does have a history of chronic hypotension on midodrine 10 3 times daily and Florinef 0.1 daily at home.   Patient's Florinef has been increased to 0.2 mg by primary and currently she is receiving 500 mg bolus.  Per chart review, patient's diastolic blood pressure has always remained in between 40s and 60s resulting in low MAP.  As patient's medication has just been modified and currently she is receiving fluid bolus.  Would recommend to continue monitoring on the floor.  If patient is unable to maintain SBP of 80, please consult critical care again.     Eduarda Velarde MD  PGY-3, Internal Medicine Resident  [facility], [place]         12/1/2024, 8:59 AM

## 2024-12-01 NOTE — PROGRESS NOTES
[facility]   Progress Note                   Date:   12/1/2024  Patient name: Yasmine Morrison  Date of admission:  11/29/2024  5:11 PM  MRN:   [id number]  YOB: 1945  PCP: Sravani Wu DO    Reason for Admission: UTI (urinary tract infection) [N39.0]  KATE (acute kidney injury) (HCC) [N17.9]  Urinary tract infection without hematuria, site unspecified [N39.0]  Ken coma scale total score 13-15, at arrival to emergency department [R40.2412]    Subjective:       Clinical Changes / Abnormalities: Pt seen and examined in the room.  Patient resting in bed with sitter at bedside. Pt denies any CP or sob.  Labs, vitals and tele reviewed-SR 87    Medications:   Scheduled Meds:   fludrocortisone  0.2 mg Oral Daily    sodium chloride  500 mL IntraVENous Once    sodium chloride  500 mL IntraVENous Once    linezolid  600 mg IntraVENous Q12H    midodrine  10 mg Oral TID WC    aspirin  81 mg Oral Daily    [Held by provider] carBAMazepine  300 mg Oral Daily    QUEtiapine  50 mg Oral BID    pantoprazole  40 mg Oral QAM AC    trospium  20 mg Oral BID AC    sodium chloride flush  5-40 mL IntraVENous 2 times per day    enoxaparin  40 mg SubCUTAneous Daily    divalproex  250 mg Oral BID    [Held by provider] carBAMazepine  400 mg Oral Nightly    atorvastatin  40 mg Oral Nightly     Continuous Infusions:   sodium chloride       CBC:   Recent Labs     11/29/24  1813 11/30/24  0802   WBC 7.2 11.1   HGB 13.0 11.4*    327     BMP:    Recent Labs     11/29/24  1813 11/29/24  2305 11/30/24  0802     --  139   K 3.1* 3.7 3.3*     --  103   CO2 25  --  23   BUN 19  --  16   CREATININE 1.3*  --  1.2*   GLUCOSE 107*  --  77     Hepatic:   Recent Labs     11/29/24 1813   AST 35   ALT 29   BILITOT 0.5   ALKPHOS 259*     Troponin:   Recent Labs     11/29/24  1813 11/29/24  2012   TROPHS 61* 53*     BNP: No results for input(s): \"BNP\" in the last 72 hours.  Lipids: No results for input(s):  vertebra, initial encounter (HCC)     Depression     Kidney stone     Urinary incontinence     Overactive bladder     Closed head injury     Community acquired pneumonia     Sepsis (HCC)     COPD exacerbation (HCC)     Hypokalemia     Bacterial UTI     Urinary tract infection due to Proteus     ESBL (extended spectrum beta-lactamase) producing bacteria infection     Acute cystitis with hematuria     Leukocytosis     Mitral valve disorder     Chest pain     Encounter for palliative care     Urinary tract infection without hematuria     Ureteral stent present     GI bleed     Pyelonephritis     History of extended-spectrum beta-lactamase producing Escherichia coli infection     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   ECHO ordered, not yet completed.  Continue ASA and statin  Keep K >4 and Mg >2   If ECHO low risk, no further cardiac work up planned.  Will continue to monitor    Electronically signed by LAURIE Carreno CNP on 12/1/2024 at 9:39 AM  [facility] [employer].  [phone]

## 2024-12-01 NOTE — PLAN OF CARE
Problem: Safety - Adult  Goal: Free from fall injury  12/1/2024 0321 by Rudolph Bush RN  Outcome: Progressing     Problem: Discharge Planning  Goal: Discharge to home or other facility with appropriate resources  12/1/2024 0321 by Rudolph Bush, RN  Outcome: Progressing       Problem: Skin/Tissue Integrity  Goal: Absence of new skin breakdown  Description: 1.  Monitor for areas of redness and/or skin breakdown  2.  Assess vascular access sites hourly  3.  Every 4-6 hours minimum:  Change oxygen saturation probe site  4.  Every 4-6 hours:  If on nasal continuous positive airway pressure, respiratory therapy assess nares and determine need for appliance change or resting period.  12/1/2024 0321 by Rudolph Bush, RN  Outcome: Progressing

## 2024-12-01 NOTE — PROGRESS NOTES
[facility]  Office: [phone]  Felipe Galicia DO, Duglas Archuleta DO, Andrew Abdi DO, Carlos Alberto Garcia DO, Jona Barrett MD, Saima Funes MD, Shanta Yates MD, Evie Whitaker MD,  David Rangel MD, Venu White MD, Young Shoemaker MD,  Sallie Santiago DO, Jenae Victoria MD, Marcus Garrido MD, David Galicia DO, Joanna Winn MD,  Gonzalo Hunter DO, Irish Escobar MD, Yvonne Shepard MD, Dorinda Birch MD, Torres Barrios MD,  Oscar Aguilar MD, Chepe Figueroa MD, Shanthi Harden MD, Kwesi Silva MD, Omer Sullivan MD, Ryne Dewey MD, Herb Yen DO, Blake Azevedo MD, Shirley Waterhouse, CNP,  Fela Mills, CNP, Herb Mandujano, CNP,  Heydi Smith, MARIA LUZ, Ashlie Orellana, CNP, Kimberly Bryan, CNP, Alona Dickinson, CNP, Robyn Guzman, CNP, WING BurgerC, WING SchmidtC, Beata Schwarz, CNP, Arturo Nayak, CNP,  Corine Casey, CNP, Vikki Estrada, CNP,  Sumi Lopez, CNP, Delia Zuleta, CNP         [facility]   IN-PATIENT SERVICE   [facility]    Progress Note    12/1/2024    12:22 PM    Name:   Yasmine Morrison  MRN:     [id number]     Acct:      [id number]   Room:   [facility] Day:  2  Admit Date:  11/29/2024  5:11 PM    PCP:   Sravani Wu DO  Code Status:  DNR-CCA    Subjective:     C/C:   Chief Complaint   Patient presents with    Urinary Tract Infection     Altered Mental Status     Interval History Status: not change   Patient was seen and examined during morning rounds today, he is awake alert time 1 herself, she looks in no acute distress, sitter at bedside  Patient blood pressure remains soft with MAP less than 65, she was started on midodrine 10 mg 3 times daily yesterday , her Florinef dose increased to 0.2 today, she continued to receive IV fluid, she was given bolus total of 1 L however her blood pressure remained low, critical care team was consulted early in the morning who recommended to reconsult if patient  unable to maintain her systolic blood pressure of 80s, I get a call from RN that patient systolic blood pressure in 70 here MAP 40, will start Levophed and will reconsult critical care team patient may need to go to ICU for close monitoring    Review of Systems:     Review of Systems   HENT: Negative.     Respiratory: Negative.     Gastrointestinal:  Positive for abdominal pain.       Medications:     Allergies:    Allergies   Allergen Reactions    Adhesive Tape Other (See Comments)     Pulls skin off    Haldol [Haloperidol Lactate] Other (See Comments)     hallucinates       Current Meds:   Scheduled Meds:    fludrocortisone  0.2 mg Oral Daily    sodium chloride  500 mL IntraVENous Once    linezolid  600 mg IntraVENous Q12H    midodrine  10 mg Oral TID WC    aspirin  81 mg Oral Daily    [Held by provider] carBAMazepine  300 mg Oral Daily    QUEtiapine  50 mg Oral BID    pantoprazole  40 mg Oral QAM AC    trospium  20 mg Oral BID AC    sodium chloride flush  5-40 mL IntraVENous 2 times per day    enoxaparin  40 mg SubCUTAneous Daily    divalproex  250 mg Oral BID    [Held by provider] carBAMazepine  400 mg Oral Nightly    atorvastatin  40 mg Oral Nightly     Continuous Infusions:    norepinephrine      sodium chloride       PRN Meds: sodium chloride flush, sodium chloride, ondansetron **OR** ondansetron, acetaminophen **OR** acetaminophen, polyethylene glycol    Data:     Past Medical History:   has a past medical history of Anxiety, Convulsion ([facility]), Dementia (HCC), Encephalopathy, Herpes, Hx of blood clots, Hyperlipidemia, Left bundle branch block, MRSA (methicillin resistant Staphylococcus aureus), Parkinson's disease ([facility]), Seizures ([facility]), Under care of service provider, and Vitamin D deficiency.    Social History:   reports that she quit smoking about 35 years ago. Her smoking use included cigarettes. She started smoking about 64 years ago. She has a 30 pack-year smoking history. She has never used smokeless

## 2024-12-02 ENCOUNTER — APPOINTMENT (OUTPATIENT)
Age: 79
DRG: 690 | End: 2024-12-02
Payer: COMMERCIAL

## 2024-12-02 PROBLEM — R06.02 SHORTNESS OF BREATH: Status: ACTIVE | Noted: 2024-12-02

## 2024-12-02 LAB
AMMONIA PLAS-SCNC: 21 UMOL/L (ref 11–51)
ANION GAP SERPL CALCULATED.3IONS-SCNC: 11 MMOL/L (ref 9–16)
BODY TEMPERATURE: 37
BUN SERPL-MCNC: 11 MG/DL (ref 8–23)
CALCIUM SERPL-MCNC: 8.2 MG/DL (ref 8.6–10.4)
CHLORIDE SERPL-SCNC: 102 MMOL/L (ref 98–107)
CO2 SERPL-SCNC: 18 MMOL/L (ref 20–31)
COHGB MFR BLD: 1.1 % (ref 0–5)
CREAT SERPL-MCNC: 0.7 MG/DL (ref 0.6–0.9)
ECHO AO ROOT DIAM: 2.8 CM
ECHO AV AREA PEAK VELOCITY: 1.2 CM2
ECHO AV AREA VTI: 1.2 CM2
ECHO AV MEAN GRADIENT: 5 MMHG
ECHO AV MEAN VELOCITY: 1 M/S
ECHO AV PEAK GRADIENT: 10 MMHG
ECHO AV PEAK VELOCITY: 1.6 M/S
ECHO AV VELOCITY RATIO: 0.56
ECHO AV VTI: 25.4 CM
ECHO EST RA PRESSURE: 3 MMHG
ECHO LA AREA 2C: 7.9 CM2
ECHO LA AREA 4C: 8.1 CM2
ECHO LA DIAMETER: 2.2 CM
ECHO LA MAJOR AXIS: 4 CM
ECHO LA MINOR AXIS: 4 CM
ECHO LA TO AORTIC ROOT RATIO: 0.79
ECHO LA VOL BP: 12 ML (ref 22–52)
ECHO LA VOL MOD A2C: 11 ML (ref 22–52)
ECHO LA VOL MOD A4C: 13 ML (ref 22–52)
ECHO LV E' LATERAL VELOCITY: 6.85 CM/S
ECHO LV E' SEPTAL VELOCITY: 5.11 CM/S
ECHO LV EDV A2C: 31 ML
ECHO LV EDV A4C: 51 ML
ECHO LV EF PHYSICIAN: 55 %
ECHO LV EJECTION FRACTION A2C: 64 %
ECHO LV EJECTION FRACTION A4C: 69 %
ECHO LV EJECTION FRACTION BIPLANE: 66 % (ref 55–100)
ECHO LV ESV A2C: 11 ML
ECHO LV ESV A4C: 16 ML
ECHO LV FRACTIONAL SHORTENING: 31 % (ref 28–44)
ECHO LV INTERNAL DIMENSION DIASTOLIC: 3.9 CM (ref 3.9–5.3)
ECHO LV INTERNAL DIMENSION SYSTOLIC: 2.7 CM
ECHO LV IVSD: 0.6 CM (ref 0.6–0.9)
ECHO LV MASS 2D: 61.6 G (ref 67–162)
ECHO LV POSTERIOR WALL DIASTOLIC: 0.6 CM (ref 0.6–0.9)
ECHO LV RELATIVE WALL THICKNESS RATIO: 0.31
ECHO LVOT AREA: 2 CM2
ECHO LVOT AV VTI INDEX: 0.6
ECHO LVOT DIAM: 1.6 CM
ECHO LVOT MEAN GRADIENT: 2 MMHG
ECHO LVOT PEAK GRADIENT: 3 MMHG
ECHO LVOT PEAK VELOCITY: 0.9 M/S
ECHO LVOT SV: 30.7 ML
ECHO LVOT VTI: 15.3 CM
ECHO MV A VELOCITY: 0.62 M/S
ECHO MV AREA VTI: 1.3 CM2
ECHO MV E DECELERATION TIME (DT): 211 MS
ECHO MV E VELOCITY: 0.63 M/S
ECHO MV E/A RATIO: 1.02
ECHO MV E/E' LATERAL: 9.2
ECHO MV E/E' RATIO (AVERAGED): 10.76
ECHO MV E/E' SEPTAL: 12.33
ECHO MV LVOT VTI INDEX: 1.54
ECHO MV MAX VELOCITY: 0.9 M/S
ECHO MV MEAN GRADIENT: 2 MMHG
ECHO MV MEAN VELOCITY: 0.6 M/S
ECHO MV PEAK GRADIENT: 3 MMHG
ECHO MV VTI: 23.6 CM
ECHO PV MAX VELOCITY: 1.3 M/S
ECHO PV PEAK GRADIENT: 7 MMHG
ECHO PVEIN A VELOCITY: 0.4 M/S
ECHO RA AREA 4C: 11.4 CM2
ECHO RA VOLUME: 24 ML
ECHO RIGHT VENTRICULAR SYSTOLIC PRESSURE (RVSP): 32 MMHG
ECHO RV BASAL DIMENSION: 2.7 CM
ECHO RV FREE WALL PEAK S': 19.5 CM/S
ECHO RV TAPSE: 2 CM (ref 1.7–?)
ECHO TV REGURGITANT MAX VELOCITY: 2.69 M/S
ECHO TV REGURGITANT PEAK GRADIENT: 29 MMHG
ERYTHROCYTE [DISTWIDTH] IN BLOOD BY AUTOMATED COUNT: 14.1 % (ref 11.8–14.4)
FIO2 ON VENT: ABNORMAL %
GFR, ESTIMATED: 87 ML/MIN/1.73M2
GLUCOSE BLD-MCNC: 140 MG/DL (ref 65–105)
GLUCOSE SERPL-MCNC: 182 MG/DL (ref 74–99)
HCO3 VENOUS: 22.9 MMOL/L (ref 24–30)
HCT VFR BLD AUTO: 31.5 % (ref 36.3–47.1)
HGB BLD-MCNC: 10.1 G/DL (ref 11.9–15.1)
LACTIC ACID, WHOLE BLOOD: 2.6 MMOL/L (ref 0.7–2.1)
LACTIC ACID, WHOLE BLOOD: 2.9 MMOL/L (ref 0.7–2.1)
LACTIC ACID, WHOLE BLOOD: 4 MMOL/L (ref 0.7–2.1)
MCH RBC QN AUTO: 32 PG (ref 25.2–33.5)
MCHC RBC AUTO-ENTMCNC: 32.1 G/DL (ref 28.4–34.8)
MCV RBC AUTO: 99.7 FL (ref 82.6–102.9)
NEGATIVE BASE EXCESS, VEN: 2.2 MMOL/L (ref 0–2)
NRBC BLD-RTO: 0 PER 100 WBC
O2 SAT, VEN: 26.2 % (ref 60–85)
PCO2 VENOUS: 43.4 MM HG (ref 39–55)
PH VENOUS: 7.34 (ref 7.32–7.42)
PLATELET # BLD AUTO: 389 K/UL (ref 138–453)
PMV BLD AUTO: 9.5 FL (ref 8.1–13.5)
PO2 VENOUS: 18.9 MM HG (ref 30–50)
POTASSIUM SERPL-SCNC: 3.4 MMOL/L (ref 3.7–5.3)
RBC # BLD AUTO: 3.16 M/UL (ref 3.95–5.11)
SODIUM SERPL-SCNC: 131 MMOL/L (ref 136–145)
TSH SERPL DL<=0.05 MIU/L-ACNC: 0.4 UIU/ML (ref 0.27–4.2)
WBC OTHER # BLD: 5.9 K/UL (ref 3.5–11.3)

## 2024-12-02 PROCEDURE — 82140 ASSAY OF AMMONIA: CPT

## 2024-12-02 PROCEDURE — 97166 OT EVAL MOD COMPLEX 45 MIN: CPT

## 2024-12-02 PROCEDURE — 2580000003 HC RX 258: Performed by: NURSE PRACTITIONER

## 2024-12-02 PROCEDURE — 2580000003 HC RX 258

## 2024-12-02 PROCEDURE — 6360000002 HC RX W HCPCS: Performed by: NURSE PRACTITIONER

## 2024-12-02 PROCEDURE — 80048 BASIC METABOLIC PNL TOTAL CA: CPT

## 2024-12-02 PROCEDURE — 6370000000 HC RX 637 (ALT 250 FOR IP): Performed by: STUDENT IN AN ORGANIZED HEALTH CARE EDUCATION/TRAINING PROGRAM

## 2024-12-02 PROCEDURE — 74176 CT ABD & PELVIS W/O CONTRAST: CPT

## 2024-12-02 PROCEDURE — 83605 ASSAY OF LACTIC ACID: CPT

## 2024-12-02 PROCEDURE — 85027 COMPLETE CBC AUTOMATED: CPT

## 2024-12-02 PROCEDURE — 97162 PT EVAL MOD COMPLEX 30 MIN: CPT

## 2024-12-02 PROCEDURE — 2580000003 HC RX 258: Performed by: INTERNAL MEDICINE

## 2024-12-02 PROCEDURE — 93306 TTE W/DOPPLER COMPLETE: CPT

## 2024-12-02 PROCEDURE — 6370000000 HC RX 637 (ALT 250 FOR IP): Performed by: PHYSICIAN ASSISTANT

## 2024-12-02 PROCEDURE — 97116 GAIT TRAINING THERAPY: CPT

## 2024-12-02 PROCEDURE — 6360000002 HC RX W HCPCS

## 2024-12-02 PROCEDURE — 99233 SBSQ HOSP IP/OBS HIGH 50: CPT | Performed by: INTERNAL MEDICINE

## 2024-12-02 PROCEDURE — 93306 TTE W/DOPPLER COMPLETE: CPT | Performed by: INTERNAL MEDICINE

## 2024-12-02 PROCEDURE — 97535 SELF CARE MNGMENT TRAINING: CPT

## 2024-12-02 PROCEDURE — 99232 SBSQ HOSP IP/OBS MODERATE 35: CPT | Performed by: INTERNAL MEDICINE

## 2024-12-02 PROCEDURE — 6370000000 HC RX 637 (ALT 250 FOR IP)

## 2024-12-02 PROCEDURE — 6370000000 HC RX 637 (ALT 250 FOR IP): Performed by: NURSE PRACTITIONER

## 2024-12-02 PROCEDURE — 94761 N-INVAS EAR/PLS OXIMETRY MLT: CPT

## 2024-12-02 PROCEDURE — 36415 COLL VENOUS BLD VENIPUNCTURE: CPT

## 2024-12-02 PROCEDURE — 84443 ASSAY THYROID STIM HORMONE: CPT

## 2024-12-02 PROCEDURE — 2000000000 HC ICU R&B

## 2024-12-02 PROCEDURE — 82947 ASSAY GLUCOSE BLOOD QUANT: CPT

## 2024-12-02 PROCEDURE — 99291 CRITICAL CARE FIRST HOUR: CPT | Performed by: INTERNAL MEDICINE

## 2024-12-02 PROCEDURE — 82805 BLOOD GASES W/O2 SATURATION: CPT

## 2024-12-02 PROCEDURE — 6360000002 HC RX W HCPCS: Performed by: INTERNAL MEDICINE

## 2024-12-02 RX ORDER — 0.9 % SODIUM CHLORIDE 0.9 %
500 INTRAVENOUS SOLUTION INTRAVENOUS ONCE
Status: COMPLETED | OUTPATIENT
Start: 2024-12-02 | End: 2024-12-02

## 2024-12-02 RX ORDER — LINEZOLID 600 MG/1
600 TABLET, FILM COATED ORAL 2 TIMES DAILY
Qty: 6 TABLET | Refills: 0
Start: 2024-12-02 | End: 2024-12-05 | Stop reason: HOSPADM

## 2024-12-02 RX ORDER — SODIUM CHLORIDE 9 MG/ML
INJECTION, SOLUTION INTRAVENOUS CONTINUOUS
Status: DISCONTINUED | OUTPATIENT
Start: 2024-12-02 | End: 2024-12-04

## 2024-12-02 RX ADMIN — SODIUM CHLORIDE 500 ML: 9 INJECTION, SOLUTION INTRAVENOUS at 17:47

## 2024-12-02 RX ADMIN — MIDODRINE HYDROCHLORIDE 10 MG: 5 TABLET ORAL at 13:09

## 2024-12-02 RX ADMIN — SODIUM CHLORIDE, PRESERVATIVE FREE 10 ML: 5 INJECTION INTRAVENOUS at 19:35

## 2024-12-02 RX ADMIN — HYDROCORTISONE SODIUM SUCCINATE 50 MG: 100 INJECTION, POWDER, FOR SOLUTION INTRAMUSCULAR; INTRAVENOUS at 13:09

## 2024-12-02 RX ADMIN — MEROPENEM 1000 MG: 1 INJECTION INTRAVENOUS at 19:53

## 2024-12-02 RX ADMIN — QUETIAPINE FUMARATE 50 MG: 25 TABLET ORAL at 22:05

## 2024-12-02 RX ADMIN — TROSPIUM CHLORIDE 20 MG: 20 TABLET, FILM COATED ORAL at 16:43

## 2024-12-02 RX ADMIN — MIDODRINE HYDROCHLORIDE 10 MG: 5 TABLET ORAL at 08:25

## 2024-12-02 RX ADMIN — MEROPENEM 1000 MG: 1 INJECTION INTRAVENOUS at 09:07

## 2024-12-02 RX ADMIN — POTASSIUM BICARBONATE 20 MEQ: 782 TABLET, EFFERVESCENT ORAL at 08:25

## 2024-12-02 RX ADMIN — ACETAMINOPHEN 650 MG: 325 TABLET ORAL at 16:40

## 2024-12-02 RX ADMIN — MIDODRINE HYDROCHLORIDE 10 MG: 5 TABLET ORAL at 16:39

## 2024-12-02 RX ADMIN — LINEZOLID 600 MG: 600 INJECTION, SOLUTION INTRAVENOUS at 01:17

## 2024-12-02 RX ADMIN — POTASSIUM BICARBONATE 20 MEQ: 782 TABLET, EFFERVESCENT ORAL at 08:59

## 2024-12-02 RX ADMIN — DIVALPROEX SODIUM 250 MG: 250 TABLET, FILM COATED, EXTENDED RELEASE ORAL at 08:30

## 2024-12-02 RX ADMIN — ATORVASTATIN CALCIUM 40 MG: 40 TABLET, FILM COATED ORAL at 22:05

## 2024-12-02 RX ADMIN — PANTOPRAZOLE SODIUM 40 MG: 40 TABLET, DELAYED RELEASE ORAL at 08:25

## 2024-12-02 RX ADMIN — HYDROCORTISONE SODIUM SUCCINATE 50 MG: 100 INJECTION, POWDER, FOR SOLUTION INTRAMUSCULAR; INTRAVENOUS at 08:25

## 2024-12-02 RX ADMIN — SODIUM CHLORIDE: 9 INJECTION, SOLUTION INTRAVENOUS at 10:33

## 2024-12-02 RX ADMIN — HYDROCORTISONE SODIUM SUCCINATE 50 MG: 100 INJECTION, POWDER, FOR SOLUTION INTRAMUSCULAR; INTRAVENOUS at 19:29

## 2024-12-02 RX ADMIN — TROSPIUM CHLORIDE 20 MG: 20 TABLET, FILM COATED ORAL at 09:26

## 2024-12-02 RX ADMIN — ASPIRIN 81 MG: 81 TABLET, COATED ORAL at 08:25

## 2024-12-02 RX ADMIN — QUETIAPINE FUMARATE 50 MG: 25 TABLET ORAL at 08:25

## 2024-12-02 RX ADMIN — HYDROCORTISONE SODIUM SUCCINATE 50 MG: 100 INJECTION, POWDER, FOR SOLUTION INTRAMUSCULAR; INTRAVENOUS at 01:16

## 2024-12-02 RX ADMIN — LINEZOLID 600 MG: 600 INJECTION, SOLUTION INTRAVENOUS at 13:12

## 2024-12-02 RX ADMIN — ENOXAPARIN SODIUM 40 MG: 100 INJECTION SUBCUTANEOUS at 08:25

## 2024-12-02 RX ADMIN — DIVALPROEX SODIUM 250 MG: 250 TABLET, FILM COATED, EXTENDED RELEASE ORAL at 22:05

## 2024-12-02 ASSESSMENT — PAIN DESCRIPTION - LOCATION: LOCATION: HEAD

## 2024-12-02 ASSESSMENT — PAIN SCALES - GENERAL
PAINLEVEL_OUTOF10: 0
PAINLEVEL_OUTOF10: 0
PAINLEVEL_OUTOF10: 3
PAINLEVEL_OUTOF10: 0

## 2024-12-02 ASSESSMENT — ENCOUNTER SYMPTOMS
ABDOMINAL PAIN: 0
EYE PAIN: 0
COLOR CHANGE: 0
EYE REDNESS: 0
SHORTNESS OF BREATH: 0
FACIAL SWELLING: 0
STRIDOR: 0
EYE DISCHARGE: 0
CHEST TIGHTNESS: 0

## 2024-12-02 NOTE — PROGRESS NOTES
limited by endurance;Treatment limited secondary to decreased cognition    Plan  Physical Therapy Plan  General Plan:  (4-5x/week)  Current Treatment Recommendations: Strengthening, Balance training, Functional mobility training, Transfer training, Endurance training, Gait training, Therapeutic activities, Safety education & training, Patient/Caregiver education & training  Safety Devices  Type of Devices: Call light within reach, Gait belt, Left in chair, Chair alarm in place  Restraints  Restraints Initially in Place: No    Restrictions  Restrictions/Precautions  Restrictions/Precautions: Up as Tolerated, Fall Risk  Required Braces or Orthoses?: No  Position Activity Restriction  Other position/activity restrictions: dementia and parkinson's at baseline     Subjective  General  Chart Reviewed: Yes  Patient assessed for rehabilitation services?: Yes  Family / Caregiver Present: No  Follows Commands: Within Functional Limits  General Comment  Comments: RN and pt agreeable to treatment session  Subjective  Subjective: Pt reports no pain, numbness or tingling.         Social/Functional History  Social/Functional History  Lives With: Daughter  Additional Comments: Pt unable to answer or recall any home information.  Personal w/c in room, daughter assists  Vision/Hearing  Hearing  Hearing: Exceptions to WFL  Hearing Exceptions: Hard of hearing/hearing concerns    Cognition   Orientation  Overall Orientation Status: Impaired  Orientation Level: Oriented to person;Disoriented to place;Disoriented to time;Disoriented to situation  Cognition  Overall Cognitive Status: Exceptions  Arousal/Alertness: Appears intact  Following Commands: Follows one step commands with repetition  Attention Span: Attends with cues to redirect  Memory: Decreased short term memory;Decreased long term memory  Safety Judgement: Decreased awareness of need for assistance  Problem Solving: Assistance required to implement solutions;Assistance required  to identify errors made  Insights: Impaired  Initiation: Impaired  Sequencing: Impaired  Cognition Comment: Pt aware of memory deficits throughout session    Objective  Temp: 97.6 °F (36.4 °C)  Pulse: 66  Heart Rate Source: Monitor  Respirations: 11  SpO2: 99 %  O2 Device: None (Room air)  BP: (!) 131/47  MAP (Calculated): 75  BP Location: Right leg  BP Method: Automatic  Patient Position: Lying left side             AROM RLE (degrees)  RLE AROM: WFL  AROM LLE (degrees)  LLE AROM : WFL  Strength RLE  Comment: 4/5 grossly  Strength LLE  Comment: 4/5 grossly           Bed mobility  Rolling to Right: Moderate assistance  Supine to Sit: Moderate assistance  Scooting: Contact guard assistance  Bed Mobility Comments: assist from trunk progression to side of bed  Transfers  Sit to Stand: Minimal Assistance  Stand to Sit: Minimal Assistance  Comment: up to rw with assist for hand placement and safety  Ambulation  Surface: Level tile  Device: Rolling Walker  Assistance: Moderate assistance;2 Person assistance  Quality of Gait: initially Renetta/CGA for 10 ft, after fatigue modA x 2, rigid trunk with posterior lean  Gait Deviations: Slow Anju;Shuffles  Distance: 20 ft  More Ambulation?: No  Stairs/Curb  Stairs?: No     Balance  Posture: Fair  Sitting - Static: Fair  Sitting - Dynamic: Fair;-  Standing - Static: Fair  Standing - Dynamic: Poor;+  Comments: standing balance with B UE support          OutComes Score    AM-PAC - Mobility    AM-PAC Basic Mobility - Inpatient   How much help is needed turning from your back to your side while in a flat bed without using bedrails?: A Little  How much help is needed moving from lying on your back to sitting on the side of a flat bed without using bedrails?: A Little  How much help is needed moving to and from a bed to a chair?: A Little  How much help is needed standing up from a chair using your arms?: A Little  How much help is needed walking in hospital room?: A Lot  How much help  is needed climbing 3-5 steps with a railing?: Total  AM-PAC Inpatient Mobility Raw Score : 15  AM-PAC Inpatient T-Scale Score : 39.45  Mobility Inpatient CMS 0-100% Score: 57.7  Mobility Inpatient CMS G-Code Modifier : CK       Goals  Short Term Goals  Time Frame for Short Term Goals: 14 visits  Short Term Goal 1: Pt CGA with bed mobility  Short Term Goal 2: Pt CGA with transfers with proper safety awareness  Short Term Goal 3: Pt ambulate 25 ft with rw and CGA, no loss of balance  Short Term Goal 4: Pt participate in 25 minutes exercises and activities to improve strength and balance  Short Term Goal 5: Pt propel w/c 50 ft with SBA  Patient Goals   Patient Goals : To regain strength       Education  Patient Education  Education Given To: Patient  Education Provided: Role of Therapy;Plan of Care  Education Method: Verbal;Demonstration  Barriers to Learning: Cognition  Education Outcome: Continued education needed      Therapy Time   Individual Concurrent Group Co-treatment   Time In 1400         Time Out 1424         Minutes 24         Timed Code Treatment Minutes: 8 Minutes       Lacey Wallace, PT

## 2024-12-02 NOTE — PLAN OF CARE
Problem: Safety - Adult  Goal: Free from fall injury  12/1/2024 2156 by Shanel Diego RN  Outcome: Progressing  12/1/2024 1543 by Hailey Bowen RN  Outcome: Progressing     Problem: Discharge Planning  Goal: Discharge to home or other facility with appropriate resources  12/1/2024 2156 by Shanel Diego RN  Outcome: Progressing  12/1/2024 1543 by Hailey Bowen RN  Outcome: Progressing     Problem: Skin/Tissue Integrity  Goal: Absence of new skin breakdown  Description: 1.  Monitor for areas of redness and/or skin breakdown  2.  Assess vascular access sites hourly  3.  Every 4-6 hours minimum:  Change oxygen saturation probe site  4.  Every 4-6 hours:  If on nasal continuous positive airway pressure, respiratory therapy assess nares and determine need for appliance change or resting period.  12/1/2024 2156 by Shanel Diego RN  Outcome: Progressing  12/1/2024 1543 by Hailey Bowen RN  Outcome: Progressing     Problem: Cardiovascular - Adult  Goal: Maintains optimal cardiac output and hemodynamic stability  12/1/2024 2156 by Shanel Diego RN  Outcome: Progressing  12/1/2024 1543 by Hailey Bowen RN  Outcome: Progressing  Goal: Absence of cardiac dysrhythmias or at baseline  12/1/2024 2156 by Shanel Diego RN  Outcome: Progressing  12/1/2024 1543 by Hailey Bowen RN  Outcome: Progressing     Problem: Skin/Tissue Integrity - Adult  Goal: Skin integrity remains intact  12/1/2024 2156 by Shanel Diego RN  Outcome: Progressing  Flowsheets  Taken 12/1/2024 2154  Skin Integrity Remains Intact: Monitor for areas of redness and/or skin breakdown  Taken 12/1/2024 2000  Skin Integrity Remains Intact: Monitor for areas of redness and/or skin breakdown  12/1/2024 1543 by Hailey Bowen RN  Outcome: Progressing  Flowsheets (Taken 12/1/2024 1331)  Skin Integrity Remains Intact:   Monitor for areas of redness and/or skin breakdown   Assess vascular access sites hourly  Goal: Oral mucous membranes

## 2024-12-02 NOTE — PLAN OF CARE
Problem: Safety - Adult  Goal: Free from fall injury  Outcome: Progressing     Problem: Discharge Planning  Goal: Discharge to home or other facility with appropriate resources  Outcome: Progressing  Flowsheets (Taken 12/2/2024 0800)  Discharge to home or other facility with appropriate resources: Identify discharge learning needs (meds, wound care, etc)     Problem: Skin/Tissue Integrity  Goal: Absence of new skin breakdown  Description: 1.  Monitor for areas of redness and/or skin breakdown  2.  Assess vascular access sites hourly  3.  Every 4-6 hours minimum:  Change oxygen saturation probe site  4.  Every 4-6 hours:  If on nasal continuous positive airway pressure, respiratory therapy assess nares and determine need for appliance change or resting period.  Outcome: Progressing     Problem: Cardiovascular - Adult  Goal: Maintains optimal cardiac output and hemodynamic stability  Outcome: Progressing  Flowsheets (Taken 12/2/2024 0800)  Maintains optimal cardiac output and hemodynamic stability:   Monitor urine output and notify Licensed Independent Practitioner for values outside of normal range   Assess for signs of decreased cardiac output     Problem: Skin/Tissue Integrity - Adult  Goal: Skin integrity remains intact  Outcome: Progressing  Flowsheets (Taken 12/2/2024 0800)  Skin Integrity Remains Intact: Monitor for areas of redness and/or skin breakdown     Problem: Musculoskeletal - Adult  Goal: Return mobility to safest level of function  Outcome: Progressing  Flowsheets (Taken 12/2/2024 0800)  Return Mobility to Safest Level of Function: Assist with transfers and ambulation using safe patient handling equipment as needed     Problem: Genitourinary - Adult  Goal: Absence of urinary retention  Outcome: Progressing  Flowsheets (Taken 12/2/2024 0800)  Absence of urinary retention: Monitor intake/output and perform bladder scan as needed  Goal: Urinary catheter remains patent  Outcome: Progressing  Flowsheets

## 2024-12-02 NOTE — PROGRESS NOTES
[facility]   Progress Note                   Date:   12/2/2024  Patient name: Yasmine Morrison  Date of admission:  11/29/2024  5:11 PM  MRN:   [id number]  YOB: 1945  PCP: Sravani Wu DO    Subjective:       Patient seen and examined at bedside.  Overnight events noted.     Medications:   Scheduled Meds:   potassium bicarb-citric acid  20 mEq Oral Once    [Held by provider] fludrocortisone  0.2 mg Oral Daily    hydrocortisone sodium succinate PF  50 mg IntraVENous Q6H    meropenem  1,000 mg IntraVENous Q12H    linezolid  600 mg IntraVENous Q12H    midodrine  10 mg Oral TID WC    aspirin  81 mg Oral Daily    [Held by provider] carBAMazepine  300 mg Oral Daily    QUEtiapine  50 mg Oral BID    pantoprazole  40 mg Oral QAM AC    trospium  20 mg Oral BID AC    sodium chloride flush  5-40 mL IntraVENous 2 times per day    enoxaparin  40 mg SubCUTAneous Daily    divalproex  250 mg Oral BID    [Held by provider] carBAMazepine  400 mg Oral Nightly    atorvastatin  40 mg Oral Nightly     Continuous Infusions:   norepinephrine 2 mcg/min (12/02/24 0614)    sodium chloride 10 mL/hr at 12/02/24 0614         CBC:   Recent Labs     11/30/24  0802 12/01/24  1517 12/02/24  0657   WBC 11.1 6.5 5.9   HGB 11.4* 9.3* 10.1*    323 389     BMP:    Recent Labs     11/30/24  0802 12/01/24  1030 12/02/24  0657    138 131*   K 3.3* 3.6* 3.4*    109* 102   CO2 23 20 18*   BUN 16 10 11   CREATININE 1.2* 1.1* 0.7   GLUCOSE 77 129* 182*     Hepatic:   Recent Labs     11/29/24  1813   AST 35   ALT 29   BILITOT 0.5   ALKPHOS 259*          Lab Results   Component Value Date    CHOL 197 03/05/2021    TRIG 109 03/05/2021    HDL 70 03/05/2021     03/05/2021    VLDL NOT REPORTED 03/05/2021    CHOLHDLRATIO 2.8 03/05/2021      INR: No results for input(s): \"INR\" in the last 72 hours.    Objective:   Vitals: BP (!) 113/56   Pulse 70   Temp 97.7 °F (36.5 °C) (Oral)   Resp 10   SpO2 99%        General appearance: alert and cooperative with exam  HEENT: Head: Normocephalic, no lesions, without obvious abnormality.  Neck: no JVD, trachea midline, no adenopathy  Lungs: Clear to auscultation  Heart: Regular rate and rhythm, s1/s2 auscultated, no murmurs  Abdomen: soft, non-tender, bowel sounds active  Extremities: no edema  Neurologic: not done       EKG:   Results for orders placed or performed during the hospital encounter of 11/29/24   EKG 12 Lead   Result Value Ref Range    Ventricular Rate 81 BPM    Atrial Rate 81 BPM    P-R Interval 206 ms    QRS Duration 128 ms    Q-T Interval 420 ms    QTc Calculation (Bazett) 487 ms    P Axis 13 degrees    R Axis 35 degrees    T Axis 161 degrees    Narrative    Normal sinus rhythm  Left bundle branch block  Abnormal ECG  When compared with ECG of 20-DEC-2022 12:02,  Nonspecific T wave abnormality now evident in Inferior leads       Echo:    Results for orders placed or performed during the hospital encounter of 05/15/22   ECHO Complete 2D W Doppler W Color   Result Value Ref Range    Left Ventricular Ejection Fraction 55     LVEF MODALITY ECHO     Narrative    Transthoracic Echocardiography Report (TTE)     Patient Name CELESTINE      Date of Study               05/17/2022                NIMCO PEACOCK      Date of      1945  Gender                      Female   Birth      Age          76 year(s)  Race                              Room Number  0426        Height:                     67 inch, 170.18 cm      Corporate ID [id number]    Weight:                     135 pounds, 61.2 kg   #      Patient Acct [id number]   BSA:          1.71 m^2      BMI:      21.14   #                                                              kg/m^2      MR #         [id number]     Sonographer                 Lorri Mcfadden      Accession #  [id number]  Interpreting Physician      Orlando Easton      Fellow                   Referring Nurse                            Practitioner

## 2024-12-02 NOTE — PROGRESS NOTES
Physician Progress Note      PATIENT:               NIMCO SPRAGUE  CSN #:                  [id number]  :                       1945  ADMIT DATE:       2024 5:11 PM  DISCH DATE:  RESPONDING  PROVIDER #:        ANKUR MODI          QUERY TEXT:    Pt admitted with UTI . Pt noted to have AMS and lethargy known history of   dementia and parkinsons but per family more interactive then she is currently.   medicine note suspected toxic metabolic encephalopathy likely uti and   medication overdose and pt has chronic hypotension per review SBP down to 80s   SBP was 134 on admission on Levophed and proamatine. If possible, please   document in the progress notes and discharge summary if you are evaluating   and/or treating any of the following:    The medical record reflects the following:  Risk Factors: UTI, dementia  Clinical Indicators: Pt admitted with UTI . Pt noted to have AMS and lethargy   known history of dementia and parkinsons but per family more interactive then   she is currently. medicine note suspected toxic metabolic encephalopathy   likely uti and medication overdose and pt has chronic hypotension per review   SBP down to 80s SBP was 134 on admission on Levophed and proamatine  Treatment: ICU monitoring, IV Levophed    Thank You Germán MEYER BSN CCDS  Options provided:  -- Hypovolemic Shock  -- Hypovolemia without Shock  -- Hypotension without Shock  -- Other - I will add my own diagnosis  -- Disagree - Not applicable / Not valid  -- Disagree - Clinically unable to determine / Unknown  -- Refer to Clinical Documentation Reviewer    PROVIDER RESPONSE TEXT:    This patient has hypotension without shock.    Query created by: Amy Adame on 2024 8:47 AM      Electronically signed by:  ANKUR MODI 2024 8:52 AM

## 2024-12-02 NOTE — PROGRESS NOTES
Occupational Therapy Initial Evaluation  Facility/Department: [facility] 3- MICU   Patient Name: Yasmine Morrison        MRN: [id number]    : 1945    Date of Service: 2024    Discharge Recommendations  Discharge Recommendations: Patient would benefit from continued therapy after discharge     Chief Complaint   Patient presents with    Urinary Tract Infection     Altered Mental Status     Past Medical History:  has a past medical history of Anxiety, Convulsion (HCC), Dementia (HCC), Encephalopathy, Herpes, Hx of blood clots, Hyperlipidemia, Left bundle branch block, MRSA (methicillin resistant Staphylococcus aureus), Parkinson's disease (HCC), Seizures ([facility]), Under care of service provider, and Vitamin D deficiency.  Past Surgical History:  has a past surgical history that includes Abscess Drainage (Right, 2013); Ureter stent placement (Left, 2022); Cystoscopy (Left, 2022); Cystoscopy (Left, 2022); Hysterectomy; Splenectomy; other surgical history; Ureteroscopy (Left, 2022); Ureter surgery (Left, 2022); and Lithotripsy (Left, 2022).    Assessment  Performance deficits / Impairments: Decreased functional mobility ;Decreased ADL status;Decreased endurance;Decreased balance;Decreased safe awareness;Decreased cognition;Decreased strength;Decreased coordination  Assessment: Patient demonstrates decreased cognition with baseline dementia per chart, impacting ability to follow commands and progress through tasks. Pt completed bed mobility at Mod A for trunk progression and tolerated sitting EOB at SBA > CGA while participating in dynamic tasks. Pt completed transfer at Min A and mobility progressing from Min A > Mod A x2 using RW with symptomatic hypotension. Pt retired to recliner to participate in self-feeding with set up and wash cloths added to R UE to assist with tremor mgmt with fair success. Patient would benefit from continued acute OT services to address functional

## 2024-12-02 NOTE — PROGRESS NOTES
.          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   Hx of non obstructive kidney stone         Discussion / summary of stay / plan of care/ Recommendations:     HENCE:   Resume  meropenem  Start zyvox and hold anxiolytic  AB till 12/5  Mentally better - chronic hypotension on midrine  Watch mental recovery - DNR CCA    Infection Control Recommendations   [place] Precautions  Contact Isolation       Antimicrobial Stewardship Recommendations   Simplification of therapy  Targeted therapy      History of Present Illness:   Initial history:  Yasmine Morrison is a 79 y.o.-year-old female that presents with worsening altered mental status and lethargy and admitted for complicated UTI.  Patient presented to the ED on 11/28 for concerns of a possible stroke and patient arrived with DNR CC paperwork. Per chart review the daughter confirmed her code status and further workup was declined. Patient was diagnosed with UTI and discharged on keflex. Patient also has a history of advanced dementia and Parkinson. Patient returned to ED last night due to increased confusion from her baseline. She has a history of complicated UTI and in October 2022 she developed multidrug resistant ESBL treated with meropenem.    In the ED her vitals signs were stable. Labs were remarkable for hypokalemia 3.1. UA was showed moderate leukocytes,  WBC, moderate leukocyte esterase, and few bacteria. Patient was given 40 mEq potassium, Rocephin,

## 2024-12-02 NOTE — PROGRESS NOTES
INTENSIVE CARE UNIT  Resident Physician Progress Note    Patient - Yasmine Morrison  Date of Admission -  11/29/2024  5:11 PM  Date of Evaluation -  12/2/2024  Room and Bed Number -  3026/3026-01   Hospital Day - 3  Chief Complaint   Patient presents with    Urinary Tract Infection     Altered Mental Status      SUBJECTIVE:   OVERNIGHT EVENTS:      -No acute events overnight  Patient CODE STATUS was made DNR CCA with possible intubation depending on clinical scenario overnight after thorough discussion with patient and her adult child.    TODAY:    My Evaluation:  Patient blood pressures improved after starting Levophed since yesterday evening ranging between systolic 105-112 and MAP 68-72.     -On pressure support, see pressors below  Sedation: None  Vasopressors: Levo @ 2mcg/min    Labs reviewed WBC 6.5, hemoglobin 9.3, platelets 223, potassium 3.6, sodium 138, creatinine 1.1, BUN 10    I's/O's:  In: 2225.9 [P.O.:582; I.V.:253]  Out: 2995 [Urine:2995]    Net IO Since Admission: -1,169.06 mL [12/02/24 1158]   2795 cc/kg/hr in last 24hrs    Consults:  Infectious Disease : Patient resumed with Meropenem and continued on Zyvox.      BRIEF PLAN:  Plan to replace oral potassium 40 mEq  Plan to titrate Levophed and wean as tolerated  Continue meropenem and Zyvox per ID.  Monitor input and output.  Ordered VBG, Ammonia and Lactic acid  Ordered CT Abdomen for stones- plan to consult urology after results    HISTORY OF PRESENT ILLNESS:    History was obtained from chart review and the patient.       Yasmine Morrison is a 79 y.o. female with past medical history of recurrent UTIs, dementia, parkinsonism, seizure disorder, GERD, COPD, and DVT who presented with worsening altered mental status and lethargy and admitted for complicated UTI.  Patient presented to the ED on 11/28 for concerns of a possible stroke and patient arrived with DNR CC paperwork. Per chart review the daughter confirmed her code status and further workup  11/29/2024 06:39 PM    PHUR 5.5 11/29/2024 06:39 PM    PHUR 7.0 12/19/2022 03:50 PM    WBCUA 50  11/29/2024 06:39 PM    RBCUA 0 TO 2 11/29/2024 06:39 PM    MUCUS 1+ 11/13/2019 03:30 PM    TRICHOMONAS NOT REPORTED 11/13/2019 03:30 PM    YEAST NOT REPORTED 11/13/2019 03:30 PM    BACTERIA FEW 11/29/2024 06:39 PM    LEUKOCYTESUR MODERATE 11/29/2024 06:39 PM    UROBILINOGEN Normal 11/29/2024 06:39 PM    BILIRUBINUR NEGATIVE 11/29/2024 06:39 PM    GLUCOSEU NEGATIVE 11/29/2024 06:39 PM    KETUA NEGATIVE 11/29/2024 06:39 PM    AMORPHOUS NOT REPORTED 11/13/2019 03:30 PM       HgBA1c:  No results found for: \"LABA1C\"  TSH:    Lab Results   Component Value Date/Time    TSH 0.40 12/02/2024 06:57 AM     Lactic Acid:   Lab Results   Component Value Date/Time    LACTA 2.1 10/03/2022 06:17 PM      Troponin: No results for input(s): \"TROPONINI\" in the last 72 hours.    Microbiology:  Urine Culture: Positive for Enterococcus faecium sensitive to vancomycin    Radiology/Imaging:  US RENAL COMPLETE   Preliminary Result   1. No evidence of hydronephrosis.   2. Suspected bilateral renal calculi which may measure up to 9 mm.   3. Cholelithiasis with distended gallbladder incidentally noted.         CT HEAD WO CONTRAST   Final Result   1. Stable appearance of the brain without acute intracranial process   identified.   2. Unremarkable CT angiogram of the head and neck.         CTA HEAD NECK W CONTRAST   Final Result   1. Stable appearance of the brain without acute intracranial process   identified.   2. Unremarkable CT angiogram of the head and neck.         XR ABDOMEN (KUB) (SINGLE AP VIEW)   Final Result   No evidence of bowel obstruction.      Constipation         XR CHEST PORTABLE   Final Result   1. No acute cardiopulmonary process.   2. Incidentally seen are air distended loops of small bowel in the upper   abdomen.         CT ABDOMEN PELVIS WO CONTRAST Additional Contrast? None    (Results Pending)       ASSESSMENT:

## 2024-12-02 NOTE — CODE DOCUMENTATION
Resuscitation/Code Status Note on Yasmine Morrison (YOB: 1945)    At 1926 on December 1, 2024, resuscitation/code status decision was based on a thorough discussion with the patient and patient's adult child - Cheyanne White .  The code status was made DNR-CCA, possible intubation depending on clinical scenario.    Electronically signed by Torie Fuentes DO on 12/1/24 at 7:26 PM EST

## 2024-12-03 PROBLEM — N17.9 AKI (ACUTE KIDNEY INJURY) (HCC): Status: ACTIVE | Noted: 2024-12-03

## 2024-12-03 LAB
ANION GAP SERPL CALCULATED.3IONS-SCNC: 10 MMOL/L (ref 9–16)
BASOPHILS # BLD: 0 K/UL (ref 0–0.2)
BASOPHILS NFR BLD: 0 % (ref 0–2)
BUN SERPL-MCNC: 13 MG/DL (ref 8–23)
CALCIUM SERPL-MCNC: 8.4 MG/DL (ref 8.6–10.4)
CHLORIDE SERPL-SCNC: 107 MMOL/L (ref 98–107)
CO2 SERPL-SCNC: 20 MMOL/L (ref 20–31)
CREAT SERPL-MCNC: 0.8 MG/DL (ref 0.6–0.9)
EOSINOPHIL # BLD: 0 K/UL (ref 0–0.4)
EOSINOPHILS RELATIVE PERCENT: 0 % (ref 1–4)
ERYTHROCYTE [DISTWIDTH] IN BLOOD BY AUTOMATED COUNT: 14.3 % (ref 11.8–14.4)
GFR, ESTIMATED: 74 ML/MIN/1.73M2
GLUCOSE SERPL-MCNC: 119 MG/DL (ref 74–99)
HCT VFR BLD AUTO: 31 % (ref 36.3–47.1)
HGB BLD-MCNC: 10.2 G/DL (ref 11.9–15.1)
IMM GRANULOCYTES # BLD AUTO: 0 K/UL (ref 0–0.3)
IMM GRANULOCYTES NFR BLD: 0 %
LACTIC ACID, WHOLE BLOOD: 1.7 MMOL/L (ref 0.7–2.1)
LACTIC ACID, WHOLE BLOOD: 2.3 MMOL/L (ref 0.7–2.1)
LYMPHOCYTES NFR BLD: 0.78 K/UL (ref 1–4.8)
LYMPHOCYTES RELATIVE PERCENT: 8 % (ref 24–44)
MCH RBC QN AUTO: 32.7 PG (ref 25.2–33.5)
MCHC RBC AUTO-ENTMCNC: 32.9 G/DL (ref 28.4–34.8)
MCV RBC AUTO: 99.4 FL (ref 82.6–102.9)
MONOCYTES NFR BLD: 0.2 K/UL (ref 0.1–0.8)
MONOCYTES NFR BLD: 2 % (ref 1–7)
MORPHOLOGY: NORMAL
NEUTROPHILS NFR BLD: 90 % (ref 36–66)
NEUTS SEG NFR BLD: 8.82 K/UL (ref 1.8–7.7)
NRBC BLD-RTO: 0 PER 100 WBC
PLATELET # BLD AUTO: 397 K/UL (ref 138–453)
PMV BLD AUTO: 9.2 FL (ref 8.1–13.5)
POTASSIUM SERPL-SCNC: 3.4 MMOL/L (ref 3.7–5.3)
RBC # BLD AUTO: 3.12 M/UL (ref 3.95–5.11)
SODIUM SERPL-SCNC: 137 MMOL/L (ref 136–145)
WBC OTHER # BLD: 9.8 K/UL (ref 3.5–11.3)

## 2024-12-03 PROCEDURE — 6370000000 HC RX 637 (ALT 250 FOR IP)

## 2024-12-03 PROCEDURE — 6360000002 HC RX W HCPCS: Performed by: NURSE PRACTITIONER

## 2024-12-03 PROCEDURE — 6370000000 HC RX 637 (ALT 250 FOR IP): Performed by: NURSE PRACTITIONER

## 2024-12-03 PROCEDURE — 6370000000 HC RX 637 (ALT 250 FOR IP): Performed by: PHYSICIAN ASSISTANT

## 2024-12-03 PROCEDURE — 6360000002 HC RX W HCPCS

## 2024-12-03 PROCEDURE — 6360000002 HC RX W HCPCS: Performed by: INTERNAL MEDICINE

## 2024-12-03 PROCEDURE — 2580000003 HC RX 258: Performed by: NURSE PRACTITIONER

## 2024-12-03 PROCEDURE — 2580000003 HC RX 258: Performed by: INTERNAL MEDICINE

## 2024-12-03 PROCEDURE — 80048 BASIC METABOLIC PNL TOTAL CA: CPT

## 2024-12-03 PROCEDURE — 36415 COLL VENOUS BLD VENIPUNCTURE: CPT

## 2024-12-03 PROCEDURE — 85025 COMPLETE CBC W/AUTO DIFF WBC: CPT

## 2024-12-03 PROCEDURE — 99291 CRITICAL CARE FIRST HOUR: CPT | Performed by: INTERNAL MEDICINE

## 2024-12-03 PROCEDURE — 2060000000 HC ICU INTERMEDIATE R&B

## 2024-12-03 PROCEDURE — 99233 SBSQ HOSP IP/OBS HIGH 50: CPT | Performed by: INTERNAL MEDICINE

## 2024-12-03 PROCEDURE — 2580000003 HC RX 258

## 2024-12-03 PROCEDURE — 99232 SBSQ HOSP IP/OBS MODERATE 35: CPT | Performed by: INTERNAL MEDICINE

## 2024-12-03 PROCEDURE — 6370000000 HC RX 637 (ALT 250 FOR IP): Performed by: STUDENT IN AN ORGANIZED HEALTH CARE EDUCATION/TRAINING PROGRAM

## 2024-12-03 PROCEDURE — 94761 N-INVAS EAR/PLS OXIMETRY MLT: CPT

## 2024-12-03 PROCEDURE — 97535 SELF CARE MNGMENT TRAINING: CPT

## 2024-12-03 PROCEDURE — 83605 ASSAY OF LACTIC ACID: CPT

## 2024-12-03 RX ORDER — HYDROCORTISONE SODIUM SUCCINATE 100 MG/2ML
50 INJECTION INTRAMUSCULAR; INTRAVENOUS 2 TIMES DAILY
Status: DISCONTINUED | OUTPATIENT
Start: 2024-12-03 | End: 2024-12-04

## 2024-12-03 RX ORDER — POTASSIUM CHLORIDE 7.45 MG/ML
10 INJECTION INTRAVENOUS PRN
Status: DISCONTINUED | OUTPATIENT
Start: 2024-12-03 | End: 2024-12-06 | Stop reason: HOSPADM

## 2024-12-03 RX ORDER — POTASSIUM CHLORIDE 1500 MG/1
40 TABLET, EXTENDED RELEASE ORAL PRN
Status: DISCONTINUED | OUTPATIENT
Start: 2024-12-03 | End: 2024-12-06 | Stop reason: HOSPADM

## 2024-12-03 RX ADMIN — MEROPENEM 1000 MG: 1 INJECTION INTRAVENOUS at 08:37

## 2024-12-03 RX ADMIN — ENOXAPARIN SODIUM 40 MG: 100 INJECTION SUBCUTANEOUS at 08:31

## 2024-12-03 RX ADMIN — HYDROCORTISONE SODIUM SUCCINATE 50 MG: 100 INJECTION, POWDER, FOR SOLUTION INTRAMUSCULAR; INTRAVENOUS at 12:08

## 2024-12-03 RX ADMIN — MEROPENEM 1000 MG: 1 INJECTION INTRAVENOUS at 19:45

## 2024-12-03 RX ADMIN — PANTOPRAZOLE SODIUM 40 MG: 40 TABLET, DELAYED RELEASE ORAL at 08:32

## 2024-12-03 RX ADMIN — SODIUM CHLORIDE: 9 INJECTION, SOLUTION INTRAVENOUS at 03:08

## 2024-12-03 RX ADMIN — ATORVASTATIN CALCIUM 40 MG: 40 TABLET, FILM COATED ORAL at 19:46

## 2024-12-03 RX ADMIN — TROSPIUM CHLORIDE 20 MG: 20 TABLET, FILM COATED ORAL at 16:21

## 2024-12-03 RX ADMIN — HYDROCORTISONE SODIUM SUCCINATE 50 MG: 100 INJECTION, POWDER, FOR SOLUTION INTRAMUSCULAR; INTRAVENOUS at 06:52

## 2024-12-03 RX ADMIN — MIDODRINE HYDROCHLORIDE 10 MG: 5 TABLET ORAL at 11:19

## 2024-12-03 RX ADMIN — TROSPIUM CHLORIDE 20 MG: 20 TABLET, FILM COATED ORAL at 08:32

## 2024-12-03 RX ADMIN — ASPIRIN 81 MG: 81 TABLET, COATED ORAL at 08:32

## 2024-12-03 RX ADMIN — HYDROCORTISONE SODIUM SUCCINATE 50 MG: 100 INJECTION, POWDER, FOR SOLUTION INTRAMUSCULAR; INTRAVENOUS at 19:46

## 2024-12-03 RX ADMIN — DIVALPROEX SODIUM 250 MG: 250 TABLET, FILM COATED, EXTENDED RELEASE ORAL at 19:46

## 2024-12-03 RX ADMIN — DIVALPROEX SODIUM 250 MG: 250 TABLET, FILM COATED, EXTENDED RELEASE ORAL at 08:31

## 2024-12-03 RX ADMIN — POTASSIUM CHLORIDE 40 MEQ: 1500 TABLET, EXTENDED RELEASE ORAL at 08:32

## 2024-12-03 RX ADMIN — HYDROCORTISONE SODIUM SUCCINATE 50 MG: 100 INJECTION, POWDER, FOR SOLUTION INTRAMUSCULAR; INTRAVENOUS at 00:38

## 2024-12-03 RX ADMIN — SODIUM CHLORIDE, PRESERVATIVE FREE 10 ML: 5 INJECTION INTRAVENOUS at 08:33

## 2024-12-03 RX ADMIN — QUETIAPINE FUMARATE 50 MG: 25 TABLET ORAL at 08:32

## 2024-12-03 RX ADMIN — LINEZOLID 600 MG: 600 INJECTION, SOLUTION INTRAVENOUS at 12:39

## 2024-12-03 RX ADMIN — QUETIAPINE FUMARATE 50 MG: 25 TABLET ORAL at 19:46

## 2024-12-03 RX ADMIN — MIDODRINE HYDROCHLORIDE 10 MG: 5 TABLET ORAL at 08:32

## 2024-12-03 RX ADMIN — LINEZOLID 600 MG: 600 INJECTION, SOLUTION INTRAVENOUS at 00:42

## 2024-12-03 ASSESSMENT — ENCOUNTER SYMPTOMS
APNEA: 0
FACIAL SWELLING: 0
STRIDOR: 0
SHORTNESS OF BREATH: 0
EYE PAIN: 0
ABDOMINAL PAIN: 0
EYE DISCHARGE: 0
COLOR CHANGE: 0
EYE REDNESS: 0
CHOKING: 0
CHEST TIGHTNESS: 0

## 2024-12-03 ASSESSMENT — PAIN SCALES - GENERAL
PAINLEVEL_OUTOF10: 0
PAINLEVEL_OUTOF10: 0

## 2024-12-03 NOTE — PROGRESS NOTES
Days of Exercise per Week: 0 days     Minutes of Exercise per Session: 0 min   Stress: No Stress Concern Present (12/10/2019)    [employer] - Occupational Stress Questionnaire     Feeling of Stress : Only a little   Social Connections: Unknown (12/10/2019)    Social Connection and Isolation Panel [NHANES]     Frequency of Communication with Friends and Family: Three times a week     Frequency of Social Gatherings with Friends and Family: Twice a week     Attends Latter-day Services: Never     Active Member of Clubs or Organizations: No     Attends Club or Organization Meetings: Never     Marital Status: Not on file   Intimate Partner Violence: Unknown (2/22/2024)    Received from [facility]     Fear of Current or Ex-Partner: Not on file     Emotionally Abused: Not on file     Physically Abused: Not on file     Sexually Abused: Not on file     Physically or Sexually Abused: Not on file   Housing Stability: Unknown (4/29/2024)    Housing Stability Vital Sign     Unable to Pay for Housing in the Last Year: Not on file     Number of Places Lived in the Last Year: Not on file     Unstable Housing in the Last Year: No       Family History:     Family History   Problem Relation Age of Onset    Asthma Mother     No Known Problems Father       Medical Decision Making:   I have independently reviewed/ordered the following labs:    CBC with Differential:   Recent Labs     12/02/24  0657 12/03/24  0416   WBC 5.9 9.8   HGB 10.1* 10.2*   HCT 31.5* 31.0*    397   LYMPHOPCT  --  8*   MONOPCT  --  2   EOSPCT  --  0*     BMP:  Recent Labs     12/02/24  0657 12/03/24  0416   * 137   K 3.4* 3.4*    107   CO2 18* 20   BUN 11 13   CREATININE 0.7 0.8     Hepatic Function Panel:   No results for input(s): \"LABALBU\", \"BILIDIR\", \"IBILI\", \"BILITOT\", \"ALKPHOS\", \"ALT\", \"AST\" in the last 72 hours.    Invalid input(s): \"PROT\"    No  results for input(s): \"RPR\" in the last 72 hours.  No results for input(s): \"HIV\" in the last 72 hours.  No results for input(s): \"BC\" in the last 72 hours.  Lab Results   Component Value Date/Time    CREATININE 0.8 12/03/2024 04:16 AM    GLUCOSE 119 12/03/2024 04:16 AM    GLUCOSE 93 03/12/2012 03:18 PM       Detailed results:        Thank you for allowing us to participate in the care of this patient.Please call with questions.    This note is created with the assistance of a speech recognition program.  While intending to generate adocument that actually reflects the content of the visit, the document can still have some errors including those of syntax and sound a like substitutions which may escape proof reading.  It such instances, actual meaningcan be extrapolated by contextual diversion.    Carey Cobb MD  Office: [phone]  Perfect serve / office [phone]

## 2024-12-03 NOTE — CARE COORDINATION
TRansitional planning     Called [facility] [phone]. She received pre cert. They are able to accept. Patient will need pre cert.     1614 Called erin Edward [phone] and she confirms plan is for patient to go to [facility].     1616 Spoke to resident. Patient will go to step down today. Okay to start pre cert.     1620 Called Tegan and told her to start pre cert.     1622 Called erin Edward and told her patient has step down orders and is going to room 449

## 2024-12-03 NOTE — PLAN OF CARE
Problem: Safety - Adult  Goal: Free from fall injury  Outcome: Progressing     Problem: Discharge Planning  Goal: Discharge to home or other facility with appropriate resources  Outcome: Progressing  Flowsheets (Taken 12/3/2024 0800)  Discharge to home or other facility with appropriate resources: Identify barriers to discharge with patient and caregiver     Problem: Skin/Tissue Integrity  Goal: Absence of new skin breakdown  Description: 1.  Monitor for areas of redness and/or skin breakdown  2.  Assess vascular access sites hourly  3.  Every 4-6 hours minimum:  Change oxygen saturation probe site  4.  Every 4-6 hours:  If on nasal continuous positive airway pressure, respiratory therapy assess nares and determine need for appliance change or resting period.  Outcome: Progressing     Problem: Cardiovascular - Adult  Goal: Maintains optimal cardiac output and hemodynamic stability  Outcome: Progressing     Problem: Skin/Tissue Integrity - Adult  Goal: Skin integrity remains intact  Outcome: Progressing  Flowsheets (Taken 12/3/2024 0800)  Skin Integrity Remains Intact: Monitor for areas of redness and/or skin breakdown     Problem: Musculoskeletal - Adult  Goal: Return mobility to safest level of function  Outcome: Progressing  Flowsheets (Taken 12/3/2024 0800)  Return Mobility to Safest Level of Function:   Assess patient stability and activity tolerance for standing, transferring and ambulating with or without assistive devices   Assist with transfers and ambulation using safe patient handling equipment as needed     Problem: Genitourinary - Adult  Goal: Absence of urinary retention  Outcome: Progressing  Flowsheets (Taken 12/3/2024 0800)  Absence of urinary retention: Monitor intake/output and perform bladder scan as needed     Problem: Pain  Goal: Verbalizes/displays adequate comfort level or baseline comfort level  Outcome: Progressing

## 2024-12-03 NOTE — FLOWSHEET NOTE
Pt transported by writer via wheelchair to room 449. Autumn MEYER at bedside to take pt. Pt transported with personal wheelchair, glasses and beaded bracelets. All questions answered at this time.

## 2024-12-03 NOTE — TRANSITION OF CARE
Critical care team - Resident sign-out to medicine service      Date and time: 12/3/2024 2:13 PM  Patient's name:  Yasmine Morrison  Medical Record Number: [id number]  Patient's account/billing number: [id number]  Patient's YOB: 1945  Age: 79 y.o.  Date of Admission: 11/29/2024  5:11 PM  Length of stay during current admission: 4    Primary Care Physician: Sravani Wu DO    Code Status: DNR-CCA    Mode of physician to physician communication:        [x] Via telephone   [] In person     Date and time of sign-out: 12/3/2024 2:13 PM    Accepting Internal Medicine resident: Heydi Yanez NP    Accepting Medicine team: IM Team Intermed    Accepting team's attending: Dr. Victoria    Patient's current ICU Bed:  3026     Patient's assigned bed on floor:  449        [] Med-Surg Monitored [x] Step-down       [] Psychiatry ICU       [] Psych floor     Reason for ICU admission:     [] Intubated (date ) and Extubated (date )                                   []  Protect airway      [] ARF              [] Other     [x] Monitoring more than 1 hour   [] Agressive IVF resuscitation       [] Titrating drips to maintain hemodynamic stability                   [x]  Levo                [] Vaso               [] Antonio                [] other \  [] Post arrest   [] CRRT/ CVVH    Others:-    ICU course summary:     Yasmine Morrison is a 79 y.o. female with past medical history of recurrent UTIs, dementia, parkinsonism, seizure disorder, GERD, COPD, and DVT who presented with worsening altered mental status and lethargy and admitted for complicated UTI.  Patient presented to the ED on 11/28 for concerns of a possible stroke and patient arrived with DNR CC paperwork. Per chart review the daughter confirmed her code status and further workup was declined. Patient was diagnosed with UTI and discharged on keflex. Patient also has a history of advanced dementia and Parkinson. Patient returned to ED on 11/29/24 on due to increased  confusion from her baseline   In the ED her vitals signs were stable. Labs were remarkable for hypokalemia 3.1. UA was showed moderate leukocytes,  WBC, moderate leukocyte esterase, and few bacteria. Patient was given 40 mEq potassium, Rocephin, and ASA.   Patient is admitted to internal medicine initially with acute encephalopathy suspected secondary to UTI/medication overdose from carbamazepine.  Carbamazepine was held and Depakote was continued.  Patient is also history of chronic hypotension, on Florinef, patient Florinef dose was increased and started on midodrine 10 Mg 3 times daily.  Elevated troponins were found and flat trend.  Urine culture was positive for Enterococcus faecium, patient was started on Zyvox and stopped with meropenem which is started initially by ID.  Cardiology was consulted for mildly elevated troponin, echo ordered.  Critical care was consulted for low blood pressures and patient is admitted to ICU for further management.  Patient was started on Levophed infusion and titrated.  Patient was also found to have increased lactate levels and was given normal saline bolus 500 mL and infusion at 75 mL/h.  Patient was also given hydrocortisone stop with the fludrocortisone home medication.  Patient is weaned off Levophed, blood pressure stable after it.  Patient is continued on hydrocortisone 50 twice daily and midodrine 10 Mg 3 times daily along with the antibiotics with meropenem and Zyvox till .  Patient is stable for transfer to the medical floor.    Procedures during patient's ICU stay:     [] CVC (date )         [] HD ( access )           []  EGD/ Colonoscopy   []  EEG   []  Other     Current:     Temp (24hrs), Av.8 °F (36.6 °C), Min:97.3 °F (36.3 °C), Max:98.2 °F (36.8 °C)    Systolic (16hrs), Av , Min:84 , Max:145     Diastolic (16hrs), Av, Min:43, Max:110      Support device:   [] Ventilator [] BIPAP  [] Nasal Cannula [x] Room Air    Nutrition:    [] NPO [] Tube

## 2024-12-03 NOTE — PLAN OF CARE
Problem: Safety - Adult  Goal: Free from fall injury  12/3/2024 1809 by Autumn Gordon RN  Outcome: Progressing  12/3/2024 1231 by Leif Pearce RN  Outcome: Progressing     Problem: Discharge Planning  Goal: Discharge to home or other facility with appropriate resources  12/3/2024 1809 by Autumn Gordon RN  Outcome: Progressing  12/3/2024 1231 by Leif Pearce RN  Outcome: Progressing  Flowsheets (Taken 12/3/2024 0800)  Discharge to home or other facility with appropriate resources: Identify barriers to discharge with patient and caregiver     Problem: Skin/Tissue Integrity  Goal: Absence of new skin breakdown  Description: 1.  Monitor for areas of redness and/or skin breakdown  2.  Assess vascular access sites hourly  3.  Every 4-6 hours minimum:  Change oxygen saturation probe site  4.  Every 4-6 hours:  If on nasal continuous positive airway pressure, respiratory therapy assess nares and determine need for appliance change or resting period.  12/3/2024 1809 by Autumn Gordon RN  Outcome: Progressing  12/3/2024 1231 by Leif Pearce RN  Outcome: Progressing     Problem: Cardiovascular - Adult  Goal: Maintains optimal cardiac output and hemodynamic stability  12/3/2024 1809 by Autumn Gordon RN  Outcome: Progressing  12/3/2024 1231 by Leif Pearce RN  Outcome: Progressing  Goal: Absence of cardiac dysrhythmias or at baseline  12/3/2024 1809 by Autumn Gordon RN  Outcome: Progressing  12/3/2024 1231 by Leif Pearce RN  Outcome: Progressing  Flowsheets (Taken 12/3/2024 0800)  Absence of cardiac dysrhythmias or at baseline: Assess for signs of decreased cardiac output     Problem: Skin/Tissue Integrity - Adult  Goal: Skin integrity remains intact  12/3/2024 1809 by Autumn Gordon RN  Outcome: Progressing  12/3/2024 1231 by Leif Pearce RN  Outcome: Progressing  Flowsheets (Taken 12/3/2024 0800)  Skin Integrity Remains Intact: Monitor for areas of redness and/or skin breakdown  Goal: Oral

## 2024-12-03 NOTE — PROGRESS NOTES
Physician Progress Note      PATIENT:               NIMCO SPRAGUE  CSN #:                  [id number]  :                       1945  ADMIT DATE:       2024 5:11 PM  DISCH DATE:  RESPONDING  PROVIDER #:        ANKUR MODI          QUERY TEXT:    Patient admitted with AMS, noted to have UTI and documentation of    medicine notes likely toxic metabolic encephalopathy  due to uti and   medication overdose carbamazepine level elevated of 24>30 and medication was   held. . If possible, please document in progress notes and discharge summary   if you are evaluating and/or treating any of the following:    The medical record reflects the following:  Risk Factors: age of 79, AMS  Clinical Indicators: Patient admitted with AMS, noted to have UTI and   documentation of  medicine notes likely toxic metabolic encephalopathy    due to uti and medication overdose carbamazepine level elevated of 24>30 and   medication was held  Treatment: ICU monitoring, Carbamazepine discontinued, level monitoring    Thank You Germán MEYER BSN CCDS  Options provided:  -- Accidental overdose of carbamazepine  -- Adverse effect of carbamazepine, properly administered  -- Other - I will add my own diagnosis  -- Disagree - Not applicable / Not valid  -- Disagree - Clinically unable to determine / Unknown  -- Refer to Clinical Documentation Reviewer    PROVIDER RESPONSE TEXT:    AMS are due to an adverse effect of properly administered carbamazepine.    Query created by: Amy Adame on 12/3/2024 7:28 AM      Electronically signed by:  ANKUR MODI 12/3/2024 9:40 AM

## 2024-12-03 NOTE — PROGRESS NOTES
Occupational Therapy Daily Treatment Note  Facility/Department: [facility] CAR 3- MICU   Patient Name: Yasmine Morrison        MRN: [id number]    : 1945    Date of Service: 12/3/2024    Discharge Recommendations  Discharge Recommendations: Patient would benefit from continued therapy after discharge  OT Equipment Recommendations  Equipment Needed: No    Chief Complaint   Patient presents with    Urinary Tract Infection     Altered Mental Status     Past Medical History:  has a past medical history of Anxiety, Convulsion ([facility]), Dementia ([facility]), Encephalopathy, Herpes, Hx of blood clots, Hyperlipidemia, Left bundle branch block, MRSA (methicillin resistant Staphylococcus aureus), Parkinson's disease ([facility]), Seizures ([facility]), Under care of service provider, and Vitamin D deficiency.  Past Surgical History:  has a past surgical history that includes Abscess Drainage (Right, 2013); Ureter stent placement (Left, 2022); Cystoscopy (Left, 2022); Cystoscopy (Left, 2022); Hysterectomy; Splenectomy; other surgical history; Ureteroscopy (Left, 2022); Ureter surgery (Left, 2022); and Lithotripsy (Left, 2022).    Assessment  Performance deficits / Impairments: Decreased functional mobility ;Decreased ADL status;Decreased endurance;Decreased balance;Decreased safe awareness;Decreased cognition;Decreased strength;Decreased coordination  Assessment: Pt requires Mod A for bed mobility and Min A with RW for functional transfers and short mobility to recliner this date. Significant cuing and assist needed for engagement in self care activity with pt declining some tasks. Patient would benefit from continued acute OT services to address functional deficits through skilled intervention to promote independence and safety with ADL/IADLs and functional transfers/mobility for safe return to prior living environment and level of function.  Prognosis: Fair  Decision Making: Medium Complexity  REQUIRES OT  removing pt glassess as pt attempting to wash over lenses. Cuing needed for ceasation for task as pt continues tasks until told otherwise.  UE Bathing: Moderate assistance  UE Bathing Skilled Clinical Factors: Cuing and encouragement needed for pt to initate task. Pt lightly washing trunk prior to stopping task requring assist to complete further  LE Bathing: Maximum assistance  LE Bathing Skilled Clinical Factors: Pt not grasping washcloth to engage in task. Writer offers cuing and hand over hand assist for pt to wash distal thighs within pt reach however pt not engaging in task  UE Dressing: Minimal assistance  UE Dressing Skilled Clinical Factors: Assist to doff gown. Pt initating thredding BUE into gown to don  Additional Comments: Pt initally shaking head no when asked if wanting to get washed up, however when presented with ADL items and cued for initation of task, pt engaging in above ADL's with assist. Pt declines further participation in ADL's    Balance  Balance  Sitting: Without support (SBA EOB ~5 mins to prepare for transfer to recliner)  Standing: With support (Min A with RW support and 0-2 UE support. Cuing needed to grasp RW for support)    Transfers/Mobility  Bed mobility  Supine to Sit: Moderate assistance  Bed Mobility Comments: HOB ~30 degrees, tactile and verbal cues for initiation; increased time to progress trunk    Transfers  Stand Pivot Transfers: Minimal assistance  Sit to stand: Minimal assistance  Stand to sit: Minimal assistance  Transfer Comments: verbal cues to use RW with Fair return. Pivot to chair at bedside with RW support         Functional Mobility: Minimal assistance;Adaptive equipment;Increased time to complete  Functional Mobility Skilled Clinical Factors: cuing and increased time to take few steps to pivot bed<>chair. no LOB       Patient Education  Patient Education  Education Given To: Patient  Education Provided: Role of Therapy;Plan of Care;Transfer

## 2024-12-03 NOTE — PROGRESS NOTES
[facility]   Progress Note                   Date:   12/3/2024  Patient name: Yasmine Morrison  Date of admission:  11/29/2024  5:11 PM  MRN:   [id number]  YOB: 1945  PCP: Sravani Wu DO      Subjective:       Patient seen and examined at bedside.  Overnight events noted.       Medications:   Scheduled Meds:   potassium bicarb-citric acid  20 mEq Oral Once    potassium bicarb-citric acid  40 mEq Oral Once    hydrocortisone sodium succinate PF  50 mg IntraVENous BID    [Held by provider] fludrocortisone  0.2 mg Oral Daily    meropenem  1,000 mg IntraVENous Q12H    linezolid  600 mg IntraVENous Q12H    midodrine  10 mg Oral TID WC    aspirin  81 mg Oral Daily    [Held by provider] carBAMazepine  300 mg Oral Daily    QUEtiapine  50 mg Oral BID    pantoprazole  40 mg Oral QAM AC    trospium  20 mg Oral BID AC    sodium chloride flush  5-40 mL IntraVENous 2 times per day    enoxaparin  40 mg SubCUTAneous Daily    divalproex  250 mg Oral BID    [Held by provider] carBAMazepine  400 mg Oral Nightly    atorvastatin  40 mg Oral Nightly     Continuous Infusions:   sodium chloride 75 mL/hr at 12/03/24 1505    norepinephrine Stopped (12/03/24 0831)    sodium chloride Stopped (12/02/24 1033)         CBC:   Recent Labs     12/01/24  1517 12/02/24  0657 12/03/24  0416   WBC 6.5 5.9 9.8   HGB 9.3* 10.1* 10.2*    389 397     BMP:    Recent Labs     12/01/24  1030 12/02/24  0657 12/03/24  0416    131* 137   K 3.6* 3.4* 3.4*   * 102 107   CO2 20 18* 20   BUN 10 11 13   CREATININE 1.1* 0.7 0.8   GLUCOSE 129* 182* 119*          Lab Results   Component Value Date    CHOL 197 03/05/2021    TRIG 109 03/05/2021    HDL 70 03/05/2021     03/05/2021    VLDL NOT REPORTED 03/05/2021    CHOLHDLRATIO 2.8 03/05/2021      INR: No results for input(s): \"INR\" in the last 72 hours.    Objective:   Vitals: BP (!) 142/57   Pulse 71   Temp 98.2 °F (36.8 °C) (Axillary)   Resp 15   SpO2 98%     Interpreting             Referring Physician         DEMARCO MORENO *   Fellow     Type of Study      TTE procedure:2D Echocardiogram, M-Mode, Doppler, Color Doppler.     Procedure Date  Date: 05/17/2022 Start: 11:17 AM    Study Location: [facility]    History / Tech. Comments:  NSTEMI, Closed Compression Fx    Patient Status: Inpatient    Height: 67 inches Weight: 135 pounds BSA: 1.71 m^2 BMI: 21.14 kg/m^2    CONCLUSIONS    Summary  Technically difficult study, tachycardia noted  Normal LV size and wall thickness.  Abnormal septal wall motion  Normal LV systolic function with LVEF >55%.  Normal RV size and function. RV systolic pressure 48 mmHg  LA and RA appears normal in size.  No obvious significant structural valvular abnormality noted.  No significant valvular stenosis or regurgitation noted.  Normal aortic root dimension.  No significant pericardial effusion noted.  No obvious intra-cardiac mass or shunt noted.  IVC not well visualized    Signature  ----------------------------------------------------------------------------   Electronically signed by Lorri Mcfadden(Sonographer) on 05/17/2022 02:58   PM  ----------------------------------------------------------------------------    ----------------------------------------------------------------------------   Electronically signed by Elen EastonInterpreting physician) on   05/18/2022 11:56 AM  ----------------------------------------------------------------------------  FINDINGS  Left Atrium  Left atrium is normal in size.  Left Ventricle  Left ventricle is normal in size with normal systolic function globally.  Calculated ejection fraction by bi-plane Anna's method is 62%  Evidence of moderate diastolic dysfunction.  Right Atrium  Right atrium is normal in size.  Right Ventricle  Normal right ventricular size and function.  TAPSE measures 2.1 cm  Mitral Valve  Mild thickening of the mitral leaflets without stenosis.  Mild mitral  test once of out of ICU due to wall motion abnormality on echo.     Discussed with patient in detail. All questions answered. Agrees with plan as outlined above.     Thank you for allowing me to participate in the care of this patient, please do not hesitate to call if you have any questions.    Armin Rodrigues DO, FACC, FACOI, RPVI, EVANSE, MANDIE  [facility]  [facility]  [phone]          Electronically signed by Kurtis Melgar MD on 12/3/2024 at 4:02 PM

## 2024-12-03 NOTE — PLAN OF CARE
Problem: Safety - Adult  Goal: Free from fall injury  12/2/2024 2014 by Azucena Alvarado RN  Outcome: Progressing  Flowsheets (Taken 12/2/2024 2011)  Free From Fall Injury: Based on caregiver fall risk screen, instruct family/caregiver to ask for assistance with transferring infant if caregiver noted to have fall risk factors  12/2/2024 1657 by Leif Pearce RN  Outcome: Progressing     Problem: Discharge Planning  Goal: Discharge to home or other facility with appropriate resources  12/2/2024 2014 by Azucena Alvarado RN  Outcome: Progressing  12/2/2024 1657 by Leif Pearce RN  Outcome: Progressing  Flowsheets (Taken 12/2/2024 0800)  Discharge to home or other facility with appropriate resources: Identify discharge learning needs (meds, wound care, etc)     Problem: Skin/Tissue Integrity  Goal: Absence of new skin breakdown  Description: 1.  Monitor for areas of redness and/or skin breakdown  2.  Assess vascular access sites hourly  3.  Every 4-6 hours minimum:  Change oxygen saturation probe site  4.  Every 4-6 hours:  If on nasal continuous positive airway pressure, respiratory therapy assess nares and determine need for appliance change or resting period.  12/2/2024 2014 by Azucena Alvarado RN  Outcome: Progressing  12/2/2024 1657 by Leif Pearce RN  Outcome: Progressing     Problem: Cardiovascular - Adult  Goal: Maintains optimal cardiac output and hemodynamic stability  12/2/2024 2014 by Azucena Alvarado RN  Outcome: Progressing  12/2/2024 1657 by Leif Pearce RN  Outcome: Progressing  Flowsheets (Taken 12/2/2024 0800)  Maintains optimal cardiac output and hemodynamic stability:   Monitor urine output and notify Licensed Independent Practitioner for values outside of normal range   Assess for signs of decreased cardiac output     Problem: Skin/Tissue Integrity - Adult  Goal: Skin integrity remains intact  12/2/2024 2014 by Azucena Alvarado RN  Outcome: Progressing  Flowsheets  (Taken 12/2/2024 2011)  Skin Integrity Remains Intact: Monitor for areas of redness and/or skin breakdown  12/2/2024 1657 by Leif Pearce RN  Outcome: Progressing  Flowsheets (Taken 12/2/2024 0800)  Skin Integrity Remains Intact: Monitor for areas of redness and/or skin breakdown     Problem: Musculoskeletal - Adult  Goal: Return mobility to safest level of function  12/2/2024 2014 by Azucena Alvarado RN  Outcome: Progressing  12/2/2024 1657 by Leif Pearce RN  Outcome: Progressing  Flowsheets (Taken 12/2/2024 0800)  Return Mobility to Safest Level of Function: Assist with transfers and ambulation using safe patient handling equipment as needed     Problem: Genitourinary - Adult  Goal: Absence of urinary retention  12/2/2024 2014 by zAucena Alvarado RN  Outcome: Progressing  12/2/2024 1657 by Leif Pearce RN  Outcome: Progressing  Flowsheets (Taken 12/2/2024 0800)  Absence of urinary retention: Monitor intake/output and perform bladder scan as needed     Problem: Pain  Goal: Verbalizes/displays adequate comfort level or baseline comfort level  12/2/2024 2014 by Azucena Alvarado RN  Outcome: Progressing  12/2/2024 1657 by Leif Pearce RN  Outcome: Progressing

## 2024-12-03 NOTE — PROGRESS NOTES
Rocephin, and ASA.       Interval changes  12/2/2024   Patient Vitals for the past 8 hrs:   BP Temp Temp src Pulse Resp SpO2   12/02/24 2215 118/66 -- -- 65 11 99 %   12/02/24 2200 (!) 115/57 -- -- 64 10 98 %   12/02/24 2145 118/62 -- -- 65 13 99 %   12/02/24 2130 119/61 -- -- 66 13 98 %   12/02/24 2115 (!) 123/59 -- -- 65 14 98 %   12/02/24 2100 114/68 -- -- 66 14 100 %   12/02/24 2045 (!) 119/59 -- -- 67 12 98 %   12/02/24 2030 127/79 -- -- 67 14 98 %   12/02/24 2015 120/60 -- -- 65 12 99 %   12/02/24 2000 (!) 129/56 97.3 °F (36.3 °C) Oral 67 15 99 %   12/02/24 1945 (!) 120/93 -- -- 64 14 100 %   12/02/24 1930 (!) 117/53 -- -- 68 17 99 %   12/02/24 1915 -- -- -- 66 13 100 %   12/02/24 1900 -- -- -- 67 16 99 %   12/02/24 1830 (!) 116/58 -- -- 67 14 100 %   12/02/24 1815 112/71 -- -- 68 11 100 %   12/02/24 1800 (!) 106/54 -- -- 74 14 98 %   12/02/24 1745 109/60 -- -- 67 11 100 %   12/02/24 1730 114/63 -- -- 68 12 99 %   12/02/24 1715 93/63 -- -- 69 13 99 %   12/02/24 1700 (!) 107/55 -- -- 68 10 99 %   12/02/24 1645 132/71 -- -- 68 15 99 %   12/02/24 1630 118/61 -- -- 70 13 98 %   12/02/24 1600 99/61 97.9 °F (36.6 °C) Axillary 67 15 99 %   12/1  Chronic hypotensions omn  midrine - hypotension - sent to ICU  Appropriate and better eating today  U cx E feacium and GNR     12/2  Very nice on RA and very forgetful, can't remember why she os here  U cx E fecium and GNR low count  No fever - CTAP no hydro and has  non obstructing stones    Summary of relevant labs:  Labs:  WBC: 11.1  - 5.9   Lactic acid 2.9  Creat 0.7    Micro:  Urine culture: Enterococcus faecium     Procedures      Cardiology      Imaging:  US renal   IMPRESSION:  1. No evidence of hydronephrosis.  2. Suspected bilateral renal calculi which may measure up to 9 mm.  3. Cholelithiasis with distended gallbladder incidentally noted.       I have personally reviewed the past medical history, past surgical history, medications, social history, and family  Unknown (4/29/2024)    PRAPARE - Transportation     Lack of Transportation (Medical): Not on file     Lack of Transportation (Non-Medical): No   Physical Activity: Inactive (12/10/2019)    Exercise Vital Sign     Days of Exercise per Week: 0 days     Minutes of Exercise per Session: 0 min   Stress: No Stress Concern Present (12/10/2019)    [employer] - Occupational Stress Questionnaire     Feeling of Stress : Only a little   Social Connections: Unknown (12/10/2019)    Social Connection and Isolation Panel [NHANES]     Frequency of Communication with Friends and Family: Three times a week     Frequency of Social Gatherings with Friends and Family: Twice a week     Attends [employer] Services: Never     Active Member of Clubs or Organizations: No     Attends Club or Organization Meetings: Never     Marital Status: Not on file   Intimate Partner Violence: Unknown (2/22/2024)    [facility]    [place] Safety & Environment     Fear of Current or Ex-Partner: Not on file     Emotionally Abused: Not on file     Physically Abused: Not on file     Sexually Abused: Not on file     Physically or Sexually Abused: Not on file   Housing Stability: Unknown (4/29/2024)    Housing Stability Vital Sign     Unable to Pay for Housing in the Last Year: Not on file     Number of Places Lived in the Last Year: Not on file     Unstable Housing in the Last Year: No       Family History:     Family History   Problem Relation Age of Onset    Asthma Mother     No Known Problems Father       Medical Decision Making:   I have independently reviewed/ordered the following labs:    CBC with Differential:   Recent Labs     12/01/24  1517 12/02/24  0657   WBC 6.5 5.9   HGB 9.3* 10.1*   HCT 29.6* 31.5*    389     BMP:  Recent Labs     12/01/24  1030 12/02/24  0657    131*   K 3.6* 3.4*   * 102   CO2 20 18*   BUN 10 11   CREATININE 1.1* 0.7     Hepatic  Function Panel:   No results for input(s): \"LABALBU\", \"BILIDIR\", \"IBILI\", \"BILITOT\", \"ALKPHOS\", \"ALT\", \"AST\" in the last 72 hours.    Invalid input(s): \"PROT\"    No results for input(s): \"RPR\" in the last 72 hours.  No results for input(s): \"HIV\" in the last 72 hours.  No results for input(s): \"BC\" in the last 72 hours.  Lab Results   Component Value Date/Time    CREATININE 0.7 12/02/2024 06:57 AM    GLUCOSE 182 12/02/2024 06:57 AM    GLUCOSE 93 03/12/2012 03:18 PM       Detailed results:        Thank you for allowing us to participate in the care of this patient.Please call with questions.    This note is created with the assistance of a speech recognition program.  While intending to generate adocument that actually reflects the content of the visit, the document can still have some errors including those of syntax and sound a like substitutions which may escape proof reading.  It such instances, actual meaningcan be extrapolated by contextual diversion.    Carey Cobb MD  Office: [phone]  Perfect serve / office [phone]

## 2024-12-04 PROBLEM — R40.2410 GLASGOW COMA SCALE TOTAL SCORE 13-15: Status: ACTIVE | Noted: 2024-12-04

## 2024-12-04 LAB
ANION GAP SERPL CALCULATED.3IONS-SCNC: 9 MMOL/L (ref 9–16)
BASOPHILS # BLD: <0.03 K/UL (ref 0–0.2)
BASOPHILS NFR BLD: 0 % (ref 0–2)
BUN SERPL-MCNC: 12 MG/DL (ref 8–23)
CALCIUM SERPL-MCNC: 8.3 MG/DL (ref 8.6–10.4)
CARBAMAZEPINE SERPL-MCNC: <2 UG/ML (ref 4–12)
CHLORIDE SERPL-SCNC: 110 MMOL/L (ref 98–107)
CO2 SERPL-SCNC: 18 MMOL/L (ref 20–31)
CREAT SERPL-MCNC: 0.9 MG/DL (ref 0.6–0.9)
EOSINOPHIL # BLD: <0.03 K/UL (ref 0–0.44)
EOSINOPHILS RELATIVE PERCENT: 0 % (ref 1–4)
ERYTHROCYTE [DISTWIDTH] IN BLOOD BY AUTOMATED COUNT: 14.6 % (ref 11.8–14.4)
GFR, ESTIMATED: 64 ML/MIN/1.73M2
GLUCOSE SERPL-MCNC: 88 MG/DL (ref 74–99)
HCT VFR BLD AUTO: 29.8 % (ref 36.3–47.1)
HGB BLD-MCNC: 9.2 G/DL (ref 11.9–15.1)
IMM GRANULOCYTES # BLD AUTO: 0.03 K/UL (ref 0–0.3)
IMM GRANULOCYTES NFR BLD: 0 %
LYMPHOCYTES NFR BLD: 3.25 K/UL (ref 1.1–3.7)
LYMPHOCYTES RELATIVE PERCENT: 38 % (ref 24–43)
MAGNESIUM SERPL-MCNC: 2 MG/DL (ref 1.6–2.4)
MCH RBC QN AUTO: 32.4 PG (ref 25.2–33.5)
MCHC RBC AUTO-ENTMCNC: 30.9 G/DL (ref 28.4–34.8)
MCV RBC AUTO: 104.9 FL (ref 82.6–102.9)
MONOCYTES NFR BLD: 0.95 K/UL (ref 0.1–1.2)
MONOCYTES NFR BLD: 11 % (ref 3–12)
NEUTROPHILS NFR BLD: 51 % (ref 36–65)
NEUTS SEG NFR BLD: 4.38 K/UL (ref 1.5–8.1)
NRBC BLD-RTO: 0 PER 100 WBC
PLATELET # BLD AUTO: 373 K/UL (ref 138–453)
PMV BLD AUTO: 8.9 FL (ref 8.1–13.5)
POTASSIUM SERPL-SCNC: 3.2 MMOL/L (ref 3.7–5.3)
RBC # BLD AUTO: 2.84 M/UL (ref 3.95–5.11)
RBC # BLD: ABNORMAL 10*6/UL
RBC # BLD: ABNORMAL 10*6/UL
SODIUM SERPL-SCNC: 137 MMOL/L (ref 136–145)
WBC OTHER # BLD: 8.6 K/UL (ref 3.5–11.3)

## 2024-12-04 PROCEDURE — 6370000000 HC RX 637 (ALT 250 FOR IP)

## 2024-12-04 PROCEDURE — 99233 SBSQ HOSP IP/OBS HIGH 50: CPT | Performed by: NURSE PRACTITIONER

## 2024-12-04 PROCEDURE — 2580000003 HC RX 258: Performed by: NURSE PRACTITIONER

## 2024-12-04 PROCEDURE — 80156 ASSAY CARBAMAZEPINE TOTAL: CPT

## 2024-12-04 PROCEDURE — 6370000000 HC RX 637 (ALT 250 FOR IP): Performed by: STUDENT IN AN ORGANIZED HEALTH CARE EDUCATION/TRAINING PROGRAM

## 2024-12-04 PROCEDURE — 36415 COLL VENOUS BLD VENIPUNCTURE: CPT

## 2024-12-04 PROCEDURE — 99232 SBSQ HOSP IP/OBS MODERATE 35: CPT | Performed by: INTERNAL MEDICINE

## 2024-12-04 PROCEDURE — 6360000002 HC RX W HCPCS: Performed by: NURSE PRACTITIONER

## 2024-12-04 PROCEDURE — 6370000000 HC RX 637 (ALT 250 FOR IP): Performed by: NURSE PRACTITIONER

## 2024-12-04 PROCEDURE — 2060000000 HC ICU INTERMEDIATE R&B

## 2024-12-04 PROCEDURE — 2580000003 HC RX 258: Performed by: STUDENT IN AN ORGANIZED HEALTH CARE EDUCATION/TRAINING PROGRAM

## 2024-12-04 PROCEDURE — 6360000002 HC RX W HCPCS: Performed by: STUDENT IN AN ORGANIZED HEALTH CARE EDUCATION/TRAINING PROGRAM

## 2024-12-04 PROCEDURE — 6360000002 HC RX W HCPCS: Performed by: INTERNAL MEDICINE

## 2024-12-04 PROCEDURE — 99232 SBSQ HOSP IP/OBS MODERATE 35: CPT | Performed by: STUDENT IN AN ORGANIZED HEALTH CARE EDUCATION/TRAINING PROGRAM

## 2024-12-04 PROCEDURE — 51798 US URINE CAPACITY MEASURE: CPT

## 2024-12-04 PROCEDURE — 83735 ASSAY OF MAGNESIUM: CPT

## 2024-12-04 PROCEDURE — 99222 1ST HOSP IP/OBS MODERATE 55: CPT | Performed by: PSYCHIATRY & NEUROLOGY

## 2024-12-04 PROCEDURE — 2580000003 HC RX 258: Performed by: INTERNAL MEDICINE

## 2024-12-04 PROCEDURE — 6370000000 HC RX 637 (ALT 250 FOR IP): Performed by: PHYSICIAN ASSISTANT

## 2024-12-04 PROCEDURE — 80048 BASIC METABOLIC PNL TOTAL CA: CPT

## 2024-12-04 PROCEDURE — 85025 COMPLETE CBC W/AUTO DIFF WBC: CPT

## 2024-12-04 RX ORDER — 0.9 % SODIUM CHLORIDE 0.9 %
500 INTRAVENOUS SOLUTION INTRAVENOUS ONCE
Status: COMPLETED | OUTPATIENT
Start: 2024-12-04 | End: 2024-12-04

## 2024-12-04 RX ORDER — CARBAMAZEPINE 200 MG/1
300 TABLET ORAL 2 TIMES DAILY
Status: DISCONTINUED | OUTPATIENT
Start: 2024-12-04 | End: 2024-12-06 | Stop reason: HOSPADM

## 2024-12-04 RX ORDER — HYDROCORTISONE SODIUM SUCCINATE 100 MG/2ML
50 INJECTION INTRAMUSCULAR; INTRAVENOUS ONCE
Status: COMPLETED | OUTPATIENT
Start: 2024-12-04 | End: 2024-12-04

## 2024-12-04 RX ORDER — POTASSIUM CHLORIDE 1500 MG/1
40 TABLET, EXTENDED RELEASE ORAL ONCE
Status: COMPLETED | OUTPATIENT
Start: 2024-12-04 | End: 2024-12-04

## 2024-12-04 RX ORDER — FLUDROCORTISONE ACETATE 0.1 MG/1
0.1 TABLET ORAL DAILY
Status: DISCONTINUED | OUTPATIENT
Start: 2024-12-04 | End: 2024-12-06 | Stop reason: HOSPADM

## 2024-12-04 RX ADMIN — MIDODRINE HYDROCHLORIDE 10 MG: 5 TABLET ORAL at 09:54

## 2024-12-04 RX ADMIN — MIDODRINE HYDROCHLORIDE 10 MG: 5 TABLET ORAL at 16:48

## 2024-12-04 RX ADMIN — LINEZOLID 600 MG: 600 INJECTION, SOLUTION INTRAVENOUS at 01:29

## 2024-12-04 RX ADMIN — PANTOPRAZOLE SODIUM 40 MG: 40 TABLET, DELAYED RELEASE ORAL at 06:25

## 2024-12-04 RX ADMIN — DIVALPROEX SODIUM 250 MG: 250 TABLET, FILM COATED, EXTENDED RELEASE ORAL at 21:11

## 2024-12-04 RX ADMIN — ASPIRIN 81 MG: 81 TABLET, COATED ORAL at 09:54

## 2024-12-04 RX ADMIN — SODIUM CHLORIDE, PRESERVATIVE FREE 10 ML: 5 INJECTION INTRAVENOUS at 21:11

## 2024-12-04 RX ADMIN — FLUDROCORTISONE ACETATE 0.1 MG: 0.1 TABLET ORAL at 09:57

## 2024-12-04 RX ADMIN — SODIUM CHLORIDE 500 ML: 9 INJECTION, SOLUTION INTRAVENOUS at 16:54

## 2024-12-04 RX ADMIN — TROSPIUM CHLORIDE 20 MG: 20 TABLET, FILM COATED ORAL at 06:25

## 2024-12-04 RX ADMIN — HYDROCORTISONE SODIUM SUCCINATE 50 MG: 100 INJECTION, POWDER, FOR SOLUTION INTRAMUSCULAR; INTRAVENOUS at 09:54

## 2024-12-04 RX ADMIN — QUETIAPINE FUMARATE 50 MG: 25 TABLET ORAL at 21:11

## 2024-12-04 RX ADMIN — SODIUM CHLORIDE, PRESERVATIVE FREE 10 ML: 5 INJECTION INTRAVENOUS at 09:56

## 2024-12-04 RX ADMIN — MEROPENEM 1000 MG: 1 INJECTION INTRAVENOUS at 11:47

## 2024-12-04 RX ADMIN — POTASSIUM CHLORIDE 40 MEQ: 1500 TABLET, EXTENDED RELEASE ORAL at 16:49

## 2024-12-04 RX ADMIN — MIDODRINE HYDROCHLORIDE 10 MG: 5 TABLET ORAL at 12:43

## 2024-12-04 RX ADMIN — ATORVASTATIN CALCIUM 40 MG: 40 TABLET, FILM COATED ORAL at 21:11

## 2024-12-04 RX ADMIN — QUETIAPINE FUMARATE 50 MG: 25 TABLET ORAL at 09:53

## 2024-12-04 RX ADMIN — ENOXAPARIN SODIUM 40 MG: 100 INJECTION SUBCUTANEOUS at 09:53

## 2024-12-04 RX ADMIN — POTASSIUM CHLORIDE 40 MEQ: 1500 TABLET, EXTENDED RELEASE ORAL at 09:56

## 2024-12-04 RX ADMIN — CARBAMAZEPINE 300 MG: 200 TABLET ORAL at 21:12

## 2024-12-04 RX ADMIN — DIVALPROEX SODIUM 250 MG: 250 TABLET, FILM COATED, EXTENDED RELEASE ORAL at 09:53

## 2024-12-04 RX ADMIN — LINEZOLID 600 MG: 600 INJECTION, SOLUTION INTRAVENOUS at 13:42

## 2024-12-04 RX ADMIN — TROSPIUM CHLORIDE 20 MG: 20 TABLET, FILM COATED ORAL at 16:48

## 2024-12-04 ASSESSMENT — ENCOUNTER SYMPTOMS
EYE PAIN: 0
EYE REDNESS: 0
CHOKING: 0
COLOR CHANGE: 0
PHOTOPHOBIA: 0
FACIAL SWELLING: 0
APNEA: 0
CHEST TIGHTNESS: 0
SHORTNESS OF BREATH: 0
EYE DISCHARGE: 0
ABDOMINAL PAIN: 0
STRIDOR: 0

## 2024-12-04 NOTE — PLAN OF CARE
Problem: Safety - Adult  Goal: Free from fall injury  12/4/2024 0316 by Kate Hernandez RN  Outcome: Progressing     Problem: Discharge Planning  Goal: Discharge to home or other facility with appropriate resources  12/4/2024 0316 by Kate Hernandez RN  Outcome: Progressing     Problem: Skin/Tissue Integrity  Goal: Absence of new skin breakdown  Description: 1.  Monitor for areas of redness and/or skin breakdown  2.  Assess vascular access sites hourly  3.  Every 4-6 hours minimum:  Change oxygen saturation probe site  4.  Every 4-6 hours:  If on nasal continuous positive airway pressure, respiratory therapy assess nares and determine need for appliance change or resting period.  12/4/2024 0316 by Kate Hernandez RN  Outcome: Progressing     Problem: Cardiovascular - Adult  Goal: Maintains optimal cardiac output and hemodynamic stability  12/4/2024 0316 by Kate Hernandez RN  Outcome: Progressing     Problem: Cardiovascular - Adult  Goal: Absence of cardiac dysrhythmias or at baseline  12/4/2024 0316 by Kate Hernandez RN  Outcome: Progressing     Problem: Skin/Tissue Integrity - Adult  Goal: Skin integrity remains intact  12/4/2024 0316 by Kate Hernandez RN  Outcome: Progressing     Problem: Skin/Tissue Integrity - Adult  Goal: Oral mucous membranes remain intact  12/4/2024 0316 by Kate Hernandez RN  Outcome: Progressing     Problem: Musculoskeletal - Adult  Goal: Return mobility to safest level of function  12/4/2024 0316 by Kate Hernandez RN  Outcome: Progressing     Problem: Genitourinary - Adult  Goal: Absence of urinary retention  12/4/2024 0316 by Kate Hernandez RN  Outcome: Progressing     Problem: Genitourinary - Adult  Goal: Urinary catheter remains patent  12/4/2024 0316 by Kate Hernandez RN  Outcome: Progressing     Problem: Pain  Goal: Verbalizes/displays adequate comfort level or baseline comfort level  12/4/2024 0316 by Kate Hernandez RN  Outcome: Progressing

## 2024-12-04 NOTE — CONSULTS
[facility]   IN-PATIENT SERVICE      NEUROLOGY CONSULT  NOTE            Date:   12/4/2024  Patient name:  Yasmine Morrison  Date of admission:  11/29/2024  YOB: 1945      Chief Complaint:     Chief Complaint   Patient presents with    Urinary Tract Infection     Altered Mental Status       Reason for Consult:      Abnormal tegretol levels     History of Present Illness:     The patient is a 79 y.o. female who presents with Urinary Tract Infection  and Altered Mental Status  . The patient was seen and examined and the chart was reviewed.     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 At baseline patient has severe dementia. She has a history of recurrent UTI's.     She presented with worsening altered mental status and lethargy and admitted for complicated UTI. Patient presented to the ED on 11/28 for concerns of a possible stroke and patient arrived with DNR CC paperwork. Per chart review the daughter confirmed her code status and further workup was declined. Patient was diagnosed with UTI and discharged on keflex. Patient also has a history of advanced dementia and Parkinson. Patient returned to ED on 11/19/24 on due to increased confusion from her baseline.    Carbamazepine level was supra-therapeutic and this was held. Depakote continued. Has chronic hypotension- treated with pressors and midodrine.     Neurology asked to consult for elevated carbamazepine levels. No breakthrough seizures have been witnessed or reported.     ID is on board for UTI.   Past Medical History:     Past Medical History:   Diagnosis Date    Anxiety     Convulsion (HCC)     Dementia (HCC)     Encephalopathy     Herpes     Hx of blood clots      found for this or any previous visit.    Results for orders placed during the hospital encounter of 11/29/24    CT HEAD WO CONTRAST    Narrative  EXAMINATION:  CTA OF THE HEAD AND NECK WITH CONTRAST; CT OF THE HEAD WITHOUT CONTRAST    11/29/2024 7:21 pm    TECHNIQUE:  CTA of the head and neck was performed with the administration of intravenous  contrast. Multiplanar reformatted images are provided for review.  MIP images  are provided for review. Stenosis of the internal carotid arteries measured  using NASCET criteria. Automated exposure control, iterative reconstruction,  and/or weight based adjustment of the mA/kV was utilized to reduce the  radiation dose to as low as reasonably achievable.; CT of the head was  performed without the administration of intravenous contrast. Automated  exposure control, iterative reconstruction, and/or weight based adjustment of  the mA/kV was utilized to reduce the radiation dose to as low as reasonably  achievable. Noncontrast CT of the head with reconstructed 2-D images are also  provided for review.    COMPARISON:  CT brain 07/26/2024 and CT angiogram head and neck 10/12/2015.    HISTORY:  ORDERING SYSTEM PROVIDED HISTORY:  AMS  TECHNOLOGIST PROVIDED HISTORY:  AMS  Decision Support Exception - unselect if not a suspected or confirmed  emergency medical condition->Emergency Medical Condition (MA); ORDERING  SYSTEM PROVIDED HISTORY:  AMS  TECHNOLOGIST PROVIDED HISTORY:  AMS  Decision Support Exception - unselect if not a suspected or confirmed  emergency medical condition->Emergency Medical Condition (MA)    FINDINGS:    CT HEAD:    BRAIN/VENTRICLES:  Encephalomalacia within the left temporal lobe is  unchanged.  There is diffuse cerebral volume loss.  There is no acute infarct  or acute intracranial hemorrhage present.  There is no mass effect or midline  shift present.  Periventricular hypoattenuation is present.    ORBITS: Limited evaluation of the orbits is

## 2024-12-04 NOTE — PLAN OF CARE
Problem: Safety - Adult  Goal: Free from fall injury  Outcome: Progressing     Problem: Discharge Planning  Goal: Discharge to home or other facility with appropriate resources  Outcome: Progressing     Problem: Skin/Tissue Integrity  Goal: Absence of new skin breakdown  Description: 1.  Monitor for areas of redness and/or skin breakdown  2.  Assess vascular access sites hourly  3.  Every 4-6 hours minimum:  Change oxygen saturation probe site  4.  Every 4-6 hours:  If on nasal continuous positive airway pressure, respiratory therapy assess nares and determine need for appliance change or resting period.  Outcome: Progressing     Problem: Cardiovascular - Adult  Goal: Maintains optimal cardiac output and hemodynamic stability  Outcome: Progressing  Goal: Absence of cardiac dysrhythmias or at baseline  Outcome: Progressing     Problem: Skin/Tissue Integrity - Adult  Goal: Skin integrity remains intact  Outcome: Progressing  Goal: Oral mucous membranes remain intact  Outcome: Progressing     Problem: Musculoskeletal - Adult  Goal: Return mobility to safest level of function  Outcome: Progressing     Problem: Genitourinary - Adult  Goal: Absence of urinary retention  Outcome: Progressing  Goal: Urinary catheter remains patent  Outcome: Progressing     Problem: Pain  Goal: Verbalizes/displays adequate comfort level or baseline comfort level  Outcome: Progressing

## 2024-12-04 NOTE — PROGRESS NOTES
[facility]  Office: [phone]  Felipe Galicia DO, Duglas Archuleta DO, Andrew Abdi DO, Carlos Alberto Garcia DO, Jona Barrett MD, Saima Funes MD, Shanta Yates MD, Evie Whitaker MD,  David Rangel MD, Venu White MD, Young Shoemaker MD,  Sallie Santiago DO, Jenae Victoria MD, Marcus Garrido MD, David Galicia DO, Joanna Winn MD,  Gonzalo Hunter DO, Irish Escobar MD, Yvonne Shepard MD, Dorinda Birch MD, Torres Barrios MD,  Oscar Aguilar MD, Chepe Figueroa MD, Shanthi Harden MD, Kwesi Silva MD, Omer Sullivan MD, Ryne Dewey MD, Herb Yen DO, Blake Azevedo MD, Shirley Waterhouse, CNP,  Fela Mills, CNP, Herb Mandujano, CNP,  Heydi Smith, MARIA LUZ, Ashlie Orellana, CNP, Kimberly Bryan, CNP, Alona Dickinson, CNP, Robyn Guzman, CNP, WING BurgerC, WING SchmidtC, Beata Schwarz, CNP, Arturo Nayak, CNP,  Corine Casey, CNP, Vikki Estrada, CNP,  Sumi Lopez, CNP, Delia Zuleta, CNP         [facility]   IN-PATIENT SERVICE   [facility]    Progress Note    12/4/2024    8:39 AM    Name:   Yasmine Morrison  MRN:     [id number]     Acct:      [id number]   Room:   0449/0449-01   Day:  5  Admit Date:  11/29/2024  5:11 PM    PCP:   Sravani Wu DO  Code Status:  DNR-CCA    Subjective:     C/C:   Chief Complaint   Patient presents with    Urinary Tract Infection     Altered Mental Status     Interval History Status: not changed.     Patient was eating at the time of exam  She did not answer many orientation answers, knows that she is in the hospital.  Noted to have tremors  No fever or chills.  Blood pressure remains around 100-1 10 systolic.  Potassium 3.2, hemoglobin 9.2  Brief History:     79-year-old female with known medical history of recurrent UTIs, dementia, seizure disorder, GERD, COPD, DVT presents to the hospital on 11/29 with altered mental status and slurred speech.  Patient was recently evaluated in the ER and was noted  to have DNRCC paperwork and family denied workup.  Patient was discharged home on Keflex.  Patient returned to ER with worsening confusion.  She was noted to have lactic acidosis, UTI positive.  Patient was also noted to have KATE with elevated creatinine of 1.3 from baseline of 0.7.  Troponin was elevated to 61.  Patient was started on treatment, on 12/1 patient was noted to be hypotensive and was transferred to ICU.    Medications:     Allergies:    Allergies   Allergen Reactions    Adhesive Tape Other (See Comments)     Pulls skin off    Haldol [Haloperidol Lactate] Other (See Comments)     hallucinates       Current Meds:   Scheduled Meds:    potassium bicarb-citric acid  20 mEq Oral Once    potassium bicarb-citric acid  40 mEq Oral Once    hydrocortisone sodium succinate PF  50 mg IntraVENous BID    [Held by provider] fludrocortisone  0.2 mg Oral Daily    meropenem  1,000 mg IntraVENous Q12H    linezolid  600 mg IntraVENous Q12H    midodrine  10 mg Oral TID WC    aspirin  81 mg Oral Daily    [Held by provider] carBAMazepine  300 mg Oral Daily    QUEtiapine  50 mg Oral BID    pantoprazole  40 mg Oral QAM AC    trospium  20 mg Oral BID AC    sodium chloride flush  5-40 mL IntraVENous 2 times per day    enoxaparin  40 mg SubCUTAneous Daily    divalproex  250 mg Oral BID    [Held by provider] carBAMazepine  400 mg Oral Nightly    atorvastatin  40 mg Oral Nightly     Continuous Infusions:    sodium chloride Stopped (12/03/24 1726)    norepinephrine Stopped (12/03/24 0831)    sodium chloride Stopped (12/02/24 1033)     PRN Meds: potassium chloride **OR** potassium alternative oral replacement **OR** potassium chloride, sodium chloride flush, sodium chloride, ondansetron **OR** ondansetron, acetaminophen **OR** acetaminophen, polyethylene glycol    Data:     Past Medical History:   has a past medical history of Anxiety, Convulsion (HCC), Dementia (HCC), Encephalopathy, Herpes, Hx of blood clots, Hyperlipidemia, Left  calves  Skin:  no gross lesions, rashes, induration    Assessment:        Hospital Problems             Last Modified POA    * (Principal) Urinary tract infection without hematuria 12/1/2024 Yes    Shortness of breath 12/2/2024 Yes    ESBL (extended spectrum beta-lactamase) producing bacteria infection 11/29/2024 Yes    History of extended-spectrum beta-lactamase producing Escherichia coli infection 11/29/2024 Yes    Shock 12/3/2024 Yes    Acute encephalopathy 11/30/2024 Yes    Dementia with behavioral disturbance (HCC) 11/29/2024 Yes    Elevated troponin 11/30/2024 Yes    KATE (acute kidney injury) (HCC) 12/3/2024 Yes       Plan:        Complicated UTI- urine culture showing Enterococcus faecium, gram-negative rods, low count, Zyvox and meropenem planned till 12/5, plan to go to SNF.    Elevated troponin- echo showing abnormal diastolic function with hypokinesis of anterior septal and inferior septal area, cardiology recommending ischemia workup, pending discussion with family    KATE-resolved after hydration    Seizure disorder on Depakote, tegretol was held due to elevated levels, will discuss with neurology for adjustment of anti epileptics. Recheck levels pending.    Dementia on Seroquel and Prozac    Chronic hypotension on Florinef,midodrine, wean off solu cortef    CODE STATUS DNR CCA    Discharge planning to snf  Replace electrolytes      Jenae Victoria MD  12/4/2024  8:39 AM

## 2024-12-04 NOTE — PROGRESS NOTES
[facility]   Progress Note                   Date:   12/4/2024  Patient name: Yasmine Morrison  Date of admission:  11/29/2024  5:11 PM  MRN:   [id number]  YOB: 1945  PCP: Sravani Wu DO      Subjective:       Patient seen and examined at bedside.   Pt is confused.No family at bedside. RN reports that family has refused cardiac testing in the past.       Medications:   Scheduled Meds:   fludrocortisone  0.1 mg Oral Daily    potassium bicarb-citric acid  20 mEq Oral Once    potassium bicarb-citric acid  40 mEq Oral Once    meropenem  1,000 mg IntraVENous Q12H    linezolid  600 mg IntraVENous Q12H    midodrine  10 mg Oral TID WC    aspirin  81 mg Oral Daily    [Held by provider] carBAMazepine  300 mg Oral Daily    QUEtiapine  50 mg Oral BID    pantoprazole  40 mg Oral QAM AC    trospium  20 mg Oral BID AC    sodium chloride flush  5-40 mL IntraVENous 2 times per day    enoxaparin  40 mg SubCUTAneous Daily    divalproex  250 mg Oral BID    [Held by provider] carBAMazepine  400 mg Oral Nightly    atorvastatin  40 mg Oral Nightly     Continuous Infusions:   sodium chloride Stopped (12/02/24 1033)         CBC:   Recent Labs     12/02/24  0657 12/03/24  0416 12/04/24  0550   WBC 5.9 9.8 8.6   HGB 10.1* 10.2* 9.2*    397 373     BMP:    Recent Labs     12/02/24  0657 12/03/24  0416 12/04/24  0550   * 137 137   K 3.4* 3.4* 3.2*    107 110*   CO2 18* 20 18*   BUN 11 13 12   CREATININE 0.7 0.8 0.9   GLUCOSE 182* 119* 88          Lab Results   Component Value Date    CHOL 197 03/05/2021    TRIG 109 03/05/2021    HDL 70 03/05/2021     03/05/2021    VLDL NOT REPORTED 03/05/2021    CHOLHDLRATIO 2.8 03/05/2021      INR: No results for input(s): \"INR\" in the last 72 hours.    Objective:   Vitals: BP (!) 120/41   Pulse 64   Temp 98.2 °F (36.8 °C) (Oral)   Resp 15   SpO2 94%       General appearance: alert and cooperative with exam, but confused   HEENT: Head:  sclerosis without stenosis.  No aortic insufficiency.  Tricuspid Valve  No tricuspid regurgitation was seen.  Mild tricuspid regurgitation.  Estimated right ventricular systolic pressure is 48 mmHg.  Pulmonic Valve  The pulmonic valve is normal in structure.  Pericardial Effusion  No significant pericardial effusion is seen.    Miscellaneous  Normal aortic root dimension.    M-mode / 2D Measurements & Calculations:      LVIDd:4.3 cm(3.7 - 5.6 cm)       Diastolic Volume:59.3 ml   LVIDs:3.2 cm(2.2 - 4.0 cm)       Systolic Volume:22.3 ml   IVSd:1 cm(0.6 - 1.1 cm)          Aortic Root:2.7 cm(2.0 - 3.7 cm)   LVPWd:1 cm(0.6 - 1.1 cm)         LA Dimension: 2.5 cm(1.9 - 4.0 cm)   Fractional Shortenin.58 %    LA volume/Index: 23.97 ml /14m^2   Calculated LVEF (%): 62.39 %     LVOT:1.9 cm                                    RVDd:2.5 cm      Mitral:                                 Aortic      Valve Area (P1/2-Time): 3.38 cm^2       Peak Velocity: 1.83 m/s   Peak E-Wave: 0.67 m/s                   Mean Velocity: 1.27 m/s   Peak A-Wave: 0.94 m/s                   Peak Gradient: 13.4 mmHg   E/A Ratio: 0.71                         Mean Gradient: 7 mmHg   Peak Gradient: 1.78 mmHg   Mean Gradient: 2 mmHg   Deceleration Time: 250 msec             Area (continuity): 1.68 cm^2   P1/2t: 65 msec                          AV VTI: 29.5 cm      Area (continuity): 2.98 cm^2   Mean Velocity: 0.59 m/s      Tricuspid:                              Pulmonic:      Estimated RVSP: 48 mmHg                 Peak Velocity: 0.92 m/s   Peak TR Velocity: 3.11 m/s              Peak Gradient: 3.39 mmHg   Peak TR Gradient: 38.6884 mmHg   Estimated RA Pressure: 10 mmHg                                              Estimated PASP: 48.69 mmHg     Diastology / Tissue Doppler    Lateral Wall E' velocity:0.07 m/s  Lateral Wall E/E':9       24    ECHO (TTE) COMPLETE (PRN CONTRAST/BUBBLE/STRAIN/3D) 2024  1:33 PM (Final)    Interpretation Summary    Left  Ventricle: Normal left ventricular systolic function. EF by visual approximation is 55%. Left ventricle size is normal. Normal wall thickness. Hypokinesis of the anteroseptum and inferoseptum. Abnormal diastolic function.    Aortic Valve: Mildly thickened cusps.    Tricuspid Valve: Trace regurgitation. The estimated RVSP is 32 mmHg.    Image quality is adequate.    Signed by: Armin Rodrigues DO on 12/2/2024  1:33 PM             Hospital Problems             Last Modified POA    * (Principal) Urinary tract infection without hematuria 12/1/2024 Yes    Shortness of breath 12/2/2024 Yes    ESBL (extended spectrum beta-lactamase) producing bacteria infection 11/29/2024 Yes    History of extended-spectrum beta-lactamase producing Escherichia coli infection 11/29/2024 Yes    Shock 12/3/2024 Yes    Acute encephalopathy 11/30/2024 Yes    Dementia with behavioral disturbance (HCC) 11/29/2024 Yes    Elevated troponin 11/30/2024 Yes    KATE (acute kidney injury) (HCC) 12/3/2024 Yes       Assessment:   Musculoskeletal chest pain, mildly elevated troponin 61-53 with flat trend secondary to KATE & sepsis  Admitted for altered mental status in the setting of UTI  KATE  Dementia  Parkinsonism  History of COPD        Plan:     BP low- continue midodrine and hydrocortisone  ECHO ordered showed abnormal diastolic function with hypokinesis of anteroseptum and inferoseptum but normal LVEF, will need ischemia workup when current issues resolve if family is agreeable.  Will attempt to reach out to family to discuss findings.    Continue ASA and statin  Keep K >4 and Mg >2   Will continue to monitor      LAURIE ONTIVEROS CNP   [facility]  [facility]  [phone]          Electronically signed by LAURIE ONTIVEROS CNP on 12/4/2024 at 11:24 AM

## 2024-12-04 NOTE — CARE COORDINATION
Spoke to patient and family, Cheyanne regarding patient's transitional planning. When speaking to Abril she expressed little desire in seeking long-term bed placement and wanted to inquire more about the respite care services. She stated she would reach out to [facility] and speak with them.     Called Tegan and updated on patient's daughters desire to speak more about respite services. Tegan stated she would look into it further and call me back with an update.
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
## 2024-12-04 NOTE — PROGRESS NOTES
auscultation. There were no wheezes, rhonchi or rales. There is no intercostal retraction or use of accessory muscles. No egophony noted.   Cardiovascular: Regular without murmur, clicks, gallops or rubs.   Abdomen: Slightly rounded and soft without organomegaly. No rebound tenderness, rigidity or guarding was appreciated.    Lymphatic: No lymphadenopathy.  Musculoskeletal: Normal curvature of the spine.  No gross muscle weakness.    Extremities:  The patient does not lower extremity edema, ulcerations, tenderness, varicosities or erythema.  Muscle size, tone and strength are normal.  No involuntary movements are noted.     Skin:  Warm and dry.  Good color, turgor and pigmentation. No lesions or scars.  No cyanosis or clubbing  Neurological/Psychiatric: The patient's general behavior, level of consciousness, thought content and emotional status is normal.          CBC:   Recent Labs     12/02/24  0657 12/03/24  0416 12/04/24  0550   WBC 5.9 9.8 8.6   HGB 10.1* 10.2* 9.2*    397 373     BMP:    Recent Labs     12/02/24  0657 12/03/24  0416 12/04/24  0550   * 137 137   K 3.4* 3.4* 3.2*    107 110*   CO2 18* 20 18*   BUN 11 13 12   CREATININE 0.7 0.8 0.9   GLUCOSE 182* 119* 88     Calcium:  Recent Labs     12/04/24  0550   CALCIUM 8.3*     Ionized Calcium:Invalid input(s): \"IONCA\"  Magnesium:No results for input(s): \"MG\" in the last 72 hours.  Phosphorus:No results for input(s): \"PHOS\" in the last 72 hours.  BNP:No results for input(s): \"BNP\" in the last 72 hours.  Glucose:  Recent Labs     12/02/24  1928   POCGLU 140*     HgbA1C: No results for input(s): \"LABA1C\" in the last 72 hours.  INR: No results for input(s): \"INR\" in the last 72 hours.  Hepatic: No results for input(s): \"ALKPHOS\", \"ALT\", \"AST\", \"BILITOT\", \"BILIDIR\", \"LABALBU\" in the last 72 hours.    Invalid input(s): \"PROT\"  Amylase and Lipase:No results for input(s): \"LACTA\", \"AMYLASE\" in the last 72 hours.  Lactic Acid: No results for  input(s): \"LACTA\" in the last 72 hours.  CARDIAC ENZYMES:No results for input(s): \"CKTOTAL\", \"CKMB\", \"CKMBINDEX\", \"TROPONINI\" in the last 72 hours.  BNP: No results for input(s): \"BNP\" in the last 72 hours.  Lipids: No results for input(s): \"CHOL\", \"TRIG\", \"HDL\", \"LDL\" in the last 72 hours.    Invalid input(s): \"LDLCALC\"  ABGs: No results found for: \"PH\", \"PCO2\", \"PO2\", \"HCO3\", \"O2SAT\"  Thyroid:   Lab Results   Component Value Date/Time    TSH 0.40 12/02/2024 06:57 AM      Urinalysis: No results for input(s): \"BACTERIA\", \"BLOODU\", \"CLARITYU\", \"COLORU\", \"PHUR\", \"PROTEINU\", \"RBCUA\", \"SPECGRAV\", \"BILIRUBINUR\", \"NITRU\", \"WBCUA\", \"LEUKOCYTESUR\", \"GLUCOSEU\" in the last 72 hours.    CULTURES:          Assessment:    Principal Problem:    Urinary tract infection without hematuria  Active Problems:    Shortness of breath    ESBL (extended spectrum beta-lactamase) producing bacteria infection    History of extended-spectrum beta-lactamase producing Escherichia coli infection    Shock    Acute encephalopathy    Dementia with behavioral disturbance (HCC)    Elevated troponin    KATE (acute kidney injury) (HCC)  Resolved Problems:    UTI (urinary tract infection)  Patient transferred out of the ICU on 12/3/2024Sepsis due to UTI with shock  Bilateral nonobstructive renal calculi  COPD  Trace bilateral pleural effusion  Dementia  Parkinsonism  Seizures  GERD  DVT  Hypokalemia  Anemia, slightly worse    Plan:  Meds reviewed- changes made as appropriate.  Increase activity as permitted and tolerated.  Questions patient had were answered to her satisfaction.  Continue supplemental oxygen, as needed  Cxr reviewed. None today  Diet reviewed  Thank you for having us involved in the care of your patient. Please call us if you have any questions or concerns.    Tavares Rodriguez MD, MD      12/4/2024, 6:40 AM    Pulmonary & Critical Care

## 2024-12-05 ENCOUNTER — APPOINTMENT (OUTPATIENT)
Dept: NUCLEAR MEDICINE | Age: 79
DRG: 690 | End: 2024-12-05
Payer: COMMERCIAL

## 2024-12-05 LAB
ANION GAP SERPL CALCULATED.3IONS-SCNC: 6 MMOL/L (ref 9–16)
BASOPHILS # BLD: 0.03 K/UL (ref 0–0.2)
BASOPHILS NFR BLD: 1 % (ref 0–2)
BUN SERPL-MCNC: 12 MG/DL (ref 8–23)
CALCIUM SERPL-MCNC: 8.2 MG/DL (ref 8.6–10.4)
CHLORIDE SERPL-SCNC: 113 MMOL/L (ref 98–107)
CO2 SERPL-SCNC: 21 MMOL/L (ref 20–31)
CREAT SERPL-MCNC: 1 MG/DL (ref 0.6–0.9)
EOSINOPHIL # BLD: 0.34 K/UL (ref 0–0.44)
EOSINOPHILS RELATIVE PERCENT: 5 % (ref 1–4)
ERYTHROCYTE [DISTWIDTH] IN BLOOD BY AUTOMATED COUNT: 14.9 % (ref 11.8–14.4)
GFR, ESTIMATED: 57 ML/MIN/1.73M2
GLUCOSE SERPL-MCNC: 80 MG/DL (ref 74–99)
HCT VFR BLD AUTO: 30.9 % (ref 36.3–47.1)
HGB BLD-MCNC: 9.7 G/DL (ref 11.9–15.1)
IMM GRANULOCYTES # BLD AUTO: <0.03 K/UL (ref 0–0.3)
IMM GRANULOCYTES NFR BLD: 0 %
LYMPHOCYTES NFR BLD: 2.86 K/UL (ref 1.1–3.7)
LYMPHOCYTES RELATIVE PERCENT: 45 % (ref 24–43)
MCH RBC QN AUTO: 32.6 PG (ref 25.2–33.5)
MCHC RBC AUTO-ENTMCNC: 31.4 G/DL (ref 28.4–34.8)
MCV RBC AUTO: 103.7 FL (ref 82.6–102.9)
MICROORGANISM SPEC CULT: NORMAL
MONOCYTES NFR BLD: 0.73 K/UL (ref 0.1–1.2)
MONOCYTES NFR BLD: 11 % (ref 3–12)
NEUTROPHILS NFR BLD: 38 % (ref 36–65)
NEUTS SEG NFR BLD: 2.49 K/UL (ref 1.5–8.1)
NRBC BLD-RTO: 0 PER 100 WBC
PLATELET # BLD AUTO: 390 K/UL (ref 138–453)
PMV BLD AUTO: 9.3 FL (ref 8.1–13.5)
POTASSIUM SERPL-SCNC: 4.4 MMOL/L (ref 3.7–5.3)
RBC # BLD AUTO: 2.98 M/UL (ref 3.95–5.11)
RBC # BLD: ABNORMAL 10*6/UL
RBC # BLD: ABNORMAL 10*6/UL
SERVICE CMNT-IMP: NORMAL
SODIUM SERPL-SCNC: 140 MMOL/L (ref 136–145)
SPECIMEN DESCRIPTION: NORMAL
WBC OTHER # BLD: 6.5 K/UL (ref 3.5–11.3)

## 2024-12-05 PROCEDURE — 6370000000 HC RX 637 (ALT 250 FOR IP): Performed by: STUDENT IN AN ORGANIZED HEALTH CARE EDUCATION/TRAINING PROGRAM

## 2024-12-05 PROCEDURE — 99232 SBSQ HOSP IP/OBS MODERATE 35: CPT | Performed by: PSYCHIATRY & NEUROLOGY

## 2024-12-05 PROCEDURE — 97116 GAIT TRAINING THERAPY: CPT

## 2024-12-05 PROCEDURE — 36415 COLL VENOUS BLD VENIPUNCTURE: CPT

## 2024-12-05 PROCEDURE — 6360000002 HC RX W HCPCS: Performed by: INTERNAL MEDICINE

## 2024-12-05 PROCEDURE — 6370000000 HC RX 637 (ALT 250 FOR IP): Performed by: NURSE PRACTITIONER

## 2024-12-05 PROCEDURE — 85025 COMPLETE CBC W/AUTO DIFF WBC: CPT

## 2024-12-05 PROCEDURE — 2580000003 HC RX 258: Performed by: INTERNAL MEDICINE

## 2024-12-05 PROCEDURE — 97110 THERAPEUTIC EXERCISES: CPT

## 2024-12-05 PROCEDURE — 97535 SELF CARE MNGMENT TRAINING: CPT

## 2024-12-05 PROCEDURE — 97530 THERAPEUTIC ACTIVITIES: CPT

## 2024-12-05 PROCEDURE — 99233 SBSQ HOSP IP/OBS HIGH 50: CPT | Performed by: NURSE PRACTITIONER

## 2024-12-05 PROCEDURE — 80048 BASIC METABOLIC PNL TOTAL CA: CPT

## 2024-12-05 PROCEDURE — 99232 SBSQ HOSP IP/OBS MODERATE 35: CPT | Performed by: INTERNAL MEDICINE

## 2024-12-05 PROCEDURE — 99232 SBSQ HOSP IP/OBS MODERATE 35: CPT | Performed by: STUDENT IN AN ORGANIZED HEALTH CARE EDUCATION/TRAINING PROGRAM

## 2024-12-05 PROCEDURE — 2060000000 HC ICU INTERMEDIATE R&B

## 2024-12-05 PROCEDURE — 2580000003 HC RX 258: Performed by: NURSE PRACTITIONER

## 2024-12-05 PROCEDURE — 6360000002 HC RX W HCPCS: Performed by: NURSE PRACTITIONER

## 2024-12-05 PROCEDURE — 6370000000 HC RX 637 (ALT 250 FOR IP): Performed by: PHYSICIAN ASSISTANT

## 2024-12-05 RX ADMIN — LINEZOLID 600 MG: 600 INJECTION, SOLUTION INTRAVENOUS at 01:25

## 2024-12-05 RX ADMIN — CARBAMAZEPINE 300 MG: 200 TABLET ORAL at 09:44

## 2024-12-05 RX ADMIN — TROSPIUM CHLORIDE 20 MG: 20 TABLET, FILM COATED ORAL at 17:42

## 2024-12-05 RX ADMIN — ASPIRIN 81 MG: 81 TABLET, COATED ORAL at 09:45

## 2024-12-05 RX ADMIN — CARBAMAZEPINE 300 MG: 200 TABLET ORAL at 19:59

## 2024-12-05 RX ADMIN — MEROPENEM 1000 MG: 1 INJECTION INTRAVENOUS at 00:17

## 2024-12-05 RX ADMIN — MIDODRINE HYDROCHLORIDE 10 MG: 5 TABLET ORAL at 12:47

## 2024-12-05 RX ADMIN — QUETIAPINE FUMARATE 50 MG: 25 TABLET ORAL at 09:45

## 2024-12-05 RX ADMIN — ENOXAPARIN SODIUM 40 MG: 100 INJECTION SUBCUTANEOUS at 09:46

## 2024-12-05 RX ADMIN — DIVALPROEX SODIUM 250 MG: 250 TABLET, FILM COATED, EXTENDED RELEASE ORAL at 09:46

## 2024-12-05 RX ADMIN — MEROPENEM 1000 MG: 1 INJECTION INTRAVENOUS at 12:49

## 2024-12-05 RX ADMIN — SODIUM CHLORIDE, PRESERVATIVE FREE 10 ML: 5 INJECTION INTRAVENOUS at 09:45

## 2024-12-05 RX ADMIN — SODIUM CHLORIDE, PRESERVATIVE FREE 10 ML: 5 INJECTION INTRAVENOUS at 19:59

## 2024-12-05 RX ADMIN — ATORVASTATIN CALCIUM 40 MG: 40 TABLET, FILM COATED ORAL at 19:59

## 2024-12-05 RX ADMIN — QUETIAPINE FUMARATE 50 MG: 25 TABLET ORAL at 19:59

## 2024-12-05 RX ADMIN — DIVALPROEX SODIUM 250 MG: 250 TABLET, FILM COATED, EXTENDED RELEASE ORAL at 19:58

## 2024-12-05 RX ADMIN — PANTOPRAZOLE SODIUM 40 MG: 40 TABLET, DELAYED RELEASE ORAL at 06:08

## 2024-12-05 RX ADMIN — FLUDROCORTISONE ACETATE 0.1 MG: 0.1 TABLET ORAL at 09:45

## 2024-12-05 RX ADMIN — TROSPIUM CHLORIDE 20 MG: 20 TABLET, FILM COATED ORAL at 06:08

## 2024-12-05 RX ADMIN — MIDODRINE HYDROCHLORIDE 10 MG: 5 TABLET ORAL at 09:45

## 2024-12-05 ASSESSMENT — ENCOUNTER SYMPTOMS
EYE PAIN: 0
EYE DISCHARGE: 0
ABDOMINAL PAIN: 0
SHORTNESS OF BREATH: 0
COLOR CHANGE: 0
CHEST TIGHTNESS: 0
APNEA: 0
FACIAL SWELLING: 0
PHOTOPHOBIA: 0
STRIDOR: 0
CHOKING: 0
EYE REDNESS: 0

## 2024-12-05 NOTE — PROGRESS NOTES
previous visit.    No results found for this or any previous visit.    No results found for this or any previous visit.    No results found for this or any previous visit.    No results found for this or any previous visit.    No results found for this or any previous visit.    No results found for this or any previous visit.    Results for orders placed during the hospital encounter of 11/29/24    CT HEAD WO CONTRAST    Narrative  EXAMINATION:  CTA OF THE HEAD AND NECK WITH CONTRAST; CT OF THE HEAD WITHOUT CONTRAST    11/29/2024 7:21 pm    TECHNIQUE:  CTA of the head and neck was performed with the administration of intravenous  contrast. Multiplanar reformatted images are provided for review.  MIP images  are provided for review. Stenosis of the internal carotid arteries measured  using NASCET criteria. Automated exposure control, iterative reconstruction,  and/or weight based adjustment of the mA/kV was utilized to reduce the  radiation dose to as low as reasonably achievable.; CT of the head was  performed without the administration of intravenous contrast. Automated  exposure control, iterative reconstruction, and/or weight based adjustment of  the mA/kV was utilized to reduce the radiation dose to as low as reasonably  achievable. Noncontrast CT of the head with reconstructed 2-D images are also  provided for review.    COMPARISON:  CT brain 07/26/2024 and CT angiogram head and neck 10/12/2015.    HISTORY:  ORDERING SYSTEM PROVIDED HISTORY:  AMS  TECHNOLOGIST PROVIDED HISTORY:  AMS  Decision Support Exception - unselect if not a suspected or confirmed  emergency medical condition->Emergency Medical Condition (MA); ORDERING  SYSTEM PROVIDED HISTORY:  AMS  TECHNOLOGIST PROVIDED HISTORY:  AMS  Decision Support Exception - unselect if not a suspected or confirmed  emergency medical condition->Emergency Medical Condition (MA)    FINDINGS:    CT HEAD:    BRAIN/VENTRICLES:  Encephalomalacia within the left temporal  lobe is  unchanged.  There is diffuse cerebral volume loss.  There is no acute infarct  or acute intracranial hemorrhage present.  There is no mass effect or midline  shift present.  Periventricular hypoattenuation is present.    ORBITS: Limited evaluation of the orbits is unremarkable.    SINUSES:  The paranasal sinuses and mastoid air cells are clear.    SOFT TISSUES/SKULL: No lytic or blastic osseous lesions are identified.      CTA NECK:    AORTIC ARCH/ARCH VESSELS: No dissection or arterial injury.  No significant  stenosis of the brachiocephalic or subclavian arteries.    CAROTID ARTERIES: No dissection, arterial injury, or hemodynamically  significant stenosis by NASCET criteria.    VERTEBRAL ARTERIES: No dissection, arterial injury, or significant stenosis.    SOFT TISSUES: The lung apices are clear.  No cervical or superior mediastinal  lymphadenopathy.  The larynx and pharynx are unremarkable.  No acute  abnormality of the salivary and thyroid glands.    BONES: No lytic or blastic osseous lesions are identified.      CTA HEAD:    ANTERIOR CIRCULATION: The intracranial segments of the internal carotid  arteries are normal.  There is no significant stenosis or aneurysm  identified.  The anterior and middle cerebral arteries are normal in  appearance.  No significant stenosis or aneurysm is identified.    POSTERIOR CIRCULATION: The distal vertebral arteries are normal in  appearance.  The basilar artery is normal.  No significant stenosis or  aneurysm is identified.  The posterior cerebral arteries are normal in  appearance.    OTHER: No dural venous sinus thrombosis on this non-dedicated study.    Impression  1. Stable appearance of the brain without acute intracranial process  identified.  2. Unremarkable CT angiogram of the head and neck.      I personally reviewed all of the above medications, clinical laboratory, imaging and other diagnostic tests.     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 At baseline patient has severe dementia. She has a history of recurrent UTI's.   She presented with worsening altered mental status and lethargy and admitted for complicated UTI. P on due to increased confusion from her baseline.  Carbamazepine level was supra-therapeutic and this was held. Depakote continued. Neurology asked to consult for elevated carbamazepine levels. No breakthrough seizures have been witnessed or reported.    Has chronic hypotension- treated with pressors and midodrine.   ID is on board for UTI.      Plan:      Re-check level <2. Resumed Carbamazepine at 300mg BID. Re-check level in 2-3 days ( order placed)   VPA level subtherapeutic- continue current dose, no breakthrough seizures witnessed  Seizure precautions  ID recs  DNR-CCA  Primary team to manage co-morbidities and metabolic derangements  HCT and CTA head reviewed, no need for further neuro-imaging  Nothing further to add from neuro standpoint. Will sign off. Please call with questions.       This note is created with the assistance of a speech-recognition program. While intending to generate a document that actually reflects the content of the visit, the document can still have some errors including those of syntax and sound a- like substitutions which may escape proofreading.  In such instances, actual meaning can be extrapolated by contextual derivation.

## 2024-12-05 NOTE — PROGRESS NOTES
Physical Therapy  Facility/Department: [address] ONC/MED SURG  Physical Therapy Treatment Note    Name: Yasmine Morrison  : 1945  MRN: [id number]  Date of Service: 2024    Discharge Recommendations:  Therapy recommended at discharge   PT Equipment Recommendations  Equipment Needed: No      Patient Diagnosis(es): The primary encounter diagnosis was Urinary tract infection without hematuria, site unspecified. Diagnoses of KATE (acute kidney injury) (HCC), Ken coma scale total score 13-15, at arrival to emergency department, Shortness of breath, and Cardiomyopathy, unspecified type (HCC) were also pertinent to this visit.  Past Medical History:  has a past medical history of Anxiety, Convulsion (HCC), Dementia (HCC), Encephalopathy, Herpes, Hx of blood clots, Hyperlipidemia, Left bundle branch block, MRSA (methicillin resistant Staphylococcus aureus), Parkinson's disease (HCC), Seizures (HCC), Under care of service provider, and Vitamin D deficiency.  Past Surgical History:  has a past surgical history that includes Abscess Drainage (Right, 2013); Ureter stent placement (Left, 2022); Cystoscopy (Left, 2022); Cystoscopy (Left, 2022); Hysterectomy; Splenectomy; other surgical history; Ureteroscopy (Left, 2022); Ureter surgery (Left, 2022); and Lithotripsy (Left, 2022).    Assessment  Body Structures, Functions, Activity Limitations Requiring Skilled Therapeutic Intervention: Decreased functional mobility ;Decreased strength;Decreased safe awareness;Decreased endurance;Decreased balance  Assessment: Pt presents with generalized weakness. Pt not able to ambulate this date due to poor standing balance. Bed mobility and transfers completed with maxA. Pt unsafe to return home due to overall deconditioning, balance, and gait defiits, and decreased endurance. Pt requires 24hr assistance due to cognition, limited mobility and strength. Pt will benefit from continued therapy to  improve deficits and progress function.  Therapy Prognosis: Good  Activity Tolerance  Activity Tolerance: Patient limited by endurance;Treatment limited secondary to decreased cognition    Plan  Physical Therapy Plan  General Plan:  (4-5x/week)  Current Treatment Recommendations: Strengthening, Balance training, Functional mobility training, Transfer training, Endurance training, Gait training, Therapeutic activities, Safety education & training, Patient/Caregiver education & training  Safety Devices  Type of Devices: Call light within reach, Gait belt, Left in chair, Chair alarm in place, Patient at risk for falls, Nurse notified, All fall risk precautions in place  Restraints  Restraints Initially in Place: No    Restrictions  Restrictions/Precautions  Restrictions/Precautions: Up as Tolerated, Fall Risk  Required Braces or Orthoses?: No  Position Activity Restriction  Other Position/Activity Restrictions: dementia and parkinson's at baseline     Subjective  General  Patient assessed for rehabilitation services?: Yes  Response To Previous Treatment: Patient with no complaints from previous session.  Family/Caregiver Present: Yes (daughter)  Follows Commands: Within Functional Limits  General Comment  Comments: RN and pt agreeable to treatment session. Pt supine in bed with daughter and RN present bed side. No c/o pain. Pt pleasant and cooperative.  Subjective  Subjective: Pt retired to recliner at end of session with call light in reach and chair alarm set.          Cognition   Orientation  Overall Orientation Status: Impaired  Orientation Level: Oriented to person;Disoriented to time;Disoriented to situation;Disoriented to place  Cognition  Overall Cognitive Status: Exceptions  Arousal/Alertness: Appears intact  Following Commands: Inconsistently follows commands  Attention Span: Attends with cues to redirect  Memory: Decreased short term memory;Decreased long term memory  Safety Judgement: Decreased awareness of

## 2024-12-05 NOTE — PROGRESS NOTES
Occupational Therapy  Occupational Therapy Daily Treatment Note  Facility/Department: [address] ONC/MED SURG   Patient Name: Yasmine Morrison        MRN: [id number]    : 1945    Date of Service: 2024    Discharge Recommendations  Discharge Recommendations: Patient would benefit from continued therapy after discharge       Chief Complaint   Patient presents with    Urinary Tract Infection     Altered Mental Status     Past Medical History:  has a past medical history of Anxiety, Convulsion (HCC), Dementia (HCC), Encephalopathy, Herpes, Hx of blood clots, Hyperlipidemia, Left bundle branch block, MRSA (methicillin resistant Staphylococcus aureus), Parkinson's disease (HCC), Seizures ([facility]), Under care of service provider, and Vitamin D deficiency.  Past Surgical History:  has a past surgical history that includes Abscess Drainage (Right, 2013); Ureter stent placement (Left, 2022); Cystoscopy (Left, 2022); Cystoscopy (Left, 2022); Hysterectomy; Splenectomy; other surgical history; Ureteroscopy (Left, 2022); Ureter surgery (Left, 2022); and Lithotripsy (Left, 2022).    Assessment  Performance deficits / Impairments: Decreased functional mobility ;Decreased ADL status;Decreased endurance;Decreased balance;Decreased safe awareness;Decreased cognition;Decreased strength;Decreased coordination;Decreased high-level IADLs  Assessment: Pt limited by above deficits impacting pts functional mobility/ADL performance. Pt is expected to require skilled OT services to increase safety and promote increased independence t/o ADL/IADLs and functional mobility tasks.  Prognosis: Fair  Activity Tolerance  Activity Tolerance: Treatment limited secondary to decreased cognition  Safety Devices  Type of Devices: Call light within reach;Gait belt;Patient at risk for falls;Nurse notified;All fall risk precautions in place;Left in bed;Bed alarm in place  Restraints  Restraints Initially in Place:  No    Restrictions/Precautions  Restrictions/Precautions  Activity Level: Up with Assist  Required Braces or Orthoses?: No  Position Activity Restriction  Other Position/Activity Restrictions: dementia and parkinson's at baseline    Subjective  General  Patient assessed for rehabilitation services?: Yes  Response to previous treatment: Patient with no complaints from previous session  Family / Caregiver Present: No  General Comment  Comments: RN ok'd pt for OT this date. Pt agreeable and pleasant t/o session, baseline of dementia, pleasantly confused t/o. Pt denied pain.      Objective  Orientation  Overall Orientation Status: Impaired  Orientation Level: Oriented to person;Disoriented to time;Disoriented to situation;Disoriented to place  Cognition  Overall Cognitive Status: Exceptions  Arousal/Alertness: Appears intact  Following Commands: Inconsistently follows commands  Attention Span: Attends with cues to redirect  Memory: Impaired  Safety Judgement: Decreased awareness of need for assistance;Decreased awareness of need for safety  Problem Solving: Assistance required to implement solutions;Assistance required to identify errors made;Assistance required to correct errors made;Decreased awareness of errors  Insights: Decreased awareness of deficits  Initiation: Requires cues for all  Sequencing: Requires cues for all  Cognition Comment: Baseline dementia. Minimally verbal throughout session. Stated \"feeling jumbled\" when asked orientation questions.    Activities of Daily Living  Grooming: Stand by assistance;Verbal cueing;Increased time to complete  Grooming Skilled Clinical Factors: Pt sat EOB and was provided hair brush. Pt utilized RUE to brush hair when given one prompt. Increased time to complete.  Putting On/Taking Off Footwear: Maximum assistance  Putting On/Taking Off Footwear Skilled Clinical Factors: MAX A to adjust socks    Balance  Balance  Sitting: Without support (Pt sat EOB unsupported ~15 mins

## 2024-12-05 NOTE — PROGRESS NOTES
Physician Progress Note      PATIENT:               NIMCO SPRAGUE  [facility] #:                  [id number]  :                       1945  ADMIT DATE:       2024 5:11 PM  DISCH DATE:  RESPONDING  PROVIDER #:        Armin Rodrigues DO          QUERY TEXT:    Patient admitted with UTI and encephalopathy. Noted documentation of sepsis in   Cardiology consult note on  and card consult note on  and   In   order to support the diagnosis of sepsis, please include additional clinical   indicators in your documentation.  Or please document if the diagnosis of   sepsis has been ruled out after further study    The medical record reflects the following:  Risk Factors: UTI, age of 79, hypotension,  Clinical Indicators: wbc 11.1>6.5 lactic acid 2.8 temperature 36.4  Treatment: ICU monitoring, Zyvox IV    Thank You Germán MEYER BSN CCDS  Options provided:  -- Sepsis present as evidenced by, Please document evidence.  -- Sepsis was ruled out after study  -- Other - I will add my own diagnosis  -- Disagree - Not applicable / Not valid  -- Disagree - Clinically unable to determine / Unknown  -- Refer to Clinical Documentation Reviewer    PROVIDER RESPONSE TEXT:    Sepsis was ruled out after study.    Query created by: Amy Adame on 12/3/2024 7:24 AM      Electronically signed by:  Armin Rodrigues DO 2024 6:46 PM

## 2024-12-05 NOTE — PLAN OF CARE
Problem: Safety - Adult  Goal: Free from fall injury  12/5/2024 0019 by Kate Hernandez RN  Outcome: Progressing     Problem: Discharge Planning  Goal: Discharge to home or other facility with appropriate resources  12/5/2024 0019 by Kate Hernandez RN  Outcome: Progressing     Problem: Skin/Tissue Integrity  Goal: Absence of new skin breakdown  Description: 1.  Monitor for areas of redness and/or skin breakdown  2.  Assess vascular access sites hourly  3.  Every 4-6 hours minimum:  Change oxygen saturation probe site  4.  Every 4-6 hours:  If on nasal continuous positive airway pressure, respiratory therapy assess nares and determine need for appliance change or resting period.  12/5/2024 0019 by Kate Hernandez RN  Outcome: Progressing     Problem: Cardiovascular - Adult  Goal: Maintains optimal cardiac output and hemodynamic stability  12/5/2024 0019 by Kate Hernandez RN  Outcome: Progressing     Problem: Cardiovascular - Adult  Goal: Absence of cardiac dysrhythmias or at baseline  12/5/2024 0019 by Kate Hernandez RN  Outcome: Progressing     Problem: Skin/Tissue Integrity - Adult  Goal: Skin integrity remains intact  12/5/2024 0019 by Kate Hernandez RN  Outcome: Progressing     Problem: Skin/Tissue Integrity - Adult  Goal: Oral mucous membranes remain intact  12/5/2024 0019 by Kate Hernandez RN  Outcome: Progressing     Problem: Musculoskeletal - Adult  Goal: Return mobility to safest level of function  12/5/2024 0019 by Kate Hernandez RN  Outcome: Progressing

## 2024-12-05 NOTE — PROGRESS NOTES
[facility]   Progress Note                   Date:   12/5/2024  Patient name: Yasmine Morrison  Date of admission:  11/29/2024  5:11 PM  MRN:   [id number]  YOB: 1945  PCP: Sravani Wu DO      Subjective:       Patient seen and examined at bedside.   Pt is confused.No family at bedside. Pt remains confused.        Medications:   Scheduled Meds:   fludrocortisone  0.1 mg Oral Daily    carBAMazepine  300 mg Oral BID    meropenem  1,000 mg IntraVENous Q12H    linezolid  600 mg IntraVENous Q12H    midodrine  10 mg Oral TID WC    aspirin  81 mg Oral Daily    QUEtiapine  50 mg Oral BID    pantoprazole  40 mg Oral QAM AC    trospium  20 mg Oral BID AC    sodium chloride flush  5-40 mL IntraVENous 2 times per day    enoxaparin  40 mg SubCUTAneous Daily    divalproex  250 mg Oral BID    atorvastatin  40 mg Oral Nightly     Continuous Infusions:   sodium chloride Stopped (12/02/24 1033)         CBC:   Recent Labs     12/03/24  0416 12/04/24  0550 12/05/24  0456   WBC 9.8 8.6 6.5   HGB 10.2* 9.2* 9.7*    373 390     BMP:    Recent Labs     12/03/24  0416 12/04/24  0550 12/05/24  0456    137 140   K 3.4* 3.2* 4.4    110* 113*   CO2 20 18* 21   BUN 13 12 12   CREATININE 0.8 0.9 1.0*   GLUCOSE 119* 88 80          Lab Results   Component Value Date    CHOL 197 03/05/2021    TRIG 109 03/05/2021    HDL 70 03/05/2021     03/05/2021    VLDL NOT REPORTED 03/05/2021    CHOLHDLRATIO 2.8 03/05/2021      INR: No results for input(s): \"INR\" in the last 72 hours.    Objective:   Vitals: BP (!) 84/53   Pulse 87   Temp 97.7 °F (36.5 °C) (Temporal)   Resp 14   SpO2 92%       General appearance: alert and cooperative with exam, but confused   HEENT: Head: Normocephalic, no lesions, without obvious abnormality.  Neck: no JVD, trachea midline, no adenopathy  Lungs: Clear to auscultation  Heart: Regular rate and rhythm, s1/s2 auscultated, no murmurs  Abdomen: soft, non-tender, bowel

## 2024-12-05 NOTE — PROGRESS NOTES
PULMONARY PROGRESS NOTE      Patient:  Yasmine Morrison  YOB: 1945    MRN: [id number]     Acct: [id number]     Admit date: 11/29/2024    REASON FOR CONSULT:-COPD    Pt seen and Chart reviewed.  12/5/2024  Afebrile   Hemodynamically stable  On room air  No overnight issues reported    Subjective:   Review of Systems -   General ROS: Completed and except as mentioned above were negative   Psychological ROS:  Completed and except as mentioned above were negative  Allergy and Immunology ROS:  Completed and except as mentioned above were negative  Hematological and Lymphatic ROS:  Completed and except as mentioned above were negative  Respiratory ROS:  Completed and except as mentioned above were negative  Cardiovascular ROS:  Completed and except as mentioned above were negative  Gastrointestinal ROS: Completed and except as mentioned above were negative  Genito-Urinary ROS:  Completed and except as mentioned above were negative  Musculoskeletal ROS:  Completed and except as mentioned above were negative  Neurological ROS:  Completed and except as mentioned above were negative  Dermatological ROS:  Completed and except as mentioned above were negative      Diet:  ADULT DIET; Dysphagia - Soft and Bite Sized  Diet NPO      Medications:Current Inpatient    Scheduled Meds:   fludrocortisone  0.1 mg Oral Daily    carBAMazepine  300 mg Oral BID    midodrine  10 mg Oral TID WC    aspirin  81 mg Oral Daily    QUEtiapine  50 mg Oral BID    pantoprazole  40 mg Oral QAM AC    trospium  20 mg Oral BID AC    sodium chloride flush  5-40 mL IntraVENous 2 times per day    enoxaparin  40 mg SubCUTAneous Daily    divalproex  250 mg Oral BID    atorvastatin  40 mg Oral Nightly     Continuous Infusions:   sodium chloride Stopped (12/02/24 1033)     PRN Meds:potassium chloride **OR** potassium alternative oral replacement **OR** potassium chloride,  weakness.    Extremities:  The patient does not lower extremity edema, ulcerations, tenderness, varicosities or erythema.  Muscle size, tone and strength are normal.  No involuntary movements are noted.     Skin:  Warm and dry.  Good color, turgor and pigmentation. No lesions or scars.  No cyanosis or clubbing  Neurological/Psychiatric: The patient's general behavior, level of consciousness, thought content and emotional status is normal.          CBC:   Recent Labs     12/03/24 0416 12/04/24  0550 12/05/24  0456   WBC 9.8 8.6 6.5   HGB 10.2* 9.2* 9.7*    373 390     BMP:    Recent Labs     12/03/24 0416 12/04/24  0550 12/05/24  0456    137 140   K 3.4* 3.2* 4.4    110* 113*   CO2 20 18* 21   BUN 13 12 12   CREATININE 0.8 0.9 1.0*   GLUCOSE 119* 88 80     Calcium:  Recent Labs     12/05/24  0456   CALCIUM 8.2*     Ionized Calcium:Invalid input(s): \"IONCA\"  Magnesium:  Recent Labs     12/04/24  0550   MG 2.0     Phosphorus:No results for input(s): \"PHOS\" in the last 72 hours.  BNP:No results for input(s): \"BNP\" in the last 72 hours.  Glucose:  Recent Labs     12/02/24  1928   POCGLU 140*     HgbA1C: No results for input(s): \"LABA1C\" in the last 72 hours.  INR: No results for input(s): \"INR\" in the last 72 hours.  Hepatic: No results for input(s): \"ALKPHOS\", \"ALT\", \"AST\", \"BILITOT\", \"BILIDIR\", \"LABALBU\" in the last 72 hours.    Invalid input(s): \"PROT\"  Amylase and Lipase:No results for input(s): \"LACTA\", \"AMYLASE\" in the last 72 hours.  Lactic Acid: No results for input(s): \"LACTA\" in the last 72 hours.  CARDIAC ENZYMES:No results for input(s): \"CKTOTAL\", \"CKMB\", \"CKMBINDEX\", \"TROPONINI\" in the last 72 hours.  BNP: No results for input(s): \"BNP\" in the last 72 hours.  Lipids: No results for input(s): \"CHOL\", \"TRIG\", \"HDL\", \"LDL\" in the last 72 hours.    Invalid input(s): \"LDLCALC\"  ABGs: No results found for: \"PH\", \"PCO2\", \"PO2\", \"HCO3\", \"O2SAT\"  Thyroid:   Lab Results   Component Value Date/Time     TSH 0.40 12/02/2024 06:57 AM      Urinalysis: No results for input(s): \"BACTERIA\", \"BLOODU\", \"CLARITYU\", \"COLORU\", \"PHUR\", \"PROTEINU\", \"RBCUA\", \"SPECGRAV\", \"BILIRUBINUR\", \"NITRU\", \"WBCUA\", \"LEUKOCYTESUR\", \"GLUCOSEU\" in the last 72 hours.    CULTURES:          Assessment:    Principal Problem:    Urinary tract infection without hematuria  Active Problems:    Shortness of breath    ESBL (extended spectrum beta-lactamase) producing bacteria infection    History of extended-spectrum beta-lactamase producing Escherichia coli infection    Shock    Acute encephalopathy    Dementia with behavioral disturbance (HCC)    Elevated troponin    KATE (acute kidney injury) ([facility])    [place] coma scale total score 13-15  Resolved Problems:    UTI (urinary tract infection)  Patient transferred out of the ICU on 12/3/2024Sepsis due to UTI with shock  Bilateral nonobstructive renal calculi  COPD  Trace bilateral pleural effusion  Dementia  Parkinsonism  Seizures  GERD  DVT  Hypokalemia  Anemia, slightly worse    Plan:  Hemodynamically stable  On room air  On Zyvox and meropenem  Continue fludrocortisone  GI/DVT prophylaxis; Lovenox/Protonix  Physical/Occupational Therapy    Nino Kidd MD  Pulmonary & Critical Care  12/5/2024    Pulmonary & Critical Care

## 2024-12-05 NOTE — CARE COORDINATION
PS Dr. Victoria inquiring about patient's status and informing of denied SNF authorization. Dr. Victoria informed that a stress test is planned for tomorrow.

## 2024-12-05 NOTE — PROGRESS NOTES
[facility]  Office: [phone]  Felipe Galicia DO, Duglas Archuleta DO, Andrew Abdi DO, Carlos Alberto Garcia DO, Jona Barrett MD, Saima Funes MD, Shanta Yates MD, Evie Whitaker MD,  David Rangel MD, Venu White MD, Young Shoemaker MD,  Sallie Santiago DO, Jenae Victoria MD, Marcus Garrido MD, David Galicia DO, Joanna Winn MD,  Gonzalo Hunter DO, Irish Escobar MD, Yvonne Shepard MD, Dorinda Birch MD, Torres Barrios MD,  Oscar Aguilar MD, Chepe Figueroa MD, Shanthi Harden MD, Kwesi Silva MD, Omer Sullivan MD, Ryne Dewey MD, Herb Yen DO, Blake Azevedo MD, Shirley Waterhouse, CNP,  Fela Mills, CNP, Herb Mandujano, CNP,  Heydi Smith, MARIA LUZ, Ashlie Orellana, CNP, Kimberly Bryan, CNP, Alona Dickinson, CNP, Robyn Guzman, CNP, Juliette Iraheta PAMandyC, WING SchmidtC, Beata Schwarz, CNP, Arturo Nayak, CNP,  Corine Casey, CNP, Vikki Estrada, CNP,  Sumi Lopez, CNP, Delia Zuleta, CNP         [facility]   IN-PATIENT SERVICE   [facility]    Progress Note    12/5/2024    1:12 PM    Name:   Yasmine Morrison  MRN:     [id number]     Acct:      [id number]   Room:   0449/0449-01   Day:  6  Admit Date:  11/29/2024  5:11 PM    PCP:   Sravani Wu DO  Code Status:  DNR-CCA    Subjective:     C/C:   Chief Complaint   Patient presents with    Urinary Tract Infection     Altered Mental Status     Interval History Status: not changed.     Patient was eating at the time of exam.  She keeps on saying I dnt remember and not able to answer any orientation answers.  Bp remains low but she is not symptomatic.    Brief History:     79-year-old female with known medical history of recurrent UTIs, dementia, seizure disorder, GERD, COPD, DVT presents to the hospital on 11/29 with altered mental status and slurred speech.  Patient was recently evaluated in the ER and was noted to have DNRCC paperwork and family denied workup.  Patient was discharged  injury) (HCC) 12/3/2024 Yes    [place] coma scale total score 13-15 12/4/2024 Yes       Plan:        Complicated UTI- urine culture showing Enterococcus faecium, gram-negative rods, low count, Zyvox and meropenem planned till 12/5.    Elevated troponin- echo showing abnormal diastolic function with hypokinesis of anterior septal and inferior septal area, cardiology recommending ischemia workup, plan for stress test    KATE-resolved after hydration    Seizure disorder on Depakote, tegretol was restarted. Follow up on repeat levels, neuro signed off    Dementia on Seroquel and Prozac    Chronic hypotension on Florinef, midodrine, weaned off solu cortef    CODE STATUS DNR CCA    Discharge planning started  Replace electrolytes as needed      Jenae Victoria MD  12/5/2024  1:12 PM

## 2024-12-05 NOTE — PROGRESS NOTES
.          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   Hx of non obstructive kidney stone         Discussion / summary of stay / plan of care/ Recommendations:     HENCE:   Zyvox and  meropenem both till 12/5 -plan to go to SNF - antibiotics completed -   hold anxiolytic  Mentally more awake but very forgetful - chronic hypotension on midrine  DNR CCA  ID signing off    Infection Control Recommendations   [place] Precautions  Contact Isolation       Antimicrobial Stewardship Recommendations   Simplification of therapy  Targeted therapy      History of Present Illness:   Initial history:  Yasmine Morrison is a 79 y.o.-year-old female that presents with worsening altered mental status and lethargy and admitted for complicated UTI.  Patient presented to the ED on 11/28 for concerns of a possible stroke and patient arrived with DNR CC paperwork. Per chart review the daughter confirmed her code status and further workup was declined. Patient was diagnosed with UTI and discharged on keflex. Patient also has a history of advanced dementia and Parkinson. Patient returned to ED last night due to increased confusion from her baseline. She has a history of complicated UTI and in October 2022 she developed multidrug resistant ESBL treated with meropenem.    In the ED her vitals signs were stable. Labs were remarkable for hypokalemia 3.1. UA was showed moderate leukocytes,  WBC, moderate leukocyte esterase, and few    Social Connections: Unknown (12/10/2019)    Social Connection and Isolation Panel [NHANES]     Frequency of Communication with Friends and Family: Three times a week     Frequency of Social Gatherings with Friends and Family: Twice a week     Attends Oriental orthodox Services: Never     Active Member of Clubs or Organizations: No     Attends Club or Organization Meetings: Never     Marital Status: Not on file   Intimate Partner Violence: Unknown (2/22/2024)    Received from [facility]     Fear of Current or Ex-Partner: Not on file     Emotionally Abused: Not on file     Physically Abused: Not on file     Sexually Abused: Not on file     Physically or Sexually Abused: Not on file   Housing Stability: Unknown (4/29/2024)    Housing Stability Vital Sign     Unable to Pay for Housing in the Last Year: Not on file     Number of Places Lived in the Last Year: Not on file     Unstable Housing in the Last Year: No       Family History:     Family History   Problem Relation Age of Onset    Asthma Mother     No Known Problems Father       Medical Decision Making:   I have independently reviewed/ordered the following labs:    CBC with Differential:   Recent Labs     12/04/24  0550 12/05/24  0456   WBC 8.6 6.5   HGB 9.2* 9.7*   HCT 29.8* 30.9*    390   LYMPHOPCT 38 45*   MONOPCT 11 11   EOSPCT 0* 5*     BMP:  Recent Labs     12/04/24  0550 12/05/24  0456    140   K 3.2* 4.4   * 113*   CO2 18* 21   BUN 12 12   CREATININE 0.9 1.0*   MG 2.0  --      Hepatic Function Panel:   No results for input(s): \"LABALBU\", \"BILIDIR\", \"IBILI\", \"BILITOT\", \"ALKPHOS\", \"ALT\", \"AST\" in the last 72 hours.    Invalid input(s): \"PROT\"    No results for input(s): \"RPR\" in the last 72 hours.  No results for input(s): \"HIV\" in the last 72 hours.  No results for input(s): \"BC\" in the last 72 hours.  Lab Results   Component Value Date/Time    CREATININE 1.0 12/05/2024 04:56 AM     GLUCOSE 80 12/05/2024 04:56 AM    GLUCOSE 93 03/12/2012 03:18 PM       Detailed results:        Thank you for allowing us to participate in the care of this patient.Please call with questions.    This note is created with the assistance of a speech recognition program.  While intending to generate adocument that actually reflects the content of the visit, the document can still have some errors including those of syntax and sound a like substitutions which may escape proof reading.  It such instances, actual meaningcan be extrapolated by contextual diversion.    Jesu Li  Office: [phone]  Perfect serve / office [phone]      I have discussed the care of the patient, including pertinent history and exam findings,  with the resident., student or CNP- I have seen and examined the patient and the key elements of all parts of the encounter have been performed by me.  I have decided the assessment, plan and orders as documented by the resident.student or CNP    Carey Cobb, Infectious Diseases

## 2024-12-05 NOTE — PROGRESS NOTES
endurance;Treatment limited secondary to decreased cognition    Plan  Physical Therapy Plan  General Plan:  (4-5x/week)  Current Treatment Recommendations: Strengthening, Balance training, Functional mobility training, Transfer training, Endurance training, Gait training, Therapeutic activities, Safety education & training, Patient/Caregiver education & training  Safety Devices  Type of Devices: Call light within reach, Gait belt, Patient at risk for falls, Nurse notified, All fall risk precautions in place, Left in bed, Bed alarm in place  Restraints  Restraints Initially in Place: No    Restrictions  Restrictions/Precautions  Restrictions/Precautions: Up as Tolerated, Fall Risk  Activity Level: Up with Assist  Required Braces or Orthoses?: No  Position Activity Restriction  Other Position/Activity Restrictions: dementia and parkinson's at baseline     Subjective  General  Patient assessed for rehabilitation services?: Yes  Response To Previous Treatment: Patient with no complaints from previous session.  Family/Caregiver Present: No (daughter)  Follows Commands: Within Functional Limits  General Comment  Comments: Pt agreeable to treatment session. Pt supine in bed with daughter and RN present bed side. No c/o pain. Pt pleasant and cooperative.  Subjective  Subjective: Pt retired to recliner at end of session with call light in reach and chair alarm set.        Cognition   Orientation  Overall Orientation Status: Impaired  Orientation Level: Oriented to person;Disoriented to time;Disoriented to situation;Disoriented to place  Cognition  Overall Cognitive Status: Exceptions  Arousal/Alertness: Appears intact  Following Commands: Inconsistently follows commands  Attention Span: Attends with cues to redirect  Memory: Decreased short term memory;Decreased long term memory  Safety Judgement: Decreased awareness of need for assistance;Decreased awareness of need for safety  Problem Solving: Assistance required to  implement solutions;Assistance required to identify errors made;Assistance required to correct errors made;Decreased awareness of errors  Insights: Impaired  Initiation: Requires cues for all  Sequencing: Requires cues for all  Cognition Comment: Baseline dementia. Minimally verbal throughout session. Pt will say \"NO\" to a lot of activities asked of her but will perform    Objective  Bed mobility  Rolling to Left: Maximum assistance  Rolling to Right: Maximum assistance  Supine to Sit: Moderate assistance  Sit to Supine: Minimal assistance  Scooting: Stand by assistance  Bed Mobility Comments: HOB elevated for supine to sit.Pt required assstance of trunk and BLE to get EOB. Pt able to perform sit to supine in flat bed with SBA.  Transfers  Sit to Stand: Minimal Assistance;Moderate Assistance  Stand to Sit: Minimal Assistance  Comment: Assessed with RW for 2 trials with pt requiring Renetta for 1st trial and modA for 2nd trial.  Ambulation  Surface: Level tile  Device: Rolling Walker  Assistance: Minimal assistance  Quality of Gait: flexed posture, head down, flexed knees, slow, unsteady, when turning to right will begin to push walker away  Gait Deviations: Slow Anju;Shuffles  Distance: 30ft x2  Comments: Did not assess due to pt's poor standing balance.  More Ambulation?: No  Stairs/Curb  Stairs?: No     Balance  Posture: Fair  Sitting - Static: Fair  Sitting - Dynamic: Fair  Standing - Static: Poor;-;Fair  Standing - Dynamic: Poor;+  Comments: Standing assessed with RW with pt posterioly leaning back requiring modA to stay upright with pt having to be set down at EOB. After some rest pt performed standing again with improvement.  Exercise Treatment: Seated LE exercise program: Long Arc Quads, hip abduction/adduction, heel/toe raises, and marches. Reps: 20  Static Standing Balance Exercises: Standing tolerance x2  60sec face to face       AM-PAC - Mobility    AM-PAC Basic Mobility - Inpatient   How much help is  needed turning from your back to your side while in a flat bed without using bedrails?: A Little  How much help is needed moving from lying on your back to sitting on the side of a flat bed without using bedrails?: A Little  How much help is needed moving to and from a bed to a chair?: A Lot  How much help is needed standing up from a chair using your arms?: A Lot  How much help is needed walking in hospital room?: A Lot  How much help is needed climbing 3-5 steps with a railing?: A Lot  AM-PAC Inpatient Mobility Raw Score : 14  AM-PAC Inpatient T-Scale Score : 38.1  Mobility Inpatient CMS 0-100% Score: 61.29  Mobility Inpatient CMS G-Code Modifier : CL       Goals  Short Term Goals  Time Frame for Short Term Goals: 14 visits  Short Term Goal 1: Pt CGA with bed mobility  Short Term Goal 2: Pt CGA with transfers with proper safety awareness  Short Term Goal 3: Pt ambulate 25 ft with rw and CGA, no loss of balance  Short Term Goal 4: Pt participate in 25 minutes exercises and activities to improve strength and balance  Short Term Goal 5: Pt propel w/c 50 ft with SBA  Patient Goals   Patient Goals : To regain strength       Education  Patient Education  Education Given To: Patient  Education Provided: Role of Therapy;Plan of Care  Education Method: Verbal;Demonstration  Barriers to Learning: Cognition  Education Outcome: Continued education needed      Therapy Time   Individual Concurrent Group Co-treatment   Time In 1447         Time Out 1530         Minutes 43         Timed Code Treatment Minutes: 38 Minutes       GENARO OLGUIN PTA

## 2024-12-06 ENCOUNTER — APPOINTMENT (OUTPATIENT)
Age: 79
DRG: 690 | End: 2024-12-06
Payer: COMMERCIAL

## 2024-12-06 ENCOUNTER — APPOINTMENT (OUTPATIENT)
Dept: NUCLEAR MEDICINE | Age: 79
DRG: 690 | End: 2024-12-06
Payer: COMMERCIAL

## 2024-12-06 VITALS
RESPIRATION RATE: 10 BRPM | HEIGHT: 67 IN | OXYGEN SATURATION: 98 % | DIASTOLIC BLOOD PRESSURE: 51 MMHG | SYSTOLIC BLOOD PRESSURE: 114 MMHG | BODY MASS INDEX: 19.57 KG/M2 | TEMPERATURE: 97.9 F | HEART RATE: 77 BPM

## 2024-12-06 LAB
STRESS BASELINE DIAS BP: 57 MMHG
STRESS BASELINE HR: 82 BPM
STRESS BASELINE SYS BP: 102 MMHG
STRESS ESTIMATED WORKLOAD: 1 METS
STRESS PEAK DIAS BP: 57 MMHG
STRESS PEAK SYS BP: 102 MMHG
STRESS PERCENT HR ACHIEVED: 70 %
STRESS POST PEAK HR: 97 BPM
STRESS RATE PRESSURE PRODUCT: 9894 BPM*MMHG
STRESS TARGET HR: 139 BPM

## 2024-12-06 PROCEDURE — 93017 CV STRESS TEST TRACING ONLY: CPT

## 2024-12-06 PROCEDURE — 99232 SBSQ HOSP IP/OBS MODERATE 35: CPT | Performed by: INTERNAL MEDICINE

## 2024-12-06 PROCEDURE — 6360000002 HC RX W HCPCS: Performed by: NURSE PRACTITIONER

## 2024-12-06 PROCEDURE — A9500 TC99M SESTAMIBI: HCPCS | Performed by: NURSE PRACTITIONER

## 2024-12-06 PROCEDURE — 99239 HOSP IP/OBS DSCHRG MGMT >30: CPT | Performed by: STUDENT IN AN ORGANIZED HEALTH CARE EDUCATION/TRAINING PROGRAM

## 2024-12-06 PROCEDURE — 6370000000 HC RX 637 (ALT 250 FOR IP): Performed by: STUDENT IN AN ORGANIZED HEALTH CARE EDUCATION/TRAINING PROGRAM

## 2024-12-06 PROCEDURE — 6370000000 HC RX 637 (ALT 250 FOR IP): Performed by: PHYSICIAN ASSISTANT

## 2024-12-06 PROCEDURE — 6370000000 HC RX 637 (ALT 250 FOR IP): Performed by: NURSE PRACTITIONER

## 2024-12-06 PROCEDURE — 2580000003 HC RX 258: Performed by: NURSE PRACTITIONER

## 2024-12-06 PROCEDURE — 99233 SBSQ HOSP IP/OBS HIGH 50: CPT | Performed by: SURGERY

## 2024-12-06 PROCEDURE — 3430000000 HC RX DIAGNOSTIC RADIOPHARMACEUTICAL: Performed by: NURSE PRACTITIONER

## 2024-12-06 PROCEDURE — 93018 CV STRESS TEST I&R ONLY: CPT | Performed by: INTERNAL MEDICINE

## 2024-12-06 PROCEDURE — 78452 HT MUSCLE IMAGE SPECT MULT: CPT

## 2024-12-06 RX ORDER — DIVALPROEX SODIUM 250 MG/1
250 TABLET, FILM COATED, EXTENDED RELEASE ORAL 2 TIMES DAILY
Qty: 30 TABLET | Refills: 3 | Status: SHIPPED | OUTPATIENT
Start: 2024-12-06

## 2024-12-06 RX ORDER — SODIUM CHLORIDE 0.9 % (FLUSH) 0.9 %
10 SYRINGE (ML) INJECTION PRN
Status: DISCONTINUED | OUTPATIENT
Start: 2024-12-06 | End: 2024-12-06 | Stop reason: HOSPADM

## 2024-12-06 RX ORDER — PANTOPRAZOLE SODIUM 40 MG/1
40 TABLET, DELAYED RELEASE ORAL
Qty: 30 TABLET | Refills: 3 | Status: SHIPPED | OUTPATIENT
Start: 2024-12-07

## 2024-12-06 RX ORDER — TETRAKIS(2-METHOXYISOBUTYLISOCYANIDE)COPPER(I) TETRAFLUOROBORATE 1 MG/ML
13.8 INJECTION, POWDER, LYOPHILIZED, FOR SOLUTION INTRAVENOUS
Status: COMPLETED | OUTPATIENT
Start: 2024-12-06 | End: 2024-12-06

## 2024-12-06 RX ORDER — REGADENOSON 0.08 MG/ML
0.4 INJECTION, SOLUTION INTRAVENOUS
Status: COMPLETED | OUTPATIENT
Start: 2024-12-06 | End: 2024-12-06

## 2024-12-06 RX ORDER — ATROPINE SULFATE 0.1 MG/ML
0.5 INJECTION INTRAVENOUS EVERY 5 MIN PRN
Status: DISCONTINUED | OUTPATIENT
Start: 2024-12-06 | End: 2024-12-06 | Stop reason: ALTCHOICE

## 2024-12-06 RX ORDER — SODIUM CHLORIDE 9 MG/ML
500 INJECTION, SOLUTION INTRAVENOUS CONTINUOUS PRN
Status: DISCONTINUED | OUTPATIENT
Start: 2024-12-06 | End: 2024-12-06 | Stop reason: ALTCHOICE

## 2024-12-06 RX ORDER — SODIUM CHLORIDE 0.9 % (FLUSH) 0.9 %
5-40 SYRINGE (ML) INJECTION PRN
Status: DISCONTINUED | OUTPATIENT
Start: 2024-12-06 | End: 2024-12-06 | Stop reason: ALTCHOICE

## 2024-12-06 RX ORDER — METOPROLOL TARTRATE 1 MG/ML
5 INJECTION, SOLUTION INTRAVENOUS EVERY 5 MIN PRN
Status: DISCONTINUED | OUTPATIENT
Start: 2024-12-06 | End: 2024-12-06 | Stop reason: ALTCHOICE

## 2024-12-06 RX ORDER — ALBUTEROL SULFATE 90 UG/1
2 INHALANT RESPIRATORY (INHALATION) PRN
Status: DISCONTINUED | OUTPATIENT
Start: 2024-12-06 | End: 2024-12-06 | Stop reason: ALTCHOICE

## 2024-12-06 RX ORDER — ATORVASTATIN CALCIUM 40 MG/1
40 TABLET, FILM COATED ORAL NIGHTLY
Qty: 30 TABLET | Refills: 3 | Status: SHIPPED | OUTPATIENT
Start: 2024-12-06

## 2024-12-06 RX ORDER — MIDODRINE HYDROCHLORIDE 10 MG/1
10 TABLET ORAL
Qty: 90 TABLET | Refills: 3 | Status: SHIPPED | OUTPATIENT
Start: 2024-12-06

## 2024-12-06 RX ORDER — TETRAKIS(2-METHOXYISOBUTYLISOCYANIDE)COPPER(I) TETRAFLUOROBORATE 1 MG/ML
38 INJECTION, POWDER, LYOPHILIZED, FOR SOLUTION INTRAVENOUS
Status: COMPLETED | OUTPATIENT
Start: 2024-12-06 | End: 2024-12-06

## 2024-12-06 RX ORDER — AMINOPHYLLINE 25 MG/ML
50 INJECTION, SOLUTION INTRAVENOUS PRN
Status: DISCONTINUED | OUTPATIENT
Start: 2024-12-06 | End: 2024-12-06 | Stop reason: ALTCHOICE

## 2024-12-06 RX ORDER — CARBAMAZEPINE 200 MG/1
300 TABLET ORAL 2 TIMES DAILY
Qty: 90 TABLET | Refills: 3 | Status: SHIPPED | OUTPATIENT
Start: 2024-12-06

## 2024-12-06 RX ORDER — NITROGLYCERIN 0.4 MG/1
0.4 TABLET SUBLINGUAL EVERY 5 MIN PRN
Status: DISCONTINUED | OUTPATIENT
Start: 2024-12-06 | End: 2024-12-06 | Stop reason: ALTCHOICE

## 2024-12-06 RX ADMIN — TETRAKIS(2-METHOXYISOBUTYLISOCYANIDE)COPPER(I) TETRAFLUOROBORATE 38 MILLICURIE: 1 INJECTION, POWDER, LYOPHILIZED, FOR SOLUTION INTRAVENOUS at 10:15

## 2024-12-06 RX ADMIN — SODIUM CHLORIDE, PRESERVATIVE FREE 10 ML: 5 INJECTION INTRAVENOUS at 08:20

## 2024-12-06 RX ADMIN — SODIUM CHLORIDE, PRESERVATIVE FREE 10 ML: 5 INJECTION INTRAVENOUS at 08:13

## 2024-12-06 RX ADMIN — QUETIAPINE FUMARATE 50 MG: 25 TABLET ORAL at 08:13

## 2024-12-06 RX ADMIN — MIDODRINE HYDROCHLORIDE 10 MG: 5 TABLET ORAL at 08:14

## 2024-12-06 RX ADMIN — MIDODRINE HYDROCHLORIDE 10 MG: 5 TABLET ORAL at 12:28

## 2024-12-06 RX ADMIN — REGADENOSON 0.4 MG: 0.08 INJECTION, SOLUTION INTRAVENOUS at 10:15

## 2024-12-06 RX ADMIN — CARBAMAZEPINE 300 MG: 200 TABLET ORAL at 08:13

## 2024-12-06 RX ADMIN — ENOXAPARIN SODIUM 40 MG: 100 INJECTION SUBCUTANEOUS at 08:12

## 2024-12-06 RX ADMIN — ASPIRIN 81 MG: 81 TABLET, COATED ORAL at 08:13

## 2024-12-06 RX ADMIN — SODIUM CHLORIDE, PRESERVATIVE FREE 10 ML: 5 INJECTION INTRAVENOUS at 09:42

## 2024-12-06 RX ADMIN — TETRAKIS(2-METHOXYISOBUTYLISOCYANIDE)COPPER(I) TETRAFLUOROBORATE 13.8 MILLICURIE: 1 INJECTION, POWDER, LYOPHILIZED, FOR SOLUTION INTRAVENOUS at 08:20

## 2024-12-06 RX ADMIN — SODIUM CHLORIDE, PRESERVATIVE FREE 10 ML: 5 INJECTION INTRAVENOUS at 10:15

## 2024-12-06 RX ADMIN — FLUDROCORTISONE ACETATE 0.1 MG: 0.1 TABLET ORAL at 08:15

## 2024-12-06 RX ADMIN — DIVALPROEX SODIUM 250 MG: 250 TABLET, FILM COATED, EXTENDED RELEASE ORAL at 08:13

## 2024-12-06 NOTE — PROGRESS NOTES
Physician Progress Note      PATIENT:               NIMCO SPRAGUE  CSN #:                  [id number]  :                       1945  ADMIT DATE:       2024 5:11 PM  DISCH DATE:  RESPONDING  PROVIDER #:        Jenae Victoria MD          QUERY TEXT:    Pt admitted with complicated UTI  and has shock documented in  progress   notes.  critical care query response on  notes hypotension without shock.   sepsis ruled out on . per review patient was on iv Levophed from =12/3   and was given NS bolus of 1500 ml then iv fluid at 75 per hour SBP.  If   possible, please document in progress notes and discharge summary further   specificity regarding the type of shock:    The medical record reflects the following:  Risk Factors: age of 79, UTI, KATE  Clinical Indicators: Pt admitted with complicated UTI  and has shock   documented in  progress notes.  critical care query response on  notes   hypotension without shock. sepsis ruled out on . per review patient was   on iv Levophed from =12/3 and was given NS bolus of 1500 ml then iv fluid   at 75 per hour SBP.  Treatment: IV fluids, ICU monitoring, iv Levophed    Thank You Germán MEYER BSN CCDS  Options provided:  -- Hypovolemic Shock  -- Hypotension without shock confirmed  -- Other - I will add my own diagnosis  -- Disagree - Not applicable / Not valid  -- Disagree - Clinically unable to determine / Unknown  -- Refer to Clinical Documentation Reviewer    PROVIDER RESPONSE TEXT:    Hypotension without shock is confirmed    Query created by: Amy Adame on 2024 7:25 AM      Electronically signed by:  Jenae Victoria MD 2024 10:18 AM

## 2024-12-06 NOTE — PROGRESS NOTES
Physical Therapy        Physical Therapy Cancel Note      DATE: 2024    NAME: Yasmine Morrison  MRN: [id number]   : 1945      Patient not seen this date for Physical Therapy due to:    Testing: Pt out of room at stress test, will check back this afternoon as time permits.       Electronically signed by Ronaldo Armenta PT on 2024 at 10:36 AM

## 2024-12-06 NOTE — DISCHARGE SUMMARY
[facility]  Office: [phone]  Felipe Galicia DO, Duglas Archuleta DO, Andrew Abdi DO, Carlos Alberto Garcia DO, Jona Barrett MD, Saima Funes MD, Shanta Yates MD, Evie Whitaker MD,  David Rangel MD, Venu White MD, Young Shoemaker MD,  Sallie Santiago DO, Jenae Victoria MD, Marcus Garrido MD, David Galicia DO, Joanna Winn MD,  Gonzalo Hunter DO, Irish Escobar MD, Yvonne Shepard MD, Dorinda Birch MD, Torres Barrios MD,  Oscar Aguilar MD, Chepe Figueroa MD, Shanthi Harden MD, Kwesi Silva MD, Omer Sullivan MD, Ryne Dewey MD, Herb Yen DO, Blake Azevedo MD, Shirley Waterhouse, CNP,  Fela Mills, CNP, Hebr Mandujano, CNP,  Heydi Smith, MARIA LUZ, Ashlie Orellana, CNP, Kimberly Bryan, CNP, Alona Dickinson, CNP, Robyn Guzman, CNP, Juliette Iraheta PA-C, Twyla Cerda PA-C, Beata Schwarz, CNP, Arturo Nayak, CNP,  Corine Casey, CNP, Vikki Estrada, CNP,  Sumi Lopez, CNP, Delia Zuleta, CNP         [facility]   IN-PATIENT SERVICE   [facility]    Discharge Summary     Patient ID: Yasmine Morrison  :  1945   MRN: [id number]     ACCOUNT:  [id number]   Patient's PCP: Sravani Wu DO  Admit Date: 2024   Discharge Date: 2024     Length of Stay: 7  Code Status:  DNR-CCA  Admitting Physician: Jenae Victoria MD  Discharge Physician: Jenae Victoria MD     Active Discharge Diagnoses:     Hospital Problem Lists:  Principal Problem:    Urinary tract infection without hematuria  Active Problems:    Shortness of breath    ESBL (extended spectrum beta-lactamase) producing bacteria infection    History of extended-spectrum beta-lactamase producing Escherichia coli infection    Shock    Acute encephalopathy    Dementia with behavioral disturbance (HCC)    Elevated troponin    KATE (acute kidney injury) ([facility])    [place] coma scale total score 13-15  Resolved Problems:    UTI (urinary tract infection)      Admission Condition:   these medications      carBAMazepine 200 MG tablet  Commonly known as: TEGRETOL  Take 1.5 tablets by mouth 2 times daily  What changed:   when to take this  Another medication with the same name was removed. Continue taking this medication, and follow the directions you see here.            CONTINUE taking these medications      aspirin 81 MG EC tablet     DESMOpressin 0.2 MG tablet  Commonly known as: DDAVP     Ensure Complete Liqd  Take 1 Can by mouth 3 times daily     fludrocortisone 0.1 MG tablet  Commonly known as: FLORINEF  Take 1 tablet by mouth daily     midodrine 10 MG tablet  Commonly known as: PROAMATINE  Take 1 tablet by mouth 3 times daily (with meals)     mirabegron 50 MG Tb24  Commonly known as: Myrbetriq  Take 50 mg by mouth daily     * Misc. Devices Misc  1 each by Does not apply route daily Hospital bed &     * Misc. Devices Misc  1 each by Does not apply route daily Manual wheelchair     QUEtiapine 25 MG tablet  Commonly known as: SEROQUEL  Take 2 tablets by mouth 2 times daily           * This list has 2 medication(s) that are the same as other medications prescribed for you. Read the directions carefully, and ask your doctor or other care provider to review them with you.                STOP taking these medications      cephALEXin 500 MG capsule  Commonly known as: KEFLEX     nirmatrelvir/ritonavir 300/100 20 x 150 MG & 10 x 100MG Tbpk  Commonly known as: Paxlovid (300/100)     omeprazole 20 MG delayed release capsule  Commonly known as: PRILOSEC  Replaced by: pantoprazole 40 MG tablet     tamsulosin 0.4 MG capsule  Commonly known as: FLOMAX               Where to Get Your Medications        These medications were sent to [facility] [address] [facility] - P [phone] - F [phone]  [facility] [address] [facility] [address]      Phone: [phone]   atorvastatin 40 MG tablet  carBAMazepine 200 MG tablet  divalproex 250 MG extended release tablet  midodrine 10  MG tablet  pantoprazole 40 MG tablet         No discharge procedures on file.    Time Spent on discharge is  35 mins in patient examination, evaluation, counseling as well as medication reconciliation, prescriptions for required medications, discharge plan and follow up.    Electronically signed by   Jenae Victoria MD  12/6/2024  2:35 PM      Thank you Sravani Alvarez DO for the opportunity to be involved in this patient's care.

## 2024-12-06 NOTE — PLAN OF CARE
Problem: Safety - Adult  Goal: Free from fall injury  12/6/2024 1615 by Roberto Madrid RN  Outcome: Progressing  12/6/2024 0350 by Kate Hernandez RN  Outcome: Progressing     Problem: Discharge Planning  Goal: Discharge to home or other facility with appropriate resources  12/6/2024 1615 by Roberto Madrid RN  Outcome: Progressing  12/6/2024 0350 by Kate Hernandez RN  Outcome: Progressing     Problem: Skin/Tissue Integrity  Goal: Absence of new skin breakdown  Description: 1.  Monitor for areas of redness and/or skin breakdown  2.  Assess vascular access sites hourly  3.  Every 4-6 hours minimum:  Change oxygen saturation probe site  4.  Every 4-6 hours:  If on nasal continuous positive airway pressure, respiratory therapy assess nares and determine need for appliance change or resting period.  12/6/2024 1615 by Roberto Madrid RN  Outcome: Progressing  12/6/2024 0350 by Kate Hernandez RN  Outcome: Progressing     Problem: Cardiovascular - Adult  Goal: Maintains optimal cardiac output and hemodynamic stability  12/6/2024 1615 by Roberto Madrid RN  Outcome: Progressing  12/6/2024 0350 by Kate Hernandez RN  Outcome: Progressing  Goal: Absence of cardiac dysrhythmias or at baseline  12/6/2024 1615 by Roberto Madrid RN  Outcome: Progressing  12/6/2024 0350 by Kate Hernandez RN  Outcome: Progressing     Problem: Skin/Tissue Integrity - Adult  Goal: Skin integrity remains intact  12/6/2024 1615 by Roberto Madrid RN  Outcome: Progressing  12/6/2024 0350 by Kate Hernandez RN  Outcome: Progressing  Goal: Oral mucous membranes remain intact  12/6/2024 1615 by Roberto Madrid RN  Outcome: Progressing  12/6/2024 0350 by Kate Hernandez RN  Outcome: Progressing     Problem: Musculoskeletal - Adult  Goal: Return mobility to safest level of function  12/6/2024 1615 by Roberto Madrid RN  Outcome: Progressing  12/6/2024 0350 by Kate Hernandez RN  Outcome: Progressing     Problem: Genitourinary -

## 2024-12-06 NOTE — PLAN OF CARE
Problem: Safety - Adult  Goal: Free from fall injury  12/6/2024 0350 by Kate Hernandez RN  Outcome: Progressing     Problem: Discharge Planning  Goal: Discharge to home or other facility with appropriate resources  12/6/2024 0350 by Kate Hernandez RN  Outcome: Progressing     Problem: Skin/Tissue Integrity  Goal: Absence of new skin breakdown  Description: 1.  Monitor for areas of redness and/or skin breakdown  2.  Assess vascular access sites hourly  3.  Every 4-6 hours minimum:  Change oxygen saturation probe site  4.  Every 4-6 hours:  If on nasal continuous positive airway pressure, respiratory therapy assess nares and determine need for appliance change or resting period.  12/6/2024 0350 by Kate Hernandez RN  Outcome: Progressing     Problem: Cardiovascular - Adult  Goal: Maintains optimal cardiac output and hemodynamic stability  12/6/2024 0350 by Kate Hernandez RN  Outcome: Progressing     Problem: Cardiovascular - Adult  Goal: Absence of cardiac dysrhythmias or at baseline  12/6/2024 0350 by Kate Hernandez RN  Outcome: Progressing     Problem: Skin/Tissue Integrity - Adult  Goal: Skin integrity remains intact  12/6/2024 0350 by Kate Hernandez RN  Outcome: Progressing     Problem: Skin/Tissue Integrity - Adult  Goal: Oral mucous membranes remain intact  12/6/2024 0350 by Kate Hernandez RN  Outcome: Progressing     Problem: Musculoskeletal - Adult  Goal: Return mobility to safest level of function  12/6/2024 0350 by Kate Hernandez RN  Outcome: Progressing     Problem: Genitourinary - Adult  Goal: Absence of urinary retention  12/6/2024 0350 by Kate Hernandez RN  Outcome: Progressing     Problem: Genitourinary - Adult  Goal: Urinary catheter remains patent  12/6/2024 0350 by Kate Hernandez RN  Outcome: Progressing     Problem: Pain  Goal: Verbalizes/displays adequate comfort level or baseline comfort level  12/6/2024 0350 by Kate Hernandez RN  Outcome: Progressing

## 2024-12-06 NOTE — PROGRESS NOTES
PULMONARY PROGRESS NOTE      Patient:  Yasmine Morrison  YOB: 1945    MRN: [id number]     Acct: [id number]     Admit date: 11/29/2024    REASON FOR CONSULT:-COPD    Pt seen and Chart reviewed.  12/6/2024  Afebrile   Hemodynamically stable  On room air  No overnight issues reported    Subjective:   Review of Systems -   General ROS: Completed and except as mentioned above were negative   Psychological ROS:  Completed and except as mentioned above were negative  Allergy and Immunology ROS:  Completed and except as mentioned above were negative  Hematological and Lymphatic ROS:  Completed and except as mentioned above were negative  Respiratory ROS:  Completed and except as mentioned above were negative  Cardiovascular ROS:  Completed and except as mentioned above were negative  Gastrointestinal ROS: Completed and except as mentioned above were negative  Genito-Urinary ROS:  Completed and except as mentioned above were negative  Musculoskeletal ROS:  Completed and except as mentioned above were negative  Neurological ROS:  Completed and except as mentioned above were negative  Dermatological ROS:  Completed and except as mentioned above were negative      Diet:  ADULT DIET; Dysphagia - Soft and Bite Sized      Medications:Current Inpatient    Scheduled Meds:   fludrocortisone  0.1 mg Oral Daily    carBAMazepine  300 mg Oral BID    midodrine  10 mg Oral TID WC    aspirin  81 mg Oral Daily    QUEtiapine  50 mg Oral BID    pantoprazole  40 mg Oral QAM AC    trospium  20 mg Oral BID AC    sodium chloride flush  5-40 mL IntraVENous 2 times per day    enoxaparin  40 mg SubCUTAneous Daily    divalproex  250 mg Oral BID    atorvastatin  40 mg Oral Nightly     Continuous Infusions:   sodium chloride Stopped (12/02/24 1033)     PRN Meds:sodium chloride flush, sodium chloride flush, potassium chloride **OR** potassium alternative oral  for input(s): \"BACTERIA\", \"BLOODU\", \"CLARITYU\", \"COLORU\", \"PHUR\", \"PROTEINU\", \"RBCUA\", \"SPECGRAV\", \"BILIRUBINUR\", \"NITRU\", \"WBCUA\", \"LEUKOCYTESUR\", \"GLUCOSEU\" in the last 72 hours.    CULTURES:          Assessment:    Principal Problem:    Urinary tract infection without hematuria  Active Problems:    Shortness of breath    ESBL (extended spectrum beta-lactamase) producing bacteria infection    History of extended-spectrum beta-lactamase producing Escherichia coli infection    Shock    Acute encephalopathy    Dementia with behavioral disturbance ([facility])    Elevated troponin    KATE (acute kidney injury) ([facility])    Ken coma scale total score 13-15  Resolved Problems:    UTI (urinary tract infection)  Patient transferred out of the ICU on 12/3/2024Sepsis due to UTI with shock  Bilateral nonobstructive renal calculi  COPD  Trace bilateral pleural effusion  Dementia  Parkinsonism  Seizures  GERD  DVT  Hypokalemia  Anemia, slightly worse    Plan:  Hemodynamically stable  On room air  On Zyvox and meropenem  Continue fludrocortisone  GI/DVT prophylaxis; Lovenox/Protonix  Physical/Occupational Therapy    Nino Kidd MD  Pulmonary & Critical Care  12/6/2024    Pulmonary & Critical Care

## 2024-12-06 NOTE — PROGRESS NOTES
Writer clarified with Dr. Victoria if it was okay for the patient to have her morning PO medications. Esme said it was okay for patient to have her PO medications this AM.

## 2024-12-06 NOTE — PROGRESS NOTES
[facility]   Progress Note                   Date:   12/6/2024  Patient name: Yasmine Morrison  Date of admission:  11/29/2024  5:11 PM  MRN:   [id number]  YOB: 1945  PCP: Sravani Wu DO      Subjective:       Patient seen and examined at bedside in stress test , pleasantly confused .       Medications:   Scheduled Meds:   fludrocortisone  0.1 mg Oral Daily    carBAMazepine  300 mg Oral BID    midodrine  10 mg Oral TID WC    aspirin  81 mg Oral Daily    QUEtiapine  50 mg Oral BID    pantoprazole  40 mg Oral QAM AC    trospium  20 mg Oral BID AC    sodium chloride flush  5-40 mL IntraVENous 2 times per day    enoxaparin  40 mg SubCUTAneous Daily    divalproex  250 mg Oral BID    atorvastatin  40 mg Oral Nightly     Continuous Infusions:   sodium chloride Stopped (12/02/24 1033)         CBC:   Recent Labs     12/04/24  0550 12/05/24  0456   WBC 8.6 6.5   HGB 9.2* 9.7*    390     BMP:    Recent Labs     12/04/24  0550 12/05/24  0456    140   K 3.2* 4.4   * 113*   CO2 18* 21   BUN 12 12   CREATININE 0.9 1.0*   GLUCOSE 88 80          Lab Results   Component Value Date    CHOL 197 03/05/2021    TRIG 109 03/05/2021    HDL 70 03/05/2021     03/05/2021    VLDL NOT REPORTED 03/05/2021    CHOLHDLRATIO 2.8 03/05/2021      INR: No results for input(s): \"INR\" in the last 72 hours.    Objective:   Vitals: BP (!) 102/59   Pulse 82   Temp 98.3 °F (36.8 °C) (Oral)   Resp 18   SpO2 98%       General appearance: alert and cooperative with exam, but confused   HEENT: Head: Normocephalic, no lesions, without obvious abnormality.  Neck: no JVD, trachea midline, no adenopathy  Lungs: Clear to auscultation  Heart: Regular rate and rhythm, s1/s2 auscultated, no murmurs  Abdomen: soft, non-tender, bowel sounds active  Extremities: no edema  Neurologic: not done       EKG:   Results for orders placed or performed during the hospital encounter of 11/29/24   EKG 12 Lead   Result  Value Ref Range    Ventricular Rate 81 BPM    Atrial Rate 81 BPM    P-R Interval 206 ms    QRS Duration 128 ms    Q-T Interval 420 ms    QTc Calculation (Bazett) 487 ms    P Axis 13 degrees    R Axis 35 degrees    T Axis 161 degrees    Narrative    Normal sinus rhythm  Left bundle branch block  Abnormal ECG  When compared with ECG of 20-DEC-2022 12:02,  Nonspecific T wave abnormality now evident in Inferior leads       Echo:    Results for orders placed or performed during the hospital encounter of 05/15/22   ECHO Complete 2D W Doppler W Color   Result Value Ref Range    Left Ventricular Ejection Fraction 55     LVEF MODALITY ECHO     Narrative    Transthoracic Echocardiography Report (TTE)     Patient Name CELESTINE      Date of Study               05/17/2022                NIMCO PEACOCK      Date of      1945  Gender                      Female   Birth      Age          76 year(s)  Race                              Room Number  0426        Height:                     67 inch, 170.18 cm      Corporate ID [id number]    Weight:                     135 pounds, 61.2 kg   #      Patient Acct [id number]   BSA:          1.71 m^2      BMI:      21.14   #                                                              kg/m^2      MR #         [id number]     Sonographer                 Lorri Mcfadden      Accession #  [id number]  Interpreting Physician      Orlando Easton      Fellow                   Referring Nurse                            Practitioner      Interpreting             Referring Physician         DEMARCO MORENO *   Fellow     Type of Study      TTE procedure:2D Echocardiogram, M-Mode, Doppler, Color Doppler.     Procedure Date  Date: 05/17/2022 Start: 11:17 AM    Study Location: [facility]    History / Tech. Comments:  NSTEMI, Closed Compression Fx    Patient Status: Inpatient    Height: 67 inches Weight: 135 pounds BSA: 1.71 m^2 BMI: 21.14 kg/m^2    CONCLUSIONS    Summary  Technically difficult  study, tachycardia noted  Normal LV size and wall thickness.  Abnormal septal wall motion  Normal LV systolic function with LVEF >55%.  Normal RV size and function. RV systolic pressure 48 mmHg  LA and RA appears normal in size.  No obvious significant structural valvular abnormality noted.  No significant valvular stenosis or regurgitation noted.  Normal aortic root dimension.  No significant pericardial effusion noted.  No obvious intra-cardiac mass or shunt noted.  IVC not well visualized    Signature  ----------------------------------------------------------------------------   Electronically signed by Lorri Mcfadden(Sonographer) on 05/17/2022 02:58   PM  ----------------------------------------------------------------------------    ----------------------------------------------------------------------------   Electronically signed by Orlando Easton(Interpreting physician) on   05/18/2022 11:56 AM  ----------------------------------------------------------------------------  FINDINGS  Left Atrium  Left atrium is normal in size.  Left Ventricle  Left ventricle is normal in size with normal systolic function globally.  Calculated ejection fraction by bi-plane Anna's method is 62%  Evidence of moderate diastolic dysfunction.  Right Atrium  Right atrium is normal in size.  Right Ventricle  Normal right ventricular size and function.  TAPSE measures 2.1 cm  Mitral Valve  Mild thickening of the mitral leaflets without stenosis.  Mild mitral regurgitation.  Aortic Valve  Aortic valve is trileaflet.  Aortic sclerosis without stenosis.  No aortic insufficiency.  Tricuspid Valve  No tricuspid regurgitation was seen.  Mild tricuspid regurgitation.  Estimated right ventricular systolic pressure is 48 mmHg.  Pulmonic Valve  The pulmonic valve is normal in structure.  Pericardial Effusion  No significant pericardial effusion is seen.    Miscellaneous  Normal aortic root dimension.    M-mode / 2D Measurements &

## 2024-12-06 NOTE — PROGRESS NOTES
[facility]  Office: [phone]  Felipe Galicia DO, Duglas Archuleta DO, Andrew Abdi DO, Carlos Alberto Garcia DO, Jona Barrett MD, Saima Funes MD, Shanta Yates MD, Evie Whitaker MD,  David Rangel MD, Venu White MD, Young Shoemaker MD,  Sallie Santiago DO, Jenae Victoria MD, Marcus Garrido MD, David Galicia DO, Joanna Winn MD,  Gonzalo Hunter DO, Irish Escobar MD, Yvonne Shepard MD, Dorinda Birch MD, Torres Barrios MD,  Oscar Aguilar MD, Chepe Figueroa MD, Shanthi Harden MD, Kwesi Silva MD, Omer Sullivan MD, Ryne Dewey MD, Herb Yen DO, Blake Azevedo MD, Shirley Waterhouse, CNP,  Fela Mills, CNP, Herb Mandujano, CNP,  Heydi Smith, MARIA LUZ, Ashlie Orellana, CNP, Kimberly Bryan, CNP, Alona Dickinson, CNP, Robyn Guzman, CNP, WING BurgerC, WING SchmidtC, Beata Schwarz, CNP, Arturo Nayak, CNP,  Corine Casey, CNP, Vikki Estrada, CNP,  Sumi Lopez, CNP, Delia Zuleta, CNP         [facility]   IN-PATIENT SERVICE   [facility]    Progress Note    12/6/2024    2:33 PM    Name:   Yasmine Morrison  MRN:     [id number]     Acct:      [id number]   Room:   0449/0449-01   Day:  7  Admit Date:  11/29/2024  5:11 PM    PCP:   Sravani Wu DO  Code Status:  DNR-CCA    Subjective:     C/C:   Chief Complaint   Patient presents with    Urinary Tract Infection     Altered Mental Status     Interval History Status: not changed.     Patient was seen in the room.  No chest pain.  She returned from stress test.  No other acute events overnight.    Brief History:     79-year-old female with known medical history of recurrent UTIs, dementia, seizure disorder, GERD, COPD, DVT presents to the hospital on 11/29 with altered mental status and slurred speech.  Patient was recently evaluated in the ER and was noted to have DNRCC paperwork and family denied workup.  Patient was discharged home on Keflex.  Patient returned to ER with worsening     * (Principal) Urinary tract infection without hematuria 12/1/2024 Yes    Shortness of breath 12/2/2024 Yes    ESBL (extended spectrum beta-lactamase) producing bacteria infection 11/29/2024 Yes    History of extended-spectrum beta-lactamase producing Escherichia coli infection 11/29/2024 Yes    Shock 12/3/2024 Yes    Acute encephalopathy 11/30/2024 Yes    Dementia with behavioral disturbance (HCC) 11/29/2024 Yes    Elevated troponin 11/30/2024 Yes    KATE (acute kidney injury) (HCC) 12/3/2024 Yes    [place] coma scale total score 13-15 12/4/2024 Yes       Plan:        Complicated UTI- urine culture showing Enterococcus faecium, gram-negative rods, low count, Zyvox and meropenem completed 12/5.    Elevated troponin- echo showing abnormal diastolic function with hypokinesis of anterior septal and inferior septal area, stress test today    KATE-resolved after hydration    Seizure disorder on Depakote, tegretol was restarted. Follow up on repeat levels, neuro signed off    Dementia on Seroquel and Prozac    Chronic hypotension on Florinef, midodrine, weaned off solu cortef    CODE STATUS DNR CCA    Discharge planning started  Replace electrolytes as needed    Discharge pending cardio clearance    Jenae Victoria MD  12/6/2024  2:33 PM

## 2024-12-06 NOTE — PROGRESS NOTES
Patient discharged to [facility] and will be driven by her daughter. Discharged packet provided to patients daughter and all questions were answered. One peripheral IV removed with no complications. External catheter removed with no complications. Prescriptions sent to patients home pharmacy. All patient belongings gathered and taken with patient. Patient escorted off unit in her personal wheelchair with staff and patients daughter to a private vehicle.

## 2024-12-06 NOTE — PROGRESS NOTES
Comprehensive Nutrition Assessment    Type and Reason for Visit:  Initial, LOS    Nutrition Recommendations/Plan:   Start low kcal, high protein ONS once a day     Malnutrition Assessment:  Malnutrition Status:  Insufficient data (12/06/24 1626)    Context:  Chronic Illness     Findings of the 6 clinical characteristics of malnutrition:  Energy Intake:  Mild decrease in energy intake  Weight Loss:  No weight loss     Body Fat Loss:  Unable to assess     Muscle Mass Loss:  Unable to assess    Fluid Accumulation:  Unable to assess     Strength:  Not Performed    Nutrition Assessment:    Pt seen for LOS. Adm UTI. PMHx of dementia, HLD, Parkinson's. Pt working on lunch at visit. Noted edentulous. Pt reports doing well chewing and swallowing with soft diet. Noted 1-100% of meals per nursing documentation. Discussed ONS to supplement intake, pt reports typically drinking Ensure PTA.    Nutrition Related Findings:    Meds/labs reviewed. Wound Type: None       Current Nutrition Intake & Therapies:    Average Meal Intake: 1-25%, 51-75%, %, 26-50%  Average Supplements Intake: None Ordered  ADULT DIET; Dysphagia - Soft and Bite Sized    Anthropometric Measures:  Height: 170.2 cm (5' 7.01\")  Ideal Body Weight (IBW): 135 lbs (61 kg)    Current Body Weight: 56.7 kg (125 lb), 92.6 % IBW. Weight Source: Not specified  Current BMI (kg/m2): 19.6  BMI Categories: Normal Weight (BMI 18.5-24.9)    Estimated Daily Nutrient Needs:  Energy Requirements Based On: Kcal/kg  Weight Used for Energy Requirements: Current  Energy (kcal/day): 4180-6479 kcal/d  Weight Used for Protein Requirements: Current  Protein (g/day): 60-70 gm/d  Method Used for Fluid Requirements: 1 ml/kcal  Fluid (ml/day): or per MD    Nutrition Diagnosis:   Predicted inadequate energy intake related to cognitive or neurological impairment as evidenced by variable po intake    Nutrition Interventions:   Food and/or Nutrient Delivery: Continue Current Diet, Start
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
## 2024-12-06 NOTE — CARE COORDINATION
Spoke to Jemal at [facility] and was informed the patient has an approved respite auth. She stated the daughters plan is to transport the patient to [facility].     Spoke to Cheyanne to confirm transportation arrangements. She was agreeable to bring cloths and will transport her mother to [facility].

## 2024-12-07 LAB
MICROORGANISM SPEC CULT: NORMAL
SPECIMEN DESCRIPTION: NORMAL

## 2024-12-10 ENCOUNTER — TELEPHONE (OUTPATIENT)
Dept: FAMILY MEDICINE CLINIC | Age: 79
End: 2024-12-10

## 2024-12-10 NOTE — TELEPHONE ENCOUNTER
Care Transitions Initial Follow Up Call    Outreach made within 2 business days of discharge: Yes    Patient: Yasmine Morrison Patient : 1945   MRN: [id number]  Reason for Admission: UTI  Discharge Date: 24       Spoke with: patient    Discharge department/facility: [facility] Interactive Patient Contact:  Was patient able to fill all prescriptions:   Was patient instructed to bring all medications to the follow-up visit:   Is patient taking all medications as directed in the discharge summary?   Does patient understand their discharge instructions:   Does patient have questions or concerns that need addressed prior to 7-14 day follow up office visit:     Additional needs identified to be addressed with provider  Patient has been transferred to Skilled Nursing facility             Scheduled appointment with PCP within 7-14 days    Follow Up  No future appointments.    Hamilton Moreau MA

## 2024-12-12 ENCOUNTER — TELEMEDICINE (OUTPATIENT)
Dept: FAMILY MEDICINE CLINIC | Age: 79
End: 2024-12-12

## 2024-12-12 DIAGNOSIS — Z86.61 HISTORY OF ENCEPHALITIS: Primary | ICD-10-CM

## 2024-12-12 DIAGNOSIS — R53.1 WEAKNESS: ICD-10-CM

## 2024-12-12 DIAGNOSIS — G40.909 SEIZURE DISORDER (HCC): ICD-10-CM

## 2024-12-12 NOTE — PROGRESS NOTES
Yasmine Morrison, was evaluated through a synchronous (real-time) audio-video encounter. The patient (or guardian if applicable) is aware that this is a billable service, which includes applicable co-pays. This Virtual Visit was conducted with patient's (and/or legal guardian's) consent. Patient identification was verified, and a caregiver was present when appropriate.   The patient was located at Home: [address] [place] [address] [place] [address]  Provider was located at Home (Appt Dept State): [place]  Confirm you are appropriately licensed, registered, or certified to deliver care in the state where the patient is located as indicated above. If you are not or unsure, please re-schedule the visit: Yes, I confirm.     Yasmine Morrison (:  1945) is a Established patient, presenting virtually for evaluation of the following:      Below is the assessment and plan developed based on review of pertinent history, physical exam, labs, studies, and medications.     Assessment & Plan  History of encephalitis   Chronic, not at goal (unstable), continue current treatment plan         Seizure disorder (HCC)   Chronic, not at goal (unstable), continue current treatment plan         Weakness   Chronic, not at goal (unstable), continue current treatment plan           No follow-ups on file.       Subjective   HPI  Review of Systems   She is being seen today following recent hospital admission where she had an acute change in mental status and was found to be dehydrated with acute renal failure hypokalemia and urinary sepsis she is now doing better family does want comfort measures only secondary to her history of encephalitis and continuing to deteriorate with her overall health condition she is now doing a little better and is now stable again    Objective   Patient-Reported Vitals  No data recorded     Physical Exam  [INSTRUCTIONS:  \"[x]\" Indicates a positive item  \"[]\" Indicates a negative item  -- DELETE ALL ITEMS NOT

## 2024-12-17 NOTE — PROGRESS NOTES
Physician Progress Note      PATIENT:               NIMCO SPRAGUE  CSN #:                  [id number]  :                       1945  ADMIT DATE:       2024 5:11 PM  DISCH DATE:        2024 5:47 PM  RESPONDING  PROVIDER #:        Jenae Victoria MD          QUERY TEXT:    Pt admitted with UTI and AMS  and has Acute  encephalopathy documented in   discharge summary along with Dementia with behavioral disturbance.. noted   presented with worsening confusion with noted history of Dementia Treated in   hospital with iv antibiotics and   . If possible, please   document in progress notes and discharge summary further specificity regarding   the type of encephalopathy:    The medical record reflects the following:  Risk Factors: age of 79 Dementia , UTI  Clinical Indicators: Pt admitted with UTI and AMS  and has Acute    encephalopathy documented in discharge summary along with Dementia with   behavioral disturbance.. noted presented with worsening confusion with noted   history of Dementia Treated in hospital with iv antibiotics and safety     Treatment: iv antibiotics, Seroquel ,     Thank You Germán MEYER BSN CCDS  Options provided:  -- Metabolic encephalopathy on top of underlying dementia  -- encephalopathy ruled out pt has Dementia with behavioral disturbances  -- Other - I will add my own diagnosis  -- Disagree - Not applicable / Not valid  -- Disagree - Clinically unable to determine / Unknown  -- Refer to Clinical Documentation Reviewer    PROVIDER RESPONSE TEXT:    This patient has metabolic encephalopathy. on top of underlying dementia    Query created by: Amy Adame on 2024 6:13 AM      Electronically signed by:  Jenae Victoria MD 2024 2:04 PM

## 2024-12-23 ENCOUNTER — TELEPHONE (OUTPATIENT)
Dept: FAMILY MEDICINE CLINIC | Age: 79
End: 2024-12-23

## 2024-12-23 NOTE — TELEPHONE ENCOUNTER
Eileen from [facility] office of aging called, she calls every 30 days to have patients last office note faxed to [facility] for respease care. (P) [phone].p

## 2024-12-30 PROBLEM — R79.89 ELEVATED TROPONIN: Status: RESOLVED | Noted: 2024-11-30 | Resolved: 2024-12-30

## 2025-01-14 ENCOUNTER — PATIENT MESSAGE (OUTPATIENT)
Dept: FAMILY MEDICINE CLINIC | Age: 80
End: 2025-01-14

## 2025-03-31 ENCOUNTER — TELEPHONE (OUTPATIENT)
Dept: FAMILY MEDICINE CLINIC | Age: 80
End: 2025-03-31

## 2025-03-31 NOTE — TELEPHONE ENCOUNTER
Eileen from [facility] called to request office notes for patient weekend respite stay at [facility] , fax [phone]. Eileen was  informed of patient lov with provider  was 12/12/24 and states that office can give her a call back if appt is needed as office notes  sent should be more recent,

## 2025-04-17 RX ORDER — ATORVASTATIN CALCIUM 40 MG/1
40 TABLET, FILM COATED ORAL NIGHTLY
Qty: 30 TABLET | Refills: 3 | Status: SHIPPED | OUTPATIENT
Start: 2025-04-17

## 2025-04-17 NOTE — TELEPHONE ENCOUNTER
Yasmine Morrison is calling to request a refill on the following medication(s):    Last Visit Date (If Applicable):  12/12/2024    Next Visit Date:    Visit date not found    Medication Request:  Requested Prescriptions     Pending Prescriptions Disp Refills    atorvastatin (LIPITOR) 40 MG tablet 30 tablet 3     Sig: Take 1 tablet by mouth nightly

## 2025-05-05 RX ORDER — QUETIAPINE FUMARATE 25 MG/1
50 TABLET, FILM COATED ORAL 2 TIMES DAILY
Qty: 120 TABLET | Refills: 5 | Status: SHIPPED | OUTPATIENT
Start: 2025-05-05

## 2025-05-05 NOTE — TELEPHONE ENCOUNTER
Yasmine Morrison is calling to request a refill on the following medication(s):    Last Visit Date (If Applicable):  12/12/2024    Next Visit Date:    Visit date not found    Medication Request:  Requested Prescriptions     Pending Prescriptions Disp Refills    QUEtiapine (SEROQUEL) 25 MG tablet [Pharmacy Med Name: quetiapine 25 mg tablet] 120 tablet 5     Sig: TAKE TWO TABLETS BY MOUTH TWICE DAILY

## 2025-06-03 ENCOUNTER — TELEMEDICINE (OUTPATIENT)
Dept: FAMILY MEDICINE CLINIC | Age: 80
End: 2025-06-03
Payer: COMMERCIAL

## 2025-06-03 DIAGNOSIS — G40.909 SEIZURE DISORDER (HCC): Primary | ICD-10-CM

## 2025-06-03 DIAGNOSIS — Z74.01 BED CONFINEMENT STATUS: ICD-10-CM

## 2025-06-03 DIAGNOSIS — R32 ENURESIS: ICD-10-CM

## 2025-06-03 PROCEDURE — G8420 CALC BMI NORM PARAMETERS: HCPCS | Performed by: FAMILY MEDICINE

## 2025-06-03 PROCEDURE — 1123F ACP DISCUSS/DSCN MKR DOCD: CPT | Performed by: FAMILY MEDICINE

## 2025-06-03 PROCEDURE — G8400 PT W/DXA NO RESULTS DOC: HCPCS | Performed by: FAMILY MEDICINE

## 2025-06-03 PROCEDURE — 0509F URINE INCON PLAN DOCD: CPT | Performed by: FAMILY MEDICINE

## 2025-06-03 PROCEDURE — 1090F PRES/ABSN URINE INCON ASSESS: CPT | Performed by: FAMILY MEDICINE

## 2025-06-03 PROCEDURE — 99214 OFFICE O/P EST MOD 30 MIN: CPT | Performed by: FAMILY MEDICINE

## 2025-06-03 PROCEDURE — G8427 DOCREV CUR MEDS BY ELIG CLIN: HCPCS | Performed by: FAMILY MEDICINE

## 2025-06-03 PROCEDURE — 1160F RVW MEDS BY RX/DR IN RCRD: CPT | Performed by: FAMILY MEDICINE

## 2025-06-03 PROCEDURE — 1036F TOBACCO NON-USER: CPT | Performed by: FAMILY MEDICINE

## 2025-06-03 PROCEDURE — 1159F MED LIST DOCD IN RCRD: CPT | Performed by: FAMILY MEDICINE

## 2025-06-03 ASSESSMENT — PATIENT HEALTH QUESTIONNAIRE - PHQ9
SUM OF ALL RESPONSES TO PHQ QUESTIONS 1-9: 0
2. FEELING DOWN, DEPRESSED OR HOPELESS: NOT AT ALL
SUM OF ALL RESPONSES TO PHQ QUESTIONS 1-9: 0
1. LITTLE INTEREST OR PLEASURE IN DOING THINGS: NOT AT ALL

## 2025-06-03 NOTE — PROGRESS NOTES
Yasmine Morrison, was evaluated through a synchronous (real-time) audio-video encounter. The patient (or guardian if applicable) is aware that this is a billable service, which includes applicable co-pays. This Virtual Visit was conducted with patient's (and/or legal guardian's) consent. Patient identification was verified, and a caregiver was present when appropriate.   The patient was located at Home: [address] [place] [address] [place] [address]  Provider was located at Home (Appt Dept State): [place]  Confirm you are appropriately licensed, registered, or certified to deliver care in the state where the patient is located as indicated above. If you are not or unsure, please re-schedule the visit: Yes, I confirm.     Yasmine Morrison (:  1945) is a Established patient, presenting virtually for evaluation of the following:      Below is the assessment and plan developed based on review of pertinent history, physical exam, labs, studies, and medications.     Assessment & Plan  Seizure disorder (HCC)            Enuresis            Bed confinement status              No follow-ups on file.       Subjective   HPI  Review of Systems     History of Present Illness  The patient presents via virtual visit for respite care.    Her daughter reports that she has been faring well, with no new health concerns or issues. Her appetite remains satisfactory, and she continues to maintain her ability to self-feed. However, she experiences incontinence, necessitating the use of diapers for both bowel and bladder control. There are no new respiratory conditions reported.      Objective   Patient-Reported Vitals  No data recorded     Physical Exam  [INSTRUCTIONS:  \"[x]\" Indicates a positive item  \"[]\" Indicates a negative item  -- DELETE ALL ITEMS NOT EXAMINED]    Constitutional: [x] Appears well-developed and well-nourished [x] No apparent distress      [] Abnormal -     Mental status: [x] Alert and awake  [x] Oriented to person/place/time

## 2025-07-25 ENCOUNTER — TELEPHONE (OUTPATIENT)
Dept: FAMILY MEDICINE CLINIC | Age: 80
End: 2025-07-25

## 2025-08-01 RX ORDER — ATORVASTATIN CALCIUM 40 MG/1
40 TABLET, FILM COATED ORAL NIGHTLY
Qty: 30 TABLET | Refills: 3 | Status: SHIPPED | OUTPATIENT
Start: 2025-08-01

## (undated) DEVICE — DRAINBAG,ANTI-REFLUX TOWER,L/F,2000ML,LL: Brand: MEDLINE

## (undated) DEVICE — GUIDEWIRE URO L150CM DIA .035IN STIFF NIT HYDRPHL STR TIP

## (undated) DEVICE — GUIDEWIRE URO L150CM DIA0.035IN STIFF NIT HYDRPHLC STR TIP

## (undated) DEVICE — DUAL LUMEN URETERAL CATHETER

## (undated) DEVICE — SOLUTION IRRIG 3000ML STRL H2O USP UROMATIC PLAS CONT

## (undated) DEVICE — PACK PROCEDURE SURG CYSTO SVMMC LF

## (undated) DEVICE — GLOVE ORANGE PI 7   MSG9070

## (undated) DEVICE — CATHETER URETH 16FR BLLN 5CC SIL ALLY W/ SIL HYDRGEL 2 W F

## (undated) DEVICE — 3M™ STERI-STRIP™ COMPOUND BENZOIN TINCTURE 40 BAGS/CARTON 4 CARTONS/CASE C1544: Brand: 3M™ STERI-STRIP™

## (undated) DEVICE — SINGLE ACTION PUMPING SYSTEM

## (undated) DEVICE — STRIP,CLOSURE,WOUND,MEDI-STRIP,1/2X4: Brand: MEDLINE

## (undated) DEVICE — DRAPE,REIN 53X77,STERILE: Brand: MEDLINE

## (undated) DEVICE — ST CHARLES CYSTO PACK: Brand: MEDLINE INDUSTRIES, INC.

## (undated) DEVICE — LITHOTRIPSY TREATMENT

## (undated) DEVICE — SOLUTION IRRIG 1000ML STRL H2O USP PLAS POUR BTL

## (undated) DEVICE — CATHETER URET 5FR L70CM TIP 8FR OPN END CONE TIP INJ HUB

## (undated) DEVICE — SYRINGE, LUER LOCK, 10ML: Brand: MEDLINE

## (undated) DEVICE — GLOVE ORANGE PI 7 1/2   MSG9075

## (undated) DEVICE — GLOVE SURG SZ 65 THK91MIL LTX FREE SYN POLYISOPRENE

## (undated) DEVICE — ADAPTER URO SCP UROLOK LL